# Patient Record
Sex: FEMALE | Race: BLACK OR AFRICAN AMERICAN | NOT HISPANIC OR LATINO | Employment: UNEMPLOYED | URBAN - METROPOLITAN AREA
[De-identification: names, ages, dates, MRNs, and addresses within clinical notes are randomized per-mention and may not be internally consistent; named-entity substitution may affect disease eponyms.]

---

## 2021-04-15 ENCOUNTER — OFFICE VISIT (OUTPATIENT)
Dept: INTERNAL MEDICINE | Facility: CLINIC | Age: 41
End: 2021-04-15
Payer: COMMERCIAL

## 2021-04-15 ENCOUNTER — TELEPHONE (OUTPATIENT)
Dept: ADMINISTRATIVE | Facility: HOSPITAL | Age: 41
End: 2021-04-15

## 2021-04-15 ENCOUNTER — TELEPHONE (OUTPATIENT)
Dept: ORTHOPEDICS | Facility: CLINIC | Age: 41
End: 2021-04-15

## 2021-04-15 VITALS
SYSTOLIC BLOOD PRESSURE: 128 MMHG | HEIGHT: 67 IN | TEMPERATURE: 98 F | DIASTOLIC BLOOD PRESSURE: 82 MMHG | WEIGHT: 289.25 LBS | BODY MASS INDEX: 45.4 KG/M2 | HEART RATE: 64 BPM

## 2021-04-15 DIAGNOSIS — E78.5 HYPERLIPIDEMIA, UNSPECIFIED HYPERLIPIDEMIA TYPE: ICD-10-CM

## 2021-04-15 DIAGNOSIS — M75.102 TEAR OF LEFT ROTATOR CUFF, UNSPECIFIED TEAR EXTENT, UNSPECIFIED WHETHER TRAUMATIC: Primary | ICD-10-CM

## 2021-04-15 DIAGNOSIS — Z85.3 HISTORY OF BREAST CANCER: ICD-10-CM

## 2021-04-15 DIAGNOSIS — T45.1X5A ADVERSE EFFECT OF CHEMOTHERAPY, INITIAL ENCOUNTER: ICD-10-CM

## 2021-04-15 DIAGNOSIS — R32 URINARY INCONTINENCE, UNSPECIFIED TYPE: ICD-10-CM

## 2021-04-15 DIAGNOSIS — F41.1 GAD (GENERALIZED ANXIETY DISORDER): ICD-10-CM

## 2021-04-15 DIAGNOSIS — Z12.31 SCREENING MAMMOGRAM, ENCOUNTER FOR: ICD-10-CM

## 2021-04-15 DIAGNOSIS — Z90.12 STATUS POST LEFT MASTECTOMY: ICD-10-CM

## 2021-04-15 DIAGNOSIS — Z28.9 DELAYED IMMUNIZATIONS: ICD-10-CM

## 2021-04-15 DIAGNOSIS — R73.03 PRE-DIABETES: ICD-10-CM

## 2021-04-15 PROBLEM — Z90.10 S/P MASTECTOMY: Status: ACTIVE | Noted: 2021-04-15

## 2021-04-15 PROCEDURE — 99999 PR PBB SHADOW E&M-NEW PATIENT-LVL V: ICD-10-PCS | Mod: PBBFAC,,, | Performed by: FAMILY MEDICINE

## 2021-04-15 PROCEDURE — 1125F PR PAIN SEVERITY QUANTIFIED, PAIN PRESENT: ICD-10-PCS | Mod: S$GLB,,, | Performed by: FAMILY MEDICINE

## 2021-04-15 PROCEDURE — 90471 TDAP VACCINE GREATER THAN OR EQUAL TO 7YO IM: ICD-10-PCS | Mod: S$GLB,,, | Performed by: FAMILY MEDICINE

## 2021-04-15 PROCEDURE — 3008F BODY MASS INDEX DOCD: CPT | Mod: CPTII,S$GLB,, | Performed by: FAMILY MEDICINE

## 2021-04-15 PROCEDURE — 99204 PR OFFICE/OUTPT VISIT, NEW, LEVL IV, 45-59 MIN: ICD-10-PCS | Mod: 25,S$GLB,, | Performed by: FAMILY MEDICINE

## 2021-04-15 PROCEDURE — 90715 TDAP VACCINE GREATER THAN OR EQUAL TO 7YO IM: ICD-10-PCS | Mod: S$GLB,,, | Performed by: FAMILY MEDICINE

## 2021-04-15 PROCEDURE — 3008F PR BODY MASS INDEX (BMI) DOCUMENTED: ICD-10-PCS | Mod: CPTII,S$GLB,, | Performed by: FAMILY MEDICINE

## 2021-04-15 PROCEDURE — 1125F AMNT PAIN NOTED PAIN PRSNT: CPT | Mod: S$GLB,,, | Performed by: FAMILY MEDICINE

## 2021-04-15 PROCEDURE — 90471 IMMUNIZATION ADMIN: CPT | Mod: S$GLB,,, | Performed by: FAMILY MEDICINE

## 2021-04-15 PROCEDURE — 99204 OFFICE O/P NEW MOD 45 MIN: CPT | Mod: 25,S$GLB,, | Performed by: FAMILY MEDICINE

## 2021-04-15 PROCEDURE — 90715 TDAP VACCINE 7 YRS/> IM: CPT | Mod: S$GLB,,, | Performed by: FAMILY MEDICINE

## 2021-04-15 PROCEDURE — 99999 PR PBB SHADOW E&M-NEW PATIENT-LVL V: CPT | Mod: PBBFAC,,, | Performed by: FAMILY MEDICINE

## 2021-04-15 RX ORDER — METFORMIN HYDROCHLORIDE 500 MG/1
TABLET, EXTENDED RELEASE ORAL
Qty: 30 TABLET | Refills: 0 | Status: SHIPPED | OUTPATIENT
Start: 2021-04-15 | End: 2021-08-03 | Stop reason: SDUPTHER

## 2021-04-15 RX ORDER — ESCITALOPRAM OXALATE 10 MG/1
10 TABLET ORAL DAILY
COMMUNITY
Start: 2021-03-24 | End: 2021-04-15 | Stop reason: SDUPTHER

## 2021-04-15 RX ORDER — PRAVASTATIN SODIUM 40 MG/1
40 TABLET ORAL NIGHTLY
Qty: 30 TABLET | Refills: 0 | Status: SHIPPED | OUTPATIENT
Start: 2021-04-15 | End: 2021-08-03 | Stop reason: SDUPTHER

## 2021-04-15 RX ORDER — LEVOTHYROXINE SODIUM 50 UG/1
50 TABLET ORAL EVERY MORNING
Qty: 30 TABLET | Refills: 0 | Status: SHIPPED | OUTPATIENT
Start: 2021-04-15 | End: 2021-07-29 | Stop reason: SDUPTHER

## 2021-04-15 RX ORDER — LEVOTHYROXINE SODIUM 50 UG/1
50 TABLET ORAL EVERY MORNING
COMMUNITY
Start: 2021-04-04 | End: 2021-04-15 | Stop reason: SDUPTHER

## 2021-04-15 RX ORDER — TRAZODONE HYDROCHLORIDE 50 MG/1
TABLET ORAL
COMMUNITY
Start: 2021-04-09 | End: 2021-04-15 | Stop reason: SDUPTHER

## 2021-04-15 RX ORDER — OXYCODONE AND ACETAMINOPHEN 5; 325 MG/1; MG/1
1 TABLET ORAL EVERY 4 HOURS PRN
COMMUNITY
Start: 2021-03-02 | End: 2021-07-02

## 2021-04-15 RX ORDER — OXYBUTYNIN CHLORIDE 5 MG/1
5 TABLET, EXTENDED RELEASE ORAL DAILY
Qty: 30 TABLET | Refills: 0 | Status: SHIPPED | OUTPATIENT
Start: 2021-04-15 | End: 2021-05-07 | Stop reason: SDUPTHER

## 2021-04-15 RX ORDER — TRAZODONE HYDROCHLORIDE 50 MG/1
TABLET ORAL
Qty: 30 TABLET | Refills: 0 | Status: SHIPPED | OUTPATIENT
Start: 2021-04-15 | End: 2021-04-30

## 2021-04-15 RX ORDER — NAPROXEN 500 MG/1
500 TABLET ORAL 2 TIMES DAILY
COMMUNITY
Start: 2021-03-24 | End: 2021-10-11

## 2021-04-15 RX ORDER — ESCITALOPRAM OXALATE 10 MG/1
10 TABLET ORAL DAILY
Qty: 30 TABLET | Refills: 0 | Status: SHIPPED | OUTPATIENT
Start: 2021-04-15 | End: 2021-04-30

## 2021-04-15 RX ORDER — ASPIRIN 325 MG
TABLET ORAL
COMMUNITY
End: 2023-03-22 | Stop reason: SDUPTHER

## 2021-04-15 RX ORDER — METFORMIN HYDROCHLORIDE 500 MG/1
TABLET, EXTENDED RELEASE ORAL
COMMUNITY
Start: 2021-03-29 | End: 2021-04-15 | Stop reason: SDUPTHER

## 2021-04-15 RX ORDER — PRAVASTATIN SODIUM 40 MG/1
40 TABLET ORAL NIGHTLY
COMMUNITY
Start: 2021-04-04 | End: 2021-04-15 | Stop reason: SDUPTHER

## 2021-04-15 RX ORDER — MAGNESIUM 250 MG
TABLET ORAL ONCE
COMMUNITY
End: 2021-07-02

## 2021-04-16 ENCOUNTER — TELEPHONE (OUTPATIENT)
Dept: ORTHOPEDICS | Facility: CLINIC | Age: 41
End: 2021-04-16

## 2021-04-16 DIAGNOSIS — M25.512 LEFT SHOULDER PAIN, UNSPECIFIED CHRONICITY: Primary | ICD-10-CM

## 2021-04-19 ENCOUNTER — OFFICE VISIT (OUTPATIENT)
Dept: ORTHOPEDICS | Facility: CLINIC | Age: 41
End: 2021-04-19
Payer: COMMERCIAL

## 2021-04-19 ENCOUNTER — HOSPITAL ENCOUNTER (OUTPATIENT)
Dept: RADIOLOGY | Facility: HOSPITAL | Age: 41
Discharge: HOME | End: 2021-04-19
Attending: PHYSICAL MEDICINE & REHABILITATION
Payer: COMMERCIAL

## 2021-04-19 VITALS — WEIGHT: 289 LBS | HEIGHT: 67 IN | BODY MASS INDEX: 45.36 KG/M2

## 2021-04-19 DIAGNOSIS — M75.122 NONTRAUMATIC COMPLETE TEAR OF LEFT ROTATOR CUFF: Primary | ICD-10-CM

## 2021-04-19 DIAGNOSIS — M75.02 ADHESIVE CAPSULITIS OF LEFT SHOULDER: ICD-10-CM

## 2021-04-19 DIAGNOSIS — G89.29 CHRONIC LEFT SHOULDER PAIN: ICD-10-CM

## 2021-04-19 DIAGNOSIS — M25.512 LEFT SHOULDER PAIN, UNSPECIFIED CHRONICITY: ICD-10-CM

## 2021-04-19 DIAGNOSIS — M25.512 CHRONIC LEFT SHOULDER PAIN: ICD-10-CM

## 2021-04-19 PROCEDURE — 99999 PR PBB SHADOW E&M-EST. PATIENT-LVL IV: ICD-10-PCS | Mod: PBBFAC,,, | Performed by: PHYSICAL MEDICINE & REHABILITATION

## 2021-04-19 PROCEDURE — 1125F PR PAIN SEVERITY QUANTIFIED, PAIN PRESENT: ICD-10-PCS | Mod: S$GLB,,, | Performed by: PHYSICAL MEDICINE & REHABILITATION

## 2021-04-19 PROCEDURE — 3008F PR BODY MASS INDEX (BMI) DOCUMENTED: ICD-10-PCS | Mod: CPTII,S$GLB,, | Performed by: PHYSICAL MEDICINE & REHABILITATION

## 2021-04-19 PROCEDURE — 1125F AMNT PAIN NOTED PAIN PRSNT: CPT | Mod: S$GLB,,, | Performed by: PHYSICAL MEDICINE & REHABILITATION

## 2021-04-19 PROCEDURE — 99999 PR PBB SHADOW E&M-EST. PATIENT-LVL IV: CPT | Mod: PBBFAC,,, | Performed by: PHYSICAL MEDICINE & REHABILITATION

## 2021-04-19 PROCEDURE — 73030 X-RAY EXAM OF SHOULDER: CPT | Mod: 26,LT,, | Performed by: RADIOLOGY

## 2021-04-19 PROCEDURE — 73030 XR SHOULDER COMPLETE 2 OR MORE VIEWS LEFT: ICD-10-PCS | Mod: 26,LT,, | Performed by: RADIOLOGY

## 2021-04-19 PROCEDURE — 3008F BODY MASS INDEX DOCD: CPT | Mod: CPTII,S$GLB,, | Performed by: PHYSICAL MEDICINE & REHABILITATION

## 2021-04-19 PROCEDURE — 73030 X-RAY EXAM OF SHOULDER: CPT | Mod: TC,LT

## 2021-04-19 PROCEDURE — 99204 PR OFFICE/OUTPT VISIT, NEW, LEVL IV, 45-59 MIN: ICD-10-PCS | Mod: S$GLB,,, | Performed by: PHYSICAL MEDICINE & REHABILITATION

## 2021-04-19 PROCEDURE — 99204 OFFICE O/P NEW MOD 45 MIN: CPT | Mod: S$GLB,,, | Performed by: PHYSICAL MEDICINE & REHABILITATION

## 2021-04-23 ENCOUNTER — HOSPITAL ENCOUNTER (OUTPATIENT)
Dept: RADIOLOGY | Facility: HOSPITAL | Age: 41
Discharge: HOME | End: 2021-04-23
Attending: PHYSICAL MEDICINE & REHABILITATION
Payer: COMMERCIAL

## 2021-04-23 DIAGNOSIS — G89.29 CHRONIC LEFT SHOULDER PAIN: ICD-10-CM

## 2021-04-23 DIAGNOSIS — M25.512 CHRONIC LEFT SHOULDER PAIN: ICD-10-CM

## 2021-04-23 PROCEDURE — 73221 MRI JOINT UPR EXTREM W/O DYE: CPT | Mod: TC,PO,LT

## 2021-04-23 PROCEDURE — 73221 MRI SHOULDER WITHOUT CONTRAST LEFT: ICD-10-PCS | Mod: 26,LT,, | Performed by: RADIOLOGY

## 2021-04-23 PROCEDURE — 73221 MRI JOINT UPR EXTREM W/O DYE: CPT | Mod: 26,LT,, | Performed by: RADIOLOGY

## 2021-04-26 ENCOUNTER — TELEPHONE (OUTPATIENT)
Dept: ORTHOPEDICS | Facility: CLINIC | Age: 41
End: 2021-04-26

## 2021-04-27 ENCOUNTER — OFFICE VISIT (OUTPATIENT)
Dept: ORTHOPEDICS | Facility: CLINIC | Age: 41
End: 2021-04-27
Payer: COMMERCIAL

## 2021-04-27 VITALS — HEIGHT: 67 IN | WEIGHT: 289 LBS | BODY MASS INDEX: 45.36 KG/M2

## 2021-04-27 DIAGNOSIS — M75.122 NONTRAUMATIC COMPLETE TEAR OF LEFT ROTATOR CUFF: Primary | ICD-10-CM

## 2021-04-27 PROCEDURE — 3008F PR BODY MASS INDEX (BMI) DOCUMENTED: ICD-10-PCS | Mod: CPTII,S$GLB,, | Performed by: STUDENT IN AN ORGANIZED HEALTH CARE EDUCATION/TRAINING PROGRAM

## 2021-04-27 PROCEDURE — 99999 PR PBB SHADOW E&M-EST. PATIENT-LVL III: ICD-10-PCS | Mod: PBBFAC,,, | Performed by: STUDENT IN AN ORGANIZED HEALTH CARE EDUCATION/TRAINING PROGRAM

## 2021-04-27 PROCEDURE — 99204 OFFICE O/P NEW MOD 45 MIN: CPT | Mod: S$GLB,,, | Performed by: STUDENT IN AN ORGANIZED HEALTH CARE EDUCATION/TRAINING PROGRAM

## 2021-04-27 PROCEDURE — 99999 PR PBB SHADOW E&M-EST. PATIENT-LVL III: CPT | Mod: PBBFAC,,, | Performed by: STUDENT IN AN ORGANIZED HEALTH CARE EDUCATION/TRAINING PROGRAM

## 2021-04-27 PROCEDURE — 99204 PR OFFICE/OUTPT VISIT, NEW, LEVL IV, 45-59 MIN: ICD-10-PCS | Mod: S$GLB,,, | Performed by: STUDENT IN AN ORGANIZED HEALTH CARE EDUCATION/TRAINING PROGRAM

## 2021-04-27 PROCEDURE — 3008F BODY MASS INDEX DOCD: CPT | Mod: CPTII,S$GLB,, | Performed by: STUDENT IN AN ORGANIZED HEALTH CARE EDUCATION/TRAINING PROGRAM

## 2021-04-27 PROCEDURE — 1125F PR PAIN SEVERITY QUANTIFIED, PAIN PRESENT: ICD-10-PCS | Mod: S$GLB,,, | Performed by: STUDENT IN AN ORGANIZED HEALTH CARE EDUCATION/TRAINING PROGRAM

## 2021-04-27 PROCEDURE — 1125F AMNT PAIN NOTED PAIN PRSNT: CPT | Mod: S$GLB,,, | Performed by: STUDENT IN AN ORGANIZED HEALTH CARE EDUCATION/TRAINING PROGRAM

## 2021-04-30 ENCOUNTER — LAB VISIT (OUTPATIENT)
Dept: LAB | Facility: HOSPITAL | Age: 41
End: 2021-04-30
Attending: FAMILY MEDICINE
Payer: COMMERCIAL

## 2021-04-30 ENCOUNTER — OFFICE VISIT (OUTPATIENT)
Dept: OPHTHALMOLOGY | Facility: CLINIC | Age: 41
End: 2021-04-30
Payer: COMMERCIAL

## 2021-04-30 ENCOUNTER — OFFICE VISIT (OUTPATIENT)
Dept: INTERNAL MEDICINE | Facility: CLINIC | Age: 41
End: 2021-04-30
Payer: COMMERCIAL

## 2021-04-30 VITALS
SYSTOLIC BLOOD PRESSURE: 138 MMHG | RESPIRATION RATE: 18 BRPM | WEIGHT: 293 LBS | BODY MASS INDEX: 45.99 KG/M2 | HEART RATE: 66 BPM | TEMPERATURE: 98 F | DIASTOLIC BLOOD PRESSURE: 84 MMHG | HEIGHT: 67 IN

## 2021-04-30 DIAGNOSIS — E11.9 DIABETES MELLITUS WITHOUT COMPLICATION: Primary | ICD-10-CM

## 2021-04-30 DIAGNOSIS — F41.1 GAD (GENERALIZED ANXIETY DISORDER): ICD-10-CM

## 2021-04-30 DIAGNOSIS — Z00.00 ROUTINE GENERAL MEDICAL EXAMINATION AT A HEALTH CARE FACILITY: Primary | ICD-10-CM

## 2021-04-30 DIAGNOSIS — H53.9 VISION DISTURBANCE: ICD-10-CM

## 2021-04-30 DIAGNOSIS — Z85.3 HISTORY OF BREAST CANCER: ICD-10-CM

## 2021-04-30 DIAGNOSIS — Z90.710 S/P HYSTERECTOMY: ICD-10-CM

## 2021-04-30 DIAGNOSIS — G47.09 OTHER INSOMNIA: ICD-10-CM

## 2021-04-30 DIAGNOSIS — Z00.00 ROUTINE GENERAL MEDICAL EXAMINATION AT A HEALTH CARE FACILITY: ICD-10-CM

## 2021-04-30 DIAGNOSIS — H04.123 DRY EYE SYNDROME, BILATERAL: ICD-10-CM

## 2021-04-30 DIAGNOSIS — Z78.0 POSTMENOPAUSAL: ICD-10-CM

## 2021-04-30 DIAGNOSIS — H53.2 MONOCULAR DIPLOPIA OF BOTH EYES: ICD-10-CM

## 2021-04-30 DIAGNOSIS — R06.09 DOE (DYSPNEA ON EXERTION): ICD-10-CM

## 2021-04-30 DIAGNOSIS — H52.13 MYOPIA OF BOTH EYES: ICD-10-CM

## 2021-04-30 PROBLEM — Z12.31 SCREENING MAMMOGRAM, ENCOUNTER FOR: Status: RESOLVED | Noted: 2021-04-15 | Resolved: 2021-04-30

## 2021-04-30 LAB
25(OH)D3+25(OH)D2 SERPL-MCNC: 24 NG/ML (ref 30–96)
ALBUMIN SERPL BCP-MCNC: 3.5 G/DL (ref 3.5–5.2)
ALP SERPL-CCNC: 130 U/L (ref 55–135)
ALT SERPL W/O P-5'-P-CCNC: 19 U/L (ref 10–44)
ANION GAP SERPL CALC-SCNC: 7 MMOL/L (ref 8–16)
AST SERPL-CCNC: 20 U/L (ref 10–40)
BASOPHILS # BLD AUTO: 0.03 K/UL (ref 0–0.2)
BASOPHILS NFR BLD: 0.7 % (ref 0–1.9)
BILIRUB SERPL-MCNC: 0.2 MG/DL (ref 0.1–1)
BUN SERPL-MCNC: 9 MG/DL (ref 6–20)
CALCIUM SERPL-MCNC: 9.6 MG/DL (ref 8.7–10.5)
CHLORIDE SERPL-SCNC: 107 MMOL/L (ref 95–110)
CHOLEST SERPL-MCNC: 134 MG/DL (ref 120–199)
CHOLEST/HDLC SERPL: 3.1 {RATIO} (ref 2–5)
CO2 SERPL-SCNC: 26 MMOL/L (ref 23–29)
CREAT SERPL-MCNC: 0.8 MG/DL (ref 0.5–1.4)
DIFFERENTIAL METHOD: ABNORMAL
EOSINOPHIL # BLD AUTO: 0.1 K/UL (ref 0–0.5)
EOSINOPHIL NFR BLD: 2.7 % (ref 0–8)
ERYTHROCYTE [DISTWIDTH] IN BLOOD BY AUTOMATED COUNT: 16.4 % (ref 11.5–14.5)
EST. GFR  (AFRICAN AMERICAN): >60 ML/MIN/1.73 M^2
EST. GFR  (NON AFRICAN AMERICAN): >60 ML/MIN/1.73 M^2
ESTIMATED AVG GLUCOSE: 111 MG/DL (ref 68–131)
FERRITIN SERPL-MCNC: 15 NG/ML (ref 20–300)
GLUCOSE SERPL-MCNC: 79 MG/DL (ref 70–110)
HBA1C MFR BLD: 5.5 % (ref 4–5.6)
HCT VFR BLD AUTO: 38.3 % (ref 37–48.5)
HDLC SERPL-MCNC: 43 MG/DL (ref 40–75)
HDLC SERPL: 32.1 % (ref 20–50)
HGB BLD-MCNC: 11.9 G/DL (ref 12–16)
IMM GRANULOCYTES # BLD AUTO: 0.01 K/UL (ref 0–0.04)
IMM GRANULOCYTES NFR BLD AUTO: 0.2 % (ref 0–0.5)
IRON SERPL-MCNC: 51 UG/DL (ref 30–160)
LDLC SERPL CALC-MCNC: 67.2 MG/DL (ref 63–159)
LEFT EYE DM RETINOPATHY: NEGATIVE
LYMPHOCYTES # BLD AUTO: 2 K/UL (ref 1–4.8)
LYMPHOCYTES NFR BLD: 43.3 % (ref 18–48)
MCH RBC QN AUTO: 24.4 PG (ref 27–31)
MCHC RBC AUTO-ENTMCNC: 31.1 G/DL (ref 32–36)
MCV RBC AUTO: 79 FL (ref 82–98)
MONOCYTES # BLD AUTO: 0.4 K/UL (ref 0.3–1)
MONOCYTES NFR BLD: 9.3 % (ref 4–15)
NEUTROPHILS # BLD AUTO: 2 K/UL (ref 1.8–7.7)
NEUTROPHILS NFR BLD: 43.8 % (ref 38–73)
NONHDLC SERPL-MCNC: 91 MG/DL
NRBC BLD-RTO: 0 /100 WBC
PLATELET # BLD AUTO: 305 K/UL (ref 150–450)
PMV BLD AUTO: 10.1 FL (ref 9.2–12.9)
POTASSIUM SERPL-SCNC: 4.3 MMOL/L (ref 3.5–5.1)
PROT SERPL-MCNC: 7 G/DL (ref 6–8.4)
RBC # BLD AUTO: 4.88 M/UL (ref 4–5.4)
RIGHT EYE DM RETINOPATHY: NEGATIVE
SATURATED IRON: 11 % (ref 20–50)
SODIUM SERPL-SCNC: 140 MMOL/L (ref 136–145)
TOTAL IRON BINDING CAPACITY: 469 UG/DL (ref 250–450)
TRANSFERRIN SERPL-MCNC: 317 MG/DL (ref 200–375)
TRIGL SERPL-MCNC: 119 MG/DL (ref 30–150)
TSH SERPL DL<=0.005 MIU/L-ACNC: 1.73 UIU/ML (ref 0.4–4)
WBC # BLD AUTO: 4.5 K/UL (ref 3.9–12.7)

## 2021-04-30 PROCEDURE — 36415 COLL VENOUS BLD VENIPUNCTURE: CPT | Mod: PO | Performed by: FAMILY MEDICINE

## 2021-04-30 PROCEDURE — 92015 PR REFRACTION: ICD-10-PCS | Mod: S$GLB,,, | Performed by: OPTOMETRIST

## 2021-04-30 PROCEDURE — 99999 PR PBB SHADOW E&M-EST. PATIENT-LVL IV: CPT | Mod: PBBFAC,,, | Performed by: FAMILY MEDICINE

## 2021-04-30 PROCEDURE — 92004 PR EYE EXAM, NEW PATIENT,COMPREHESV: ICD-10-PCS | Mod: S$GLB,,, | Performed by: OPTOMETRIST

## 2021-04-30 PROCEDURE — 85025 COMPLETE CBC W/AUTO DIFF WBC: CPT | Performed by: FAMILY MEDICINE

## 2021-04-30 PROCEDURE — 3008F BODY MASS INDEX DOCD: CPT | Mod: CPTII,S$GLB,, | Performed by: FAMILY MEDICINE

## 2021-04-30 PROCEDURE — 84443 ASSAY THYROID STIM HORMONE: CPT | Performed by: FAMILY MEDICINE

## 2021-04-30 PROCEDURE — 82728 ASSAY OF FERRITIN: CPT | Performed by: FAMILY MEDICINE

## 2021-04-30 PROCEDURE — 99396 PREV VISIT EST AGE 40-64: CPT | Mod: S$GLB,,, | Performed by: FAMILY MEDICINE

## 2021-04-30 PROCEDURE — 83540 ASSAY OF IRON: CPT | Performed by: FAMILY MEDICINE

## 2021-04-30 PROCEDURE — 99999 PR PBB SHADOW E&M-EST. PATIENT-LVL III: ICD-10-PCS | Mod: PBBFAC,,, | Performed by: OPTOMETRIST

## 2021-04-30 PROCEDURE — 99396 PR PREVENTIVE VISIT,EST,40-64: ICD-10-PCS | Mod: S$GLB,,, | Performed by: FAMILY MEDICINE

## 2021-04-30 PROCEDURE — 86803 HEPATITIS C AB TEST: CPT | Performed by: FAMILY MEDICINE

## 2021-04-30 PROCEDURE — 86703 HIV-1/HIV-2 1 RESULT ANTBDY: CPT | Performed by: FAMILY MEDICINE

## 2021-04-30 PROCEDURE — 82306 VITAMIN D 25 HYDROXY: CPT | Performed by: FAMILY MEDICINE

## 2021-04-30 PROCEDURE — 92015 DETERMINE REFRACTIVE STATE: CPT | Mod: S$GLB,,, | Performed by: OPTOMETRIST

## 2021-04-30 PROCEDURE — 3008F PR BODY MASS INDEX (BMI) DOCUMENTED: ICD-10-PCS | Mod: CPTII,S$GLB,, | Performed by: FAMILY MEDICINE

## 2021-04-30 PROCEDURE — 80053 COMPREHEN METABOLIC PANEL: CPT | Performed by: FAMILY MEDICINE

## 2021-04-30 PROCEDURE — 83036 HEMOGLOBIN GLYCOSYLATED A1C: CPT | Performed by: FAMILY MEDICINE

## 2021-04-30 PROCEDURE — 99999 PR PBB SHADOW E&M-EST. PATIENT-LVL III: CPT | Mod: PBBFAC,,, | Performed by: OPTOMETRIST

## 2021-04-30 PROCEDURE — 80061 LIPID PANEL: CPT | Performed by: FAMILY MEDICINE

## 2021-04-30 PROCEDURE — 1125F PR PAIN SEVERITY QUANTIFIED, PAIN PRESENT: ICD-10-PCS | Mod: S$GLB,,, | Performed by: FAMILY MEDICINE

## 2021-04-30 PROCEDURE — 92004 COMPRE OPH EXAM NEW PT 1/>: CPT | Mod: S$GLB,,, | Performed by: OPTOMETRIST

## 2021-04-30 PROCEDURE — 1125F AMNT PAIN NOTED PAIN PRSNT: CPT | Mod: S$GLB,,, | Performed by: FAMILY MEDICINE

## 2021-04-30 PROCEDURE — 99999 PR PBB SHADOW E&M-EST. PATIENT-LVL IV: ICD-10-PCS | Mod: PBBFAC,,, | Performed by: FAMILY MEDICINE

## 2021-04-30 RX ORDER — BUSPIRONE HYDROCHLORIDE 5 MG/1
5 TABLET ORAL 3 TIMES DAILY
Qty: 270 TABLET | Refills: 1 | Status: SHIPPED | OUTPATIENT
Start: 2021-04-30 | End: 2021-05-13

## 2021-04-30 RX ORDER — CALCIUM CARBONATE/VITAMIN D3 600 MG-20
1 TABLET,CHEWABLE ORAL ONCE
Qty: 360 TABLET | Refills: 1 | Status: SHIPPED | OUTPATIENT
Start: 2021-04-30 | End: 2021-04-30

## 2021-04-30 RX ORDER — ESCITALOPRAM OXALATE 20 MG/1
20 TABLET ORAL DAILY
Qty: 90 TABLET | Refills: 0 | Status: SHIPPED | OUTPATIENT
Start: 2021-04-30 | End: 2021-08-03 | Stop reason: SDUPTHER

## 2021-04-30 RX ORDER — TRAZODONE HYDROCHLORIDE 150 MG/1
150 TABLET ORAL NIGHTLY
Qty: 90 TABLET | Refills: 0 | Status: SHIPPED | OUTPATIENT
Start: 2021-04-30 | End: 2021-08-03 | Stop reason: SDUPTHER

## 2021-05-03 DIAGNOSIS — E78.5 HYPERLIPIDEMIA, UNSPECIFIED HYPERLIPIDEMIA TYPE: ICD-10-CM

## 2021-05-03 DIAGNOSIS — R06.09 DOE (DYSPNEA ON EXERTION): Primary | ICD-10-CM

## 2021-05-03 LAB
HCV AB SERPL QL IA: NEGATIVE
HIV 1+2 AB+HIV1 P24 AG SERPL QL IA: NEGATIVE

## 2021-05-04 ENCOUNTER — OFFICE VISIT (OUTPATIENT)
Dept: CARDIOLOGY | Facility: CLINIC | Age: 41
End: 2021-05-04
Payer: COMMERCIAL

## 2021-05-04 ENCOUNTER — HOSPITAL ENCOUNTER (OUTPATIENT)
Dept: CARDIOLOGY | Facility: HOSPITAL | Age: 41
Discharge: HOME | End: 2021-05-04
Attending: INTERNAL MEDICINE
Payer: COMMERCIAL

## 2021-05-04 ENCOUNTER — HOSPITAL ENCOUNTER (OUTPATIENT)
Dept: RADIOLOGY | Facility: HOSPITAL | Age: 41
Discharge: HOME | End: 2021-05-04
Attending: INTERNAL MEDICINE
Payer: COMMERCIAL

## 2021-05-04 VITALS
OXYGEN SATURATION: 98 % | BODY MASS INDEX: 47.09 KG/M2 | HEIGHT: 66 IN | DIASTOLIC BLOOD PRESSURE: 90 MMHG | HEART RATE: 65 BPM | RESPIRATION RATE: 16 BRPM | WEIGHT: 293 LBS | SYSTOLIC BLOOD PRESSURE: 130 MMHG

## 2021-05-04 DIAGNOSIS — F41.1 GAD (GENERALIZED ANXIETY DISORDER): ICD-10-CM

## 2021-05-04 DIAGNOSIS — R00.2 PALPITATIONS: ICD-10-CM

## 2021-05-04 DIAGNOSIS — R06.09 DOE (DYSPNEA ON EXERTION): ICD-10-CM

## 2021-05-04 DIAGNOSIS — E78.5 HYPERLIPIDEMIA, UNSPECIFIED HYPERLIPIDEMIA TYPE: ICD-10-CM

## 2021-05-04 DIAGNOSIS — R07.9 CHEST PAIN, UNSPECIFIED TYPE: ICD-10-CM

## 2021-05-04 DIAGNOSIS — E66.01 OBESITY, MORBID, BMI 40.0-49.9: ICD-10-CM

## 2021-05-04 DIAGNOSIS — R73.03 PRE-DIABETES: ICD-10-CM

## 2021-05-04 DIAGNOSIS — R07.9 CHEST PAIN, UNSPECIFIED TYPE: Primary | ICD-10-CM

## 2021-05-04 DIAGNOSIS — E78.49 OTHER HYPERLIPIDEMIA: ICD-10-CM

## 2021-05-04 PROCEDURE — 71046 X-RAY EXAM CHEST 2 VIEWS: CPT | Mod: 26,,, | Performed by: RADIOLOGY

## 2021-05-04 PROCEDURE — 71046 XR CHEST PA AND LATERAL: ICD-10-PCS | Mod: 26,,, | Performed by: RADIOLOGY

## 2021-05-04 PROCEDURE — 71046 X-RAY EXAM CHEST 2 VIEWS: CPT | Mod: TC,FY,PO

## 2021-05-04 PROCEDURE — 93005 ELECTROCARDIOGRAM TRACING: CPT | Mod: PO

## 2021-05-04 PROCEDURE — 99204 OFFICE O/P NEW MOD 45 MIN: CPT | Mod: S$GLB,,, | Performed by: INTERNAL MEDICINE

## 2021-05-04 PROCEDURE — 99999 PR PBB SHADOW E&M-EST. PATIENT-LVL IV: ICD-10-PCS | Mod: PBBFAC,,, | Performed by: INTERNAL MEDICINE

## 2021-05-04 PROCEDURE — 93010 EKG 12-LEAD: ICD-10-PCS | Mod: ,,, | Performed by: INTERNAL MEDICINE

## 2021-05-04 PROCEDURE — 99204 PR OFFICE/OUTPT VISIT, NEW, LEVL IV, 45-59 MIN: ICD-10-PCS | Mod: S$GLB,,, | Performed by: INTERNAL MEDICINE

## 2021-05-04 PROCEDURE — 99999 PR PBB SHADOW E&M-EST. PATIENT-LVL IV: CPT | Mod: PBBFAC,,, | Performed by: INTERNAL MEDICINE

## 2021-05-04 PROCEDURE — 93010 ELECTROCARDIOGRAM REPORT: CPT | Mod: ,,, | Performed by: INTERNAL MEDICINE

## 2021-05-04 RX ORDER — ASPIRIN 81 MG/1
81 TABLET ORAL DAILY
Qty: 30 TABLET | Refills: 0 | Status: SHIPPED | OUTPATIENT
Start: 2021-05-04 | End: 2022-02-14

## 2021-05-04 RX ORDER — HYDROCHLOROTHIAZIDE 25 MG/1
25 TABLET ORAL DAILY
Qty: 30 TABLET | Refills: 3 | Status: SHIPPED | OUTPATIENT
Start: 2021-05-04 | End: 2021-05-04 | Stop reason: SDUPTHER

## 2021-05-04 RX ORDER — HYDROCHLOROTHIAZIDE 25 MG/1
25 TABLET ORAL DAILY PRN
Qty: 30 TABLET | Refills: 3 | Status: SHIPPED | OUTPATIENT
Start: 2021-05-04 | End: 2021-08-03 | Stop reason: SDUPTHER

## 2021-05-05 ENCOUNTER — TELEPHONE (OUTPATIENT)
Dept: INTERNAL MEDICINE | Facility: CLINIC | Age: 41
End: 2021-05-05

## 2021-05-05 ENCOUNTER — TELEPHONE (OUTPATIENT)
Dept: CARDIOLOGY | Facility: CLINIC | Age: 41
End: 2021-05-05

## 2021-05-07 ENCOUNTER — OFFICE VISIT (OUTPATIENT)
Dept: INTERNAL MEDICINE | Facility: CLINIC | Age: 41
End: 2021-05-07
Payer: COMMERCIAL

## 2021-05-07 VITALS
WEIGHT: 289.69 LBS | RESPIRATION RATE: 17 BRPM | TEMPERATURE: 97 F | DIASTOLIC BLOOD PRESSURE: 72 MMHG | HEIGHT: 66 IN | HEART RATE: 60 BPM | BODY MASS INDEX: 46.56 KG/M2 | SYSTOLIC BLOOD PRESSURE: 118 MMHG

## 2021-05-07 DIAGNOSIS — F41.1 GAD (GENERALIZED ANXIETY DISORDER): ICD-10-CM

## 2021-05-07 DIAGNOSIS — R32 URINARY INCONTINENCE, UNSPECIFIED TYPE: ICD-10-CM

## 2021-05-07 DIAGNOSIS — E78.49 OTHER HYPERLIPIDEMIA: ICD-10-CM

## 2021-05-07 DIAGNOSIS — R73.03 PRE-DIABETES: ICD-10-CM

## 2021-05-07 DIAGNOSIS — M75.122 NONTRAUMATIC COMPLETE TEAR OF LEFT ROTATOR CUFF: ICD-10-CM

## 2021-05-07 DIAGNOSIS — R06.02 SOB (SHORTNESS OF BREATH): Primary | ICD-10-CM

## 2021-05-07 DIAGNOSIS — T45.1X5A ADVERSE EFFECT OF CHEMOTHERAPY, INITIAL ENCOUNTER: ICD-10-CM

## 2021-05-07 DIAGNOSIS — Z85.3 HISTORY OF BREAST CANCER: ICD-10-CM

## 2021-05-07 PROBLEM — H53.9 VISION DISTURBANCE: Status: RESOLVED | Noted: 2021-04-30 | Resolved: 2021-05-07

## 2021-05-07 PROCEDURE — 99214 OFFICE O/P EST MOD 30 MIN: CPT | Mod: S$GLB,,, | Performed by: FAMILY MEDICINE

## 2021-05-07 PROCEDURE — 3008F PR BODY MASS INDEX (BMI) DOCUMENTED: ICD-10-PCS | Mod: CPTII,S$GLB,, | Performed by: FAMILY MEDICINE

## 2021-05-07 PROCEDURE — 1125F AMNT PAIN NOTED PAIN PRSNT: CPT | Mod: S$GLB,,, | Performed by: FAMILY MEDICINE

## 2021-05-07 PROCEDURE — 3008F BODY MASS INDEX DOCD: CPT | Mod: CPTII,S$GLB,, | Performed by: FAMILY MEDICINE

## 2021-05-07 PROCEDURE — 99999 PR PBB SHADOW E&M-EST. PATIENT-LVL IV: ICD-10-PCS | Mod: PBBFAC,,, | Performed by: FAMILY MEDICINE

## 2021-05-07 PROCEDURE — 99214 PR OFFICE/OUTPT VISIT, EST, LEVL IV, 30-39 MIN: ICD-10-PCS | Mod: S$GLB,,, | Performed by: FAMILY MEDICINE

## 2021-05-07 PROCEDURE — 99999 PR PBB SHADOW E&M-EST. PATIENT-LVL IV: CPT | Mod: PBBFAC,,, | Performed by: FAMILY MEDICINE

## 2021-05-07 PROCEDURE — 1125F PR PAIN SEVERITY QUANTIFIED, PAIN PRESENT: ICD-10-PCS | Mod: S$GLB,,, | Performed by: FAMILY MEDICINE

## 2021-05-07 RX ORDER — OXYBUTYNIN CHLORIDE 5 MG/1
5 TABLET, EXTENDED RELEASE ORAL DAILY
Qty: 90 TABLET | Refills: 0 | Status: SHIPPED | OUTPATIENT
Start: 2021-05-07 | End: 2021-08-03 | Stop reason: SDUPTHER

## 2021-05-09 PROBLEM — Z17.1 MALIGNANT NEOPLASM OF LEFT BREAST IN FEMALE, ESTROGEN RECEPTOR NEGATIVE: Status: ACTIVE | Noted: 2021-05-09

## 2021-05-09 PROBLEM — C50.912 MALIGNANT NEOPLASM OF LEFT BREAST IN FEMALE, ESTROGEN RECEPTOR NEGATIVE: Status: ACTIVE | Noted: 2021-05-09

## 2021-05-09 PROBLEM — D32.9 MENINGIOMA: Status: ACTIVE | Noted: 2021-05-09

## 2021-05-10 ENCOUNTER — INFUSION (OUTPATIENT)
Dept: INFUSION THERAPY | Facility: HOSPITAL | Age: 41
End: 2021-05-10
Attending: INTERNAL MEDICINE
Payer: COMMERCIAL

## 2021-05-10 ENCOUNTER — OFFICE VISIT (OUTPATIENT)
Dept: HEMATOLOGY/ONCOLOGY | Facility: CLINIC | Age: 41
End: 2021-05-10
Payer: COMMERCIAL

## 2021-05-10 VITALS
HEART RATE: 70 BPM | RESPIRATION RATE: 16 BRPM | OXYGEN SATURATION: 100 % | TEMPERATURE: 98 F | DIASTOLIC BLOOD PRESSURE: 89 MMHG | SYSTOLIC BLOOD PRESSURE: 129 MMHG

## 2021-05-10 VITALS
SYSTOLIC BLOOD PRESSURE: 131 MMHG | WEIGHT: 287.25 LBS | HEIGHT: 68 IN | TEMPERATURE: 97 F | HEART RATE: 73 BPM | BODY MASS INDEX: 43.53 KG/M2 | DIASTOLIC BLOOD PRESSURE: 83 MMHG

## 2021-05-10 DIAGNOSIS — Z17.1 MALIGNANT NEOPLASM OF LEFT BREAST IN FEMALE, ESTROGEN RECEPTOR NEGATIVE, UNSPECIFIED SITE OF BREAST: Primary | ICD-10-CM

## 2021-05-10 DIAGNOSIS — Z17.1 MALIGNANT NEOPLASM OF LEFT BREAST IN FEMALE, ESTROGEN RECEPTOR NEGATIVE, UNSPECIFIED SITE OF BREAST: ICD-10-CM

## 2021-05-10 DIAGNOSIS — M75.122 NONTRAUMATIC COMPLETE TEAR OF LEFT ROTATOR CUFF: ICD-10-CM

## 2021-05-10 DIAGNOSIS — C50.912 MALIGNANT NEOPLASM OF LEFT BREAST IN FEMALE, ESTROGEN RECEPTOR NEGATIVE, UNSPECIFIED SITE OF BREAST: ICD-10-CM

## 2021-05-10 DIAGNOSIS — Z90.12 STATUS POST LEFT MASTECTOMY: ICD-10-CM

## 2021-05-10 DIAGNOSIS — Z85.3 HISTORY OF BREAST CANCER: ICD-10-CM

## 2021-05-10 DIAGNOSIS — D32.9 MENINGIOMA: ICD-10-CM

## 2021-05-10 DIAGNOSIS — C50.912 MALIGNANT NEOPLASM OF LEFT BREAST IN FEMALE, ESTROGEN RECEPTOR NEGATIVE, UNSPECIFIED SITE OF BREAST: Primary | ICD-10-CM

## 2021-05-10 PROCEDURE — 99205 OFFICE O/P NEW HI 60 MIN: CPT | Mod: S$GLB,,, | Performed by: INTERNAL MEDICINE

## 2021-05-10 PROCEDURE — 63600175 PHARM REV CODE 636 W HCPCS: Performed by: INTERNAL MEDICINE

## 2021-05-10 PROCEDURE — 96523 IRRIG DRUG DELIVERY DEVICE: CPT

## 2021-05-10 PROCEDURE — 99205 PR OFFICE/OUTPT VISIT, NEW, LEVL V, 60-74 MIN: ICD-10-PCS | Mod: S$GLB,,, | Performed by: INTERNAL MEDICINE

## 2021-05-10 PROCEDURE — 99999 PR PBB SHADOW E&M-EST. PATIENT-LVL V: ICD-10-PCS | Mod: PBBFAC,,, | Performed by: INTERNAL MEDICINE

## 2021-05-10 PROCEDURE — A4216 STERILE WATER/SALINE, 10 ML: HCPCS | Performed by: INTERNAL MEDICINE

## 2021-05-10 PROCEDURE — 99999 PR PBB SHADOW E&M-EST. PATIENT-LVL V: CPT | Mod: PBBFAC,,, | Performed by: INTERNAL MEDICINE

## 2021-05-10 PROCEDURE — 25000003 PHARM REV CODE 250: Performed by: INTERNAL MEDICINE

## 2021-05-10 RX ORDER — SODIUM CHLORIDE 0.9 % (FLUSH) 0.9 %
10 SYRINGE (ML) INJECTION
Status: CANCELLED | OUTPATIENT
Start: 2021-05-10

## 2021-05-10 RX ORDER — HEPARIN 100 UNIT/ML
500 SYRINGE INTRAVENOUS
Status: CANCELLED | OUTPATIENT
Start: 2021-05-10

## 2021-05-10 RX ORDER — LIDOCAINE AND PRILOCAINE 25; 25 MG/G; MG/G
CREAM TOPICAL
Qty: 30 G | Refills: 0 | Status: SHIPPED | OUTPATIENT
Start: 2021-05-10 | End: 2021-07-02

## 2021-05-10 RX ORDER — SODIUM CHLORIDE 0.9 % (FLUSH) 0.9 %
10 SYRINGE (ML) INJECTION
Status: DISCONTINUED | OUTPATIENT
Start: 2021-05-10 | End: 2021-05-10 | Stop reason: HOSPADM

## 2021-05-10 RX ORDER — HEPARIN 100 UNIT/ML
500 SYRINGE INTRAVENOUS
Status: DISCONTINUED | OUTPATIENT
Start: 2021-05-10 | End: 2021-05-10 | Stop reason: HOSPADM

## 2021-05-10 RX ADMIN — SODIUM CHLORIDE, PRESERVATIVE FREE 10 ML: 5 INJECTION INTRAVENOUS at 12:05

## 2021-05-10 RX ADMIN — HEPARIN 500 UNITS: 100 SYRINGE at 12:05

## 2021-05-12 ENCOUNTER — TELEPHONE (OUTPATIENT)
Dept: HEMATOLOGY/ONCOLOGY | Facility: CLINIC | Age: 41
End: 2021-05-12

## 2021-05-12 ENCOUNTER — NURSE TRIAGE (OUTPATIENT)
Dept: ADMINISTRATIVE | Facility: CLINIC | Age: 41
End: 2021-05-12

## 2021-05-13 ENCOUNTER — TELEPHONE (OUTPATIENT)
Dept: INTERNAL MEDICINE | Facility: CLINIC | Age: 41
End: 2021-05-13

## 2021-05-13 ENCOUNTER — OFFICE VISIT (OUTPATIENT)
Dept: INTERNAL MEDICINE | Facility: CLINIC | Age: 41
End: 2021-05-13
Payer: COMMERCIAL

## 2021-05-13 VITALS
HEART RATE: 70 BPM | HEIGHT: 68 IN | TEMPERATURE: 98 F | BODY MASS INDEX: 43.68 KG/M2 | SYSTOLIC BLOOD PRESSURE: 110 MMHG | DIASTOLIC BLOOD PRESSURE: 76 MMHG

## 2021-05-13 DIAGNOSIS — Z85.3 HISTORY OF BREAST CANCER: ICD-10-CM

## 2021-05-13 DIAGNOSIS — T45.1X5A ADVERSE EFFECT OF CHEMOTHERAPY, INITIAL ENCOUNTER: ICD-10-CM

## 2021-05-13 DIAGNOSIS — R01.1 MURMUR: ICD-10-CM

## 2021-05-13 DIAGNOSIS — G47.09 OTHER INSOMNIA: ICD-10-CM

## 2021-05-13 DIAGNOSIS — E78.49 OTHER HYPERLIPIDEMIA: ICD-10-CM

## 2021-05-13 DIAGNOSIS — Z28.9 DELAYED IMMUNIZATIONS: ICD-10-CM

## 2021-05-13 DIAGNOSIS — F41.1 GAD (GENERALIZED ANXIETY DISORDER): Primary | ICD-10-CM

## 2021-05-13 DIAGNOSIS — R06.09 DOE (DYSPNEA ON EXERTION): ICD-10-CM

## 2021-05-13 PROCEDURE — 90670 PCV13 VACCINE IM: CPT | Mod: S$GLB,,, | Performed by: FAMILY MEDICINE

## 2021-05-13 PROCEDURE — 3008F PR BODY MASS INDEX (BMI) DOCUMENTED: ICD-10-PCS | Mod: CPTII,S$GLB,, | Performed by: FAMILY MEDICINE

## 2021-05-13 PROCEDURE — 90670 PNEUMOCOCCAL CONJUGATE VACCINE 13-VALENT LESS THAN 5YO & GREATER THAN: ICD-10-PCS | Mod: S$GLB,,, | Performed by: FAMILY MEDICINE

## 2021-05-13 PROCEDURE — 99999 PR PBB SHADOW E&M-EST. PATIENT-LVL V: CPT | Mod: PBBFAC,,, | Performed by: FAMILY MEDICINE

## 2021-05-13 PROCEDURE — 3008F BODY MASS INDEX DOCD: CPT | Mod: CPTII,S$GLB,, | Performed by: FAMILY MEDICINE

## 2021-05-13 PROCEDURE — 90471 IMMUNIZATION ADMIN: CPT | Mod: S$GLB,,, | Performed by: FAMILY MEDICINE

## 2021-05-13 PROCEDURE — 1126F PR PAIN SEVERITY QUANTIFIED, NO PAIN PRESENT: ICD-10-PCS | Mod: S$GLB,,, | Performed by: FAMILY MEDICINE

## 2021-05-13 PROCEDURE — 90471 PNEUMOCOCCAL CONJUGATE VACCINE 13-VALENT LESS THAN 5YO & GREATER THAN: ICD-10-PCS | Mod: S$GLB,,, | Performed by: FAMILY MEDICINE

## 2021-05-13 PROCEDURE — 1126F AMNT PAIN NOTED NONE PRSNT: CPT | Mod: S$GLB,,, | Performed by: FAMILY MEDICINE

## 2021-05-13 PROCEDURE — 99214 PR OFFICE/OUTPT VISIT, EST, LEVL IV, 30-39 MIN: ICD-10-PCS | Mod: 25,S$GLB,, | Performed by: FAMILY MEDICINE

## 2021-05-13 PROCEDURE — 99214 OFFICE O/P EST MOD 30 MIN: CPT | Mod: 25,S$GLB,, | Performed by: FAMILY MEDICINE

## 2021-05-13 PROCEDURE — 99999 PR PBB SHADOW E&M-EST. PATIENT-LVL V: ICD-10-PCS | Mod: PBBFAC,,, | Performed by: FAMILY MEDICINE

## 2021-05-13 RX ORDER — ESCITALOPRAM OXALATE 10 MG/1
10 TABLET ORAL DAILY
Qty: 90 TABLET | Refills: 0 | Status: SHIPPED | OUTPATIENT
Start: 2021-05-13 | End: 2021-07-29 | Stop reason: SDUPTHER

## 2021-05-13 RX ORDER — BUSPIRONE HYDROCHLORIDE 10 MG/1
10 TABLET ORAL 3 TIMES DAILY
Qty: 270 TABLET | Refills: 0 | Status: SHIPPED | OUTPATIENT
Start: 2021-05-13 | End: 2021-07-02

## 2021-05-14 PROCEDURE — G0180 PR HOME HEALTH MD CERTIFICATION: ICD-10-PCS | Mod: ,,, | Performed by: FAMILY MEDICINE

## 2021-05-14 PROCEDURE — G0180 MD CERTIFICATION HHA PATIENT: HCPCS | Mod: ,,, | Performed by: FAMILY MEDICINE

## 2021-05-19 ENCOUNTER — TELEPHONE (OUTPATIENT)
Dept: INTERNAL MEDICINE | Facility: CLINIC | Age: 41
End: 2021-05-19

## 2021-05-20 ENCOUNTER — TELEPHONE (OUTPATIENT)
Dept: RADIOLOGY | Facility: HOSPITAL | Age: 41
End: 2021-05-20

## 2021-05-23 ENCOUNTER — EXTERNAL HOME HEALTH (OUTPATIENT)
Dept: HOME HEALTH SERVICES | Facility: HOSPITAL | Age: 41
End: 2021-05-23
Payer: COMMERCIAL

## 2021-05-24 ENCOUNTER — HOSPITAL ENCOUNTER (OUTPATIENT)
Dept: RADIOLOGY | Facility: HOSPITAL | Age: 41
Discharge: HOME | End: 2021-05-24
Attending: INTERNAL MEDICINE
Payer: COMMERCIAL

## 2021-05-24 DIAGNOSIS — Z17.1 MALIGNANT NEOPLASM OF LEFT BREAST IN FEMALE, ESTROGEN RECEPTOR NEGATIVE, UNSPECIFIED SITE OF BREAST: ICD-10-CM

## 2021-05-24 DIAGNOSIS — C50.912 MALIGNANT NEOPLASM OF LEFT BREAST IN FEMALE, ESTROGEN RECEPTOR NEGATIVE, UNSPECIFIED SITE OF BREAST: ICD-10-CM

## 2021-05-24 DIAGNOSIS — D32.9 MENINGIOMA: ICD-10-CM

## 2021-05-24 PROCEDURE — 70553 MRI BRAIN STEM W/O & W/DYE: CPT | Mod: TC,PO

## 2021-05-24 PROCEDURE — A9585 GADOBUTROL INJECTION: HCPCS | Mod: PO | Performed by: INTERNAL MEDICINE

## 2021-05-24 PROCEDURE — 70553 MRI BRAIN W WO CONTRAST: ICD-10-PCS | Mod: 26,,, | Performed by: RADIOLOGY

## 2021-05-24 PROCEDURE — 70553 MRI BRAIN STEM W/O & W/DYE: CPT | Mod: 26,,, | Performed by: RADIOLOGY

## 2021-05-24 PROCEDURE — 25500020 PHARM REV CODE 255: Mod: PO | Performed by: INTERNAL MEDICINE

## 2021-05-24 RX ORDER — GADOBUTROL 604.72 MG/ML
10 INJECTION INTRAVENOUS
Status: COMPLETED | OUTPATIENT
Start: 2021-05-24 | End: 2021-05-24

## 2021-05-24 RX ADMIN — GADOBUTROL 10 ML: 604.72 INJECTION INTRAVENOUS at 11:05

## 2021-05-26 ENCOUNTER — HOSPITAL ENCOUNTER (OUTPATIENT)
Dept: RADIOLOGY | Facility: HOSPITAL | Age: 41
Discharge: HOME | End: 2021-05-26
Attending: INTERNAL MEDICINE
Payer: COMMERCIAL

## 2021-05-26 DIAGNOSIS — D32.9 MENINGIOMA: ICD-10-CM

## 2021-05-26 DIAGNOSIS — C50.912 MALIGNANT NEOPLASM OF LEFT BREAST IN FEMALE, ESTROGEN RECEPTOR NEGATIVE, UNSPECIFIED SITE OF BREAST: ICD-10-CM

## 2021-05-26 DIAGNOSIS — Z17.1 MALIGNANT NEOPLASM OF LEFT BREAST IN FEMALE, ESTROGEN RECEPTOR NEGATIVE, UNSPECIFIED SITE OF BREAST: ICD-10-CM

## 2021-05-26 PROCEDURE — 78815 NM PET CT ROUTINE: ICD-10-PCS | Mod: 26,PI,, | Performed by: RADIOLOGY

## 2021-05-26 PROCEDURE — 78815 PET IMAGE W/CT SKULL-THIGH: CPT | Mod: 26,PI,, | Performed by: RADIOLOGY

## 2021-05-26 PROCEDURE — 78815 PET IMAGE W/CT SKULL-THIGH: CPT | Mod: TC

## 2021-05-27 ENCOUNTER — DOCUMENT SCAN (OUTPATIENT)
Dept: HOME HEALTH SERVICES | Facility: HOSPITAL | Age: 41
End: 2021-05-27
Payer: COMMERCIAL

## 2021-05-28 ENCOUNTER — OFFICE VISIT (OUTPATIENT)
Dept: HEMATOLOGY/ONCOLOGY | Facility: CLINIC | Age: 41
End: 2021-05-28
Payer: COMMERCIAL

## 2021-05-28 VITALS
SYSTOLIC BLOOD PRESSURE: 130 MMHG | DIASTOLIC BLOOD PRESSURE: 81 MMHG | HEIGHT: 68 IN | OXYGEN SATURATION: 100 % | TEMPERATURE: 97 F | HEART RATE: 63 BPM | WEIGHT: 291.88 LBS | BODY MASS INDEX: 44.23 KG/M2

## 2021-05-28 DIAGNOSIS — D32.9 MENINGIOMA: ICD-10-CM

## 2021-05-28 DIAGNOSIS — Z17.1 MALIGNANT NEOPLASM OF LEFT BREAST IN FEMALE, ESTROGEN RECEPTOR NEGATIVE, UNSPECIFIED SITE OF BREAST: Primary | ICD-10-CM

## 2021-05-28 DIAGNOSIS — C50.912 MALIGNANT NEOPLASM OF LEFT BREAST IN FEMALE, ESTROGEN RECEPTOR NEGATIVE, UNSPECIFIED SITE OF BREAST: Primary | ICD-10-CM

## 2021-05-28 PROCEDURE — 99215 OFFICE O/P EST HI 40 MIN: CPT | Mod: S$GLB,,, | Performed by: INTERNAL MEDICINE

## 2021-05-28 PROCEDURE — 3008F BODY MASS INDEX DOCD: CPT | Mod: CPTII,S$GLB,, | Performed by: INTERNAL MEDICINE

## 2021-05-28 PROCEDURE — 1126F PR PAIN SEVERITY QUANTIFIED, NO PAIN PRESENT: ICD-10-PCS | Mod: S$GLB,,, | Performed by: INTERNAL MEDICINE

## 2021-05-28 PROCEDURE — 99215 PR OFFICE/OUTPT VISIT, EST, LEVL V, 40-54 MIN: ICD-10-PCS | Mod: S$GLB,,, | Performed by: INTERNAL MEDICINE

## 2021-05-28 PROCEDURE — 99999 PR PBB SHADOW E&M-EST. PATIENT-LVL IV: ICD-10-PCS | Mod: PBBFAC,,, | Performed by: INTERNAL MEDICINE

## 2021-05-28 PROCEDURE — 99999 PR PBB SHADOW E&M-EST. PATIENT-LVL IV: CPT | Mod: PBBFAC,,, | Performed by: INTERNAL MEDICINE

## 2021-05-28 PROCEDURE — 1126F AMNT PAIN NOTED NONE PRSNT: CPT | Mod: S$GLB,,, | Performed by: INTERNAL MEDICINE

## 2021-05-28 PROCEDURE — 3008F PR BODY MASS INDEX (BMI) DOCUMENTED: ICD-10-PCS | Mod: CPTII,S$GLB,, | Performed by: INTERNAL MEDICINE

## 2021-05-28 RX ORDER — SODIUM CHLORIDE 0.9 % (FLUSH) 0.9 %
10 SYRINGE (ML) INJECTION
Status: CANCELLED | OUTPATIENT
Start: 2021-05-28

## 2021-05-28 RX ORDER — HEPARIN 100 UNIT/ML
500 SYRINGE INTRAVENOUS
Status: CANCELLED | OUTPATIENT
Start: 2021-05-28

## 2021-06-01 ENCOUNTER — HOSPITAL ENCOUNTER (OUTPATIENT)
Dept: RADIOLOGY | Facility: HOSPITAL | Age: 41
Discharge: HOME | End: 2021-06-01
Attending: FAMILY MEDICINE
Payer: COMMERCIAL

## 2021-06-01 DIAGNOSIS — Z90.12 STATUS POST LEFT MASTECTOMY: ICD-10-CM

## 2021-06-01 DIAGNOSIS — Z12.31 SCREENING MAMMOGRAM, ENCOUNTER FOR: ICD-10-CM

## 2021-06-01 DIAGNOSIS — Z85.3 HISTORY OF BREAST CANCER: ICD-10-CM

## 2021-06-01 PROCEDURE — 77065 DX MAMMO INCL CAD UNI: CPT | Mod: 26,RT,, | Performed by: RADIOLOGY

## 2021-06-01 PROCEDURE — 77061 MAMMO DIGITAL DIAGNOSTIC RIGHT WITH TOMO: ICD-10-PCS | Mod: 26,RT,, | Performed by: RADIOLOGY

## 2021-06-01 PROCEDURE — 77061 BREAST TOMOSYNTHESIS UNI: CPT | Mod: 26,RT,, | Performed by: RADIOLOGY

## 2021-06-01 PROCEDURE — 77061 BREAST TOMOSYNTHESIS UNI: CPT | Mod: TC,RT

## 2021-06-01 PROCEDURE — 77065 MAMMO DIGITAL DIAGNOSTIC RIGHT WITH TOMO: ICD-10-PCS | Mod: 26,RT,, | Performed by: RADIOLOGY

## 2021-06-23 ENCOUNTER — HOSPITAL ENCOUNTER (OUTPATIENT)
Dept: RADIOLOGY | Facility: HOSPITAL | Age: 41
Discharge: HOME | End: 2021-06-23
Attending: INTERNAL MEDICINE
Payer: COMMERCIAL

## 2021-06-23 ENCOUNTER — HOSPITAL ENCOUNTER (OUTPATIENT)
Dept: CARDIOLOGY | Facility: HOSPITAL | Age: 41
Discharge: HOME | End: 2021-06-23
Attending: INTERNAL MEDICINE
Payer: COMMERCIAL

## 2021-06-23 ENCOUNTER — TELEPHONE (OUTPATIENT)
Dept: INFUSION THERAPY | Facility: HOSPITAL | Age: 41
End: 2021-06-23

## 2021-06-23 ENCOUNTER — HOSPITAL ENCOUNTER (OUTPATIENT)
Dept: RADIOLOGY | Facility: HOSPITAL | Age: 41
Discharge: HOME | End: 2021-06-23
Attending: INTERNAL MEDICINE
Payer: MEDICAID

## 2021-06-23 VITALS — HEIGHT: 68 IN | BODY MASS INDEX: 44.1 KG/M2 | WEIGHT: 291 LBS

## 2021-06-23 DIAGNOSIS — R73.03 PRE-DIABETES: ICD-10-CM

## 2021-06-23 DIAGNOSIS — R07.9 CHEST PAIN, UNSPECIFIED TYPE: ICD-10-CM

## 2021-06-23 DIAGNOSIS — R00.2 PALPITATIONS: ICD-10-CM

## 2021-06-23 DIAGNOSIS — F41.1 GAD (GENERALIZED ANXIETY DISORDER): ICD-10-CM

## 2021-06-23 DIAGNOSIS — R06.09 DOE (DYSPNEA ON EXERTION): ICD-10-CM

## 2021-06-23 DIAGNOSIS — E78.49 OTHER HYPERLIPIDEMIA: ICD-10-CM

## 2021-06-23 DIAGNOSIS — E66.01 OBESITY, MORBID, BMI 40.0-49.9: ICD-10-CM

## 2021-06-23 LAB
AORTIC ROOT ANNULUS: 3.24 CM
AV INDEX (PROSTH): 0.89
AV MEAN GRADIENT: 3 MMHG
AV PEAK GRADIENT: 6 MMHG
AV VALVE AREA: 2.89 CM2
AV VELOCITY RATIO: 0.77
BSA FOR ECHO PROCEDURE: 2.52 M2
CV ECHO LV RWT: 0.47 CM
DOP CALC AO PEAK VEL: 1.23 M/S
DOP CALC AO VTI: 26.55 CM
DOP CALC LVOT AREA: 3.2 CM2
DOP CALC LVOT DIAMETER: 2.03 CM
DOP CALC LVOT PEAK VEL: 0.95 M/S
DOP CALC LVOT STROKE VOLUME: 76.6 CM3
DOP CALC RVOT PEAK VEL: 0.64 M/S
DOP CALC RVOT VTI: 16.14 CM
DOP CALCLVOT PEAK VEL VTI: 23.68 CM
E WAVE DECELERATION TIME: 176.99 MSEC
E/A RATIO: 1.04
E/E' RATIO: 5.84 M/S
ECHO LV POSTERIOR WALL: 1.28 CM (ref 0.6–1.1)
EJECTION FRACTION: 55 %
FRACTIONAL SHORTENING: 31 % (ref 28–44)
INTERVENTRICULAR SEPTUM: 1.25 CM (ref 0.6–1.1)
IVRT: 85.63 MSEC
LA MAJOR: 5.13 CM
LA MINOR: 4.87 CM
LA WIDTH: 3.41 CM
LEFT ATRIUM SIZE: 4.11 CM
LEFT ATRIUM VOLUME INDEX: 24.8 ML/M2
LEFT ATRIUM VOLUME: 59.52 CM3
LEFT INTERNAL DIMENSION IN SYSTOLE: 3.76 CM (ref 2.1–4)
LEFT VENTRICLE DIASTOLIC VOLUME INDEX: 60.73 ML/M2
LEFT VENTRICLE DIASTOLIC VOLUME: 145.74 ML
LEFT VENTRICLE MASS INDEX: 121 G/M2
LEFT VENTRICLE SYSTOLIC VOLUME INDEX: 25.1 ML/M2
LEFT VENTRICLE SYSTOLIC VOLUME: 60.21 ML
LEFT VENTRICULAR INTERNAL DIMENSION IN DIASTOLE: 5.47 CM (ref 3.5–6)
LEFT VENTRICULAR MASS: 290.41 G
LV LATERAL E/E' RATIO: 6.08 M/S
LV SEPTAL E/E' RATIO: 5.62 M/S
MV A" WAVE DURATION": 9.99 MSEC
MV PEAK A VEL: 0.7 M/S
MV PEAK E VEL: 0.73 M/S
PISA TR MAX VEL: 2.65 M/S
PULM VEIN S/D RATIO: 1.47
PV MEAN GRADIENT: 1 MMHG
PV PEAK D VEL: 0.49 M/S
PV PEAK S VEL: 0.72 M/S
PV PEAK VELOCITY: 0.79 CM/S
RA MAJOR: 5.19 CM
RA PRESSURE: 3 MMHG
RA WIDTH: 2.52 CM
RIGHT VENTRICULAR END-DIASTOLIC DIMENSION: 2.37 CM
SINUS: 2.7 CM
STJ: 2.87 CM
TDI LATERAL: 0.12 M/S
TDI SEPTAL: 0.13 M/S
TDI: 0.13 M/S
TR MAX PG: 28 MMHG
TRICUSPID ANNULAR PLANE SYSTOLIC EXCURSION: 2.04 CM
TV REST PULMONARY ARTERY PRESSURE: 31 MMHG

## 2021-06-23 PROCEDURE — 78452 STRESS TEST WITH MYOCARDIAL PERFUSION (CUPID ONLY): ICD-10-PCS | Mod: 26,,, | Performed by: INTERNAL MEDICINE

## 2021-06-23 PROCEDURE — 93017 CV STRESS TEST TRACING ONLY: CPT

## 2021-06-23 PROCEDURE — 93018 STRESS TEST WITH MYOCARDIAL PERFUSION (CUPID ONLY): ICD-10-PCS | Mod: ,,, | Performed by: INTERNAL MEDICINE

## 2021-06-23 PROCEDURE — 78452 HT MUSCLE IMAGE SPECT MULT: CPT | Mod: 26,,, | Performed by: INTERNAL MEDICINE

## 2021-06-23 PROCEDURE — A9502 TC99M TETROFOSMIN: HCPCS

## 2021-06-23 PROCEDURE — 93306 ECHO (CUPID ONLY): ICD-10-PCS | Mod: 26,,, | Performed by: INTERNAL MEDICINE

## 2021-06-23 PROCEDURE — 93016 STRESS TEST WITH MYOCARDIAL PERFUSION (CUPID ONLY): ICD-10-PCS | Mod: ,,, | Performed by: INTERNAL MEDICINE

## 2021-06-23 PROCEDURE — 93227 XTRNL ECG REC<48 HR R&I: CPT | Mod: ,,, | Performed by: INTERNAL MEDICINE

## 2021-06-23 PROCEDURE — 93226 XTRNL ECG REC<48 HR SCAN A/R: CPT

## 2021-06-23 PROCEDURE — 93016 CV STRESS TEST SUPVJ ONLY: CPT | Mod: ,,, | Performed by: INTERNAL MEDICINE

## 2021-06-23 PROCEDURE — 93306 TTE W/DOPPLER COMPLETE: CPT

## 2021-06-23 PROCEDURE — 93306 TTE W/DOPPLER COMPLETE: CPT | Mod: 26,,, | Performed by: INTERNAL MEDICINE

## 2021-06-23 PROCEDURE — 93018 CV STRESS TEST I&R ONLY: CPT | Mod: ,,, | Performed by: INTERNAL MEDICINE

## 2021-06-23 PROCEDURE — 63600175 PHARM REV CODE 636 W HCPCS: Performed by: INTERNAL MEDICINE

## 2021-06-23 PROCEDURE — 78452 HT MUSCLE IMAGE SPECT MULT: CPT

## 2021-06-23 PROCEDURE — 93227 HOLTER MONITOR - 48 HOUR (CUPID ONLY): ICD-10-PCS | Mod: ,,, | Performed by: INTERNAL MEDICINE

## 2021-06-23 RX ORDER — REGADENOSON 0.08 MG/ML
0.4 INJECTION, SOLUTION INTRAVENOUS ONCE
Status: COMPLETED | OUTPATIENT
Start: 2021-06-23 | End: 2021-06-23

## 2021-06-23 RX ADMIN — REGADENOSON 0.4 MG: 0.08 INJECTION, SOLUTION INTRAVENOUS at 01:06

## 2021-06-24 ENCOUNTER — PATIENT MESSAGE (OUTPATIENT)
Dept: INTERNAL MEDICINE | Facility: CLINIC | Age: 41
End: 2021-06-24

## 2021-06-24 LAB
CV STRESS BASE HR: 46 BPM
DIASTOLIC BLOOD PRESSURE: 84 MMHG
NUC REST EJECTION FRACTION: 60
NUC STRESS EJECTION FRACTION: 53 %
OHS CV CPX 85 PERCENT MAX PREDICTED HEART RATE MALE: 143
OHS CV CPX ESTIMATED METS: 1
OHS CV CPX MAX PREDICTED HEART RATE: 168
OHS CV CPX PATIENT IS FEMALE: 1
OHS CV CPX PATIENT IS MALE: 0
OHS CV CPX PEAK DIASTOLIC BLOOD PRESSURE: 88 MMHG
OHS CV CPX PEAK HEAR RATE: 85 BPM
OHS CV CPX PEAK RATE PRESSURE PRODUCT: NORMAL
OHS CV CPX PEAK SYSTOLIC BLOOD PRESSURE: 152 MMHG
OHS CV CPX PERCENT MAX PREDICTED HEART RATE ACHIEVED: 51
OHS CV CPX RATE PRESSURE PRODUCT PRESENTING: 5704
STRESS ECHO POST EXERCISE DUR SEC: 58 SECONDS
SYSTOLIC BLOOD PRESSURE: 124 MMHG

## 2021-06-25 ENCOUNTER — INFUSION (OUTPATIENT)
Dept: INFUSION THERAPY | Facility: HOSPITAL | Age: 41
End: 2021-06-25
Attending: FAMILY MEDICINE
Payer: COMMERCIAL

## 2021-06-25 VITALS
DIASTOLIC BLOOD PRESSURE: 78 MMHG | WEIGHT: 288.81 LBS | HEART RATE: 55 BPM | RESPIRATION RATE: 18 BRPM | HEIGHT: 68 IN | SYSTOLIC BLOOD PRESSURE: 120 MMHG | OXYGEN SATURATION: 96 % | BODY MASS INDEX: 43.77 KG/M2 | TEMPERATURE: 98 F

## 2021-06-25 DIAGNOSIS — C50.912 MALIGNANT NEOPLASM OF LEFT BREAST IN FEMALE, ESTROGEN RECEPTOR NEGATIVE, UNSPECIFIED SITE OF BREAST: Primary | ICD-10-CM

## 2021-06-25 DIAGNOSIS — Z17.1 MALIGNANT NEOPLASM OF LEFT BREAST IN FEMALE, ESTROGEN RECEPTOR NEGATIVE, UNSPECIFIED SITE OF BREAST: Primary | ICD-10-CM

## 2021-06-25 LAB
OHS CV EVENT MONITOR DAY: 2
OHS CV HOLTER LENGTH DECIMAL HOURS: 96
OHS CV HOLTER LENGTH HOURS: 48
OHS CV HOLTER LENGTH MINUTES: 0

## 2021-06-25 PROCEDURE — A4216 STERILE WATER/SALINE, 10 ML: HCPCS | Performed by: INTERNAL MEDICINE

## 2021-06-25 PROCEDURE — 25000003 PHARM REV CODE 250: Performed by: INTERNAL MEDICINE

## 2021-06-25 PROCEDURE — 63600175 PHARM REV CODE 636 W HCPCS: Performed by: INTERNAL MEDICINE

## 2021-06-25 PROCEDURE — 96523 IRRIG DRUG DELIVERY DEVICE: CPT

## 2021-06-25 RX ORDER — HEPARIN 100 UNIT/ML
500 SYRINGE INTRAVENOUS
Status: DISCONTINUED | OUTPATIENT
Start: 2021-06-25 | End: 2021-06-25 | Stop reason: HOSPADM

## 2021-06-25 RX ORDER — SODIUM CHLORIDE 0.9 % (FLUSH) 0.9 %
10 SYRINGE (ML) INJECTION
Status: CANCELLED | OUTPATIENT
Start: 2021-06-25

## 2021-06-25 RX ORDER — SODIUM CHLORIDE 0.9 % (FLUSH) 0.9 %
10 SYRINGE (ML) INJECTION
Status: DISCONTINUED | OUTPATIENT
Start: 2021-06-25 | End: 2021-06-25 | Stop reason: HOSPADM

## 2021-06-25 RX ORDER — HEPARIN 100 UNIT/ML
500 SYRINGE INTRAVENOUS
Status: CANCELLED | OUTPATIENT
Start: 2021-06-25

## 2021-06-25 RX ADMIN — HEPARIN 500 UNITS: 100 SYRINGE at 11:06

## 2021-06-25 RX ADMIN — SODIUM CHLORIDE, PRESERVATIVE FREE 10 ML: 5 INJECTION INTRAVENOUS at 11:06

## 2021-07-01 ENCOUNTER — TELEPHONE (OUTPATIENT)
Dept: INTERNAL MEDICINE | Facility: CLINIC | Age: 41
End: 2021-07-01

## 2021-07-02 ENCOUNTER — OFFICE VISIT (OUTPATIENT)
Dept: INTERNAL MEDICINE | Facility: CLINIC | Age: 41
End: 2021-07-02
Payer: COMMERCIAL

## 2021-07-02 ENCOUNTER — OFFICE VISIT (OUTPATIENT)
Dept: CARDIOLOGY | Facility: CLINIC | Age: 41
End: 2021-07-02
Payer: COMMERCIAL

## 2021-07-02 ENCOUNTER — HOSPITAL ENCOUNTER (OUTPATIENT)
Dept: RADIOLOGY | Facility: HOSPITAL | Age: 41
Discharge: HOME | End: 2021-07-02
Attending: FAMILY MEDICINE
Payer: COMMERCIAL

## 2021-07-02 ENCOUNTER — TELEPHONE (OUTPATIENT)
Dept: PAIN MEDICINE | Facility: CLINIC | Age: 41
End: 2021-07-02

## 2021-07-02 VITALS
WEIGHT: 293 LBS | HEART RATE: 66 BPM | OXYGEN SATURATION: 98 % | DIASTOLIC BLOOD PRESSURE: 88 MMHG | SYSTOLIC BLOOD PRESSURE: 130 MMHG | BODY MASS INDEX: 45.22 KG/M2

## 2021-07-02 VITALS
TEMPERATURE: 99 F | DIASTOLIC BLOOD PRESSURE: 74 MMHG | SYSTOLIC BLOOD PRESSURE: 102 MMHG | HEIGHT: 68 IN | WEIGHT: 293 LBS | HEART RATE: 100 BPM | BODY MASS INDEX: 44.41 KG/M2

## 2021-07-02 DIAGNOSIS — Z85.3 HISTORY OF BREAST CANCER: ICD-10-CM

## 2021-07-02 DIAGNOSIS — R07.9 CHEST PAIN, UNSPECIFIED TYPE: Primary | ICD-10-CM

## 2021-07-02 DIAGNOSIS — D32.9 MENINGIOMA: ICD-10-CM

## 2021-07-02 DIAGNOSIS — E66.01 OBESITY, MORBID, BMI 40.0-49.9: ICD-10-CM

## 2021-07-02 DIAGNOSIS — C50.912 MALIGNANT NEOPLASM OF LEFT BREAST IN FEMALE, ESTROGEN RECEPTOR NEGATIVE, UNSPECIFIED SITE OF BREAST: ICD-10-CM

## 2021-07-02 DIAGNOSIS — G47.09 OTHER INSOMNIA: ICD-10-CM

## 2021-07-02 DIAGNOSIS — R06.09 DOE (DYSPNEA ON EXERTION): ICD-10-CM

## 2021-07-02 DIAGNOSIS — R07.9 CHEST PAIN, UNSPECIFIED TYPE: ICD-10-CM

## 2021-07-02 DIAGNOSIS — Z17.1 MALIGNANT NEOPLASM OF LEFT BREAST IN FEMALE, ESTROGEN RECEPTOR NEGATIVE, UNSPECIFIED SITE OF BREAST: ICD-10-CM

## 2021-07-02 DIAGNOSIS — F41.1 GAD (GENERALIZED ANXIETY DISORDER): Primary | ICD-10-CM

## 2021-07-02 DIAGNOSIS — R73.03 PRE-DIABETES: ICD-10-CM

## 2021-07-02 DIAGNOSIS — Z28.9 DELAYED IMMUNIZATIONS: ICD-10-CM

## 2021-07-02 DIAGNOSIS — E78.49 OTHER HYPERLIPIDEMIA: ICD-10-CM

## 2021-07-02 DIAGNOSIS — T45.1X5A ADVERSE EFFECT OF CHEMOTHERAPY, INITIAL ENCOUNTER: ICD-10-CM

## 2021-07-02 DIAGNOSIS — R01.1 MURMUR: ICD-10-CM

## 2021-07-02 DIAGNOSIS — G47.30 SLEEP APNEA, UNSPECIFIED TYPE: ICD-10-CM

## 2021-07-02 PROCEDURE — 3008F BODY MASS INDEX DOCD: CPT | Mod: CPTII,S$GLB,, | Performed by: FAMILY MEDICINE

## 2021-07-02 PROCEDURE — 99999 PR PBB SHADOW E&M-EST. PATIENT-LVL V: ICD-10-PCS | Mod: PBBFAC,,, | Performed by: INTERNAL MEDICINE

## 2021-07-02 PROCEDURE — 99214 OFFICE O/P EST MOD 30 MIN: CPT | Mod: S$GLB,,, | Performed by: INTERNAL MEDICINE

## 2021-07-02 PROCEDURE — 99215 PR OFFICE/OUTPT VISIT, EST, LEVL V, 40-54 MIN: ICD-10-PCS | Mod: 25,S$GLB,, | Performed by: FAMILY MEDICINE

## 2021-07-02 PROCEDURE — 99215 OFFICE O/P EST HI 40 MIN: CPT | Mod: 25,S$GLB,, | Performed by: FAMILY MEDICINE

## 2021-07-02 PROCEDURE — 1126F AMNT PAIN NOTED NONE PRSNT: CPT | Mod: S$GLB,,, | Performed by: FAMILY MEDICINE

## 2021-07-02 PROCEDURE — 3008F PR BODY MASS INDEX (BMI) DOCUMENTED: ICD-10-PCS | Mod: CPTII,S$GLB,, | Performed by: FAMILY MEDICINE

## 2021-07-02 PROCEDURE — 99214 PR OFFICE/OUTPT VISIT, EST, LEVL IV, 30-39 MIN: ICD-10-PCS | Mod: S$GLB,,, | Performed by: INTERNAL MEDICINE

## 2021-07-02 PROCEDURE — 71046 X-RAY EXAM CHEST 2 VIEWS: CPT | Mod: 26,,, | Performed by: RADIOLOGY

## 2021-07-02 PROCEDURE — 90732 PPSV23 VACC 2 YRS+ SUBQ/IM: CPT | Mod: S$GLB,,, | Performed by: FAMILY MEDICINE

## 2021-07-02 PROCEDURE — 1126F PR PAIN SEVERITY QUANTIFIED, NO PAIN PRESENT: ICD-10-PCS | Mod: S$GLB,,, | Performed by: FAMILY MEDICINE

## 2021-07-02 PROCEDURE — 3008F BODY MASS INDEX DOCD: CPT | Mod: CPTII,S$GLB,, | Performed by: INTERNAL MEDICINE

## 2021-07-02 PROCEDURE — 90471 IMMUNIZATION ADMIN: CPT | Mod: S$GLB,,, | Performed by: FAMILY MEDICINE

## 2021-07-02 PROCEDURE — 99999 PR PBB SHADOW E&M-EST. PATIENT-LVL V: CPT | Mod: PBBFAC,,, | Performed by: FAMILY MEDICINE

## 2021-07-02 PROCEDURE — 3008F PR BODY MASS INDEX (BMI) DOCUMENTED: ICD-10-PCS | Mod: CPTII,S$GLB,, | Performed by: INTERNAL MEDICINE

## 2021-07-02 PROCEDURE — 90732 PNEUMOCOCCAL POLYSACCHARIDE VACCINE 23-VALENT =>2YO SQ IM: ICD-10-PCS | Mod: S$GLB,,, | Performed by: FAMILY MEDICINE

## 2021-07-02 PROCEDURE — 99999 PR PBB SHADOW E&M-EST. PATIENT-LVL V: CPT | Mod: PBBFAC,,, | Performed by: INTERNAL MEDICINE

## 2021-07-02 PROCEDURE — 71046 XR CHEST PA AND LATERAL: ICD-10-PCS | Mod: 26,,, | Performed by: RADIOLOGY

## 2021-07-02 PROCEDURE — 90471 PNEUMOCOCCAL POLYSACCHARIDE VACCINE 23-VALENT =>2YO SQ IM: ICD-10-PCS | Mod: S$GLB,,, | Performed by: FAMILY MEDICINE

## 2021-07-02 PROCEDURE — 71046 X-RAY EXAM CHEST 2 VIEWS: CPT | Mod: TC

## 2021-07-02 PROCEDURE — 99999 PR PBB SHADOW E&M-EST. PATIENT-LVL V: ICD-10-PCS | Mod: PBBFAC,,, | Performed by: FAMILY MEDICINE

## 2021-07-02 RX ORDER — BUSPIRONE HYDROCHLORIDE 15 MG/1
15 TABLET ORAL 3 TIMES DAILY
Qty: 270 TABLET | Refills: 3 | Status: SHIPPED | OUTPATIENT
Start: 2021-07-02 | End: 2021-08-03 | Stop reason: SDUPTHER

## 2021-07-02 RX ORDER — LANOLIN ALCOHOL/MO/W.PET/CERES
400 CREAM (GRAM) TOPICAL DAILY
Qty: 90 TABLET | Refills: 1 | Status: SHIPPED | OUTPATIENT
Start: 2021-07-02 | End: 2021-09-23 | Stop reason: SDUPTHER

## 2021-07-06 ENCOUNTER — TELEPHONE (OUTPATIENT)
Dept: CARDIOLOGY | Facility: CLINIC | Age: 41
End: 2021-07-06

## 2021-07-06 ENCOUNTER — TELEPHONE (OUTPATIENT)
Dept: INTERNAL MEDICINE | Facility: CLINIC | Age: 41
End: 2021-07-06

## 2021-07-08 ENCOUNTER — TELEPHONE (OUTPATIENT)
Dept: PAIN MEDICINE | Facility: CLINIC | Age: 41
End: 2021-07-08

## 2021-07-29 ENCOUNTER — TELEPHONE (OUTPATIENT)
Dept: PAIN MEDICINE | Facility: CLINIC | Age: 41
End: 2021-07-29

## 2021-07-29 ENCOUNTER — OFFICE VISIT (OUTPATIENT)
Dept: PAIN MEDICINE | Facility: CLINIC | Age: 41
End: 2021-07-29
Payer: COMMERCIAL

## 2021-07-29 VITALS
SYSTOLIC BLOOD PRESSURE: 113 MMHG | HEIGHT: 68 IN | DIASTOLIC BLOOD PRESSURE: 81 MMHG | RESPIRATION RATE: 18 BRPM | HEART RATE: 55 BPM | BODY MASS INDEX: 44.41 KG/M2 | WEIGHT: 293 LBS

## 2021-07-29 DIAGNOSIS — M75.112 NONTRAUMATIC INCOMPLETE TEAR OF LEFT ROTATOR CUFF: Primary | ICD-10-CM

## 2021-07-29 DIAGNOSIS — R07.9 CHEST PAIN, UNSPECIFIED TYPE: ICD-10-CM

## 2021-07-29 DIAGNOSIS — M79.2 NEUROPATHIC PAIN OF CHEST: ICD-10-CM

## 2021-07-29 PROCEDURE — 1159F MED LIST DOCD IN RCRD: CPT | Mod: CPTII,S$GLB,, | Performed by: ANESTHESIOLOGY

## 2021-07-29 PROCEDURE — 3079F PR MOST RECENT DIASTOLIC BLOOD PRESSURE 80-89 MM HG: ICD-10-PCS | Mod: CPTII,S$GLB,, | Performed by: ANESTHESIOLOGY

## 2021-07-29 PROCEDURE — 3008F BODY MASS INDEX DOCD: CPT | Mod: CPTII,S$GLB,, | Performed by: ANESTHESIOLOGY

## 2021-07-29 PROCEDURE — 3044F PR MOST RECENT HEMOGLOBIN A1C LEVEL <7.0%: ICD-10-PCS | Mod: CPTII,S$GLB,, | Performed by: ANESTHESIOLOGY

## 2021-07-29 PROCEDURE — 99999 PR PBB SHADOW E&M-EST. PATIENT-LVL V: ICD-10-PCS | Mod: PBBFAC,,, | Performed by: ANESTHESIOLOGY

## 2021-07-29 PROCEDURE — 1159F PR MEDICATION LIST DOCUMENTED IN MEDICAL RECORD: ICD-10-PCS | Mod: CPTII,S$GLB,, | Performed by: ANESTHESIOLOGY

## 2021-07-29 PROCEDURE — 3074F SYST BP LT 130 MM HG: CPT | Mod: CPTII,S$GLB,, | Performed by: ANESTHESIOLOGY

## 2021-07-29 PROCEDURE — 99999 PR PBB SHADOW E&M-EST. PATIENT-LVL V: CPT | Mod: PBBFAC,,, | Performed by: ANESTHESIOLOGY

## 2021-07-29 PROCEDURE — 1125F AMNT PAIN NOTED PAIN PRSNT: CPT | Mod: CPTII,S$GLB,, | Performed by: ANESTHESIOLOGY

## 2021-07-29 PROCEDURE — 3074F PR MOST RECENT SYSTOLIC BLOOD PRESSURE < 130 MM HG: ICD-10-PCS | Mod: CPTII,S$GLB,, | Performed by: ANESTHESIOLOGY

## 2021-07-29 PROCEDURE — 99204 PR OFFICE/OUTPT VISIT, NEW, LEVL IV, 45-59 MIN: ICD-10-PCS | Mod: S$GLB,,, | Performed by: ANESTHESIOLOGY

## 2021-07-29 PROCEDURE — 3008F PR BODY MASS INDEX (BMI) DOCUMENTED: ICD-10-PCS | Mod: CPTII,S$GLB,, | Performed by: ANESTHESIOLOGY

## 2021-07-29 PROCEDURE — 3079F DIAST BP 80-89 MM HG: CPT | Mod: CPTII,S$GLB,, | Performed by: ANESTHESIOLOGY

## 2021-07-29 PROCEDURE — 3044F HG A1C LEVEL LT 7.0%: CPT | Mod: CPTII,S$GLB,, | Performed by: ANESTHESIOLOGY

## 2021-07-29 PROCEDURE — 1125F PR PAIN SEVERITY QUANTIFIED, PAIN PRESENT: ICD-10-PCS | Mod: CPTII,S$GLB,, | Performed by: ANESTHESIOLOGY

## 2021-07-29 PROCEDURE — 99204 OFFICE O/P NEW MOD 45 MIN: CPT | Mod: S$GLB,,, | Performed by: ANESTHESIOLOGY

## 2021-07-29 RX ORDER — LIDOCAINE 50 MG/G
PATCH TOPICAL
Qty: 90 PATCH | Refills: 0 | Status: SHIPPED | OUTPATIENT
Start: 2021-07-29 | End: 2021-11-22

## 2021-07-29 RX ORDER — LEVOTHYROXINE SODIUM 50 UG/1
50 TABLET ORAL
COMMUNITY
End: 2021-08-03 | Stop reason: SDUPTHER

## 2021-07-29 RX ORDER — CAPSAICIN 0.75 MG/G
CREAM TOPICAL 3 TIMES DAILY
Qty: 57 G | Refills: 0 | Status: SHIPPED | OUTPATIENT
Start: 2021-07-29 | End: 2021-11-22

## 2021-08-02 ENCOUNTER — NURSE TRIAGE (OUTPATIENT)
Dept: ADMINISTRATIVE | Facility: CLINIC | Age: 41
End: 2021-08-02

## 2021-08-02 PROBLEM — Z00.00 ROUTINE GENERAL MEDICAL EXAMINATION AT A HEALTH CARE FACILITY: Status: RESOLVED | Noted: 2021-04-30 | Resolved: 2021-08-02

## 2021-08-03 ENCOUNTER — OFFICE VISIT (OUTPATIENT)
Dept: INTERNAL MEDICINE | Facility: CLINIC | Age: 41
End: 2021-08-03
Payer: MEDICAID

## 2021-08-03 DIAGNOSIS — R32 URINARY INCONTINENCE, UNSPECIFIED TYPE: ICD-10-CM

## 2021-08-03 DIAGNOSIS — E78.5 HYPERLIPIDEMIA, UNSPECIFIED HYPERLIPIDEMIA TYPE: ICD-10-CM

## 2021-08-03 DIAGNOSIS — G47.09 OTHER INSOMNIA: ICD-10-CM

## 2021-08-03 DIAGNOSIS — I10 HYPERTENSION, UNSPECIFIED TYPE: ICD-10-CM

## 2021-08-03 DIAGNOSIS — F41.1 GAD (GENERALIZED ANXIETY DISORDER): Primary | ICD-10-CM

## 2021-08-03 DIAGNOSIS — E78.49 OTHER HYPERLIPIDEMIA: ICD-10-CM

## 2021-08-03 DIAGNOSIS — R73.03 PRE-DIABETES: ICD-10-CM

## 2021-08-03 DIAGNOSIS — E03.9 HYPOTHYROIDISM, UNSPECIFIED TYPE: ICD-10-CM

## 2021-08-03 PROBLEM — R07.9 CHEST PAIN: Status: RESOLVED | Noted: 2021-07-02 | Resolved: 2021-08-03

## 2021-08-03 PROCEDURE — 99214 PR OFFICE/OUTPT VISIT, EST, LEVL IV, 30-39 MIN: ICD-10-PCS | Mod: 95,,, | Performed by: FAMILY MEDICINE

## 2021-08-03 PROCEDURE — 99214 OFFICE O/P EST MOD 30 MIN: CPT | Mod: 95,,, | Performed by: FAMILY MEDICINE

## 2021-08-03 RX ORDER — HYDROCHLOROTHIAZIDE 25 MG/1
25 TABLET ORAL DAILY PRN
Qty: 90 TABLET | Refills: 3 | Status: SHIPPED | OUTPATIENT
Start: 2021-08-03 | End: 2022-07-19 | Stop reason: SDUPTHER

## 2021-08-03 RX ORDER — LEVOTHYROXINE SODIUM 50 UG/1
50 TABLET ORAL
Qty: 90 TABLET | Refills: 3 | Status: SHIPPED | OUTPATIENT
Start: 2021-08-03 | End: 2021-09-21

## 2021-08-03 RX ORDER — TRAZODONE HYDROCHLORIDE 150 MG/1
150 TABLET ORAL NIGHTLY
Qty: 90 TABLET | Refills: 3 | Status: SHIPPED | OUTPATIENT
Start: 2021-08-03 | End: 2022-07-19 | Stop reason: SDUPTHER

## 2021-08-03 RX ORDER — ESCITALOPRAM OXALATE 20 MG/1
20 TABLET ORAL DAILY
Qty: 90 TABLET | Refills: 3 | Status: SHIPPED | OUTPATIENT
Start: 2021-08-03 | End: 2022-07-19 | Stop reason: SDUPTHER

## 2021-08-03 RX ORDER — OXYBUTYNIN CHLORIDE 5 MG/1
5 TABLET, EXTENDED RELEASE ORAL DAILY
Qty: 90 TABLET | Refills: 3 | Status: SHIPPED | OUTPATIENT
Start: 2021-08-03 | End: 2022-07-19

## 2021-08-03 RX ORDER — PRAVASTATIN SODIUM 40 MG/1
40 TABLET ORAL NIGHTLY
Qty: 90 TABLET | Refills: 3 | Status: SHIPPED | OUTPATIENT
Start: 2021-08-03 | End: 2022-07-19 | Stop reason: SDUPTHER

## 2021-08-03 RX ORDER — BUSPIRONE HYDROCHLORIDE 15 MG/1
15 TABLET ORAL 3 TIMES DAILY
Qty: 270 TABLET | Refills: 3 | Status: SHIPPED | OUTPATIENT
Start: 2021-08-03 | End: 2022-07-19 | Stop reason: SDUPTHER

## 2021-08-03 RX ORDER — METFORMIN HYDROCHLORIDE 500 MG/1
TABLET, EXTENDED RELEASE ORAL
Qty: 90 TABLET | Refills: 3 | Status: SHIPPED | OUTPATIENT
Start: 2021-08-03 | End: 2022-07-19 | Stop reason: SDUPTHER

## 2021-08-06 ENCOUNTER — PATIENT MESSAGE (OUTPATIENT)
Dept: INTERNAL MEDICINE | Facility: CLINIC | Age: 41
End: 2021-08-06

## 2021-08-16 ENCOUNTER — TELEPHONE (OUTPATIENT)
Dept: PULMONOLOGY | Facility: CLINIC | Age: 41
End: 2021-08-16

## 2021-08-18 ENCOUNTER — TELEPHONE (OUTPATIENT)
Dept: PSYCHIATRY | Facility: CLINIC | Age: 41
End: 2021-08-18

## 2021-09-01 ENCOUNTER — TELEPHONE (OUTPATIENT)
Dept: PSYCHIATRY | Facility: CLINIC | Age: 41
End: 2021-09-01

## 2021-09-01 ENCOUNTER — PATIENT MESSAGE (OUTPATIENT)
Dept: PSYCHIATRY | Facility: CLINIC | Age: 41
End: 2021-09-01

## 2021-09-08 ENCOUNTER — INFUSION (OUTPATIENT)
Dept: INFUSION THERAPY | Facility: HOSPITAL | Age: 41
End: 2021-09-08
Attending: INTERNAL MEDICINE
Payer: MEDICAID

## 2021-09-08 ENCOUNTER — OFFICE VISIT (OUTPATIENT)
Dept: HEMATOLOGY/ONCOLOGY | Facility: CLINIC | Age: 41
End: 2021-09-08
Payer: MEDICAID

## 2021-09-08 ENCOUNTER — LAB VISIT (OUTPATIENT)
Dept: LAB | Facility: HOSPITAL | Age: 41
End: 2021-09-08
Attending: INTERNAL MEDICINE
Payer: MEDICAID

## 2021-09-08 ENCOUNTER — PATIENT MESSAGE (OUTPATIENT)
Dept: ORTHOPEDICS | Facility: CLINIC | Age: 41
End: 2021-09-08

## 2021-09-08 VITALS
DIASTOLIC BLOOD PRESSURE: 75 MMHG | BODY MASS INDEX: 44.41 KG/M2 | OXYGEN SATURATION: 99 % | WEIGHT: 293 LBS | HEART RATE: 54 BPM | TEMPERATURE: 97 F | HEIGHT: 68 IN | SYSTOLIC BLOOD PRESSURE: 117 MMHG

## 2021-09-08 VITALS
DIASTOLIC BLOOD PRESSURE: 74 MMHG | OXYGEN SATURATION: 99 % | HEART RATE: 60 BPM | TEMPERATURE: 98 F | RESPIRATION RATE: 18 BRPM | SYSTOLIC BLOOD PRESSURE: 120 MMHG

## 2021-09-08 DIAGNOSIS — D84.821 IMMUNODEFICIENCY DUE TO CHEMOTHERAPY: Primary | ICD-10-CM

## 2021-09-08 DIAGNOSIS — Z17.1 MALIGNANT NEOPLASM OF LEFT BREAST IN FEMALE, ESTROGEN RECEPTOR NEGATIVE, UNSPECIFIED SITE OF BREAST: Primary | ICD-10-CM

## 2021-09-08 DIAGNOSIS — C50.912 MALIGNANT NEOPLASM OF LEFT BREAST IN FEMALE, ESTROGEN RECEPTOR NEGATIVE, UNSPECIFIED SITE OF BREAST: ICD-10-CM

## 2021-09-08 DIAGNOSIS — D64.9 ANEMIA, UNSPECIFIED TYPE: ICD-10-CM

## 2021-09-08 DIAGNOSIS — Z79.899 IMMUNODEFICIENCY DUE TO CHEMOTHERAPY: Primary | ICD-10-CM

## 2021-09-08 DIAGNOSIS — C50.912 MALIGNANT NEOPLASM OF LEFT BREAST IN FEMALE, ESTROGEN RECEPTOR NEGATIVE, UNSPECIFIED SITE OF BREAST: Primary | ICD-10-CM

## 2021-09-08 DIAGNOSIS — D32.9 MENINGIOMA: ICD-10-CM

## 2021-09-08 DIAGNOSIS — Z17.1 MALIGNANT NEOPLASM OF LEFT BREAST IN FEMALE, ESTROGEN RECEPTOR NEGATIVE, UNSPECIFIED SITE OF BREAST: ICD-10-CM

## 2021-09-08 DIAGNOSIS — T45.1X5A IMMUNODEFICIENCY DUE TO CHEMOTHERAPY: Primary | ICD-10-CM

## 2021-09-08 DIAGNOSIS — D50.9 MICROCYTIC ANEMIA: ICD-10-CM

## 2021-09-08 DIAGNOSIS — G93.9 BRAIN LESION: ICD-10-CM

## 2021-09-08 LAB
ALBUMIN SERPL BCP-MCNC: 3.3 G/DL (ref 3.5–5.2)
ALP SERPL-CCNC: 115 U/L (ref 55–135)
ALT SERPL W/O P-5'-P-CCNC: 13 U/L (ref 10–44)
ANION GAP SERPL CALC-SCNC: 9 MMOL/L (ref 8–16)
AST SERPL-CCNC: 17 U/L (ref 10–40)
BASOPHILS # BLD AUTO: 0.02 K/UL (ref 0–0.2)
BASOPHILS NFR BLD: 0.4 % (ref 0–1.9)
BILIRUB SERPL-MCNC: 0.2 MG/DL (ref 0.1–1)
BUN SERPL-MCNC: 8 MG/DL (ref 6–20)
CALCIUM SERPL-MCNC: 9.8 MG/DL (ref 8.7–10.5)
CHLORIDE SERPL-SCNC: 107 MMOL/L (ref 95–110)
CO2 SERPL-SCNC: 25 MMOL/L (ref 23–29)
CREAT SERPL-MCNC: 0.9 MG/DL (ref 0.5–1.4)
DIFFERENTIAL METHOD: ABNORMAL
EOSINOPHIL # BLD AUTO: 0.1 K/UL (ref 0–0.5)
EOSINOPHIL NFR BLD: 1.4 % (ref 0–8)
ERYTHROCYTE [DISTWIDTH] IN BLOOD BY AUTOMATED COUNT: 15.6 % (ref 11.5–14.5)
EST. GFR  (AFRICAN AMERICAN): >60 ML/MIN/1.73 M^2
EST. GFR  (NON AFRICAN AMERICAN): >60 ML/MIN/1.73 M^2
GLUCOSE SERPL-MCNC: 109 MG/DL (ref 70–110)
HCT VFR BLD AUTO: 38.9 % (ref 37–48.5)
HGB BLD-MCNC: 11.9 G/DL (ref 12–16)
IMM GRANULOCYTES # BLD AUTO: 0.01 K/UL (ref 0–0.04)
IMM GRANULOCYTES NFR BLD AUTO: 0.2 % (ref 0–0.5)
LYMPHOCYTES # BLD AUTO: 1.7 K/UL (ref 1–4.8)
LYMPHOCYTES NFR BLD: 34.1 % (ref 18–48)
MCH RBC QN AUTO: 23.9 PG (ref 27–31)
MCHC RBC AUTO-ENTMCNC: 30.6 G/DL (ref 32–36)
MCV RBC AUTO: 78 FL (ref 82–98)
MONOCYTES # BLD AUTO: 0.3 K/UL (ref 0.3–1)
MONOCYTES NFR BLD: 6.8 % (ref 4–15)
NEUTROPHILS # BLD AUTO: 2.9 K/UL (ref 1.8–7.7)
NEUTROPHILS NFR BLD: 57.1 % (ref 38–73)
NRBC BLD-RTO: 0 /100 WBC
PLATELET # BLD AUTO: 276 K/UL (ref 150–450)
PMV BLD AUTO: 9.4 FL (ref 9.2–12.9)
POTASSIUM SERPL-SCNC: 3.8 MMOL/L (ref 3.5–5.1)
PROT SERPL-MCNC: 6.9 G/DL (ref 6–8.4)
RBC # BLD AUTO: 4.98 M/UL (ref 4–5.4)
SODIUM SERPL-SCNC: 141 MMOL/L (ref 136–145)
WBC # BLD AUTO: 5.01 K/UL (ref 3.9–12.7)

## 2021-09-08 PROCEDURE — 99999 PR PBB SHADOW E&M-EST. PATIENT-LVL V: CPT | Mod: PBBFAC,,, | Performed by: INTERNAL MEDICINE

## 2021-09-08 PROCEDURE — 36415 COLL VENOUS BLD VENIPUNCTURE: CPT | Performed by: INTERNAL MEDICINE

## 2021-09-08 PROCEDURE — 99215 OFFICE O/P EST HI 40 MIN: CPT | Mod: PBBFAC | Performed by: INTERNAL MEDICINE

## 2021-09-08 PROCEDURE — A4216 STERILE WATER/SALINE, 10 ML: HCPCS | Performed by: INTERNAL MEDICINE

## 2021-09-08 PROCEDURE — 96523 IRRIG DRUG DELIVERY DEVICE: CPT

## 2021-09-08 PROCEDURE — 99215 OFFICE O/P EST HI 40 MIN: CPT | Mod: S$PBB,,, | Performed by: INTERNAL MEDICINE

## 2021-09-08 PROCEDURE — 25000003 PHARM REV CODE 250: Performed by: INTERNAL MEDICINE

## 2021-09-08 PROCEDURE — 99215 PR OFFICE/OUTPT VISIT, EST, LEVL V, 40-54 MIN: ICD-10-PCS | Mod: S$PBB,,, | Performed by: INTERNAL MEDICINE

## 2021-09-08 PROCEDURE — 99999 PR PBB SHADOW E&M-EST. PATIENT-LVL V: ICD-10-PCS | Mod: PBBFAC,,, | Performed by: INTERNAL MEDICINE

## 2021-09-08 PROCEDURE — 85025 COMPLETE CBC W/AUTO DIFF WBC: CPT | Performed by: INTERNAL MEDICINE

## 2021-09-08 PROCEDURE — 63600175 PHARM REV CODE 636 W HCPCS: Performed by: INTERNAL MEDICINE

## 2021-09-08 PROCEDURE — 80053 COMPREHEN METABOLIC PANEL: CPT | Performed by: INTERNAL MEDICINE

## 2021-09-08 RX ORDER — SODIUM CHLORIDE 0.9 % (FLUSH) 0.9 %
10 SYRINGE (ML) INJECTION
Status: CANCELLED | OUTPATIENT
Start: 2021-09-08

## 2021-09-08 RX ORDER — SODIUM CHLORIDE 0.9 % (FLUSH) 0.9 %
10 SYRINGE (ML) INJECTION
Status: DISCONTINUED | OUTPATIENT
Start: 2021-09-08 | End: 2021-09-08 | Stop reason: HOSPADM

## 2021-09-08 RX ORDER — HEPARIN 100 UNIT/ML
500 SYRINGE INTRAVENOUS
Status: CANCELLED | OUTPATIENT
Start: 2021-09-08

## 2021-09-08 RX ORDER — GABAPENTIN 100 MG/1
100 CAPSULE ORAL
COMMUNITY
Start: 2021-08-02 | End: 2021-09-24 | Stop reason: SDUPTHER

## 2021-09-08 RX ORDER — HEPARIN 100 UNIT/ML
500 SYRINGE INTRAVENOUS
Status: DISCONTINUED | OUTPATIENT
Start: 2021-09-08 | End: 2021-09-08 | Stop reason: HOSPADM

## 2021-09-08 RX ADMIN — SODIUM CHLORIDE, PRESERVATIVE FREE 10 ML: 5 INJECTION INTRAVENOUS at 09:09

## 2021-09-08 RX ADMIN — HEPARIN 500 UNITS: 100 SYRINGE at 09:09

## 2021-09-13 ENCOUNTER — PATIENT MESSAGE (OUTPATIENT)
Dept: PAIN MEDICINE | Facility: CLINIC | Age: 41
End: 2021-09-13

## 2021-09-13 ENCOUNTER — TELEPHONE (OUTPATIENT)
Dept: PAIN MEDICINE | Facility: CLINIC | Age: 41
End: 2021-09-13

## 2021-09-15 ENCOUNTER — PATIENT MESSAGE (OUTPATIENT)
Dept: PAIN MEDICINE | Facility: CLINIC | Age: 41
End: 2021-09-15

## 2021-09-23 ENCOUNTER — PATIENT MESSAGE (OUTPATIENT)
Dept: INTERNAL MEDICINE | Facility: CLINIC | Age: 41
End: 2021-09-23

## 2021-09-23 ENCOUNTER — PATIENT MESSAGE (OUTPATIENT)
Dept: HEMATOLOGY/ONCOLOGY | Facility: CLINIC | Age: 41
End: 2021-09-23

## 2021-09-23 DIAGNOSIS — R07.9 CHEST PAIN, UNSPECIFIED TYPE: Primary | ICD-10-CM

## 2021-09-23 DIAGNOSIS — R00.2 PALPITATIONS: ICD-10-CM

## 2021-09-23 DIAGNOSIS — E78.5 HYPERLIPIDEMIA, UNSPECIFIED HYPERLIPIDEMIA TYPE: ICD-10-CM

## 2021-09-23 RX ORDER — LANOLIN ALCOHOL/MO/W.PET/CERES
400 CREAM (GRAM) TOPICAL DAILY
Qty: 90 TABLET | Refills: 1 | Status: SHIPPED | OUTPATIENT
Start: 2021-09-23 | End: 2021-11-10

## 2021-09-23 RX ORDER — LANOLIN ALCOHOL/MO/W.PET/CERES
400 CREAM (GRAM) TOPICAL DAILY
Qty: 90 TABLET | Refills: 1 | Status: SHIPPED | OUTPATIENT
Start: 2021-09-23 | End: 2021-12-02 | Stop reason: SDUPTHER

## 2021-09-27 ENCOUNTER — TELEPHONE (OUTPATIENT)
Dept: PAIN MEDICINE | Facility: CLINIC | Age: 41
End: 2021-09-27

## 2021-10-02 ENCOUNTER — PATIENT OUTREACH (OUTPATIENT)
Dept: ADMINISTRATIVE | Facility: OTHER | Age: 41
End: 2021-10-02

## 2021-10-04 ENCOUNTER — TELEPHONE (OUTPATIENT)
Dept: CARDIOLOGY | Facility: CLINIC | Age: 41
End: 2021-10-04

## 2021-10-07 ENCOUNTER — TELEPHONE (OUTPATIENT)
Dept: RADIOLOGY | Facility: HOSPITAL | Age: 41
End: 2021-10-07

## 2021-10-08 ENCOUNTER — HOSPITAL ENCOUNTER (OUTPATIENT)
Dept: RADIOLOGY | Facility: HOSPITAL | Age: 41
Discharge: HOME | End: 2021-10-08
Attending: INTERNAL MEDICINE
Payer: MEDICAID

## 2021-10-08 DIAGNOSIS — G93.9 BRAIN LESION: ICD-10-CM

## 2021-10-08 PROCEDURE — 70553 MRI BRAIN W WO CONTRAST: ICD-10-PCS | Mod: 26,,, | Performed by: RADIOLOGY

## 2021-10-08 PROCEDURE — 25500020 PHARM REV CODE 255: Mod: PO | Performed by: INTERNAL MEDICINE

## 2021-10-08 PROCEDURE — A9585 GADOBUTROL INJECTION: HCPCS | Mod: PO | Performed by: INTERNAL MEDICINE

## 2021-10-08 PROCEDURE — 70553 MRI BRAIN STEM W/O & W/DYE: CPT | Mod: 26,,, | Performed by: RADIOLOGY

## 2021-10-08 PROCEDURE — 70553 MRI BRAIN STEM W/O & W/DYE: CPT | Mod: TC,PO

## 2021-10-08 RX ORDER — GADOBUTROL 604.72 MG/ML
10 INJECTION INTRAVENOUS
Status: COMPLETED | OUTPATIENT
Start: 2021-10-08 | End: 2021-10-08

## 2021-10-08 RX ADMIN — GADOBUTROL 10 ML: 604.72 INJECTION INTRAVENOUS at 09:10

## 2021-10-09 DIAGNOSIS — D50.9 MICROCYTIC ANEMIA: Primary | ICD-10-CM

## 2021-10-09 RX ORDER — SODIUM CHLORIDE 0.9 % (FLUSH) 0.9 %
10 SYRINGE (ML) INJECTION
Status: CANCELLED | OUTPATIENT
Start: 2022-02-25

## 2021-10-09 RX ORDER — HEPARIN 100 UNIT/ML
500 SYRINGE INTRAVENOUS
Status: CANCELLED | OUTPATIENT
Start: 2022-02-25

## 2021-10-09 RX ORDER — HEPARIN 100 UNIT/ML
500 SYRINGE INTRAVENOUS
Status: CANCELLED | OUTPATIENT
Start: 2022-03-04

## 2021-10-09 RX ORDER — SODIUM CHLORIDE 0.9 % (FLUSH) 0.9 %
10 SYRINGE (ML) INJECTION
Status: CANCELLED | OUTPATIENT
Start: 2022-03-04

## 2021-10-11 ENCOUNTER — OFFICE VISIT (OUTPATIENT)
Dept: CARDIOLOGY | Facility: CLINIC | Age: 41
End: 2021-10-11
Payer: MEDICAID

## 2021-10-11 ENCOUNTER — TELEPHONE (OUTPATIENT)
Dept: INTERNAL MEDICINE | Facility: CLINIC | Age: 41
End: 2021-10-11

## 2021-10-11 ENCOUNTER — OFFICE VISIT (OUTPATIENT)
Dept: INTERNAL MEDICINE | Facility: CLINIC | Age: 41
End: 2021-10-11
Payer: MEDICAID

## 2021-10-11 ENCOUNTER — HOSPITAL ENCOUNTER (OUTPATIENT)
Dept: RADIOLOGY | Facility: HOSPITAL | Age: 41
Discharge: HOME | End: 2021-10-11
Attending: FAMILY MEDICINE
Payer: MEDICAID

## 2021-10-11 ENCOUNTER — TELEPHONE (OUTPATIENT)
Dept: HEMATOLOGY/ONCOLOGY | Facility: CLINIC | Age: 41
End: 2021-10-11

## 2021-10-11 VITALS
DIASTOLIC BLOOD PRESSURE: 72 MMHG | HEART RATE: 58 BPM | WEIGHT: 291.25 LBS | BODY MASS INDEX: 44.14 KG/M2 | SYSTOLIC BLOOD PRESSURE: 120 MMHG | TEMPERATURE: 98 F | HEIGHT: 68 IN

## 2021-10-11 VITALS
BODY MASS INDEX: 44.14 KG/M2 | RESPIRATION RATE: 16 BRPM | HEART RATE: 58 BPM | WEIGHT: 291.25 LBS | SYSTOLIC BLOOD PRESSURE: 116 MMHG | DIASTOLIC BLOOD PRESSURE: 84 MMHG | HEIGHT: 68 IN | OXYGEN SATURATION: 96 %

## 2021-10-11 DIAGNOSIS — R26.9 GAIT DIFFICULTY: Primary | ICD-10-CM

## 2021-10-11 DIAGNOSIS — E78.5 HYPERLIPIDEMIA, UNSPECIFIED HYPERLIPIDEMIA TYPE: ICD-10-CM

## 2021-10-11 DIAGNOSIS — R06.09 DOE (DYSPNEA ON EXERTION): ICD-10-CM

## 2021-10-11 DIAGNOSIS — S39.012A STRAIN OF LUMBAR REGION, INITIAL ENCOUNTER: Primary | ICD-10-CM

## 2021-10-11 DIAGNOSIS — C50.912 MALIGNANT NEOPLASM OF LEFT BREAST IN FEMALE, ESTROGEN RECEPTOR NEGATIVE, UNSPECIFIED SITE OF BREAST: ICD-10-CM

## 2021-10-11 DIAGNOSIS — R07.9 CHEST PAIN, UNSPECIFIED TYPE: ICD-10-CM

## 2021-10-11 DIAGNOSIS — E78.49 OTHER HYPERLIPIDEMIA: Primary | ICD-10-CM
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
## 2021-10-11 DIAGNOSIS — Z85.3 HISTORY OF BREAST CANCER: ICD-10-CM

## 2021-10-11 DIAGNOSIS — Z17.1 MALIGNANT NEOPLASM OF LEFT BREAST IN FEMALE, ESTROGEN RECEPTOR NEGATIVE, UNSPECIFIED SITE OF BREAST: ICD-10-CM

## 2021-10-11 DIAGNOSIS — T45.1X5A ADVERSE EFFECT OF CHEMOTHERAPY, INITIAL ENCOUNTER: ICD-10-CM

## 2021-10-11 DIAGNOSIS — E78.49 OTHER HYPERLIPIDEMIA: ICD-10-CM

## 2021-10-11 DIAGNOSIS — R01.1 MURMUR: ICD-10-CM

## 2021-10-11 DIAGNOSIS — R26.89 IMBALANCE: ICD-10-CM

## 2021-10-11 DIAGNOSIS — R00.2 PALPITATIONS: ICD-10-CM

## 2021-10-11 DIAGNOSIS — E66.01 OBESITY, MORBID, BMI 40.0-49.9: ICD-10-CM

## 2021-10-11 DIAGNOSIS — I10 PRIMARY HYPERTENSION: ICD-10-CM

## 2021-10-11 DIAGNOSIS — R26.9 GAIT DIFFICULTY: ICD-10-CM

## 2021-10-11 DIAGNOSIS — R73.03 PRE-DIABETES: ICD-10-CM

## 2021-10-11 DIAGNOSIS — F41.1 GAD (GENERALIZED ANXIETY DISORDER): ICD-10-CM

## 2021-10-11 DIAGNOSIS — G47.30 SLEEP APNEA, UNSPECIFIED TYPE: ICD-10-CM

## 2021-10-11 DIAGNOSIS — S39.012A STRAIN OF LUMBAR REGION, INITIAL ENCOUNTER: ICD-10-CM

## 2021-10-11 PROCEDURE — 99999 PR PBB SHADOW E&M-EST. PATIENT-LVL V: CPT | Mod: PBBFAC,,, | Performed by: FAMILY MEDICINE

## 2021-10-11 PROCEDURE — 99214 OFFICE O/P EST MOD 30 MIN: CPT | Mod: S$PBB,,, | Performed by: FAMILY MEDICINE

## 2021-10-11 PROCEDURE — 99214 PR OFFICE/OUTPT VISIT, EST, LEVL IV, 30-39 MIN: ICD-10-PCS | Mod: S$PBB,,, | Performed by: INTERNAL MEDICINE

## 2021-10-11 PROCEDURE — 99215 OFFICE O/P EST HI 40 MIN: CPT | Mod: PBBFAC,27,PO | Performed by: FAMILY MEDICINE

## 2021-10-11 PROCEDURE — 96372 THER/PROPH/DIAG INJ SC/IM: CPT | Mod: PBBFAC,PO

## 2021-10-11 PROCEDURE — 99214 PR OFFICE/OUTPT VISIT, EST, LEVL IV, 30-39 MIN: ICD-10-PCS | Mod: S$PBB,,, | Performed by: FAMILY MEDICINE

## 2021-10-11 PROCEDURE — 99999 PR PBB SHADOW E&M-EST. PATIENT-LVL IV: CPT | Mod: PBBFAC,,, | Performed by: INTERNAL MEDICINE

## 2021-10-11 PROCEDURE — 72100 XR LUMBAR SPINE 2 OR 3 VIEWS: ICD-10-PCS | Mod: 26,,, | Performed by: RADIOLOGY

## 2021-10-11 PROCEDURE — 72100 X-RAY EXAM L-S SPINE 2/3 VWS: CPT | Mod: TC,FY,PO

## 2021-10-11 PROCEDURE — 99999 PR PBB SHADOW E&M-EST. PATIENT-LVL V: ICD-10-PCS | Mod: PBBFAC,,, | Performed by: FAMILY MEDICINE

## 2021-10-11 PROCEDURE — 99999 PR PBB SHADOW E&M-EST. PATIENT-LVL IV: ICD-10-PCS | Mod: PBBFAC,,, | Performed by: INTERNAL MEDICINE

## 2021-10-11 PROCEDURE — 99214 OFFICE O/P EST MOD 30 MIN: CPT | Mod: S$PBB,,, | Performed by: INTERNAL MEDICINE

## 2021-10-11 PROCEDURE — 99214 OFFICE O/P EST MOD 30 MIN: CPT | Mod: PBBFAC,PO | Performed by: INTERNAL MEDICINE

## 2021-10-11 PROCEDURE — 72100 X-RAY EXAM L-S SPINE 2/3 VWS: CPT | Mod: 26,,, | Performed by: RADIOLOGY

## 2021-10-11 PROCEDURE — 90471 IMMUNIZATION ADMIN: CPT | Mod: PBBFAC,PO

## 2021-10-11 RX ORDER — ORPHENADRINE CITRATE 30 MG/ML
60 INJECTION INTRAMUSCULAR; INTRAVENOUS
Status: COMPLETED | OUTPATIENT
Start: 2021-10-11 | End: 2021-10-11

## 2021-10-11 RX ORDER — KETOROLAC TROMETHAMINE 30 MG/ML
60 INJECTION, SOLUTION INTRAMUSCULAR; INTRAVENOUS
Status: COMPLETED | OUTPATIENT
Start: 2021-10-11 | End: 2021-10-11

## 2021-10-11 RX ORDER — MELOXICAM 15 MG/1
15 TABLET ORAL DAILY
Qty: 30 TABLET | Refills: 0 | Status: SHIPPED | OUTPATIENT
Start: 2021-10-11 | End: 2022-07-19

## 2021-10-11 RX ADMIN — KETOROLAC TROMETHAMINE 60 MG: 60 INJECTION, SOLUTION INTRAMUSCULAR at 11:10

## 2021-10-11 RX ADMIN — ORPHENADRINE CITRATE 60 MG: 30 INJECTION INTRAMUSCULAR; INTRAVENOUS at 11:10

## 2021-10-19 ENCOUNTER — CLINICAL SUPPORT (OUTPATIENT)
Dept: REHABILITATION | Facility: HOSPITAL | Age: 41
End: 2021-10-19
Attending: FAMILY MEDICINE
Payer: MEDICAID

## 2021-10-19 DIAGNOSIS — S39.012A STRAIN OF LUMBAR REGION, INITIAL ENCOUNTER: ICD-10-CM
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
## 2021-10-19 PROCEDURE — 97162 PT EVAL MOD COMPLEX 30 MIN: CPT | Mod: PN

## 2021-10-20 ENCOUNTER — PATIENT MESSAGE (OUTPATIENT)
Dept: INTERNAL MEDICINE | Facility: CLINIC | Age: 41
End: 2021-10-20
Payer: MEDICAID

## 2021-10-20 ENCOUNTER — TELEPHONE (OUTPATIENT)
Dept: INTERNAL MEDICINE | Facility: CLINIC | Age: 41
End: 2021-10-20

## 2021-10-22 ENCOUNTER — OFFICE VISIT (OUTPATIENT)
Dept: OTOLARYNGOLOGY | Facility: CLINIC | Age: 41
End: 2021-10-22
Payer: MEDICAID

## 2021-10-22 ENCOUNTER — CLINICAL SUPPORT (OUTPATIENT)
Dept: AUDIOLOGY | Facility: CLINIC | Age: 41
End: 2021-10-22
Payer: MEDICAID

## 2021-10-22 DIAGNOSIS — R26.9 GAIT DIFFICULTY: ICD-10-CM

## 2021-10-22 DIAGNOSIS — R26.89 IMBALANCE: ICD-10-CM

## 2021-10-22 DIAGNOSIS — K21.9 LARYNGOPHARYNGEAL REFLUX (LPR): ICD-10-CM

## 2021-10-22 DIAGNOSIS — G43.909 MIGRAINE WITHOUT STATUS MIGRAINOSUS, NOT INTRACTABLE, UNSPECIFIED MIGRAINE TYPE: Primary | ICD-10-CM

## 2021-10-22 PROCEDURE — 99212 OFFICE O/P EST SF 10 MIN: CPT | Mod: PBBFAC | Performed by: STUDENT IN AN ORGANIZED HEALTH CARE EDUCATION/TRAINING PROGRAM

## 2021-10-22 PROCEDURE — 99999 PR PBB SHADOW E&M-EST. PATIENT-LVL I: CPT | Mod: PBBFAC,,, | Performed by: AUDIOLOGIST-HEARING AID FITTER

## 2021-10-22 PROCEDURE — 92557 COMPREHENSIVE HEARING TEST: CPT | Mod: PBBFAC | Performed by: AUDIOLOGIST-HEARING AID FITTER

## 2021-10-22 PROCEDURE — 99999 PR PBB SHADOW E&M-EST. PATIENT-LVL II: ICD-10-PCS | Mod: PBBFAC,,, | Performed by: STUDENT IN AN ORGANIZED HEALTH CARE EDUCATION/TRAINING PROGRAM

## 2021-10-22 PROCEDURE — 99203 OFFICE O/P NEW LOW 30 MIN: CPT | Mod: S$PBB,,, | Performed by: STUDENT IN AN ORGANIZED HEALTH CARE EDUCATION/TRAINING PROGRAM

## 2021-10-22 PROCEDURE — 92567 TYMPANOMETRY: CPT | Mod: PBBFAC | Performed by: AUDIOLOGIST-HEARING AID FITTER

## 2021-10-22 PROCEDURE — 99999 PR PBB SHADOW E&M-EST. PATIENT-LVL I: ICD-10-PCS | Mod: PBBFAC,,, | Performed by: AUDIOLOGIST-HEARING AID FITTER

## 2021-10-22 PROCEDURE — 99203 PR OFFICE/OUTPT VISIT, NEW, LEVL III, 30-44 MIN: ICD-10-PCS | Mod: S$PBB,,, | Performed by: STUDENT IN AN ORGANIZED HEALTH CARE EDUCATION/TRAINING PROGRAM

## 2021-10-22 PROCEDURE — 99999 PR PBB SHADOW E&M-EST. PATIENT-LVL II: CPT | Mod: PBBFAC,,, | Performed by: STUDENT IN AN ORGANIZED HEALTH CARE EDUCATION/TRAINING PROGRAM

## 2021-10-22 PROCEDURE — 99211 OFF/OP EST MAY X REQ PHY/QHP: CPT | Mod: PBBFAC,27 | Performed by: AUDIOLOGIST-HEARING AID FITTER

## 2021-10-22 RX ORDER — PANTOPRAZOLE SODIUM 40 MG/1
40 TABLET, DELAYED RELEASE ORAL DAILY
Qty: 30 TABLET | Refills: 11 | Status: SHIPPED | OUTPATIENT
Start: 2021-10-22 | End: 2021-10-27 | Stop reason: SDUPTHER

## 2021-10-26 ENCOUNTER — CLINICAL SUPPORT (OUTPATIENT)
Dept: REHABILITATION | Facility: HOSPITAL | Age: 41
End: 2021-10-26
Payer: MEDICAID

## 2021-10-26 DIAGNOSIS — G89.29 CHRONIC BILATERAL LOW BACK PAIN WITHOUT SCIATICA: ICD-10-CM

## 2021-10-26 DIAGNOSIS — M54.50 CHRONIC BILATERAL LOW BACK PAIN WITHOUT SCIATICA: ICD-10-CM

## 2021-10-26 DIAGNOSIS — R26.2 DIFFICULTY WALKING: ICD-10-CM

## 2021-10-26 PROCEDURE — 97110 THERAPEUTIC EXERCISES: CPT | Mod: PN

## 2021-10-28 ENCOUNTER — CLINICAL SUPPORT (OUTPATIENT)
Dept: REHABILITATION | Facility: HOSPITAL | Age: 41
End: 2021-10-28
Payer: MEDICAID

## 2021-10-28 DIAGNOSIS — R26.2 DIFFICULTY WALKING: ICD-10-CM

## 2021-10-28 DIAGNOSIS — G89.29 CHRONIC BILATERAL LOW BACK PAIN WITHOUT SCIATICA: ICD-10-CM

## 2021-10-28 DIAGNOSIS — M54.50 CHRONIC BILATERAL LOW BACK PAIN WITHOUT SCIATICA: ICD-10-CM

## 2021-10-28 PROCEDURE — 97110 THERAPEUTIC EXERCISES: CPT | Mod: PN

## 2021-10-29 ENCOUNTER — OFFICE VISIT (OUTPATIENT)
Dept: INTERNAL MEDICINE | Facility: CLINIC | Age: 41
End: 2021-10-29
Payer: MEDICAID

## 2021-10-29 ENCOUNTER — LAB VISIT (OUTPATIENT)
Dept: LAB | Facility: HOSPITAL | Age: 41
End: 2021-10-29
Attending: FAMILY MEDICINE
Payer: MEDICAID

## 2021-10-29 VITALS
BODY MASS INDEX: 44.41 KG/M2 | TEMPERATURE: 98 F | SYSTOLIC BLOOD PRESSURE: 130 MMHG | DIASTOLIC BLOOD PRESSURE: 84 MMHG | OXYGEN SATURATION: 99 % | HEIGHT: 68 IN | WEIGHT: 293 LBS | HEART RATE: 69 BPM

## 2021-10-29 DIAGNOSIS — M75.102 TEAR OF LEFT ROTATOR CUFF, UNSPECIFIED TEAR EXTENT, UNSPECIFIED WHETHER TRAUMATIC: ICD-10-CM

## 2021-10-29 DIAGNOSIS — E03.9 HYPOTHYROIDISM, UNSPECIFIED TYPE: ICD-10-CM

## 2021-10-29 DIAGNOSIS — I10 PRIMARY HYPERTENSION: ICD-10-CM

## 2021-10-29 DIAGNOSIS — R26.9 GAIT DIFFICULTY: ICD-10-CM

## 2021-10-29 DIAGNOSIS — K21.9 GASTROESOPHAGEAL REFLUX DISEASE, UNSPECIFIED WHETHER ESOPHAGITIS PRESENT: ICD-10-CM

## 2021-10-29 DIAGNOSIS — F41.1 GAD (GENERALIZED ANXIETY DISORDER): ICD-10-CM

## 2021-10-29 DIAGNOSIS — R20.2 TINGLING OF LEFT UPPER EXTREMITY: ICD-10-CM

## 2021-10-29 DIAGNOSIS — R06.09 DOE (DYSPNEA ON EXERTION): ICD-10-CM

## 2021-10-29 DIAGNOSIS — R06.02 SOB (SHORTNESS OF BREATH): Primary | ICD-10-CM

## 2021-10-29 DIAGNOSIS — R26.2 DIFFICULTY WALKING: ICD-10-CM

## 2021-10-29 DIAGNOSIS — R26.89 IMBALANCE: ICD-10-CM

## 2021-10-29 DIAGNOSIS — E78.49 OTHER HYPERLIPIDEMIA: ICD-10-CM

## 2021-10-29 DIAGNOSIS — R06.02 SOB (SHORTNESS OF BREATH): ICD-10-CM

## 2021-10-29 DIAGNOSIS — R10.9 ABDOMINAL PAIN, UNSPECIFIED ABDOMINAL LOCATION: ICD-10-CM

## 2021-10-29 LAB
BNP SERPL-MCNC: 15 PG/ML (ref 0–99)
CK SERPL-CCNC: 211 U/L (ref 20–180)
D DIMER PPP IA.FEU-MCNC: 0.79 MG/L FEU
T4 FREE SERPL-MCNC: 0.96 NG/DL (ref 0.71–1.51)
THYROPEROXIDASE IGG SERPL-ACNC: 308 IU/ML
TSH SERPL DL<=0.005 MIU/L-ACNC: 1.59 UIU/ML (ref 0.4–4)

## 2021-10-29 PROCEDURE — 84436 ASSAY OF TOTAL THYROXINE: CPT | Performed by: FAMILY MEDICINE

## 2021-10-29 PROCEDURE — 85379 FIBRIN DEGRADATION QUANT: CPT | Performed by: FAMILY MEDICINE

## 2021-10-29 PROCEDURE — 99999 PR PBB SHADOW E&M-EST. PATIENT-LVL V: CPT | Mod: PBBFAC,,, | Performed by: FAMILY MEDICINE

## 2021-10-29 PROCEDURE — 84480 ASSAY TRIIODOTHYRONINE (T3): CPT | Performed by: FAMILY MEDICINE

## 2021-10-29 PROCEDURE — 99214 PR OFFICE/OUTPT VISIT, EST, LEVL IV, 30-39 MIN: ICD-10-PCS | Mod: S$PBB,,, | Performed by: FAMILY MEDICINE

## 2021-10-29 PROCEDURE — 99215 OFFICE O/P EST HI 40 MIN: CPT | Mod: PBBFAC,PO | Performed by: FAMILY MEDICINE

## 2021-10-29 PROCEDURE — 82550 ASSAY OF CK (CPK): CPT | Performed by: FAMILY MEDICINE

## 2021-10-29 PROCEDURE — 99214 OFFICE O/P EST MOD 30 MIN: CPT | Mod: S$PBB,,, | Performed by: FAMILY MEDICINE

## 2021-10-29 PROCEDURE — 84439 ASSAY OF FREE THYROXINE: CPT | Performed by: FAMILY MEDICINE

## 2021-10-29 PROCEDURE — 99999 PR PBB SHADOW E&M-EST. PATIENT-LVL V: ICD-10-PCS | Mod: PBBFAC,,, | Performed by: FAMILY MEDICINE

## 2021-10-29 PROCEDURE — 83880 ASSAY OF NATRIURETIC PEPTIDE: CPT | Performed by: FAMILY MEDICINE

## 2021-10-29 PROCEDURE — 85025 COMPLETE CBC W/AUTO DIFF WBC: CPT | Performed by: FAMILY MEDICINE

## 2021-10-29 PROCEDURE — 84481 FREE ASSAY (FT-3): CPT | Performed by: FAMILY MEDICINE

## 2021-10-29 PROCEDURE — 36415 COLL VENOUS BLD VENIPUNCTURE: CPT | Mod: PO | Performed by: FAMILY MEDICINE

## 2021-10-29 PROCEDURE — 84443 ASSAY THYROID STIM HORMONE: CPT | Performed by: FAMILY MEDICINE

## 2021-10-29 PROCEDURE — 86376 MICROSOMAL ANTIBODY EACH: CPT | Performed by: FAMILY MEDICINE

## 2021-10-29 RX ORDER — ALBUTEROL SULFATE 90 UG/1
2 AEROSOL, METERED RESPIRATORY (INHALATION) EVERY 6 HOURS PRN
Qty: 18 G | Refills: 1 | Status: SHIPPED | OUTPATIENT
Start: 2021-10-29 | End: 2022-07-19 | Stop reason: SDUPTHER

## 2021-10-30 ENCOUNTER — TELEPHONE (OUTPATIENT)
Dept: INTERNAL MEDICINE | Facility: CLINIC | Age: 41
End: 2021-10-30
Payer: MEDICAID

## 2021-10-30 LAB
BASOPHILS # BLD AUTO: 0.01 K/UL (ref 0–0.2)
BASOPHILS NFR BLD: 0.2 % (ref 0–1.9)
DIFFERENTIAL METHOD: ABNORMAL
EOSINOPHIL # BLD AUTO: 0.1 K/UL (ref 0–0.5)
EOSINOPHIL NFR BLD: 2.1 % (ref 0–8)
ERYTHROCYTE [DISTWIDTH] IN BLOOD BY AUTOMATED COUNT: 16.1 % (ref 11.5–14.5)
HCT VFR BLD AUTO: 40.8 % (ref 37–48.5)
HGB BLD-MCNC: 12.3 G/DL (ref 12–16)
IMM GRANULOCYTES # BLD AUTO: 0.01 K/UL (ref 0–0.04)
IMM GRANULOCYTES NFR BLD AUTO: 0.2 % (ref 0–0.5)
LYMPHOCYTES # BLD AUTO: 1.9 K/UL (ref 1–4.8)
LYMPHOCYTES NFR BLD: 36.8 % (ref 18–48)
MCH RBC QN AUTO: 24.4 PG (ref 27–31)
MCHC RBC AUTO-ENTMCNC: 30.1 G/DL (ref 32–36)
MCV RBC AUTO: 81 FL (ref 82–98)
MONOCYTES # BLD AUTO: 0.3 K/UL (ref 0.3–1)
MONOCYTES NFR BLD: 5.1 % (ref 4–15)
NEUTROPHILS # BLD AUTO: 2.9 K/UL (ref 1.8–7.7)
NEUTROPHILS NFR BLD: 55.6 % (ref 38–73)
NRBC BLD-RTO: 0 /100 WBC
PLATELET # BLD AUTO: 289 K/UL (ref 150–450)
PMV BLD AUTO: 9.6 FL (ref 9.2–12.9)
RBC # BLD AUTO: 5.05 M/UL (ref 4–5.4)
T3 SERPL-MCNC: 131 NG/DL (ref 60–180)
T3FREE SERPL-MCNC: 3 PG/ML (ref 2.3–4.2)
T4 SERPL-MCNC: 11.1 UG/DL (ref 4.5–11.5)
WBC # BLD AUTO: 5.14 K/UL (ref 3.9–12.7)

## 2021-11-01 ENCOUNTER — TELEPHONE (OUTPATIENT)
Dept: INTERNAL MEDICINE | Facility: CLINIC | Age: 41
End: 2021-11-01
Payer: MEDICAID

## 2021-11-01 ENCOUNTER — CLINICAL SUPPORT (OUTPATIENT)
Dept: REHABILITATION | Facility: HOSPITAL | Age: 41
End: 2021-11-01
Payer: MEDICAID

## 2021-11-01 DIAGNOSIS — M54.50 CHRONIC BILATERAL LOW BACK PAIN WITHOUT SCIATICA: ICD-10-CM

## 2021-11-01 DIAGNOSIS — G89.29 CHRONIC BILATERAL LOW BACK PAIN WITHOUT SCIATICA: ICD-10-CM

## 2021-11-01 DIAGNOSIS — R26.2 DIFFICULTY WALKING: ICD-10-CM

## 2021-11-01 DIAGNOSIS — E06.9 THYROIDITIS: Primary | ICD-10-CM

## 2021-11-01 PROCEDURE — 97110 THERAPEUTIC EXERCISES: CPT | Mod: PN

## 2021-11-02 ENCOUNTER — PATIENT MESSAGE (OUTPATIENT)
Dept: INTERNAL MEDICINE | Facility: CLINIC | Age: 41
End: 2021-11-02
Payer: MEDICAID

## 2021-11-03 ENCOUNTER — PATIENT MESSAGE (OUTPATIENT)
Dept: GASTROENTEROLOGY | Facility: CLINIC | Age: 41
End: 2021-11-03
Payer: MEDICAID

## 2021-11-03 ENCOUNTER — PATIENT MESSAGE (OUTPATIENT)
Dept: INTERNAL MEDICINE | Facility: CLINIC | Age: 41
End: 2021-11-03
Payer: MEDICAID

## 2021-11-04 ENCOUNTER — CLINICAL SUPPORT (OUTPATIENT)
Dept: REHABILITATION | Facility: HOSPITAL | Age: 41
End: 2021-11-04
Payer: MEDICAID

## 2021-11-04 ENCOUNTER — PATIENT MESSAGE (OUTPATIENT)
Dept: PAIN MEDICINE | Facility: CLINIC | Age: 41
End: 2021-11-04
Payer: MEDICAID

## 2021-11-04 ENCOUNTER — PATIENT MESSAGE (OUTPATIENT)
Dept: PAIN MEDICINE | Facility: HOSPITAL | Age: 41
End: 2021-11-04
Payer: MEDICAID

## 2021-11-04 DIAGNOSIS — R26.2 DIFFICULTY WALKING: ICD-10-CM

## 2021-11-04 DIAGNOSIS — M54.50 CHRONIC BILATERAL LOW BACK PAIN WITHOUT SCIATICA: ICD-10-CM

## 2021-11-04 DIAGNOSIS — G89.29 CHRONIC BILATERAL LOW BACK PAIN WITHOUT SCIATICA: ICD-10-CM

## 2021-11-04 PROCEDURE — 97110 THERAPEUTIC EXERCISES: CPT | Mod: PN

## 2021-11-05 DIAGNOSIS — R06.02 SOB (SHORTNESS OF BREATH): ICD-10-CM

## 2021-11-05 DIAGNOSIS — E66.01 OBESITY, MORBID, BMI 40.0-49.9: ICD-10-CM

## 2021-11-05 DIAGNOSIS — R06.09 DOE (DYSPNEA ON EXERTION): ICD-10-CM

## 2021-11-05 DIAGNOSIS — E78.49 OTHER HYPERLIPIDEMIA: Primary | ICD-10-CM

## 2021-11-05 DIAGNOSIS — R01.1 MURMUR: ICD-10-CM

## 2021-11-05 DIAGNOSIS — I10 PRIMARY HYPERTENSION: ICD-10-CM

## 2021-11-08 ENCOUNTER — CLINICAL SUPPORT (OUTPATIENT)
Dept: REHABILITATION | Facility: HOSPITAL | Age: 41
End: 2021-11-08
Payer: MEDICAID

## 2021-11-08 ENCOUNTER — PATIENT MESSAGE (OUTPATIENT)
Dept: PAIN MEDICINE | Facility: CLINIC | Age: 41
End: 2021-11-08
Payer: MEDICAID

## 2021-11-08 ENCOUNTER — HOSPITAL ENCOUNTER (OUTPATIENT)
Dept: CARDIOLOGY | Facility: HOSPITAL | Age: 41
Discharge: HOME | End: 2021-11-08
Attending: INTERNAL MEDICINE
Payer: MEDICAID

## 2021-11-08 ENCOUNTER — OFFICE VISIT (OUTPATIENT)
Dept: CARDIOLOGY | Facility: CLINIC | Age: 41
End: 2021-11-08
Payer: MEDICAID

## 2021-11-08 VITALS
OXYGEN SATURATION: 97 % | WEIGHT: 293 LBS | SYSTOLIC BLOOD PRESSURE: 136 MMHG | BODY MASS INDEX: 44.41 KG/M2 | DIASTOLIC BLOOD PRESSURE: 88 MMHG | HEIGHT: 68 IN | HEART RATE: 71 BPM | RESPIRATION RATE: 16 BRPM

## 2021-11-08 DIAGNOSIS — R06.02 SOB (SHORTNESS OF BREATH): ICD-10-CM

## 2021-11-08 DIAGNOSIS — R26.2 DIFFICULTY WALKING: ICD-10-CM

## 2021-11-08 DIAGNOSIS — G89.29 CHRONIC BILATERAL LOW BACK PAIN WITHOUT SCIATICA: ICD-10-CM

## 2021-11-08 DIAGNOSIS — R00.2 PALPITATIONS: ICD-10-CM

## 2021-11-08 DIAGNOSIS — R01.1 MURMUR: ICD-10-CM

## 2021-11-08 DIAGNOSIS — R07.9 CHEST PAIN, UNSPECIFIED TYPE: ICD-10-CM

## 2021-11-08 DIAGNOSIS — E66.01 OBESITY, MORBID, BMI 40.0-49.9: ICD-10-CM

## 2021-11-08 DIAGNOSIS — G47.30 SLEEP APNEA, UNSPECIFIED TYPE: ICD-10-CM

## 2021-11-08 DIAGNOSIS — E78.49 OTHER HYPERLIPIDEMIA: ICD-10-CM

## 2021-11-08 DIAGNOSIS — I10 PRIMARY HYPERTENSION: ICD-10-CM

## 2021-11-08 DIAGNOSIS — F41.1 GAD (GENERALIZED ANXIETY DISORDER): ICD-10-CM

## 2021-11-08 DIAGNOSIS — R06.09 DOE (DYSPNEA ON EXERTION): Primary | ICD-10-CM

## 2021-11-08 DIAGNOSIS — Z85.3 HISTORY OF BREAST CANCER: ICD-10-CM

## 2021-11-08 DIAGNOSIS — R06.09 DOE (DYSPNEA ON EXERTION): ICD-10-CM

## 2021-11-08 DIAGNOSIS — R60.0 LOCALIZED EDEMA: ICD-10-CM

## 2021-11-08 DIAGNOSIS — M54.50 CHRONIC BILATERAL LOW BACK PAIN WITHOUT SCIATICA: ICD-10-CM

## 2021-11-08 DIAGNOSIS — R73.03 PRE-DIABETES: ICD-10-CM

## 2021-11-08 PROCEDURE — 99215 OFFICE O/P EST HI 40 MIN: CPT | Mod: PBBFAC,PO | Performed by: INTERNAL MEDICINE

## 2021-11-08 PROCEDURE — 99999 PR PBB SHADOW E&M-EST. PATIENT-LVL V: CPT | Mod: PBBFAC,,, | Performed by: INTERNAL MEDICINE

## 2021-11-08 PROCEDURE — 99214 OFFICE O/P EST MOD 30 MIN: CPT | Mod: S$PBB,,, | Performed by: INTERNAL MEDICINE

## 2021-11-08 PROCEDURE — 97110 THERAPEUTIC EXERCISES: CPT | Mod: PN

## 2021-11-08 PROCEDURE — 93005 ELECTROCARDIOGRAM TRACING: CPT | Mod: PO

## 2021-11-08 PROCEDURE — 93010 ELECTROCARDIOGRAM REPORT: CPT | Mod: ,,, | Performed by: INTERNAL MEDICINE

## 2021-11-08 PROCEDURE — 99214 PR OFFICE/OUTPT VISIT, EST, LEVL IV, 30-39 MIN: ICD-10-PCS | Mod: S$PBB,,, | Performed by: INTERNAL MEDICINE

## 2021-11-08 PROCEDURE — 99999 PR PBB SHADOW E&M-EST. PATIENT-LVL V: ICD-10-PCS | Mod: PBBFAC,,, | Performed by: INTERNAL MEDICINE

## 2021-11-08 PROCEDURE — 93010 EKG 12-LEAD: ICD-10-PCS | Mod: ,,, | Performed by: INTERNAL MEDICINE

## 2021-11-09 ENCOUNTER — PATIENT MESSAGE (OUTPATIENT)
Dept: HEMATOLOGY/ONCOLOGY | Facility: CLINIC | Age: 41
End: 2021-11-09
Payer: MEDICAID

## 2021-11-10 ENCOUNTER — OFFICE VISIT (OUTPATIENT)
Dept: ORTHOPEDICS | Facility: CLINIC | Age: 41
End: 2021-11-10
Payer: MEDICAID

## 2021-11-10 VITALS — HEIGHT: 68 IN | BODY MASS INDEX: 44.41 KG/M2 | WEIGHT: 293 LBS

## 2021-11-10 DIAGNOSIS — M75.102 TEAR OF LEFT ROTATOR CUFF, UNSPECIFIED TEAR EXTENT, UNSPECIFIED WHETHER TRAUMATIC: ICD-10-CM

## 2021-11-10 DIAGNOSIS — R20.2 TINGLING OF LEFT UPPER EXTREMITY: ICD-10-CM

## 2021-11-10 PROCEDURE — 99999 PR PBB SHADOW E&M-EST. PATIENT-LVL IV: CPT | Mod: PBBFAC,,, | Performed by: STUDENT IN AN ORGANIZED HEALTH CARE EDUCATION/TRAINING PROGRAM

## 2021-11-10 PROCEDURE — 99213 OFFICE O/P EST LOW 20 MIN: CPT | Mod: S$PBB,,, | Performed by: STUDENT IN AN ORGANIZED HEALTH CARE EDUCATION/TRAINING PROGRAM

## 2021-11-10 PROCEDURE — 99213 PR OFFICE/OUTPT VISIT, EST, LEVL III, 20-29 MIN: ICD-10-PCS | Mod: S$PBB,,, | Performed by: STUDENT IN AN ORGANIZED HEALTH CARE EDUCATION/TRAINING PROGRAM

## 2021-11-10 PROCEDURE — 99999 PR PBB SHADOW E&M-EST. PATIENT-LVL IV: ICD-10-PCS | Mod: PBBFAC,,, | Performed by: STUDENT IN AN ORGANIZED HEALTH CARE EDUCATION/TRAINING PROGRAM

## 2021-11-10 PROCEDURE — 99214 OFFICE O/P EST MOD 30 MIN: CPT | Mod: PBBFAC,PO | Performed by: STUDENT IN AN ORGANIZED HEALTH CARE EDUCATION/TRAINING PROGRAM

## 2021-11-10 RX ORDER — METFORMIN HYDROCHLORIDE 500 MG/1
TABLET, EXTENDED RELEASE ORAL
COMMUNITY
Start: 2021-04-07 | End: 2022-02-08

## 2021-11-11 ENCOUNTER — PATIENT MESSAGE (OUTPATIENT)
Dept: INTERNAL MEDICINE | Facility: CLINIC | Age: 41
End: 2021-11-11
Payer: MEDICAID

## 2021-11-11 ENCOUNTER — CLINICAL SUPPORT (OUTPATIENT)
Dept: REHABILITATION | Facility: HOSPITAL | Age: 41
End: 2021-11-11
Payer: MEDICAID

## 2021-11-11 ENCOUNTER — PATIENT MESSAGE (OUTPATIENT)
Dept: HEMATOLOGY/ONCOLOGY | Facility: CLINIC | Age: 41
End: 2021-11-11
Payer: MEDICAID

## 2021-11-11 DIAGNOSIS — M54.50 CHRONIC BILATERAL LOW BACK PAIN WITHOUT SCIATICA: ICD-10-CM

## 2021-11-11 DIAGNOSIS — R26.2 DIFFICULTY WALKING: ICD-10-CM

## 2021-11-11 DIAGNOSIS — G89.29 CHRONIC BILATERAL LOW BACK PAIN WITHOUT SCIATICA: ICD-10-CM

## 2021-11-11 PROCEDURE — 97110 THERAPEUTIC EXERCISES: CPT | Mod: PN

## 2021-11-12 ENCOUNTER — HOSPITAL ENCOUNTER (OUTPATIENT)
Facility: HOSPITAL | Age: 41
Discharge: HOME | End: 2021-11-12
Attending: ANESTHESIOLOGY | Admitting: ANESTHESIOLOGY
Payer: MEDICAID

## 2021-11-12 VITALS
WEIGHT: 293 LBS | HEART RATE: 70 BPM | DIASTOLIC BLOOD PRESSURE: 82 MMHG | SYSTOLIC BLOOD PRESSURE: 158 MMHG | BODY MASS INDEX: 44.41 KG/M2 | RESPIRATION RATE: 16 BRPM | OXYGEN SATURATION: 99 % | HEIGHT: 68 IN | TEMPERATURE: 98 F

## 2021-11-12 DIAGNOSIS — M12.812 LEFT ROTATOR CUFF TEAR ARTHROPATHY: ICD-10-CM

## 2021-11-12 DIAGNOSIS — M75.102 LEFT ROTATOR CUFF TEAR ARTHROPATHY: ICD-10-CM

## 2021-11-12 PROBLEM — M75.112 NONTRAUMATIC INCOMPLETE TEAR OF LEFT ROTATOR CUFF: Status: ACTIVE | Noted: 2021-11-12

## 2021-11-12 LAB — POCT GLUCOSE: 149 MG/DL (ref 70–110)

## 2021-11-12 PROCEDURE — 64418 PR NERVE BLOCK INJ, ANES/STEROID, SUPRASCAPULAR: ICD-10-PCS | Mod: 59,LT,, | Performed by: ANESTHESIOLOGY

## 2021-11-12 PROCEDURE — 64418 NJX AA&/STRD SPRSCAP NRV: CPT | Mod: 59,LT,, | Performed by: ANESTHESIOLOGY

## 2021-11-12 PROCEDURE — 64417 PR NERVE BLOCK INJ, ANES/STEROID, AXILLARY, INCL IMAG GUIDANCE: ICD-10-PCS | Mod: LT,,, | Performed by: ANESTHESIOLOGY

## 2021-11-12 PROCEDURE — 63600175 PHARM REV CODE 636 W HCPCS: Performed by: ANESTHESIOLOGY

## 2021-11-12 PROCEDURE — 25000003 PHARM REV CODE 250: Performed by: ANESTHESIOLOGY

## 2021-11-12 PROCEDURE — 82962 GLUCOSE BLOOD TEST: CPT | Performed by: ANESTHESIOLOGY

## 2021-11-12 PROCEDURE — 64418 NJX AA&/STRD SPRSCAP NRV: CPT | Performed by: ANESTHESIOLOGY

## 2021-11-12 PROCEDURE — 64417 NJX AA&/STRD AX NERVE IMG: CPT | Performed by: ANESTHESIOLOGY

## 2021-11-12 PROCEDURE — 64417 NJX AA&/STRD AX NERVE IMG: CPT | Mod: LT,,, | Performed by: ANESTHESIOLOGY

## 2021-11-12 RX ORDER — BUPIVACAINE HYDROCHLORIDE 2.5 MG/ML
INJECTION, SOLUTION EPIDURAL; INFILTRATION; INTRACAUDAL
Status: DISCONTINUED | OUTPATIENT
Start: 2021-11-12 | End: 2021-11-12 | Stop reason: HOSPADM

## 2021-11-12 RX ORDER — SODIUM BICARBONATE 1 MEQ/ML
SYRINGE (ML) INTRAVENOUS
Status: DISCONTINUED | OUTPATIENT
Start: 2021-11-12 | End: 2021-11-12 | Stop reason: HOSPADM

## 2021-11-12 RX ORDER — TRIAMCINOLONE ACETONIDE 40 MG/ML
INJECTION, SUSPENSION INTRA-ARTICULAR; INTRAMUSCULAR
Status: DISCONTINUED | OUTPATIENT
Start: 2021-11-12 | End: 2021-11-12 | Stop reason: HOSPADM

## 2021-11-15 ENCOUNTER — HOSPITAL ENCOUNTER (OUTPATIENT)
Dept: CARDIOLOGY | Facility: HOSPITAL | Age: 41
Discharge: HOME | End: 2021-11-15
Attending: INTERNAL MEDICINE
Payer: MEDICAID

## 2021-11-15 VITALS — WEIGHT: 293 LBS | HEIGHT: 68 IN | BODY MASS INDEX: 44.41 KG/M2

## 2021-11-15 DIAGNOSIS — R60.0 LOCALIZED EDEMA: ICD-10-CM

## 2021-11-15 PROCEDURE — 93970 EXTREMITY STUDY: CPT | Mod: 50,PO

## 2021-11-15 PROCEDURE — 93970 CV US DOPPLER VENOUS LEGS BILATERAL (CUPID ONLY): ICD-10-PCS | Mod: 26,,, | Performed by: INTERNAL MEDICINE

## 2021-11-15 PROCEDURE — 93970 EXTREMITY STUDY: CPT | Mod: 26,,, | Performed by: INTERNAL MEDICINE

## 2021-11-16 ENCOUNTER — CLINICAL SUPPORT (OUTPATIENT)
Dept: REHABILITATION | Facility: HOSPITAL | Age: 41
End: 2021-11-16
Payer: MEDICAID

## 2021-11-16 DIAGNOSIS — R26.2 DIFFICULTY WALKING: ICD-10-CM

## 2021-11-16 DIAGNOSIS — M54.50 CHRONIC BILATERAL LOW BACK PAIN WITHOUT SCIATICA: ICD-10-CM

## 2021-11-16 DIAGNOSIS — G89.29 CHRONIC BILATERAL LOW BACK PAIN WITHOUT SCIATICA: ICD-10-CM

## 2021-11-16 PROCEDURE — 97110 THERAPEUTIC EXERCISES: CPT | Mod: PN

## 2021-11-18 ENCOUNTER — CLINICAL SUPPORT (OUTPATIENT)
Dept: REHABILITATION | Facility: HOSPITAL | Age: 41
End: 2021-11-18
Payer: MEDICAID

## 2021-11-18 ENCOUNTER — TELEPHONE (OUTPATIENT)
Dept: RADIOLOGY | Facility: HOSPITAL | Age: 41
End: 2021-11-18
Payer: MEDICAID

## 2021-11-18 DIAGNOSIS — R26.2 DIFFICULTY WALKING: ICD-10-CM

## 2021-11-18 DIAGNOSIS — G89.29 CHRONIC BILATERAL LOW BACK PAIN WITHOUT SCIATICA: ICD-10-CM

## 2021-11-18 DIAGNOSIS — M54.50 CHRONIC BILATERAL LOW BACK PAIN WITHOUT SCIATICA: ICD-10-CM

## 2021-11-18 PROCEDURE — 97110 THERAPEUTIC EXERCISES: CPT | Mod: PN

## 2021-11-19 ENCOUNTER — HOSPITAL ENCOUNTER (OUTPATIENT)
Dept: RADIOLOGY | Facility: HOSPITAL | Age: 41
Discharge: HOME | End: 2021-11-19
Attending: FAMILY MEDICINE
Payer: MEDICAID

## 2021-11-19 DIAGNOSIS — E04.1 THYROID NODULE: Primary | ICD-10-CM

## 2021-11-19 DIAGNOSIS — E03.9 HYPOTHYROIDISM, UNSPECIFIED TYPE: ICD-10-CM

## 2021-11-19 DIAGNOSIS — R06.09 DOE (DYSPNEA ON EXERTION): ICD-10-CM

## 2021-11-19 PROCEDURE — 71250 CT THORAX DX C-: CPT | Mod: TC

## 2021-11-19 PROCEDURE — 71250 CT CHEST WITHOUT CONTRAST: ICD-10-PCS | Mod: 26,,, | Performed by: RADIOLOGY

## 2021-11-19 PROCEDURE — 76536 US EXAM OF HEAD AND NECK: CPT | Mod: 26,,, | Performed by: RADIOLOGY

## 2021-11-19 PROCEDURE — 76536 US THYROID: ICD-10-PCS | Mod: 26,,, | Performed by: RADIOLOGY

## 2021-11-19 PROCEDURE — 76536 US EXAM OF HEAD AND NECK: CPT | Mod: TC

## 2021-11-19 PROCEDURE — 71250 CT THORAX DX C-: CPT | Mod: 26,,, | Performed by: RADIOLOGY

## 2021-11-22 ENCOUNTER — TELEPHONE (OUTPATIENT)
Dept: PEDIATRICS | Facility: CLINIC | Age: 41
End: 2021-11-22
Payer: MEDICAID

## 2021-11-22 ENCOUNTER — OFFICE VISIT (OUTPATIENT)
Dept: GASTROENTEROLOGY | Facility: CLINIC | Age: 41
End: 2021-11-22
Payer: MEDICAID

## 2021-11-22 ENCOUNTER — HOSPITAL ENCOUNTER (OUTPATIENT)
Dept: RADIOLOGY | Facility: HOSPITAL | Age: 41
Discharge: HOME | End: 2021-11-22
Attending: NURSE PRACTITIONER
Payer: MEDICAID

## 2021-11-22 ENCOUNTER — OFFICE VISIT (OUTPATIENT)
Dept: INTERNAL MEDICINE | Facility: CLINIC | Age: 41
End: 2021-11-22
Payer: MEDICAID

## 2021-11-22 VITALS — BODY MASS INDEX: 44.98 KG/M2 | HEIGHT: 68 IN | SYSTOLIC BLOOD PRESSURE: 122 MMHG | DIASTOLIC BLOOD PRESSURE: 70 MMHG

## 2021-11-22 VITALS
SYSTOLIC BLOOD PRESSURE: 110 MMHG | OXYGEN SATURATION: 98 % | WEIGHT: 293 LBS | HEART RATE: 84 BPM | BODY MASS INDEX: 44.98 KG/M2 | TEMPERATURE: 98 F | DIASTOLIC BLOOD PRESSURE: 80 MMHG

## 2021-11-22 DIAGNOSIS — I10 PRIMARY HYPERTENSION: ICD-10-CM

## 2021-11-22 DIAGNOSIS — K59.04 CHRONIC IDIOPATHIC CONSTIPATION: Primary | ICD-10-CM

## 2021-11-22 DIAGNOSIS — E04.1 THYROID NODULE: ICD-10-CM

## 2021-11-22 DIAGNOSIS — K21.9 GASTROESOPHAGEAL REFLUX DISEASE, UNSPECIFIED WHETHER ESOPHAGITIS PRESENT: ICD-10-CM

## 2021-11-22 DIAGNOSIS — K59.04 CHRONIC IDIOPATHIC CONSTIPATION: ICD-10-CM

## 2021-11-22 DIAGNOSIS — R10.11 RIGHT UPPER QUADRANT ABDOMINAL PAIN: Primary | ICD-10-CM

## 2021-11-22 DIAGNOSIS — E78.49 OTHER HYPERLIPIDEMIA: ICD-10-CM

## 2021-11-22 DIAGNOSIS — R07.0 THROAT DISCOMFORT: Primary | ICD-10-CM

## 2021-11-22 DIAGNOSIS — R10.11 RIGHT UPPER QUADRANT ABDOMINAL PAIN: ICD-10-CM

## 2021-11-22 PROCEDURE — 99215 OFFICE O/P EST HI 40 MIN: CPT | Mod: PBBFAC,27 | Performed by: NURSE PRACTITIONER

## 2021-11-22 PROCEDURE — 99214 OFFICE O/P EST MOD 30 MIN: CPT | Mod: S$PBB,,, | Performed by: FAMILY MEDICINE

## 2021-11-22 PROCEDURE — 99214 OFFICE O/P EST MOD 30 MIN: CPT | Mod: 25,27,PBBFAC,PO | Performed by: FAMILY MEDICINE

## 2021-11-22 PROCEDURE — 74019 RADEX ABDOMEN 2 VIEWS: CPT | Mod: TC

## 2021-11-22 PROCEDURE — 99999 PR PBB SHADOW E&M-EST. PATIENT-LVL IV: CPT | Mod: PBBFAC,,, | Performed by: FAMILY MEDICINE

## 2021-11-22 PROCEDURE — 74019 RADEX ABDOMEN 2 VIEWS: CPT | Mod: 26,,, | Performed by: RADIOLOGY

## 2021-11-22 PROCEDURE — 99204 OFFICE O/P NEW MOD 45 MIN: CPT | Mod: S$PBB,,, | Performed by: NURSE PRACTITIONER

## 2021-11-22 PROCEDURE — 74019 XR ABDOMEN FLAT AND ERECT: ICD-10-PCS | Mod: 26,,, | Performed by: RADIOLOGY

## 2021-11-22 PROCEDURE — 99214 PR OFFICE/OUTPT VISIT, EST, LEVL IV, 30-39 MIN: ICD-10-PCS | Mod: S$PBB,,, | Performed by: FAMILY MEDICINE

## 2021-11-22 PROCEDURE — 99204 PR OFFICE/OUTPT VISIT, NEW, LEVL IV, 45-59 MIN: ICD-10-PCS | Mod: S$PBB,,, | Performed by: NURSE PRACTITIONER

## 2021-11-22 PROCEDURE — 99999 PR PBB SHADOW E&M-EST. PATIENT-LVL IV: ICD-10-PCS | Mod: PBBFAC,,, | Performed by: FAMILY MEDICINE

## 2021-11-22 PROCEDURE — 99999 PR PBB SHADOW E&M-EST. PATIENT-LVL V: ICD-10-PCS | Mod: PBBFAC,,, | Performed by: NURSE PRACTITIONER

## 2021-11-22 PROCEDURE — 99999 PR PBB SHADOW E&M-EST. PATIENT-LVL V: CPT | Mod: PBBFAC,,, | Performed by: NURSE PRACTITIONER

## 2021-11-25 ENCOUNTER — PATIENT MESSAGE (OUTPATIENT)
Dept: GASTROENTEROLOGY | Facility: CLINIC | Age: 41
End: 2021-11-25
Payer: MEDICAID

## 2021-11-29 ENCOUNTER — HOSPITAL ENCOUNTER (OUTPATIENT)
Dept: RADIOLOGY | Facility: HOSPITAL | Age: 41
Discharge: HOME | End: 2021-11-29
Attending: NURSE PRACTITIONER
Payer: MEDICAID

## 2021-11-29 ENCOUNTER — PATIENT MESSAGE (OUTPATIENT)
Dept: GASTROENTEROLOGY | Facility: CLINIC | Age: 41
End: 2021-11-29
Payer: MEDICAID

## 2021-11-29 DIAGNOSIS — R10.11 RIGHT UPPER QUADRANT ABDOMINAL PAIN: ICD-10-CM

## 2021-11-29 PROCEDURE — 25000011 NM HEPATOBILIARY(HIDA) WITH PHARM AND EF

## 2021-11-29 PROCEDURE — 78227 NM HEPATOBILIARY(HIDA) WITH PHARM AND EF: ICD-10-PCS | Mod: 26,,, | Performed by: RADIOLOGY

## 2021-11-29 PROCEDURE — 78227 HEPATOBIL SYST IMAGE W/DRUG: CPT | Mod: 26,,, | Performed by: RADIOLOGY

## 2021-11-29 PROCEDURE — 27202308 NM HEPATOBILIARY(HIDA) WITH PHARM AND EF

## 2021-11-30 ENCOUNTER — TELEPHONE (OUTPATIENT)
Dept: SURGERY | Facility: CLINIC | Age: 41
End: 2021-11-30
Payer: MEDICAID

## 2021-11-30 DIAGNOSIS — R10.11 RIGHT UPPER QUADRANT ABDOMINAL PAIN: Primary | ICD-10-CM

## 2021-12-01 ENCOUNTER — TELEPHONE (OUTPATIENT)
Dept: SURGERY | Facility: CLINIC | Age: 41
End: 2021-12-01
Payer: MEDICAID

## 2021-12-02 ENCOUNTER — PATIENT MESSAGE (OUTPATIENT)
Dept: HEMATOLOGY/ONCOLOGY | Facility: CLINIC | Age: 41
End: 2021-12-02
Payer: MEDICAID

## 2021-12-02 ENCOUNTER — OFFICE VISIT (OUTPATIENT)
Dept: OTOLARYNGOLOGY | Facility: CLINIC | Age: 41
End: 2021-12-02
Payer: MEDICAID

## 2021-12-02 VITALS — HEIGHT: 68 IN | BODY MASS INDEX: 44.41 KG/M2 | WEIGHT: 293 LBS

## 2021-12-02 DIAGNOSIS — E04.1 THYROID NODULE: Primary | ICD-10-CM

## 2021-12-02 PROCEDURE — 99213 OFFICE O/P EST LOW 20 MIN: CPT | Mod: S$PBB,,, | Performed by: OTOLARYNGOLOGY

## 2021-12-02 PROCEDURE — 99999 PR PBB SHADOW E&M-EST. PATIENT-LVL IV: CPT | Mod: PBBFAC,,, | Performed by: OTOLARYNGOLOGY

## 2021-12-02 PROCEDURE — 99214 OFFICE O/P EST MOD 30 MIN: CPT | Mod: PBBFAC | Performed by: OTOLARYNGOLOGY

## 2021-12-02 PROCEDURE — 99213 PR OFFICE/OUTPT VISIT, EST, LEVL III, 20-29 MIN: ICD-10-PCS | Mod: S$PBB,,, | Performed by: OTOLARYNGOLOGY

## 2021-12-02 PROCEDURE — 99999 PR PBB SHADOW E&M-EST. PATIENT-LVL IV: ICD-10-PCS | Mod: PBBFAC,,, | Performed by: OTOLARYNGOLOGY

## 2021-12-03 ENCOUNTER — CLINICAL SUPPORT (OUTPATIENT)
Dept: PULMONOLOGY | Facility: CLINIC | Age: 41
End: 2021-12-03
Payer: MEDICAID

## 2021-12-03 ENCOUNTER — PATIENT MESSAGE (OUTPATIENT)
Dept: PSYCHIATRY | Facility: CLINIC | Age: 41
End: 2021-12-03
Payer: MEDICAID

## 2021-12-03 ENCOUNTER — LAB VISIT (OUTPATIENT)
Dept: LAB | Facility: HOSPITAL | Age: 41
End: 2021-12-03
Attending: INTERNAL MEDICINE
Payer: MEDICAID

## 2021-12-03 ENCOUNTER — OFFICE VISIT (OUTPATIENT)
Dept: PULMONOLOGY | Facility: CLINIC | Age: 41
End: 2021-12-03
Payer: MEDICAID

## 2021-12-03 ENCOUNTER — TELEPHONE (OUTPATIENT)
Dept: SLEEP MEDICINE | Facility: CLINIC | Age: 41
End: 2021-12-03
Payer: MEDICAID

## 2021-12-03 VITALS
SYSTOLIC BLOOD PRESSURE: 134 MMHG | RESPIRATION RATE: 18 BRPM | OXYGEN SATURATION: 97 % | HEIGHT: 68 IN | DIASTOLIC BLOOD PRESSURE: 80 MMHG | BODY MASS INDEX: 44.41 KG/M2 | HEART RATE: 51 BPM | WEIGHT: 293 LBS

## 2021-12-03 VITALS — WEIGHT: 293 LBS | BODY MASS INDEX: 44.41 KG/M2 | HEIGHT: 68 IN

## 2021-12-03 DIAGNOSIS — Z82.5 FAMILY HISTORY OF ASTHMA: ICD-10-CM

## 2021-12-03 DIAGNOSIS — E04.1 THYROID NODULE: ICD-10-CM

## 2021-12-03 DIAGNOSIS — R06.09 DOE (DYSPNEA ON EXERTION): ICD-10-CM

## 2021-12-03 DIAGNOSIS — R29.818 SUSPECTED SLEEP APNEA: ICD-10-CM

## 2021-12-03 DIAGNOSIS — R06.81 WITNESSED APNEIC SPELLS: ICD-10-CM

## 2021-12-03 DIAGNOSIS — J45.909 ASTHMA, UNSPECIFIED ASTHMA SEVERITY, UNSPECIFIED WHETHER COMPLICATED, UNSPECIFIED WHETHER PERSISTENT: ICD-10-CM

## 2021-12-03 DIAGNOSIS — R91.8 MULTIPLE LUNG NODULES ON CT: ICD-10-CM

## 2021-12-03 DIAGNOSIS — R06.83 SNORING: ICD-10-CM

## 2021-12-03 DIAGNOSIS — R06.09 DOE (DYSPNEA ON EXERTION): Primary | ICD-10-CM

## 2021-12-03 DIAGNOSIS — G47.19 EXCESSIVE DAYTIME SLEEPINESS: ICD-10-CM

## 2021-12-03 LAB
BRPFT: ABNORMAL
FEF 25 75 LLN: 1.51
FEF 25 75 PRE REF: 44.7 %
FEF 25 75 REF: 2.9
FEV1 FVC LLN: 71
FEV1 FVC PRE REF: 96.1 %
FEV1 FVC REF: 81
FEV1 LLN: 2.21
FEV1 PRE REF: 51.5 %
FEV1 REF: 2.87
FVC LLN: 2.76
FVC PRE REF: 53.3 %
FVC REF: 3.55
PEF LLN: 4.92
PEF PRE REF: 71.4 %
PEF REF: 7.19
PRE FEF 25 75: 1.3 L/S (ref 1.51–4.3)
PRE FET 100: 7.73 SEC
PRE FEV1 FVC: 78.27 % (ref 70.82–92)
PRE FEV1: 1.48 L (ref 2.21–3.54)
PRE FVC: 1.89 L (ref 2.76–4.34)
PRE PEF: 5.14 L/S (ref 4.92–9.46)

## 2021-12-03 PROCEDURE — 82785 ASSAY OF IGE: CPT | Performed by: INTERNAL MEDICINE

## 2021-12-03 PROCEDURE — 99999 PR PBB SHADOW E&M-EST. PATIENT-LVL I: CPT | Mod: PBBFAC,,,

## 2021-12-03 PROCEDURE — 99999 PR PBB SHADOW E&M-EST. PATIENT-LVL I: ICD-10-PCS | Mod: PBBFAC,,,

## 2021-12-03 PROCEDURE — 94618 PULMONARY STRESS TESTING: CPT | Mod: 26,S$PBB,, | Performed by: INTERNAL MEDICINE

## 2021-12-03 PROCEDURE — 99204 PR OFFICE/OUTPT VISIT, NEW, LEVL IV, 45-59 MIN: ICD-10-PCS | Mod: 25,S$PBB,, | Performed by: INTERNAL MEDICINE

## 2021-12-03 PROCEDURE — 36415 COLL VENOUS BLD VENIPUNCTURE: CPT | Performed by: INTERNAL MEDICINE

## 2021-12-03 PROCEDURE — 94618 PULMONARY STRESS TESTING: CPT | Mod: PBBFAC

## 2021-12-03 PROCEDURE — 99211 OFF/OP EST MAY X REQ PHY/QHP: CPT | Mod: PBBFAC,27

## 2021-12-03 PROCEDURE — 99999 PR PBB SHADOW E&M-EST. PATIENT-LVL IV: ICD-10-PCS | Mod: PBBFAC,,, | Performed by: INTERNAL MEDICINE

## 2021-12-03 PROCEDURE — 94010 BREATHING CAPACITY TEST: CPT | Mod: PBBFAC

## 2021-12-03 PROCEDURE — 94010 BREATHING CAPACITY TEST: ICD-10-PCS | Mod: 26,59,S$PBB, | Performed by: INTERNAL MEDICINE

## 2021-12-03 PROCEDURE — 99204 OFFICE O/P NEW MOD 45 MIN: CPT | Mod: 25,S$PBB,, | Performed by: INTERNAL MEDICINE

## 2021-12-03 PROCEDURE — 99214 OFFICE O/P EST MOD 30 MIN: CPT | Mod: PBBFAC | Performed by: INTERNAL MEDICINE

## 2021-12-03 PROCEDURE — 94010 BREATHING CAPACITY TEST: CPT | Mod: 26,59,S$PBB, | Performed by: INTERNAL MEDICINE

## 2021-12-03 PROCEDURE — 94618 PULMONARY STRESS TESTING: ICD-10-PCS | Mod: 26,S$PBB,, | Performed by: INTERNAL MEDICINE

## 2021-12-03 PROCEDURE — 99999 PR PBB SHADOW E&M-EST. PATIENT-LVL IV: CPT | Mod: PBBFAC,,, | Performed by: INTERNAL MEDICINE

## 2021-12-03 RX ORDER — BUDESONIDE AND FORMOTEROL FUMARATE DIHYDRATE 80; 4.5 UG/1; UG/1
2 AEROSOL RESPIRATORY (INHALATION) 2 TIMES DAILY
Qty: 10.2 G | Refills: 3 | Status: SHIPPED | OUTPATIENT
Start: 2021-12-03 | End: 2022-03-16

## 2021-12-03 RX ORDER — LANOLIN ALCOHOL/MO/W.PET/CERES
400 CREAM (GRAM) TOPICAL DAILY
Qty: 90 TABLET | Refills: 1 | Status: SHIPPED | OUTPATIENT
Start: 2021-12-03 | End: 2024-01-16 | Stop reason: SDUPTHER

## 2021-12-04 LAB — IGE SERPL-ACNC: 58 IU/ML (ref 0–100)

## 2021-12-06 ENCOUNTER — OFFICE VISIT (OUTPATIENT)
Dept: SURGERY | Facility: CLINIC | Age: 41
End: 2021-12-06
Payer: MEDICAID

## 2021-12-06 ENCOUNTER — HOSPITAL ENCOUNTER (OUTPATIENT)
Dept: RADIOLOGY | Facility: HOSPITAL | Age: 41
Discharge: HOME | End: 2021-12-06
Attending: OTOLARYNGOLOGY
Payer: MEDICAID

## 2021-12-06 VITALS
DIASTOLIC BLOOD PRESSURE: 78 MMHG | BODY MASS INDEX: 45.89 KG/M2 | TEMPERATURE: 98 F | HEART RATE: 52 BPM | WEIGHT: 293 LBS | SYSTOLIC BLOOD PRESSURE: 129 MMHG

## 2021-12-06 DIAGNOSIS — Z15.09 MUTATION IN TP53 GENE: ICD-10-CM

## 2021-12-06 DIAGNOSIS — Z17.1 MALIGNANT NEOPLASM OF LEFT BREAST IN FEMALE, ESTROGEN RECEPTOR NEGATIVE, UNSPECIFIED SITE OF BREAST: ICD-10-CM

## 2021-12-06 DIAGNOSIS — C50.912 MALIGNANT NEOPLASM OF LEFT BREAST IN FEMALE, ESTROGEN RECEPTOR NEGATIVE, UNSPECIFIED SITE OF BREAST: ICD-10-CM

## 2021-12-06 DIAGNOSIS — Z15.89 MUTATION IN TP53 GENE: ICD-10-CM

## 2021-12-06 DIAGNOSIS — E04.1 THYROID NODULE: ICD-10-CM

## 2021-12-06 DIAGNOSIS — Z15.01 MUTATION IN TP53 GENE: ICD-10-CM

## 2021-12-06 PROBLEM — Z15.02 MUTATION IN TP53 GENE: Status: ACTIVE | Noted: 2021-12-06

## 2021-12-06 PROCEDURE — 88173 CYTOPATH EVAL FNA REPORT: CPT | Performed by: PATHOLOGY

## 2021-12-06 PROCEDURE — 10005 FNA BX W/US GDN 1ST LES: CPT

## 2021-12-06 PROCEDURE — 99204 PR OFFICE/OUTPT VISIT, NEW, LEVL IV, 45-59 MIN: ICD-10-PCS | Mod: S$PBB,,, | Performed by: SURGERY

## 2021-12-06 PROCEDURE — 88173 PR  INTERPRETATION OF FNA SMEAR: ICD-10-PCS | Mod: 26,,, | Performed by: PATHOLOGY

## 2021-12-06 PROCEDURE — 99999 PR PBB SHADOW E&M-EST. PATIENT-LVL V: ICD-10-PCS | Mod: PBBFAC,,, | Performed by: SURGERY

## 2021-12-06 PROCEDURE — 99999 PR PBB SHADOW E&M-EST. PATIENT-LVL V: CPT | Mod: PBBFAC,,, | Performed by: SURGERY

## 2021-12-06 PROCEDURE — 88173 CYTOPATH EVAL FNA REPORT: CPT | Mod: 26,,, | Performed by: PATHOLOGY

## 2021-12-06 PROCEDURE — 99204 OFFICE O/P NEW MOD 45 MIN: CPT | Mod: S$PBB,,, | Performed by: SURGERY

## 2021-12-06 PROCEDURE — 99215 OFFICE O/P EST HI 40 MIN: CPT | Mod: PBBFAC,25 | Performed by: SURGERY

## 2021-12-07 ENCOUNTER — CLINICAL SUPPORT (OUTPATIENT)
Dept: REHABILITATION | Facility: HOSPITAL | Age: 41
End: 2021-12-07
Payer: MEDICAID

## 2021-12-07 DIAGNOSIS — R26.2 DIFFICULTY WALKING: ICD-10-CM

## 2021-12-07 DIAGNOSIS — G89.29 CHRONIC BILATERAL LOW BACK PAIN WITHOUT SCIATICA: ICD-10-CM

## 2021-12-07 DIAGNOSIS — M54.50 CHRONIC BILATERAL LOW BACK PAIN WITHOUT SCIATICA: ICD-10-CM

## 2021-12-07 PROCEDURE — 97110 THERAPEUTIC EXERCISES: CPT | Mod: PN

## 2021-12-08 ENCOUNTER — OFFICE VISIT (OUTPATIENT)
Dept: ORTHOPEDICS | Facility: CLINIC | Age: 41
End: 2021-12-08
Payer: MEDICAID

## 2021-12-08 VITALS — WEIGHT: 293 LBS | BODY MASS INDEX: 44.41 KG/M2 | HEIGHT: 68 IN

## 2021-12-08 DIAGNOSIS — M75.122 NONTRAUMATIC COMPLETE TEAR OF LEFT ROTATOR CUFF: Primary | ICD-10-CM

## 2021-12-08 PROCEDURE — 99999 PR PBB SHADOW E&M-EST. PATIENT-LVL III: ICD-10-PCS | Mod: PBBFAC,,, | Performed by: STUDENT IN AN ORGANIZED HEALTH CARE EDUCATION/TRAINING PROGRAM

## 2021-12-08 PROCEDURE — 99213 PR OFFICE/OUTPT VISIT, EST, LEVL III, 20-29 MIN: ICD-10-PCS | Mod: S$PBB,,, | Performed by: STUDENT IN AN ORGANIZED HEALTH CARE EDUCATION/TRAINING PROGRAM

## 2021-12-08 PROCEDURE — 99213 OFFICE O/P EST LOW 20 MIN: CPT | Mod: PBBFAC,PO | Performed by: STUDENT IN AN ORGANIZED HEALTH CARE EDUCATION/TRAINING PROGRAM

## 2021-12-08 PROCEDURE — 99999 PR PBB SHADOW E&M-EST. PATIENT-LVL III: CPT | Mod: PBBFAC,,, | Performed by: STUDENT IN AN ORGANIZED HEALTH CARE EDUCATION/TRAINING PROGRAM

## 2021-12-08 PROCEDURE — 99213 OFFICE O/P EST LOW 20 MIN: CPT | Mod: S$PBB,,, | Performed by: STUDENT IN AN ORGANIZED HEALTH CARE EDUCATION/TRAINING PROGRAM

## 2021-12-10 ENCOUNTER — PATIENT MESSAGE (OUTPATIENT)
Dept: ORTHOPEDICS | Facility: CLINIC | Age: 41
End: 2021-12-10
Payer: MEDICAID

## 2021-12-10 ENCOUNTER — PATIENT MESSAGE (OUTPATIENT)
Dept: OTOLARYNGOLOGY | Facility: CLINIC | Age: 41
End: 2021-12-10
Payer: MEDICAID

## 2021-12-10 ENCOUNTER — PROCEDURE VISIT (OUTPATIENT)
Dept: SLEEP MEDICINE | Facility: CLINIC | Age: 41
End: 2021-12-10
Payer: MEDICAID

## 2021-12-10 DIAGNOSIS — R06.83 SNORING: ICD-10-CM

## 2021-12-10 DIAGNOSIS — R29.818 SUSPECTED SLEEP APNEA: ICD-10-CM

## 2021-12-10 DIAGNOSIS — G47.19 EXCESSIVE DAYTIME SLEEPINESS: ICD-10-CM

## 2021-12-10 DIAGNOSIS — R06.81 WITNESSED APNEIC SPELLS: ICD-10-CM

## 2021-12-10 LAB
COMMENT: NORMAL
FINAL PATHOLOGIC DIAGNOSIS: NORMAL
Lab: NORMAL

## 2021-12-10 PROCEDURE — 95806 SLEEP STUDY UNATT&RESP EFFT: CPT | Mod: PBBFAC | Performed by: INTERNAL MEDICINE

## 2021-12-13 ENCOUNTER — PATIENT MESSAGE (OUTPATIENT)
Dept: OTOLARYNGOLOGY | Facility: CLINIC | Age: 41
End: 2021-12-13
Payer: MEDICAID

## 2021-12-13 PROCEDURE — 95806 SLEEP STUDY UNATT&RESP EFFT: CPT | Mod: 26,S$PBB,, | Performed by: INTERNAL MEDICINE

## 2021-12-13 PROCEDURE — 95806 PR SLEEP STUDY, UNATTENDED, SIMUL RECORD HR/O2 SAT/RESP FLOW/RESP EFFT: ICD-10-PCS | Mod: 26,S$PBB,, | Performed by: INTERNAL MEDICINE

## 2021-12-15 ENCOUNTER — CLINICAL SUPPORT (OUTPATIENT)
Dept: REHABILITATION | Facility: HOSPITAL | Age: 41
End: 2021-12-15
Payer: MEDICAID

## 2021-12-15 DIAGNOSIS — M54.50 CHRONIC BILATERAL LOW BACK PAIN WITHOUT SCIATICA: ICD-10-CM

## 2021-12-15 DIAGNOSIS — R26.2 DIFFICULTY WALKING: ICD-10-CM

## 2021-12-15 DIAGNOSIS — G89.29 CHRONIC BILATERAL LOW BACK PAIN WITHOUT SCIATICA: ICD-10-CM

## 2021-12-15 PROCEDURE — 97110 THERAPEUTIC EXERCISES: CPT | Mod: PN,CQ

## 2021-12-16 ENCOUNTER — PATIENT MESSAGE (OUTPATIENT)
Dept: PULMONOLOGY | Facility: CLINIC | Age: 41
End: 2021-12-16
Payer: MEDICAID

## 2021-12-16 ENCOUNTER — PATIENT MESSAGE (OUTPATIENT)
Dept: INTERNAL MEDICINE | Facility: CLINIC | Age: 41
End: 2021-12-16
Payer: MEDICAID

## 2021-12-20 ENCOUNTER — CLINICAL SUPPORT (OUTPATIENT)
Dept: REHABILITATION | Facility: HOSPITAL | Age: 41
End: 2021-12-20
Payer: MEDICAID

## 2021-12-20 DIAGNOSIS — R26.2 DIFFICULTY WALKING: ICD-10-CM

## 2021-12-20 DIAGNOSIS — M54.50 CHRONIC BILATERAL LOW BACK PAIN WITHOUT SCIATICA: ICD-10-CM

## 2021-12-20 DIAGNOSIS — G89.29 CHRONIC BILATERAL LOW BACK PAIN WITHOUT SCIATICA: ICD-10-CM

## 2021-12-20 PROCEDURE — 97110 THERAPEUTIC EXERCISES: CPT | Mod: PN

## 2021-12-23 ENCOUNTER — PATIENT MESSAGE (OUTPATIENT)
Dept: OTOLARYNGOLOGY | Facility: CLINIC | Age: 41
End: 2021-12-23
Payer: MEDICAID

## 2021-12-29 ENCOUNTER — PATIENT MESSAGE (OUTPATIENT)
Dept: HEMATOLOGY/ONCOLOGY | Facility: CLINIC | Age: 41
End: 2021-12-29
Payer: MEDICAID

## 2022-01-02 NOTE — PROGRESS NOTES
"Referring Provider:    No referring provider defined for this encounter.  Subjective:   Patient: Sangeetha June [id number], :1980   Visit date:1/3/2022 1:55 PM    Chief Complaint:  Follow-up    HPI:  Sangeetha is a 42 y.o. female who I was asked to see in consultation for evaluation of the following issue(s):    COMPRESSIVE SYMPTOMS OR MINOR RISK FACTORS FOR THYROID CANCER (Negative unless checked):  []  Increasing in size over the past 6 months  []  Dysphagia   [x]  Dyspnea on exertion  []  Orthopnea  []  Hemoptysis  [x]  Voice changes  []  Pain  []  AGE >45    [x]  FEMALE     HIGH RISK HISTORY FOR THYROID CANCER:  []  Thyroid cancer in 1 or more first degree relatives  []  History of radiation exposure to the neck  []  Prior thyroidectomy with dx of thyroid cancer  []  PET positive nodule  []  Multiple Endocrine Neoplasia  []  Familial Medullary Thyroid Cancer    (2009 REVISED [employer]ICAN [employer]OCIATION MANAGEMENT GUIDELINES FOR PATIENTS WITH THYROID NODULES AND DIFFERENTIATED THYROID CANCER)            Objective:   Physical Exam:  Vitals:  Ht 5' 6" (1.676 m)   Wt (!) 138.2 kg (304 lb 10.8 oz)   LMP  (LMP Unknown)   BMI 49.18 kg/m²   General appearance:  Well developed, well nourished        Communication: weak, raspy voice    Ears:  Otoscopy of external auditory canals and tympanic membranes was normal, clinical speech reception thresholds grossly intact, no mass/lesion of auricle.  Nose:  No masses/lesions of external nose, nasal mucosa, septum, and turbinates were within normal limits.  Mouth:  No mass/lesion of lips, teeth, gums, hard/soft palate, tongue, tonsils, or oropharynx.      Neck & Lymphatics:  No cervical lymphadenopathy, no neck mass/crepitus/ asymmetry, trachea is midline.  THYROID: no palpable nodules    DATA REVIEWED    [] On thyroid hormone medications (check if YES)  Lab Results   Component Value Date    TSH 1.586 10/29/2021    TSH 1.731 2021    FREET4 0.96 10/29/2021    " CALCIUM 9.3 01/07/2022    CALCIUM 9.3 10/08/2021    CALCIUM 9.8 09/08/2021    CALCIUM 9.2 07/02/2021    CALCIUM 9.3 05/24/2021       ULTRASOUND:  No results found for this or any previous visit.    Results for orders placed during the hospital encounter of 11/19/21    US Thyroid    Narrative  EXAMINATION:  US THYROID    CLINICAL HISTORY:  Hypothyroidism, unspecified    TECHNIQUE:  Ultrasound of the thyroid and cervical lymph nodes was performed.     COMPARISON:  None.    FINDINGS:  The thyroid gland is normal in size.  The right lobe measures 5.2 x 1.3 x 2.0 cm.  The left lobe measures 5.1 x 2.0 x 2.0 cm. Thyroid isthmus measures 6.4 mm AP.  Total thyroid volume measures approximately 16.0 mL.  There is a densely calcified rounded nodule at the midportion of the left lobe measuring approximately 12 mm which appears to demonstrate some extra glandular extension posteriorly.  Characterization is somewhat limited by artifact from calcification.  Thyroid parenchyma otherwise appears fairly homogeneous with normal vascularity.  No lymphadenopathy is seen.    Impression  Suspicious 12 mm nodule at the midportion of the left thyroid lobe as above.  FNA is recommend.    This report was flagged in Epic as abnormal.      Electronically signed by: Phoenix Mejia MD  Date:    11/19/2021  Time:    13:48        PATHOLOGY:  Final Pathologic Diagnosis Thyroid,  left lobe nodule ( 4 smears and 1 thin prep):   - Features consistent with benign thyroid nodule; [place] Category 2:   Benign, with features suggestive of chronic lymphocytic thyroiditis (see   comments)    Comment: Interp By Griselda Angela M.D., Signed on 12/10/2021 at 08:41     Flexible Laryngoscopy  Procedure: Risks, benefits, and alternatives of the procedure were discussed with the patient, and the patient consented to the fiberoptic examination.  We applied a topical nasal decongestant and analgesic.  After adequate anesthesia was obtained, the flexible  fiberoptic scope was passed into each nostril independently.  Each nasal cavity, the entire pharynx (nasopharynx to hypopharynx) and the larynx were visualized. At the end of the examination, the scope was removed. The patient tolerated the procedure well with no complications.     Findings:  -     Laryngeal mucosa is normal  -     Posterior commissure has mild hypertrophy  -     Lingual tonsils have no hypertrophy  -     Adenoids have no  hypertrophy  -     Right vocal fold: normal mobility     mass/lesion: none  -     Left vocal fold: normal mobility     mass/lesion: none  -     Other findings: none        Assessment & Plan:   Sangeetha was seen today for follow-up.    Diagnoses and all orders for this visit:    Thyroid nodule  Comments:  Benign FNA; repeat US 6/2023  Orders:  -     US Soft Tissue Head Neck Thyroid; Future    Dysphonia  -     Ambulatory referral/consult to Speech Therapy; Future

## 2022-01-03 ENCOUNTER — OFFICE VISIT (OUTPATIENT)
Dept: OTOLARYNGOLOGY | Facility: CLINIC | Age: 42
End: 2022-01-03
Payer: MEDICAID

## 2022-01-03 ENCOUNTER — OFFICE VISIT (OUTPATIENT)
Dept: PSYCHIATRY | Facility: CLINIC | Age: 42
End: 2022-01-03
Payer: MEDICAID

## 2022-01-03 VITALS — BODY MASS INDEX: 47.09 KG/M2 | HEIGHT: 66 IN | WEIGHT: 293 LBS

## 2022-01-03 DIAGNOSIS — F43.23 ADJUSTMENT DISORDER WITH MIXED ANXIETY AND DEPRESSED MOOD: ICD-10-CM

## 2022-01-03 DIAGNOSIS — R49.0 DYSPHONIA: ICD-10-CM

## 2022-01-03 DIAGNOSIS — E04.1 THYROID NODULE: Primary | ICD-10-CM

## 2022-01-03 PROCEDURE — 99213 OFFICE O/P EST LOW 20 MIN: CPT | Mod: 25,S$PBB,, | Performed by: OTOLARYNGOLOGY

## 2022-01-03 PROCEDURE — 99999 PR PBB SHADOW E&M-EST. PATIENT-LVL IV: CPT | Mod: PBBFAC,,, | Performed by: OTOLARYNGOLOGY

## 2022-01-03 PROCEDURE — 3008F BODY MASS INDEX DOCD: CPT | Mod: CPTII,,, | Performed by: OTOLARYNGOLOGY

## 2022-01-03 PROCEDURE — 90791 PR PSYCHIATRIC DIAGNOSTIC EVALUATION: ICD-10-PCS | Mod: AJ,HB,, | Performed by: SOCIAL WORKER

## 2022-01-03 PROCEDURE — 31575 DIAGNOSTIC LARYNGOSCOPY: CPT | Mod: PBBFAC | Performed by: OTOLARYNGOLOGY

## 2022-01-03 PROCEDURE — 99999 PR PBB SHADOW E&M-EST. PATIENT-LVL I: CPT | Mod: PBBFAC,AJ,HB, | Performed by: SOCIAL WORKER

## 2022-01-03 PROCEDURE — 3044F PR MOST RECENT HEMOGLOBIN A1C LEVEL <7.0%: ICD-10-PCS | Mod: AJ,HB,CPTII, | Performed by: SOCIAL WORKER

## 2022-01-03 PROCEDURE — 3044F HG A1C LEVEL LT 7.0%: CPT | Mod: CPTII,,, | Performed by: OTOLARYNGOLOGY

## 2022-01-03 PROCEDURE — 99999 PR PBB SHADOW E&M-EST. PATIENT-LVL IV: ICD-10-PCS | Mod: PBBFAC,,, | Performed by: OTOLARYNGOLOGY

## 2022-01-03 PROCEDURE — 90791 PSYCH DIAGNOSTIC EVALUATION: CPT | Mod: AJ,HB,, | Performed by: SOCIAL WORKER

## 2022-01-03 PROCEDURE — 31575 PR LARYNGOSCOPY, FLEXIBLE; DIAGNOSTIC: ICD-10-PCS | Mod: S$PBB,,, | Performed by: OTOLARYNGOLOGY

## 2022-01-03 PROCEDURE — 31575 DIAGNOSTIC LARYNGOSCOPY: CPT | Mod: S$PBB,,, | Performed by: OTOLARYNGOLOGY

## 2022-01-03 PROCEDURE — 3044F PR MOST RECENT HEMOGLOBIN A1C LEVEL <7.0%: ICD-10-PCS | Mod: CPTII,,, | Performed by: OTOLARYNGOLOGY

## 2022-01-03 PROCEDURE — 99211 OFF/OP EST MAY X REQ PHY/QHP: CPT | Mod: PBBFAC,25 | Performed by: SOCIAL WORKER

## 2022-01-03 PROCEDURE — 3008F PR BODY MASS INDEX (BMI) DOCUMENTED: ICD-10-PCS | Mod: CPTII,,, | Performed by: OTOLARYNGOLOGY

## 2022-01-03 PROCEDURE — 99213 PR OFFICE/OUTPT VISIT, EST, LEVL III, 20-29 MIN: ICD-10-PCS | Mod: 25,S$PBB,, | Performed by: OTOLARYNGOLOGY

## 2022-01-03 PROCEDURE — 99214 OFFICE O/P EST MOD 30 MIN: CPT | Mod: PBBFAC,27,25 | Performed by: OTOLARYNGOLOGY

## 2022-01-03 PROCEDURE — 3044F HG A1C LEVEL LT 7.0%: CPT | Mod: AJ,HB,CPTII, | Performed by: SOCIAL WORKER

## 2022-01-03 PROCEDURE — 99999 PR PBB SHADOW E&M-EST. PATIENT-LVL I: ICD-10-PCS | Mod: PBBFAC,AJ,HB, | Performed by: SOCIAL WORKER

## 2022-01-03 NOTE — PROGRESS NOTES
"Psychiatry Initial Visit (PhD/LCSW)  Diagnostic Interview - CPT 54458    Date: 1/3/2022    Site: [place]     Referral source:  Sam Garcia MD    Clinical status of patient: Outpatient    Sangeetha June, a 41 y.o. female, for initial evaluation visit.  Met with patient.    Chief complaint/reason for encounter: depression and anxiety    History of present illness:  Patient was uncomfortable entering the office due to anxiety.  She did not like the "way" the office looked.  She has multiple medical issues that she is still dealing with.  "I love my sisters and brothers, but they don't give it back to me."  I don't have a life.  "I will think someone is coming to get me."  Her sister recorded the patient previously.  She had barricaded herself in the room.  "I got a lot going on."  I am still dealing with my momma and my  dying.  She got sick with cancer after they .  "I cry all of the time."    "Nobody care about me.  I feel alone all the time.  I get mad because I have a hard time finding my words due to the chemo."  She would hide in the closet due to someone trying to find her.  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  They are supposed to be getting me a CPAP.    Pain: 4    Symptoms:   · Mood: depressed mood, insomnia and poor concentration  · Anxiety: excessive anxiety/worry and restlessness/keyed up  · Substance abuse: denied  · Cognitive functioning: poor focus  · Health behaviors: s/p breast cancer, shoulder problems    Psychiatric history: She has never been to counseling before.    Medical history: She has high blood pressure.  She gets tired easily.      Family history of psychiatric illness: Two of her siblings are "nervous" and one is depressed."    Social history (marriage, employment, etc.):  Her mother, Sara,  about six years ago.  "It feels like it happened last year."  She had a massive stroke which caused dementia.  She was 63.  She was " "a homemaker and "my best friend."  She lived with the patient in [place].  "I tried to kill myself after she ."  "I called my doctor.  She talked to me then I went back home."  She does not know her real father.  He is still alive.  Her step father, Abdon Burrell, lives in [place].  He calls me and checks on me daily.  He was  to her mother for about forty years.  He was a sugar cane farmer.  She grew up in [place].  She moved to [place], [place].  She was there for twenty two years.  Her mother had went to school in [place].  Her brother already lived there.  Her mother had dementia.  The patient graduated from [employer] in .  She was in a "special class" for her behavior.  "All I wanted to do was fight.  I wouldn't listen to the teacher."  She went to college for two years at [employer].  She was pursuing something in the medical field.  She is the fifth of eight children, four boys and four girls.  She has one brother that is  due to gunshot through his window.  He was standing up watching television.  He was living in [place].  "I love all of my sisters and brothers."  She lives with her sister, Ruth Ann, 41, for one year in [place].  She likes living with her.  She acts like she doesn't like me living there.  Her 6yo nephew, Hector, lives with them.  She thinks her sister is depressed.  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  She worked in a nursing home, sitting, and living with a client till she .  The patient came back to [place] Almost a year ago due to illness.  She had left breast cancer in 2019.  She had a left breast mastectomy.  She plans to have a right breast mastectomy due to the chance of cancer.  She has a left arm rotator cuff tear from when she had the left mastectomy.  She has applied for disability.  She has a tumor on her brain, but it is not malignant.  She was " " to [place] for five years.  He  five years ago.  He was 38.  He had a massive heart attack in his sleep.  Her son, Maximiliano, Abdi, who lives in [place].  His father, Jeff, was with her for three years.  He didn't want to stop doing drugs.  Her son works in a plant.  He is single with no children.  She reports a close relationship to her son.  She is a Anabaptist.  "I believe in God."  She listens to her Jainism on the phone.  She likes to watch Anabaptist movies about healing.        Substance use:   Alcohol: none   Drugs: none   Tobacco: none   Caffeine:  She will drink about three cokes a day.  She is trying to increase her water intake.      Current medications and drug reactions (include OTC, herbal): see medication list  She has been taking the Lexapro and the Buspar for about a year.      Strengths and liabilities: Strength: Patient accepts guidance/feedback, Strength: Patient is motivated for change., Strength: Patient has positive support network., Strength: Patient has reasonable judgment., Strength: Patient is stable., Liability: Patient has poor health., Liability: Patient has possible cognitive impairment., Liability: Patient lacks coping skills.    Current Evaluation:     Mental Status Exam:  General Appearance:  age appropriate, casually dressed, overweight   Speech: normal tone, normal rate, normal pitch, normal volume      Level of Cooperation: cooperative      Thought Processes: normal and logical   Mood: anxious, depressed      Thought Content: normal, no suicidality, no homicidality, delusions, or paranoia   Affect: anxious   Orientation: Oriented x3   Memory: recent >  intact, remote >  intact   Attention Span & Concentration: intact   Fund of General Knowledge: intact and appropriate to age and level of education   Abstract Reasoning:    Judgment & Insight: poor     Language  intact     Diagnostic Impression - Plan:       ICD-10-CM ICD-9-CM   1. ANGIE (generalized anxiety disorder)  F41.1 " 300.02       Plan:individual psychotherapy   May refer patient for psychological testing in the future to assess cognition.  Will continue to assess in ongoing sessions.    Return to Clinic: as scheduled    Length of Service (minutes): 45

## 2022-01-03 NOTE — LETTER
January 8, 2022        Sam Garcia MD  [id number] [place] [id number]             [facility]  [address] [place] [address]  Phone: [phone]  Fax: [phone]   Patient: Sangeetha June   MR Number: [id number]   YOB: 1980   Date of Visit: 1/3/2022       Dear Dr. Garcia:    Thank you for referring Sangeetha June to me for evaluation. Below are the relevant portions of my assessment and plan of care.    If you have questions, please do not hesitate to call me. I look forward to following Sangeetha along with you.    Sincerely,      Kaleb Rader, MAURY           CC  No Recipients

## 2022-01-05 ENCOUNTER — PATIENT MESSAGE (OUTPATIENT)
Dept: PULMONOLOGY | Facility: CLINIC | Age: 42
End: 2022-01-05
Payer: MEDICAID

## 2022-01-05 ENCOUNTER — PATIENT MESSAGE (OUTPATIENT)
Dept: ORTHOPEDICS | Facility: CLINIC | Age: 42
End: 2022-01-05

## 2022-01-05 ENCOUNTER — OFFICE VISIT (OUTPATIENT)
Dept: ORTHOPEDICS | Facility: CLINIC | Age: 42
End: 2022-01-05
Payer: MEDICAID

## 2022-01-05 VITALS — WEIGHT: 293 LBS | BODY MASS INDEX: 47.09 KG/M2 | HEIGHT: 66 IN

## 2022-01-05 DIAGNOSIS — M67.814 BICEPS TENDINOSIS OF LEFT SHOULDER: ICD-10-CM

## 2022-01-05 DIAGNOSIS — M75.42 SUBACROMIAL IMPINGEMENT OF LEFT SHOULDER: ICD-10-CM

## 2022-01-05 DIAGNOSIS — M75.122 NONTRAUMATIC COMPLETE TEAR OF LEFT ROTATOR CUFF: Primary | ICD-10-CM

## 2022-01-05 DIAGNOSIS — Z01.818 PREOP TESTING: Primary | ICD-10-CM

## 2022-01-05 PROCEDURE — 1159F MED LIST DOCD IN RCRD: CPT | Mod: CPTII,,, | Performed by: STUDENT IN AN ORGANIZED HEALTH CARE EDUCATION/TRAINING PROGRAM

## 2022-01-05 PROCEDURE — 1160F PR REVIEW ALL MEDS BY PRESCRIBER/CLIN PHARMACIST DOCUMENTED: ICD-10-PCS | Mod: CPTII,,, | Performed by: STUDENT IN AN ORGANIZED HEALTH CARE EDUCATION/TRAINING PROGRAM

## 2022-01-05 PROCEDURE — 1159F PR MEDICATION LIST DOCUMENTED IN MEDICAL RECORD: ICD-10-PCS | Mod: CPTII,,, | Performed by: STUDENT IN AN ORGANIZED HEALTH CARE EDUCATION/TRAINING PROGRAM

## 2022-01-05 PROCEDURE — 99214 PR OFFICE/OUTPT VISIT, EST, LEVL IV, 30-39 MIN: ICD-10-PCS | Mod: S$PBB,,, | Performed by: STUDENT IN AN ORGANIZED HEALTH CARE EDUCATION/TRAINING PROGRAM

## 2022-01-05 PROCEDURE — 3008F BODY MASS INDEX DOCD: CPT | Mod: CPTII,,, | Performed by: STUDENT IN AN ORGANIZED HEALTH CARE EDUCATION/TRAINING PROGRAM

## 2022-01-05 PROCEDURE — 99214 OFFICE O/P EST MOD 30 MIN: CPT | Mod: S$PBB,,, | Performed by: STUDENT IN AN ORGANIZED HEALTH CARE EDUCATION/TRAINING PROGRAM

## 2022-01-05 PROCEDURE — 1160F RVW MEDS BY RX/DR IN RCRD: CPT | Mod: CPTII,,, | Performed by: STUDENT IN AN ORGANIZED HEALTH CARE EDUCATION/TRAINING PROGRAM

## 2022-01-05 PROCEDURE — 99999 PR PBB SHADOW E&M-EST. PATIENT-LVL V: ICD-10-PCS | Mod: PBBFAC,,, | Performed by: STUDENT IN AN ORGANIZED HEALTH CARE EDUCATION/TRAINING PROGRAM

## 2022-01-05 PROCEDURE — 3008F PR BODY MASS INDEX (BMI) DOCUMENTED: ICD-10-PCS | Mod: CPTII,,, | Performed by: STUDENT IN AN ORGANIZED HEALTH CARE EDUCATION/TRAINING PROGRAM

## 2022-01-05 PROCEDURE — 99215 OFFICE O/P EST HI 40 MIN: CPT | Mod: PBBFAC,PO | Performed by: STUDENT IN AN ORGANIZED HEALTH CARE EDUCATION/TRAINING PROGRAM

## 2022-01-05 PROCEDURE — 99999 PR PBB SHADOW E&M-EST. PATIENT-LVL V: CPT | Mod: PBBFAC,,, | Performed by: STUDENT IN AN ORGANIZED HEALTH CARE EDUCATION/TRAINING PROGRAM

## 2022-01-05 RX ORDER — DIAPER,BRIEF,INFANT-TODD,DISP
EACH MISCELLANEOUS 2 TIMES DAILY
COMMUNITY
Start: 2021-12-22 | End: 2024-02-27

## 2022-01-05 RX ORDER — OLOPATADINE HYDROCHLORIDE 1 MG/ML
SOLUTION/ DROPS OPHTHALMIC
COMMUNITY
Start: 2021-12-18 | End: 2024-02-27

## 2022-01-05 NOTE — H&P (VIEW-ONLY)
Orthopaedic Follow-Up Visit    Last Appointment:  12/08/2021  Diagnosis:  Left shoulder rotator cuff tear, subacromial impingement, biceps tendinosis  Prior Procedure: none    Sangeetha June is a 41 y.o. female who is here for f/u evaluation of left shoulder rotator cuff tear. The patient was last seen here by me on 12/08/2021 at which point we decided to await results of her thyroid nodule biopsy prior to considering further treatment options. The patient returns today reporting that the symptoms have persisted and is interested in proceeding with further treatment options.  She had a axillary and suprascapular nerve block with interventional pain in November.  She reports this relieved significant amount of her symptoms, but she still endorses pain in the shoulder with activity.    To review her history, antonia June is a 41 y.o. right-hand dominant female who presents with left shoulder pain and dysfunction. Onset of the symptoms was over1 year ago. Inciting event:Patient states she got a mastectomy on 06/24/2020 and has been having residual pain in her left shoulder since. Current symptoms include pain and limited range of motion in the shoulder, pain is poorly localized, global shoulder pain, quality is constant aching and sharp pains. Pain is aggravated by range of motion, especially trying to lift or reach, endorses night pain when sleeping.      Patient's medications, allergies, past medical, surgical, social and family histories were reviewed and updated as appropriate.    Review of Systems   All systems reviewed were negative.  Specifically, the patient denies fever, chills, weight loss, chest pain, shortness of breath, or dyspnea on exertion.      Past Medical History:   Diagnosis Date    Anxiety     Breast cancer     Chest pain     Chest pain 7/2/2021    Cholecystitis without calculus     Dizziness     Elevated C-reactive protein (CRP)     Essential hypertension     Memory change      Osteoarthritis     Other specified disorders of thyroid     Screening mammogram, encounter for 4/15/2021    Shoulder injury     SOB (shortness of breath)     Thyroiditis     Vision disturbance 4/30/2021       Past Surgical History:   Procedure Laterality Date    BREAST BIOPSY      HYSTERECTOMY      INJECTION OF JOINT Left 11/12/2021    Procedure: Left suprascapular and axillary injection with local;  Surgeon: Bisi Cochran MD;  [facility];  Service: Pain Management;  Laterality: Left;    MASTECTOMY         Patient's Medications   New Prescriptions    No medications on file   Previous Medications    ALBUTEROL (PROVENTIL/VENTOLIN HFA) 90 MCG/ACTUATION INHALER    Inhale 2 puffs into the lungs every 6 (six) hours as needed for Wheezing. Rescue    ASCORBIC ACID, VITAMIN C, (VITAMIN C) 100 MG TABLET    Take 100 mg by mouth once daily.    ASPIRIN (ECOTRIN) 81 MG EC TABLET    Take 1 tablet (81 mg total) by mouth once daily.    BUDESONIDE-FORMOTEROL 80-4.5 MCG (SYMBICORT) 80-4.5 MCG/ACTUATION HFAA    Inhale 2 puffs into the lungs 2 (two) times a day. Controller    BUSPIRONE (BUSPAR) 15 MG TABLET    Take 1 tablet (15 mg total) by mouth 3 (three) times daily.    ESCITALOPRAM OXALATE (LEXAPRO) 20 MG TABLET    Take 1 tablet (20 mg total) by mouth once daily.    EUTHYROX 50 MCG TABLET    Take 1 tablet (50 mcg total) by mouth every morning.    GABAPENTIN (NEURONTIN) 100 MG CAPSULE    Take 1 capsule (100 mg total) by mouth 2 (two) times daily.    HYDROCHLOROTHIAZIDE (HYDRODIURIL) 25 MG TABLET    Take 1 tablet (25 mg total) by mouth daily as needed (edema, swelling).    HYDROCORTISONE 0.5 % CREAM    Apply topically 2 (two) times daily.    LINACLOTIDE (LINZESS) 72 MCG CAP CAPSULE    Take 1 capsule (72 mcg total) by mouth once daily.    MAGNESIUM OXIDE (MAG-OX) 400 MG (241.3 MG MAGNESIUM) TABLET    Take 1 tablet (400 mg total) by mouth once daily.    MELOXICAM (MOBIC) 15 MG TABLET    Take 1 tablet (15 mg total)  by mouth once daily.    METFORMIN (GLUCOPHAGE-XR) 500 MG ER 24HR TABLET    SMARTSI Tablet(s) By Mouth Every Evening    METFORMIN (GLUCOPHAGE-XR) 500 MG ER 24HR TABLET    1 tablet EVERY PM (route: oral)    MULTIVIT WITH MIN-FOLIC ACID (ADULT MULTIVITAMIN GUMMIES) 200 MCG CHEW    Take by mouth.    OLOPATADINE (PATANOL) 0.1 % OPHTHALMIC SOLUTION        OXYBUTYNIN (DITROPAN-XL) 5 MG TR24    Take 1 tablet (5 mg total) by mouth once daily.    PANTOPRAZOLE (PROTONIX) 40 MG TABLET    Take 1 tablet (40 mg total) by mouth once daily.    PRAVASTATIN (PRAVACHOL) 40 MG TABLET    Take 1 tablet (40 mg total) by mouth every evening.    RANITIDINE (ZANTAC) 150 MG TABLET    Take 1 tablet (150 mg total) by mouth 2 (two) times daily.    TRAZODONE (DESYREL) 150 MG TABLET    Take 1 tablet (150 mg total) by mouth every evening.    UNABLE TO FIND    1 tablet 2 TIMES DAILY (route: oral)   Modified Medications    No medications on file   Discontinued Medications    No medications on file       Family History   Problem Relation Age of Onset    Cancer Mother     Stroke Mother     Heart disease Mother     Stroke Sister     Diabetes Sister     Diabetes Brother     No Known Problems Maternal Grandmother     Cancer Maternal Grandfather     Alcohol abuse Maternal Grandfather     No Known Problems Paternal Grandmother     No Known Problems Paternal Grandfather        Review of patient's allergies indicates:   Allergen Reactions    Lisinopril          Objective:      Physical Exam  Patient is alert and oriented, no distress. Skin is intact. Neuro is normal with no focal motor or sensory findings.    Cervical exam is unremarkable. Intact cervical ROM. Negative Spurling's test    Physical Exam:  RIGHT    LEFT    Scap Dyskinesis/Winging (-)    (-)    Tenderness:          Greater Tuberosity  (-)    +  Bicipital Groove  (-)    +  AC joint   (-)    (-)  Other:     ROM:  Forward Elevation 180    100  Abduction  120    80  ER (at  side)  80    40  IR   T8    T12    Strength:   Supraspinatus  5/5    4/5  Infraspinatus  5/5    4/5  Subscap / IR  5/5    5/5     Special Tests:   Neer:    (-)    +   Salvador:   (-)    +   SS Stress:   (-)    +   Bear Hug:   (-)    (-)   [place]'s:   (-)    +   Resisted Thrower's:   (-)    +   Cross Arm Abduction:  (-)    (-)    Imaging:    XR SHOULDER COMPLETE 2 OR MORE VIEWS LEFT     CLINICAL HISTORY:  Pain in left shoulder     TECHNIQUE:  Two or three views of the left shoulder were performed.     COMPARISON:  None     FINDINGS:  Left axillary region clips are present.  Glenohumeral joint is intact.  No fracture or dislocation.  Cortical irregularity with mild erosive changes of the distal left clavicle suggested.     Impression:     As above        Electronically signed by: Gorge Griffin MD  Date:                                            04/19/2021      MRI Shoulder Without Contrast Left  Narrative: EXAMINATION:  MRI SHOULDER WITHOUT CONTRAST LEFT    CLINICAL HISTORY:  Shoulder pain, rotator cuff disorder suspected, nondiagnostic xray;  Pain in left shoulder    TECHNIQUE:  Multiplanar/multisequence MRI of the left shoulder was performed without the use of intravenous or intra-articular gadolinium.    COMPARISON:  Radiographs dated 04/19/2021    FINDINGS:  Rotator cuff: There is a full-thickness tear at the anterior leading edge of the supraspinatus tendon at the insertion approximately 7.5 mm of retraction.  There is contiguous partial-thickness tearing seen extending posteriorly to the infraspinatus spanning approximately 2.2 cm AP in total.  A small amount of intrasubstance fluid signal noted tracking proximally within the infraspinatus.  Subscapularis and teres minor tendons appear intact.  Rotator cuff muscle bulk is preserved.    Labrum: No large labral defect demonstrated on this non arthrographic study.    Long head of the biceps: Intact    Bones: Marrow signal is within normal limits with the exception  of mild cystic change underlying the rotator cuff footprint.  No evidence of fracture or marrow replacement process.    AC joint: Mild arthrosis.  There is a small joint effusion present.  Acromioclavicular and coracoclavicular ligaments appear intact.  Appears intact.    Cartilage: No large glenohumeral articular cartilage defect demonstrated on this non arthrographic study.    Miscellaneous: No joint effusion.  Impression: 1. There is of the supraspinatus and infraspinatus tendons as above.  2. Mild AC joint arthrosis with small associated effusion.    Electronically signed by: Phoenix Mejia MD  Date:    04/23/2021  Time:    11:15       Physician read: I agree with the above impression.    Assessment/Plan:   Sangeetha June is a 41 y.o. female with left shoulder rotator cuff tear, subacromial impingement, biceps tendinosis    Plan:    1. Discussed diagnosis and treatment options with her again today.  She has a known left shoulder rotator cuff tear and associated subacromial impingement and biceps tendinosis.  She has exhausted nonoperative treatments and despite these interventions continue have symptoms on a daily basis that limits her activities and diminishes her quality of life.  She is ready to proceed with operative treatment.  2. We have been awaiting results of her thyroid nodule biopsy which came back benign, so we will proceed.  3. Recommend left shoulder arthroscopy with rotator cuff repair, subacromial impingement, and possible arthroscopic biceps tenodesis.  4. We discussed the risks, benefits, limitations, and alternatives of surgery today.  We discussed the postoperative rehab and recovery protocol in detail.  Despite the risks, she elected to proceed forward with surgery and the consent was freely signed.  5. We will plan for surgery on January 27, 2022  6. Follow up with me 10-14 days after surgery          Francisco Mathews MD

## 2022-01-05 NOTE — PROGRESS NOTES
Orthopaedic Follow-Up Visit    Last Appointment:  12/08/2021  Diagnosis:  Left shoulder rotator cuff tear, subacromial impingement, biceps tendinosis  Prior Procedure: none    Sangeetha June is a 41 y.o. female who is here for f/u evaluation of left shoulder rotator cuff tear. The patient was last seen here by me on 12/08/2021 at which point we decided to await results of her thyroid nodule biopsy prior to considering further treatment options. The patient returns today reporting that the symptoms have persisted and is interested in proceeding with further treatment options.  She had a axillary and suprascapular nerve block with interventional pain in November.  She reports this relieved significant amount of her symptoms, but she still endorses pain in the shoulder with activity.    To review her history, antonia June is a 41 y.o. right-hand dominant female who presents with left shoulder pain and dysfunction. Onset of the symptoms was over1 year ago. Inciting event:Patient states she got a mastectomy on 06/24/2020 and has been having residual pain in her left shoulder since. Current symptoms include pain and limited range of motion in the shoulder, pain is poorly localized, global shoulder pain, quality is constant aching and sharp pains. Pain is aggravated by range of motion, especially trying to lift or reach, endorses night pain when sleeping.      Patient's medications, allergies, past medical, surgical, social and family histories were reviewed and updated as appropriate.    Review of Systems   All systems reviewed were negative.  Specifically, the patient denies fever, chills, weight loss, chest pain, shortness of breath, or dyspnea on exertion.      Past Medical History:   Diagnosis Date    Anxiety     Breast cancer     Chest pain     Chest pain 7/2/2021    Cholecystitis without calculus     Dizziness     Elevated C-reactive protein (CRP)     Essential hypertension     Memory change      Osteoarthritis     Other specified disorders of thyroid     Screening mammogram, encounter for 4/15/2021    Shoulder injury     SOB (shortness of breath)     Thyroiditis     Vision disturbance 4/30/2021       Past Surgical History:   Procedure Laterality Date    BREAST BIOPSY      HYSTERECTOMY      INJECTION OF JOINT Left 11/12/2021    Procedure: Left suprascapular and axillary injection with local;  Surgeon: Bisi Cochran MD;  Location: [facility];  Service: Pain Management;  Laterality: Left;    MASTECTOMY         Patient's Medications   New Prescriptions    No medications on file   Previous Medications    ALBUTEROL (PROVENTIL/VENTOLIN HFA) 90 MCG/ACTUATION INHALER    Inhale 2 puffs into the lungs every 6 (six) hours as needed for Wheezing. Rescue    ASCORBIC ACID, VITAMIN C, (VITAMIN C) 100 MG TABLET    Take 100 mg by mouth once daily.    ASPIRIN (ECOTRIN) 81 MG EC TABLET    Take 1 tablet (81 mg total) by mouth once daily.    BUDESONIDE-FORMOTEROL 80-4.5 MCG (SYMBICORT) 80-4.5 MCG/ACTUATION HFAA    Inhale 2 puffs into the lungs 2 (two) times a day. Controller    BUSPIRONE (BUSPAR) 15 MG TABLET    Take 1 tablet (15 mg total) by mouth 3 (three) times daily.    ESCITALOPRAM OXALATE (LEXAPRO) 20 MG TABLET    Take 1 tablet (20 mg total) by mouth once daily.    EUTHYROX 50 MCG TABLET    Take 1 tablet (50 mcg total) by mouth every morning.    GABAPENTIN (NEURONTIN) 100 MG CAPSULE    Take 1 capsule (100 mg total) by mouth 2 (two) times daily.    HYDROCHLOROTHIAZIDE (HYDRODIURIL) 25 MG TABLET    Take 1 tablet (25 mg total) by mouth daily as needed (edema, swelling).    HYDROCORTISONE 0.5 % CREAM    Apply topically 2 (two) times daily.    LINACLOTIDE (LINZESS) 72 MCG CAP CAPSULE    Take 1 capsule (72 mcg total) by mouth once daily.    MAGNESIUM OXIDE (MAG-OX) 400 MG (241.3 MG MAGNESIUM) TABLET    Take 1 tablet (400 mg total) by mouth once daily.    MELOXICAM (MOBIC) 15 MG TABLET    Take 1 tablet (15 mg total)  by mouth once daily.    METFORMIN (GLUCOPHAGE-XR) 500 MG ER 24HR TABLET    SMARTSI Tablet(s) By Mouth Every Evening    METFORMIN (GLUCOPHAGE-XR) 500 MG ER 24HR TABLET    1 tablet EVERY PM (route: oral)    MULTIVIT WITH MIN-FOLIC ACID (ADULT MULTIVITAMIN GUMMIES) 200 MCG CHEW    Take by mouth.    OLOPATADINE (PATANOL) 0.1 % OPHTHALMIC SOLUTION        OXYBUTYNIN (DITROPAN-XL) 5 MG TR24    Take 1 tablet (5 mg total) by mouth once daily.    PANTOPRAZOLE (PROTONIX) 40 MG TABLET    Take 1 tablet (40 mg total) by mouth once daily.    PRAVASTATIN (PRAVACHOL) 40 MG TABLET    Take 1 tablet (40 mg total) by mouth every evening.    RANITIDINE (ZANTAC) 150 MG TABLET    Take 1 tablet (150 mg total) by mouth 2 (two) times daily.    TRAZODONE (DESYREL) 150 MG TABLET    Take 1 tablet (150 mg total) by mouth every evening.    UNABLE TO FIND    1 tablet 2 TIMES DAILY (route: oral)   Modified Medications    No medications on file   Discontinued Medications    No medications on file       Family History   Problem Relation Age of Onset    Cancer Mother     Stroke Mother     Heart disease Mother     Stroke Sister     Diabetes Sister     Diabetes Brother     No Known Problems Maternal Grandmother     Cancer Maternal Grandfather     Alcohol abuse Maternal Grandfather     No Known Problems Paternal Grandmother     No Known Problems Paternal Grandfather        Review of patient's allergies indicates:   Allergen Reactions    Lisinopril          Objective:      Physical Exam  Patient is alert and oriented, no distress. Skin is intact. Neuro is normal with no focal motor or sensory findings.    Cervical exam is unremarkable. Intact cervical ROM. Negative Spurling's test    Physical Exam:  RIGHT    LEFT    Scap Dyskinesis/Winging (-)    (-)    Tenderness:          Greater Tuberosity  (-)    +  Bicipital Groove  (-)    +  AC joint   (-)    (-)  Other:     ROM:  Forward Elevation 180    100  Abduction  120    80  ER (at  side)  80    40  IR   T8    T12    Strength:   Supraspinatus  5/5    4/5  Infraspinatus  5/5    4/5  Subscap / IR  5/5    5/5     Special Tests:   Neer:    (-)    +   Salvador:   (-)    +   SS Stress:   (-)    +   Bear Hug:   (-)    (-)   Parthenon's:   (-)    +   Resisted Thrower's:   (-)    +   Cross Arm Abduction:  (-)    (-)    Imaging:    XR SHOULDER COMPLETE 2 OR MORE VIEWS LEFT     CLINICAL HISTORY:  Pain in left shoulder     TECHNIQUE:  Two or three views of the left shoulder were performed.     COMPARISON:  None     FINDINGS:  Left axillary region clips are present.  Glenohumeral joint is intact.  No fracture or dislocation.  Cortical irregularity with mild erosive changes of the distal left clavicle suggested.     Impression:     As above        Electronically signed by: Gorge Griffin MD  Date:                                            04/19/2021      MRI Shoulder Without Contrast Left  Narrative: EXAMINATION:  MRI SHOULDER WITHOUT CONTRAST LEFT    CLINICAL HISTORY:  Shoulder pain, rotator cuff disorder suspected, nondiagnostic xray;  Pain in left shoulder    TECHNIQUE:  Multiplanar/multisequence MRI of the left shoulder was performed without the use of intravenous or intra-articular gadolinium.    COMPARISON:  Radiographs dated 04/19/2021    FINDINGS:  Rotator cuff: There is a full-thickness tear at the anterior leading edge of the supraspinatus tendon at the insertion approximately 7.5 mm of retraction.  There is contiguous partial-thickness tearing seen extending posteriorly to the infraspinatus spanning approximately 2.2 cm AP in total.  A small amount of intrasubstance fluid signal noted tracking proximally within the infraspinatus.  Subscapularis and teres minor tendons appear intact.  Rotator cuff muscle bulk is preserved.    Labrum: No large labral defect demonstrated on this non arthrographic study.    Long head of the biceps: Intact    Bones: Marrow signal is within normal limits with the exception  of mild cystic change underlying the rotator cuff footprint.  No evidence of fracture or marrow replacement process.    AC joint: Mild arthrosis.  There is a small joint effusion present.  Acromioclavicular and coracoclavicular ligaments appear intact.  Appears intact.    Cartilage: No large glenohumeral articular cartilage defect demonstrated on this non arthrographic study.    Miscellaneous: No joint effusion.  Impression: 1. There is of the supraspinatus and infraspinatus tendons as above.  2. Mild AC joint arthrosis with small associated effusion.    Electronically signed by: Phoenix Mejia MD  Date:    04/23/2021  Time:    11:15       Physician read: I agree with the above impression.    Assessment/Plan:   Sangeetha June is a 41 y.o. female with left shoulder rotator cuff tear, subacromial impingement, biceps tendinosis    Plan:    1. Discussed diagnosis and treatment options with her again today.  She has a known left shoulder rotator cuff tear and associated subacromial impingement and biceps tendinosis.  She has exhausted nonoperative treatments and despite these interventions continue have symptoms on a daily basis that limits her activities and diminishes her quality of life.  She is ready to proceed with operative treatment.  2. We have been awaiting results of her thyroid nodule biopsy which came back benign, so we will proceed.  3. Recommend left shoulder arthroscopy with rotator cuff repair, subacromial impingement, and possible arthroscopic biceps tenodesis.  4. We discussed the risks, benefits, limitations, and alternatives of surgery today.  We discussed the postoperative rehab and recovery protocol in detail.  Despite the risks, she elected to proceed forward with surgery and the consent was freely signed.  5. We will plan for surgery on January 27, 2022  6. Follow up with me 10-14 days after surgery          Francisco Mathews MD

## 2022-01-06 DIAGNOSIS — G47.33 MILD OBSTRUCTIVE SLEEP APNEA: Primary | ICD-10-CM

## 2022-01-07 ENCOUNTER — LAB VISIT (OUTPATIENT)
Dept: LAB | Facility: HOSPITAL | Age: 42
End: 2022-01-07
Attending: STUDENT IN AN ORGANIZED HEALTH CARE EDUCATION/TRAINING PROGRAM
Payer: MEDICAID

## 2022-01-07 ENCOUNTER — CLINICAL SUPPORT (OUTPATIENT)
Dept: SPEECH THERAPY | Facility: HOSPITAL | Age: 42
End: 2022-01-07
Attending: OTOLARYNGOLOGY
Payer: MEDICAID

## 2022-01-07 DIAGNOSIS — Z01.818 PREOP TESTING: ICD-10-CM

## 2022-01-07 DIAGNOSIS — R49.0 DYSPHONIA: ICD-10-CM

## 2022-01-07 LAB
ALBUMIN SERPL BCP-MCNC: 3.2 G/DL (ref 3.5–5.2)
ALP SERPL-CCNC: 122 U/L (ref 55–135)
ALT SERPL W/O P-5'-P-CCNC: 11 U/L (ref 10–44)
ANION GAP SERPL CALC-SCNC: 10 MMOL/L (ref 8–16)
AST SERPL-CCNC: 11 U/L (ref 10–40)
BASOPHILS # BLD AUTO: 0.02 K/UL (ref 0–0.2)
BASOPHILS NFR BLD: 0.5 % (ref 0–1.9)
BILIRUB SERPL-MCNC: 0.3 MG/DL (ref 0.1–1)
BUN SERPL-MCNC: 6 MG/DL (ref 6–20)
CALCIUM SERPL-MCNC: 9.3 MG/DL (ref 8.7–10.5)
CHLORIDE SERPL-SCNC: 100 MMOL/L (ref 95–110)
CO2 SERPL-SCNC: 28 MMOL/L (ref 23–29)
CREAT SERPL-MCNC: 0.8 MG/DL (ref 0.5–1.4)
DIFFERENTIAL METHOD: ABNORMAL
EOSINOPHIL # BLD AUTO: 0.1 K/UL (ref 0–0.5)
EOSINOPHIL NFR BLD: 1.4 % (ref 0–8)
ERYTHROCYTE [DISTWIDTH] IN BLOOD BY AUTOMATED COUNT: 16.3 % (ref 11.5–14.5)
EST. GFR  (AFRICAN AMERICAN): >60 ML/MIN/1.73 M^2
EST. GFR  (NON AFRICAN AMERICAN): >60 ML/MIN/1.73 M^2
ESTIMATED AVG GLUCOSE: 140 MG/DL (ref 68–131)
GLUCOSE SERPL-MCNC: 133 MG/DL (ref 70–110)
HBA1C MFR BLD: 6.5 % (ref 4–5.6)
HCT VFR BLD AUTO: 39.7 % (ref 37–48.5)
HGB BLD-MCNC: 11.9 G/DL (ref 12–16)
IMM GRANULOCYTES # BLD AUTO: 0.01 K/UL (ref 0–0.04)
IMM GRANULOCYTES NFR BLD AUTO: 0.2 % (ref 0–0.5)
LYMPHOCYTES # BLD AUTO: 0.9 K/UL (ref 1–4.8)
LYMPHOCYTES NFR BLD: 21.8 % (ref 18–48)
MCH RBC QN AUTO: 24.3 PG (ref 27–31)
MCHC RBC AUTO-ENTMCNC: 30 G/DL (ref 32–36)
MCV RBC AUTO: 81 FL (ref 82–98)
MONOCYTES # BLD AUTO: 0.4 K/UL (ref 0.3–1)
MONOCYTES NFR BLD: 8.2 % (ref 4–15)
NEUTROPHILS # BLD AUTO: 2.9 K/UL (ref 1.8–7.7)
NEUTROPHILS NFR BLD: 67.9 % (ref 38–73)
NRBC BLD-RTO: 0 /100 WBC
PLATELET # BLD AUTO: 299 K/UL (ref 150–450)
PMV BLD AUTO: 10.1 FL (ref 9.2–12.9)
POTASSIUM SERPL-SCNC: 4.2 MMOL/L (ref 3.5–5.1)
PROT SERPL-MCNC: 6.6 G/DL (ref 6–8.4)
RBC # BLD AUTO: 4.9 M/UL (ref 4–5.4)
SODIUM SERPL-SCNC: 138 MMOL/L (ref 136–145)
WBC # BLD AUTO: 4.27 K/UL (ref 3.9–12.7)

## 2022-01-07 PROCEDURE — 92524 BEHAVRAL QUALIT ANALYS VOICE: CPT | Performed by: SPEECH-LANGUAGE PATHOLOGIST

## 2022-01-07 PROCEDURE — 80053 COMPREHEN METABOLIC PANEL: CPT | Performed by: STUDENT IN AN ORGANIZED HEALTH CARE EDUCATION/TRAINING PROGRAM

## 2022-01-07 PROCEDURE — 85025 COMPLETE CBC W/AUTO DIFF WBC: CPT | Performed by: STUDENT IN AN ORGANIZED HEALTH CARE EDUCATION/TRAINING PROGRAM

## 2022-01-07 PROCEDURE — 83036 HEMOGLOBIN GLYCOSYLATED A1C: CPT | Performed by: STUDENT IN AN ORGANIZED HEALTH CARE EDUCATION/TRAINING PROGRAM

## 2022-01-07 PROCEDURE — 92507 TX SP LANG VOICE COMM INDIV: CPT | Performed by: SPEECH-LANGUAGE PATHOLOGIST

## 2022-01-07 PROCEDURE — 36415 COLL VENOUS BLD VENIPUNCTURE: CPT | Performed by: STUDENT IN AN ORGANIZED HEALTH CARE EDUCATION/TRAINING PROGRAM

## 2022-01-07 NOTE — PROGRESS NOTES
See evaluation in POC.    Angelika Gunter MA, CCC-SLP  Speech Language Pathologist  1/10/2022

## 2022-01-10 ENCOUNTER — OFFICE VISIT (OUTPATIENT)
Dept: CARDIOLOGY | Facility: CLINIC | Age: 42
End: 2022-01-10
Payer: MEDICAID

## 2022-01-10 VITALS
SYSTOLIC BLOOD PRESSURE: 126 MMHG | DIASTOLIC BLOOD PRESSURE: 84 MMHG | BODY MASS INDEX: 44.41 KG/M2 | WEIGHT: 293 LBS | HEART RATE: 68 BPM | HEIGHT: 68 IN

## 2022-01-10 DIAGNOSIS — E78.49 OTHER HYPERLIPIDEMIA: ICD-10-CM

## 2022-01-10 DIAGNOSIS — G47.30 SLEEP APNEA, UNSPECIFIED TYPE: ICD-10-CM

## 2022-01-10 DIAGNOSIS — R07.9 CHEST PAIN, UNSPECIFIED TYPE: ICD-10-CM

## 2022-01-10 DIAGNOSIS — I10 PRIMARY HYPERTENSION: ICD-10-CM

## 2022-01-10 DIAGNOSIS — R06.02 SOB (SHORTNESS OF BREATH): ICD-10-CM

## 2022-01-10 DIAGNOSIS — R06.09 DOE (DYSPNEA ON EXERTION): ICD-10-CM

## 2022-01-10 DIAGNOSIS — Z01.810 PREOP CARDIOVASCULAR EXAM: Primary | ICD-10-CM

## 2022-01-10 DIAGNOSIS — E66.01 OBESITY, MORBID, BMI 40.0-49.9: ICD-10-CM

## 2022-01-10 DIAGNOSIS — F41.1 GAD (GENERALIZED ANXIETY DISORDER): ICD-10-CM

## 2022-01-10 DIAGNOSIS — Z85.3 HISTORY OF BREAST CANCER: ICD-10-CM

## 2022-01-10 DIAGNOSIS — R01.1 MURMUR: ICD-10-CM

## 2022-01-10 DIAGNOSIS — R00.2 PALPITATIONS: ICD-10-CM

## 2022-01-10 DIAGNOSIS — R60.0 LOCALIZED EDEMA: ICD-10-CM

## 2022-01-10 PROBLEM — R49.0 DYSPHONIA: Status: ACTIVE | Noted: 2022-01-10

## 2022-01-10 PROCEDURE — 3008F BODY MASS INDEX DOCD: CPT | Mod: CPTII,,, | Performed by: INTERNAL MEDICINE

## 2022-01-10 PROCEDURE — 99214 OFFICE O/P EST MOD 30 MIN: CPT | Mod: PBBFAC,PO | Performed by: INTERNAL MEDICINE

## 2022-01-10 PROCEDURE — 99214 PR OFFICE/OUTPT VISIT, EST, LEVL IV, 30-39 MIN: ICD-10-PCS | Mod: S$PBB,,, | Performed by: INTERNAL MEDICINE

## 2022-01-10 PROCEDURE — 1159F PR MEDICATION LIST DOCUMENTED IN MEDICAL RECORD: ICD-10-PCS | Mod: CPTII,,, | Performed by: INTERNAL MEDICINE

## 2022-01-10 PROCEDURE — 3079F PR MOST RECENT DIASTOLIC BLOOD PRESSURE 80-89 MM HG: ICD-10-PCS | Mod: CPTII,,, | Performed by: INTERNAL MEDICINE

## 2022-01-10 PROCEDURE — 3008F PR BODY MASS INDEX (BMI) DOCUMENTED: ICD-10-PCS | Mod: CPTII,,, | Performed by: INTERNAL MEDICINE

## 2022-01-10 PROCEDURE — 99214 OFFICE O/P EST MOD 30 MIN: CPT | Mod: S$PBB,,, | Performed by: INTERNAL MEDICINE

## 2022-01-10 PROCEDURE — 3074F PR MOST RECENT SYSTOLIC BLOOD PRESSURE < 130 MM HG: ICD-10-PCS | Mod: CPTII,,, | Performed by: INTERNAL MEDICINE

## 2022-01-10 PROCEDURE — 1159F MED LIST DOCD IN RCRD: CPT | Mod: CPTII,,, | Performed by: INTERNAL MEDICINE

## 2022-01-10 PROCEDURE — 3079F DIAST BP 80-89 MM HG: CPT | Mod: CPTII,,, | Performed by: INTERNAL MEDICINE

## 2022-01-10 PROCEDURE — 1160F RVW MEDS BY RX/DR IN RCRD: CPT | Mod: CPTII,,, | Performed by: INTERNAL MEDICINE

## 2022-01-10 PROCEDURE — 99999 PR PBB SHADOW E&M-EST. PATIENT-LVL IV: CPT | Mod: PBBFAC,,, | Performed by: INTERNAL MEDICINE

## 2022-01-10 PROCEDURE — 1160F PR REVIEW ALL MEDS BY PRESCRIBER/CLIN PHARMACIST DOCUMENTED: ICD-10-PCS | Mod: CPTII,,, | Performed by: INTERNAL MEDICINE

## 2022-01-10 PROCEDURE — 99999 PR PBB SHADOW E&M-EST. PATIENT-LVL IV: ICD-10-PCS | Mod: PBBFAC,,, | Performed by: INTERNAL MEDICINE

## 2022-01-10 PROCEDURE — 3044F PR MOST RECENT HEMOGLOBIN A1C LEVEL <7.0%: ICD-10-PCS | Mod: CPTII,,, | Performed by: INTERNAL MEDICINE

## 2022-01-10 PROCEDURE — 3044F HG A1C LEVEL LT 7.0%: CPT | Mod: CPTII,,, | Performed by: INTERNAL MEDICINE

## 2022-01-10 PROCEDURE — 3074F SYST BP LT 130 MM HG: CPT | Mod: CPTII,,, | Performed by: INTERNAL MEDICINE

## 2022-01-10 NOTE — PLAN OF CARE
"[facility]  Speech Therapy Evaluation -Voice    Date: 1/7/2022     Name: Sangeetha June   MRN: [id number]    Therapy Diagnosis:   Encounter Diagnosis   Name Primary?    Dysphonia     Physician: Issac Erickson MD  Physician Orders: Ambulatory referral/consult to speech therapy   Medical Diagnosis: Dysphonia [R49.0]    Visit # / Visits Authorized:  1 / 1   Date of Evaluation:  1/7/2022   Insurance Authorization Period: 1/3/22-12/31/22  Plan of Care Certification:   1/7/2022 to 3/18/22      Time In: 10:34 AM   Time Out: 11:11 AM  Procedure Min.   Qualitative Analysis of Voice and Resonance  22   Speech-Language-Voice Therapy 15     Precautions: Standard  Subjective     History of Current Condition:   Patient complains of changes to vocal function since she completed chemo for breast cancer about a year ago. She reports her vocal quality is worse when she is tired. She was seen by Dr. Erickson, ENT, on 1/3/22 with normal scope exam findings. She reports her voice "changes" over the course of the day. She reports she feels like she is "trying her hardest to push the words out" and reports she "misses a breath sometimes". Her medical history is significant for history of cancer and COPD.      Past Medical History: Sangeetha June  has a past medical history of Anxiety, Breast cancer, Chest pain, Chest pain (7/2/2021), Cholecystitis without calculus, Dizziness, Elevated C-reactive protein (CRP), Essential hypertension, Memory change, Osteoarthritis, Other specified disorders of thyroid, Screening mammogram, encounter for (4/15/2021), Shoulder injury, SOB (shortness of breath), Thyroiditis, and Vision disturbance (4/30/2021).  Sangeetha June  has a past surgical history that includes Hysterectomy; Mastectomy; Breast biopsy; and Injection of joint (Left, 11/12/2021).  Medical Hx and Allergies: Sangeetha has a current medication list which includes the following prescription(s): albuterol, " "ascorbic acid (vitamin c), aspirin, budesonide-formoterol 80-4.5 mcg, buspirone, escitalopram oxalate, euthyrox, gabapentin, hydrochlorothiazide, hydrocortisone, linaclotide, magnesium oxide, meloxicam, metformin, metformin, adult multivitamin gummies, olopatadine, oxybutynin, pantoprazole, pravastatin, ranitidine, trazodone, and UNABLE TO FIND.   Review of patient's allergies indicates:   Allergen Reactions    Lisinopril      Prior Therapy:  Has done PT in the past   Social History:  Not currently working   Prior Level of Function: WFL   Current Level of Function: voice impacting ability to communicate efficiently and effectively in all communication situations   Pain: 0/10  Pain Location / Description: N/A  Nutrition:  Tolerating full diet, no swallowing concerns     Objective   Formal Assessment:    VOICE EVALUATION    Vocal Complaints: fatigue with use, changes in modal pitch, difficulty with singing, tightness and reduced volume    Swallowing: N/A   Breathing: shallow and labored breathing   Smokin packs/day   EtOH: 0 drinks/week; "maybe once a month"   Caffeine: 3-5 cups/day   Reflux: yes and medication management  Water: "sip here and there"     Vocal activities: talking to family, talk on the phone     Environment: N/A    Laryngeal endoscopy findings per Dr. Erickson on 1/3/22:  Findings:  -     Laryngeal mucosa is normal  -     Posterior commissure has mild hypertrophy  -     Lingual tonsils have no hypertrophy  -     Adenoids have no  hypertrophy  -     Right vocal fold:    normal mobility                                      mass/lesion: none  -     Left vocal fold:      normal mobility                                      mass/lesion: none  -     Other findings: none       Perceptual assessment:    -Quality: strained and breathy  -Volume: decreased projection and variable loudness   -Pitch: low F0  -Flexibility: instability    Consensus Auditory-Percentual Evaluation of Voice (CAPE-V) Overall Score: " "30/100    Maximum Phonation Time (MPT) (ah > 18s WFL): 5 seconds    S/Z ratio (ratio of 1.4+ may indicate a degree of vocal fold dysfunction): S: 6.5s / Z: 5.5s = 1.2     Voice Handicap Index (VHI-10) (completed to assess self-perceived handicap associated with dysphonia; >11 considered abnormal):     1. My voice makes it difficult for people to hear me 4   2.   I run out of air when I talk 4   3.   People have difficulty understanding me in a noisy room  4   4.   The sound of my voice varies throughout the day  4   5.   My family has difficulty understanding me when I call throughout the house 4   6.   I use the phone less often than I would like to 4   7.   I'm tense when talking to others because of my voice  4   8.   I tend to avoid groups of people because of my voice  0   9.   People seem irritated with my voice 1   10. People ask "What's wrong with your voice?" 3   TOTAL 32/40       Reflux Symptom Index (RSI) Score:   Reflux Symptom Index (RSI) is a questionnaire given to the patient to  the possible presence and/or severity of LPR symptoms within the last month and any relationship between this condition and the patient's dysphagia. A score that is greater than 15 may be indicative of LPR.    2. Hoarseness or problem with your voice  5   2.   Clearing your throat  3   3.   Excess throat mucus or post-nasal drip 1   4.   Difficulty swallowing food, liquid, or pills 2   5.   Coughing after you ate or after lying down  5   6.   Breathing difficulties or choking episodes 5   7.   Troublesome or annoying cough 5   8.   Sensations of something sticking in your throat or a lump in your throat 3   9.   Heartburn, chest pain, indigestion, or stomach acid coming up 5   TOTAL 34/40       Oral-Motor Assessment  WNL    Muscle Tension Assessment  Tension Observed: neck  Tenderness with palpation/massage: yes  Reduced thyrohyoid space at rest: yes    Breath Support  At rest: Mixed thoracic/clavicular  Sustained " Phonation: Mixed thoracic/clavicular  Conversation: Mixed thoracic/clavicular  Speaks on residual air: yes    Impact of Voice Impairment on Functioning: (0=no impact; 10=substantial impact)  Vocal Effort: 5  Vocal Fatigue: 6  Vocal Impact: 5    Education / Stimulability Trials  Discussed importance of vocal hygiene including: hydration and reducing caffeine / drying agents.  Patient was stimulable for improved voice using VFE exercises.        Treatment   Total Treatment Time Separate from Evaluation: 15 minutes     Education: Plan of Care, role of SLP in care, vocal hygeine and rationale and demonstration of vocal function exercises were discussed with pt. Patient expressed understanding.     Home Program: Patient educated and encouraged to implement vocal hygiene program, reflux management strategies, and vocal function exercises x2 each, twice daily.   Assessment     Sangeetha presents to [facility] status post medical diagnosis of dysphonia. She presents with mild-moderate dysphonia characterized by strained vocal quality, reduced breath/speech coordination resulting in poor breath support for phonation, vocal fatigue, reduced projection and loudness, and inconsistent/instable vocal quality that is impacting patient's quality of life and ability to communicate efficiently and effectively in all communication situations.       Positive prognostic factors include willingness to participate in therapy, motivation. Negative prognostic factors include transportation. Patient will benefit from skilled, outpatient rehabilitation speech therapy.    Rehab Potential: good  Pt's spiritual, cultural, and educational needs considered and patient agreeable to plan of care and goals.    Short Term Goals (10 weeks):   Patient will complete SOVT exercises and/or resonant-focused exercises 3-5x daily to strengthen and balance the intrinsic laryngeal musculature and maximize glottic closure without medial  hyperfunction.  Patient will discriminate between easy and strained phonation with 80% accuracy.   Patient will recall and utilize vocal hygiene strategies with minimal verbal cueing.    Patient will participate in vocal function exercises x 10 each with minimal verbal cueing to improve vocal quality.        Long Term Goals (10 weeks):   Patient will implement and adhere to vocal hygiene protocols on a daily basis, including the elimination of phonotraumatic behaviors.  Patient and clinician will facilitate changes in vocal function in order to restore functional use of voice for daily occupational, social, and emotional demands.  Patient will improve coordination of respiration and phonation for efficient vocal production at a conversational level.    Plan     Plan of Care Certification Period: 1/7/2022 to 3/18/22    Recommended Treatment Plan:  Patient will participate in the [facility] rehabilitation program for speech therapy 1 times per week to address her voice deficits, to educate patient and their family, and to participate in a home exercise program.    Other Recommendations: N/A    Therapist's Name:   Angelika Gunter MA, CCC-SLP  Speech Language Pathologist  1/10/2022      I CERTIFY THE NEED FOR THESE SERVICES FURNISHED UNDER THIS PLAN OF TREATMENT AND WHILE UNDER MY CARE    Physician Name: _______________________________    Physician Signature: ____________________________
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
## 2022-01-18 ENCOUNTER — HOSPITAL ENCOUNTER (OUTPATIENT)
Dept: PREADMISSION TESTING | Facility: HOSPITAL | Age: 42
Discharge: HOME | End: 2022-01-18
Attending: STUDENT IN AN ORGANIZED HEALTH CARE EDUCATION/TRAINING PROGRAM
Payer: MEDICAID

## 2022-01-18 VITALS
TEMPERATURE: 98 F | DIASTOLIC BLOOD PRESSURE: 60 MMHG | OXYGEN SATURATION: 99 % | HEART RATE: 75 BPM | SYSTOLIC BLOOD PRESSURE: 108 MMHG | RESPIRATION RATE: 16 BRPM

## 2022-01-18 DIAGNOSIS — Z17.1 MALIGNANT NEOPLASM OF LEFT BREAST IN FEMALE, ESTROGEN RECEPTOR NEGATIVE, UNSPECIFIED SITE OF BREAST: ICD-10-CM

## 2022-01-18 DIAGNOSIS — E78.49 OTHER HYPERLIPIDEMIA: ICD-10-CM

## 2022-01-18 DIAGNOSIS — R73.03 PRE-DIABETES: ICD-10-CM

## 2022-01-18 DIAGNOSIS — I10 PRIMARY HYPERTENSION: ICD-10-CM

## 2022-01-18 DIAGNOSIS — R06.09 DOE (DYSPNEA ON EXERTION): ICD-10-CM

## 2022-01-18 DIAGNOSIS — M75.112 NONTRAUMATIC INCOMPLETE TEAR OF LEFT ROTATOR CUFF: ICD-10-CM

## 2022-01-18 DIAGNOSIS — C50.912 MALIGNANT NEOPLASM OF LEFT BREAST IN FEMALE, ESTROGEN RECEPTOR NEGATIVE, UNSPECIFIED SITE OF BREAST: ICD-10-CM

## 2022-01-18 DIAGNOSIS — I63.9 CEREBROVASCULAR ACCIDENT (CVA), UNSPECIFIED MECHANISM: ICD-10-CM

## 2022-01-18 DIAGNOSIS — F41.1 GAD (GENERALIZED ANXIETY DISORDER): ICD-10-CM

## 2022-01-18 DIAGNOSIS — G47.09 OTHER INSOMNIA: ICD-10-CM

## 2022-01-18 NOTE — H&P
Preoperative History and Physical                                                             Hospital Medicine      Chief Complaint: Preoperative evaluation     History of Present Illness:      Sangeetha June is a 42 y.o. female who presents to the office today for a preoperative consultation at the request of Dr. Mathews who plans on performing left shoulder arthroscopy with rotator cuff repair on 1/27/2022.    Functional Status:      The patient is not able to climb a flight of stairs. The patient is able to ambulate  without difficulty. The patient's functional status is affected by the surgical problem. The patient's functional status is not affected by shortness of breath, chest pain, dyspnea on exertion and fatigue.    MET score greater than 4    Past Medical History:      Past Medical History:   Diagnosis Date    Anxiety     Breast cancer     Chest pain     Chest pain 7/2/2021    Cholecystitis without calculus     Dizziness     Elevated C-reactive protein (CRP)     Essential hypertension     Memory change     Osteoarthritis     Other specified disorders of thyroid     Screening mammogram, encounter for 4/15/2021    Shoulder injury     SOB (shortness of breath)     Thyroiditis     Vision disturbance 4/30/2021        Past Surgical History:      Past Surgical History:   Procedure Laterality Date    BREAST BIOPSY      HYSTERECTOMY      INJECTION OF JOINT Left 11/12/2021    Procedure: Left suprascapular and axillary injection with local;  Surgeon: Bisi Cochran MD;  Location: [facility];  Service: Pain Management;  Laterality: Left;    MASTECTOMY          Social History:      Social History     Socioeconomic History    Marital status:    Tobacco Use    Smoking status: Never Smoker    Smokeless tobacco: Never Used   Substance and Sexual Activity    Alcohol use: Not Currently    Drug use: Yes     Types: Marijuana         Family History:      Family History   Problem Relation Age of Onset    Cancer Mother     Stroke Mother     Heart disease Mother     Stroke Sister     Diabetes Sister     Diabetes Brother     No Known Problems Maternal Grandmother     Cancer Maternal Grandfather     Alcohol abuse Maternal Grandfather     No Known Problems Paternal Grandmother     No Known Problems Paternal Grandfather        Allergies:      Review of patient's allergies indicates:   Allergen Reactions    Lisinopril        Medications:      Current Outpatient Medications   Medication Sig    albuterol (PROVENTIL/VENTOLIN HFA) 90 mcg/actuation inhaler Inhale 2 puffs into the lungs every 6 (six) hours as needed for Wheezing. Rescue    ascorbic acid, vitamin C, (VITAMIN C) 100 MG tablet Take 100 mg by mouth once daily.    aspirin (ECOTRIN) 81 MG EC tablet Take 1 tablet (81 mg total) by mouth once daily.    budesonide-formoterol 80-4.5 mcg (SYMBICORT) 80-4.5 mcg/actuation HFAA Inhale 2 puffs into the lungs 2 (two) times a day. Controller    busPIRone (BUSPAR) 15 MG tablet Take 1 tablet (15 mg total) by mouth 3 (three) times daily.    EScitalopram oxalate (LEXAPRO) 20 MG tablet Take 1 tablet (20 mg total) by mouth once daily.    EUTHYROX 50 mcg tablet Take 1 tablet (50 mcg total) by mouth every morning.    gabapentin (NEURONTIN) 100 MG capsule Take 1 capsule (100 mg total) by mouth 2 (two) times daily.    hydroCHLOROthiazide (HYDRODIURIL) 25 MG tablet Take 1 tablet (25 mg total) by mouth daily as needed (edema, swelling).    magnesium oxide (MAG-OX) 400 mg (241.3 mg magnesium) tablet Take 1 tablet (400 mg total) by mouth once daily.    meloxicam (MOBIC) 15 MG tablet Take 1 tablet (15 mg total) by mouth once daily.    metFORMIN (GLUCOPHAGE-XR) 500 MG ER 24hr tablet SMARTSI Tablet(s) By Mouth Every Evening    metFORMIN (GLUCOPHAGE-XR) 500 MG ER 24hr tablet 1 tablet EVERY PM (route: oral)    oxybutynin (DITROPAN-XL) 5 MG TR24  Take 1 tablet (5 mg total) by mouth once daily.    pantoprazole (PROTONIX) 40 MG tablet Take 1 tablet (40 mg total) by mouth once daily.    pravastatin (PRAVACHOL) 40 MG tablet Take 1 tablet (40 mg total) by mouth every evening.    ranitidine (ZANTAC) 150 MG tablet Take 1 tablet (150 mg total) by mouth 2 (two) times daily.    traZODone (DESYREL) 150 MG tablet Take 1 tablet (150 mg total) by mouth every evening.    hydrocortisone 0.5 % cream Apply topically 2 (two) times daily.    linaCLOtide (LINZESS) 72 mcg Cap capsule Take 1 capsule (72 mcg total) by mouth once daily. (Patient not taking: Reported on 1/18/2022)    multivit with min-folic acid (ADULT MULTIVITAMIN GUMMIES) 200 mcg Chew Take by mouth.    olopatadine (PATANOL) 0.1 % ophthalmic solution     UNABLE TO FIND 1 tablet 2 TIMES DAILY (route: oral)     No current facility-administered medications for this encounter.       Vitals:      Vitals:    01/18/22 1133   BP: 108/60   Pulse: 75   Resp: 16   Temp: 98 °F (36.7 °C)       Review of Systems:        Constitutional: Negative for fever, chills, weight loss, malaise/fatigue and diaphoresis.   HENT: Negative for hearing loss, ear pain, nosebleeds, congestion, sore throat, neck pain, tinnitus and ear discharge.    Eyes: Negative for blurred vision, double vision, photophobia, pain, discharge and redness.   Respiratory: Negative for cough, hemoptysis, sputum production, shortness of breath, wheezing and stridor.    Cardiovascular: Negative for chest pain, palpitations, orthopnea, claudication, leg swelling and PND. + SOB (chronic)  Gastrointestinal: Negative for heartburn, nausea, vomiting, abdominal pain, diarrhea, constipation, blood in stool and melena.   Genitourinary: Negative for dysuria, urgency, frequency, hematuria and flank pain.   Musculoskeletal: Negative for myalgias, back pain, joint pain and falls.   Skin: Negative for itching and rash.   Neurological: Negative for dizziness, tingling,  tremors, sensory change, speech change, focal weakness, seizures, loss of consciousness, weakness and headaches.   Endo/Heme/Allergies: Negative for environmental allergies and polydipsia. Does not bruise/bleed easily.   Psychiatric/Behavioral: Negative for depression, suicidal ideas, hallucinations, memory loss and substance abuse. The patient is not nervous/anxious and does not have insomnia.    All 14 systems reviewed and negative except as noted above.    Physical Exam:      Constitutional: Appears well-developed, well-nourished and in no acute distress.  Patient is oriented to person, place, and time. Obese   Head: Normocephalic and atraumatic. Mucous membranes moist.  Neck: Neck supple no mass.   Cardiovascular: Normal rate and regular rhythm.  S1 S2 appreciated by ascultation.  Pulmonary/Chest: Effort normal and clear to auscultation bilaterally. No respiratory distress.   Abdomen: Soft. Non-tender and non-distended. Bowel sounds are normal.   Neurological: Patient is alert and oriented to person, place and time. Moves all extremities.  Skin: Warm and dry. No lesions.  Extremities: No clubbing, cyanosis or edema.    Laboratory data:      Reviewed and noted in plan where applicable. Please see chart for full laboratory data.    No results for input(s): CPK, CPKMB, TROPONINI, MB in the last 24 hours. No results for input(s): POCTGLUCOSE in the last 24 hours.     No results found for: INR, PROTIME    Lab Results   Component Value Date    WBC 4.27 01/07/2022    HGB 11.9 (L) 01/07/2022    HCT 39.7 01/07/2022    MCV 81 (L) 01/07/2022     01/07/2022       No results for input(s): GLU, NA, K, CL, CO2, BUN, CREATININE, CALCIUM, MG in the last 24 hours.    Predictors of intubation difficulty:       Morbid obesity? yes    Anatomically abnormal facies? no   Prominent incisors? no   Receding mandible? no   Short, thick neck? no   Neck range of motion: normal   Dentition: no teeth  Modified Mallampati score Class II      Cardiographics:      ECG: normal sinus rhythm, no blocks or conduction defects, no ischemic changes  Echocardiogram: normal reviewed by Dr. Johnston     Imaging:      Chest x-ray: not done      Assessment and Plan:      Nontraumatic incomplete tear of left rotator cuff  Patient presents today at the request of Dr. Mathews who plans on performing a left shoulder arthroscopy with rotator cuff repair on 1/27/2022     Known risk factors for perioperative complications: Hx of CVA     Difficulty with intubation is not anticipated.    Cardiac Risk Estimation: Based on the Revised Cardiac Risk Index, patient is a Class 2 risk with a 6.0% risk of a major cardiac event in a low risk procedure.     1.) Preoperative workup as follows: ECG, hemoglobin, hematocrit, electrolytes, creatinine, glucose.  2.) Change in medication regimen before surgery: discontinue ASA 6 days before surgery, discontinue NSAIDs (mobic) 5 days before surgery.  3.) Prophylaxis for cardiac events with perioperative beta-blockers: not indicated.  4.) Invasive hemodynamic monitoring perioperatively: should be considered.  5.) Deep vein thrombosis prophylaxis postoperatively: regimen to be chosen by surgical team.  6.) Surveillance for postoperative MI with ECG immediately postoperatively and on postoperati ve days 1 and 2 AND troponin levels 24 hours postoperatively and on day 4 or hospital discharge (whichever comes first): should be considered.  7.) Current medications which may produce withdrawal symptoms if withheld perioperatively: None   8.) Other measures: Postoperative hypertension management with IV hydralazine until able to take oral medications.  Postoperative incentive spirometry to prevent pneumonia.    Hypertension  Bp stable   Continue HCTZ   IV hydralazine prn   Monitor closely     Hyperlipidemia  Continue statin     Pre-diabetes  Accuchecks   Hemoglobin A1c 6.5  Hold metformin   Hold metformin X 24 hours prior to sugery    ANGIE  (generalized anxiety disorder)  Stable   Continue lexapro and buspar     Other insomnia  Continue trazodone     AGUILAR (dyspnea on exertion)  Patient cleared per cardiology   - pharm nuclear stress test - neg 6/21  - 2D ECHO - neg  - f/u pulm has EVERARDO  - Holter - neg  - recent D dimer elevated, neg CTA for PE        Malignant neoplasm of left breast in female, estrogen receptor negative  F/u with oncology outpatient     Hx of CVA (cerebral vascular accident)  Continue statin   Resume ASA when appropriate

## 2022-01-18 NOTE — ASSESSMENT & PLAN NOTE
Accuchecks   Hemoglobin A1c 6.5  Hold metformin   Hold metformin X 24 hours prior to sugery  
Bp stable   Continue HCTZ   IV hydralazine prn   Monitor closely   
Continue statin   
Continue statin   Resume ASA when appropriate   
Continue trazodone   
F/u with oncology outpatient   
Patient cleared per cardiology   - pharm nuclear stress test - neg 6/21  - 2D ECHO - neg  - f/u pulm has EVERARDO  - Holter - neg  - recent D dimer elevated, neg CTA for PE      
Patient presents today at the request of Dr. Mathews who plans on performing a left shoulder arthroscopy with rotator cuff repair on 1/27/2022     Known risk factors for perioperative complications: Hx of CVA     Difficulty with intubation is not anticipated.    Cardiac Risk Estimation: Based on the Revised Cardiac Risk Index, patient is a Class 2 risk with a 6.0% risk of a major cardiac event in a low risk procedure.     1.) Preoperative workup as follows: ECG, hemoglobin, hematocrit, electrolytes, creatinine, glucose.  2.) Change in medication regimen before surgery: discontinue ASA 6 days before surgery, discontinue NSAIDs (mobic) 5 days before surgery.  3.) Prophylaxis for cardiac events with perioperative beta-blockers: not indicated.  4.) Invasive hemodynamic monitoring perioperatively: should be considered.  5.) Deep vein thrombosis prophylaxis postoperatively: regimen to be chosen by surgical team.  6.) Surveillance for postoperative MI with ECG immediately postoperatively and on postoperati ve days 1 and 2 AND troponin levels 24 hours postoperatively and on day 4 or hospital discharge (whichever comes first): should be considered.  7.) Current medications which may produce withdrawal symptoms if withheld perioperatively: None   8.) Other measures: Postoperative hypertension management with IV hydralazine until able to take oral medications.  Postoperative incentive spirometry to prevent pneumonia.  
Stable   Continue lexapro and buspar   
large/painful/red

## 2022-01-18 NOTE — DISCHARGE INSTRUCTIONS
To confirm, Your doctor has instructed you that surgery is scheduled for 1/27/2022      Please report to [facility] .  Pre admit office will call afternoon prior to surgery with final arrival time    INSTRUCTIONS IMPORTANT!!!     Do not eat, drink, or smoke after 12 midnight-including water. OK to brush teeth, no gum, candy or mints!    ¨ Take only these medicines with a small swallow of water-morning of surgery.    Buspar, Lexapro, Hydrochlorothiazide, Protonix    Pre operative instructions:  Please review the Pre-Operative Instruction booklet that you were given.        Bathing Instructions--See page 6 in the Pre-operative booklet.      Prevention of surgical site infections:     -Keep incisions clean and dry.   -Do not soak/submerge incisions in water until completely healed.   -Do not apply lotions, powders, creams, or deodorants to site.   -Always make sure hands are cleaned with antibacterial soap/ alcohol-based  prior to touching the surgical site.  (This includes doctors, nurses, staff, and yourself.)    Signs and symptoms:   -Redness and pain around the area where you had surgery   -Drainage of cloudy fluid from your surgical wound   -Fever over 100.4       I have read or had read and explained to me, and understand the above information.  Additional comments or instructions:  Received a copy of Pre-operative instructions booklet, FAQ surgical site infection sheet, and packets of hibiclens (if indicated).

## 2022-01-20 ENCOUNTER — ANESTHESIA EVENT (OUTPATIENT)
Dept: SURGERY | Facility: HOSPITAL | Age: 42
End: 2022-01-20
Payer: MEDICAID

## 2022-01-21 ENCOUNTER — PATIENT MESSAGE (OUTPATIENT)
Dept: ADMINISTRATIVE | Facility: OTHER | Age: 42
End: 2022-01-21
Payer: MEDICAID

## 2022-01-24 ENCOUNTER — TELEPHONE (OUTPATIENT)
Dept: SPORTS MEDICINE | Facility: CLINIC | Age: 42
End: 2022-01-24
Payer: MEDICAID

## 2022-01-24 ENCOUNTER — LAB VISIT (OUTPATIENT)
Dept: PRIMARY CARE CLINIC | Facility: CLINIC | Age: 42
End: 2022-01-24
Payer: MEDICAID

## 2022-01-24 DIAGNOSIS — Z01.818 PREOP TESTING: ICD-10-CM

## 2022-01-24 PROCEDURE — U0003 INFECTIOUS AGENT DETECTION BY NUCLEIC ACID (DNA OR RNA); SEVERE ACUTE RESPIRATORY SYNDROME CORONAVIRUS 2 (SARS-COV-2) (CORONAVIRUS DISEASE [COVID-19]), AMPLIFIED PROBE TECHNIQUE, MAKING USE OF HIGH THROUGHPUT TECHNOLOGIES AS DESCRIBED BY CMS-2020-01-R: HCPCS | Performed by: STUDENT IN AN ORGANIZED HEALTH CARE EDUCATION/TRAINING PROGRAM

## 2022-01-24 PROCEDURE — U0005 INFEC AGEN DETEC AMPLI PROBE: HCPCS | Performed by: STUDENT IN AN ORGANIZED HEALTH CARE EDUCATION/TRAINING PROGRAM

## 2022-01-25 LAB
SARS-COV-2 RNA RESP QL NAA+PROBE: NOT DETECTED
SARS-COV-2- CYCLE NUMBER: NORMAL

## 2022-01-26 ENCOUNTER — TELEPHONE (OUTPATIENT)
Dept: PREADMISSION TESTING | Facility: HOSPITAL | Age: 42
End: 2022-01-26
Payer: MEDICAID

## 2022-01-26 NOTE — ANESTHESIA PREPROCEDURE EVALUATION
01/26/2022   Past Medical History:   Diagnosis Date    Anxiety     Breast cancer     Chest pain     Chest pain 7/2/2021    Cholecystitis without calculus     Dizziness     Elevated C-reactive protein (CRP)     Essential hypertension     Memory change     Osteoarthritis     Other specified disorders of thyroid     Screening mammogram, encounter for 4/15/2021    Shoulder injury     SOB (shortness of breath)     Thyroiditis     Vision disturbance 4/30/2021       Sangeetha June is a 42 y.o., female.  Past Surgical History:   Procedure Laterality Date    BREAST BIOPSY      HYSTERECTOMY      INJECTION OF JOINT Left 11/12/2021    Procedure: Left suprascapular and axillary injection with local;  Surgeon: Bisi Cochran MD;  [facility]: [facility];  Service: Pain Management;  Laterality: Left;    MASTECTOMY           Anesthesia Evaluation    I have reviewed the Patient Summary Reports.    I have reviewed the Nursing Notes. I have reviewed the NPO Status.   I have reviewed the Medications.     Review of Systems  Anesthesia Hx:  No problems with previous Anesthesia  Denies Family Hx of Anesthesia complications.   Denies Personal Hx of Anesthesia complications.   Social:  No Alcohol Use, Non-Smoker    Hematology/Oncology:  Hematology Normal        Cardiovascular:   Hypertension AGUILAR ECG has been reviewed. Extensive cards w/u for AGUILAR, NEG.  EF 55%.   Pulmonary:   Sleep Apnea    Education provided regarding risk of obstructive sleep apnea     Renal/:  Renal/ Normal     Hepatic/GI:   GERD    Musculoskeletal:   Arthritis   Denies Spine Disorders    Neurological:   CVA    Endocrine:   Hypothyroidism    Psych:   anxiety          Physical Exam  General:  Morbid Obesity    Airway/Jaw/Neck:  Airway Findings: Mouth Opening: Normal Tongue: Normal  General Airway Assessment: Adult  Mallampati: II  TM  Distance: Normal, at least 6 cm  Jaw/Neck Findings:  Neck ROM: Normal ROM  Neck Findings:     Eyes/Ears/Nose:  Eyes/Ears/Nose Findings:    Dental:  Dental Findings: Edentulous   Chest/Lungs:  Chest/Lungs Findings: Clear to auscultation, Normal Respiratory Rate     Heart/Vascular:  Heart Findings: Rate: Normal  Rhythm: Regular Rhythm  Sounds: Normal  Heart murmur: negative Vascular Findings: Normal    Abdomen:  Abdomen Findings: Normal    Musculoskeletal:  Musculoskeletal Findings: Normal   Skin:  Skin Findings: Normal    Mental Status:  Mental Status Findings:  Alert and Oriented         Anesthesia Plan  Type of Anesthesia, risks & benefits discussed:  Anesthesia Type:  general    Patient's Preference:   Plan Factors:          Intra-op Monitoring Plan: standard ASA monitors  Intra-op Monitoring Plan Comments:   Post Op Pain Control Plan: per primary service following discharge from PACU, multimodal analgesia and peripheral nerve block  Post Op Pain Control Plan Comments:     Induction:   IV  Beta Blocker:  Patient is not currently on a Beta-Blocker (No further documentation required).       Informed Consent: Patient understands risks and agrees with Anesthesia plan.  Questions answered. Anesthesia consent signed with patient.  ASA Score: 3     Day of Surgery Review of History & Physical:    H&P update referred to the surgeon.         Ready For Surgery From Anesthesia Perspective.

## 2022-01-26 NOTE — TELEPHONE ENCOUNTER
Called and spoke with the patient about the following:    Your Surgery arrival time is at 5:30 am on 1/27/22 at [facility] location.    The address is [address] [place]  [address].     Only one adult (over 18) is to accompany you to surgery, unless it is a Pediatric patient, then 2 adults are encouraged to accompany them to the surgery center.    Your ride MUST STAY the entire time until you are discharged.      Please come in the main lobby (located on the 1st floor).     Be prepared to show your photo ID and insurance card.     Masks should be worn by ALL persons entering the building.     Nothing to eat or drink after after midnight, unless you were instructed to take specific medications discussed with the Pre-admit Nurse.     Please call [phone] with any questions or concerns.     Thanks.

## 2022-01-27 ENCOUNTER — HOSPITAL ENCOUNTER (OUTPATIENT)
Facility: HOSPITAL | Age: 42
Discharge: HOME | End: 2022-01-27
Attending: STUDENT IN AN ORGANIZED HEALTH CARE EDUCATION/TRAINING PROGRAM | Admitting: STUDENT IN AN ORGANIZED HEALTH CARE EDUCATION/TRAINING PROGRAM
Payer: MEDICAID

## 2022-01-27 ENCOUNTER — ANESTHESIA (OUTPATIENT)
Dept: SURGERY | Facility: HOSPITAL | Age: 42
End: 2022-01-27
Payer: MEDICAID

## 2022-01-27 VITALS
HEIGHT: 68 IN | DIASTOLIC BLOOD PRESSURE: 66 MMHG | OXYGEN SATURATION: 95 % | RESPIRATION RATE: 21 BRPM | WEIGHT: 293 LBS | SYSTOLIC BLOOD PRESSURE: 136 MMHG | BODY MASS INDEX: 44.41 KG/M2 | TEMPERATURE: 98 F | HEART RATE: 71 BPM

## 2022-01-27 DIAGNOSIS — M75.102 ROTATOR CUFF TEAR, LEFT: ICD-10-CM

## 2022-01-27 DIAGNOSIS — M75.122 NONTRAUMATIC COMPLETE TEAR OF LEFT ROTATOR CUFF: Primary | ICD-10-CM

## 2022-01-27 LAB — POCT GLUCOSE: 126 MG/DL (ref 70–110)

## 2022-01-27 PROCEDURE — 29828 SHO ARTHRS SRG BICP TENODSIS: CPT | Mod: 51,LT,, | Performed by: STUDENT IN AN ORGANIZED HEALTH CARE EDUCATION/TRAINING PROGRAM

## 2022-01-27 PROCEDURE — 25000003 PHARM REV CODE 250: Performed by: NURSE ANESTHETIST, CERTIFIED REGISTERED

## 2022-01-27 PROCEDURE — 25000003 PHARM REV CODE 250: Performed by: ANESTHESIOLOGY

## 2022-01-27 PROCEDURE — 25000003 PHARM REV CODE 250: Performed by: STUDENT IN AN ORGANIZED HEALTH CARE EDUCATION/TRAINING PROGRAM

## 2022-01-27 PROCEDURE — 01630 ANES OPN/ARTHR PX SHO JT NOS: CPT | Performed by: STUDENT IN AN ORGANIZED HEALTH CARE EDUCATION/TRAINING PROGRAM

## 2022-01-27 PROCEDURE — 63600175 PHARM REV CODE 636 W HCPCS: Performed by: STUDENT IN AN ORGANIZED HEALTH CARE EDUCATION/TRAINING PROGRAM

## 2022-01-27 PROCEDURE — D9220A PRA ANESTHESIA: Mod: ANES,,, | Performed by: ANESTHESIOLOGY

## 2022-01-27 PROCEDURE — 29826 PR SHLDR ARTHROSCOP,PART ACROMIOPLAS: ICD-10-PCS | Mod: LT,,, | Performed by: STUDENT IN AN ORGANIZED HEALTH CARE EDUCATION/TRAINING PROGRAM

## 2022-01-27 PROCEDURE — 76942 ECHO GUIDE FOR BIOPSY: CPT | Mod: 26,,, | Performed by: ANESTHESIOLOGY

## 2022-01-27 PROCEDURE — D9220A PRA ANESTHESIA: ICD-10-PCS | Mod: ANES,,, | Performed by: ANESTHESIOLOGY

## 2022-01-27 PROCEDURE — D9220A PRA ANESTHESIA: Mod: CRNA,,, | Performed by: NURSE ANESTHETIST, CERTIFIED REGISTERED

## 2022-01-27 PROCEDURE — 25000242 PHARM REV CODE 250 ALT 637 W/ HCPCS: Performed by: NURSE ANESTHETIST, CERTIFIED REGISTERED

## 2022-01-27 PROCEDURE — 71000033 HC RECOVERY, INTIAL HOUR: Performed by: STUDENT IN AN ORGANIZED HEALTH CARE EDUCATION/TRAINING PROGRAM

## 2022-01-27 PROCEDURE — 36000711: Performed by: STUDENT IN AN ORGANIZED HEALTH CARE EDUCATION/TRAINING PROGRAM

## 2022-01-27 PROCEDURE — 64450 NJX AA&/STRD OTHER PN/BRANCH: CPT | Performed by: STUDENT IN AN ORGANIZED HEALTH CARE EDUCATION/TRAINING PROGRAM

## 2022-01-27 PROCEDURE — 82962 GLUCOSE BLOOD TEST: CPT | Performed by: STUDENT IN AN ORGANIZED HEALTH CARE EDUCATION/TRAINING PROGRAM

## 2022-01-27 PROCEDURE — 64415 NJX AA&/STRD BRCH PLXS IMG: CPT | Mod: 59,LT | Performed by: ANESTHESIOLOGY

## 2022-01-27 PROCEDURE — 29826 SHO ARTHRS SRG DECOMPRESSION: CPT | Mod: LT,,, | Performed by: STUDENT IN AN ORGANIZED HEALTH CARE EDUCATION/TRAINING PROGRAM

## 2022-01-27 PROCEDURE — 63600175 PHARM REV CODE 636 W HCPCS: Performed by: ANESTHESIOLOGY

## 2022-01-27 PROCEDURE — C9290 INJ, BUPIVACAINE LIPOSOME: HCPCS | Performed by: ANESTHESIOLOGY

## 2022-01-27 PROCEDURE — 29827 SHO ARTHRS SRG RT8TR CUF RPR: CPT | Mod: LT,,, | Performed by: STUDENT IN AN ORGANIZED HEALTH CARE EDUCATION/TRAINING PROGRAM

## 2022-01-27 PROCEDURE — C1713 ANCHOR/SCREW BN/BN,TIS/BN: HCPCS | Performed by: STUDENT IN AN ORGANIZED HEALTH CARE EDUCATION/TRAINING PROGRAM

## 2022-01-27 PROCEDURE — 29827 PR SHLDR ARTHROSCOP,SURG,W/ROTAT CUFF REPR: ICD-10-PCS | Mod: LT,,, | Performed by: STUDENT IN AN ORGANIZED HEALTH CARE EDUCATION/TRAINING PROGRAM

## 2022-01-27 PROCEDURE — 37000009 HC ANESTHESIA EA ADD 15 MINS: Performed by: STUDENT IN AN ORGANIZED HEALTH CARE EDUCATION/TRAINING PROGRAM

## 2022-01-27 PROCEDURE — 27201423 OPTIME MED/SURG SUP & DEVICES STERILE SUPPLY: Performed by: STUDENT IN AN ORGANIZED HEALTH CARE EDUCATION/TRAINING PROGRAM

## 2022-01-27 PROCEDURE — 36000710: Performed by: STUDENT IN AN ORGANIZED HEALTH CARE EDUCATION/TRAINING PROGRAM

## 2022-01-27 PROCEDURE — 63600175 PHARM REV CODE 636 W HCPCS: Performed by: NURSE ANESTHETIST, CERTIFIED REGISTERED

## 2022-01-27 PROCEDURE — 64415 NJX AA&/STRD BRCH PLXS IMG: CPT | Mod: 59,LT,, | Performed by: ANESTHESIOLOGY

## 2022-01-27 PROCEDURE — 29828 PR ARTHROSCOPY SHOULDER SURGICAL BICEPS TENODESIS: ICD-10-PCS | Mod: 51,LT,, | Performed by: STUDENT IN AN ORGANIZED HEALTH CARE EDUCATION/TRAINING PROGRAM

## 2022-01-27 PROCEDURE — 37000008 HC ANESTHESIA 1ST 15 MINUTES: Performed by: STUDENT IN AN ORGANIZED HEALTH CARE EDUCATION/TRAINING PROGRAM

## 2022-01-27 PROCEDURE — 64415 PR NERVE BLOCK INJ, ANES/STEROID, BRACHIAL PLEXUS, INCL IMAG GUIDANCE: ICD-10-PCS | Mod: 59,LT,, | Performed by: ANESTHESIOLOGY

## 2022-01-27 PROCEDURE — 76942 PR U/S GUIDANCE FOR NEEDLE GUIDANCE: ICD-10-PCS | Mod: 26,,, | Performed by: ANESTHESIOLOGY

## 2022-01-27 PROCEDURE — D9220A PRA ANESTHESIA: ICD-10-PCS | Mod: CRNA,,, | Performed by: NURSE ANESTHETIST, CERTIFIED REGISTERED

## 2022-01-27 PROCEDURE — 71000015 HC POSTOP RECOV 1ST HR: Performed by: STUDENT IN AN ORGANIZED HEALTH CARE EDUCATION/TRAINING PROGRAM

## 2022-01-27 DEVICE — ANCHOR FIBERTAK 2.6 SOFT DL: Type: IMPLANTABLE DEVICE | Site: SHOULDER | Status: FUNCTIONAL

## 2022-01-27 DEVICE — IMPLANTABLE DEVICE: Type: IMPLANTABLE DEVICE | Site: SHOULDER | Status: FUNCTIONAL

## 2022-01-27 DEVICE — ANCHOR SWIVELOCK KNTLS BLUE #2: Type: IMPLANTABLE DEVICE | Site: SHOULDER | Status: FUNCTIONAL

## 2022-01-27 RX ORDER — DIPHENHYDRAMINE HYDROCHLORIDE 50 MG/ML
25 INJECTION INTRAMUSCULAR; INTRAVENOUS EVERY 6 HOURS PRN
Status: DISCONTINUED | OUTPATIENT
Start: 2022-01-27 | End: 2022-01-27 | Stop reason: HOSPADM

## 2022-01-27 RX ORDER — MIDAZOLAM HYDROCHLORIDE 1 MG/ML
INJECTION INTRAMUSCULAR; INTRAVENOUS
Status: DISCONTINUED | OUTPATIENT
Start: 2022-01-27 | End: 2022-01-27

## 2022-01-27 RX ORDER — ONDANSETRON HYDROCHLORIDE 2 MG/ML
INJECTION, SOLUTION INTRAMUSCULAR; INTRAVENOUS
Status: DISCONTINUED | OUTPATIENT
Start: 2022-01-27 | End: 2022-01-27

## 2022-01-27 RX ORDER — EPINEPHRINE 1 MG/ML
INJECTION, SOLUTION INTRACARDIAC; INTRAMUSCULAR; INTRAVENOUS; SUBCUTANEOUS
Status: DISCONTINUED
Start: 2022-01-27 | End: 2022-01-27 | Stop reason: HOSPADM

## 2022-01-27 RX ORDER — MEPERIDINE HYDROCHLORIDE 25 MG/ML
12.5 INJECTION INTRAMUSCULAR; INTRAVENOUS; SUBCUTANEOUS ONCE
Status: DISCONTINUED | OUTPATIENT
Start: 2022-01-27 | End: 2022-01-27 | Stop reason: HOSPADM

## 2022-01-27 RX ORDER — ALBUTEROL SULFATE 90 UG/1
AEROSOL, METERED RESPIRATORY (INHALATION)
Status: DISCONTINUED | OUTPATIENT
Start: 2022-01-27 | End: 2022-01-27

## 2022-01-27 RX ORDER — DEXAMETHASONE SODIUM PHOSPHATE 4 MG/ML
INJECTION, SOLUTION INTRA-ARTICULAR; INTRALESIONAL; INTRAMUSCULAR; INTRAVENOUS; SOFT TISSUE
Status: DISCONTINUED | OUTPATIENT
Start: 2022-01-27 | End: 2022-01-27

## 2022-01-27 RX ORDER — SUCCINYLCHOLINE CHLORIDE 20 MG/ML
INJECTION INTRAMUSCULAR; INTRAVENOUS
Status: DISCONTINUED | OUTPATIENT
Start: 2022-01-27 | End: 2022-01-27

## 2022-01-27 RX ORDER — TRAMADOL HYDROCHLORIDE 50 MG/1
50 TABLET ORAL
Qty: 36 TABLET | Refills: 0 | Status: SHIPPED | OUTPATIENT
Start: 2022-01-27 | End: 2022-02-14 | Stop reason: SDUPTHER

## 2022-01-27 RX ORDER — ASPIRIN 81 MG/1
81 TABLET ORAL 2 TIMES DAILY
Qty: 28 TABLET | Refills: 0 | Status: SHIPPED | OUTPATIENT
Start: 2022-01-27 | End: 2022-02-10

## 2022-01-27 RX ORDER — FENTANYL CITRATE 50 UG/ML
25 INJECTION, SOLUTION INTRAMUSCULAR; INTRAVENOUS EVERY 5 MIN PRN
Status: DISCONTINUED | OUTPATIENT
Start: 2022-01-27 | End: 2022-01-27 | Stop reason: HOSPADM

## 2022-01-27 RX ORDER — PHENYLEPHRINE HYDROCHLORIDE 10 MG/ML
INJECTION INTRAVENOUS
Status: DISCONTINUED | OUTPATIENT
Start: 2022-01-27 | End: 2022-01-27

## 2022-01-27 RX ORDER — BUPIVACAINE HYDROCHLORIDE 5 MG/ML
INJECTION, SOLUTION EPIDURAL; INTRACAUDAL
Status: COMPLETED | OUTPATIENT
Start: 2022-01-27 | End: 2022-01-27

## 2022-01-27 RX ORDER — SODIUM CHLORIDE, SODIUM LACTATE, POTASSIUM CHLORIDE, CALCIUM CHLORIDE 600; 310; 30; 20 MG/100ML; MG/100ML; MG/100ML; MG/100ML
INJECTION, SOLUTION INTRAVENOUS CONTINUOUS
Status: ACTIVE | OUTPATIENT
Start: 2022-01-27

## 2022-01-27 RX ORDER — CHLORHEXIDINE GLUCONATE ORAL RINSE 1.2 MG/ML
10 SOLUTION DENTAL
Status: DISCONTINUED | OUTPATIENT
Start: 2022-01-27 | End: 2022-01-27 | Stop reason: HOSPADM

## 2022-01-27 RX ORDER — SODIUM CHLORIDE 9 MG/ML
INJECTION, SOLUTION INTRAVENOUS CONTINUOUS
Status: DISCONTINUED | OUTPATIENT
Start: 2022-01-27 | End: 2022-01-27 | Stop reason: HOSPADM

## 2022-01-27 RX ORDER — LIDOCAINE HYDROCHLORIDE 20 MG/ML
INJECTION, SOLUTION EPIDURAL; INFILTRATION; INTRACAUDAL; PERINEURAL
Status: DISCONTINUED | OUTPATIENT
Start: 2022-01-27 | End: 2022-01-27

## 2022-01-27 RX ORDER — FENTANYL CITRATE 50 UG/ML
INJECTION, SOLUTION INTRAMUSCULAR; INTRAVENOUS
Status: DISCONTINUED | OUTPATIENT
Start: 2022-01-27 | End: 2022-01-27

## 2022-01-27 RX ORDER — EPINEPHRINE 1 MG/ML
INJECTION, SOLUTION INTRACARDIAC; INTRAMUSCULAR; INTRAVENOUS; SUBCUTANEOUS
Status: DISCONTINUED | OUTPATIENT
Start: 2022-01-27 | End: 2022-01-27 | Stop reason: HOSPADM

## 2022-01-27 RX ORDER — PROPOFOL 10 MG/ML
VIAL (ML) INTRAVENOUS
Status: DISCONTINUED | OUTPATIENT
Start: 2022-01-27 | End: 2022-01-27

## 2022-01-27 RX ORDER — ROCURONIUM BROMIDE 10 MG/ML
INJECTION, SOLUTION INTRAVENOUS
Status: DISCONTINUED | OUTPATIENT
Start: 2022-01-27 | End: 2022-01-27

## 2022-01-27 RX ORDER — CELECOXIB 200 MG/1
200 CAPSULE ORAL 2 TIMES DAILY
Qty: 28 CAPSULE | Refills: 0 | Status: SHIPPED | OUTPATIENT
Start: 2022-01-27

## 2022-01-27 RX ORDER — HYDROCODONE BITARTRATE AND ACETAMINOPHEN 5; 325 MG/1; MG/1
1 TABLET ORAL
Status: DISCONTINUED | OUTPATIENT
Start: 2022-01-27 | End: 2022-01-27 | Stop reason: HOSPADM

## 2022-01-27 RX ORDER — ONDANSETRON 2 MG/ML
4 INJECTION INTRAMUSCULAR; INTRAVENOUS ONCE AS NEEDED
Status: DISCONTINUED | OUTPATIENT
Start: 2022-01-27 | End: 2022-01-27 | Stop reason: HOSPADM

## 2022-01-27 RX ORDER — OXYCODONE HYDROCHLORIDE 5 MG/1
5 TABLET ORAL EVERY 4 HOURS PRN
Qty: 24 TABLET | Refills: 0 | Status: SHIPPED | OUTPATIENT
Start: 2022-01-27 | End: 2022-02-08

## 2022-01-27 RX ORDER — ACETAMINOPHEN 500 MG
1000 TABLET ORAL EVERY 8 HOURS PRN
Qty: 60 TABLET | Refills: 0 | Status: SHIPPED | OUTPATIENT
Start: 2022-01-27 | End: 2022-02-08

## 2022-01-27 RX ORDER — ALBUTEROL SULFATE 0.83 MG/ML
2.5 SOLUTION RESPIRATORY (INHALATION) EVERY 4 HOURS PRN
Status: DISCONTINUED | OUTPATIENT
Start: 2022-01-27 | End: 2022-01-27 | Stop reason: HOSPADM

## 2022-01-27 RX ADMIN — SODIUM CHLORIDE, SODIUM LACTATE, POTASSIUM CHLORIDE, AND CALCIUM CHLORIDE: 600; 310; 30; 20 INJECTION, SOLUTION INTRAVENOUS at 06:01

## 2022-01-27 RX ADMIN — ROCURONIUM BROMIDE 30 MG: 10 INJECTION, SOLUTION INTRAVENOUS at 07:01

## 2022-01-27 RX ADMIN — PHENYLEPHRINE HYDROCHLORIDE 50 MCG: 10 INJECTION INTRAVENOUS at 08:01

## 2022-01-27 RX ADMIN — PROPOFOL 50 MG: 10 INJECTION, EMULSION INTRAVENOUS at 07:01

## 2022-01-27 RX ADMIN — SUCCINYLCHOLINE CHLORIDE 200 MG: 20 INJECTION, SOLUTION INTRAMUSCULAR; INTRAVENOUS at 07:01

## 2022-01-27 RX ADMIN — DEXAMETHASONE SODIUM PHOSPHATE 4 MG: 4 INJECTION, SOLUTION INTRA-ARTICULAR; INTRALESIONAL; INTRAMUSCULAR; INTRAVENOUS; SOFT TISSUE at 08:01

## 2022-01-27 RX ADMIN — ALBUTEROL SULFATE 10 PUFF: 90 AEROSOL, METERED RESPIRATORY (INHALATION) at 09:01

## 2022-01-27 RX ADMIN — MIDAZOLAM HYDROCHLORIDE 1 MG: 1 INJECTION INTRAMUSCULAR; INTRAVENOUS at 07:01

## 2022-01-27 RX ADMIN — HYDROCODONE BITARTRATE AND ACETAMINOPHEN 1 TABLET: 5; 325 TABLET ORAL at 10:01

## 2022-01-27 RX ADMIN — CEFAZOLIN SODIUM 3 ML: 2 SOLUTION INTRAVENOUS at 07:01

## 2022-01-27 RX ADMIN — SUGAMMADEX 200 MG: 100 INJECTION, SOLUTION INTRAVENOUS at 09:01

## 2022-01-27 RX ADMIN — PROPOFOL 30 MG: 10 INJECTION, EMULSION INTRAVENOUS at 07:01

## 2022-01-27 RX ADMIN — FENTANYL CITRATE 50 MCG: 50 INJECTION, SOLUTION INTRAMUSCULAR; INTRAVENOUS at 07:01

## 2022-01-27 RX ADMIN — LIDOCAINE HYDROCHLORIDE 80 MG: 20 INJECTION, SOLUTION EPIDURAL; INFILTRATION; INTRACAUDAL; PERINEURAL at 07:01

## 2022-01-27 RX ADMIN — ONDANSETRON HYDROCHLORIDE 4 MG: 2 INJECTION, SOLUTION INTRAMUSCULAR; INTRAVENOUS at 08:01

## 2022-01-27 RX ADMIN — ROCURONIUM BROMIDE 10 MG: 10 INJECTION, SOLUTION INTRAVENOUS at 07:01

## 2022-01-27 RX ADMIN — ROCURONIUM BROMIDE 10 MG: 10 INJECTION, SOLUTION INTRAVENOUS at 08:01

## 2022-01-27 RX ADMIN — BUPIVACAINE 133 MG: 13.3 INJECTION, SUSPENSION, LIPOSOMAL INFILTRATION at 07:01

## 2022-01-27 RX ADMIN — CHLORHEXIDINE GLUCONATE 0.12% ORAL RINSE 10 ML: 1.2 LIQUID ORAL at 06:01

## 2022-01-27 RX ADMIN — BUPIVACAINE HYDROCHLORIDE 10 ML: 5 INJECTION, SOLUTION EPIDURAL; INTRACAUDAL; PERINEURAL at 07:01

## 2022-01-27 NOTE — PATIENT INSTRUCTIONS
DISCHARGE INSTRUCTIONS FOR ROTATOR CUFF REPAIR     Contact the [facility] Medicine Clinic at [phone] if you have questions about your instructions or follow-up appointment.     DIET:   Start with clear liquids and light foods to minimize nausea. Once these are tolerated, advance to a regular diet.     DRESSING AND WOUND CARE:   Keep the dressing clean and dry. It is normal for there to be some drainage after surgery since the shoulder was irrigated with large amounts of fluid. Reinforce with additional gauze as necessary.   Remove the dressing the 2nd day after surgery and begin changing daily with clean gauze or Band-Aids®. Keep your incisions covered until you follow up in clinic.   If you have Steri-Strips in place of stitches, allow them to stay in place as long as possible. Steri-Strips are made of a fabric material that can get wet in the shower and pat dry with a towel. They usually fall off on their own within 7 to 10 days. You may trim the edges as they begin to curl.     BATHING:   You may bathe or shower on the 2nd day after surgery, but do not scrub or soak the incisions. Dry the area by gently blotting it with a gauze or towel. After it is completely dry, cover the wound with clean gauze or Band-Aids®. Do NOT submerge the incisions (bath/swim) until after the sutures are removed and the wound has completely healed.     ACTIVITY:    Ice should be applied to the shoulder for 20-30 minutes, 5-6 times a day, to help control pain and swelling. Apply additional times as needed, especially after exercise, for the first 3-4 weeks. Do not apply ice directly to the skin; use a thin barrier in between. Also, do not use heat.    Elevate the shoulder by sleeping as upright as possible using extra pillows or a recliner. Do this for the first few days to help decrease pain and swelling.    Wear the sling at all times for 6 weeks including while sleeping. The only time you may remove the sling is for bathing  and exercises. Do not lean or put your body weight on your arm.    When your block wears off, start the following exercises:  Remove the sling for 5-10 minutes, 3 times a day, to do the following exercises:   1. Fully bend & straighten your fingers, your wrist, and your elbow several times.   2. Lean forward, bracing yourself on a table/counter with your normal arm. Let your surgical arm relax and hang straight down. Shift your weight so that your arm moves side to side, front to back, and in gentle circles like a pendulum or elephant's trunk. Use your body to generate the movement for this, NOT your surgical shoulder's muscles. (see drawing below)      Physical therapy will be started after your 1st follow-up visit. At that time, you will be given a prescription & rehabilitation protocol to take to the PT clinic of your choice. Plan to visit with a therapist within 3 days after your follow-up visit.     PAIN CONTROL:   It is important to stay ahead of pain as it becomes challenging to get under control if you fall behind. Ice and elevation can help and should be used as much as possible in the first few days.    Narcotic pain medications, such as tramadol and oxycodone, should be taken as prescribed. The Tramadol is intended to be taken first as the primary medicine and then oxycodone taken for breakthrough pain. Wean off as soon as possible. Take these with food to decrease the chances of nausea and vomiting. Do not drink alcohol, drive a vehicle, or use heavy machinery while taking narcotic pain medications.    NSAID medications are used for pain control and to decrease inflammation. You may be prescribed an NSAID such as celebrex. Take as instructed. Other NSAID medications such as ibuprofen, Motrin, Advil, naproxen, or Aleve can be used once you have finished the celebrex, or if a prescription for celebrex was not provided.    Acetaminophen (Tylenol) is an effective over-the-counter pain medication that  can be used with NSAID medications and non-acetaminophen containing narcotics such as plain oxycodone.     ASPIRIN FOR PREVENTION OF BLOOD CLOTS:   You should take one 81 mg baby aspirin twice daily for two weeks starting the evening of the day you have surgery unless instructed otherwise or taking a different blood thinner such as enoxaparin or warfarin. If you are aware that you are at high risk for a blood clot, notify your physician as soon as possible.   Take aspirin at least 30 minutes before taking ibuprofen or Toradol.    CONSTIPATION PREVENTION:   Anesthesia and pain medications, changes in eating and drinking, and less activity can all lead to constipation after surgery. To prevent or reduce constipation, take an over-the-counter stool softener (brands include Colace and Miralax). Follow the directions on the bottle. Drink plenty of water and eat high fiber foods including whole grains, fresh fruits, vegetables, beans, prunes or prune juice.     PROBLEMS TO REPORT:   1. Persistent bloody drainage that soaks through reinforced dressings.   2. Fever greater than 101F or 38C.   3. Incision that is very red, swollen, draining pus, shows red streaks, or feels hot.   4. Inability to urinate within 8 hours of surgery (a rare effect of the anesthesia).   5. If you develop a rash, generalized itching or swelling from the medications, STOP the medication and call the clinic or the orthopedic surgery resident on call.   Daytime calls should be directed to the Sports Medicine Clinic at [phone].   Night-time and weekend calls should be directed to the after hours nurse line at [phone]    FREQUENTLY ASKED QUESTIONS     WHAT DAILY ACTIVITIES CAN I DO?   After shoulder surgery, you may do what you feel comfortable doing in the sling. Do not lift anything with your operative arm or put yourself at risk of falling.     CAN I DRIVE OR RIDE BY CAR/ TRAIN/ PLANE?   You should not drive while using a sling. There  are no forced restrictions regarding operating a motor vehicle, however you must always be the  of whether you are able to operate it safely. You should not drive while taking narcotic pain medications. You may ride in a car after surgery as needed. You may take a train or even fly the day after your surgery as long as you feel secure and comfortable.     WHAT ABOUT WORK?   You may return to an office-type job or to school whenever comfortable. For most patients this occurs 1-2 weeks after surgery. For more active jobs that require some lifting, you can wait until after your follow-up appointment. Any other unusual types of jobs should be discussed to determine a date for return to work.     WHAT ABOUT SWELLING?   Expect swelling as a normal process after surgery. Ice, elevation, and other treatments provided at physical therapy will allow this to improve in time. Some swelling may remain for up to 8 weeks, and this is normal.     WHAT IF IT REALLY HURTS TOO MUCH?   Surgery hurts and you cannot expect to be pain free, but our goal is for it to be tolerable. Try to use all available pain therapies such as narcotics, NSAIDS, and acetaminophen. Always try more ice and elevation. If the pain is not tolerable, call the clinic or the after hours nurse line.

## 2022-01-27 NOTE — ANESTHESIA PROCEDURE NOTES
Peripheral Block    Patient location during procedure: pre-op   Block not for primary anesthetic.  Reason for block: at surgeon's request and post-op pain management   Post-op Pain Location: Left shoulder  Start time: 1/27/2022 7:03 AM  Timeout: 1/27/2022 6:53 AM   End time: 1/27/2022 7:06 AM    Staffing  Authorizing Provider: Maty Thompson MD  Performing Provider: Maty Thompson MD    Staffing  Other anesthesia staff: Alexander David CRNA  Preanesthetic Checklist  Completed: patient identified, IV checked, site marked, risks and benefits discussed, surgical consent, monitors and equipment checked, pre-op evaluation and timeout performed  Peripheral Block  Patient position: supine  Prep: ChloraPrep  Patient monitoring: heart rate, cardiac monitor, continuous pulse ox, continuous capnometry and frequent blood pressure checks  Block type: supraclavicular  Laterality: left  Injection technique: single shot  Needle  Needle type: Stimuplex   Needle gauge: 22 G  Needle length: 2 in  Needle localization: anatomical landmarks, ultrasound guidance and nerve stimulator   -ultrasound image captured on disc.  Assessment  Injection assessment: negative aspiration, negative parasthesia and local visualized surrounding nerve  Paresthesia pain: none  Heart rate change: no  Slow fractionated injection: yes  Pain Tolerance: comfortable throughout block and no complaints  Medications:  Medication Administration Time: 1/27/2022 7:06 AM  Medications: bupivacaine (pf) (MARCAINE) injection 0.5%, 10 mL    Medications:  Bolus administered: 30 mL of 0.5 ropivacaine  Epinephrine added: 3.75 mcg/mL (1/300,000)  Additional Notes  VSS.  [facility] RN monitoring vitals throughout procedure.  Patient tolerated procedure well.

## 2022-01-27 NOTE — PLAN OF CARE
Left sided supraclavicular peripheral block completed per Dr. Thompson at bedside. Patient tolerated well. VSS. Continuous cardiac monitor and SPO2 in place.

## 2022-01-27 NOTE — OP NOTE
DATE OF SERVICE: 1/27/2022    ATTENDING: Francisco Mathews MD    DATE OF SURGERY: 1/27/2022    PATIENT'S NAME: Sangeetha June    MEDICAL RECORD NUMBER: [id number]     PREOPERATIVE DIAGNOSES:   1. Left shoulder rotator cuff tear  2. Left shoulder biceps tendinitis, SLAP tear  3. Left shoulder impingement    POSTOPERATIVE DIAGNOSES:   1. Left shoulder rotator cuff tear  2. Left shoulder biceps tendinitis, SLAP tear  3. Left shoulder impingement    PROCEDURE PERFORMED:   1. Left shoulder arthroscopic rotator cuff repair  2. Left shoulder arthroscopic biceps tenodesis  3. Left shoulder subacromial decompression    ANESTHESIA: General plus regional.     IMPLANTS USED:   Implant Name Type Inv. Item Serial No.  Lot No. LRB No. Used Action   4.75 BC Loop N Tack Tenodesis Implant System    [employer] [id number] Left 1 Implanted   ANCHOR FIBERTAK 2.6 SOFT DL - [id number]  ANCHOR FIBERTAK 2.6 SOFT DL  [employer] [id number] Left 2 Implanted   ANCHOR SWIVELOCK KNTLS BLUE #2 - [id number]  ANCHOR SWIVELOCK KNTLS BLUE #2  [employer] [id number] Left 2 Implanted         COMPLICATIONS: None.     POSITION: Beachchair.     BRIEF INDICATIONS: This is a 42 y.o. female who presents with a symptomatic LEFT rotator cuff tear and associated biceps tendonopathy. she has failed conservative treatment measures. We discussed surgical treatment options including risks and benefits. After a detailed explanation of the technical aspects of the procedure, the patient elected to proceed and signed consent.    OPERATIVE FINDINGS:   Biceps/Labrum: Inflamed tendon with evidence of longitudinal tearing, Type 2 SLAP tear  Glenohumeral joint: Glenoid and humeral head with grade 2 changes, no focal lesions  Rotator Cuff: Full thickness tear of the supraspinatus  Subacromial space: inflamed bursal tissue with anterolateral spur    DESCRIPTION OF PROCEDURE:   The patient was identified in the preoperative holding area. The operative upper extremity was  marked.  Consent was verified. The patient was then taken for preoperative block. Following this, the patient was taken to the main operating room where she was laid supine on the operative table. General anesthetic was induced. Preoperative time-out was performed verifying the patient, procedure, and preoperative antibiotics. The patient was then positioned in the beachchair position with all bony prominences well padded. The operative upper extremity was then prepped and draped in the usual sterile fashion.     A posterior portal was established with an #11-blade. The arthroscope was inserted into the glenohumeral joint atraumatically. We then made an anterior portal using an outside-in technique with an #18-gauge spinal needle into the rotator interval. We then used an #11-blade for stab incision and then followed this with a straight hemostat to open our anterior portal. We then inserted our arthroscopic probe to probe the joint. We were able to visualize the biceps tendon which was inflamed and deformed with evidence longitudinal tearing. The labrum was probed and demonstrated frayed edges and a type 2 SLAP tear. The shaver was introduced to debride the frayed edges of labrum.    The subscapularis was viewed and probed and found to be intact.     We then directed our attention to the biceps tenodesis.  A suture tape was placed around the biceps tendon in a luggage tag configuration.  A suture Passer was then used to sanders the biceps tendon distal to the luggage tag and grabbed the tail of the suture, pulling it back through, and creating a secure locking type stitch.  This was then loaded into a SwiveLock.  The biceps tendon was then detached from its anchor on the superior labrum.  An awl was used to create a  hole for the anchor and the anchor was advanced into the  hole, thus anchoring the biceps tendon into the most superior part of the bicipital groove at the upper border of the  subscapularis.    From the glenohumeral space we were also able to visualize a rotator cuff tear.  We then localized a lateral portal under direct visualization with an #18-gauge spinal needle into the cuff tear. We then made a #11-blade stab incision in the lateral shoulder and then through this brought our arthroscopic shaver. We debrided the torn cuff tissue back to a stable rim and also began preparing the tuberosity for anchor placement. We then removed our instruments from the glenohumeral joint and placed the arthroscope from the posterior portal into the subacromial space. We debrided a significant amount of bursal tissue in the subacromial space. We identified the undersurface of the acromion. We used a VAPR to debride the undersurface of the acromion up to the anterior and lateral margins. We identified the CA ligament and we were careful not to remove this. Next, we did our acromioplasty by burring the anterolateral margin of the acromion then working carefully medially. The portals were switched and the acromioplasty was completed through the posterior portal in the cutting block fashion. We continued debridement of the bursal tissue, identified the rotator cuff tear, and worked to define the edges more clearly. The remnants of rotator cuff tissue at the greater tuberosity was debrided and the greater tuberosity preparation was completed by debriding down to bleeding bone.     Once the rotator cuff was prepared, we then placed our medial row anchors.  We used Arthrex 2.6 mm FiberTak anchors preloaded with SutureTape for the medial row.  We then passed the sutures using the scorpion device from anterior to posterior.  We tied these in a mattress fashion using alternating half-hitches and tails were left long for incorporation into a lateral row.    Next, we began debridement of the lateral humerus with a VAPR probe. We debrided down to bony base. We then placed our lateral row anchors, which consisted of  Arthrex 4.75mm SwiveLock anchors.  We brought sutures from each of the medial row anchors and tensioned them into the anterior lateral row anchor and then advanced the anchor.  This was repeated for the posterior lateral row anchor.    Once we completed this, we took the remaining final arthroscopic pictures.     The incisions were closed with 3-0 nylon suture.  A light sterile dressing and sling with shoulder immobilizer was applied.  The patient was then awoken from anesthesia and brought to PACU in stable condition.    Plan:  Rotator Cuff Protocol  NWB LUE in sling  Ok to be out of sling for pendulums, elbow, wrist ROM  ASA 81mg BID x 2wks for DVT ppx  f/u 10-14 days for suture removal      Francisco Mathews MD

## 2022-01-27 NOTE — TRANSFER OF CARE
"Anesthesia Transfer of Care Note    Patient: Sangeetha June    Procedure(s) Performed: Procedure(s) (LRB):  REPAIR, ROTATOR CUFF, ARTHROSCOPIC (Left)  ARTHROSCOPY, SHOULDER, WITH SUBACROMIAL SPACE DECOMPRESSION (Left)  FIXATION, TENDON (Left)    Patient location: PACU    Anesthesia Type: general    Transport from OR: Transported from OR on room air with adequate spontaneous ventilation    Post pain: adequate analgesia    Post assessment: no apparent anesthetic complications and tolerated procedure well    Post vital signs: stable    Level of consciousness: awake and alert    Nausea/Vomiting: no nausea/vomiting    Complications: none    Transfer of care protocol was followed      Last vitals:   Visit Vitals  BP (!) 144/68 (BP Location: Right arm, Patient Position: Lying)   Pulse 79   Temp 36.6 °C (97.9 °F) (Temporal)   Resp 18   Ht 5' 8" (1.727 m)   Wt 133.4 kg (294 lb 3.3 oz)   LMP  (LMP Unknown)   SpO2 100%   Breastfeeding No   BMI 44.73 kg/m²     "

## 2022-01-27 NOTE — ANESTHESIA POSTPROCEDURE EVALUATION
Anesthesia Post Evaluation    Patient: Sangeetha June    Procedure(s) Performed: Procedure(s) (LRB):  REPAIR, ROTATOR CUFF, ARTHROSCOPIC (Left)  ARTHROSCOPY, SHOULDER, WITH SUBACROMIAL SPACE DECOMPRESSION (Left)  FIXATION, TENDON (Left)    Final Anesthesia Type: general      Patient location during evaluation: PACU  Patient participation: Yes- Able to Participate  Level of consciousness: awake and alert and oriented  Post-procedure vital signs: reviewed and stable  Pain management: adequate  Airway patency: patent    PONV status at discharge: No PONV  Anesthetic complications: no      Cardiovascular status: blood pressure returned to baseline, stable and hemodynamically stable  Respiratory status: unassisted  Hydration status: euvolemic  Follow-up not needed.          Vitals Value Taken Time   /66 01/27/22 1048   Temp 36.6 °C (97.9 °F) 01/27/22 0948   Pulse 67 01/27/22 1058   Resp 38 01/27/22 1058   SpO2 95 % 01/27/22 1057   Vitals shown include unvalidated device data.      Event Time   Out of Recovery 10:30:00         Pain/Kaylie Score: Pain Rating Prior to Med Admin: 5 (1/27/2022 10:31 AM)  Kaylie Score: 10 (1/27/2022 10:45 AM)

## 2022-01-27 NOTE — DISCHARGE SUMMARY
[facility]  Discharge Note  Short Stay    Procedure(s) (LRB):  REPAIR, ROTATOR CUFF, ARTHROSCOPIC (Left)  ARTHROSCOPY, SHOULDER, WITH SUBACROMIAL SPACE DECOMPRESSION (Left)  FIXATION, TENDON (Left)    OUTCOME: Patient tolerated treatment/procedure well without complication and is now ready for discharge.    DISPOSITION: Home or Self Care    FINAL DIAGNOSIS:  Left shoulder rotator cuff tear    FOLLOWUP: In clinic    DISCHARGE INSTRUCTIONS:  No discharge procedures on file.      Clinical Reference Documents Added to Patient Instructions       Document    ACETAMINOPHEN, ADULT (ENGLISH)    ASPIRIN, ADULT (ENGLISH)    CELECOXIB, ADULT (ENGLISH)    OXYCODONE, ADULT (ENGLISH)    TRAMADOL, ADULT (ENGLISH)          TIME SPENT ON DISCHARGE: 10 minutes

## 2022-01-27 NOTE — ANESTHESIA PROCEDURE NOTES
Intubation    Date/Time: 1/27/2022 7:24 AM  Performed by: Alexander David CRNA  Authorized by: Maty Thompson MD     Intubation:     Induction:  Intravenous    Intubated:  Postinduction    Mask Ventilation:  Easy mask    Attempts:  1    Attempted By:  CRNA    Method of Intubation:  Direct    Blade:  Garcia 2    Laryngeal View Grade: Grade I - full view of cords      Difficult Airway Encountered?: No      Complications:  None    Airway Device:  Oral endotracheal tube    Airway Device Size:  7.0    Style/Cuff Inflation:  Cuffed (inflated to minimal occlusive pressure)    Inflation Amount (mL):  6    Tube secured:  22    Secured at:  The lips    Placement Verified By:  Capnometry    Complicating Factors:  None    Findings Post-Intubation:  BS equal bilateral  Notes:      Dr Thompson at bedside for induction/intubation

## 2022-01-28 ENCOUNTER — NURSE TRIAGE (OUTPATIENT)
Dept: ADMINISTRATIVE | Facility: CLINIC | Age: 42
End: 2022-01-28
Payer: MEDICAID

## 2022-01-28 ENCOUNTER — TELEPHONE (OUTPATIENT)
Dept: ORTHOPEDICS | Facility: CLINIC | Age: 42
End: 2022-01-28
Payer: MEDICAID

## 2022-01-28 NOTE — TELEPHONE ENCOUNTER
Dr. Luna is called and she is telling patient it is normal to have the effects she feels it is a sign the block is wearing off. She is informing patient she is having the symptoms of the nerve block wearing off. Also c/o sore throat and Dr Luna advised chloraseptic to help.

## 2022-01-28 NOTE — TELEPHONE ENCOUNTER
Reason for Disposition   [1] SEVERE post-op pain (e.g., excruciating, pain scale 8-10) AND [2] not controlled with pain medications    Additional Information   Negative: [1] Widespread rash AND [2] bright red, sunburn-like   Negative: [1] SEVERE headache AND [2] after spinal (epidural) anesthesia   Negative: [1] Vomiting AND [2] persists > 4 hours   Negative: [1] Vomiting AND [2] abdomen looks much more swollen than usual   Negative: [1] Drinking very little AND [2] dehydration suspected (e.g., no urine > 12 hours, very dry mouth, very lightheaded)   Negative: Patient sounds very sick or weak to the triager   Negative: Sounds like a serious complication to the triager   Negative: Fever > 100.4 F (38.0 C)    Protocols used: POST-OP SYMPTOMS AND [id number]

## 2022-01-28 NOTE — TELEPHONE ENCOUNTER
Swelling and hotness to touch to left hand and numbness to to fingers.  Reason for Disposition   [1] Caller has URGENT question AND [2] triager unable to answer question     Swelling hotness to touch to left hand and numbness to to fingers.    Additional Information   Negative: Sounds like a life-threatening emergency to the triager   Negative: Chest pain   Negative: Difficulty breathing   Negative: Acting confused (e.g., disoriented, slurred speech) or excessively sleepy   Negative: [1] Discomfort (pain, burning or stinging) when passing urine AND [2] male   Negative: [1] Discomfort (pain, burning or stinging) when passing urine AND [2] female   Negative: Constipation   Negative: New or worsening leg (calf, thigh) pain   Negative: New or worsening leg swelling   Negative: Dizziness is severe, or persists > 24 hours after surgery   Negative: Pain, redness, swelling, or pus at IV Site   Negative: Symptoms arising from use of a urinary catheter ([place] or [place])   Negative: Cast problems or questions   Negative: Medication question   Negative: Surgical incision symptoms and questions   Negative: [1] Widespread rash AND [2] bright red, sunburn-like   Negative: [1] SEVERE headache AND [2] after spinal (epidural) anesthesia   Negative: [1] Vomiting AND [2] persists > 4 hours   Negative: [1] Vomiting AND [2] abdomen looks much more swollen than usual   Negative: [1] Drinking very little AND [2] dehydration suspected (e.g., no urine > 12 hours, very dry mouth, very lightheaded)   Negative: Patient sounds very sick or weak to the triager   Negative: Sounds like a serious complication to the triager   Negative: Fever > 100.4 F (38.0 C)   Negative: [1] SEVERE post-op pain (e.g., excruciating, pain scale 8-10) AND [2] not controlled with pain medications    Protocols used: POST-OP SYMPTOMS AND [id number]

## 2022-01-31 ENCOUNTER — NURSE TRIAGE (OUTPATIENT)
Dept: ADMINISTRATIVE | Facility: CLINIC | Age: 42
End: 2022-01-31
Payer: MEDICAID

## 2022-02-01 ENCOUNTER — TELEPHONE (OUTPATIENT)
Dept: INTERNAL MEDICINE | Facility: CLINIC | Age: 42
End: 2022-02-01
Payer: MEDICAID

## 2022-02-01 ENCOUNTER — OFFICE VISIT (OUTPATIENT)
Dept: PSYCHIATRY | Facility: CLINIC | Age: 42
End: 2022-02-01
Payer: MEDICAID

## 2022-02-01 DIAGNOSIS — F41.1 GENERALIZED ANXIETY DISORDER: Primary | ICD-10-CM

## 2022-02-01 PROCEDURE — 3044F PR MOST RECENT HEMOGLOBIN A1C LEVEL <7.0%: ICD-10-PCS | Mod: AJ,HB,CPTII, | Performed by: SOCIAL WORKER

## 2022-02-01 PROCEDURE — 90832 PR PSYCHOTHERAPY W/PATIENT, 30 MIN: ICD-10-PCS | Mod: AJ,HB,, | Performed by: SOCIAL WORKER

## 2022-02-01 PROCEDURE — 90832 PSYTX W PT 30 MINUTES: CPT | Mod: AJ,HB,, | Performed by: SOCIAL WORKER

## 2022-02-01 PROCEDURE — 3044F HG A1C LEVEL LT 7.0%: CPT | Mod: AJ,HB,CPTII, | Performed by: SOCIAL WORKER

## 2022-02-01 NOTE — TELEPHONE ENCOUNTER
"  Reason for Disposition   [1] SEVERE pain AND [2] present < 1 hour    Additional Information   Negative: Shock suspected (e.g., cold/pale/clammy skin, too weak to stand, low BP, rapid pulse)   Negative: Difficult to awaken or acting confused (e.g., disoriented, slurred speech)   Negative: Passed out (i.e., lost consciousness, collapsed and was not responding)   Negative: Sounds like a life-threatening emergency to the triager   Negative: Chest pain   Negative: Pain is mainly in upper abdomen  (if needed ask: "is it mainly above the belly button?")   Negative: Followed an abdomen (stomach) injury   Negative: [1] Abdominal pain AND [2] pregnant < 20 weeks   Negative: [1] Abdominal pain AND [2] pregnant 20 or more weeks   Negative: [1] Abdominal pain AND [2] postpartum (from 0 to 6 weeks after delivery)   Negative: [1] SEVERE pain (e.g., excruciating) AND [2] present > 1 hour   Negative: [1] SEVERE pain AND [2] age > 60 years   Negative: [1] Vomiting AND [2] contains red blood or black ("coffee ground") material  (Exception: few red streaks in vomit that only happened once)   Negative: Blood in bowel movements (Exception: blood on surface of BM with constipation)   Negative: Black or tarry bowel movements (Exception: chronic-unchanged black-grey bowel movements AND is taking iron pills or Pepto-bismol)   Negative: Patient sounds very sick or weak to the triager   Negative: [1] MILD-MODERATE pain AND [2] constant AND [3] present > 2 hours   Negative: [1] Vomiting AND [2] abdomen looks much more swollen than usual   Negative: [1] Vomiting AND [2] contains bile (green color)   Negative: White of the eyes have turned yellow (i.e., jaundice)   Negative: Fever > 103 F (39.4 C)   Negative: [1] Fever > 101 F (38.3 C) AND [2] age > 60 years   Negative: [1] Fever > 100.0 F (37.8 C) AND [2] bedridden (e.g., nursing home patient, CVA, chronic illness, recovering from surgery)   Negative: [1] Fever > 100.0 " F (37.8 C) AND [2] diabetes mellitus or weak immune system (e.g., HIV positive, cancer chemo, splenectomy, organ transplant, chronic steroids)    Protocols used: ABDOMINAL PAIN - FEMALE-A-AH  pt called re had 'humongous' pain in stomach. started taking meds for gasrelief but pain was getting worse. eased up some.  passed huge formed BM and pain started after. Pain severe for almost an hour. abd . rates pain 5. last BM prior to today was wed. pt had surg thurs. diarrhea passed at end of formed stool. no blood. pain above belly button. Pain now at level of belly button. no CP, no SOB. felt a little lightheaded. had some nausea.no vomiting . burping. flatus earlier. denies preg. rec follow up call in next 1-2 hours.protocol advice offered. pt agrees.  ED warnings given. Call back with questions

## 2022-02-01 NOTE — TELEPHONE ENCOUNTER
Pt c/o abd pain that stated after having a BM. Advised pt to go to ER is symptoms worsen pt states she is feeling much better. Scheduled appt with Dr. Garcia on 02/08 @ 120.

## 2022-02-01 NOTE — PROGRESS NOTES
Individual Psychotherapy (PhD/LCSW)    2/1/2022    Site:  [facility]         Therapeutic Intervention: Met with patient.  Outpatient - Insight oriented psychotherapy 30 min - CPT code 43301    Chief complaint/reason for encounter: depression and anxiety     Interval history and content of current session:  Patient presents to ongoing individual therapy due to depression and anxiety.  She apologizes for being late for the appointment.  She notes that it is difficult getting ready with one arm.  She had rotator cuff repair last week.  She will have to wear an arm brace for six weeks.  She lives in [place] with her sister.  Her doctor has told her that she can't live alone due to the problems with anxiety.  She plans to move in with her boyfriend.  They knew each other from school years ago.  They reconnected in [place].  She lived in [place] for over twenty years.  She misses her life there because she was independent. Her son still lives there.  Her boyfriend sanchez not want to move back because there is not as much opportunity for work.  She struggles with ongoing pain from previous surgeries.  Encourage the patient to continue to comply with medical treatment.  Explore her goals for the future.  Reflect that she has a good support system.  Educate the patient that video visits are available if needed due to distance.  She prefers to meet with a provider in person.  She has a follow up appointment with myself in two weeks.  She will be seeing Dr. Jaquez for medication management.  She admits that talking about problems helps her mood.    Treatment plan:  · Target symptoms: depression, anxiety   · Why chosen therapy is appropriate versus another modality: relevant to diagnosis  · Outcome monitoring methods: self-report, observation  · Therapeutic intervention type: insight oriented psychotherapy, supportive psychotherapy, interactive psychotherapy    Risk parameters:  Patient reports no suicidal ideation  Patient  reports no homicidal ideation  Patient reports no self-injurious behavior  Patient reports no violent behavior    Verbal deficits: None    Patient's response to intervention:  The patient's response to intervention is accepting, motivated.    Progress toward goals and other mental status changes:  The patient's progress toward goals is limited.    Diagnosis:   Generalized Anxiety Disorder    Plan:  individual psychotherapy and medication management by physician    Return to clinic: as scheduled    Length of Service (minutes): 30

## 2022-02-08 ENCOUNTER — OFFICE VISIT (OUTPATIENT)
Dept: INTERNAL MEDICINE | Facility: CLINIC | Age: 42
End: 2022-02-08
Payer: MEDICAID

## 2022-02-08 ENCOUNTER — HOSPITAL ENCOUNTER (OUTPATIENT)
Dept: RADIOLOGY | Facility: HOSPITAL | Age: 42
Discharge: HOME | End: 2022-02-08
Attending: FAMILY MEDICINE
Payer: MEDICAID

## 2022-02-08 VITALS
BODY MASS INDEX: 46.09 KG/M2 | HEART RATE: 66 BPM | DIASTOLIC BLOOD PRESSURE: 72 MMHG | SYSTOLIC BLOOD PRESSURE: 128 MMHG | OXYGEN SATURATION: 99 % | TEMPERATURE: 98 F | WEIGHT: 293 LBS

## 2022-02-08 DIAGNOSIS — E03.9 HYPOTHYROIDISM, UNSPECIFIED TYPE: ICD-10-CM

## 2022-02-08 DIAGNOSIS — R49.0 DYSPHONIA: ICD-10-CM

## 2022-02-08 DIAGNOSIS — K21.9 GASTROESOPHAGEAL REFLUX DISEASE, UNSPECIFIED WHETHER ESOPHAGITIS PRESENT: ICD-10-CM

## 2022-02-08 DIAGNOSIS — R10.9 ABDOMINAL PAIN, UNSPECIFIED ABDOMINAL LOCATION: ICD-10-CM

## 2022-02-08 DIAGNOSIS — E78.49 OTHER HYPERLIPIDEMIA: ICD-10-CM

## 2022-02-08 DIAGNOSIS — E11.69 TYPE 2 DIABETES MELLITUS WITH OTHER SPECIFIED COMPLICATION, WITH LONG-TERM CURRENT USE OF INSULIN: Primary | ICD-10-CM

## 2022-02-08 DIAGNOSIS — R26.9 GAIT DIFFICULTY: ICD-10-CM

## 2022-02-08 DIAGNOSIS — Z79.4 TYPE 2 DIABETES MELLITUS WITH OTHER SPECIFIED COMPLICATION, WITH LONG-TERM CURRENT USE OF INSULIN: Primary | ICD-10-CM

## 2022-02-08 DIAGNOSIS — R32 URINARY INCONTINENCE, UNSPECIFIED TYPE: ICD-10-CM

## 2022-02-08 DIAGNOSIS — I10 PRIMARY HYPERTENSION: ICD-10-CM

## 2022-02-08 PROBLEM — R73.03 PRE-DIABETES: Status: RESOLVED | Noted: 2021-04-15 | Resolved: 2022-02-08

## 2022-02-08 PROBLEM — E11.9 TYPE 2 DIABETES MELLITUS, WITH LONG-TERM CURRENT USE OF INSULIN: Status: ACTIVE | Noted: 2022-02-08

## 2022-02-08 PROCEDURE — 3074F SYST BP LT 130 MM HG: CPT | Mod: CPTII,,, | Performed by: FAMILY MEDICINE

## 2022-02-08 PROCEDURE — 1160F PR REVIEW ALL MEDS BY PRESCRIBER/CLIN PHARMACIST DOCUMENTED: ICD-10-PCS | Mod: CPTII,,, | Performed by: FAMILY MEDICINE

## 2022-02-08 PROCEDURE — 1159F MED LIST DOCD IN RCRD: CPT | Mod: CPTII,,, | Performed by: FAMILY MEDICINE

## 2022-02-08 PROCEDURE — 74019 XR ABDOMEN FLAT AND ERECT: ICD-10-PCS | Mod: 26,,, | Performed by: RADIOLOGY

## 2022-02-08 PROCEDURE — 3044F HG A1C LEVEL LT 7.0%: CPT | Mod: CPTII,,, | Performed by: FAMILY MEDICINE

## 2022-02-08 PROCEDURE — 99999 PR PBB SHADOW E&M-EST. PATIENT-LVL IV: ICD-10-PCS | Mod: PBBFAC,,, | Performed by: FAMILY MEDICINE

## 2022-02-08 PROCEDURE — 3078F DIAST BP <80 MM HG: CPT | Mod: CPTII,,, | Performed by: FAMILY MEDICINE

## 2022-02-08 PROCEDURE — 1160F RVW MEDS BY RX/DR IN RCRD: CPT | Mod: CPTII,,, | Performed by: FAMILY MEDICINE

## 2022-02-08 PROCEDURE — 3044F PR MOST RECENT HEMOGLOBIN A1C LEVEL <7.0%: ICD-10-PCS | Mod: CPTII,,, | Performed by: FAMILY MEDICINE

## 2022-02-08 PROCEDURE — 99214 OFFICE O/P EST MOD 30 MIN: CPT | Mod: S$PBB,,, | Performed by: FAMILY MEDICINE

## 2022-02-08 PROCEDURE — 74019 RADEX ABDOMEN 2 VIEWS: CPT | Mod: 26,,, | Performed by: RADIOLOGY

## 2022-02-08 PROCEDURE — 3008F PR BODY MASS INDEX (BMI) DOCUMENTED: ICD-10-PCS | Mod: CPTII,,, | Performed by: FAMILY MEDICINE

## 2022-02-08 PROCEDURE — 3074F PR MOST RECENT SYSTOLIC BLOOD PRESSURE < 130 MM HG: ICD-10-PCS | Mod: CPTII,,, | Performed by: FAMILY MEDICINE

## 2022-02-08 PROCEDURE — 3078F PR MOST RECENT DIASTOLIC BLOOD PRESSURE < 80 MM HG: ICD-10-PCS | Mod: CPTII,,, | Performed by: FAMILY MEDICINE

## 2022-02-08 PROCEDURE — 1159F PR MEDICATION LIST DOCUMENTED IN MEDICAL RECORD: ICD-10-PCS | Mod: CPTII,,, | Performed by: FAMILY MEDICINE

## 2022-02-08 PROCEDURE — 74019 RADEX ABDOMEN 2 VIEWS: CPT | Mod: TC,FY,PO

## 2022-02-08 PROCEDURE — 99214 PR OFFICE/OUTPT VISIT, EST, LEVL IV, 30-39 MIN: ICD-10-PCS | Mod: S$PBB,,, | Performed by: FAMILY MEDICINE

## 2022-02-08 PROCEDURE — 3008F BODY MASS INDEX DOCD: CPT | Mod: CPTII,,, | Performed by: FAMILY MEDICINE

## 2022-02-08 PROCEDURE — 99214 OFFICE O/P EST MOD 30 MIN: CPT | Mod: PBBFAC,PO | Performed by: FAMILY MEDICINE

## 2022-02-08 PROCEDURE — 99999 PR PBB SHADOW E&M-EST. PATIENT-LVL IV: CPT | Mod: PBBFAC,,, | Performed by: FAMILY MEDICINE

## 2022-02-08 RX ORDER — GABAPENTIN 100 MG/1
100 CAPSULE ORAL 2 TIMES DAILY
Qty: 180 CAPSULE | Refills: 1 | Status: SHIPPED | OUTPATIENT
Start: 2022-02-08 | End: 2022-07-19

## 2022-02-08 NOTE — PROGRESS NOTES
Subjective:       Patient ID: Sangeetha June is a 42 y.o. female.    Chief Complaint: Hypertension        Diabetes  She presents for her initial diabetic visit. She has type 2 diabetes mellitus. Her disease course has been stable. Associated symptoms include blurred vision. Pertinent negatives for diabetes include no chest pain, no fatigue and no foot paresthesias. Pertinent negatives for hypoglycemia complications include no blackouts and no hospitalization.     Review of Systems   Constitutional: Negative for fatigue.   Eyes: Positive for blurred vision.   Respiratory: Negative for shortness of breath.    Cardiovascular: Negative for chest pain.   Gastrointestinal: Negative for abdominal pain.       Objective:      Physical Exam  Vitals and nursing note reviewed.   Constitutional:       General: She is not in acute distress.     Appearance: Normal appearance. She is well-developed. She is not diaphoretic.      Comments: In wheelchair   HENT:      Head: Normocephalic and atraumatic.      Mouth/Throat:      Mouth: Mucous membranes are moist.      Pharynx: No oropharyngeal exudate or posterior oropharyngeal erythema.   Pulmonary:      Effort: Pulmonary effort is normal. No respiratory distress.      Breath sounds: Normal breath sounds. No wheezing.   Abdominal:      General: There is no distension.      Palpations: Abdomen is soft.      Tenderness: There is abdominal tenderness in the epigastric area.   Skin:     General: Skin is warm and dry.      Findings: No erythema or rash.   Neurological:      Mental Status: She is alert.         Assessment:       1. Type 2 diabetes mellitus with other specified complication, with long-term current use of insulin    2. Primary hypertension    3. Other hyperlipidemia    4. Abdominal pain, unspecified abdominal location    5. Hypothyroidism, unspecified type    6. Urinary incontinence, unspecified type    7. Dysphonia    8. Gastroesophageal reflux disease, unspecified  whether esophagitis present    9. Gait difficulty        Plan:     Problem List Items Addressed This Visit        ENT    Dysphonia    Relevant Orders    Ambulatory referral/consult to Speech Therapy       Cardiac/Vascular    Hyperlipidemia    Overview     Chronic, Stable, cont a statin         Hypertension    Overview     Chronic, Stable, cont hctz            Renal/    Urinary incontinence    Relevant Orders    Ambulatory referral/consult to Urology       Endocrine    Hypothyroidism    Overview     Chronic, Stable, cont synthroid         Type 2 diabetes mellitus, with long-term current use of insulin - Primary    Relevant Medications    dulaglutide (TRULICITY) 0.75 mg/0.5 mL pen injector    Other Relevant Orders    Ambulatory referral/consult to Podiatry    Microalbumin/Creatinine Ratio, Urine       GI    Abdominal pain    Relevant Orders    Ambulatory referral/consult to Gastroenterology    X-Ray Abdomen Flat And Erect    Gastroesophageal reflux disease    Relevant Orders    Ambulatory referral/consult to Gastroenterology       Other    Gait difficulty    Relevant Orders    CANE FOR HOME USE

## 2022-02-09 ENCOUNTER — OFFICE VISIT (OUTPATIENT)
Dept: ORTHOPEDICS | Facility: CLINIC | Age: 42
End: 2022-02-09
Payer: MEDICAID

## 2022-02-09 ENCOUNTER — TELEPHONE (OUTPATIENT)
Dept: DIABETES | Facility: CLINIC | Age: 42
End: 2022-02-09
Payer: MEDICAID

## 2022-02-09 ENCOUNTER — OFFICE VISIT (OUTPATIENT)
Dept: PODIATRY | Facility: CLINIC | Age: 42
End: 2022-02-09
Payer: MEDICAID

## 2022-02-09 VITALS — BODY MASS INDEX: 44.41 KG/M2 | WEIGHT: 293 LBS | HEIGHT: 68 IN

## 2022-02-09 DIAGNOSIS — E11.69 TYPE 2 DIABETES MELLITUS WITH OTHER SPECIFIED COMPLICATION, WITH LONG-TERM CURRENT USE OF INSULIN: ICD-10-CM

## 2022-02-09 DIAGNOSIS — L84 CORN OR CALLUS: ICD-10-CM

## 2022-02-09 DIAGNOSIS — B35.1 DERMATOPHYTOSIS OF NAIL: Primary | ICD-10-CM

## 2022-02-09 DIAGNOSIS — Z79.4 TYPE 2 DIABETES MELLITUS WITH OTHER SPECIFIED COMPLICATION, WITH LONG-TERM CURRENT USE OF INSULIN: ICD-10-CM

## 2022-02-09 DIAGNOSIS — M75.122 NONTRAUMATIC COMPLETE TEAR OF LEFT ROTATOR CUFF: Primary | ICD-10-CM

## 2022-02-09 PROCEDURE — 99024 POSTOP FOLLOW-UP VISIT: CPT | Mod: ,,, | Performed by: STUDENT IN AN ORGANIZED HEALTH CARE EDUCATION/TRAINING PROGRAM

## 2022-02-09 PROCEDURE — 1159F MED LIST DOCD IN RCRD: CPT | Mod: CPTII,,, | Performed by: PODIATRIST

## 2022-02-09 PROCEDURE — 1160F PR REVIEW ALL MEDS BY PRESCRIBER/CLIN PHARMACIST DOCUMENTED: ICD-10-PCS | Mod: CPTII,,, | Performed by: STUDENT IN AN ORGANIZED HEALTH CARE EDUCATION/TRAINING PROGRAM

## 2022-02-09 PROCEDURE — 1159F MED LIST DOCD IN RCRD: CPT | Mod: CPTII,,, | Performed by: STUDENT IN AN ORGANIZED HEALTH CARE EDUCATION/TRAINING PROGRAM

## 2022-02-09 PROCEDURE — 3066F PR DOCUMENTATION OF TREATMENT FOR NEPHROPATHY: ICD-10-PCS | Mod: CPTII,,, | Performed by: PODIATRIST

## 2022-02-09 PROCEDURE — 3066F NEPHROPATHY DOC TX: CPT | Mod: CPTII,,, | Performed by: PODIATRIST

## 2022-02-09 PROCEDURE — 3061F PR NEG MICROALBUMINURIA RESULT DOCUMENTED/REVIEW: ICD-10-PCS | Mod: CPTII,,, | Performed by: STUDENT IN AN ORGANIZED HEALTH CARE EDUCATION/TRAINING PROGRAM

## 2022-02-09 PROCEDURE — 3008F PR BODY MASS INDEX (BMI) DOCUMENTED: ICD-10-PCS | Mod: CPTII,,, | Performed by: PODIATRIST

## 2022-02-09 PROCEDURE — 99999 PR PBB SHADOW E&M-EST. PATIENT-LVL IV: CPT | Mod: PBBFAC,,, | Performed by: STUDENT IN AN ORGANIZED HEALTH CARE EDUCATION/TRAINING PROGRAM

## 2022-02-09 PROCEDURE — 11721 DEBRIDE NAIL 6 OR MORE: CPT | Mod: PBBFAC,PO | Performed by: PODIATRIST

## 2022-02-09 PROCEDURE — 99204 PR OFFICE/OUTPT VISIT, NEW, LEVL IV, 45-59 MIN: ICD-10-PCS | Mod: 25,S$PBB,, | Performed by: PODIATRIST

## 2022-02-09 PROCEDURE — 1159F PR MEDICATION LIST DOCUMENTED IN MEDICAL RECORD: ICD-10-PCS | Mod: CPTII,,, | Performed by: PODIATRIST

## 2022-02-09 PROCEDURE — 3061F NEG MICROALBUMINURIA REV: CPT | Mod: CPTII,,, | Performed by: STUDENT IN AN ORGANIZED HEALTH CARE EDUCATION/TRAINING PROGRAM

## 2022-02-09 PROCEDURE — 11721 DEBRIDE NAIL 6 OR MORE: CPT | Mod: S$PBB,,, | Performed by: PODIATRIST

## 2022-02-09 PROCEDURE — 3008F BODY MASS INDEX DOCD: CPT | Mod: CPTII,,, | Performed by: STUDENT IN AN ORGANIZED HEALTH CARE EDUCATION/TRAINING PROGRAM

## 2022-02-09 PROCEDURE — 3044F PR MOST RECENT HEMOGLOBIN A1C LEVEL <7.0%: ICD-10-PCS | Mod: CPTII,,, | Performed by: PODIATRIST

## 2022-02-09 PROCEDURE — 99214 OFFICE O/P EST MOD 30 MIN: CPT | Mod: PBBFAC,PO | Performed by: PODIATRIST

## 2022-02-09 PROCEDURE — 1160F RVW MEDS BY RX/DR IN RCRD: CPT | Mod: CPTII,,, | Performed by: STUDENT IN AN ORGANIZED HEALTH CARE EDUCATION/TRAINING PROGRAM

## 2022-02-09 PROCEDURE — 3061F PR NEG MICROALBUMINURIA RESULT DOCUMENTED/REVIEW: ICD-10-PCS | Mod: CPTII,,, | Performed by: PODIATRIST

## 2022-02-09 PROCEDURE — 11721 PR DEBRIDEMENT OF NAILS, 6 OR MORE: ICD-10-PCS | Mod: S$PBB,,, | Performed by: PODIATRIST

## 2022-02-09 PROCEDURE — 3072F PR LOW RISK FOR RETINOPATHY: ICD-10-PCS | Mod: CPTII,,, | Performed by: STUDENT IN AN ORGANIZED HEALTH CARE EDUCATION/TRAINING PROGRAM

## 2022-02-09 PROCEDURE — 99999 PR PBB SHADOW E&M-EST. PATIENT-LVL IV: ICD-10-PCS | Mod: PBBFAC,,, | Performed by: PODIATRIST

## 2022-02-09 PROCEDURE — 99999 PR PBB SHADOW E&M-EST. PATIENT-LVL IV: ICD-10-PCS | Mod: PBBFAC,,, | Performed by: STUDENT IN AN ORGANIZED HEALTH CARE EDUCATION/TRAINING PROGRAM

## 2022-02-09 PROCEDURE — 3072F PR LOW RISK FOR RETINOPATHY: ICD-10-PCS | Mod: CPTII,,, | Performed by: PODIATRIST

## 2022-02-09 PROCEDURE — 3072F LOW RISK FOR RETINOPATHY: CPT | Mod: CPTII,,, | Performed by: PODIATRIST

## 2022-02-09 PROCEDURE — 1159F PR MEDICATION LIST DOCUMENTED IN MEDICAL RECORD: ICD-10-PCS | Mod: CPTII,,, | Performed by: STUDENT IN AN ORGANIZED HEALTH CARE EDUCATION/TRAINING PROGRAM

## 2022-02-09 PROCEDURE — 3008F PR BODY MASS INDEX (BMI) DOCUMENTED: ICD-10-PCS | Mod: CPTII,,, | Performed by: STUDENT IN AN ORGANIZED HEALTH CARE EDUCATION/TRAINING PROGRAM

## 2022-02-09 PROCEDURE — 3044F PR MOST RECENT HEMOGLOBIN A1C LEVEL <7.0%: ICD-10-PCS | Mod: CPTII,,, | Performed by: STUDENT IN AN ORGANIZED HEALTH CARE EDUCATION/TRAINING PROGRAM

## 2022-02-09 PROCEDURE — 99999 PR PBB SHADOW E&M-EST. PATIENT-LVL IV: CPT | Mod: PBBFAC,,, | Performed by: PODIATRIST

## 2022-02-09 PROCEDURE — 3072F LOW RISK FOR RETINOPATHY: CPT | Mod: CPTII,,, | Performed by: STUDENT IN AN ORGANIZED HEALTH CARE EDUCATION/TRAINING PROGRAM

## 2022-02-09 PROCEDURE — 3066F NEPHROPATHY DOC TX: CPT | Mod: CPTII,,, | Performed by: STUDENT IN AN ORGANIZED HEALTH CARE EDUCATION/TRAINING PROGRAM

## 2022-02-09 PROCEDURE — 99204 OFFICE O/P NEW MOD 45 MIN: CPT | Mod: 25,S$PBB,, | Performed by: PODIATRIST

## 2022-02-09 PROCEDURE — 3066F PR DOCUMENTATION OF TREATMENT FOR NEPHROPATHY: ICD-10-PCS | Mod: CPTII,,, | Performed by: STUDENT IN AN ORGANIZED HEALTH CARE EDUCATION/TRAINING PROGRAM

## 2022-02-09 PROCEDURE — 3008F BODY MASS INDEX DOCD: CPT | Mod: CPTII,,, | Performed by: PODIATRIST

## 2022-02-09 PROCEDURE — 99214 OFFICE O/P EST MOD 30 MIN: CPT | Mod: PBBFAC,25,PO | Performed by: STUDENT IN AN ORGANIZED HEALTH CARE EDUCATION/TRAINING PROGRAM

## 2022-02-09 PROCEDURE — 3044F HG A1C LEVEL LT 7.0%: CPT | Mod: CPTII,,, | Performed by: PODIATRIST

## 2022-02-09 PROCEDURE — 3061F NEG MICROALBUMINURIA REV: CPT | Mod: CPTII,,, | Performed by: PODIATRIST

## 2022-02-09 PROCEDURE — 99024 PR POST-OP FOLLOW-UP VISIT: ICD-10-PCS | Mod: ,,, | Performed by: STUDENT IN AN ORGANIZED HEALTH CARE EDUCATION/TRAINING PROGRAM

## 2022-02-09 PROCEDURE — 3044F HG A1C LEVEL LT 7.0%: CPT | Mod: CPTII,,, | Performed by: STUDENT IN AN ORGANIZED HEALTH CARE EDUCATION/TRAINING PROGRAM

## 2022-02-09 RX ORDER — AMMONIUM LACTATE 12 G/100G
1 CREAM TOPICAL 2 TIMES DAILY
Qty: 140 G | Refills: 2 | Status: SHIPPED | OUTPATIENT
Start: 2022-02-09 | End: 2022-07-25 | Stop reason: SDUPTHER

## 2022-02-09 NOTE — PROGRESS NOTES
Orthopaedics  Post-operative follow-up    Procedure Performed:   1. Left shoulder arthroscopic rotator cuff repair  2. Left shoulder arthroscopic biceps tenodesis  3. Left shoulder subacromial decompression    Date of Surgery: 01/27/2022    Subjective: Sangeetha June is now almost 2 weeks out from her shoulder surgery.  She is doing well with no specific complaints other than the expected post-operative pain and stiffness.  She has been compliant with post-operative restrictions and is ready to start PT.  she reports that she is already feeling better than she did preop.      Exam:  Sutures removed, incision sites benign with no drainage or redness  Mild bruising as anticipated  ROM fluid, will be formally assessed at next visit  Axillary nerve sensation and motor intact  Motor and sensory intact distally  Strong radial pulse, fingers warm and well perfused    Imaging:  No new imaging studies    Impression:  Left shoulder RCR, SAD, biceps tenodesis , initial post-operative visit - doing well    Plan:  Discussed surgical findings, operative procedure  Reviewed post-operative instructions, restrictions, and rehabilitation  Provided PT script and protocol - referral placed for [facility]  Symptomatic treatment for pain / swelling  Instructed patient to call clinic if questions or concerns      Follow-up in 1 month at 6 weeks post-op          Francisco Mathews MD

## 2022-02-09 NOTE — PROGRESS NOTES
PODIATRIC MEDICINE AND SURGERY     CHIEF COMPLAINT  Chief Complaint   Patient presents with    Diabetic Foot Exam     Diabetic routine foot care, 0 pain in feet, diabetic, wears casual shoes, last seen Dr. Garcia on 02/08/22         HPI    SUBJECTIVE: Sangeetha June is a 42 y.o. female who  has a past medical history of Anxiety, Breast cancer, Chest pain, Chest pain (7/2/2021), Cholecystitis without calculus, Dizziness, Elevated C-reactive protein (CRP), Essential hypertension, Memory change, EVERARDO (obstructive sleep apnea), Osteoarthritis, Other specified disorders of thyroid, Pre-diabetes (4/15/2021), Screening mammogram, encounter for (4/15/2021), Shoulder injury, SOB (shortness of breath), Thyroiditis, and Vision disturbance (4/30/2021). Sangeethapresents to clinic for high risk diabetic foot exam and care.  Sangeetha admits numbness, burning, and/or tingling sensations in their feet. Patient admits to painful toenails aggravated by increased weight bearing, shoe gear, and pressure. States pain is relieved with routine debridements. Patient has no other pedal complaints at this time.    This patient has documented high risk feet requiring routine maintenance secondary to diabetes mellitis and those secondary complications of diabetes, as mentioned.      HgA1c:   Hemoglobin A1C   Date Value Ref Range Status   01/07/2022 6.5 (H) 4.0 - 5.6 % Final     Comment:     ADA Screening Guidelines:  5.7-6.4%  Consistent with prediabetes  >or=6.5%  Consistent with diabetes    High levels of fetal hemoglobin interfere with the HbA1C  assay. Heterozygous hemoglobin variants (HbS, HgC, etc)do  not significantly interfere with this assay.   However, presence of multiple variants may affect accuracy.     07/02/2021 6.3 (H) 4.0 - 5.6 % Final     Comment:     ADA Screening Guidelines:  5.7-6.4%  Consistent with prediabetes  >or=6.5%  Consistent with diabetes    High levels of fetal hemoglobin interfere with the HbA1C  assay.  Heterozygous hemoglobin variants (HbS, HgC, etc)do  not significantly interfere with this assay.   However, presence of multiple variants may affect accuracy.     04/30/2021 5.5 4.0 - 5.6 % Final     Comment:     ADA Screening Guidelines:  5.7-6.4%  Consistent with prediabetes  >or=6.5%  Consistent with diabetes    High levels of fetal hemoglobin interfere with the HbA1C  assay. Heterozygous hemoglobin variants (HbS, HgC, etc)do  not significantly interfere with this assay.   However, presence of multiple variants may affect accuracy.           PMH  Past Medical History:   Diagnosis Date    Anxiety     Breast cancer     Chest pain     Chest pain 7/2/2021    Cholecystitis without calculus     Dizziness     Elevated C-reactive protein (CRP)     Essential hypertension     Memory change     EVERARDO (obstructive sleep apnea)     Osteoarthritis     Other specified disorders of thyroid     Pre-diabetes 4/15/2021    Chronic, Stable, cont metformin    Screening mammogram, encounter for 4/15/2021    Shoulder injury     SOB (shortness of breath)     Thyroiditis     Vision disturbance 4/30/2021     Patient Active Problem List   Diagnosis    Delayed immunizations    S/P mastectomy    Chemotherapy adverse reaction    History of breast cancer    Left rotator cuff tear arthropathy    ANGIE (generalized anxiety disorder)    Urinary incontinence    Hyperlipidemia    Other insomnia    AGUILAR (dyspnea on exertion)    S/P hysterectomy    Postmenopausal    Obesity, morbid, BMI 40.0-49.9    Malignant neoplasm of left breast in female, estrogen receptor negative    Meningioma    Murmur    Hypertension    Hypothyroidism    Microcytic anemia    Fall    Strain of lumbar region    Gait difficulty    Imbalance    Difficulty walking    Lumbago    Abdominal pain    Tingling of left upper extremity    Gastroesophageal reflux disease    SOB (shortness of breath)    Nontraumatic incomplete tear of left rotator  cuff    Thyroid nodule    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 Rescue 18 g 1    ascorbic acid, vitamin C, (VITAMIN C) 100 MG tablet Take 100 mg by mouth once daily.      aspirin (ECOTRIN) 81 MG EC tablet Take 1 tablet (81 mg total) by mouth once daily. 30 tablet 0    aspirin (ECOTRIN) 81 MG EC tablet Take 1 tablet (81 mg total) by mouth 2 (two) times a day. for 14 days 28 tablet 0    budesonide-formoterol 80-4.5 mcg (SYMBICORT) 80-4.5 mcg/actuation HFAA Inhale 2 puffs into the lungs 2 (two) times a day. Controller 10.2 g 3    busPIRone (BUSPAR) 15 MG tablet Take 1 tablet (15 mg total) by mouth 3 (three) times daily. 270 tablet 3    celecoxib (CELEBREX) 200 MG capsule Take 1 capsule (200 mg total) by mouth 2 (two) times daily. 28 capsule 0    dulaglutide (TRULICITY) 0.75 mg/0.5 mL pen injector Inject 0.75 mg into the skin every 7 days for 30 days, THEN 1.5 mg every 7 days. 20 pen 0    EScitalopram oxalate (LEXAPRO) 20 MG tablet Take 1 tablet (20 mg total) by mouth once daily. 90 tablet 3    EUTHYROX 50 mcg tablet Take 1 tablet (50 mcg total) by mouth every morning. 90 tablet 1    gabapentin (NEURONTIN) 100 MG capsule Take 1 capsule (100 mg total) by mouth 2 (two) times daily. 180 capsule 1    hydroCHLOROthiazide (HYDRODIURIL) 25 MG tablet Take 1 tablet (25 mg total) by mouth daily as needed (edema, swelling). 90 tablet 3    hydrocortisone 0.5 % cream Apply topically 2 (two) times daily.      linaCLOtide (LINZESS) 72 mcg Cap capsule Take 1 capsule (72 mcg total) by mouth once daily. 30 capsule 5    magnesium oxide (MAG-OX) 400 mg (241.3 mg magnesium) tablet Take  1 tablet (400 mg total) by mouth once daily. 90 tablet 1    meloxicam (MOBIC) 15 MG tablet Take 1 tablet (15 mg total) by mouth once daily. 30 tablet 0    metFORMIN (GLUCOPHAGE-XR) 500 MG ER 24hr tablet SMARTSI Tablet(s) By Mouth Every Evening 90 tablet 3    multivit with min-folic acid (ADULT MULTIVITAMIN GUMMIES) 200 mcg Chew Take by mouth.      olopatadine (PATANOL) 0.1 % ophthalmic solution       pantoprazole (PROTONIX) 40 MG tablet Take 1 tablet (40 mg total) by mouth once daily. 30 tablet 11    pravastatin (PRAVACHOL) 40 MG tablet Take 1 tablet (40 mg total) by mouth every evening. 90 tablet 3    traMADoL (ULTRAM) 50 mg tablet Take 1 tablet (50 mg total) by mouth every 4 to 6 hours as needed for Pain. 36 tablet 0    traZODone (DESYREL) 150 MG tablet Take 1 tablet (150 mg total) by mouth every evening. 90 tablet 3    oxybutynin (DITROPAN-XL) 5 MG TR24 Take 1 tablet (5 mg total) by mouth once daily. 90 tablet 3     Current Facility-Administered Medications on File Prior to Visit   Medication Dose Route Frequency Provider Last Rate Last Admin    lactated ringers infusion   Intravenous Continuous Maty Thompson MD 10 mL/hr at 22 0636 Restarted at 22 [phone]       [facility]     Past Surgical History:   Procedure Laterality Date    ARTHROSCOPIC REPAIR OF ROTATOR CUFF OF SHOULDER Left 2022    Procedure: REPAIR, ROTATOR CUFF, ARTHROSCOPIC;  Surgeon: Francisco Mathews MD;  Location: [facility] OR;  Service: Orthopedics;  Laterality: Left;    ARTHROSCOPY OF SHOULDER WITH DECOMPRESSION OF SUBACROMIAL SPACE Left 2022    Procedure: ARTHROSCOPY, SHOULDER, WITH SUBACROMIAL SPACE DECOMPRESSION;  Surgeon: Francisco Mathews MD;  Location: [facility] OR;  Service: Orthopedics;  Laterality: Left;    BREAST BIOPSY      FIXATION OF TENDON Left 2022    Procedure: FIXATION, TENDON;  Surgeon: Francisco Mathews MD;  Location: [facility] OR;  Service: Orthopedics;  Laterality: Left;     "HYSTERECTOMY      INJECTION OF JOINT Left 11/12/2021    Procedure: Left suprascapular and axillary injection with local;  Surgeon: Bisi Cochran MD;  Location: [facility];  Service: Pain Management;  Laterality: Left;    MASTECTOMY          ALL  Review of patient's allergies indicates:   Allergen Reactions    Lisinopril        SOC     Social History     Tobacco Use    Smoking status: Never Smoker    Smokeless tobacco: Never Used   Substance Use Topics    Alcohol use: Not Currently    Drug use: Yes     Types: Marijuana         Family HX    Family History   Problem Relation Age of Onset    Cancer Mother     Stroke Mother     Heart disease Mother     Stroke Sister     Diabetes Sister     Diabetes Brother     No Known Problems Maternal Grandmother     Cancer Maternal Grandfather     Alcohol abuse Maternal Grandfather     No Known Problems Paternal Grandmother     No Known Problems Paternal Grandfather             REVIEW OF SYSTEMS  General: Denies any fever or chills  Chest: Denies shortness of breath, wheezing, coughing, or sputum production  Heart: Denies chest pain.  As noted above and per history of current illness above, otherwise negative in the remainder of the 14 systems.     PHYSICAL EXAM  Vitals:    02/09/22 1012   Weight: (!) 137.4 kg (303 lb)   Height: 5' 8" (1.727 m)   PainSc: 0-No pain       GEN:  This patient is well-developed, well-nourished and appears stated age, well-oriented to person, place and time, and cooperative and pleasant on today's visit.      LOWER EXTREMITY    VASCULAR  DP pedal pulse 2/4 RIGHT, LEFT2/4   PT pedal pulse 2/4 RIGHT, LEFT2/4  Capillary refill time immediate to the toes.   Feet are warm to the touch. Skin temperature warm to warm from proximally to distally   There are no varicosities, telangiectasias noted to bilateral foot and ankle regions.   There are no ecchymoses noted to bilateral foot and ankle regions.   There is mild left gross lower extremity " edema.    DERMATOLOGIC  Skin moist with healthy texture and turgor.  Thickened, dystrophic, elongated, discolored toenails with subungal debris 1-5 b/l   There are no open ulcerations, lacerations, or fissures to bilateral foot and ankle regions. There are no signs of infection as there is no erythema, no proximal-extending lymphangiitis, no fluctuance, or crepitus noted on palpation to bilateral foot and ankle regions.   There is no interdigital maceration.   There are diffuse plantar hyperkeratotic lesions noted to feet.    NEUROLOGIC  Protective sensation intact at 10/10 sites upon examination with [place] Weinsten 5.07 g monofilament.  Propioception intact at 1st MTPJ b/l.  Achilles and patellar deep tendon reflexes intact  Babinski reflex absent    ORTHOPEDIC/BIOMECHANICAL  No symptomatic structural abnormalities noted. Muscle strength is 5/5 for foot inverters, everters, plantarflexors, and dorsiflexors. Muscle tone is normal.  I  nspection/palpation of bone, joints and muscles unremarkable.    ASSESSMENT  Dermatophytosis of nail    Type 2 diabetes mellitus with other specified complication, with long-term current use of insulin  -     Ambulatory referral/consult to Podiatry  -     Ambulatory referral/consult to Diabetes Education; Future; Expected date: 02/16/2022    Corn or callus    Other orders  -     ammonium lactate 12 % Crea; Apply 1 Act topically 2 (two) times daily. To feet.  Dispense: 140 g; Refill: 2        PLAN  -The patient was examined and evaulated  -Discuss presenting problems, etiology, pathologic processes and management options with patient today.   -I counseled the patient on their conditions, their implications and medical management. An in depth discussion on diabetic management, risk prevention, amputation prevention verbally and provided educational literature in written format.  -With patient's permission, the elongated onychomycotic toenails, as outlined in the physical examination,  were sharply debrided/trimmed with a double action nail nipper to their soft tissue attachment. If indicated, the nails were then smoothed down in thickness with an beatriz board to facilitate in further debridement removing all offending nail and subungual debris. Patient relates relief following the procedure.   -Rx amlactin   - Shoe inspection. Diabetic Foot Education. Patient reminded of the importance of good nutrition and blood sugar control to help prevent podiatric complications of diabetes. Patient instructed on proper foot hygeine. We discussed wearing proper shoe gear, daily foot inspections, never walking without protective shoe gear, never putting sharp instruments to feet, routine podiatric nail visits every 3-4 months.        Disclaimer: This note was partially prepared using a voice recognition system and is likely to have sound alike errors within the text.        Future Appointments   Date Time Provider Department Center   2/9/2022 11:20 AM Francisco Mathews MD [facility] ORTHO [place]   2/14/2022 10:20 AM Asim Johnston MD [facility] CARDIO [place]   2/15/2022  9:00 AM [facility] [place]   2/15/2022 10:00 AM Kaleb Rader LCSW [facility] [place]   2/15/2022 11:00 AM LABORATORY, [facility] [place]   2/18/2022  9:00 AM Jamar Martin MD [facility] HEMONC [place]   3/1/2022  1:00 PM Kaleb Rader LCSW [facility] [place]   3/3/2022 11:00 AM Kody Andujar MD [facility] ENDODI[facility]EL[facility]   3/16/2022 10:00 AM Evy Cid MD [facility] PULMSVC [place]   4/8/2022 10:00 AM Sam Garcia MD [facility] IM [place]   4/11/2022  1:20 PM Asim Johnston MD [facility] CARDIO [place]   4/26/2022  1:40 PM Katey Portillo PA-C [facility] GASTRO [place]       Report Electronically Signed By:     Tiny Lyn DPM   Podiatry  [facility]  2/9/2022

## 2022-02-12 ENCOUNTER — PATIENT MESSAGE (OUTPATIENT)
Dept: ORTHOPEDICS | Facility: CLINIC | Age: 42
End: 2022-02-12
Payer: MEDICAID

## 2022-02-14 ENCOUNTER — OFFICE VISIT (OUTPATIENT)
Dept: CARDIOLOGY | Facility: CLINIC | Age: 42
End: 2022-02-14
Payer: MEDICAID

## 2022-02-14 VITALS
OXYGEN SATURATION: 99 % | SYSTOLIC BLOOD PRESSURE: 128 MMHG | BODY MASS INDEX: 44.41 KG/M2 | DIASTOLIC BLOOD PRESSURE: 88 MMHG | RESPIRATION RATE: 16 BRPM | HEART RATE: 61 BPM | HEIGHT: 68 IN | WEIGHT: 293 LBS

## 2022-02-14 DIAGNOSIS — R07.9 CHEST PAIN, UNSPECIFIED TYPE: ICD-10-CM

## 2022-02-14 DIAGNOSIS — E78.49 OTHER HYPERLIPIDEMIA: ICD-10-CM

## 2022-02-14 DIAGNOSIS — R01.1 MURMUR: ICD-10-CM

## 2022-02-14 DIAGNOSIS — Z85.3 HISTORY OF BREAST CANCER: ICD-10-CM

## 2022-02-14 DIAGNOSIS — R06.09 DOE (DYSPNEA ON EXERTION): Primary | ICD-10-CM

## 2022-02-14 DIAGNOSIS — F41.1 GAD (GENERALIZED ANXIETY DISORDER): ICD-10-CM

## 2022-02-14 DIAGNOSIS — I10 PRIMARY HYPERTENSION: ICD-10-CM

## 2022-02-14 DIAGNOSIS — G47.30 SLEEP APNEA, UNSPECIFIED TYPE: ICD-10-CM

## 2022-02-14 DIAGNOSIS — R06.02 SOB (SHORTNESS OF BREATH): ICD-10-CM

## 2022-02-14 DIAGNOSIS — R60.0 LOCALIZED EDEMA: ICD-10-CM

## 2022-02-14 DIAGNOSIS — R00.2 PALPITATIONS: ICD-10-CM

## 2022-02-14 DIAGNOSIS — E66.01 OBESITY, MORBID, BMI 40.0-49.9: ICD-10-CM

## 2022-02-14 PROCEDURE — 3044F PR MOST RECENT HEMOGLOBIN A1C LEVEL <7.0%: ICD-10-PCS | Mod: CPTII,,, | Performed by: INTERNAL MEDICINE

## 2022-02-14 PROCEDURE — 3074F SYST BP LT 130 MM HG: CPT | Mod: CPTII,,, | Performed by: INTERNAL MEDICINE

## 2022-02-14 PROCEDURE — 3044F HG A1C LEVEL LT 7.0%: CPT | Mod: CPTII,,, | Performed by: INTERNAL MEDICINE

## 2022-02-14 PROCEDURE — 3079F DIAST BP 80-89 MM HG: CPT | Mod: CPTII,,, | Performed by: INTERNAL MEDICINE

## 2022-02-14 PROCEDURE — 1159F MED LIST DOCD IN RCRD: CPT | Mod: CPTII,,, | Performed by: INTERNAL MEDICINE

## 2022-02-14 PROCEDURE — 99999 PR PBB SHADOW E&M-EST. PATIENT-LVL IV: ICD-10-PCS | Mod: PBBFAC,,, | Performed by: INTERNAL MEDICINE

## 2022-02-14 PROCEDURE — 99214 PR OFFICE/OUTPT VISIT, EST, LEVL IV, 30-39 MIN: ICD-10-PCS | Mod: S$PBB,,, | Performed by: INTERNAL MEDICINE

## 2022-02-14 PROCEDURE — 3008F BODY MASS INDEX DOCD: CPT | Mod: CPTII,,, | Performed by: INTERNAL MEDICINE

## 2022-02-14 PROCEDURE — 3074F PR MOST RECENT SYSTOLIC BLOOD PRESSURE < 130 MM HG: ICD-10-PCS | Mod: CPTII,,, | Performed by: INTERNAL MEDICINE

## 2022-02-14 PROCEDURE — 1160F RVW MEDS BY RX/DR IN RCRD: CPT | Mod: CPTII,,, | Performed by: INTERNAL MEDICINE

## 2022-02-14 PROCEDURE — 3061F NEG MICROALBUMINURIA REV: CPT | Mod: CPTII,,, | Performed by: INTERNAL MEDICINE

## 2022-02-14 PROCEDURE — 3066F NEPHROPATHY DOC TX: CPT | Mod: CPTII,,, | Performed by: INTERNAL MEDICINE

## 2022-02-14 PROCEDURE — 3072F LOW RISK FOR RETINOPATHY: CPT | Mod: CPTII,,, | Performed by: INTERNAL MEDICINE

## 2022-02-14 PROCEDURE — 3008F PR BODY MASS INDEX (BMI) DOCUMENTED: ICD-10-PCS | Mod: CPTII,,, | Performed by: INTERNAL MEDICINE

## 2022-02-14 PROCEDURE — 99214 OFFICE O/P EST MOD 30 MIN: CPT | Mod: S$PBB,,, | Performed by: INTERNAL MEDICINE

## 2022-02-14 PROCEDURE — 3072F PR LOW RISK FOR RETINOPATHY: ICD-10-PCS | Mod: CPTII,,, | Performed by: INTERNAL MEDICINE

## 2022-02-14 PROCEDURE — 3066F PR DOCUMENTATION OF TREATMENT FOR NEPHROPATHY: ICD-10-PCS | Mod: CPTII,,, | Performed by: INTERNAL MEDICINE

## 2022-02-14 PROCEDURE — 3079F PR MOST RECENT DIASTOLIC BLOOD PRESSURE 80-89 MM HG: ICD-10-PCS | Mod: CPTII,,, | Performed by: INTERNAL MEDICINE

## 2022-02-14 PROCEDURE — 1159F PR MEDICATION LIST DOCUMENTED IN MEDICAL RECORD: ICD-10-PCS | Mod: CPTII,,, | Performed by: INTERNAL MEDICINE

## 2022-02-14 PROCEDURE — 99999 PR PBB SHADOW E&M-EST. PATIENT-LVL IV: CPT | Mod: PBBFAC,,, | Performed by: INTERNAL MEDICINE

## 2022-02-14 PROCEDURE — 1160F PR REVIEW ALL MEDS BY PRESCRIBER/CLIN PHARMACIST DOCUMENTED: ICD-10-PCS | Mod: CPTII,,, | Performed by: INTERNAL MEDICINE

## 2022-02-14 PROCEDURE — 99214 OFFICE O/P EST MOD 30 MIN: CPT | Mod: PBBFAC,PO | Performed by: INTERNAL MEDICINE

## 2022-02-14 PROCEDURE — 3061F PR NEG MICROALBUMINURIA RESULT DOCUMENTED/REVIEW: ICD-10-PCS | Mod: CPTII,,, | Performed by: INTERNAL MEDICINE

## 2022-02-14 RX ORDER — TRAMADOL HYDROCHLORIDE 50 MG/1
50 TABLET ORAL EVERY 6 HOURS PRN
Qty: 30 TABLET | Refills: 0 | Status: ON HOLD | OUTPATIENT
Start: 2022-02-14 | End: 2022-05-31 | Stop reason: CLARIF
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
## 2022-02-15 ENCOUNTER — OFFICE VISIT (OUTPATIENT)
Dept: PSYCHIATRY | Facility: CLINIC | Age: 42
End: 2022-02-15
Payer: MEDICAID

## 2022-02-15 ENCOUNTER — LAB VISIT (OUTPATIENT)
Dept: LAB | Facility: HOSPITAL | Age: 42
End: 2022-02-15
Attending: INTERNAL MEDICINE
Payer: MEDICAID

## 2022-02-15 ENCOUNTER — CLINICAL SUPPORT (OUTPATIENT)
Dept: REHABILITATION | Facility: HOSPITAL | Age: 42
End: 2022-02-15
Attending: STUDENT IN AN ORGANIZED HEALTH CARE EDUCATION/TRAINING PROGRAM
Payer: MEDICAID

## 2022-02-15 ENCOUNTER — CLINICAL SUPPORT (OUTPATIENT)
Dept: SPEECH THERAPY | Facility: HOSPITAL | Age: 42
End: 2022-02-15
Attending: FAMILY MEDICINE
Payer: MEDICAID

## 2022-02-15 DIAGNOSIS — F33.3 SEVERE EPISODE OF RECURRENT MAJOR DEPRESSIVE DISORDER, WITH PSYCHOTIC FEATURES: Primary | ICD-10-CM

## 2022-02-15 DIAGNOSIS — R41.3 MEMORY CHANGE: ICD-10-CM

## 2022-02-15 DIAGNOSIS — D50.9 MICROCYTIC ANEMIA: ICD-10-CM

## 2022-02-15 DIAGNOSIS — R29.898 WEAKNESS OF SHOULDER: ICD-10-CM

## 2022-02-15 DIAGNOSIS — R49.0 DYSPHONIA: Primary | ICD-10-CM

## 2022-02-15 DIAGNOSIS — F41.1 GAD (GENERALIZED ANXIETY DISORDER): ICD-10-CM

## 2022-02-15 DIAGNOSIS — F41.1 GENERALIZED ANXIETY DISORDER: Primary | ICD-10-CM

## 2022-02-15 DIAGNOSIS — M25.612 DECREASED ROM OF LEFT SHOULDER: ICD-10-CM

## 2022-02-15 DIAGNOSIS — M75.122 NONTRAUMATIC COMPLETE TEAR OF LEFT ROTATOR CUFF: ICD-10-CM

## 2022-02-15 LAB
ALBUMIN SERPL BCP-MCNC: 3.3 G/DL (ref 3.5–5.2)
ALP SERPL-CCNC: 137 U/L (ref 55–135)
ALT SERPL W/O P-5'-P-CCNC: 15 U/L (ref 10–44)
ANION GAP SERPL CALC-SCNC: 7 MMOL/L (ref 8–16)
AST SERPL-CCNC: 14 U/L (ref 10–40)
BASOPHILS # BLD AUTO: 0.03 K/UL (ref 0–0.2)
BASOPHILS NFR BLD: 0.7 % (ref 0–1.9)
BILIRUB SERPL-MCNC: 0.2 MG/DL (ref 0.1–1)
BUN SERPL-MCNC: 6 MG/DL (ref 6–20)
CALCIUM SERPL-MCNC: 9.1 MG/DL (ref 8.7–10.5)
CHLORIDE SERPL-SCNC: 106 MMOL/L (ref 95–110)
CO2 SERPL-SCNC: 27 MMOL/L (ref 23–29)
CREAT SERPL-MCNC: 0.8 MG/DL (ref 0.5–1.4)
DIFFERENTIAL METHOD: ABNORMAL
EOSINOPHIL # BLD AUTO: 0.1 K/UL (ref 0–0.5)
EOSINOPHIL NFR BLD: 3.1 % (ref 0–8)
ERYTHROCYTE [DISTWIDTH] IN BLOOD BY AUTOMATED COUNT: 16.9 % (ref 11.5–14.5)
EST. GFR  (AFRICAN AMERICAN): >60 ML/MIN/1.73 M^2
EST. GFR  (NON AFRICAN AMERICAN): >60 ML/MIN/1.73 M^2
FERRITIN SERPL-MCNC: 18 NG/ML (ref 20–300)
GLUCOSE SERPL-MCNC: 85 MG/DL (ref 70–110)
HCT VFR BLD AUTO: 38 % (ref 37–48.5)
HGB BLD-MCNC: 11.7 G/DL (ref 12–16)
IMM GRANULOCYTES # BLD AUTO: 0.01 K/UL (ref 0–0.04)
IMM GRANULOCYTES NFR BLD AUTO: 0.2 % (ref 0–0.5)
IRON SERPL-MCNC: 75 UG/DL (ref 30–160)
LYMPHOCYTES # BLD AUTO: 1.7 K/UL (ref 1–4.8)
LYMPHOCYTES NFR BLD: 40.2 % (ref 18–48)
MCH RBC QN AUTO: 24.3 PG (ref 27–31)
MCHC RBC AUTO-ENTMCNC: 30.8 G/DL (ref 32–36)
MCV RBC AUTO: 79 FL (ref 82–98)
MONOCYTES # BLD AUTO: 0.3 K/UL (ref 0.3–1)
MONOCYTES NFR BLD: 7.8 % (ref 4–15)
NEUTROPHILS # BLD AUTO: 2 K/UL (ref 1.8–7.7)
NEUTROPHILS NFR BLD: 48 % (ref 38–73)
NRBC BLD-RTO: 0 /100 WBC
PLATELET # BLD AUTO: 309 K/UL (ref 150–450)
PMV BLD AUTO: 9 FL (ref 9.2–12.9)
POTASSIUM SERPL-SCNC: 4.5 MMOL/L (ref 3.5–5.1)
PROT SERPL-MCNC: 6.9 G/DL (ref 6–8.4)
RBC # BLD AUTO: 4.81 M/UL (ref 4–5.4)
SATURATED IRON: 15 % (ref 20–50)
SODIUM SERPL-SCNC: 140 MMOL/L (ref 136–145)
TOTAL IRON BINDING CAPACITY: 506 UG/DL (ref 250–450)
TRANSFERRIN SERPL-MCNC: 342 MG/DL (ref 200–375)
WBC # BLD AUTO: 4.25 K/UL (ref 3.9–12.7)

## 2022-02-15 PROCEDURE — 99999 PR PBB SHADOW E&M-EST. PATIENT-LVL I: CPT | Mod: PBBFAC,HB,, | Performed by: PSYCHOLOGIST

## 2022-02-15 PROCEDURE — 92507 TX SP LANG VOICE COMM INDIV: CPT | Performed by: SPEECH-LANGUAGE PATHOLOGIST

## 2022-02-15 PROCEDURE — 3061F NEG MICROALBUMINURIA REV: CPT | Mod: HP,HB,CPTII, | Performed by: PSYCHOLOGIST

## 2022-02-15 PROCEDURE — 97161 PT EVAL LOW COMPLEX 20 MIN: CPT

## 2022-02-15 PROCEDURE — 3066F NEPHROPATHY DOC TX: CPT | Mod: HP,HB,CPTII, | Performed by: PSYCHOLOGIST

## 2022-02-15 PROCEDURE — 99999 PR PBB SHADOW E&M-EST. PATIENT-LVL I: ICD-10-PCS | Mod: PBBFAC,HB,, | Performed by: PSYCHOLOGIST

## 2022-02-15 PROCEDURE — 3044F HG A1C LEVEL LT 7.0%: CPT | Mod: AJ,HB,CPTII,S$PBB | Performed by: SOCIAL WORKER

## 2022-02-15 PROCEDURE — 82728 ASSAY OF FERRITIN: CPT | Performed by: INTERNAL MEDICINE

## 2022-02-15 PROCEDURE — 97110 THERAPEUTIC EXERCISES: CPT

## 2022-02-15 PROCEDURE — 99417 PR PROLONGED SVC, OUTPT, W/WO DIRECT PT CONTACT,  EA ADDTL 15 MIN: ICD-10-PCS | Mod: HP,HB,S$PBB, | Performed by: PSYCHOLOGIST

## 2022-02-15 PROCEDURE — 99215 PR OFFICE/OUTPT VISIT, EST, LEVL V, 40-54 MIN: ICD-10-PCS | Mod: HP,HB,S$PBB, | Performed by: PSYCHOLOGIST

## 2022-02-15 PROCEDURE — 3061F PR NEG MICROALBUMINURIA RESULT DOCUMENTED/REVIEW: ICD-10-PCS | Mod: AJ,HB,CPTII,S$PBB | Performed by: SOCIAL WORKER

## 2022-02-15 PROCEDURE — 3044F PR MOST RECENT HEMOGLOBIN A1C LEVEL <7.0%: ICD-10-PCS | Mod: AJ,HB,CPTII,S$PBB | Performed by: SOCIAL WORKER

## 2022-02-15 PROCEDURE — 3072F LOW RISK FOR RETINOPATHY: CPT | Mod: HP,HB,CPTII, | Performed by: PSYCHOLOGIST

## 2022-02-15 PROCEDURE — 99211 OFF/OP EST MAY X REQ PHY/QHP: CPT | Mod: PBBFAC | Performed by: PSYCHOLOGIST

## 2022-02-15 PROCEDURE — 3061F PR NEG MICROALBUMINURIA RESULT DOCUMENTED/REVIEW: ICD-10-PCS | Mod: HP,HB,CPTII, | Performed by: PSYCHOLOGIST

## 2022-02-15 PROCEDURE — 3044F HG A1C LEVEL LT 7.0%: CPT | Mod: HP,HB,CPTII, | Performed by: PSYCHOLOGIST

## 2022-02-15 PROCEDURE — 3072F PR LOW RISK FOR RETINOPATHY: ICD-10-PCS | Mod: HP,HB,CPTII, | Performed by: PSYCHOLOGIST

## 2022-02-15 PROCEDURE — 80053 COMPREHEN METABOLIC PANEL: CPT | Performed by: INTERNAL MEDICINE

## 2022-02-15 PROCEDURE — 3072F PR LOW RISK FOR RETINOPATHY: ICD-10-PCS | Mod: AJ,HB,CPTII,S$PBB | Performed by: SOCIAL WORKER

## 2022-02-15 PROCEDURE — 85025 COMPLETE CBC W/AUTO DIFF WBC: CPT | Performed by: INTERNAL MEDICINE

## 2022-02-15 PROCEDURE — 90834 PR PSYCHOTHERAPY W/PATIENT, 45 MIN: ICD-10-PCS | Mod: AJ,HB,S$PBB, | Performed by: SOCIAL WORKER

## 2022-02-15 PROCEDURE — 97140 MANUAL THERAPY 1/> REGIONS: CPT

## 2022-02-15 PROCEDURE — 84466 ASSAY OF TRANSFERRIN: CPT | Performed by: INTERNAL MEDICINE

## 2022-02-15 PROCEDURE — 99417 PROLNG OP E/M EACH 15 MIN: CPT | Mod: HP,HB,S$PBB, | Performed by: PSYCHOLOGIST

## 2022-02-15 PROCEDURE — 1159F MED LIST DOCD IN RCRD: CPT | Mod: HP,HB,CPTII, | Performed by: PSYCHOLOGIST

## 2022-02-15 PROCEDURE — 3066F PR DOCUMENTATION OF TREATMENT FOR NEPHROPATHY: ICD-10-PCS | Mod: HP,HB,CPTII, | Performed by: PSYCHOLOGIST

## 2022-02-15 PROCEDURE — 99215 OFFICE O/P EST HI 40 MIN: CPT | Mod: HP,HB,S$PBB, | Performed by: PSYCHOLOGIST

## 2022-02-15 PROCEDURE — 3072F LOW RISK FOR RETINOPATHY: CPT | Mod: AJ,HB,CPTII,S$PBB | Performed by: SOCIAL WORKER

## 2022-02-15 PROCEDURE — 1159F PR MEDICATION LIST DOCUMENTED IN MEDICAL RECORD: ICD-10-PCS | Mod: HP,HB,CPTII, | Performed by: PSYCHOLOGIST

## 2022-02-15 PROCEDURE — 3061F NEG MICROALBUMINURIA REV: CPT | Mod: AJ,HB,CPTII,S$PBB | Performed by: SOCIAL WORKER

## 2022-02-15 PROCEDURE — 3044F PR MOST RECENT HEMOGLOBIN A1C LEVEL <7.0%: ICD-10-PCS | Mod: HP,HB,CPTII, | Performed by: PSYCHOLOGIST

## 2022-02-15 PROCEDURE — 36415 COLL VENOUS BLD VENIPUNCTURE: CPT | Performed by: INTERNAL MEDICINE

## 2022-02-15 PROCEDURE — 3066F PR DOCUMENTATION OF TREATMENT FOR NEPHROPATHY: ICD-10-PCS | Mod: AJ,HB,CPTII,S$PBB | Performed by: SOCIAL WORKER

## 2022-02-15 PROCEDURE — 90834 PSYTX W PT 45 MINUTES: CPT | Mod: AJ,HB,S$PBB, | Performed by: SOCIAL WORKER

## 2022-02-15 PROCEDURE — 3066F NEPHROPATHY DOC TX: CPT | Mod: AJ,HB,CPTII,S$PBB | Performed by: SOCIAL WORKER

## 2022-02-15 NOTE — PROGRESS NOTES
"[facility]  Speech Therapy Treatment Note- Voice  Date: 2/15/2022     Name: Sangeetha June   MRN: [id number]   Therapy Diagnosis:   Encounter Diagnosis   Name Primary?    Dysphonia Yes   Physician: Issac Erickson MD  Physician Orders: Ambulatory referral/consult to speech therapy   Medical Diagnosis: Dysphonia [R49.0]    Visit #/ Visits Authorized: 1/ 8 (+eval)  Date of Evaluation:  1/7/22  Insurance Authorization Period: 1/14/22-2/28/22  Plan of Care Expiration Date:    3/18/22  Extended Plan of Care:  -   Progress Note: 3/15/22   Visits Cancelled: 1  Visits No Show: 0    Time In:  11:31 AM  Time Out:  12:00 PM  Total Billable Time: 29 mins      Precautions: Standard and cancer  Subjective:   Patient reports: her voice is "doing okay". She reports she worked on voice exercises "a couple of times" but she often forgets to complete exercises. She reports she had surgery on her L shoulder about 2 weeks ago and she will initiate PT today.   She was compliant to home exercise program.     Response to previous treatment: -     Pain Scale:  6/10 on a Visual Analog Scale currently.   Pain Location: L shoulder     Objective:   TIMED  Procedure Min.   Cognitive Therapeutic Interventions, first 15 minutes CPT 88004  0   Cognitive Therapeutic Interventions, each additional 15 minutes CPT 90111  0         UNTIMED  Procedure Min.   Speech- Language- Voice Therapy  29   Total Timed Units: 0  Total Untimed Units: 1  Charges Billed/Number of units: 46205/1    Short Term Goals: (10 weeks) Current Progress:   Patient will complete SOVT exercises and/or resonant-focused exercises 3-5x daily to strengthen and balance the intrinsic laryngeal musculature and maximize glottic closure without medial hyperfunction.    Progressing/ Not Met 2/15/2022   Not formally addressed      Patient will discriminate between easy and strained phonation with 80% accuracy.     Progressing/ Not Met 2/15/2022   Patient discriminated " between easy and strained phonation while completing VFE as well as in conversation with about 70% accuracy and moderate verbal and visual cueing.      Patient will recall and utilize vocal hygiene strategies with minimal verbal cueing.       Goal Met 2/15/2022   Patient recalled and is implementing vocal hygiene strategies independently, most notably, significantly increasing water intake since evaluation. Vocal hygiene strategies reviewed in today's session. Patient encouraged to continue to increase water intake as able.    Patient will participate in vocal function exercises x 10 each with minimal verbal cueing to improve vocal quality.     Progressing/ Not Met 2/15/2022   Vocal function exercises were reviewed and patient executed with 75% accuracy and moderate verbal and visual cueing for inhalation through nose prior to execution of exercises, reducing volume and laryngeal strain during exercise completion, focus on using breath and avoiding holding breath. Exercises completed x2 in today's session. Will continue to address.        Patient Education/Response:   Patient was educated regarding respiration/phonation and coordination of systems for efficient voicing as well as rationale for completion of exercises. Patient expressed understanding of information provided.     Home program established: yes-vocal hygiene, vocal funciton exercises x2 each, x2 daily  Exercises were reviewed and Sangeetha was able to demonstrate them prior to the end of the session.  Sangeetha demonstrated good  understanding of the education provided.     See Electronic Medical Record under Patient Instructions for exercises provided throughout therapy.  Assessment:   Sangeetha is progressing well towards her goals. Progression toward execution of VFE in today's session. Current goals remain appropriate. Goals to be updated as necessary.     Patient prognosis is Good. Patient will continue to benefit from skilled outpatient speech and language  therapy to address the deficits listed in the problem list on initial evaluation, provide patient/family education and to maximize patient's level of independence in the home and community environment.     Medical necessity is demonstrated by the following IMPAIRMENTS:  Poor vocal quality prevents accurate/efficient communication in communication situations (medical, social, vocational, home).    Barriers to Therapy: comorbidities, history of cancer, transportation  Patient's spiritual, cultural and educational needs considered and patient agreeable to plan of care and goals.  Plan:   Continue Plan of Care with focus on vocal function exercises, SOVT exercises, and reducing laryngeal strain through awareness of phonation.     Angelika Gunter MA, CCC-SLP  Speech Language Pathologist  2/15/2022

## 2022-02-15 NOTE — TELEPHONE ENCOUNTER
I would say try and call her to see what she needs us to do. I'm not sure why the medication wasn't approved by insurance or how expensive it is.

## 2022-02-15 NOTE — PROGRESS NOTES
"PSYCHIATRIC EVALUATION     Disclaimer: Evaluation and treatment is based on information presented to date. Any new information may affect assessment and findings.     Name: Sangeetha June  Age: 42 y.o.  : 1980    Preferred Name: Sangeetha  Gender Identity: cis female  Preferred Pronouns: she/her    Referring provider: Kaleb Rader LCSW    Reason for Encounter:  Post-chemo memory problems    History of Present Illness: Sangeetha attended her visit. Sangeetha reported having anxiety with chemo. She said that she gets nervous when she does "not know what's going on." She said that her symptoms start in her chest and then goes to her head and gives her a worried feeling. She said that she had her chemo 2 years ago. She said that looking down helps her concentrate better (rather than looking at another person). It helps if "I get in my own space."     She is taking Lexapro, buspirone, and trazodone. She reported that she started these when she started treatment at [facility].    She said that when she is at home and "it happens," "I am a mess." She reported that she starts to cry and "children" tell her that someone is going to take her somewhere." When asked about the children, she said that there aren't any children. She said that she hears a voice telling her that. She reported that she still hears the children, and they work her nerves.    She reported that she does not like being by herself and feels like she is a burden on her family. She said that she is always doing something wrong. Her family reportedly fusses at her for interfering with his work; her sister fusses for her forgetting things. She said that she is sometimes "so tired that I sit around." She is not keeping the house, and her sister gets upset with her for that. Her sister reportedly told her that she has until  to move in with her boyfriend.     Sangeetha has been with her boyfriend for 6 years. She said that she was in [place] and got cancer--she was on " "chemo, and her levels were dropping. She was getting weak and had poor speech. Her sister had visited her in [place] and brought her back here because she was not doing well. Her sister has been taking care of her; now that she is "doing good," she wants her to go back. Sangeetha reported that she is supposed to have a cane for ambulation. Her sister wants her to "grow my independence." Her boyfriend moved here with her and lives in .     She said that she does not drive because her anxiety interferes. Transportation brought her for her appointments today. She has her 's license and a vehicle, but her boyfriend has her car until she gets better. She reported that she last drove 3 years ago. She reported that she has trouble with word-finding and memory. She gets short of breath sometimes, even when talking. She has trouble standing for long periods; her back starts hurting. She reported problems sleeping--after she takes her medicine, it takes her about an hour to fall asleep. She reported that she gets up all during the night--waiting on a CPAP machine. She said that she currently coughs a lot in her sleep--the CPAP is reportedly going to help with that. She reported feeling irritable sometimes; cries a couple of times a week. She feels lonely and thinks that no one loves her or wants her. She feels hopeless/worthless. She said that she knows that she is not "worthless--God got me. Everybody going on with their life, and not me." She reported that she has had suicidal thoughts but "would not do it because God said no." She said that she last had those thoughts a couple of months ago because she wanted to go see her mom and . She denied having a plan in the past and denied current thoughts.    When she was a teenager, she cut her left wrist when she wanted to die. She said that she was at school--she reported feeling depressed but denied taking any medicine or getting treatment back then.    She said that she " "misses her " and my mama." She said that her   almost 7 years ago. She said that after her mom , she was suicidal and started medicine but did not recall what medicine. It was her PCP in [place].    She wanted me to know her sibling's name--she lives with Ruth Ann. Initially, said I needed to know so I could call them--has 7 of them. Then, she started naming them.    [Prasanth note of 1/3/22: Patient was uncomfortable entering the office due to anxiety.  She did not like the "way" the office looked.  She has multiple medical issues that she is still dealing with.  "I love my sisters and brothers, but they don't give it back to me."  I don't have a life.  "I will think someone is coming to get me."  Her sister recorded the patient previously.  She had barricaded herself in the room.  "I got a lot going on."  I am still dealing with my momma and my  dying.  She got sick with cancer after they .  "I cry all of the time."     "Nobody care about me.  I feel alone all the time.  I get mad because I have a hard time finding my words due to the chemo."  She would hide in the closet due to someone trying to find her.  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  They are supposed to be getting me a CPAP.    She has been taking the Lexapro and the Buspar for about a year.]     is below (last two years).          Review Of Systems: Review of Systems   Constitutional: Negative for activity change, appetite change and fatigue.   HENT: Negative for nasal congestion, hearing loss, mouth sores, sneezing, sore throat, tinnitus and trouble swallowing.    Eyes: Negative for redness and visual disturbance.   Respiratory: Negative for cough, choking, chest tightness and shortness of breath.    Cardiovascular: Negative for chest pain, palpitations and leg swelling.   Gastrointestinal: Negative for abdominal pain, constipation, diarrhea, nausea and vomiting.   Endocrine: Negative for cold " "intolerance, heat intolerance, polydipsia and polyphagia.   Genitourinary: Negative for decreased urine volume, difficulty urinating and hematuria.   Musculoskeletal: Positive for back pain. Negative for gait problem, joint swelling and myalgias.        Knees hurt; shoulder hurts   Integumentary:  Negative for color change and rash.   Allergic/Immunologic: Negative for food allergies.   Neurological: Positive for light-headedness (sometimes feels like she will pass out; "I quickly sit down"), headaches and memory loss (word-finding problems). Negative for dizziness, seizures and speech difficulty.   Hematological: Negative for adenopathy.   Psychiatric/Behavioral: Positive for agitation (irritable at times), confusion, decreased concentration, dysphoric mood, hallucinations (hearing a voice (?)), sleep disturbance (waiting on a CPAP) and suicidal ideas (none currently--last 2 months ago). Negative for self-injury. The patient is nervous/anxious.         Nutritional Screening: Considering the patient's height and weight, medications, medical history and preferences, should a referral be made to the dietitian? no    Constitutional    Vitals:  Most recent vital signs were reviewed.   Last 3 sets of Vitals    Vitals - 1 value per visit 2/9/2022 2/14/2022 2/14/2022   SYSTOLIC - - 128   DIASTOLIC - - 88   Pulse - - 61   Temp - - -   Resp - - 16   SPO2 - - 99   Weight (lb) 303 - 308.86   Weight (kg) 137.44 - 140.1   Height 68 - 68   BMI (Calculated) 46.1 - 47   VISIT REPORT - - -   Pain Score  - 0 -   Some recent data might be hidden            General: age appropriate, casually dressed, wearing mask, left arm in a sling; hair ml, pulled back  Musculoskeletal  Muscle Strength/Tone: no tremor, no tic  Gait & Station: unstead and slower gait  Psychiatric:  Oriented: x 3 / including: Date: February 14th (off a day)--self-corrected to 15th--[asked if she was right and when indicated that she was "doing fine," then she " "corrected to 15th because she had a doctor's appt yesterday]; and aware meeting with [facility] [place],   Attitude: cooperative   Eye Contact: good   Behavior: she was on her phone when I went to get her from the waiting room; her phone rang at least twice during the visit  Mood: "right now, I'm doing--I've done calmed down just a little bit" "I feel a little heaviness in my chest but I'm not nervous no more"  Affect: appropriate range   Attention: impaired and long time to complete serial 3s and counted first number when subtracting so actually subtracted 2s instead of 3s; trouble spelling world backward 20-3=Q--------------------------18---------------------------------16---------------------------14-----------------12-------------------------10; [used fingers and repeated directions often]; world; ------------d-l----r-o-l  Concentration: grossly intact   Thought Process: goal directed   Speech: intelligible  Volume: WNL   Quantity: WNL   Rhythm: WNL  Insight: fair to good   Threats: no SI / HI   Memory: Impaired to some degree and Registers and recalls 3/3 objects immediately and 2/3 at 6 minutes (apple, desk, --boat) (missing martha)  Psychosis: denies all   Estimate of Intellectual Function: average or below  Judgment (to simple situation): envelope="it got my name on it? Q--give it to the mailman" "or put it in my mailbox and raise the flag on it"  Relevant Elements of Neurological Exam: slow ambulation; slightly unsteady     Medical history:   Past Medical History:   Diagnosis Date    Anxiety     Breast cancer     Chest pain     Chest pain 7/2/2021    Cholecystitis without calculus     Dizziness     Elevated C-reactive protein (CRP)     Essential hypertension     Memory change     EVERARDO (obstructive sleep apnea)     Osteoarthritis     Other specified disorders of thyroid     Pre-diabetes 4/15/2021    Chronic, Stable, cont metformin    Screening mammogram, encounter for 4/15/2021    Shoulder " "injury     SOB (shortness of breath)     Thyroiditis     Vision disturbance 2021        Family History:  Family History   Problem Relation Age of Onset    Cancer Mother     Stroke Mother     Heart disease Mother     Stroke Sister     Diabetes Sister     Diabetes Brother     No Known Problems Maternal Grandmother     Cancer Maternal Grandfather     Alcohol abuse Maternal Grandfather     No Known Problems Paternal Grandmother     No Known Problems Paternal Grandfather         Family history of psychiatric illness: brother has depression; sister depressed sometimes; not aware of any psychosis or bipolar    PSYCHO-SOCIAL DEVELOPMENT HISTORY:   Social History     Socioeconomic History    Marital status:    Tobacco Use    Smoking status: Never Smoker    Smokeless tobacco: Never Used   Substance and Sexual Activity    Alcohol use: Not Currently    Drug use: Yes     Types: Marijuana        Social history/Education/Episcopal-Spiritual: see below     [Prasanth note of 1/3/22: Her mother, Sara,  about six years ago.  "It feels like it happened last year."  She had a massive stroke which caused dementia.  She was 63.  She was a homemaker and "my best friend."  She lived with the patient in [place].  "I tried to kill myself after she ."  "I called my doctor.  She talked to me then I went back home."  She does not know her real father.  He is still alive.  Her step father, Indra, 68, lives in [place].  He calls me and checks on me daily.  He was  to her mother for about forty years.  He was a sugar cane farmer.  She grew up in [place].  She moved to [place], [place].  She was there for twenty two years.  Her mother had went to school in [place].  Her brother already lived there.  Her mother had dementia.  The patient graduated from [employer] in .  She was in a "special class" for her behavior.  "All I wanted to do was fight.  I wouldn't listen to the " "teacher."  She went to college for two years at [employer].  She was pursuing something in the medical field.  She is the fifth of eight children, four boys and four girls.  She has one brother that is  due to gunshot through his window.  He was standing up watching television.  He was living in [place].  "I love all of my sisters and brothers."  She lives with her sister, Ruth Ann 41, for one year in [place].  She likes living with her.  She acts like she doesn't like me living there.  Her 6yo nephew, Hector, lives with them.  She thinks her sister is depressed.  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  She worked in a nursing home, sitting, and living with a client till she .  The patient came back to [place] Almost a year ago due to illness.  She had left breast cancer in 2019.  She had a left breast mastectomy.  She plans to have a right breast mastectomy due to the chance of cancer.  She has a left arm rotator cuff tear from when she had the left mastectomy.  She has applied for disability.  She has a tumor on her brain, but it is not malignant.  She was  to [place] for five years.  He  five years ago.  He was 38.  He had a massive heart attack in his sleep.  Her son, Abdi Viera, who lives in [place].  His father, Jeff, was with her for three years.  He didn't want to stop doing drugs.  Her son works in a plant.  He is single with no children.  She reports a close relationship to her son.  She is a Latter day.  "I believe in God."  She listens to her Religious on the phone.  She likes to watch Latter day movies about healing.]        Legal: Denied    FPC time: Denied    Disability:  [Noted above: She has applied for disability.] She reported that she was awarded disability--her boyfriend is her payee.    Allergy Review:   Review of patient's allergies indicates:   Allergen Reactions    Lisinopril         Medical " Problem List:   Patient Active Problem List   Diagnosis    Delayed immunizations    S/P mastectomy    Chemotherapy adverse reaction    History of breast cancer    Left rotator cuff tear arthropathy    ANGIE (generalized anxiety disorder)    Urinary incontinence    Hyperlipidemia    Other insomnia    AGUILAR (dyspnea on exertion)    S/P hysterectomy    Postmenopausal    Obesity, morbid, BMI 40.0-49.9    Malignant neoplasm of left breast in female, estrogen receptor negative    Meningioma    Murmur    Hypertension    Hypothyroidism    Microcytic anemia    Fall    Strain of lumbar region    Gait difficulty    Imbalance    Difficulty walking    Lumbago    Abdominal pain    Tingling of left upper extremity    Gastroesophageal reflux disease    SOB (shortness of breath)    Nontraumatic incomplete tear of left rotator cuff    Thyroid nodule    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    Severe episode of recurrent major depressive disorder, with psychotic features Yes    ANGIE (generalized anxiety disorder)     Memory change         IMPRESSIONS/PLAN    Sangeetha had had cancer treatment, resulting in decompensation mentally. She is treated for depression and anxiety. She reported that she has had continued memory problems and showed gross impairment on mental status testing today. There are some oddities, however, and additional testing will be helpful in diagnostic clarity and treatment recommendations/prognosis.     · Medication Management: Discussed risks, benefits, and alternatives to treatment plan documented above with patient. I answered all patient questions related to this plan, and patient expressed understanding and agreement.   Continue Lexapro 20 mg, buspirone 15 mg  tid, trazodone 150 mg hs (no refills needed)  · continue supportive counseling   · Will be getting CPAP--sleep hygiene  · Referral for neuropsych  · Get disability report--reportedly met with psychologist for CE in application  · Consulted with therapist    Follow up in about 3 months (around 5/15/2022) for medication management.     Medication List with Changes/Refills   Current Medications    ALBUTEROL (PROVENTIL/VENTOLIN HFA) 90 MCG/ACTUATION INHALER    Inhale 2 puffs into the lungs every 6 (six) hours as needed for Wheezing. Rescue    AMMONIUM LACTATE 12 % CREA    Apply 1 Act topically 2 (two) times daily. To feet.    ASCORBIC ACID, VITAMIN C, (VITAMIN C) 100 MG TABLET    Take 100 mg by mouth once daily.    BUDESONIDE-FORMOTEROL 80-4.5 MCG (SYMBICORT) 80-4.5 MCG/ACTUATION HFAA    Inhale 2 puffs into the lungs 2 (two) times a day. Controller    BUSPIRONE (BUSPAR) 15 MG TABLET    Take 1 tablet (15 mg total) by mouth 3 (three) times daily.    CELECOXIB (CELEBREX) 200 MG CAPSULE    Take 1 capsule (200 mg total) by mouth 2 (two) times daily.    DULAGLUTIDE (TRULICITY) 0.75 MG/0.5 ML PEN INJECTOR    Inject 0.75 mg into the skin every 7 days for 30 days, THEN 1.5 mg every 7 days.    ESCITALOPRAM OXALATE (LEXAPRO) 20 MG TABLET    Take 1 tablet (20 mg total) by mouth once daily.    EUTHYROX 50 MCG TABLET    Take 1 tablet (50 mcg total) by mouth every morning.    GABAPENTIN (NEURONTIN) 100 MG CAPSULE    Take 1 capsule (100 mg total) by mouth 2 (two) times daily.    HYDROCHLOROTHIAZIDE (HYDRODIURIL) 25 MG TABLET    Take 1 tablet (25 mg total) by mouth daily as needed (edema, swelling).    HYDROCORTISONE 0.5 % CREAM    Apply topically 2 (two) times daily.    LINACLOTIDE (LINZESS) 72 MCG CAP CAPSULE    Take 1 capsule (72 mcg total) by mouth once daily.    MAGNESIUM OXIDE (MAG-OX) 400 MG (241.3 MG MAGNESIUM) TABLET    Take 1 tablet (400 mg total) by mouth once daily.    MELOXICAM (MOBIC) 15 MG TABLET    Take 1 tablet (15 mg  total) by mouth once daily.    METFORMIN (GLUCOPHAGE-XR) 500 MG ER 24HR TABLET    SMARTSI Tablet(s) By Mouth Every Evening    MULTIVIT WITH MIN-FOLIC ACID (ADULT MULTIVITAMIN GUMMIES) 200 MCG CHEW    Take by mouth.    OLOPATADINE (PATANOL) 0.1 % OPHTHALMIC SOLUTION        OXYBUTYNIN (DITROPAN-XL) 5 MG TR24    Take 1 tablet (5 mg total) by mouth once daily.    PANTOPRAZOLE (PROTONIX) 40 MG TABLET    Take 1 tablet (40 mg total) by mouth once daily.    PRAVASTATIN (PRAVACHOL) 40 MG TABLET    Take 1 tablet (40 mg total) by mouth every evening.    TRAMADOL (ULTRAM) 50 MG TABLET    Take 1 tablet (50 mg total) by mouth every 6 (six) hours as needed for Pain.    TRAZODONE (DESYREL) 150 MG TABLET    Take 1 tablet (150 mg total) by mouth every evening.        Time spent with pt including note preparation: 103 minutes     Patito Jaquez, PhD, MP  Advanced Practice Medical Psychologist  [facility]  [address] [facility].  [place] [id number]  [phone]   [phone] fax

## 2022-02-15 NOTE — LETTER
February 15, 2022    Kaleb Rader, [facility]  [address] [place] [address]       [facility]  [address] [facility]  [place] [address]  Phone: [phone]  Fax: [phone]   Patient: Sangeetha June   MR Number: [id number]   YOB: 1980   Date of Visit: 2/15/2022     Dear Dr. Rader:    Thank you for referring Sangeetha June to me for evaluation. Below are the relevant portions of my assessment and plan of care.    Sangeetha had had breast cancer that returned a couple of years ago, resulting in decompensation mentally. She is treated for depression and anxiety. She reported that she has had continued memory problems and showed gross impairment on mental status testing today. There are some oddities, however, and additional testing will be helpful in diagnostic clarity and treatment recommendations/prognosis.   · Medication Management: Discussed risks, benefits, and alternatives to treatment plan documented above with patient. I answered all patient questions related to this plan, and patient expressed understanding and agreement.   Continue Lexapro 20 mg, buspirone 15 mg tid, trazodone 150 mg hs (no refills needed)  · continue supportive counseling   · Will be getting CPAP--sleep hygiene  · Referral for neuropsych  · Get disability report--reportedly met with psychologist for CE in application  · Consulted with therapist  Follow up in about 3 months (around 5/15/2022) for medication management.     If you have questions, please do not hesitate to call me. I look forward to following Sangeetha along with you.    Sincerely,    Patito Jaquez, PhD, MPAP   Advanced Practice Medical Psychologist      CC  Sam Garcia MD

## 2022-02-15 NOTE — PLAN OF CARE
"[facility]  Physical Therapy Initial Evaluation      Name: Sangeetha Ramoshill  Clinic Number: [id number]    Therapy Diagnosis:    Encounter Diagnoses   Name Primary?    Nontraumatic complete tear of left rotator cuff     Decreased ROM of left shoulder     Weakness of shoulder       Physician: Francisco Mathews*    Physician Orders: PT Eval and Treat  Medical Diagnosis from Referral: M75.122 (ICD-10-CM) - Nontraumatic complete tear of left rotator cuff  Evaluation Date: 2/15/2022  Authorization Period Expiration: 02/09/2023  Plan of Care Expiration: 05/18/2022    Progress Update: 03/15/2022    Visit # / Visits authorized: 1 / 1   FOTO: 1 / 3       PRECAUTIONS: Standard Precautions, Diabetes: type 2 and Weight-bearing status: Non-weight-bearing: left shoulder     SURGERY:   1. Left shoulder arthroscopic rotator cuff repair  2. Left shoulder arthroscopic biceps tenodesis  3. Left shoulder subacromial decompression  DATE OF SURGERY: 1/27/2022  MD Follow-up: 3/9/2021    Time In: 12:25  Time Out: 1:15  Total Appointment Time (timed & untimed codes): 50 minutes    SUBJECTIVE   Date of onset: surgery on 1/27    History of current condition - Sangeetha is a 42 y.o. female whom reports a history of mammary surgery due to cancer.  Sangeetha's current exercise regiment includes: "waiting until she came to PT".  Seeking Physical Therapy for post-op Rotator cuff repair. She reports pain is mild but has become stiff not moving a lot especially in her left upper trap.    JOVANNY: non traumatic  Falls: none  Physician Instructions (per patient): she reports she has to keep arm by her side. She reports she was told she can move her elbow actively. She also reports doctor stated to hold off on exercise until therapy.  Post-op instructions: follow post RCR protocol      Imaging: No updated imaging for this episode of care     Prior Therapy: Yes  Social History: Pt lives with their family  Occupation: Pt is " not working  Prior Level of Function: Independent with all ADLs  Current Level of Function: 20% of PLOF    Pain:  Current 0 /10, worst 0 /10, best 0 /10   Location:     Description: Aching  Aggravating Factors: sleep and being in her sling  Easing Factors: medication, rest    Pts goals: Pt reported goals are to improve range of motion and strength    _______________________________________________________  Medical History:   Past Medical History:   Diagnosis Date    Anxiety     Breast cancer     Chest pain     Chest pain 7/2/2021    Cholecystitis without calculus     Dizziness     Elevated C-reactive protein (CRP)     Essential hypertension     Memory change     EVERARDO (obstructive sleep apnea)     Osteoarthritis     Other specified disorders of thyroid     Pre-diabetes 4/15/2021    Chronic, Stable, cont metformin    Screening mammogram, encounter for 4/15/2021    Shoulder injury     SOB (shortness of breath)     Thyroiditis     Vision disturbance 4/30/2021       Surgical History:   Sangeetha June  has a past surgical history that includes Hysterectomy; Mastectomy; Breast biopsy; Injection of joint (Left, 11/12/2021); Arthroscopic repair of rotator cuff of shoulder (Left, 1/27/2022); Arthroscopy of shoulder with decompression of subacromial space (Left, 1/27/2022); and Fixation of tendon (Left, 1/27/2022).    Medications:   Sangeetha has a current medication list which includes the following prescription(s): albuterol, ammonium lactate, ascorbic acid (vitamin c), budesonide-formoterol 80-4.5 mcg, buspirone, celecoxib, dulaglutide, escitalopram oxalate, euthyrox, gabapentin, hydrochlorothiazide, hydrocortisone, linaclotide, magnesium oxide, meloxicam, metformin, adult multivitamin gummies, olopatadine, oxybutynin, pantoprazole, pravastatin, tramadol, and trazodone, and the following Facility-Administered Medications: lactated ringers.    Allergies:   Review of patient's allergies indicates:    Allergen Reactions    Lisinopril           OBJECTIVE   (x = not tested due to pain and/or inability to obtain test position)    RANGE OF MOTION:    Cervical Right   (spine)  2/15/2022 Left     2/15/2022 Pain/Dysfunction with Movement Goal   Cervical Flexion (60) 35 --- stiff 45  Initial:    Cervical Extension (90) 60 --- stiff 45  Initial:      Shoulder PROM Right  2/15/2022 Left  2/15/2022 Pain/Dysfunction with Movement Goal   Forward Flexion in scapular plane (180) 90 90 discomfort 180  Initial:    External Rotation at scaption (90) x 30  90  Initial:    Internal rotation  at 0 degrees (70) x 30  45  Initial:    Functional ER (C7) x x  C7  Initial:   Functional IR (T10) x x  T10  Initial:      STRENGTH:    U/E MMT Right  2/15/2022 Left  2/15/2022 Pain/Dysfunction with Movement Goal   Shoulder Flexion x trace  5/5 B   Shoulder Extension x trace  5/5 B   Shoulder Abduction x trace  5/5 B   Shoulder IR x trace  5/5 B   Shoulder ER   x trace  5/5 B   Elbow Flexion  5/5 3-/5 No pain or discomfort 5/5 B   Elbow Extension 5/5 3-/5 No pain or discomfort 5/5 B     MUSCLE LENGTH:     Muscle Tested  Right  2/15/2022 Left   2/15/2022 Goal   Upper Trapezius  decreased decreased Normal B    Levator Scapulae  decreased decreased Normal B   Sternocleidomastoid x x Normal B   Scalenes  x x Normal B    Pectoralis Minor  x x Normal B   Pectoralis Major x x Normal B     JOINT MOBILITY:     Joint Motion Tested Right  (spine)  2/15/2022 Left   2/15/2022 Goal   GHJ Posterior Glide x Hypomobile Normal B   GHJ Inferior Glide x Hypomobile Normal B   GHJ Lateral Glide x Hypomobile Normal B   GHJ Anterior Glide x Hypomobile Normal B   Scapular Thoracic: grade 2 glide x Hypomobile Normal B       Sensation:  Sensation is intact to light touch    Palpation: Increased tone and tenderness noted with palpation to: upper traps and rhomboids    Posture:  Pt presents with postural abnormalities which include: forward head and rounded  shoulders    Gait: N/A    Movement Analysis: N/A    Balance: N/A      FUNCTION:     CMS Impairment/Limitation/Restriction for FOTO shoulder Survey    Therapist reviewed FOTO scores for Sangeetha June on 2/15/2022.   FOTO documents entered into [employer] - see Media section.    Limitation Score: 96%         TREATMENT     Total Treatment time separate from Evaluation: (20) minutes    Rehabilitation program:   Phase 1: (0-6 weeks) - 1/27/2022-3/10/2022  Phase 2: (6-12 weeks)- 3/10/2022-4/7/2022  Phase 3: (12+ weeks)- 4/7/2022+    Sangeetha received the following manual therapy techniques: Joint mobilizations, Myofacial release, Soft tissue Mobilization, and PROM were applied to the: left for (10) minutes, including: x = exercises performed today    Manual Intervention 2/15/2022    Soft Tissue Massage  upper trapezius, levator scapulae  and rhomboids    Joint Mobility  Grade (I-III); GHJ oscillations/AP/Lat/SupInf    x Grade (I-III); Scapular Thoracic Med/Lat/Sup/Inf/UR/DR   PROM x Supine: Internal rotation to 30/External Rotation to 30    x Side-lying: Scapular ADD, Shoulder Flexion    x Elbow Flexion/extension     Plan for Next Visit: GHJ oscillations     Sangeetha received therapeutic exercises to develop strength, endurance, ROM, flexibility, and posture for (10) minutes including: x = exercises performed     Ther Ex 2/15/2022    Scapular Retractions x 30x   Pendulum Hangs x 20x CW/CCW   Wrist flexion/extension x 30x   Ball squeezes x 30x               Plan for Next Visit: isometrics, rhythmic stabilization,        Home Exercises and Patient Education Provided    Education/Self-Care provided:    Patient educated on the impairments noted above and the effects of physical therapy intervention to improve overall condition and QOL.    Patient was educated on all the above exercise prior/during/after for proper posture, positioning, and execution for safe performance with home exercise program.     Written Home Exercises  Provided: yes. Prefers: Printed Copies  Exercises were reviewed and Sangeetha was able to demonstrate them prior to the end of the session.  Sangeetha demonstrated good understanding of the education provided.     See EMR under Patient Instructions for exercises provided during therapy sessions.    ASSESSMENT     Sangeetha is a 42 y.o. female referred to outpatient physical therapy with a medical diagnosis of Nontraumatic complete tear of left rotator cuff s/p rotator cuff repair by Dr. Mathews on 1/27/2021.  Sangeetha is post-op day 20, and presents in shoulder abductor sling for support while healing.     Signs and symptoms are consistent with this stage of recovery, with physical exam findings that support decreased shoulder girdle ROM, decreased scapular and shoulder strength, Glenohumeral joint hypomobility, increased joint and soft tissue edema, and impaired functional mobility. The above impairments will be addressed through manual therapy techniques, therapeutic exercises, functional training, and modalities as necessary. Patient was treated and educated on exercises for home program, progression of therapy, and benefits of therapy to achieve full functional mobility.     Pt prognosis is Good.   Pt will benefit from skilled outpatient Physical Therapy to address the deficits stated above and in the chart below, provide pt/family education, and to maximize pt's level of independence.     Plan of care discussed with patient: Yes  Pt's spiritual, cultural and educational needs considered and patient is agreeable to the plan of care and goals as stated below:     Anticipated Barriers for therapy: co-morbidities and sedentary lifestyle    Medical Necessity is demonstrated by the following  History  Co-morbidities and personal factors that may impact the plan of care Co-morbidities:   diabetes, high BMI, history of cancer, HTN and shoulder surgery    Personal Factors:   no deficits     high   Examination  Body Structures and  "Functions, activity limitations and participation restrictions that may impact the plan of care Body Regions:   neck  upper extremities    Body Systems:    gross symmetry  ROM  strength  gross coordinated movement    Participation Restrictions:   See above in "Current Level of Function"     Activity limitations:   Learning and applying knowledge  no deficits    General Tasks and Commands  no deficits    Communication  no deficits    Mobility  lifting and carrying objects  fine hand use (grasping/picking up)    Self care  no deficits    Domestic Life  shopping  cooking  doing house work (cleaning house, washing dishes, laundry)  assisting others    Interactions/Relationships  no deficits    Life Areas  no deficits    Community and Social Life  community life  recreation and leisure         high   Clinical Presentation stable and uncomplicated low   Decision Making/ Complexity Score: low       GOALS:     SHORT TERM GOALS: 8 weeks 7/15/2021   1. Recent signs and systems trend is improving in order to progress towards LTG's.     1. Patient will improve AROM to below measures to progress towards long term goals.  a. Shoulder Flexion: pain free AROM >130*  b. Shoulder ER @ 90*: pain free AROM > 60*  c. Shoulder functional IR: pain free AROM > L5     3. Patient will be independent with HEP in order to further progress and return to maximal function.     4. Pain rating at Worst: 5/10 in order to progress towards increased independence with activity.     5. Patient will be able to correct postural deviations in sitting and standing, to decrease pain and promote long term stability.        LONG TERM GOALS: 16 weeks 7/15/2021   1. Patient will return to normal ADL, recreational, and work related activities with less pain and limitation.      2. Patient will improve AROM to stated goals in order to return to maximal functional potential.      3. Patient will improve strength to stated goals of appropriate musculature in order " to improve functional independence.      4. Pain Rating at Best: 1/10 to improve Quality of Life and return to prior level activities.      5. Pain will Improve FOTO Score to the predicted outcome score: 52%     6. Patient will have met/partially met personal goal of: being able to reach arm above shoulder height to complete daily activities            PLAN   Plan of care Certification: 2/15/2022 to 5/15/2022.    Outpatient Physical Therapy 2 times weekly for 12 weeks to include any combination of the following interventions: virtual visits, dry needling, modalities, electrical stimulation (IFC, Pre-Mod, Attended with Functional Dry Needling), Manual Therapy, Moist Heat/ Ice, Neuromuscular Re-ed, Patient Education, Self Care, Therapeutic Exercise, Functional Training and Therapeutic Activites     Thank you for this referral.    These services are reasonable and necessary for the conditions set forth above while under my care.    Ryne Arrington, PT, DPT

## 2022-02-15 NOTE — PATIENT INSTRUCTIONS
"[facility]PARTMENT OF PSYCHIATRY   PATIENT INFORMATION    We appreciate the opportunity to participate in your medical care and hope the following protocols will make it easier for you to receive quality treatment in our department.    PUNCTUALITY: Your appointment is scheduled for a fixed amount of time, reserved especially for you.  To get the benefit of your appointment, please arrive at least 15 minutes early to allow time for traffic, parking and registration.  Should you arrive more than 15 minutes late to your appointment, you will be rescheduled in order to assure your clinician has adequate time to assess you and provide helpful care.      APPOINTMENTS: Appointments are made by the nursing/front office staff or through the patient portal. Providers do not have access  to schedule appointments. Walk in appointments are not available. FOR EMERGENCIES, PLEASE GO THE CLOSEST EMERGENCY ROOM.    CANCELLATION/MISSED APPOINTMENTS:   In order to receive quality care, all appointments must be kept.  If you are unable to keep an appointment, please reschedule at least 3 days prior if possible. Late cancellations (within 24 hours of the appointment) and repeated no-show appointments may result in dismissal from the clinic. After two no show/late cancellation visits, you will receive a notice letter, alerting you to keep visits to prevent department dismissal. If another visit is missed after receipt of the notice, you will be discharged from the clinic. This policy is in effect to allow for other individuals on a long waiting list to be seen as soon as possible. Unlike other branches of medicine where several individuals can be scheduled in a 30 minute time slot, only one individual can be scheduled in any time slot in Psychiatry.     MESSAGES: For simple questions/concerns, you may contact your individual providers electronically through the "[facility]" portal or by calling [phone] " with messages relayed via office staff. If relevant, include pharmacy name and phone number, date of last visit and next scheduled visit, phone number where you can be reached throughout the day, and whether leaving a voicemail or message on an answering machine is acceptable. Messages will be returned by the Medical Assistant or Office Staff after your provider has reviewed the message.  Please allow 24 hours for a returned message before leaving another message. Messages will be checked each workday (Monday through Friday) during office hours (8:00 a.m. and 5:00 p.m.) and returned at most within one business day.  You may leave a non-urgent message after hours. Note that psychotherapy and medication management are not appropriate by telephone or the patient portal.    PRESCRIPTION REFILLS:  Please communicate with your prescriber about any refills you need during your appointment. You may also request refills through the [facility] portal (preferred) or by calling the clinic. Prescriptions will be filled during office hours.      Please do not wait until you are completely out of medication to request refills. Same day refills are not always possible. Patients may experience symptoms of withdrawal if they run out of medications. The patient assumes all responsibility when there is an issue with non-compliance with follow-up appointments and medications.   Some medications are controlled and regulated by the FDA and [place]. Some of these medications can not be refilled before 30 days and require a face to face appointment.     PAPERWORK REQUESTS: If you have any forms or letters that need to be completed by your doctor, please present these at the beginning of the appointment to ensure that information needed to complete them is obtained during the office visit. Paperwork will be returned within 7-10 business days. Staff will call you to  the paperwork when completed.    SPECIAL EVALUATIONS: Please note that  "our department is treatment-focused. As such, we focus on treatment-oriented evaluations and do not perform specialty or "forensic" evaluations. Examples are listed below.     Disability: We do not do disability evaluations.  Please contact Social Security Administration for evaluations and determinations. You will then sign releases allowing for records from your treatment providers to be forwarded to Social Security Administration to use in their evaluation.   Gun Permit: We do not offer Sound Judgment Evaluations or assessments leading to gun ownership, nor do we fill out or file paperwork relevant to owning, concealing or purchasing a firearm.   Emotional Support      Animals (KAYY): We do not provide documentation, including letters, to aid in the acclamation that an Emotional Support Animal is required. Note that ESAs are not trained to perform tasks or recognize particular signs or symptoms. Rather, they are distinguished by the close, emotional, and supportive bond between the animal and the owner.       SAMPLES: We do not provide samples of any medications. If you have financial difficulties and are on a limited income, you may qualify for Patient Assistance Programs from various pharmaceutical companies. This will require that you complete paperwork with your financial information, but this does not guarantee that the company will approve the application. Alternative medication options can be discussed.    REFERRALS/COORDINATION: You will be referred to other providers if we feel unable to adequately diagnose or treat your particular condition, or if collaboration with another provider would allow for better management of your condition.    This document is for information purposes only. Please refer to the full disclaimer and copyright statement available at http://[email or web address].health.[place].gov.au regarding the information from this website before making use of such information.  See website " www.cci.health.wa.gov.au for more handouts and resources.    What is Sleep Hygiene?  Sleep hygiene is the term used to describe good sleep habits. Considerable research has gone into developing a set of guidelines and tips which are designed to enhance good sleeping, and there is much evidence to suggest that these strategies can provide long-term solutions to sleep difficulties. There are many medications which are used to treat insomnia,  but these tend to be only effective in the short-term. Ongoing use of sleeping pills may lead to dependence and interfere with developing good sleep habits independent of medication,  thereby prolonging sleep difficulties. Talk to your health professional about what is right for you, but we recommend good sleep hygiene as an important part of treating insomnia,  either with other strategies such as medication or cognitive therapy or alone.    Sleep Hygiene Tips  1) Get regular. One of the best ways to train your body to sleep well is to go to bed and get up at more or less the same time every day, even on weekends and days off! This regular rhythm will make you feel better and will give your body something to work from.  2) Sleep when sleepy. Only try to sleep when you actually feel tired or sleepy, rather than spending too much time awake in bed.  3) Get up & try again. If you havent been able to get to sleep after about 20 minutes or more, get up and do something calming or boring until you feel sleepy, then return to bed and try again. Sit quietly on the couch with the lights off (bright light will tell your brain that it is time to wake up), or read something boring like the phone book. Avoid doing anything that is too stimulating or interesting, as this will wake you up even more.  4) Avoid caffeine & nicotine. It is best to avoid consuming any caffeine (in coffee, tea, cola drinks, chocolate, and some medications) or nicotine (cigarettes) for at least 4-6 hours before  going to bed. These substances act as stimulants and interfere with the ability to fall asleep   5) Avoid alcohol. It is also best to avoid alcohol for at least 4-6 hours before going to bed. Many people believe that alcohol is relaxing and helps them to get to sleep at first, but it actually interrupts the quality of sleep.  6) Bed is for sleeping. Try not to use your bed for anything other than sleeping and sex, so that your body comes to associate bed with sleep. If you use bed as a place to watch TV, eat, read, work on your laptop, pay bills, and other things, your body will not learn this connection.  7) No naps. It is best to avoid taking naps during the day, to make sure that you are tired at bedtime. If you cant make it through the day without a nap, make sure it is for less than an hour and before 3pm.  8) Sleep rituals. You can develop your own rituals of things to remind your body that it is time to sleep - some people find it useful to do relaxing stretches or breathing exercises for 15 minutes before bed each night, or sit calmly with a cup of caffeine-free tea.  9) Bathtime. Having a hot bath 1-2 hours before bedtime can be useful, as it will raise your body temperature, causing you to feel sleepy as your body temperature drops again. Research shows that sleepiness is associated with a drop in body temperature.  10) No clock-watching. Many people who struggle with sleep tend to watch the clock too much. Frequently checking the clock during the night can wake you up (especially if you turn  on the light to read the time) and reinforces negative thoughts such as Oh no, look how late it is, Ill never get to sleep or its so early, I have only slept for 5 hours, this is  terrible.  11) Use a sleep diary. This worksheet can be a useful way of making sure you have the right facts about your sleep, rather than making assumptions. Because a diary involves watching  the clock (see point 10) it is a good  idea to only use it for two weeks to get an idea of what is going and then perhaps two months down the track to see how you are progressing.  12) Exercise. Regular exercise is a good idea to help with good sleep, but try not to do strenuous exercise in the 4 hours before bedtime. Morning walks are a great way to start the day feeling refreshed!  13) Eat right. A healthy, balanced diet will help you to sleep well, but timing is important. Some people find that a very empty stomach at bedtime is distracting, so it can be useful  to have a light snack, but a heavy meal soon before bed can also interrupt sleep. Some people recommend a warm glass of milk, which contains tryptophan, which acts as a natural  sleep inducer.  14) The right space. It is very important that your bed and bedroom are quiet and comfortable for sleeping. A cooler room with enough blankets to stay warm is best, and make sure you have curtains or an eyemask to block out early morning light and earplugs if there is noise outside your room.  15) Keep daytime routine the same. Even if you have a bad night sleep and are tired it is important that you try to keep your daytime activities the same as you had planned. That is,  dont avoid activities because you feel tired. This can reinforce the insomnia.    Call In if problems  Call Report Side Effects   Encouraged to follow up with primary care / Gen Med MD for continued monitoring of general health and wellness  Call [phone] Or go to ER if Acute Concerns (especially if any thoughts of harm to self or other)       Patito Jaquez, PhD, MP  Advanced Practice Medical Psychologist  [facility]  [place] [address]  [phone]   [phone] fax

## 2022-02-16 ENCOUNTER — TELEPHONE (OUTPATIENT)
Dept: NEUROLOGY | Facility: CLINIC | Age: 42
End: 2022-02-16
Payer: MEDICAID

## 2022-02-16 ENCOUNTER — PATIENT MESSAGE (OUTPATIENT)
Dept: NEUROLOGY | Facility: CLINIC | Age: 42
End: 2022-02-16
Payer: MEDICAID

## 2022-02-16 NOTE — TELEPHONE ENCOUNTER
Spoke to pt to schedule intake appt with Dr Garay, and testing a few weeks later. Sent new patient documents via [employer].

## 2022-02-17 NOTE — PROGRESS NOTES
[facility]   Physical Therapy Treatment Note     Name: Sangeetha Hannon [place]  Clinic Number: [id number]    Therapy Diagnosis:   Encounter Diagnoses   Name Primary?    Decreased ROM of left shoulder     Weakness of shoulder      Physician: Francisco Mathews    Visit Date: 2/18/2022    Physician: Francisco Mathews    Physician Orders: PT Eval and Treat  Medical Diagnosis from Referral: M75.122 (ICD-10-CM) - Nontraumatic complete tear of left rotator cuff  Evaluation Date: 2/15/2022  Authorization Period Expiration: 02/09/2023  Plan of Care Expiration: 05/18/2022                          Progress Update: 03/15/2022                        Visit # / Visits authorized: 1/20 (1 / 1 - eval)          FOTO: 1 / 3       PTA Visit #: 1/5     PRECAUTIONS: Standard Precautions, Diabetes: type 2 and Weight-bearing status: Non-weight-bearing: left shoulder      SURGERY:   1. Left shoulder arthroscopic rotator cuff repair  2. Left shoulder arthroscopic biceps tenodesis  3. Left shoulder subacromial decompression  DATE OF SURGERY: 1/27/2022  MD Follow-up: 3/9/2021    Time In: 1250 pm  Time Out: 125 pm  Total Appointment Time: 35 minutes    SUBJECTIVE     Pt reports: her shoulder has been hurting and she's out of pain medicine so she has just been tolerating it.    She was compliant with home exercise program.  Response to previous treatment: initial eval  Functional change: too soon to tell    Pain: 7/10  Location: left shoulder      OBJECTIVE     Objective Measures updated at progress report unless specified.     Treatment     Rehabilitation program:   Phase 1: (0-6 weeks) - 1/27/2022-3/10/2022  Phase 2: (6-12 weeks)- 3/10/2022-4/7/2022  Phase 3: (12+ weeks)- 4/7/2022+    Sangeetha received the treatments listed below:      Sangeetha received the following manual therapy techniques: Joint mobilizations, Myofacial release, Soft tissue Mobilization, and PROM were applied to the: left for (20)  minutes, including:   x = exercises performed today     Manual Intervention Performed today:     Soft Tissue Massage  x upper trapezius, levator scapulae  and rhomboids    Joint Mobility   Grade (I-III); GHJ oscillations/AP/Lat/SupInf     x Grade (I-III); Scapular Thoracic Med/Lat/Sup/Inf/UR/DR   PROM x Supine: Internal rotation to 30/External Rotation to 30     x Side-lying: Scapular ADD, Shoulder Flexion     x Elbow Flexion/extension      Plan for Next Visit: GHJ oscillations      Sangeetha received therapeutic exercises to develop strength, endurance, ROM, flexibility, and posture for (15) minutes including:   x = exercises performed      Ther Ex Performed today:     Scapular Retractions x 30x   Pendulum Hangs x 30x CW/CCW   Wrist flexion/extension x 30x   Bicep curls x 30x   Ball squeezes  30x   Shoulder Isometrics: flex, ext, abd, IR, ER   x 3s x10               Plan for Next Visit: rhythmic stabilization        Patient Education and Home Exercises     Home Exercises Provided and Patient Education Provided     Education provided:   · Patient educated on the impairments noted above and the effects of physical therapy intervention to improve overall condition and QOL.   · Patient was educated on all the above exercise prior/during/after for proper posture, positioning, and execution for safe performance with home exercise program.     Written Home Exercises Provided: Patient instructed to cont prior HEP. Exercises were reviewed and Sangeetha was able to demonstrate them prior to the end of the session.  Sangeetha demonstrated good  understanding of the education provided. See EMR under Patient Instructions for exercises provided during therapy sessions    ASSESSMENT     Patient did well with addition of isometric exercises, she was encouraged to remain as pain free as possible. She had a few moments that caused minimal pain. Upper trap STM and stretching were done this session to address tension she presented to therapy with,  which seemed to help relieve this.     Sangeetha Is progressing well towards her goals.   Pt prognosis is Good.     Pt will continue to benefit from skilled outpatient physical therapy to address the deficits listed in the problem list box on initial evaluation, provide pt/family education and to maximize pt's level of independence in the home and community environment.     Pt's spiritual, cultural and educational needs considered and pt agreeable to plan of care and goals.     Anticipated barriers to physical therapy:  co-morbidities and sedentary lifestyle, transportation and other doctors appointments    Goals:     SHORT TERM GOALS: 8 weeks 7/15/2021   1. Recent signs and systems trend is improving in order to progress towards LTG's.     1. Patient will improve AROM to below measures to progress towards long term goals.  a. Shoulder Flexion: pain free AROM >130*  b. Shoulder ER @ 90*: pain free AROM > 60*  c. Shoulder functional IR: pain free AROM > L5     3. Patient will be independent with HEP in order to further progress and return to maximal function.     4. Pain rating at Worst: 5/10 in order to progress towards increased independence with activity.     5. Patient will be able to correct postural deviations in sitting and standing, to decrease pain and promote long term stability.        LONG TERM GOALS: 16 weeks 7/15/2021   1. Patient will return to normal ADL, recreational, and work related activities with less pain and limitation.      2. Patient will improve AROM to stated goals in order to return to maximal functional potential.      3. Patient will improve strength to stated goals of appropriate musculature in order to improve functional independence.      4. Pain Rating at Best: 1/10 to improve Quality of Life and return to prior level activities.      5. Pain will Improve FOTO Score to the predicted outcome score: 52%     6. Patient will have met/partially met personal goal of: being able to reach arm  above shoulder height to complete daily activities              PLAN     Continue PT current POC to improve left upper extremity range of motion and strength.    Tylor Lamb, PTA

## 2022-02-17 NOTE — PROGRESS NOTES
"Individual Psychotherapy (PhD/LCSW)    2/15/2022    Site:  [facility]         Therapeutic Intervention: Met with patient.  Outpatient - Insight oriented psychotherapy 45 min - CPT code 94874    Chief complaint/reason for encounter: depression and anxiety     Interval history and content of current session:  Patient presents to ongoing individual therapy due to depression and anxiety.  She was seen by Dr. Jaquez earlier today.  She will be referred for neuropsych testing.  She was able to receive disability due to her condition.  She would like to stay living with her sister, but her sister would like her to become independent again.  The patient recalls visiting family in [place] when she came south from [place].  She would not stay in one place and she was constantly visiting others.  Her boyfriend wants her to move in with him.  Her sister has given her a deadline.  The patient took medical transportation to get to this appointment.  She recalls hearing children's voices telling her that they were going to take her to the basement and do her harm in [place].  She noted that the children were "real" in [place].  She does hear the children's voices now.  It causes her anxiety.  Encourage the patient to focus on the present.  Note that she is grieving the life that she used to live.  Reflect that if she moves in with her boyfriend she will not be very independent.  She deals with pain due to previous surgeries.  She continues to wear an arm brace from a recent surgery.  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  She does not want to be told when to go to bed or when to eat.  She admits that she was uncomfortable meeting Dr. Jaquez because she is a new person.  She does feel it helps her to talk about her problems.    Treatment plan:  ? Target symptoms: depression, anxiety   ? Why chosen therapy is appropriate versus another modality: relevant to " diagnosis  ? Outcome monitoring methods: self-report, observation  ? Therapeutic intervention type: insight oriented psychotherapy, supportive psychotherapy, interactive psychotherapy     Risk parameters:  Patient reports no suicidal ideation  Patient reports no homicidal ideation  Patient reports no self-injurious behavior  Patient reports no violent behavior     Verbal deficits: None     Patient's response to intervention:  The patient's response to intervention is accepting, motivated.     Progress toward goals and other mental status changes:  The patient's progress toward goals is limited.     Diagnosis:   Generalized Anxiety Disorder     Plan:  individual psychotherapy and medication management by physician     Return to clinic: as scheduled     Length of Service (minutes): 45

## 2022-02-18 ENCOUNTER — OFFICE VISIT (OUTPATIENT)
Dept: HEMATOLOGY/ONCOLOGY | Facility: CLINIC | Age: 42
End: 2022-02-18
Payer: MEDICAID

## 2022-02-18 ENCOUNTER — CLINICAL SUPPORT (OUTPATIENT)
Dept: REHABILITATION | Facility: HOSPITAL | Age: 42
End: 2022-02-18
Attending: STUDENT IN AN ORGANIZED HEALTH CARE EDUCATION/TRAINING PROGRAM
Payer: MEDICAID

## 2022-02-18 ENCOUNTER — TELEPHONE (OUTPATIENT)
Dept: ENDOSCOPY | Facility: HOSPITAL | Age: 42
End: 2022-02-18
Payer: MEDICAID

## 2022-02-18 VITALS
HEIGHT: 68 IN | BODY MASS INDEX: 44.41 KG/M2 | DIASTOLIC BLOOD PRESSURE: 86 MMHG | WEIGHT: 293 LBS | HEART RATE: 58 BPM | TEMPERATURE: 97 F | SYSTOLIC BLOOD PRESSURE: 141 MMHG | OXYGEN SATURATION: 98 %

## 2022-02-18 DIAGNOSIS — F41.1 GAD (GENERALIZED ANXIETY DISORDER): ICD-10-CM

## 2022-02-18 DIAGNOSIS — E66.01 OBESITY, MORBID, BMI 40.0-49.9: ICD-10-CM

## 2022-02-18 DIAGNOSIS — M25.612 DECREASED ROM OF LEFT SHOULDER: ICD-10-CM

## 2022-02-18 DIAGNOSIS — E04.1 THYROID NODULE: ICD-10-CM

## 2022-02-18 DIAGNOSIS — D50.9 MICROCYTIC ANEMIA: ICD-10-CM

## 2022-02-18 DIAGNOSIS — D32.9 MENINGIOMA: ICD-10-CM

## 2022-02-18 DIAGNOSIS — C50.912 MALIGNANT NEOPLASM OF LEFT BREAST IN FEMALE, ESTROGEN RECEPTOR NEGATIVE, UNSPECIFIED SITE OF BREAST: ICD-10-CM

## 2022-02-18 DIAGNOSIS — R29.898 WEAKNESS OF SHOULDER: ICD-10-CM

## 2022-02-18 DIAGNOSIS — Z17.1 MALIGNANT NEOPLASM OF LEFT BREAST IN FEMALE, ESTROGEN RECEPTOR NEGATIVE, UNSPECIFIED SITE OF BREAST: ICD-10-CM

## 2022-02-18 PROCEDURE — 3079F PR MOST RECENT DIASTOLIC BLOOD PRESSURE 80-89 MM HG: ICD-10-PCS | Mod: CPTII,,, | Performed by: INTERNAL MEDICINE

## 2022-02-18 PROCEDURE — 3044F PR MOST RECENT HEMOGLOBIN A1C LEVEL <7.0%: ICD-10-PCS | Mod: CPTII,,, | Performed by: INTERNAL MEDICINE

## 2022-02-18 PROCEDURE — 3061F PR NEG MICROALBUMINURIA RESULT DOCUMENTED/REVIEW: ICD-10-PCS | Mod: CPTII,,, | Performed by: INTERNAL MEDICINE

## 2022-02-18 PROCEDURE — 3072F PR LOW RISK FOR RETINOPATHY: ICD-10-PCS | Mod: CPTII,,, | Performed by: INTERNAL MEDICINE

## 2022-02-18 PROCEDURE — 3061F NEG MICROALBUMINURIA REV: CPT | Mod: CPTII,,, | Performed by: INTERNAL MEDICINE

## 2022-02-18 PROCEDURE — 97140 MANUAL THERAPY 1/> REGIONS: CPT | Mod: CQ

## 2022-02-18 PROCEDURE — 1159F MED LIST DOCD IN RCRD: CPT | Mod: CPTII,,, | Performed by: INTERNAL MEDICINE

## 2022-02-18 PROCEDURE — 3008F BODY MASS INDEX DOCD: CPT | Mod: CPTII,,, | Performed by: INTERNAL MEDICINE

## 2022-02-18 PROCEDURE — 1159F PR MEDICATION LIST DOCUMENTED IN MEDICAL RECORD: ICD-10-PCS | Mod: CPTII,,, | Performed by: INTERNAL MEDICINE

## 2022-02-18 PROCEDURE — 3077F SYST BP >= 140 MM HG: CPT | Mod: CPTII,,, | Performed by: INTERNAL MEDICINE

## 2022-02-18 PROCEDURE — 99215 PR OFFICE/OUTPT VISIT, EST, LEVL V, 40-54 MIN: ICD-10-PCS | Mod: 25,S$PBB,, | Performed by: INTERNAL MEDICINE

## 2022-02-18 PROCEDURE — 3079F DIAST BP 80-89 MM HG: CPT | Mod: CPTII,,, | Performed by: INTERNAL MEDICINE

## 2022-02-18 PROCEDURE — 3077F PR MOST RECENT SYSTOLIC BLOOD PRESSURE >= 140 MM HG: ICD-10-PCS | Mod: CPTII,,, | Performed by: INTERNAL MEDICINE

## 2022-02-18 PROCEDURE — 99215 OFFICE O/P EST HI 40 MIN: CPT | Mod: PBBFAC,PO | Performed by: INTERNAL MEDICINE

## 2022-02-18 PROCEDURE — 3066F PR DOCUMENTATION OF TREATMENT FOR NEPHROPATHY: ICD-10-PCS | Mod: CPTII,,, | Performed by: INTERNAL MEDICINE

## 2022-02-18 PROCEDURE — 3066F NEPHROPATHY DOC TX: CPT | Mod: CPTII,,, | Performed by: INTERNAL MEDICINE

## 2022-02-18 PROCEDURE — 3008F PR BODY MASS INDEX (BMI) DOCUMENTED: ICD-10-PCS | Mod: CPTII,,, | Performed by: INTERNAL MEDICINE

## 2022-02-18 PROCEDURE — 99999 PR PBB SHADOW E&M-EST. PATIENT-LVL V: CPT | Mod: PBBFAC,,, | Performed by: INTERNAL MEDICINE

## 2022-02-18 PROCEDURE — 3044F HG A1C LEVEL LT 7.0%: CPT | Mod: CPTII,,, | Performed by: INTERNAL MEDICINE

## 2022-02-18 PROCEDURE — 99215 OFFICE O/P EST HI 40 MIN: CPT | Mod: 25,S$PBB,, | Performed by: INTERNAL MEDICINE

## 2022-02-18 PROCEDURE — 97110 THERAPEUTIC EXERCISES: CPT | Mod: CQ

## 2022-02-18 PROCEDURE — 3072F LOW RISK FOR RETINOPATHY: CPT | Mod: CPTII,,, | Performed by: INTERNAL MEDICINE

## 2022-02-18 PROCEDURE — 99999 PR PBB SHADOW E&M-EST. PATIENT-LVL V: ICD-10-PCS | Mod: PBBFAC,,, | Performed by: INTERNAL MEDICINE

## 2022-02-18 NOTE — ASSESSMENT & PLAN NOTE
Obtained MRI of brain that revealed a 9 mm round extra-axial enhancing mass overlying the left frontal lobe consistent with a small meningioma.  No other enhancing intracranial lesion identified.

## 2022-02-18 NOTE — ASSESSMENT & PLAN NOTE
Initiated workup for microcytic anemia, most recent iron studies from 02/15/2022 revealed low iron saturation at 15% and ferritin level at 18 nanograms/cc.  Noted hemoglobin at 11.7 grams/deciliter with microcytosis.  Given these findings, will recommend IV iron therapy.  Will recheck iron and hemoglobin status at the completion of treatment.  Will also recommend upper and lower endoscopies to rule out GI involvement.  Return in 6 weeks with repeat labs or sooner if needed.

## 2022-02-18 NOTE — ASSESSMENT & PLAN NOTE
Stage IIA (pT2 pN0 cM0) invasive ductal carcinoma, ER 1%, NE negative HER2 Elieser negative status post left breast mastectomy and adjuvant chemotherapy. Completed dose dense AC plus 3/12 weekly Taxol.     Recently established with our clinic. Restaging PET-CT scan from 05/26/2021 showed no FDG PET/CT findings to suggest recurrent or metastatic disease.  Ordered mammography, yet to be obtained by patient.    Although patient did not complete her weekly Taxol, it was decided that resuming adjuvant chemotherapy after 1 year may not be beneficial based on evidence.  Decision was made to continue with surveillance imaging as clinically necessary and physical assessment.       Reviewed CBC and CMP today, no concerning cytopenia or evidence of end-organ damage.  Return back in 4 weeks with repeat labs or sooner if needed.

## 2022-02-18 NOTE — PROGRESS NOTES
Subjective:   Date of Visit: 2/18/22   ?   ?    REFERRING PROVIDER: No referring provider defined for this encounter.   ?   CHIEF COMPLAINT:  History of breast cancer???????   ?   ONCOLOGIC DIAGNOSIS:  Invasive ductal carcinoma, pT2 N0 M0  ?   CURRENT TREATMENT:  None    PAST TREATMENT:   -left breast mastectomy  -adjuvant chemotherapy with dose dense AC followed by 3 cycles of weekly Taxol  ?   ONCOLOGIC HISTORY:   -invasive ductal carcinoma-April 2020     HPI:  42-year-old female with past medical history that is significant for hypertension osteoarthritis thyroiditis, invasive ductal carcinoma status post left breast mastectomy and adjuvant chemotherapy presents today to establish with our clinic having relocated to [place].    Apparently in April of 2020, she presented to PCP with complaints of breast lump near sternum, painful and swollen.  Mammography/ultrasound breast showed a new 2.5 cm mass left breast at 9 Oclock middle depth.  Biopsy revealed invasive ductal carcinoma, ER positive by 1%, WI negative and HER2 Elieser negative, stage at pT2 N0 M0.  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    She underwent mastectomy on 06/24/2020 and the tumor was noted to be 3.1 cm in size.  0/16 lymph nodes were positive.  Tumor was essentially treated at triple negative and chemotherapy was recommended.  Echocardiogram performed May of 2020 showed ejection fraction of 55%.  Patient was treated with dose dense AC followed by weekly Taxol.  She received 3 cycles out of 12 of single agent Taxol with the last one being in October of 2020 before she started complaining of worsening cognitive abilities and speech problems.  Chemotherapy was discontinued.  Adjuvant radiation was not recommended as patient's tumor margins were noted to be about 1.3 cm, she had negative lymph nodes and tumor was T2.  Endocrine therapy was not recommended.    She has since been observed.  Recently relocated to  [place].  She presents today to establish with our clinic.    She complains of pain around [employer] which was last flushed in of 2020 per patient.  Also complains of left shoulder pain which she attributed to rotator cuff injury.  Denies fever, chills, nausea chest pain shortness of breath, abdominal pain, or dysuria.  Denies nipple inversion, breast pain palpable mass.    With regards to speech difficulty and cognitive impairment, MRI of the brain was obtained on 10/13/2020 and showed a left frontal meningioma with no acute intracranial pathology, repeat MRI was recommended in 6 months.  She complains of intermittent headache mostly frontal.    Family history is pertinent colon cancer, prostate cancer esophageal cancer.  No family history breast or ovarian cancer.    Review of Systems   Constitutional: Positive for fatigue. Negative for activity change, appetite change, chills, fever and unexpected weight change.   HENT: Negative for hearing loss, mouth sores, nosebleeds, sore throat, tinnitus, trouble swallowing and voice change.         Headache   Eyes: Negative for visual disturbance.   Respiratory: Negative for cough, chest tightness and shortness of breath.    Cardiovascular: Negative for chest pain, palpitations and leg swelling.   Gastrointestinal: Negative for abdominal pain, anal bleeding, blood in stool, constipation, diarrhea, nausea and vomiting.   Genitourinary: Negative for dysuria, frequency, hematuria, pelvic pain, vaginal bleeding and vaginal pain.   Musculoskeletal: Positive for gait problem. Negative for arthralgias, back pain, joint swelling and neck pain.        Left shoulder pain   Skin: Negative for color change, pallor, rash and wound.   Allergic/Immunologic: Negative for immunocompromised state.   Neurological: Positive for weakness. Negative for dizziness, tremors, syncope, speech difficulty, light-headedness and headaches.   Hematological: Negative for adenopathy. Does not bruise/bleed  easily.   Psychiatric/Behavioral: Negative for agitation, confusion, decreased concentration, hallucinations and sleep disturbance. The patient is not nervous/anxious.        ?   PAST MEDICAL HISTORY:   Past Medical History:   Diagnosis Date    Anxiety     Breast cancer     Chest pain     Chest pain 7/2/2021    Cholecystitis without calculus     Dizziness     Elevated C-reactive protein (CRP)     Essential hypertension     Memory change     EVERARDO (obstructive sleep apnea)     Osteoarthritis     Other specified disorders of thyroid     Pre-diabetes 4/15/2021    Chronic, Stable, cont metformin    Screening mammogram, encounter for 4/15/2021    Shoulder injury     SOB (shortness of breath)     Thyroiditis     Vision disturbance 4/30/2021    ?     PAST SURGICAL HISTORY:   Past Surgical History:   Procedure Laterality Date    ARTHROSCOPIC REPAIR OF ROTATOR CUFF OF SHOULDER Left 1/27/2022    Procedure: REPAIR, ROTATOR CUFF, ARTHROSCOPIC;  Surgeon: Francisco Mathews MD;  Location: [facility] OR;  Service: Orthopedics;  Laterality: Left;    ARTHROSCOPY OF SHOULDER WITH DECOMPRESSION OF SUBACROMIAL SPACE Left 1/27/2022    Procedure: ARTHROSCOPY, SHOULDER, WITH SUBACROMIAL SPACE DECOMPRESSION;  Surgeon: Francisco Mathews MD;  Location: [facility] OR;  Service: Orthopedics;  Laterality: Left;    BREAST BIOPSY      FIXATION OF TENDON Left 1/27/2022    Procedure: FIXATION, TENDON;  Surgeon: Francisco Mathews MD;  Location: [facility] OR;  Service: Orthopedics;  Laterality: Left;    HYSTERECTOMY      INJECTION OF JOINT Left 11/12/2021    Procedure: Left suprascapular and axillary injection with local;  Surgeon: Bisi Cochran MD;  Location: [facility] PAIN [facility]GT;  Service: Pain Management;  Laterality: Left;    MASTECTOMY        ?   ALLERGIES:   Allergies as of 02/18/2022 - Reviewed 02/18/2022   Allergen Reaction Noted    Lisinopril  03/01/2021      ?   MEDICATIONS:?   Outpatient Medications Marked as Taking  for the 22 encounter (Office Visit) with Jamar Martin MD   Medication Sig Dispense Refill    albuterol (PROVENTIL/VENTOLIN HFA) 90 mcg/actuation inhaler Inhale 2 puffs into the lungs every 6 (six) hours as needed for Wheezing. Rescue 18 g 1    ammonium lactate 12 % Crea Apply 1 Act topically 2 (two) times daily. To feet. 140 g 2    ascorbic acid, vitamin C, (VITAMIN C) 100 MG tablet Take 100 mg by mouth once daily.      budesonide-formoterol 80-4.5 mcg (SYMBICORT) 80-4.5 mcg/actuation HFAA Inhale 2 puffs into the lungs 2 (two) times a day. Controller 10.2 g 3    busPIRone (BUSPAR) 15 MG tablet Take 1 tablet (15 mg total) by mouth 3 (three) times daily. 270 tablet 3    celecoxib (CELEBREX) 200 MG capsule Take 1 capsule (200 mg total) by mouth 2 (two) times daily. 28 capsule 0    dulaglutide (TRULICITY) 0.75 mg/0.5 mL pen injector Inject 0.75 mg into the skin every 7 days for 30 days, THEN 1.5 mg every 7 days. 20 pen 0    EScitalopram oxalate (LEXAPRO) 20 MG tablet Take 1 tablet (20 mg total) by mouth once daily. 90 tablet 3    EUTHYROX 50 mcg tablet Take 1 tablet (50 mcg total) by mouth every morning. 90 tablet 1    gabapentin (NEURONTIN) 100 MG capsule Take 1 capsule (100 mg total) by mouth 2 (two) times daily. 180 capsule 1    hydroCHLOROthiazide (HYDRODIURIL) 25 MG tablet Take 1 tablet (25 mg total) by mouth daily as needed (edema, swelling). 90 tablet 3    hydrocortisone 0.5 % cream Apply topically 2 (two) times daily.      linaCLOtide (LINZESS) 72 mcg Cap capsule Take 1 capsule (72 mcg total) by mouth once daily. 30 capsule 5    magnesium oxide (MAG-OX) 400 mg (241.3 mg magnesium) tablet Take 1 tablet (400 mg total) by mouth once daily. 90 tablet 1    meloxicam (MOBIC) 15 MG tablet Take 1 tablet (15 mg total) by mouth once daily. 30 tablet 0    metFORMIN (GLUCOPHAGE-XR) 500 MG ER 24hr tablet SMARTSI Tablet(s) By Mouth Every Evening 90 tablet 3    multivit with min-folic acid  (ADULT MULTIVITAMIN GUMMIES) 200 mcg Chew Take by mouth.      olopatadine (PATANOL) 0.1 % ophthalmic solution       pantoprazole (PROTONIX) 40 MG tablet Take 1 tablet (40 mg total) by mouth once daily. 30 tablet 11    pravastatin (PRAVACHOL) 40 MG tablet Take 1 tablet (40 mg total) by mouth every evening. 90 tablet 3    traMADoL (ULTRAM) 50 mg tablet Take 1 tablet (50 mg total) by mouth every 6 (six) hours as needed for Pain. 30 tablet 0    traZODone (DESYREL) 150 MG tablet Take 1 tablet (150 mg total) by mouth every evening. 90 tablet 3      ?   SOCIAL HISTORY:?   Social History     Tobacco Use    Smoking status: Never Smoker    Smokeless tobacco: Never Used   Substance Use Topics    Alcohol use: Not Currently        ?   FAMILY HISTORY:   family history includes Alcohol abuse in her maternal grandfather; Cancer in her maternal grandfather and mother; Diabetes in her brother and sister; Heart disease in her mother; No Known Problems in her maternal grandmother, paternal grandfather, and paternal grandmother; Stroke in her mother and sister.   ?     Objective:      Physical Exam  Constitutional:       General: She is not in acute distress.     Appearance: She is well-developed. She is obese. She is not ill-appearing or toxic-appearing.   HENT:      Head: Normocephalic and atraumatic.      Mouth/Throat:      Pharynx: No oropharyngeal exudate.   Eyes:      General: No scleral icterus.        Right eye: No discharge.         Left eye: No discharge.      Conjunctiva/sclera: Conjunctivae normal.      Pupils: Pupils are equal, round, and reactive to light.   Neck:      Thyroid: No thyromegaly.   Cardiovascular:      Rate and Rhythm: Normal rate and regular rhythm.      Heart sounds: No murmur heard.      Pulmonary:      Effort: Pulmonary effort is normal. No respiratory distress.      Breath sounds: Normal breath sounds.   Chest:      Chest wall: Deformity (From mastectomy) present. No tenderness.   Breasts:       Right: No supraclavicular adenopathy.      Left: Skin change present. No supraclavicular adenopathy.       Abdominal:      General: Bowel sounds are normal. There is no distension.      Palpations: Abdomen is soft. There is no mass.      Tenderness: There is no abdominal tenderness. There is no guarding or rebound.   Musculoskeletal:         General: No tenderness. Normal range of motion.      Cervical back: Normal range of motion and neck supple.   Lymphadenopathy:      Cervical: No cervical adenopathy.      Right cervical: No superficial cervical adenopathy.     Left cervical: No superficial cervical adenopathy.      Upper Body:      Right upper body: No supraclavicular or pectoral adenopathy.      Left upper body: No supraclavicular or pectoral adenopathy.   Skin:     General: Skin is warm and dry.      Capillary Refill: Capillary refill takes 2 to 3 seconds.      Coloration: Skin is not pale.      Findings: No erythema or rash.   Neurological:      Mental Status: She is alert and oriented to person, place, and time.      Cranial Nerves: No cranial nerve deficit.      Sensory: No sensory deficit.   Psychiatric:         Behavior: Behavior normal. Behavior is cooperative.         Judgment: Judgment normal.         ?   Vitals:    02/18/22 0848   BP: (!) 141/86   Pulse: (!) 58   Temp: 97.3 °F (36.3 °C)      ?     ECOG SCORE    2 - Capable of all selfcare but unable to carry out any work activities, active > 50% of hours             ?   Laboratory:  ?   No visits with results within 1 Day(s) from this visit.   Latest known visit with results is:   Lab Visit on 02/15/2022   Component Date Value Ref Range Status    Iron 02/15/2022 75  30 - 160 ug/dL Final    Transferrin 02/15/2022 342  200 - 375 mg/dL Final    TIBC 02/15/2022 506* 250 - 450 ug/dL Final    Saturated Iron 02/15/2022 15* 20 - 50 % Final    Ferritin 02/15/2022 18* 20.0 - 300.0 ng/mL Final    WBC 02/15/2022 4.25  3.90 - 12.70 K/uL Final    RBC  02/15/2022 4.81  4.00 - 5.40 M/uL Final    Hemoglobin 02/15/2022 11.7* 12.0 - 16.0 g/dL Final    Hematocrit 02/15/2022 38.0  37.0 - 48.5 % Final    MCV 02/15/2022 79* 82 - 98 fL Final    MCH 02/15/2022 24.3* 27.0 - 31.0 pg Final    MCHC 02/15/2022 30.8* 32.0 - 36.0 g/dL Final    RDW 02/15/2022 16.9* 11.5 - 14.5 % Final    Platelets 02/15/2022 309  150 - 450 K/uL Final    MPV 02/15/2022 9.0* 9.2 - 12.9 fL Final    Immature Granulocytes 02/15/2022 0.2  0.0 - 0.5 % Final    Gran # (ANC) 02/15/2022 2.0  1.8 - 7.7 K/uL Final    Immature Grans (Abs) 02/15/2022 0.01  0.00 - 0.04 K/uL Final    Lymph # 02/15/2022 1.7  1.0 - 4.8 K/uL Final    Mono # 02/15/2022 0.3  0.3 - 1.0 K/uL Final    Eos # 02/15/2022 0.1  0.0 - 0.5 K/uL Final    Baso # 02/15/2022 0.03  0.00 - 0.20 K/uL Final    nRBC 02/15/2022 0  0 /100 WBC Final    Gran % 02/15/2022 48.0  38.0 - 73.0 % Final    Lymph % 02/15/2022 40.2  18.0 - 48.0 % Final    Mono % 02/15/2022 7.8  4.0 - 15.0 % Final    Eosinophil % 02/15/2022 3.1  0.0 - 8.0 % Final    Basophil % 02/15/2022 0.7  0.0 - 1.9 % Final    Differential Method 02/15/2022 Automated   Final    Sodium 02/15/2022 140  136 - 145 mmol/L Final    Potassium 02/15/2022 4.5  3.5 - 5.1 mmol/L Final    Chloride 02/15/2022 106  95 - 110 mmol/L Final    CO2 02/15/2022 27  23 - 29 mmol/L Final    Glucose 02/15/2022 85  70 - 110 mg/dL Final    BUN 02/15/2022 6  6 - 20 mg/dL Final    Creatinine 02/15/2022 0.8  0.5 - 1.4 mg/dL Final    Calcium 02/15/2022 9.1  8.7 - 10.5 mg/dL Final    Total Protein 02/15/2022 6.9  6.0 - 8.4 g/dL Final    Albumin 02/15/2022 3.3* 3.5 - 5.2 g/dL Final    Total Bilirubin 02/15/2022 0.2  0.1 - 1.0 mg/dL Final    Alkaline Phosphatase 02/15/2022 137* 55 - 135 U/L Final    AST 02/15/2022 14  10 - 40 U/L Final    ALT 02/15/2022 15  10 - 44 U/L Final    Anion Gap 02/15/2022 7* 8 - 16 mmol/L Final    eGFR if African American 02/15/2022 >60  >60 mL/min/1.73 m^2 Final     eGFR if non African American 02/15/2022 >60  >60 mL/min/1.73 m^2 Final      ?   Tumor markers   ?   ?   Imaging: X-Ray Abdomen Flat And Erect  Narrative: EXAMINATION:  XR ABDOMEN FLAT AND ERECT    CLINICAL HISTORY:  Unspecified abdominal pain    TECHNIQUE:  Flat and erect AP views of the abdomen were performed.    COMPARISON:  11/22/2021    FINDINGS:  Bowel gas pattern is nonobstructive.  Moderate colonic fecal material present.  No suspicious mass-effect or calcifications present.  Osseous structures intact.  Lung bases clear.  Impression: Constipation    Electronically signed by: Stephen Gonzalez MD  Date:    02/08/2022  Time:    14:17     ?      Pathology:  Pathology Results  (Last 10 years)    None           ?   Assessment/Plan:       1. ANGIE (generalized anxiety disorder)    2. Malignant neoplasm of left breast in female, estrogen receptor negative, unspecified site of breast    3. Meningioma    4. Microcytic anemia    5. Obesity, morbid, BMI 40.0-49.9    6. Thyroid nodule          ANGIE (generalized anxiety disorder)  Followed by psychiatrist.    Malignant neoplasm of left breast in female, estrogen receptor negative  Stage IIA (pT2 pN0 cM0) invasive ductal carcinoma, ER 1%, CA negative HER2 Elieser negative status post left breast mastectomy and adjuvant chemotherapy. Completed dose dense AC plus 3/12 weekly Taxol.     Recently established with our clinic. Restaging PET-CT scan from 05/26/2021 showed no FDG PET/CT findings to suggest recurrent or metastatic disease.  Ordered mammography, yet to be obtained by patient.    Although patient did not complete her weekly Taxol, it was decided that resuming adjuvant chemotherapy after 1 year may not be beneficial based on evidence.  Decision was made to continue with surveillance imaging as clinically necessary and physical assessment.       Reviewed CBC and CMP today, no concerning cytopenia or evidence of end-organ damage.  Return back in 4 weeks with repeat labs or  sooner if needed.    Meningioma  Obtained MRI of brain that revealed a 9 mm round extra-axial enhancing mass overlying the left frontal lobe consistent with a small meningioma.  No other enhancing intracranial lesion identified.    Microcytic anemia  Initiated workup for microcytic anemia, most recent iron studies from 02/15/2022 revealed low iron saturation at 15% and ferritin level at 18 nanograms/cc.  Noted hemoglobin at 11.7 grams/deciliter with microcytosis.  Given these findings, will recommend IV iron therapy.  Will recheck iron and hemoglobin status at the completion of treatment.  Will also recommend upper and lower endoscopies to rule out GI involvement.  Return in 6 weeks with repeat labs or sooner if needed.    Obesity, morbid, BMI 40.0-49.9  Counseled on lifestyle modification and exercise.    Thyroid nodule  Was seen by Dr. Erickson, biopsy in December of 2021 revealed benign tissue.      ?ANGIE (generalized anxiety disorder)  -     CBC Auto Differential; Future; Expected date: 02/18/2022  -     Comprehensive Metabolic Panel; Future; Expected date: 02/18/2022  -     Iron and TIBC; Future; Expected date: 02/18/2022  -     Ferritin; Future; Expected date: 02/18/2022  -     Case Request Endoscopy: EGD (ESOPHAGOGASTRODUODENOSCOPY)  COLONOSCOPY  -     Mammo Digital Diagnostic Bilat with Darrion; Future; Expected date: 02/18/2022    Malignant neoplasm of left breast in female, estrogen receptor negative, unspecified site of breast  -     CBC Auto Differential; Future; Expected date: 02/18/2022  -     Comprehensive Metabolic Panel; Future; Expected date: 02/18/2022  -     Iron and TIBC; Future; Expected date: 02/18/2022  -     Ferritin; Future; Expected date: 02/18/2022  -     Case Request Endoscopy: EGD (ESOPHAGOGASTRODUODENOSCOPY)  COLONOSCOPY  -     Mammo Digital Diagnostic Bilat with Darrion; Future; Expected date: 02/18/2022  -     Mammo Digital Diagnostic Right with Darrion; Future; Expected date:  02/18/2022    Meningioma    Microcytic anemia  -     CBC Auto Differential; Future; Expected date: 02/18/2022  -     Comprehensive Metabolic Panel; Future; Expected date: 02/18/2022  -     Iron and TIBC; Future; Expected date: 02/18/2022  -     Ferritin; Future; Expected date: 02/18/2022  -     Case Request Endoscopy: EGD (ESOPHAGOGASTRODUODENOSCOPY)  COLONOSCOPY  -     Mammo Digital Diagnostic Bilat with Darrion; Future; Expected date: 02/18/2022    Obesity, morbid, BMI 40.0-49.9    Thyroid nodule       ?   Follow-Up: Follow up in about 2 months (around 4/18/2022).    BREANNE CROCKETT Md., Ph.D  Hematology & Oncology Department  Phone #: [phone]

## 2022-02-18 NOTE — TELEPHONE ENCOUNTER
----- Message from Sangeetha Werner LPN sent at 2/18/2022 10:51 AM CST -----    ----- Message -----  From: Valerie Poon LPN  Sent: 2/18/2022  10:34 AM CST  To: [facility] Staff    Good morning, Dr. Martin would like patient to be seen for a EGD and colonoscopy.  Thank you!

## 2022-02-22 ENCOUNTER — CLINICAL SUPPORT (OUTPATIENT)
Dept: REHABILITATION | Facility: HOSPITAL | Age: 42
End: 2022-02-22
Attending: STUDENT IN AN ORGANIZED HEALTH CARE EDUCATION/TRAINING PROGRAM
Payer: MEDICAID

## 2022-02-22 ENCOUNTER — CLINICAL SUPPORT (OUTPATIENT)
Dept: SPEECH THERAPY | Facility: HOSPITAL | Age: 42
End: 2022-02-22
Payer: MEDICAID

## 2022-02-22 DIAGNOSIS — R29.898 WEAKNESS OF SHOULDER: ICD-10-CM

## 2022-02-22 DIAGNOSIS — M25.612 DECREASED ROM OF LEFT SHOULDER: Primary | ICD-10-CM

## 2022-02-22 DIAGNOSIS — R49.0 DYSPHONIA: Primary | ICD-10-CM

## 2022-02-22 PROCEDURE — 97110 THERAPEUTIC EXERCISES: CPT

## 2022-02-22 PROCEDURE — 92507 TX SP LANG VOICE COMM INDIV: CPT | Performed by: SPEECH-LANGUAGE PATHOLOGIST

## 2022-02-22 NOTE — PROGRESS NOTES
"[facility]  Speech Therapy Treatment Note- Voice  Date: 2/22/2022     Name: Sangeetha June   MRN: [id number]   Therapy Diagnosis:   Encounter Diagnosis   Name Primary?    Dysphonia Yes   Physician: Issac Erickson MD  Physician Orders: Ambulatory referral/consult to speech therapy   Medical Diagnosis: Dysphonia [R49.0]    Visit #/ Visits Authorized: 3/ 8 (+eval)  Date of Evaluation:  1/7/22  Insurance Authorization Period: 1/14/22-2/28/22  Plan of Care Expiration Date:    3/18/22  Extended Plan of Care:  -   Progress Note: 3/15/22   Visits Cancelled: 2  Visits No Show: 0    Time In:  9:44 AM  Time Out: 10:30 AM  Total Billable Time: 46 mins      Precautions: Standard and cancer  Subjective:   Patient reports: she feels like "her brain is messing up" and "telling her she needs to get out of here". She reports she is feeling anxious and would like to see her psychiatrist soon. She reports after discussion, that she forgot to take all of her medication this morning which is likely related to her anxious feelings. She reports she has been having memory concerns since chemotherapy and she will follow with a neuropsychologist for testing in early May 2022.     She reports her voice is "alright". She reports some days her voice is clear and some days she feels like "she has to push her words out", particularly when she is fatigued. She reports she has been completing her exercises about 1 time per day. She reports she has eliminated soda from her diet and has overall increased her water intake.   She was compliant to home exercise program.     Response to previous treatment: -     Pain Scale:  6/10 on a Visual Analog Scale currently.   Pain Location: L shoulder     Objective:   TIMED  Procedure Min.   Cognitive Therapeutic Interventions, first 15 minutes CPT 43109  0   Cognitive Therapeutic Interventions, each additional 15 minutes CPT 58078  0         UNTIMED  Procedure Min.   Speech- Language- Voice " Therapy  46   Total Timed Units: 0  Total Untimed Units: 1  Charges Billed/Number of units: 92218/1    Short Term Goals: (10 weeks) Current Progress:   Patient will complete SOVT exercises and/or resonant-focused exercises 3-5x daily to strengthen and balance the intrinsic laryngeal musculature and maximize glottic closure without medial hyperfunction.    Progressing/ Not Met 2/22/2022   Not formally addressed      Patient will discriminate between easy and strained phonation with 80% accuracy.     Goal Met 2/22/2022    Met x1 Patient discriminated between easy and strained phonation while completing VFE as well as in conversation with about 85% accuracy and minimal verbal and visual cueing.      Patient will recall and utilize vocal hygiene strategies with minimal verbal cueing.       Goal Met 2/15/2022   Patient recalled and is implementing vocal hygiene strategies independently, most notably, significantly increasing water intake since evaluation. Vocal hygiene strategies reviewed in today's session. Patient encouraged to continue to increase water intake as able.    Patient will participate in vocal function exercises with 80% accuracy with minimal verbal cueing to improve vocal quality.     Progressing/ Not Met 2/22/2022    Met x1 Vocal function exercises were reviewed and patient executed with 85% accuracy and minimal-moderate verbal and visual cueing for inhalation through nose prior to execution of exercises, reducing volume and laryngeal strain during exercise completion, focus on using breath and avoiding holding breath. Exercises completed x4 in today's session. Will continue to address. Of note, significantly improved vocal quality with overall subjective reduction in laryngeal strain after completion of exercises in today's session.         Patient Education/Response:   Patient was educated regarding respiration/phonation and coordination of systems for efficient voicing as well as rationale for  completion of exercises. Patient expressed understanding of information provided.     Home program established: yes-vocal hygiene, vocal funciton exercises x2 each, x2 daily  Exercises were reviewed and Sangeetha was able to demonstrate them prior to the end of the session.  Sangeetha demonstrated good  understanding of the education provided.     See Electronic Medical Record under Patient Instructions for exercises provided throughout therapy.  Assessment:   Sangeetha is progressing well towards her goals. Progression toward independent execution of VFE in today's session with overall improved vocal quality following execution. Current goals remain appropriate. Goals to be updated as necessary.     Patient prognosis is Good. Patient will continue to benefit from skilled outpatient speech and language therapy to address the deficits listed in the problem list on initial evaluation, provide patient/family education and to maximize patient's level of independence in the home and community environment.     Medical necessity is demonstrated by the following IMPAIRMENTS:  Poor vocal quality prevents accurate/efficient communication in communication situations (medical, social, vocational, home).    Barriers to Therapy: comorbidities, history of cancer, transportation  Patient's spiritual, cultural and educational needs considered and patient agreeable to plan of care and goals.  Plan:   Continue Plan of Care with focus on vocal function exercises, SOVT exercises, and reducing laryngeal strain through awareness of phonation.     Angelika Gunter MA, CCC-SLP  Speech Language Pathologist  2/22/2022

## 2022-02-22 NOTE — PROGRESS NOTES
[facility]   Physical Therapy Treatment Note     Name: Sangeetha Hannon [place]  Clinic Number: [id number]    Therapy Diagnosis:   Encounter Diagnoses   Name Primary?    Decreased ROM of left shoulder Yes    Weakness of shoulder      Physician: Francisco Mathews*    Visit Date: 2/22/2022    Physician: Francisco Mathews    Physician Orders: PT Eval and Treat  Medical Diagnosis from Referral: M75.122 (ICD-10-CM) - Nontraumatic complete tear of left rotator cuff  Evaluation Date: 2/15/2022  Authorization Period Expiration: 02/09/2023  Plan of Care Expiration: 05/18/2022                          Progress Update: 03/15/2022                        Visit # / Visits authorized: 2/20 (1 - eval)          FOTO: 1 / 3       PTA Visit #: 1/5     PRECAUTIONS: Standard Precautions, Diabetes: type 2 and Weight-bearing status: Non-weight-bearing: left shoulder      SURGERY:   1. Left shoulder arthroscopic rotator cuff repair  2. Left shoulder arthroscopic biceps tenodesis  3. Left shoulder subacromial decompression  DATE OF SURGERY: 1/27/2022  MD Follow-up: 3/9/2021    Time In: 1250 pm  Time Out: 125 pm  Total Appointment Time: 35 minutes    SUBJECTIVE     Pt reports: her shoulder has been hurting at deltoid tuberosity. She reports doing laundry with her non-affected arm and    She was compliant with home exercise program.  Response to previous treatment: initial eval  Functional change: too soon to tell    Pain: 7/10  Location: left shoulder      OBJECTIVE     Objective Measures updated at progress report unless specified.     Treatment     Rehabilitation program:   Phase 1: (0-6 weeks) - 1/27/2022-3/10/2022  Phase 2: (6-12 weeks)- 3/10/2022-4/7/2022  Phase 3: (12+ weeks)- 4/7/2022+    Sangeetha received the treatments listed below:      Sangeetha received the following manual therapy techniques: Joint mobilizations, Myofacial release, Soft tissue Mobilization, and PROM were applied to the: left for  (20) minutes, including:   x = exercises performed today     Manual Intervention Performed today:     Soft Tissue Massage  x upper trapezius, levator scapulae  and rhomboids    Joint Mobility  x Grade (I-III); GHJ oscillations/AP/Lat/SupInf     x Grade (I-III); Scapular Thoracic Med/Lat/Sup/Inf/UR/DR   PROM x Supine: Internal rotation to 30/External Rotation to 30     x Side-lying: Scapular ADD, Shoulder Flexion     x Elbow Flexion/extension      Plan for Next Visit: GHJ oscillations      Sangeetha received therapeutic exercises to develop strength, endurance, ROM, flexibility, and posture for (25) minutes including:   x = exercises performed      Ther Ex Performed today:     Scapular Retractions x 30x   Pendulum Hangs  30x CW/CCW   Wrist flexion/extension x 30x each 3#   Bicep curls x 30x   Ball squeezes  30x   Shoulder Isometrics: flex, ext, abd, Internal rotation, External Rotation    x 3 x10               Plan for Next Visit: rhythmic stabilization        Patient Education and Home Exercises     Home Exercises Provided and Patient Education Provided     Education provided:   · Patient educated on the impairments noted above and the effects of physical therapy intervention to improve overall condition and QOL.   · Patient was educated on all the above exercise prior/during/after for proper posture, positioning, and execution for safe performance with home exercise program.     Written Home Exercises Provided: Patient instructed to cont prior HEP. Exercises were reviewed and Sangeetha was able to demonstrate them prior to the end of the session.  Sangeetha demonstrated good  understanding of the education provided. See EMR under Patient Instructions for exercises provided during therapy sessions    ASSESSMENT     Patient did well with addition of isometric exercises, she was encouraged to remain as pain free as possible. She had a few moments that caused minimal pain. Upper trap STM and stretching were done this session to  address tension she presented to therapy with, which seemed to help relieve this.     Sangeetha Is progressing well towards her goals.   Pt prognosis is Good.     Pt will continue to benefit from skilled outpatient physical therapy to address the deficits listed in the problem list box on initial evaluation, provide pt/family education and to maximize pt's level of independence in the home and community environment.     Pt's spiritual, cultural and educational needs considered and pt agreeable to plan of care and goals.     Anticipated barriers to physical therapy:  co-morbidities and sedentary lifestyle, transportation and other doctors appointments    Goals:     SHORT TERM GOALS: 8 weeks 7/15/2021   1. Recent signs and systems trend is improving in order to progress towards LTG's.     1. Patient will improve AROM to below measures to progress towards long term goals.  a. Shoulder Flexion: pain free AROM >130*  b. Shoulder ER @ 90*: pain free AROM > 60*  c. Shoulder functional IR: pain free AROM > L5     3. Patient will be independent with HEP in order to further progress and return to maximal function.     4. Pain rating at Worst: 5/10 in order to progress towards increased independence with activity.     5. Patient will be able to correct postural deviations in sitting and standing, to decrease pain and promote long term stability.        LONG TERM GOALS: 16 weeks 7/15/2021   1. Patient will return to normal ADL, recreational, and work related activities with less pain and limitation.      2. Patient will improve AROM to stated goals in order to return to maximal functional potential.      3. Patient will improve strength to stated goals of appropriate musculature in order to improve functional independence.      4. Pain Rating at Best: 1/10 to improve Quality of Life and return to prior level activities.      5. Pain will Improve FOTO Score to the predicted outcome score: 52%     6. Patient will have met/partially  met personal goal of: being able to reach arm above shoulder height to complete daily activities              PLAN     Continue PT current POC to improve left upper extremity range of motion and strength.    Ryne Arrington, PT

## 2022-02-23 ENCOUNTER — PATIENT MESSAGE (OUTPATIENT)
Dept: INTERNAL MEDICINE | Facility: CLINIC | Age: 42
End: 2022-02-23
Payer: MEDICAID

## 2022-02-23 ENCOUNTER — PATIENT MESSAGE (OUTPATIENT)
Dept: CARDIOLOGY | Facility: CLINIC | Age: 42
End: 2022-02-23
Payer: MEDICAID

## 2022-02-23 ENCOUNTER — TELEPHONE (OUTPATIENT)
Dept: HEMATOLOGY/ONCOLOGY | Facility: CLINIC | Age: 42
End: 2022-02-23
Payer: MEDICAID

## 2022-02-23 ENCOUNTER — PATIENT MESSAGE (OUTPATIENT)
Dept: PSYCHIATRY | Facility: CLINIC | Age: 42
End: 2022-02-23
Payer: MEDICAID

## 2022-02-23 ENCOUNTER — HOSPITAL ENCOUNTER (OUTPATIENT)
Dept: RADIOLOGY | Facility: HOSPITAL | Age: 42
Discharge: HOME | End: 2022-02-23
Attending: INTERNAL MEDICINE
Payer: MEDICAID

## 2022-02-23 ENCOUNTER — PATIENT MESSAGE (OUTPATIENT)
Dept: HEMATOLOGY/ONCOLOGY | Facility: CLINIC | Age: 42
End: 2022-02-23
Payer: MEDICAID

## 2022-02-23 DIAGNOSIS — C50.912 MALIGNANT NEOPLASM OF LEFT BREAST IN FEMALE, ESTROGEN RECEPTOR NEGATIVE, UNSPECIFIED SITE OF BREAST: ICD-10-CM

## 2022-02-23 DIAGNOSIS — Z17.1 MALIGNANT NEOPLASM OF LEFT BREAST IN FEMALE, ESTROGEN RECEPTOR NEGATIVE, UNSPECIFIED SITE OF BREAST: ICD-10-CM

## 2022-02-23 PROCEDURE — 77065 MAMMO DIGITAL DIAGNOSTIC RIGHT WITH TOMO: ICD-10-PCS | Mod: 26,RT,, | Performed by: RADIOLOGY

## 2022-02-23 PROCEDURE — 77065 DX MAMMO INCL CAD UNI: CPT | Mod: 26,RT,, | Performed by: RADIOLOGY

## 2022-02-23 PROCEDURE — 77061 BREAST TOMOSYNTHESIS UNI: CPT | Mod: 26,RT,, | Performed by: RADIOLOGY

## 2022-02-23 PROCEDURE — 77061 MAMMO DIGITAL DIAGNOSTIC RIGHT WITH TOMO: ICD-10-PCS | Mod: 26,RT,, | Performed by: RADIOLOGY

## 2022-02-23 PROCEDURE — 77065 DX MAMMO INCL CAD UNI: CPT | Mod: TC,RT

## 2022-02-23 NOTE — TELEPHONE ENCOUNTER
"Spoke to patient per jose d message states "I will be moving to [place] or [place] and want to know of any services available for a sitter  Because of my anxiety and not wanting to be alone."  Informed will send message to RAUL Saavedra'er to see if there are any services available, verbalized understanding.   "

## 2022-02-25 ENCOUNTER — CLINICAL SUPPORT (OUTPATIENT)
Dept: REHABILITATION | Facility: HOSPITAL | Age: 42
End: 2022-02-25
Attending: STUDENT IN AN ORGANIZED HEALTH CARE EDUCATION/TRAINING PROGRAM
Payer: MEDICAID

## 2022-02-25 ENCOUNTER — INFUSION (OUTPATIENT)
Dept: INFUSION THERAPY | Facility: HOSPITAL | Age: 42
End: 2022-02-25
Attending: INTERNAL MEDICINE
Payer: MEDICAID

## 2022-02-25 VITALS
SYSTOLIC BLOOD PRESSURE: 124 MMHG | HEART RATE: 60 BPM | RESPIRATION RATE: 16 BRPM | DIASTOLIC BLOOD PRESSURE: 87 MMHG | TEMPERATURE: 97 F | OXYGEN SATURATION: 98 %

## 2022-02-25 DIAGNOSIS — R29.898 WEAKNESS OF SHOULDER: ICD-10-CM

## 2022-02-25 DIAGNOSIS — D50.9 MICROCYTIC ANEMIA: Primary | ICD-10-CM

## 2022-02-25 DIAGNOSIS — M25.612 DECREASED ROM OF LEFT SHOULDER: Primary | ICD-10-CM

## 2022-02-25 PROCEDURE — 63600175 PHARM REV CODE 636 W HCPCS: Mod: JG | Performed by: INTERNAL MEDICINE

## 2022-02-25 PROCEDURE — 96365 THER/PROPH/DIAG IV INF INIT: CPT

## 2022-02-25 PROCEDURE — 97140 MANUAL THERAPY 1/> REGIONS: CPT

## 2022-02-25 PROCEDURE — 97110 THERAPEUTIC EXERCISES: CPT

## 2022-02-25 PROCEDURE — 25000003 PHARM REV CODE 250: Performed by: INTERNAL MEDICINE

## 2022-02-25 RX ORDER — HEPARIN 100 UNIT/ML
500 SYRINGE INTRAVENOUS
Status: DISCONTINUED | OUTPATIENT
Start: 2022-02-25 | End: 2022-02-25 | Stop reason: HOSPADM

## 2022-02-25 RX ADMIN — SODIUM CHLORIDE: 9 INJECTION, SOLUTION INTRAVENOUS at 11:02

## 2022-02-25 RX ADMIN — FERRIC CARBOXYMALTOSE INJECTION 750 MG: 50 INJECTION, SOLUTION INTRAVENOUS at 11:02

## 2022-02-25 RX ADMIN — Medication 500 UNITS: at 12:02

## 2022-02-25 NOTE — PLAN OF CARE
Discussed plan of care with pt. Addressed any and ongoing concerns. Pt denies    Problem: Adult Inpatient Plan of Care  Goal: Plan of Care Review  Outcome: Ongoing, Progressing  Flowsheets (Taken 2/25/2022 1304)  Plan of Care Reviewed With: patient  Goal: Patient-Specific Goal (Individualized)  Outcome: Ongoing, Progressing  Goal: Absence of Hospital-Acquired Illness or Injury  Outcome: Ongoing, Progressing  Intervention: Identify and Manage Fall Risk  Flowsheets (Taken 2/25/2022 1304)  Safety Promotion/Fall Prevention: in recliner, wheels locked  Intervention: Prevent Infection  Flowsheets (Taken 2/25/2022 1304)  Infection Prevention:   equipment surfaces disinfected   hand hygiene promoted  Goal: Optimal Comfort and Wellbeing  Outcome: Ongoing, Progressing  Intervention: Provide Person-Centered Care  Flowsheets (Taken 2/25/2022 1304)  Trust Relationship/Rapport:   care explained   questions encouraged   choices provided   reassurance provided   emotional support provided   thoughts/feelings acknowledged   empathic listening provided   questions answered     Problem: Anemia  Goal: Anemia Symptom Improvement  Outcome: Ongoing, Progressing  Intervention: Monitor and Manage Anemia  Flowsheets (Taken 2/25/2022 1304)  Safety Promotion/Fall Prevention: in recliner, wheels locked  Fatigue Management: fatigue-related activity identified

## 2022-02-25 NOTE — PROGRESS NOTES
[facility]   Physical Therapy Treatment Note     Name: Sangeetha Hannon [place]  Clinic Number: [id number]    Therapy Diagnosis:   Encounter Diagnoses   Name Primary?    Decreased ROM of left shoulder Yes    Weakness of shoulder      Physician: Francisco Mathews*    Visit Date: 2/25/2022    Physician: Francisco Mathews    Physician Orders: PT Eval and Treat  Medical Diagnosis from Referral: M75.122 (ICD-10-CM) - Nontraumatic complete tear of left rotator cuff  Evaluation Date: 2/15/2022  Authorization Period Expiration: 02/09/2023  Plan of Care Expiration: 05/18/2022                          Progress Update: 03/15/2022                        Visit # / Visits authorized: 3/20 (1 - eval)          FOTO: 1 / 3       PTA Visit #: 1/5     PRECAUTIONS: Standard Precautions, Diabetes: type 2 and Weight-bearing status: Non-weight-bearing: left shoulder      SURGERY:   1. Left shoulder arthroscopic rotator cuff repair  2. Left shoulder arthroscopic biceps tenodesis  3. Left shoulder subacromial decompression  DATE OF SURGERY: 1/27/2022  MD Follow-up: 3/9/2021    Time In: 0925 pm  Time Out: 1015 pm  Total Appointment Time: 50 minutes    SUBJECTIVE     Pt reports: pain has not been a problem for her lately but hasn't been able to get her prescription filled. She denies pain during range of motion today.    She was compliant with home exercise program.  Response to previous treatment: initial eval  Functional change: too soon to tell    Pain: 7/10  Location: left shoulder      OBJECTIVE     Objective Measures updated at progress report unless specified.     Treatment     Rehabilitation program:   Phase 1: (0-6 weeks) - 1/27/2022-3/10/2022  Phase 2: (6-12 weeks)- 3/10/2022-4/7/2022  Phase 3: (12+ weeks)- 4/7/2022+    Sangeetha received the treatments listed below:      Sangeetha received the following manual therapy techniques: Joint mobilizations, Myofacial release, Soft tissue Mobilization, and  PROM were applied to the: left for (20) minutes, including:   x = exercises performed today     Manual Intervention Performed today:     Soft Tissue Massage  x upper trapezius, levator scapulae  and rhomboids    Joint Mobility x Grade (I-III); GHJ oscillations/AP/Lat/SupInf     x Grade (I-III); Scapular Thoracic Med/Lat/Sup/Inf/UR/DR   PROM x Supine: Internal rotation to 45/External Rotation to 30     x Side-lying: Scapular ADD, Shoulder Flexion/scaption to 120, shoulder extension to 30      Elbow Flexion/extension      Plan for Next Visit: GHJ oscillations      Sangeetha received therapeutic exercises to develop strength, endurance, ROM, flexibility, and posture for (25) minutes including:   x = exercises performed      Ther Ex Performed today:     Scapular Retractions Yellow Theraband  x 30x   Pendulum Hangs  30x CW/CCW   Wrist flexion/extension  30x each 3#   Bicep curls  30x   Ball squeezes  30x   Shoulder Isometrics: flex, ext, abd, Internal rotation, External Rotation, extension   3 x10    Prone rows leaning over mat x 3x10        Shoulder External Rotation  x Yellow Theraband    shoulder Internal rotation x Yellow Theraband    Rhythmic stabilization  x  elbow at 90 2x1 Minute      Plan for Next Visit: rhythmic stabilization        Patient Education and Home Exercises     Home Exercises Provided and Patient Education Provided     Education provided:   · Patient educated on the impairments noted above and the effects of physical therapy intervention to improve overall condition and QOL.   · Patient was educated on all the above exercise prior/during/after for proper posture, positioning, and execution for safe performance with home exercise program.     Written Home Exercises Provided: Patient instructed to cont prior HEP. Exercises were reviewed and Sangeetha was able to demonstrate them prior to the end of the session.  Sangeetha demonstrated good  understanding of the education provided. See EMR under Patient Instructions  for exercises provided during therapy sessions    ASSESSMENT     Patient did well with addition of prone rows, shoulder Internal rotation, and External Rotation exercises. She had a few moments that caused minimal pain. Rhthymic stabilization also added to today's visit.    Sangeetha Is progressing well towards her goals.   Pt prognosis is Good.     Pt will continue to benefit from skilled outpatient physical therapy to address the deficits listed in the problem list box on initial evaluation, provide pt/family education and to maximize pt's level of independence in the home and community environment.     Pt's spiritual, cultural and educational needs considered and pt agreeable to plan of care and goals.     Anticipated barriers to physical therapy:  co-morbidities and sedentary lifestyle, transportation and other doctors appointments    Goals:     SHORT TERM GOALS: 8 weeks 7/15/2021   1. Recent signs and systems trend is improving in order to progress towards LTG's.     1. Patient will improve AROM to below measures to progress towards long term goals.  a. Shoulder Flexion: pain free AROM >130*  b. Shoulder ER @ 90*: pain free AROM > 60*  c. Shoulder functional IR: pain free AROM > L5     3. Patient will be independent with HEP in order to further progress and return to maximal function.     4. Pain rating at Worst: 5/10 in order to progress towards increased independence with activity.     5. Patient will be able to correct postural deviations in sitting and standing, to decrease pain and promote long term stability.        LONG TERM GOALS: 16 weeks 7/15/2021   1. Patient will return to normal ADL, recreational, and work related activities with less pain and limitation.      2. Patient will improve AROM to stated goals in order to return to maximal functional potential.      3. Patient will improve strength to stated goals of appropriate musculature in order to improve functional independence.      4. Pain Rating at  Best: 1/10 to improve Quality of Life and return to prior level activities.      5. Pain will Improve FOTO Score to the predicted outcome score: 52%     6. Patient will have met/partially met personal goal of: being able to reach arm above shoulder height to complete daily activities              PLAN     Continue PT current POC to improve left upper extremity range of motion and strength.    Ryne Arrington, PT

## 2022-02-28 ENCOUNTER — OFFICE VISIT (OUTPATIENT)
Dept: INTERNAL MEDICINE | Facility: CLINIC | Age: 42
End: 2022-02-28
Payer: MEDICAID

## 2022-02-28 VITALS
HEART RATE: 69 BPM | WEIGHT: 293 LBS | BODY MASS INDEX: 45.32 KG/M2 | TEMPERATURE: 98 F | SYSTOLIC BLOOD PRESSURE: 110 MMHG | DIASTOLIC BLOOD PRESSURE: 71 MMHG

## 2022-02-28 DIAGNOSIS — T45.1X5A ADVERSE EFFECT OF CHEMOTHERAPY, INITIAL ENCOUNTER: ICD-10-CM

## 2022-02-28 DIAGNOSIS — R13.10 DYSPHAGIA, UNSPECIFIED TYPE: ICD-10-CM

## 2022-02-28 DIAGNOSIS — E78.49 OTHER HYPERLIPIDEMIA: ICD-10-CM

## 2022-02-28 DIAGNOSIS — I10 PRIMARY HYPERTENSION: Primary | ICD-10-CM

## 2022-02-28 DIAGNOSIS — M75.102 TEAR OF LEFT ROTATOR CUFF, UNSPECIFIED TEAR EXTENT, UNSPECIFIED WHETHER TRAUMATIC: ICD-10-CM

## 2022-02-28 DIAGNOSIS — R26.9 GAIT DIFFICULTY: ICD-10-CM
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
## 2022-02-28 PROBLEM — M25.612 DECREASED ROM OF LEFT SHOULDER: Status: RESOLVED | Noted: 2022-02-15 | Resolved: 2022-02-28

## 2022-02-28 PROCEDURE — 3008F BODY MASS INDEX DOCD: CPT | Mod: CPTII,,, | Performed by: FAMILY MEDICINE

## 2022-02-28 PROCEDURE — 3074F SYST BP LT 130 MM HG: CPT | Mod: CPTII,,, | Performed by: FAMILY MEDICINE

## 2022-02-28 PROCEDURE — 1159F PR MEDICATION LIST DOCUMENTED IN MEDICAL RECORD: ICD-10-PCS | Mod: CPTII,,, | Performed by: FAMILY MEDICINE

## 2022-02-28 PROCEDURE — 3072F PR LOW RISK FOR RETINOPATHY: ICD-10-PCS | Mod: CPTII,,, | Performed by: FAMILY MEDICINE

## 2022-02-28 PROCEDURE — 3044F PR MOST RECENT HEMOGLOBIN A1C LEVEL <7.0%: ICD-10-PCS | Mod: CPTII,,, | Performed by: FAMILY MEDICINE

## 2022-02-28 PROCEDURE — 1160F RVW MEDS BY RX/DR IN RCRD: CPT | Mod: CPTII,,, | Performed by: FAMILY MEDICINE

## 2022-02-28 PROCEDURE — 99999 PR PBB SHADOW E&M-EST. PATIENT-LVL IV: ICD-10-PCS | Mod: PBBFAC,,, | Performed by: FAMILY MEDICINE

## 2022-02-28 PROCEDURE — 99214 OFFICE O/P EST MOD 30 MIN: CPT | Mod: PBBFAC,PO | Performed by: FAMILY MEDICINE

## 2022-02-28 PROCEDURE — 3061F PR NEG MICROALBUMINURIA RESULT DOCUMENTED/REVIEW: ICD-10-PCS | Mod: CPTII,,, | Performed by: FAMILY MEDICINE

## 2022-02-28 PROCEDURE — 3074F PR MOST RECENT SYSTOLIC BLOOD PRESSURE < 130 MM HG: ICD-10-PCS | Mod: CPTII,,, | Performed by: FAMILY MEDICINE

## 2022-02-28 PROCEDURE — 99999 PR PBB SHADOW E&M-EST. PATIENT-LVL IV: CPT | Mod: PBBFAC,,, | Performed by: FAMILY MEDICINE

## 2022-02-28 PROCEDURE — 3044F HG A1C LEVEL LT 7.0%: CPT | Mod: CPTII,,, | Performed by: FAMILY MEDICINE

## 2022-02-28 PROCEDURE — 3008F PR BODY MASS INDEX (BMI) DOCUMENTED: ICD-10-PCS | Mod: CPTII,,, | Performed by: FAMILY MEDICINE

## 2022-02-28 PROCEDURE — 1160F PR REVIEW ALL MEDS BY PRESCRIBER/CLIN PHARMACIST DOCUMENTED: ICD-10-PCS | Mod: CPTII,,, | Performed by: FAMILY MEDICINE

## 2022-02-28 PROCEDURE — 99214 OFFICE O/P EST MOD 30 MIN: CPT | Mod: S$PBB,,, | Performed by: FAMILY MEDICINE

## 2022-02-28 PROCEDURE — 3078F DIAST BP <80 MM HG: CPT | Mod: CPTII,,, | Performed by: FAMILY MEDICINE

## 2022-02-28 PROCEDURE — 3061F NEG MICROALBUMINURIA REV: CPT | Mod: CPTII,,, | Performed by: FAMILY MEDICINE

## 2022-02-28 PROCEDURE — 3072F LOW RISK FOR RETINOPATHY: CPT | Mod: CPTII,,, | Performed by: FAMILY MEDICINE

## 2022-02-28 PROCEDURE — 3078F PR MOST RECENT DIASTOLIC BLOOD PRESSURE < 80 MM HG: ICD-10-PCS | Mod: CPTII,,, | Performed by: FAMILY MEDICINE

## 2022-02-28 PROCEDURE — 1159F MED LIST DOCD IN RCRD: CPT | Mod: CPTII,,, | Performed by: FAMILY MEDICINE

## 2022-02-28 PROCEDURE — 99214 PR OFFICE/OUTPT VISIT, EST, LEVL IV, 30-39 MIN: ICD-10-PCS | Mod: S$PBB,,, | Performed by: FAMILY MEDICINE

## 2022-02-28 PROCEDURE — 3066F PR DOCUMENTATION OF TREATMENT FOR NEPHROPATHY: ICD-10-PCS | Mod: CPTII,,, | Performed by: FAMILY MEDICINE

## 2022-02-28 PROCEDURE — 3066F NEPHROPATHY DOC TX: CPT | Mod: CPTII,,, | Performed by: FAMILY MEDICINE

## 2022-02-28 NOTE — PROGRESS NOTES
Subjective:       Patient ID: Sangeetha June is a 42 y.o. female.    Chief Complaint: Follow-up (Pt has questions about health care )    Here for gait difficulty and need for HH.    Review of Systems   Respiratory: Negative for shortness of breath.    Cardiovascular: Negative for chest pain.   Gastrointestinal: Negative for abdominal pain.       Objective:      Physical Exam  Vitals and nursing note reviewed.   Constitutional:       General: She is not in acute distress.     Appearance: Normal appearance. She is well-developed. She is not diaphoretic.      Comments: In wheelchair   HENT:      Head: Normocephalic and atraumatic.      Mouth/Throat:      Mouth: Mucous membranes are moist.      Pharynx: No oropharyngeal exudate or posterior oropharyngeal erythema.   Pulmonary:      Effort: Pulmonary effort is normal. No respiratory distress.      Breath sounds: Normal breath sounds. No wheezing.   Abdominal:      General: There is no distension.      Palpations: Abdomen is soft.      Tenderness: There is abdominal tenderness.   Skin:     General: Skin is warm and dry.      Findings: No erythema or rash.   Neurological:      Mental Status: She is alert.         Assessment:       1. Primary hypertension    2. Other hyperlipidemia    3. Gait difficulty    4. Dysphagia, unspecified type    5. Adverse effect of chemotherapy, initial encounter    6. Tear of left rotator cuff, unspecified tear extent, unspecified whether traumatic        Plan:     Problem List Items Addressed This Visit        Cardiac/Vascular    Hyperlipidemia    Overview     Chronic, Stable, cont a statin           Hypertension - Primary    Overview     Chronic, Stable, cont hctz              GI    Dysphagia       Other    Chemotherapy adverse reaction    Gait difficulty    Current Assessment & Plan     Pt has gait difficulty and imbalance.  Pt ADLs - able to bathe herself, feed herself, put clothes on  Unable to cook for herself, unable to drive,  unable to manage finances.  Unable to manage medications on her own.             Other Visit Diagnoses     Tear of left rotator cuff, unspecified tear extent, unspecified whether traumatic

## 2022-02-28 NOTE — ASSESSMENT & PLAN NOTE
Pt has gait difficulty and imbalance.  Pt ADLs - able to bathe herself, feed herself, put clothes on  Unable to cook for herself, unable to drive, unable to manage finances.  Unable to manage medications on her own.

## 2022-03-01 ENCOUNTER — OFFICE VISIT (OUTPATIENT)
Dept: PSYCHIATRY | Facility: CLINIC | Age: 42
End: 2022-03-01
Payer: MEDICAID

## 2022-03-01 ENCOUNTER — CLINICAL SUPPORT (OUTPATIENT)
Dept: SPEECH THERAPY | Facility: HOSPITAL | Age: 42
End: 2022-03-01
Payer: MEDICAID

## 2022-03-01 ENCOUNTER — CLINICAL SUPPORT (OUTPATIENT)
Dept: REHABILITATION | Facility: HOSPITAL | Age: 42
End: 2022-03-01
Payer: MEDICAID

## 2022-03-01 DIAGNOSIS — R49.0 DYSPHONIA: Primary | ICD-10-CM

## 2022-03-01 DIAGNOSIS — R29.898 WEAKNESS OF SHOULDER: Primary | ICD-10-CM

## 2022-03-01 DIAGNOSIS — F41.1 GENERALIZED ANXIETY DISORDER: Primary | ICD-10-CM

## 2022-03-01 PROCEDURE — 3044F PR MOST RECENT HEMOGLOBIN A1C LEVEL <7.0%: ICD-10-PCS | Mod: AJ,HB,CPTII,S$PBB | Performed by: SOCIAL WORKER

## 2022-03-01 PROCEDURE — 3066F NEPHROPATHY DOC TX: CPT | Mod: AJ,HB,CPTII,S$PBB | Performed by: SOCIAL WORKER

## 2022-03-01 PROCEDURE — 3061F NEG MICROALBUMINURIA REV: CPT | Mod: AJ,HB,CPTII,S$PBB | Performed by: SOCIAL WORKER

## 2022-03-01 PROCEDURE — 3072F PR LOW RISK FOR RETINOPATHY: ICD-10-PCS | Mod: AJ,HB,CPTII,S$PBB | Performed by: SOCIAL WORKER

## 2022-03-01 PROCEDURE — 3072F LOW RISK FOR RETINOPATHY: CPT | Mod: AJ,HB,CPTII,S$PBB | Performed by: SOCIAL WORKER

## 2022-03-01 PROCEDURE — 97110 THERAPEUTIC EXERCISES: CPT | Mod: 59 | Performed by: PHYSICAL THERAPIST

## 2022-03-01 PROCEDURE — 92507 TX SP LANG VOICE COMM INDIV: CPT | Performed by: SPEECH-LANGUAGE PATHOLOGIST

## 2022-03-01 PROCEDURE — 97140 MANUAL THERAPY 1/> REGIONS: CPT | Mod: 59

## 2022-03-01 PROCEDURE — 90832 PSYTX W PT 30 MINUTES: CPT | Mod: AJ,HB,S$PBB, | Performed by: SOCIAL WORKER

## 2022-03-01 PROCEDURE — 90832 PR PSYCHOTHERAPY W/PATIENT, 30 MIN: ICD-10-PCS | Mod: AJ,HB,S$PBB, | Performed by: SOCIAL WORKER

## 2022-03-01 PROCEDURE — 3061F PR NEG MICROALBUMINURIA RESULT DOCUMENTED/REVIEW: ICD-10-PCS | Mod: AJ,HB,CPTII,S$PBB | Performed by: SOCIAL WORKER

## 2022-03-01 PROCEDURE — 3066F PR DOCUMENTATION OF TREATMENT FOR NEPHROPATHY: ICD-10-PCS | Mod: AJ,HB,CPTII,S$PBB | Performed by: SOCIAL WORKER

## 2022-03-01 PROCEDURE — 3044F HG A1C LEVEL LT 7.0%: CPT | Mod: AJ,HB,CPTII,S$PBB | Performed by: SOCIAL WORKER
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
## 2022-03-01 NOTE — PROGRESS NOTES
[facility]   Physical Therapy Treatment Note     Name: Sangeetha Hannon [place]  Clinic Number: [id number]    Therapy Diagnosis:   Encounter Diagnosis   Name Primary?    Weakness of shoulder Yes     Physician: Francisco Mathews*    Visit Date: 3/1/2022    Physician: Francisco Mathews*    Physician Orders: PT Eval and Treat  Medical Diagnosis from Referral: M75.122 (ICD-10-CM) - Nontraumatic complete tear of left rotator cuff  Evaluation Date: 2/15/2022  Authorization Period Expiration: 02/09/2023  Plan of Care Expiration: 05/18/2022                          Progress Update: 03/15/2022                        Visit # / Visits authorized: 4/20 (1 - eval)          FOTO: 1 / 3       PTA Visit #: 0/5     PRECAUTIONS: Standard Precautions, Diabetes: type 2 and Weight-bearing status: Non-weight-bearing: left shoulder      SURGERY:   1. Left shoulder arthroscopic rotator cuff repair  2. Left shoulder arthroscopic biceps tenodesis  3. Left shoulder subacromial decompression  DATE OF SURGERY: 1/27/2022  MD Follow-up: 3/9/2021    Time In: 1130 am  Time Out: 1210 pm  Total Appointment Time: 40 minutes    SUBJECTIVE     Pt reports: left shoulder feels good, does get out of sling at home a lot. No increase in acute pains.     She was compliant with home exercise program.  Response to previous treatment: initial eval  Functional change: too soon to tell    Pain: 7/10  Location: left shoulder      OBJECTIVE     Objective Measures updated at progress report unless specified.     Treatment     Rehabilitation program:   Phase 1: (0-6 weeks) - 1/27/2022-3/10/2022  Phase 2: (6-12 weeks)- 3/10/2022-4/7/2022  Phase 3: (12+ weeks)- 4/7/2022+    Sangeetha received the treatments listed below:      Sangeetha received the following manual therapy techniques: Joint mobilizations, Myofacial release, Soft tissue Mobilization, and PROM were applied to the: left for (20) minutes, including:   x = exercises performed  today     Manual Intervention Performed today:     Soft Tissue Massage   upper trapezius, levator scapulae  and rhomboids    Joint Mobility x Grade (I-III); GHJ oscillations/AP/Lat/SupInf     x Grade (I-III); Scapular Thoracic Med/Lat/Sup/Inf/UR/DR   PROM x Supine: Internal rotation to 45/External Rotation to 30     x Side-lying: Scapular ADD, Shoulder Flexion/scaption to 120, shoulder extension to 30      Elbow Flexion/extension      Plan for Next Visit: GHJ oscillations      Sangeetha received therapeutic exercises to develop strength, endurance, ROM, flexibility, and posture for (25) minutes including:   x = exercises performed      Ther Ex Performed today:     Scapular Retractions Yellow Theraband  x 30x   Pendulum Hangs  30x CW/CCW   Wrist flexion/extension  30x each 3#   Bicep curls x 30x YTB   Ball squeezes  30x   Shoulder Isometrics: flex, ext, abd, Internal rotation, External Rotation, extension   3 x10    Prone rows leaning over mat x 3x10, 2#        Shoulder External Rotation  x Yellow Theraband    shoulder Internal rotation x Yellow Theraband    Rhythmic stabilization    elbow at 90 2x1 Minute      Plan for Next Visit: rhythmic stabilization        Patient Education and Home Exercises     Home Exercises Provided and Patient Education Provided     Education provided:   · Patient educated on the impairments noted above and the effects of physical therapy intervention to improve overall condition and QOL.   · Patient was educated on all the above exercise prior/during/after for proper posture, positioning, and execution for safe performance with home exercise program.     Written Home Exercises Provided: Patient instructed to cont prior HEP. Exercises were reviewed and Sangeetha was able to demonstrate them prior to the end of the session.  Sangeetha demonstrated good  understanding of the education provided. See EMR under Patient Instructions for exercises provided during therapy sessions    ASSESSMENT     Patient  presents with improving motion overall. No increase in acute pains. Pt educated to be very careful at home if taking sling off at home. Pt educated to continue HEP to further sustain gains made through therapy.    Sangeetha Is progressing well towards her goals.   Pt prognosis is Good.     Pt will continue to benefit from skilled outpatient physical therapy to address the deficits listed in the problem list box on initial evaluation, provide pt/family education and to maximize pt's level of independence in the home and community environment.     Pt's spiritual, cultural and educational needs considered and pt agreeable to plan of care and goals.     Anticipated barriers to physical therapy:  co-morbidities and sedentary lifestyle, transportation and other doctors appointments    Goals:     SHORT TERM GOALS: 8 weeks 7/15/2021   1. Recent signs and systems trend is improving in order to progress towards LTG's.     1. Patient will improve AROM to below measures to progress towards long term goals.  a. Shoulder Flexion: pain free AROM >130*  b. Shoulder ER @ 90*: pain free AROM > 60*  c. Shoulder functional IR: pain free AROM > L5     3. Patient will be independent with HEP in order to further progress and return to maximal function.     4. Pain rating at Worst: 5/10 in order to progress towards increased independence with activity.     5. Patient will be able to correct postural deviations in sitting and standing, to decrease pain and promote long term stability.        LONG TERM GOALS: 16 weeks 7/15/2021   1. Patient will return to normal ADL, recreational, and work related activities with less pain and limitation.      2. Patient will improve AROM to stated goals in order to return to maximal functional potential.      3. Patient will improve strength to stated goals of appropriate musculature in order to improve functional independence.      4. Pain Rating at Best: 1/10 to improve Quality of Life and return to prior  level activities.      5. Pain will Improve FOTO Score to the predicted outcome score: 52%     6. Patient will have met/partially met personal goal of: being able to reach arm above shoulder height to complete daily activities              PLAN     Continue PT current POC to improve left upper extremity range of motion and strength.    Mohit Cornejo, PT

## 2022-03-01 NOTE — PROGRESS NOTES
"[facility]  Speech Therapy Treatment Note- Voice  Date: 3/1/2022     Name: Sangeetha June   MRN: [id number]   Therapy Diagnosis:   Encounter Diagnosis   Name Primary?    Dysphonia Yes   Physician: Issac Erickson MD  Physician Orders: Ambulatory referral/consult to speech therapy   Medical Diagnosis: Dysphonia [R49.0]    Visit #/ Visits Authorized: 3/ 8 (+eval)  Date of Evaluation:  1/7/22  Insurance Authorization Period: 1/14/22-2/28/22  Plan of Care Expiration Date:    3/18/22  Extended Plan of Care:  -   Progress Note: 3/15/22   Visits Cancelled: 3  Visits No Show: 1    Time In:  10:48 AM  Time Out: 11:20 AM  Total Billable Time: 32 mins      Precautions: Standard and cancer  Subjective:   Patient reports: she was about 15 minutes late secondary to transportation issues. She continues to report difficulty breathing which results in fatigue and increased difficulty with voicing. She reports her voice has been "alright" and depends largely on breath support.   She was compliant to home exercise program.     Response to previous treatment: -     Pain Scale:  6/10 on a Visual Analog Scale currently.   Pain Location: L shoulder     Objective:   TIMED  Procedure Min.   Cognitive Therapeutic Interventions, first 15 minutes CPT 31417  0   Cognitive Therapeutic Interventions, each additional 15 minutes CPT 25875  0         UNTIMED  Procedure Min.   Speech- Language- Voice Therapy  32   Total Timed Units: 0  Total Untimed Units: 1  Charges Billed/Number of units: 62986/1    Short Term Goals: (10 weeks) Current Progress:   Patient will complete SOVT exercises and/or resonant-focused exercises 3-5x daily to strengthen and balance the intrinsic laryngeal musculature and maximize glottic closure without medial hyperfunction.    Progressing/ Not Met 3/1/2022   Not formally addressed      Patient will discriminate between easy and strained phonation with 80% accuracy.     Goal Met 2/22/2022    Met x1 Patient " discriminated between easy and strained phonation while completing VFE as well as in conversation with about 85% accuracy and minimal verbal and visual cueing.      Patient will recall and utilize vocal hygiene strategies with minimal verbal cueing.       Goal Met 2/15/2022   Patient recalled and is implementing vocal hygiene strategies independently, most notably, significantly increasing water intake since evaluation. Vocal hygiene strategies reviewed in today's session. Patient encouraged to continue to increase water intake as able.    Patient will participate in vocal function exercises with 80% accuracy with minimal verbal cueing to improve vocal quality.     Progressing/ Not Met 3/1/2022    Met x1 Vocal function exercises were reviewed and patient executed with 90% accuracy and minimal-moderate verbal and visual cueing for diaphragmatic breathing and inhalation through nose prior to execution of exercises with overall intent of reducing laryngeal strain. Of note, in today's session, patient for the first time felt she was able to manage breathing for execution of exercises with increase in clarity of vocal quality. Exercises completed x2 in today's session. Will continue to address. Patient encouraged to implement appropriate breathing pattern/diaphragmatic breathing for all speech/voicing.      Patient will complete diaphragmatic breathing exercises by producing maximal exhalation via diaphragmic breathing with a duration of 5 seconds in 10 trials, consistently with minimal tactile and verbal clinician cues.    Progressing/ Not Met 3/1/2022    Patient was introduced to diaphragmatic breathing in today's session and implemented in supine position as well as sitting up. While supine, patient implemented with 80% accuracy and moderate verbal cueing for reduced thoracic movement and while sitting up patient completed with 50% accuracy and moderate verbal cueing for reduced thoracic and clavicular movement and  increased diaphragmatic movement. Patient encouraged to complete 10-15 daily at home.         Patient Education/Response:   Patient was educated regarding respiration/phonation and coordination of systems for efficient voicing as well as rationale for completion of exercises. Patient expressed understanding of information provided.     Home program established: yes-vocal hygiene, vocal funciton exercises x2 each, x2 daily  Exercises were reviewed and Sangeetha was able to demonstrate them prior to the end of the session.  Sangeetha demonstrated good  understanding of the education provided.     See Electronic Medical Record under Patient Instructions for exercises provided throughout therapy.  Assessment:   Sangeetha is progressing well towards her goals. Progression toward independent execution of VFE in today's session with overall improved vocal quality following execution related to improved diaphragmatic breathing. Current goals remain appropriate. Goals to be updated as necessary.     Patient prognosis is Good. Patient will continue to benefit from skilled outpatient speech and language therapy to address the deficits listed in the problem list on initial evaluation, provide patient/family education and to maximize patient's level of independence in the home and community environment.     Medical necessity is demonstrated by the following IMPAIRMENTS:  Poor vocal quality prevents accurate/efficient communication in communication situations (medical, social, vocational, home).    Barriers to Therapy: comorbidities, history of cancer, transportation  Patient's spiritual, cultural and educational needs considered and patient agreeable to plan of care and goals.    Plan:   Continue Plan of Care with focus on vocal function exercises, SOVT exercises, and reducing laryngeal strain through awareness of phonation.     Angelika Gunter MA, CCC-SLP  Speech Language Pathologist  3/1/2022

## 2022-03-03 ENCOUNTER — OFFICE VISIT (OUTPATIENT)
Dept: ENDOCRINOLOGY | Facility: CLINIC | Age: 42
End: 2022-03-03
Payer: MEDICAID

## 2022-03-03 VITALS
BODY MASS INDEX: 44.41 KG/M2 | DIASTOLIC BLOOD PRESSURE: 78 MMHG | OXYGEN SATURATION: 98 % | HEIGHT: 68 IN | WEIGHT: 293 LBS | SYSTOLIC BLOOD PRESSURE: 116 MMHG | HEART RATE: 78 BPM | RESPIRATION RATE: 20 BRPM

## 2022-03-03 DIAGNOSIS — E06.9 THYROIDITIS: ICD-10-CM

## 2022-03-03 DIAGNOSIS — Z79.4 TYPE 2 DIABETES MELLITUS WITHOUT COMPLICATION, WITH LONG-TERM CURRENT USE OF INSULIN: ICD-10-CM

## 2022-03-03 DIAGNOSIS — E03.8 HYPOTHYROIDISM DUE TO HASHIMOTO'S THYROIDITIS: Primary | ICD-10-CM

## 2022-03-03 DIAGNOSIS — E04.1 THYROID NODULE: ICD-10-CM

## 2022-03-03 DIAGNOSIS — E11.9 TYPE 2 DIABETES MELLITUS WITHOUT COMPLICATION, WITH LONG-TERM CURRENT USE OF INSULIN: ICD-10-CM

## 2022-03-03 DIAGNOSIS — E06.3 HYPOTHYROIDISM DUE TO HASHIMOTO'S THYROIDITIS: Primary | ICD-10-CM

## 2022-03-03 PROCEDURE — 1159F PR MEDICATION LIST DOCUMENTED IN MEDICAL RECORD: ICD-10-PCS | Mod: CPTII,,, | Performed by: INTERNAL MEDICINE

## 2022-03-03 PROCEDURE — 3072F LOW RISK FOR RETINOPATHY: CPT | Mod: CPTII,,, | Performed by: INTERNAL MEDICINE

## 2022-03-03 PROCEDURE — 3072F PR LOW RISK FOR RETINOPATHY: ICD-10-PCS | Mod: CPTII,,, | Performed by: INTERNAL MEDICINE

## 2022-03-03 PROCEDURE — 3066F NEPHROPATHY DOC TX: CPT | Mod: CPTII,,, | Performed by: INTERNAL MEDICINE

## 2022-03-03 PROCEDURE — 3061F NEG MICROALBUMINURIA REV: CPT | Mod: CPTII,,, | Performed by: INTERNAL MEDICINE

## 2022-03-03 PROCEDURE — 99204 OFFICE O/P NEW MOD 45 MIN: CPT | Mod: S$PBB,,, | Performed by: INTERNAL MEDICINE

## 2022-03-03 PROCEDURE — 1160F PR REVIEW ALL MEDS BY PRESCRIBER/CLIN PHARMACIST DOCUMENTED: ICD-10-PCS | Mod: CPTII,,, | Performed by: INTERNAL MEDICINE

## 2022-03-03 PROCEDURE — 1159F MED LIST DOCD IN RCRD: CPT | Mod: CPTII,,, | Performed by: INTERNAL MEDICINE

## 2022-03-03 PROCEDURE — 99999 PR PBB SHADOW E&M-EST. PATIENT-LVL V: CPT | Mod: PBBFAC,,, | Performed by: INTERNAL MEDICINE

## 2022-03-03 PROCEDURE — 99999 PR PBB SHADOW E&M-EST. PATIENT-LVL V: ICD-10-PCS | Mod: PBBFAC,,, | Performed by: INTERNAL MEDICINE

## 2022-03-03 PROCEDURE — 3044F HG A1C LEVEL LT 7.0%: CPT | Mod: CPTII,,, | Performed by: INTERNAL MEDICINE

## 2022-03-03 PROCEDURE — 3061F PR NEG MICROALBUMINURIA RESULT DOCUMENTED/REVIEW: ICD-10-PCS | Mod: CPTII,,, | Performed by: INTERNAL MEDICINE

## 2022-03-03 PROCEDURE — 3066F PR DOCUMENTATION OF TREATMENT FOR NEPHROPATHY: ICD-10-PCS | Mod: CPTII,,, | Performed by: INTERNAL MEDICINE

## 2022-03-03 PROCEDURE — 99204 PR OFFICE/OUTPT VISIT, NEW, LEVL IV, 45-59 MIN: ICD-10-PCS | Mod: S$PBB,,, | Performed by: INTERNAL MEDICINE

## 2022-03-03 PROCEDURE — 3008F BODY MASS INDEX DOCD: CPT | Mod: CPTII,,, | Performed by: INTERNAL MEDICINE

## 2022-03-03 PROCEDURE — 1160F RVW MEDS BY RX/DR IN RCRD: CPT | Mod: CPTII,,, | Performed by: INTERNAL MEDICINE

## 2022-03-03 PROCEDURE — 3078F DIAST BP <80 MM HG: CPT | Mod: CPTII,,, | Performed by: INTERNAL MEDICINE

## 2022-03-03 PROCEDURE — 3044F PR MOST RECENT HEMOGLOBIN A1C LEVEL <7.0%: ICD-10-PCS | Mod: CPTII,,, | Performed by: INTERNAL MEDICINE

## 2022-03-03 PROCEDURE — 3074F PR MOST RECENT SYSTOLIC BLOOD PRESSURE < 130 MM HG: ICD-10-PCS | Mod: CPTII,,, | Performed by: INTERNAL MEDICINE

## 2022-03-03 PROCEDURE — 3078F PR MOST RECENT DIASTOLIC BLOOD PRESSURE < 80 MM HG: ICD-10-PCS | Mod: CPTII,,, | Performed by: INTERNAL MEDICINE

## 2022-03-03 PROCEDURE — 99215 OFFICE O/P EST HI 40 MIN: CPT | Mod: PBBFAC | Performed by: INTERNAL MEDICINE

## 2022-03-03 PROCEDURE — 3008F PR BODY MASS INDEX (BMI) DOCUMENTED: ICD-10-PCS | Mod: CPTII,,, | Performed by: INTERNAL MEDICINE

## 2022-03-03 PROCEDURE — 3074F SYST BP LT 130 MM HG: CPT | Mod: CPTII,,, | Performed by: INTERNAL MEDICINE

## 2022-03-03 NOTE — PROGRESS NOTES
ENDOCRINOLOGY INITIAL VISIT  03/03/2022    Reason for Visit:  Sangeetha June is a 42 y.o. female referred by Sam Garcia MD for evaluation of thyroid nodule and hypothyroidism    HPI:  Sangeetha June is a 42 y.o. female  with history breast cancer treated with mastectomy and chemotherapy , meningioma, hyperlipidemia, hypertension, hypothyroidism, 2 diabetes who presents for evaluation of thyroid nodule and hypothyroidism     With regards to hypothyroidism:    Patient was found to have hypothyroidism around 2014 in [place] with labs, started on thyroid hormone at that time   Aunt with thyroid issues    Etiology determine to be: Hashimoto's    Thyroid Antibodies:  No results found for: TPOAB, TPOS      Current medication:  Euthyrox 50 mcg daily    Takes thyroid medication properly without food first thing in the morning and is waiting at least 30 minutes to eat.     Not on any Ca, po iron, multivitamins, acid reflux medications, or supplements, discussed need to separate these medications from thyroid med by 4 hours.      Thyroid Symptoms  + fatigue    Weight change:  []  Gain [x]  Loss  []  Denies   Some wt loss with GLP-1     Temperature intolerance:  []  Cold []  Hot   [x]  Denies     GI:  []  Hyperdefication [x]  Constipation []  Denies  Taking miralax     Integument:  []  Hair loss [x]  Dry skin  []  Denies    Other:  [x]  Palpitation []  tremor     []  Increased anxiety    []  Denies      Has seen cardiology in past for palipations     Latest Reference Range & Units 10/29/21 11:30   TSH 0.400 - 4.000 uIU/mL 1.586   T3, Total 60 - 180 ng/dL 131   T3, Free 2.3 - 4.2 pg/mL 3.0   T4 Total 4.5 - 11.5 ug/dL 11.1   Free T4 0.71 - 1.51 ng/dL 0.96   Thyroperoxidase Antibodies <6.0 IU/mL 308.0 (H)       Regarding thyroid nodule:  Incidentally found to have nodule on imaging     Neck ULTRASOUND 12/6/21  The thyroid gland is normal in size.  The right lobe measures 5.2 x 1.3 x 2.0 cm.  The left  lobe measures 5.1 x 2.0 x 2.0 cm. Thyroid isthmus measures 6.4 mm AP.  Total thyroid volume measures approximately 16.0 mL.  There is a densely calcified rounded nodule at the midportion of the left lobe measuring approximately 12 mm which appears to demonstrate some extra glandular extension posteriorly.  Characterization is somewhat limited by artifact from calcification.  Thyroid parenchyma otherwise appears fairly homogeneous with normal vascularity.  No lymphadenopathy is seen.    Left 12 mm nodule FNA:  12/6/21 - benign, consistent with hashimotos    Regarding type 2 diabetes:  This is being managed by primary care physician.  Patient is compliant with metformin and weekly Trulicity, hemoglobin A1c at goal.    Lab Results   Component Value Date    HGBA1C 6.5 (H) 01/07/2022        Past Medical History:   Diagnosis Date    Anxiety     Breast cancer     Chest pain     Chest pain 7/2/2021    Cholecystitis without calculus     Decreased ROM of left shoulder 2/15/2022    Dizziness     Elevated C-reactive protein (CRP)     Essential hypertension     Fall 10/11/2021    Memory change     EVERARDO (obstructive sleep apnea)     Osteoarthritis     Other specified disorders of thyroid     Pre-diabetes 4/15/2021    Chronic, Stable, cont metformin    Screening mammogram, encounter for 4/15/2021    Shoulder injury     SOB (shortness of breath)     Thyroiditis     Vision disturbance 4/30/2021       Past Surgical History:   Procedure Laterality Date    ARTHROSCOPIC REPAIR OF ROTATOR CUFF OF SHOULDER Left 1/27/2022    Procedure: REPAIR, ROTATOR CUFF, ARTHROSCOPIC;  Surgeon: Francisco Mathews MD;  Location: [facility] OR;  Service: Orthopedics;  Laterality: Left;    ARTHROSCOPY OF SHOULDER WITH DECOMPRESSION OF SUBACROMIAL SPACE Left 1/27/2022    Procedure: ARTHROSCOPY, SHOULDER, WITH SUBACROMIAL SPACE DECOMPRESSION;  Surgeon: Francisco Mathews MD;  Location: [facility] OR;  Service: Orthopedics;  Laterality:  Left;    BREAST BIOPSY      FIXATION OF TENDON Left 1/27/2022    Procedure: FIXATION, TENDON;  Surgeon: Francisco Mathews MD;  Location: [facility] OR;  Service: Orthopedics;  Laterality: Left;    HYSTERECTOMY      INJECTION OF JOINT Left 11/12/2021    Procedure: Left suprascapular and axillary injection with local;  Surgeon: Bisi Cochran MD;  Location: [facility] PAIN MGT;  Service: Pain Management;  Laterality: Left;    MASTECTOMY         Review of patient's allergies indicates:   Allergen Reactions    Lisinopril          Current Outpatient Medications:     albuterol (PROVENTIL/VENTOLIN HFA) 90 mcg/actuation inhaler, Inhale 2 puffs into the lungs every 6 (six) hours as needed for Wheezing. Rescue, Disp: 18 g, Rfl: 1    ammonium lactate 12 % Crea, Apply 1 Act topically 2 (two) times daily. To feet., Disp: 140 g, Rfl: 2    ascorbic acid, vitamin C, (VITAMIN C) 100 MG tablet, Take 100 mg by mouth once daily., Disp: , Rfl:     budesonide-formoterol 80-4.5 mcg (SYMBICORT) 80-4.5 mcg/actuation HFAA, Inhale 2 puffs into the lungs 2 (two) times a day. Controller, Disp: 10.2 g, Rfl: 3    busPIRone (BUSPAR) 15 MG tablet, Take 1 tablet (15 mg total) by mouth 3 (three) times daily., Disp: 270 tablet, Rfl: 3    celecoxib (CELEBREX) 200 MG capsule, Take 1 capsule (200 mg total) by mouth 2 (two) times daily., Disp: 28 capsule, Rfl: 0    dulaglutide (TRULICITY) 0.75 mg/0.5 mL pen injector, Inject 0.75 mg into the skin every 7 days for 30 days, THEN 1.5 mg every 7 days., Disp: 20 pen, Rfl: 0    EScitalopram oxalate (LEXAPRO) 20 MG tablet, Take 1 tablet (20 mg total) by mouth once daily., Disp: 90 tablet, Rfl: 3    EUTHYROX 50 mcg tablet, Take 1 tablet (50 mcg total) by mouth every morning., Disp: 90 tablet, Rfl: 1    gabapentin (NEURONTIN) 100 MG capsule, Take 1 capsule (100 mg total) by mouth 2 (two) times daily., Disp: 180 capsule, Rfl: 1    hydroCHLOROthiazide (HYDRODIURIL) 25 MG tablet, Take 1 tablet (25 mg  total) by mouth daily as needed (edema, swelling)., Disp: 90 tablet, Rfl: 3    hydrocortisone 0.5 % cream, Apply topically 2 (two) times daily., Disp: , Rfl:     linaCLOtide (LINZESS) 72 mcg Cap capsule, Take 1 capsule (72 mcg total) by mouth once daily., Disp: 30 capsule, Rfl: 5    magnesium oxide (MAG-OX) 400 mg (241.3 mg magnesium) tablet, Take 1 tablet (400 mg total) by mouth once daily., Disp: 90 tablet, Rfl: 1    meloxicam (MOBIC) 15 MG tablet, Take 1 tablet (15 mg total) by mouth once daily., Disp: 30 tablet, Rfl: 0    metFORMIN (GLUCOPHAGE-XR) 500 MG ER 24hr tablet, SMARTSI Tablet(s) By Mouth Every Evening, Disp: 90 tablet, Rfl: 3    multivit with min-folic acid (ADULT MULTIVITAMIN GUMMIES) 200 mcg Chew, Take by mouth., Disp: , Rfl:     olopatadine (PATANOL) 0.1 % ophthalmic solution, , Disp: , Rfl:     oxybutynin (DITROPAN-XL) 5 MG TR24, Take 1 tablet (5 mg total) by mouth once daily., Disp: 90 tablet, Rfl: 3    pantoprazole (PROTONIX) 40 MG tablet, Take 1 tablet (40 mg total) by mouth once daily., Disp: 30 tablet, Rfl: 11    pravastatin (PRAVACHOL) 40 MG tablet, Take 1 tablet (40 mg total) by mouth every evening., Disp: 90 tablet, Rfl: 3    traMADoL (ULTRAM) 50 mg tablet, Take 1 tablet (50 mg total) by mouth every 6 (six) hours as needed for Pain., Disp: 30 tablet, Rfl: 0    traZODone (DESYREL) 150 MG tablet, Take 1 tablet (150 mg total) by mouth every evening., Disp: 90 tablet, Rfl: 3  No current facility-administered medications for this visit.    Facility-Administered Medications Ordered in Other Visits:     lactated ringers infusion, , Intravenous, Continuous, Maty Thompson MD, Last Rate: 10 mL/hr at 22 0636, Restarted at 22 0709    Social History     Socioeconomic History    Marital status:    Tobacco Use    Smoking status: Never Smoker    Smokeless tobacco: Never Used   Substance and Sexual Activity    Alcohol use: Not Currently    Drug use: Yes     Types:  Marijuana       Family History   Problem Relation Age of Onset    Cancer Mother     Stroke Mother     Heart disease Mother     Stroke Sister     Diabetes Sister     Diabetes Brother     No Known Problems Maternal Grandmother     Cancer Maternal Grandfather     Alcohol abuse Maternal Grandfather     No Known Problems Paternal Grandmother     No Known Problems Paternal Grandfather        ROS: see HPI       PHYSICAL EXAM  LMP  (LMP Unknown)   Wt Readings from Last 3 Encounters:   02/28/22 135.2 kg (298 lb 1 oz)   02/18/22 (!) 138.8 kg (306 lb)   02/14/22 (!) 140.1 kg (308 lb 13.8 oz)   ]    Constitutional:  Pleasant,  in no acute distress.   HENT:   Head:    Normocephalic and atraumatic.   Eyes:    EOMI. No scleral icterus.   Neck:    No thyromegaly or palpable thyroid nodules, no cervical LAD  Cardiovascular:  Normal rate, regular rhythm, 2+ radial pulses blx   Respiratory:   Effort normal   Gastrointestinal: Soft, nontender  Neurological:  No tremor, normal speech  Skin:    Skin is warm, dry  Psych:  Normal mood and affect.   Extremity:  No edema or wounds, no deformity     LABORATORY REVIEW:  Thyroid Labs Latest Ref Rng & Units 10/29/2021 1/7/2022 2/15/2022   TSH 0.400 - 4.000 uIU/mL 1.586 - -   Free T4 0.71 - 1.51 ng/dL 0.96 - -   Sodium 136 - 145 mmol/L - 138 140   Potassium 3.5 - 5.1 mmol/L - 4.2 4.5   Chloride 95 - 110 mmol/L - 100 106   Carbon Dioxide 23 - 29 mmol/L - 28 27   Glucose 70 - 110 mg/dL - 133(H) 85   Blood Urea Nitrogen 6 - 20 mg/dL - 6 6   Creatinine 0.5 - 1.4 mg/dL - 0.8 0.8   Calcium 8.7 - 10.5 mg/dL - 9.3 9.1   Total Protein 6.0 - 8.4 g/dL - 6.6 6.9   Albumin 3.5 - 5.2 g/dL - 3.2(L) 3.3(L)   Total Bilirubin 0.1 - 1.0 mg/dL - 0.3 0.2   AST 10 - 40 U/L - 11 14   ALT 10 - 44 U/L - 11 15   Anion Gap 8 - 16 mmol/L - 10 7(L)   eGFR (African American) >60 mL/min/1.73 m:2 - >60.0 >60   eGFR (Non-African American) >60 mL/min/1.73 m:2 - >60.0 >60   WBC 3.90 - 12.70 K/uL 5.14 4.27 4.25   RBC  4.00 - 5.40 M/uL 5.05 4.90 4.81   Hemoglobin 12.0 - 16.0 g/dL 12.3 11.9(L) 11.7(L)   Hematocrit 37.0 - 48.5 % 40.8 39.7 38.0   MCV 82 - 98 fL 81(L) 81(L) 79(L)   MCH 27.0 - 31.0 pg 24.4(L) 24.3(L) 24.3(L)   MCHC 32.0 - 36.0 g/dL 30.1(L) 30.0(L) 30.8(L)   RDW 11.5 - 14.5 % 16.1(H) 16.3(H) 16.9(H)   Platelets 150 - 450 K/uL 289 299 309   MPV 9.2 - 12.9 fL 9.6 10.1 9.0(L)   Gran # 1.8 - 7.7 K/uL 2.9 2.9 2.0   Lymph # 1.0 - 4.8 K/uL 1.9 0.9(L) 1.7   Mono # 0.3 - 1.0 K/uL 0.3 0.4 0.3   Eos # 0.0 - 0.5 K/uL 0.1 0.1 0.1   Baso # 0.00 - 0.20 K/uL 0.01 0.02 0.03   Gran % 38.0 - 73.0 % 55.6 67.9 48.0   Lymph % 18.0 - 48.0 % 36.8 21.8 40.2   Mono% 4.0 - 15.0 % 5.1 8.2 7.8   Eos % 0.0 - 8.0 % 2.1 1.4 3.1   Baso % 0.0 - 1.9 % 0.2 0.5 0.7   Thyroperoxidase Antibodies <6.0 IU/mL 308.0(H) - -        IMAGING STUDIES    ASSESSMENT/PLAN    Problem List Items Addressed This Visit        1 - High    Hypothyroidism - Primary     Patient with history of Hashimoto's disease on stable dose of new thyroxine 50 mcg daily, taking appropriately.  Given that she has recently lost weight since being on Trulicity will check TSH now to ensure that she does not need dose change.  Is difficult to determine what symptoms are related to thyroid as she has several comorbidities.  Aim to keep TSH within the normal range.           Relevant Orders    TSH       2     Thyroid nodule     History of 12 mm left-sided thyroid nodule with benign FNA in December 2021. Will repeat neck ultrasound in December 2022 with visit.  Patient is asymptomatic without any compressive symptoms will let me know if she notices any changes in neck.           Relevant Orders    US Soft Tissue Head Neck Thyroid       3     Type 2 diabetes mellitus, with long-term current use of insulin     Well controlled with hemoglobin A1c at goal, continue management per primary care physician             Other Visit Diagnoses     Thyroiditis            RTC in 12/2022 with neck ULTRASOUND prior    Add on TSH to next blood draw    Kody Andujar MD

## 2022-03-03 NOTE — ASSESSMENT & PLAN NOTE
Patient with history of Hashimoto's disease on stable dose of new thyroxine 50 mcg daily, taking appropriately.  Given that she has recently lost weight since being on Trulicity will check TSH now to ensure that she does not need dose change.  Is difficult to determine what symptoms are related to thyroid as she has several comorbidities.  Aim to keep TSH within the normal range.

## 2022-03-03 NOTE — ASSESSMENT & PLAN NOTE
History of 12 mm left-sided thyroid nodule with benign FNA in December 2021. Will repeat neck ultrasound in December 2022 with visit.  Patient is asymptomatic without any compressive symptoms will let me know if she notices any changes in neck.

## 2022-03-04 ENCOUNTER — INFUSION (OUTPATIENT)
Dept: INFUSION THERAPY | Facility: HOSPITAL | Age: 42
End: 2022-03-04
Attending: INTERNAL MEDICINE
Payer: MEDICAID

## 2022-03-04 VITALS
SYSTOLIC BLOOD PRESSURE: 111 MMHG | RESPIRATION RATE: 18 BRPM | HEART RATE: 52 BPM | OXYGEN SATURATION: 100 % | TEMPERATURE: 98 F | DIASTOLIC BLOOD PRESSURE: 75 MMHG

## 2022-03-04 DIAGNOSIS — D50.9 MICROCYTIC ANEMIA: Primary | ICD-10-CM

## 2022-03-04 PROCEDURE — 96365 THER/PROPH/DIAG IV INF INIT: CPT

## 2022-03-04 PROCEDURE — 25000003 PHARM REV CODE 250: Performed by: INTERNAL MEDICINE

## 2022-03-04 PROCEDURE — 63600175 PHARM REV CODE 636 W HCPCS: Performed by: INTERNAL MEDICINE

## 2022-03-04 RX ORDER — HEPARIN 100 UNIT/ML
500 SYRINGE INTRAVENOUS
Status: DISCONTINUED | OUTPATIENT
Start: 2022-03-04 | End: 2022-03-04 | Stop reason: HOSPADM

## 2022-03-04 RX ADMIN — FERRIC CARBOXYMALTOSE INJECTION 750 MG: 50 INJECTION, SOLUTION INTRAVENOUS at 11:03

## 2022-03-04 RX ADMIN — Medication 500 UNITS: at 12:03

## 2022-03-07 ENCOUNTER — CLINICAL SUPPORT (OUTPATIENT)
Dept: REHABILITATION | Facility: HOSPITAL | Age: 42
End: 2022-03-07
Payer: MEDICAID

## 2022-03-07 DIAGNOSIS — R29.898 WEAKNESS OF SHOULDER: Primary | ICD-10-CM

## 2022-03-07 DIAGNOSIS — R49.0 DYSPHONIA: Primary | ICD-10-CM

## 2022-03-07 PROCEDURE — 92507 TX SP LANG VOICE COMM INDIV: CPT | Performed by: SPEECH-LANGUAGE PATHOLOGIST

## 2022-03-07 PROCEDURE — 97140 MANUAL THERAPY 1/> REGIONS: CPT

## 2022-03-07 PROCEDURE — 97110 THERAPEUTIC EXERCISES: CPT

## 2022-03-07 NOTE — PROGRESS NOTES
[facility]   Physical Therapy Treatment Note     Name: Sangeetha Hannon [place]  Clinic Number: [id number]    Therapy Diagnosis:   Encounter Diagnosis   Name Primary?    Weakness of shoulder Yes     Physician: Francisco Mathews*    Visit Date: 3/7/2022    Physician: Francisco Mathews*    Physician Orders: PT Eval and Treat  Medical Diagnosis from Referral: M75.122 (ICD-10-CM) - Nontraumatic complete tear of left rotator cuff  Evaluation Date: 2/15/2022  Authorization Period Expiration: 02/09/2023  Plan of Care Expiration: 05/18/2022                          Progress Update: 03/15/2022                        Visit # / Visits authorized: 5/20 (1 - eval)          FOTO: 1 / 3       PTA Visit #: 0/5     PRECAUTIONS: Standard Precautions, Diabetes: type 2 and Weight-bearing status: Non-weight-bearing: left shoulder      SURGERY:   1. Left shoulder arthroscopic rotator cuff repair  2. Left shoulder arthroscopic biceps tenodesis  3. Left shoulder subacromial decompression  DATE OF SURGERY: 1/27/2022  MD Follow-up: 3/9/2021    Time In: 1015 am  Time Out: 1100 pm  Total Appointment Time: 45 minutes    SUBJECTIVE     Pt reports: left shoulder feels good, does get out of sling at home a lot. Patient reports mild soreness after therapy but denies pain.    She was compliant with home exercise program.  Response to previous treatment: no soreness or pain  Functional change: too soon to tell    Pain: 0/10  Location: left shoulder      OBJECTIVE     Objective Measures updated at progress report unless specified.     Treatment     Rehabilitation program:   Phase 1: (0-6 weeks) - 1/27/2022-3/10/2022  Phase 2: (6-12 weeks)- 3/10/2022-4/7/2022  Phase 3: (12+ weeks)- 4/7/2022+    Sangeetha received the treatments listed below:      Sangeetha received the following manual therapy techniques: Joint mobilizations, Myofacial release, Soft tissue Mobilization, and PROM were applied to the: left for (20) minutes,  including:   x = exercises performed today     Manual Intervention Performed today:     Soft Tissue Massage   upper trapezius, levator scapulae  and rhomboids    Joint Mobility x Grade (I-III); GHJ oscillations/AP/Lat/SupInf     x Grade (I-III); Scapular Thoracic Med/Lat/Sup/Inf/UR/DR   PROM x Supine: Internal rotation to 45/External Rotation to 30     x Side-lying: Scapular ADD, Shoulder Flexion/scaption to 120, shoulder extension to 30      Elbow Flexion/extension      Plan for Next Visit: GHJ oscillations      Sangeetha received therapeutic exercises to develop strength, endurance, ROM, flexibility, and posture for (25) minutes including:   x = exercises performed      Ther Ex Performed today:     Scapular Retractions Yellow Theraband  x 30x   Pendulum Hangs  30x CW/CCW   Wrist flexion/extension  30x each 3#   Bicep curls x 30x YTB   Ball squeezes  30x   Shoulder Isometrics: flex, ext, abd, Internal rotation, External Rotation, extension   3 x10    Prone rows leaning over mat x 3x10, 2#   pulleys x 3 Minutes scaption   External Rotation stretch with dowel x 15x 5 second holds   Bilateral AAROM with opposite hand x 15x 5 seconds toward belly buttom   HEP given x    Scapular retractions x Yellow Theraband    Shoulder External Rotation towel under arm x Yellow Theraband    shoulder Internal rotation towel under arm x Yellow Theraband    Rhythmic stabilization    elbow at 90 2x1 Minute      Plan for Next Visit: rhythmic stabilization        Patient Education and Home Exercises     Home Exercises Provided and Patient Education Provided     Education provided:   · Patient educated on the impairments noted above and the effects of physical therapy intervention to improve overall condition and QOL.   · Patient was educated on all the above exercise prior/during/after for proper posture, positioning, and execution for safe performance with home exercise program.     Written Home Exercises Provided: Patient instructed to cont  prior HEP. Exercises were reviewed and Sangeetha was able to demonstrate them prior to the end of the session.  Sangeetha demonstrated good  understanding of the education provided. See EMR under Patient Instructions for exercises provided during therapy sessions    ASSESSMENT     Patient presents with improving motion overall. No increase in acute pains. Increased PROM, and isotonics during therapy. Pt educated to be very careful at home if taking sling off at home. Pt educated to continue HEP to further sustain gains made through therapy.    Sangeetha Is progressing well towards her goals.   Pt prognosis is Good.     Pt will continue to benefit from skilled outpatient physical therapy to address the deficits listed in the problem list box on initial evaluation, provide pt/family education and to maximize pt's level of independence in the home and community environment.     Pt's spiritual, cultural and educational needs considered and pt agreeable to plan of care and goals.     Anticipated barriers to physical therapy:  co-morbidities and sedentary lifestyle, transportation and other doctors appointments    Goals:     SHORT TERM GOALS: 8 weeks 7/15/2021   1. Recent signs and systems trend is improving in order to progress towards LTG's.     1. Patient will improve AROM to below measures to progress towards long term goals.  a. Shoulder Flexion: pain free AROM >130*  b. Shoulder ER @ 90*: pain free AROM > 60*  c. Shoulder functional IR: pain free AROM > L5     3. Patient will be independent with HEP in order to further progress and return to maximal function.     4. Pain rating at Worst: 5/10 in order to progress towards increased independence with activity.     5. Patient will be able to correct postural deviations in sitting and standing, to decrease pain and promote long term stability.        LONG TERM GOALS: 16 weeks 7/15/2021   1. Patient will return to normal ADL, recreational, and work related activities with less pain and  limitation.      2. Patient will improve AROM to stated goals in order to return to maximal functional potential.      3. Patient will improve strength to stated goals of appropriate musculature in order to improve functional independence.      4. Pain Rating at Best: 1/10 to improve Quality of Life and return to prior level activities.      5. Pain will Improve FOTO Score to the predicted outcome score: 52%     6. Patient will have met/partially met personal goal of: being able to reach arm above shoulder height to complete daily activities              PLAN     Continue PT current POC to improve left upper extremity range of motion and strength.    Ryne Arrington, PT

## 2022-03-07 NOTE — PROGRESS NOTES
"[facility]  Speech Therapy Treatment Note- Voice  Date: 3/7/2022     Name: Sangeetha June   MRN: [id number]   Therapy Diagnosis:   Encounter Diagnosis   Name Primary?    Dysphonia Yes   Physician: Issac Erickson MD  Physician Orders: Ambulatory referral/consult to speech therapy   Medical Diagnosis: Dysphonia [R49.0]    Visit #/ Visits Authorized: 4/ 24 (+eval)  Date of Evaluation:  1/7/22  Insurance Authorization Period: 1/14/22-5/20/22  Plan of Care Expiration Date:    3/18/22  Extended Plan of Care:  -   Progress Note: 3/15/22   Visits Cancelled: 3  Visits No Show: 1    Time In:  8:54 AM  Time Out: 9:32 AM  Total Billable Time: 38 mins      Precautions: Standard and cancer  Subjective:   Patient reports: she arrived on time. She reports her voice has been "excellent". She reports sometimes when she talks for a long time, she has some shakiness in her voice, but overall her voice has been much improved. She reports she hasn't been "running out of air" and feels less strain. She reports she has wanted to record her voice because the quality has been significantly more clear. She reports she has been aware of straining and has "stopped herself" when she speaks with laryngeal tension/strain.   She was compliant to home exercise program.     Response to previous treatment: improved vocal quality/breathing    Pain Scale:  3/10 on a Visual Analog Scale currently.   Pain Location: L shoulder     Objective:   TIMED  Procedure Min.   Cognitive Therapeutic Interventions, first 15 minutes CPT 63050  0   Cognitive Therapeutic Interventions, each additional 15 minutes CPT 35816  0         UNTIMED  Procedure Min.   Speech- Language- Voice Therapy  38   Total Timed Units: 0  Total Untimed Units: 1  Charges Billed/Number of units: 99767/1    Short Term Goals: (10 weeks) Current Progress:   Patient will complete SOVT exercises and/or resonant-focused exercises 3-5x daily to strengthen and balance the " intrinsic laryngeal musculature and maximize glottic closure without medial hyperfunction.    Progressing/ Not Met 3/7/2022   Patient was introduced to SOVT exercises and executed with 80% accuracy and minimal verbal cueing for inhalation through nose, prolonging exercises, and reducing volume.    Patient will discriminate between easy and strained phonation with 80% accuracy.     Goal Met 2/22/2022    Met x1 Patient discriminated between easy and strained phonation while completing VFE as well as in conversation with about 85% accuracy and minimal verbal and visual cueing.      Patient will recall and utilize vocal hygiene strategies with minimal verbal cueing.       Goal Met 2/15/2022   Patient recalled and is implementing vocal hygiene strategies independently, most notably, significantly increasing water intake since evaluation. Vocal hygiene strategies reviewed in today's session. Patient encouraged to continue to increase water intake as able.    Patient will participate in vocal function exercises with 80% accuracy with minimal verbal cueing to improve vocal quality.     Goal Met 3/7/2022    Met x2 Vocal function exercises were reviewed and patient executed with 90% accuracy and minimal verbal cueing for diaphragmatic breathing and inhalation through nose prior to execution of exercises with overall intent of reducing laryngeal strain. Of note, in today's session, patient for the first time felt she was able to manage breathing for execution of exercises with increase in clarity of vocal quality. Exercises completed x2 in today's session.      Patient will complete diaphragmatic breathing exercises by producing maximal exhalation via diaphragmic breathing with a duration of 5 seconds in 10 trials, consistently with minimal tactile and verbal clinician cues.    Progressing/ Not Met 3/7/2022     Met x1  Patient implemented in supine position as well as sitting up. While supine, patient implemented with 100%  accuracy and minimal verbal cueing for reduced thoracic movement and while sitting up patient completed with 80% accuracy and moderate verbal cueing for reduced thoracic and clavicular movement and increased diaphragmatic movement. Patient encouraged to complete 10-15 minutes daily at home.         Patient Education/Response:   Patient was educated regarding respiration/phonation and coordination of systems for efficient voicing as well as rationale for completion of exercises. Patient expressed understanding of information provided.     Home program established: yes-vocal hygiene, vocal funciton exercises x2 each, x2 daily  Exercises were reviewed and Sangeetha was able to demonstrate them prior to the end of the session.  Sangeetha demonstrated good  understanding of the education provided.     See Electronic Medical Record under Patient Instructions for exercises provided throughout therapy.  Assessment:   Sangeetha is progressing well towards her goals. Progression toward independent execution of VFE and diaphragmatic breathing in today's session with overall improved vocal quality following execution related to improved diaphragmatic breathing. Current goals remain appropriate. Goals to be updated as necessary.     Patient prognosis is Good. Patient will continue to benefit from skilled outpatient speech and language therapy to address the deficits listed in the problem list on initial evaluation, provide patient/family education and to maximize patient's level of independence in the home and community environment.     Medical necessity is demonstrated by the following IMPAIRMENTS:  Poor vocal quality prevents accurate/efficient communication in communication situations (medical, social, vocational, home).    Barriers to Therapy: comorbidities, history of cancer, transportation  Patient's spiritual, cultural and educational needs considered and patient agreeable to plan of care and goals.    Plan:   Continue Plan of Care with  focus on vocal function exercises, SOVT exercises, and reducing laryngeal strain through awareness of phonation.     Angelika Gunter MA, CCC-SLP  Speech Language Pathologist  3/7/2022

## 2022-03-09 ENCOUNTER — OFFICE VISIT (OUTPATIENT)
Dept: ORTHOPEDICS | Facility: CLINIC | Age: 42
End: 2022-03-09
Payer: MEDICAID

## 2022-03-09 VITALS — WEIGHT: 293 LBS | BODY MASS INDEX: 44.41 KG/M2 | HEIGHT: 68 IN

## 2022-03-09 DIAGNOSIS — M75.122 NONTRAUMATIC COMPLETE TEAR OF LEFT ROTATOR CUFF: Primary | ICD-10-CM

## 2022-03-09 PROCEDURE — 3066F NEPHROPATHY DOC TX: CPT | Mod: CPTII,,, | Performed by: STUDENT IN AN ORGANIZED HEALTH CARE EDUCATION/TRAINING PROGRAM

## 2022-03-09 PROCEDURE — 1159F MED LIST DOCD IN RCRD: CPT | Mod: CPTII,,, | Performed by: STUDENT IN AN ORGANIZED HEALTH CARE EDUCATION/TRAINING PROGRAM

## 2022-03-09 PROCEDURE — 3008F PR BODY MASS INDEX (BMI) DOCUMENTED: ICD-10-PCS | Mod: CPTII,,, | Performed by: STUDENT IN AN ORGANIZED HEALTH CARE EDUCATION/TRAINING PROGRAM

## 2022-03-09 PROCEDURE — 99024 POSTOP FOLLOW-UP VISIT: CPT | Mod: ,,, | Performed by: STUDENT IN AN ORGANIZED HEALTH CARE EDUCATION/TRAINING PROGRAM

## 2022-03-09 PROCEDURE — 1159F PR MEDICATION LIST DOCUMENTED IN MEDICAL RECORD: ICD-10-PCS | Mod: CPTII,,, | Performed by: STUDENT IN AN ORGANIZED HEALTH CARE EDUCATION/TRAINING PROGRAM

## 2022-03-09 PROCEDURE — 3061F PR NEG MICROALBUMINURIA RESULT DOCUMENTED/REVIEW: ICD-10-PCS | Mod: CPTII,,, | Performed by: STUDENT IN AN ORGANIZED HEALTH CARE EDUCATION/TRAINING PROGRAM

## 2022-03-09 PROCEDURE — 3044F PR MOST RECENT HEMOGLOBIN A1C LEVEL <7.0%: ICD-10-PCS | Mod: CPTII,,, | Performed by: STUDENT IN AN ORGANIZED HEALTH CARE EDUCATION/TRAINING PROGRAM

## 2022-03-09 PROCEDURE — 3072F LOW RISK FOR RETINOPATHY: CPT | Mod: CPTII,,, | Performed by: STUDENT IN AN ORGANIZED HEALTH CARE EDUCATION/TRAINING PROGRAM

## 2022-03-09 PROCEDURE — 99999 PR PBB SHADOW E&M-EST. PATIENT-LVL III: CPT | Mod: PBBFAC,,, | Performed by: STUDENT IN AN ORGANIZED HEALTH CARE EDUCATION/TRAINING PROGRAM

## 2022-03-09 PROCEDURE — 3072F PR LOW RISK FOR RETINOPATHY: ICD-10-PCS | Mod: CPTII,,, | Performed by: STUDENT IN AN ORGANIZED HEALTH CARE EDUCATION/TRAINING PROGRAM

## 2022-03-09 PROCEDURE — 99024 PR POST-OP FOLLOW-UP VISIT: ICD-10-PCS | Mod: ,,, | Performed by: STUDENT IN AN ORGANIZED HEALTH CARE EDUCATION/TRAINING PROGRAM

## 2022-03-09 PROCEDURE — 1160F PR REVIEW ALL MEDS BY PRESCRIBER/CLIN PHARMACIST DOCUMENTED: ICD-10-PCS | Mod: CPTII,,, | Performed by: STUDENT IN AN ORGANIZED HEALTH CARE EDUCATION/TRAINING PROGRAM

## 2022-03-09 PROCEDURE — 1160F RVW MEDS BY RX/DR IN RCRD: CPT | Mod: CPTII,,, | Performed by: STUDENT IN AN ORGANIZED HEALTH CARE EDUCATION/TRAINING PROGRAM

## 2022-03-09 PROCEDURE — 3061F NEG MICROALBUMINURIA REV: CPT | Mod: CPTII,,, | Performed by: STUDENT IN AN ORGANIZED HEALTH CARE EDUCATION/TRAINING PROGRAM

## 2022-03-09 PROCEDURE — 99213 OFFICE O/P EST LOW 20 MIN: CPT | Mod: PBBFAC,PO | Performed by: STUDENT IN AN ORGANIZED HEALTH CARE EDUCATION/TRAINING PROGRAM

## 2022-03-09 PROCEDURE — 3008F BODY MASS INDEX DOCD: CPT | Mod: CPTII,,, | Performed by: STUDENT IN AN ORGANIZED HEALTH CARE EDUCATION/TRAINING PROGRAM

## 2022-03-09 PROCEDURE — 3044F HG A1C LEVEL LT 7.0%: CPT | Mod: CPTII,,, | Performed by: STUDENT IN AN ORGANIZED HEALTH CARE EDUCATION/TRAINING PROGRAM

## 2022-03-09 PROCEDURE — 3066F PR DOCUMENTATION OF TREATMENT FOR NEPHROPATHY: ICD-10-PCS | Mod: CPTII,,, | Performed by: STUDENT IN AN ORGANIZED HEALTH CARE EDUCATION/TRAINING PROGRAM

## 2022-03-09 PROCEDURE — 99999 PR PBB SHADOW E&M-EST. PATIENT-LVL III: ICD-10-PCS | Mod: PBBFAC,,, | Performed by: STUDENT IN AN ORGANIZED HEALTH CARE EDUCATION/TRAINING PROGRAM

## 2022-03-09 NOTE — PROGRESS NOTES
Orthopaedics  Post-operative follow-up    Procedure Performed:  1. Left shoulder arthroscopic rotator cuff repair  2. Left shoulder arthroscopic biceps tenodesis  3. Left shoulder subacromial decompression     Date of Surgery: 01/27/2022    Subjective: Sangeetha June is now approximately 6 weeks out from her shoulder surgery.  She has been compliant with post-operative restrictions and has been progressing with PT.  Her pain and function continue to improve. She is ready to have her sling discontinued.    Exam:  Incision sites healed, C/D/I  No swelling or bruising noted  Fluid ROM with no crepitus  Passive ROM: , , ER 40  Axillary nerve sensation and motor intact  Motor and sensory intact distally  Strong radial pulse, fingers warm and well perfused    Imaging:  No new imaging studies    Impression:  Left shoulder RCR, SAD, biceps tenodesis, second post-operative visit - doing well    Plan:  Overall she is doing well and making progress. She is going to PT and is starting AAROM and AROM now  Advance PT per protocol  Symptomatic treatment for pain / swelling  May advance activities as reviewed today: ok to discontinue sling  Instructed patient to call clinic if questions or concerns    Follow-up with me in 6 weeks          Francisco Mathews MD

## 2022-03-14 ENCOUNTER — PATIENT MESSAGE (OUTPATIENT)
Dept: SURGERY | Facility: CLINIC | Age: 42
End: 2022-03-14
Payer: MEDICAID

## 2022-03-16 ENCOUNTER — PATIENT MESSAGE (OUTPATIENT)
Dept: ADMINISTRATIVE | Facility: OTHER | Age: 42
End: 2022-03-16
Payer: MEDICAID

## 2022-03-16 ENCOUNTER — OFFICE VISIT (OUTPATIENT)
Dept: PULMONOLOGY | Facility: CLINIC | Age: 42
End: 2022-03-16
Payer: MEDICAID

## 2022-03-16 VITALS
HEIGHT: 68 IN | BODY MASS INDEX: 44.41 KG/M2 | DIASTOLIC BLOOD PRESSURE: 82 MMHG | OXYGEN SATURATION: 97 % | HEART RATE: 68 BPM | RESPIRATION RATE: 19 BRPM | SYSTOLIC BLOOD PRESSURE: 120 MMHG | WEIGHT: 293 LBS

## 2022-03-16 DIAGNOSIS — G47.33 MILD OBSTRUCTIVE SLEEP APNEA: ICD-10-CM

## 2022-03-16 DIAGNOSIS — J45.909 NOT WELL CONTROLLED ASTHMA WITHOUT COMPLICATION: Primary | ICD-10-CM

## 2022-03-16 DIAGNOSIS — R94.2 MIXED OBSTRUCTIVE AND RESTRICTIVE VENTILATORY DEFECT: ICD-10-CM

## 2022-03-16 DIAGNOSIS — Z71.89 CPAP USE COUNSELING: ICD-10-CM

## 2022-03-16 PROCEDURE — 3061F NEG MICROALBUMINURIA REV: CPT | Mod: CPTII,,, | Performed by: INTERNAL MEDICINE

## 2022-03-16 PROCEDURE — 3061F PR NEG MICROALBUMINURIA RESULT DOCUMENTED/REVIEW: ICD-10-PCS | Mod: CPTII,,, | Performed by: INTERNAL MEDICINE

## 2022-03-16 PROCEDURE — 99214 PR OFFICE/OUTPT VISIT, EST, LEVL IV, 30-39 MIN: ICD-10-PCS | Mod: S$PBB,,, | Performed by: INTERNAL MEDICINE

## 2022-03-16 PROCEDURE — 1160F PR REVIEW ALL MEDS BY PRESCRIBER/CLIN PHARMACIST DOCUMENTED: ICD-10-PCS | Mod: CPTII,,, | Performed by: INTERNAL MEDICINE

## 2022-03-16 PROCEDURE — 1160F RVW MEDS BY RX/DR IN RCRD: CPT | Mod: CPTII,,, | Performed by: INTERNAL MEDICINE

## 2022-03-16 PROCEDURE — 99214 OFFICE O/P EST MOD 30 MIN: CPT | Mod: PBBFAC | Performed by: INTERNAL MEDICINE

## 2022-03-16 PROCEDURE — 99999 PR PBB SHADOW E&M-EST. PATIENT-LVL IV: CPT | Mod: PBBFAC,,, | Performed by: INTERNAL MEDICINE

## 2022-03-16 PROCEDURE — 3079F PR MOST RECENT DIASTOLIC BLOOD PRESSURE 80-89 MM HG: ICD-10-PCS | Mod: CPTII,,, | Performed by: INTERNAL MEDICINE

## 2022-03-16 PROCEDURE — 99999 PR PBB SHADOW E&M-EST. PATIENT-LVL IV: ICD-10-PCS | Mod: PBBFAC,,, | Performed by: INTERNAL MEDICINE

## 2022-03-16 PROCEDURE — 3008F BODY MASS INDEX DOCD: CPT | Mod: CPTII,,, | Performed by: INTERNAL MEDICINE

## 2022-03-16 PROCEDURE — 3074F PR MOST RECENT SYSTOLIC BLOOD PRESSURE < 130 MM HG: ICD-10-PCS | Mod: CPTII,,, | Performed by: INTERNAL MEDICINE

## 2022-03-16 PROCEDURE — 3074F SYST BP LT 130 MM HG: CPT | Mod: CPTII,,, | Performed by: INTERNAL MEDICINE

## 2022-03-16 PROCEDURE — 1159F PR MEDICATION LIST DOCUMENTED IN MEDICAL RECORD: ICD-10-PCS | Mod: CPTII,,, | Performed by: INTERNAL MEDICINE

## 2022-03-16 PROCEDURE — 3066F NEPHROPATHY DOC TX: CPT | Mod: CPTII,,, | Performed by: INTERNAL MEDICINE

## 2022-03-16 PROCEDURE — 3044F PR MOST RECENT HEMOGLOBIN A1C LEVEL <7.0%: ICD-10-PCS | Mod: CPTII,,, | Performed by: INTERNAL MEDICINE

## 2022-03-16 PROCEDURE — 3066F PR DOCUMENTATION OF TREATMENT FOR NEPHROPATHY: ICD-10-PCS | Mod: CPTII,,, | Performed by: INTERNAL MEDICINE

## 2022-03-16 PROCEDURE — 99214 OFFICE O/P EST MOD 30 MIN: CPT | Mod: S$PBB,,, | Performed by: INTERNAL MEDICINE

## 2022-03-16 PROCEDURE — 3079F DIAST BP 80-89 MM HG: CPT | Mod: CPTII,,, | Performed by: INTERNAL MEDICINE

## 2022-03-16 PROCEDURE — 3072F PR LOW RISK FOR RETINOPATHY: ICD-10-PCS | Mod: CPTII,,, | Performed by: INTERNAL MEDICINE

## 2022-03-16 PROCEDURE — 3044F HG A1C LEVEL LT 7.0%: CPT | Mod: CPTII,,, | Performed by: INTERNAL MEDICINE

## 2022-03-16 PROCEDURE — 3072F LOW RISK FOR RETINOPATHY: CPT | Mod: CPTII,,, | Performed by: INTERNAL MEDICINE

## 2022-03-16 PROCEDURE — 1159F MED LIST DOCD IN RCRD: CPT | Mod: CPTII,,, | Performed by: INTERNAL MEDICINE

## 2022-03-16 PROCEDURE — 3008F PR BODY MASS INDEX (BMI) DOCUMENTED: ICD-10-PCS | Mod: CPTII,,, | Performed by: INTERNAL MEDICINE

## 2022-03-16 RX ORDER — BUDESONIDE AND FORMOTEROL FUMARATE DIHYDRATE 160; 4.5 UG/1; UG/1
2 AEROSOL RESPIRATORY (INHALATION) EVERY 12 HOURS
Qty: 10.2 G | Refills: 3 | Status: SHIPPED | OUTPATIENT
Start: 2022-03-16 | End: 2023-01-11 | Stop reason: SDUPTHER

## 2022-03-16 NOTE — PROGRESS NOTES
Pulmonary Outpatient Follow Up Visit     Subjective:       Patient ID: Sangeetha June is a 42 y.o. female.    Chief Complaint: Asthma      HPI          42-year-old female patient presenting for 3 months follow-up.      Initially evaluated December 2021 for shortness of breath on exertion referred by Cardiology.         Patient complained of shortness of breath on exertion, dry coughing intermittent wheezing for months.  Wakes up nightly to use albuterol now prescribed by PMD Dr. monteiro due to nightly coughing and wheezing.  Not a smoker.     Does have a brother and sister with asthma nieces and nephews with asthma.  Not a smoker.I suspected asthma and started on Symbicort 80 mcg 2 puffs twice a day, feels better however partially controlled asthma control test questionnaire today 11.     History of stage II left breast CA status post resection and adjuvant chemotherapy.     Complains of snoring, excessive daytime sleepiness, witnessed apneic spells. Mild obstructive sleep apnea noted on home sleep study AHI 9 events per hour.  Using CPAP machine, feeling better.       Review of Systems   Constitutional: Positive for weight gain, fatigue and weakness. Negative for fever and chills.   HENT: Negative for nosebleeds.    Eyes: Negative for redness.   Respiratory: Positive for cough, shortness of breath, wheezing, dyspnea on extertion, use of rescue inhaler and somnolence. Negative for choking.    Cardiovascular: Positive for chest pain.   Genitourinary: Negative for hematuria.   Endocrine: Negative for cold intolerance.    Musculoskeletal: Positive for arthralgias, back pain and gait problem.   Skin: Negative for rash.   Gastrointestinal: Negative for vomiting.   Neurological: Negative for syncope.   Hematological: Negative for adenopathy.        History of left breast CA status post resection and adjuvant chemotherapy   Psychiatric/Behavioral: Positive for sleep  disturbance. Negative for confusion.       Outpatient Encounter Medications as of 3/16/2022   Medication Sig Dispense Refill    albuterol (PROVENTIL/VENTOLIN HFA) 90 mcg/actuation inhaler Inhale 2 puffs into the lungs every 6 (six) hours as needed for Wheezing. Rescue 18 g 1    ammonium lactate 12 % Crea Apply 1 Act topically 2 (two) times daily. To feet. 140 g 2    ascorbic acid, vitamin C, (VITAMIN C) 100 MG tablet Take 100 mg by mouth once daily.      busPIRone (BUSPAR) 15 MG tablet Take 1 tablet (15 mg total) by mouth 3 (three) times daily. 270 tablet 3    celecoxib (CELEBREX) 200 MG capsule Take 1 capsule (200 mg total) by mouth 2 (two) times daily. 28 capsule 0    dulaglutide (TRULICITY) 0.75 mg/0.5 mL pen injector Inject 0.75 mg into the skin every 7 days for 30 days, THEN 1.5 mg every 7 days. 20 pen 0    EScitalopram oxalate (LEXAPRO) 20 MG tablet Take 1 tablet (20 mg total) by mouth once daily. 90 tablet 3    EUTHYROX 50 mcg tablet Take 1 tablet (50 mcg total) by mouth every morning. 90 tablet 1    gabapentin (NEURONTIN) 100 MG capsule Take 1 capsule (100 mg total) by mouth 2 (two) times daily. 180 capsule 1    hydroCHLOROthiazide (HYDRODIURIL) 25 MG tablet Take 1 tablet (25 mg total) by mouth daily as needed (edema, swelling). 90 tablet 3    hydrocortisone 0.5 % cream Apply topically 2 (two) times daily.      linaCLOtide (LINZESS) 72 mcg Cap capsule Take 1 capsule (72 mcg total) by mouth once daily. 30 capsule 5    magnesium oxide (MAG-OX) 400 mg (241.3 mg magnesium) tablet Take 1 tablet (400 mg total) by mouth once daily. 90 tablet 1    meloxicam (MOBIC) 15 MG tablet Take 1 tablet (15 mg total) by mouth once daily. 30 tablet 0    metFORMIN (GLUCOPHAGE-XR) 500 MG ER 24hr tablet SMARTSI Tablet(s) By Mouth Every Evening 90 tablet 3    multivit with min-folic acid (ADULT MULTIVITAMIN GUMMIES) 200 mcg Chew Take by mouth.      olopatadine (PATANOL) 0.1 % ophthalmic solution        "pantoprazole (PROTONIX) 40 MG tablet Take 1 tablet (40 mg total) by mouth once daily. 30 tablet 11    pravastatin (PRAVACHOL) 40 MG tablet Take 1 tablet (40 mg total) by mouth every evening. 90 tablet 3    traMADoL (ULTRAM) 50 mg tablet Take 1 tablet (50 mg total) by mouth every 6 (six) hours as needed for Pain. 30 tablet 0    traZODone (DESYREL) 150 MG tablet Take 1 tablet (150 mg total) by mouth every evening. 90 tablet 3    [DISCONTINUED] budesonide-formoterol 80-4.5 mcg (SYMBICORT) 80-4.5 mcg/actuation HFAA Inhale 2 puffs into the lungs 2 (two) times a day. Controller 10.2 g 3    budesonide-formoterol 160-4.5 mcg (SYMBICORT) 160-4.5 mcg/actuation HFAA Inhale 2 puffs into the lungs every 12 (twelve) hours. Controller 10.2 g 3    oxybutynin (DITROPAN-XL) 5 MG TR24 Take 1 tablet (5 mg total) by mouth once daily. 90 tablet 3     Facility-Administered Encounter Medications as of 3/16/2022   Medication Dose Route Frequency Provider Last Rate Last Admin    lactated ringers infusion   Intravenous Continuous Maty Thompson MD 10 mL/hr at 01/27/22 0636 Restarted at 01/27/22 0709       Objective:     Vital Signs (Most Recent)  Vital Signs  Pulse: 68  Resp: 19  SpO2: 97 %  BP: 120/82  Height and Weight  Height: 5' 8" (172.7 cm)  Weight: 132.9 kg (292 lb 15.9 oz)  BSA (Calculated - sq m): 2.52 sq meters  BMI (Calculated): 44.6  Weight in (lb) to have BMI = 25: 164.1]  Wt Readings from Last 2 Encounters:   03/16/22 132.9 kg (292 lb 15.9 oz)   03/09/22 134.4 kg (296 lb 4.8 oz)       Physical Exam   Constitutional: She is oriented to person, place, and time. She appears well-developed and well-nourished. No distress. She is obese.   HENT:   Head: Normocephalic.   Cardiovascular: Normal rate.   Pulmonary/Chest: Normal expansion and effort normal. No stridor. No respiratory distress. She exhibits no tenderness.   Abdominal: She exhibits no distension.   Musculoskeletal:         General: No tenderness.      Cervical back: " Neck supple.      Comments: Left breast CA sp mastectomy    Lymphadenopathy: No supraclavicular adenopathy is present.     She has no cervical adenopathy.   Neurological: She is alert and oriented to person, place, and time. Gait normal.   Skin: Skin is warm. No cyanosis. Nails show no clubbing.   Psychiatric: She has a normal mood and affect. Her behavior is normal. Judgment and thought content normal.   Nursing note and vitals reviewed.      Laboratory  Lab Results   Component Value Date    WBC 4.25 02/15/2022    RBC 4.81 02/15/2022    HGB 11.7 (L) 02/15/2022    HCT 38.0 02/15/2022    MCV 79 (L) 02/15/2022    MCH 24.3 (L) 02/15/2022    MCHC 30.8 (L) 02/15/2022    RDW 16.9 (H) 02/15/2022     02/15/2022    MPV 9.0 (L) 02/15/2022    GRAN 2.0 02/15/2022    GRAN 48.0 02/15/2022    LYMPH 1.7 02/15/2022    LYMPH 40.2 02/15/2022    MONO 0.3 02/15/2022    MONO 7.8 02/15/2022    EOS 0.1 02/15/2022    BASO 0.03 02/15/2022    EOSINOPHIL 3.1 02/15/2022    BASOPHIL 0.7 02/15/2022       BMP  Lab Results   Component Value Date     02/15/2022    K 4.5 02/15/2022     02/15/2022    CO2 27 02/15/2022    BUN 6 02/15/2022    CREATININE 0.8 02/15/2022    CALCIUM 9.1 02/15/2022    ANIONGAP 7 (L) 02/15/2022    ESTGFRAFRICA >60 02/15/2022    EGFRNONAA >60 02/15/2022    AST 14 02/15/2022    ALT 15 02/15/2022    PROT 6.9 02/15/2022       Lab Results   Component Value Date    BNP 15 10/29/2021    BNP <10 07/02/2021    BNP <10 05/04/2021       Lab Results   Component Value Date    TSH 1.586 10/29/2021       No results found for: SEDRATE    No results found for: CRP  Lab Results   Component Value Date    IGE 58 12/03/2021        No results found for: ASPERGILLUS  No results found for: AFUMIGATUSCL     No results found for: ACE     Diagnostic Results:  I have personally reviewed today the following studies:      Home sleep study mild obstructive sleep apnea AHI 9.6 events per hour.      Compliance report reviewed.  63% usage  more than 4 hours.  ResidualAHI 3 events per hour      CT chest November 2021     COMPARISON:  Prior chest radiographs; PET-CT 05/26/2021     FINDINGS:  Thoracic aorta and great vessels are unremarkable.  Heart demonstrates mild enlargement.  No pericardial effusion.  No pleural effusion.  No pathologically enlarged hilar, mediastinal or axillary lymph nodes.  Left axillary node dissection postsurgical findings present.  Left mastectomy changes noted.     Lungs demonstrate no focal opacity with diffuse hypoaeration changes.  Stable 4 mm right upper lobe nodule present adjacent to the minor fissure.  Nearby 3 mm calcified nodule present.  4 mm anterior right middle lobe nodule noted, better visualized currently but not felt to be changed from prior PET-CT.     Hepatic steatosis noted.  Left renal cyst.  No acute abnormality within the visualized upper abdomen.  No acute osseous abnormality.     Impression:     No acute abnormality      Spirometry December 2021     Grade A-D -acceptable quality of tracingsModerately severe restriction (FVC 50-59% of predicted). Consider lung volumes if clinically indicatedFlow volume loop demonstrate an obstructive defect.Flow volume loops demonstrate a restrictive defect.Overall   there is moderately severe ventilatory impairment. (FEV1 > or equal to 50% of predicted and < or equal to 59% of predicted.)   Â Pattern of mixed obstruction and restriction    6 minute walking test December 2021 no need for O2 on exertion.        Assessment/Plan:   Not well controlled asthma without complication  -     budesonide-formoterol 160-4.5 mcg (SYMBICORT) 160-4.5 mcg/actuation HFAA; Inhale 2 puffs into the lungs every 12 (twelve) hours. Controller  Dispense: 10.2 g; Refill: 3  -     Complete PFT with bronchodilator; Future; Expected date: 06/16/2022    Mixed obstructive and restrictive ventilatory defect  -     budesonide-formoterol 160-4.5 mcg (SYMBICORT) 160-4.5 mcg/actuation HFAA; Inhale 2  puffs into the lungs every 12 (twelve) hours. Controller  Dispense: 10.2 g; Refill: 3  -     Complete PFT with bronchodilator; Future; Expected date: 06/16/2022    Mild obstructive sleep apnea    CPAP use counseling     continue albuterol p.r.n.      uncontrolled asthma, increase Symbicort to 80 mcg 2 puffs twice daily.      Encouraged patient to improve CPAP compliance.  Does have a nasal cushion.      Repeat PFT next visit.            Follow up in about 3 months (around 6/16/2022).    This note was prepared using voice recognition system and is likely to have sound alike errors that may have been overlooked even after proof reading.  Please call me with any questions    Discussed diagnosis, its evaluation, treatment and usual course. All questions answered.      Evy Cid MD

## 2022-03-18 ENCOUNTER — CLINICAL SUPPORT (OUTPATIENT)
Dept: REHABILITATION | Facility: HOSPITAL | Age: 42
End: 2022-03-18
Payer: MEDICAID

## 2022-03-18 DIAGNOSIS — R29.898 WEAKNESS OF SHOULDER: Primary | ICD-10-CM

## 2022-03-18 PROCEDURE — 97110 THERAPEUTIC EXERCISES: CPT

## 2022-03-18 PROCEDURE — 97140 MANUAL THERAPY 1/> REGIONS: CPT

## 2022-03-18 NOTE — PROGRESS NOTES
[facility]   Physical Therapy Treatment Note     Name: Sangeetha Hannon Austin  Clinic Number: [id number]    Therapy Diagnosis:   Encounter Diagnosis   Name Primary?    Weakness of shoulder Yes     Physician: Francisco Mathews*    Visit Date: 3/18/2022    Physician: Francisco Mathews*    Physician Orders: PT Eval and Treat  Medical Diagnosis from Referral: M75.122 (ICD-10-CM) - Nontraumatic complete tear of left rotator cuff  Evaluation Date: 2/15/2022  Authorization Period Expiration: 02/09/2023  Plan of Care Expiration: 05/18/2022                          Progress Update: 03/15/2022                        Visit # / Visits authorized: 6/20 (1 - eval)          FOTO: 1 / 3       PTA Visit #: 0/5     PRECAUTIONS: Standard Precautions, Diabetes: type 2 and Weight-bearing status: Non-weight-bearing: left shoulder      SURGERY:   1. Left shoulder arthroscopic rotator cuff repair  2. Left shoulder arthroscopic biceps tenodesis  3. Left shoulder subacromial decompression  DATE OF SURGERY: 1/27/2022  MD Follow-up: 4/20/2021    Time In: 1055 am  Time Out: 1135 pm  Total Appointment Time: 40 minutes    SUBJECTIVE     Pt reports:Patient reports mild soreness after therapy but denies pain. She reports being unable to make it the past 2 weeks due to being sick and transportation issues. She is now moving closer to [place] so this should help.    She was compliant with home exercise program.  Response to previous treatment: no soreness or pain  Functional change: too soon to tell    Pain: 0/10  Location: left shoulder      OBJECTIVE     Objective Measures updated at progress report unless specified.     Treatment     Rehabilitation program:   Phase 1: (0-6 weeks) - 1/27/2022-3/10/2022  Phase 2: (6-12 weeks)- 3/10/2022-4/7/2022  Phase 3: (12+ weeks)- 4/7/2022+    Sangeetha received the treatments listed below:   Billing reflects [place] medicaid guidelines, billing all therapy as  therapeutic-exercise    Sangeetha received the following manual therapy techniques: Joint mobilizations, Myofacial release, Soft tissue Mobilization, and PROM were applied to the: left for (15) minutes, including:   x = exercises performed today     Manual Intervention Performed today:     Soft Tissue Massage   upper trapezius, levator scapulae  and rhomboids    Joint Mobility x Grade (I-III); GHJ oscillations/PA/Lat/Inf      Grade (I-III); Scapular Thoracic Med/Lat/Sup/Inf/UR/DR   PROM x Supine: Internal rotation/External Rotation as tolerated     x Side-lying: Scapular ADD, Shoulder Flexion/scaption to 120, shoulder extension to 30      Elbow Flexion/extension      Plan for Next Visit: GHJ oscillations      Sangeetha received therapeutic exercises to develop strength, endurance, ROM, flexibility, and posture for (25) minutes including:   x = exercises performed      Ther Ex Performed today:     Scapular Retractions Yellow Theraband  x 30x   Pendulum Hangs  30x CW/CCW   Wrist flexion/extension  30x each 3#   Bicep curls - 30x YTB   Ball squeezes  30x   Shoulder Isometrics: flex, ext, abd, Internal rotation, External Rotation, extension   3 x10    Prone rows leaning over mat - 3x10, 2#   pulleys x 3 Minutes flexion, abduction   External Rotation stretch with dowel  15x 5 second holds   Standing Bilateral AAROM flexion with opposite hand x 15x 5 seconds    HEP given     Scapular retractions - Yellow Theraband    Shoulder External Rotation towel under arm - Yellow Theraband    Wall walks x 3 Minutes    shoulder Internal rotation towel under arm - Yellow Theraband    Rhythmic stabilization    elbow at 90 2x1 Minute      Plan for Next Visit: rhythmic stabilization        Patient Education and Home Exercises     Home Exercises Provided and Patient Education Provided     Education provided:   · Patient educated on the impairments noted above and the effects of physical therapy intervention to improve overall condition and QOL.    · Patient was educated on all the above exercise prior/during/after for proper posture, positioning, and execution for safe performance with home exercise program.     Written Home Exercises Provided: Patient instructed to cont prior HEP. Exercises were reviewed and Sangeetha was able to demonstrate them prior to the end of the session.  Sangeetha demonstrated good  understanding of the education provided. See EMR under Patient Instructions for exercises provided during therapy sessions    ASSESSMENT     Patient presents with improving motion overall. No increase in acute pains. Increased PROM, and wall walks during therapy today. Pt educated to be very careful at home if taking sling off at home. Pt educated to continue HEP to further sustain gains made through therapy.    Sangeetha Is progressing well towards her goals.   Pt prognosis is Good.     Pt will continue to benefit from skilled outpatient physical therapy to address the deficits listed in the problem list box on initial evaluation, provide pt/family education and to maximize pt's level of independence in the home and community environment.     Pt's spiritual, cultural and educational needs considered and pt agreeable to plan of care and goals.     Anticipated barriers to physical therapy:  co-morbidities and sedentary lifestyle, transportation and other doctors appointments    Goals:     SHORT TERM GOALS: 8 weeks 3/18/2021   1. Recent signs and systems trend is improving in order to progress towards LTG's.  Met   1. Patient will improve AROM to below measures to progress towards long term goals.  a. Shoulder Flexion: pain free AROM >130*  b. Shoulder ER @ 90*: pain free AROM > 60*  c. Shoulder functional IR: pain free AROM > L5  PC   3. Patient will be independent with HEP in order to further progress and return to maximal function.  Met   4. Pain rating at Worst: 5/10 in order to progress towards increased independence with activity.  Met   5. Patient will be able  to correct postural deviations in sitting and standing, to decrease pain and promote long term stability.  PC      LONG TERM GOALS: 16 weeks 3/18/2021   1. Patient will return to normal ADL, recreational, and work related activities with less pain and limitation.   PC   2. Patient will improve AROM to stated goals in order to return to maximal functional potential.   PC   3. Patient will improve strength to stated goals of appropriate musculature in order to improve functional independence.  PC   4. Pain Rating at Best: 1/10 to improve Quality of Life and return to prior level activities.  PC   5. Pain will Improve FOTO Score to the predicted outcome score: 52%  PC   6. Patient will have met/partially met personal goal of: being able to reach arm above shoulder height to complete daily activities  PC            PLAN     Continue PT current POC to improve left upper extremity range of motion and strength.    Ryne Arrington, PT

## 2022-03-23 ENCOUNTER — CLINICAL SUPPORT (OUTPATIENT)
Dept: REHABILITATION | Facility: HOSPITAL | Age: 42
End: 2022-03-23
Payer: MEDICAID

## 2022-03-23 DIAGNOSIS — R29.898 WEAKNESS OF SHOULDER: Primary | ICD-10-CM

## 2022-03-23 DIAGNOSIS — R49.0 DYSPHONIA: Primary | ICD-10-CM

## 2022-03-23 PROCEDURE — 97110 THERAPEUTIC EXERCISES: CPT

## 2022-03-23 PROCEDURE — 92507 TX SP LANG VOICE COMM INDIV: CPT | Performed by: SPEECH-LANGUAGE PATHOLOGIST

## 2022-03-23 PROCEDURE — 97140 MANUAL THERAPY 1/> REGIONS: CPT | Mod: 59

## 2022-03-23 NOTE — PROGRESS NOTES
[facility]   Physical Therapy Progress Note     Name: Sangeetha Ramoshill  Clinic Number: [id number]    Therapy Diagnosis:   Encounter Diagnosis   Name Primary?    Weakness of shoulder Yes     Physician: Francisco Mathews*    Visit Date: 3/23/2022    Physician: Francisco Mathews*    Physician Orders: PT Eval and Treat  Medical Diagnosis from Referral: M75.122 (ICD-10-CM) - Nontraumatic complete tear of left rotator cuff  Evaluation Date: 2/15/2022  Authorization Period Expiration: 02/09/2023  Plan of Care Expiration: 05/18/2022                          Progress Update: 04/23/2022                        Visit # / Visits authorized: 7/20 (1 - eval)          FOTO: 1 / 3       PTA Visit #: 0/5     PRECAUTIONS: Standard Precautions, Diabetes: type 2 and Weight-bearing status: Non-weight-bearing: left shoulder      SURGERY:   1. Left shoulder arthroscopic rotator cuff repair  2. Left shoulder arthroscopic biceps tenodesis  3. Left shoulder subacromial decompression  DATE OF SURGERY: 1/27/2022  MD Follow-up: 4/20/2021    Time In: 0915 am  Time Out: 1000 pm  Total Appointment Time: 45 minutes    SUBJECTIVE     Pt reports: Patient reports mild soreness after therapy but denies pain. She reports better tolerance to therapy after today's session.    She was compliant with home exercise program.  Response to previous treatment: no soreness or pain  Functional change: too soon to tell    Pain: 0/10  Location: left shoulder      OBJECTIVE     Objective Measures updated at progress report unless specified.   RANGE OF MOTION:     Cervical Right   (spine)  2/15/2022 Left      2/15/2022 Left  2/23/2022 Pain/Dysfunction with Movement Goal   Cervical Flexion (60) 35 --- 45 stiff 45  Initial:    Cervical Extension (90) 60 --- 65 stiff 45  Initial:       Shoulder PROM Right  2/15/2022 Left  2/15/2022 Left  2/23/2022 Goal   Forward Flexion in scapular plane (180) 180 90 170 180  Initial:     External Rotation at scaption (90) x 30 60 90  Initial:    Internal rotation at 0 degrees (70) x 30 70  Scapular plane 45  Initial:    Functional ER (C7) x x x C7  Initial:   Functional IR (T10) x x x T10  Initial:       STRENGTH:     U/E MMT Right  2/15/2022 Left  2/15/2022 Pain/Dysfunction with Movement Goal   Shoulder Flexion x 3/5 discomfort  5/5 B   Shoulder Extension x 3/5 discomfort   5/5 B   Shoulder Abduction x 2+/5   5/5 B   Shoulder IR x 3/5   5/5 B   Shoulder ER    x 3/5  5/5 B   Elbow Flexion  5/5 3+/5  5/5 B   Elbow Extension 5/5 3+/5  5/5 B      MUSCLE LENGTH:      Muscle Tested  Right  2/15/2022 Left   2/15/2022 Goal   Upper Trapezius  decreased decreased Normal B    Levator Scapulae  decreased decreased Normal B   Sternocleidomastoid x x Normal B   Scalenes  x x Normal B    Pectoralis Minor  x x Normal B   Pectoralis Major x x Normal B      JOINT MOBILITY:      Joint Motion Tested Right  (spine)  2/15/2022 Left   2/15/2022 Goal   GHJ Posterior Glide x Normal Normal B   GHJ Inferior Glide x Hypomobile Normal B   GHJ Lateral Glide x Hypomobile Normal B   GHJ Anterior Glide x Normal Normal B   Scapular Thoracic: grade 2 glide x Normal Normal B           Treatment     Rehabilitation program:   Phase 1: (0-6 weeks) - 1/27/2022-3/10/2022  Phase 2: (6-12 weeks)- 3/10/2022-4/7/2022  Phase 3: (12+ weeks)- 4/7/2022+    Sangeetha received the treatments listed below:   Billing reflects [place] medicaid guidelines, billing all therapy as therapeutic-exercise    Sangeetha received the following manual therapy techniques: Joint mobilizations, Myofacial release, Soft tissue Mobilization, and PROM were applied to the: left for (15) minutes, including:   x = exercises performed today     Manual Intervention Performed today:     Soft Tissue Massage   upper trapezius, levator scapulae  and rhomboids    Joint Mobility x Grade (I-III); GHJ oscillations/PA/Lat/Inf     x Grade (I-III); Scapular Thoracic Lat/Inf   PROM x Supine:  Internal rotation/External Rotation as tolerated     x Side-lying: Scapular ADD, Shoulder Flexion/scaption      Elbow Flexion/extension      Plan for Next Visit: ALEIDA oscillations      Snageetha received therapeutic exercises to develop strength, endurance, ROM, flexibility, and posture for (30) minutes including:   x = exercises performed      Ther Ex Performed today:     Pendulum Hangs  30x CW/CCW   Wrist flexion/extension  30x each 3#   Bicep curls - 30x YTB   Ball squeezes  30x   Shoulder Isometrics: flex, ext, abd, Internal rotation, External Rotation, extension   3 x10    Prone rows leaning over mat - 3x10, 2#   pulleys x 3 Minutes flexion, abduction   External Rotation stretch with dowel  15x 5 second holds   Supine Bilateral AAROM flexion with opposite hand x 15x 5 seconds    HEP given     Scapular retractions x red Theraband    Shoulder External Rotation towel under arm x red Theraband    Wall walks x 3 Minutes    shoulder Internal rotation towel under arm x red Theraband    Rhythmic stabilization  x  shoulder at 90 3x1 Minute      Plan for Next Visit: rhythmic stabilization        Patient Education and Home Exercises     Home Exercises Provided and Patient Education Provided     Education provided:   · Patient educated on the impairments noted above and the effects of physical therapy intervention to improve overall condition and QOL.   · Patient was educated on all the above exercise prior/during/after for proper posture, positioning, and execution for safe performance with home exercise program.     Written Home Exercises Provided: Patient instructed to cont prior HEP. Exercises were reviewed and Sangeetha was able to demonstrate them prior to the end of the session.  Sangeetha demonstrated good  understanding of the education provided. See EMR under Patient Instructions for exercises provided during therapy sessions    ASSESSMENT     Patient presents with improving motion overall. No increase in acute pains. Increased  PROM and strength noted with objective measurements above. Pt no longer requires to be in a ling and is progressing well with her current treatment plan. Pt educated to continue HEP to further sustain gains made through therapy.    Sangeetha Is progressing well towards her goals.   Pt prognosis is Good.     Pt will continue to benefit from skilled outpatient physical therapy to address the deficits listed in the problem list box on initial evaluation, provide pt/family education and to maximize pt's level of independence in the home and community environment.     Pt's spiritual, cultural and educational needs considered and pt agreeable to plan of care and goals.     Anticipated barriers to physical therapy:  co-morbidities and sedentary lifestyle, transportation and other doctors appointments    Goals:     SHORT TERM GOALS: 8 weeks 3/18/2021   1. Recent signs and systems trend is improving in order to progress towards LTG's.  Met   1. Patient will improve AROM to below measures to progress towards long term goals.  a. Shoulder Flexion: pain free AROM >130*  b. Shoulder ER @ 90*: pain free AROM > 60*  c. Shoulder functional IR: pain free AROM > L5  PC   3. Patient will be independent with HEP in order to further progress and return to maximal function.  Met   4. Pain rating at Worst: 5/10 in order to progress towards increased independence with activity.  Met   5. Patient will be able to correct postural deviations in sitting and standing, to decrease pain and promote long term stability.  PC      LONG TERM GOALS: 16 weeks 3/18/2021   1. Patient will return to normal ADL, recreational, and work related activities with less pain and limitation.   PC   2. Patient will improve AROM to stated goals in order to return to maximal functional potential.   PC   3. Patient will improve strength to stated goals of appropriate musculature in order to improve functional independence.  PC   4. Pain Rating at Best: 1/10 to improve  Quality of Life and return to prior level activities.  PC   5. Pain will Improve FOTO Score to the predicted outcome score: 52%  PC   6. Patient will have met/partially met personal goal of: being able to reach arm above shoulder height to complete daily activities  PC            PLAN     Continue PT current POC to improve left upper extremity range of motion and strength.    Ryne Arrington, PT

## 2022-03-23 NOTE — PROGRESS NOTES
[facility]  Speech Therapy Treatment Note/Discharge Summary- Voice  Date: 3/23/2022     Name: Sangeetha June   MRN: [id number]   Therapy Diagnosis:   Encounter Diagnosis   Name Primary?    Dysphonia Yes   Physician: Issac Erickson MD  Physician Orders: Ambulatory referral/consult to speech therapy   Medical Diagnosis: Dysphonia [R49.0]    Visit #/ Visits Authorized: 5/ 24 (+eval)  Date of Evaluation:  1/7/22  Insurance Authorization Period: 1/14/22-5/20/22  Plan of Care Expiration Date:    3/18/22  Extended Plan of Care:  -   Progress Note: 3/15/22   Visits Cancelled: 5  Visits No Show: 1    Date of Last visit: 3/23/2022   Total Visits Received: 6  Cancelled Visits: 5  No Show Visits: 1    Time In:  10:00 AM  Time Out: 10:40 AM  Total Billable Time: 40 mins      Precautions: Standard and cancer  Subjective:   Patient reports: she arrived on time. She reports her voice has been "great". She reports her voice has been really clear for the past few days/week. She also reports her head has been very "clear" too and she has been feeling very "normal" at home. She reports she has been working on her breathing at home as well as during physical therapy.   She was compliant to home exercise program.     Response to previous treatment: improved vocal quality/breathing    Pain Scale:  3/10 on a Visual Analog Scale currently.   Pain Location: L shoulder     Objective:   TIMED  Procedure Min.   Cognitive Therapeutic Interventions, first 15 minutes CPT 41143  0   Cognitive Therapeutic Interventions, each additional 15 minutes CPT 33743  0         UNTIMED  Procedure Min.   Speech- Language- Voice Therapy  40   Total Timed Units: 0  Total Untimed Units: 1  Charges Billed/Number of units: 04884/1    Short Term Goals: (10 weeks) Current Progress:   Patient will complete SOVT exercises and/or resonant-focused exercises 3-5x daily to strengthen and balance the intrinsic laryngeal musculature and maximize glottic " closure without medial hyperfunction.    Goal Met 3/23/2022   Patient completed SOVT exercises and executed with 90% accuracy and minimal verbal cueing for inhalation through nose, prolonging exercises, and reducing volume.     Patient then read Kersey passage with minimal verbal cueing for diaphragmatic breath and overall clear vocal quality.     Patient will discriminate between easy and strained phonation with 80% accuracy.     Goal Met 2/22/2022    Met x1 Patient discriminated between easy and strained phonation while completing VFE as well as in conversation with about 85% accuracy and minimal verbal and visual cueing.      Patient will recall and utilize vocal hygiene strategies with minimal verbal cueing.       Goal Met 2/15/2022   Patient recalled and is implementing vocal hygiene strategies independently, most notably, significantly increasing water intake since evaluation. Vocal hygiene strategies reviewed in today's session. Patient encouraged to continue to increase water intake as able.    Patient will participate in vocal function exercises with 80% accuracy with minimal verbal cueing to improve vocal quality.     Goal Met 3/7/2022    Met x2 Vocal function exercises were reviewed and patient executed with 90% accuracy and minimal verbal cueing for diaphragmatic breathing and inhalation through nose prior to execution of exercises with overall intent of reducing laryngeal strain. Of note, in today's session, patient for the first time felt she was able to manage breathing for execution of exercises with increase in clarity of vocal quality. Exercises completed x2 in today's session.      Patient will complete diaphragmatic breathing exercises by producing maximal exhalation via diaphragmic breathing with a duration of 5 seconds in 10 trials, consistently with minimal tactile and verbal clinician cues.    Goal Met 3/23/2022     Met x2 Patient implemented in supine position as well as sitting up. While  supine, patient implemented with 100% accuracy and minimal verbal cueing for reduced thoracic movement and while sitting up patient completed with 80% accuracy and minimal verbal cueing for reduced thoracic and clavicular movement and increased diaphragmatic movement. Patient encouraged to complete 10-15 minutes daily at home.         Patient Education/Response:   Patient was educated regarding respiration/phonation and coordination of systems for efficient voicing as well as rationale for completion of exercises. Patient expressed understanding of information provided.     Home program established: yes-vocal hygiene, vocal funciton exercises x2 each, x2 daily  Exercises were reviewed and Sangeetha was able to demonstrate them prior to the end of the session. Sangeetha demonstrated good  understanding of the education provided.     See Electronic Medical Record under Patient Instructions for exercises provided throughout therapy.  Assessment:   Assessment of Current Status: Patient is now completing all exercises accurately and independently with carryover of improved vocal quality for all reading and speech tasks. Patient was encouraged to follow up as needed given any change to status and expressed agreement with plan of care.     Goals: See progress toward goals in objective section above.     Discharge reason: Patient is now asymptomatic and Patient has met all of her goals    Plan:   The patient is discharged from speech therapy.     Angelika Gunter MA, CCC-SLP  Speech Language Pathologist  3/23/2022

## 2022-03-28 ENCOUNTER — CLINICAL SUPPORT (OUTPATIENT)
Dept: REHABILITATION | Facility: HOSPITAL | Age: 42
End: 2022-03-28
Payer: MEDICAID

## 2022-03-28 DIAGNOSIS — R29.898 WEAKNESS OF SHOULDER: Primary | ICD-10-CM

## 2022-03-28 PROCEDURE — 97110 THERAPEUTIC EXERCISES: CPT | Performed by: PHYSICAL THERAPIST

## 2022-03-28 NOTE — PROGRESS NOTES
[facility]   Physical Therapy Progress Note     Name: Sangeetha Ramoshill  Clinic Number: [id number]    Therapy Diagnosis:   Encounter Diagnosis   Name Primary?    Weakness of shoulder Yes     Physician: Francisco Mathews*    Visit Date: 3/28/2022    Physician: Francisco Mathews*    Physician Orders: PT Eval and Treat  Medical Diagnosis from Referral: M75.122 (ICD-10-CM) - Nontraumatic complete tear of left rotator cuff  Evaluation Date: 2/15/2022  Authorization Period Expiration: 02/09/2023  Plan of Care Expiration: 05/18/2022                          Progress Update: 04/23/2022                        Visit # / Visits authorized: 8/20 (1 - eval)          FOTO: 1 / 3       PTA Visit #: 0/5     PRECAUTIONS: Standard Precautions, Diabetes: type 2 and Weight-bearing status: Non-weight-bearing: left shoulder      SURGERY:   1. Left shoulder arthroscopic rotator cuff repair  2. Left shoulder arthroscopic biceps tenodesis  3. Left shoulder subacromial decompression  DATE OF SURGERY: 1/27/2022  MD Follow-up: 4/20/2021    Time In: 1030 am  Time Out: 1115 pm  Total Appointment Time: 45 minutes    SUBJECTIVE     Pt reports: left shoulder been more painful lately as well as left sided neck pain    She was compliant with home exercise program.  Response to previous treatment: no soreness or pain  Functional change: too soon to tell    Pain: 0/10  Location: left shoulder      OBJECTIVE     Objective Measures updated at progress report unless specified.     Treatment     Rehabilitation program:   Phase 1: (0-6 weeks) - 1/27/2022-3/10/2022  Phase 2: (6-12 weeks)- 3/10/2022-4/7/2022  Phase 3: (12+ weeks)- 4/7/2022+    Sangeetha received the treatments listed below:   Billing reflects [place] medicaid guidelines, billing all therapy as therapeutic-exercise    Sangeetha received the following manual therapy techniques: Joint mobilizations, Myofacial release, Soft tissue Mobilization, and PROM were  "applied to the: left for (15) minutes, including:   x = exercises performed today     Manual Intervention Performed today:     Soft Tissue Massage   upper trapezius, levator scapulae  and rhomboids    Joint Mobility x Grade (I-III); GHJ oscillations/PA/Lat/Inf     x Grade (I-III); Scapular Thoracic Lat/Inf   PROM x Supine: Internal rotation/External Rotation as tolerated     x Side-lying: Scapular ADD, Shoulder Flexion/scaption      Elbow Flexion/extension      Plan for Next Visit: GHJ oscillations      Sangeetha received therapeutic exercises to develop strength, endurance, ROM, flexibility, and posture for (30) minutes including:   x = exercises performed      Ther Ex Performed today:               Bicep curls  30x YTB        Shoulder Isometrics: flex, ext, abd, Internal rotation, External Rotation, extension   3 x10    Prone rows leaning over mat  3x10, 2#   pulleys x 3 Minutes flexion, abduction   External Rotation stretch with dowel x 15x 5 second holds   Supine Bilateral AAROM flexion with opposite hand x 15x 5 seconds    Upper trap stretch x 30" hold, 3x   Scapular retractions x green Theraband 3x10   Shoulder External Rotation towel under arm x red Theraband 2x12   Wall walks x 3 Minutes    shoulder Internal rotation towel under arm  red Theraband    Rhythmic stabilization    shoulder at 90 3x1 Minute      Plan for Next Visit: rhythmic stabilization        Patient Education and Home Exercises     Home Exercises Provided and Patient Education Provided     Education provided:   · Patient educated on the impairments noted above and the effects of physical therapy intervention to improve overall condition and QOL.   · Patient was educated on all the above exercise prior/during/after for proper posture, positioning, and execution for safe performance with home exercise program.     Written Home Exercises Provided: Patient instructed to cont prior HEP. Exercises were reviewed and Sangeetha was able to demonstrate them prior " to the end of the session.  Sangeetha demonstrated good  understanding of the education provided. See EMR under Patient Instructions for exercises provided during therapy sessions    ASSESSMENT     Pt progressing well with RTC protocol. Pt does have increased pain but educated that is common with beginning of strengthening progressions. Pt demonstrated upper trapezius stretch to improve neck pain and stiffness. Pt will continue to benefit from further strengthening progressions to improve functional ability.     Sangeetha Is progressing well towards her goals.   Pt prognosis is Good.     Pt will continue to benefit from skilled outpatient physical therapy to address the deficits listed in the problem list box on initial evaluation, provide pt/family education and to maximize pt's level of independence in the home and community environment.     Pt's spiritual, cultural and educational needs considered and pt agreeable to plan of care and goals.     Anticipated barriers to physical therapy:  co-morbidities and sedentary lifestyle, transportation and other doctors appointments    Goals:     SHORT TERM GOALS: 8 weeks 3/18/2021   1. Recent signs and systems trend is improving in order to progress towards LTG's.  Met   1. Patient will improve AROM to below measures to progress towards long term goals.  a. Shoulder Flexion: pain free AROM >130*  b. Shoulder ER @ 90*: pain free AROM > 60*  c. Shoulder functional IR: pain free AROM > L5  PC   3. Patient will be independent with HEP in order to further progress and return to maximal function.  Met   4. Pain rating at Worst: 5/10 in order to progress towards increased independence with activity.  Met   5. Patient will be able to correct postural deviations in sitting and standing, to decrease pain and promote long term stability.  PC      LONG TERM GOALS: 16 weeks 3/18/2021   1. Patient will return to normal ADL, recreational, and work related activities with less pain and limitation.    PC   2. Patient will improve AROM to stated goals in order to return to maximal functional potential.   PC   3. Patient will improve strength to stated goals of appropriate musculature in order to improve functional independence.  PC   4. Pain Rating at Best: 1/10 to improve Quality of Life and return to prior level activities.  PC   5. Pain will Improve FOTO Score to the predicted outcome score: 52%  PC   6. Patient will have met/partially met personal goal of: being able to reach arm above shoulder height to complete daily activities  PC            PLAN     Continue PT current POC to improve left upper extremity range of motion and strength.    Mohit Cornejo, PT

## 2022-03-29 ENCOUNTER — PATIENT OUTREACH (OUTPATIENT)
Dept: ADMINISTRATIVE | Facility: HOSPITAL | Age: 42
End: 2022-03-29
Payer: MEDICAID

## 2022-04-02 ENCOUNTER — PATIENT MESSAGE (OUTPATIENT)
Dept: ORTHOPEDICS | Facility: CLINIC | Age: 42
End: 2022-04-02
Payer: MEDICAID

## 2022-04-05 ENCOUNTER — TELEPHONE (OUTPATIENT)
Dept: DIABETES | Facility: CLINIC | Age: 42
End: 2022-04-05
Payer: MEDICAID

## 2022-04-11 ENCOUNTER — OFFICE VISIT (OUTPATIENT)
Dept: CARDIOLOGY | Facility: CLINIC | Age: 42
End: 2022-04-11
Payer: MEDICAID

## 2022-04-11 ENCOUNTER — OFFICE VISIT (OUTPATIENT)
Dept: SURGERY | Facility: CLINIC | Age: 42
End: 2022-04-11
Payer: MEDICAID

## 2022-04-11 VITALS
OXYGEN SATURATION: 100 % | SYSTOLIC BLOOD PRESSURE: 120 MMHG | BODY MASS INDEX: 44.41 KG/M2 | HEIGHT: 68 IN | DIASTOLIC BLOOD PRESSURE: 60 MMHG | HEART RATE: 63 BPM | WEIGHT: 293 LBS

## 2022-04-11 VITALS
DIASTOLIC BLOOD PRESSURE: 60 MMHG | WEIGHT: 293 LBS | SYSTOLIC BLOOD PRESSURE: 120 MMHG | HEART RATE: 63 BPM | BODY MASS INDEX: 44.41 KG/M2 | HEIGHT: 68 IN

## 2022-04-11 DIAGNOSIS — I63.9 CEREBROVASCULAR ACCIDENT (CVA), UNSPECIFIED MECHANISM: ICD-10-CM

## 2022-04-11 DIAGNOSIS — E66.01 OBESITY, MORBID, BMI 40.0-49.9: ICD-10-CM

## 2022-04-11 DIAGNOSIS — R06.09 DOE (DYSPNEA ON EXERTION): Primary | ICD-10-CM

## 2022-04-11 DIAGNOSIS — R06.02 SOB (SHORTNESS OF BREATH): ICD-10-CM

## 2022-04-11 DIAGNOSIS — E78.49 OTHER HYPERLIPIDEMIA: ICD-10-CM

## 2022-04-11 DIAGNOSIS — Z17.1 MALIGNANT NEOPLASM OF LEFT BREAST IN FEMALE, ESTROGEN RECEPTOR NEGATIVE, UNSPECIFIED SITE OF BREAST: Primary | ICD-10-CM

## 2022-04-11 DIAGNOSIS — R00.2 PALPITATIONS: ICD-10-CM

## 2022-04-11 DIAGNOSIS — Z85.3 HISTORY OF BREAST CANCER: ICD-10-CM

## 2022-04-11 DIAGNOSIS — I10 PRIMARY HYPERTENSION: ICD-10-CM

## 2022-04-11 DIAGNOSIS — R07.9 CHEST PAIN, UNSPECIFIED TYPE: ICD-10-CM

## 2022-04-11 DIAGNOSIS — C50.912 MALIGNANT NEOPLASM OF LEFT BREAST IN FEMALE, ESTROGEN RECEPTOR NEGATIVE, UNSPECIFIED SITE OF BREAST: Primary | ICD-10-CM

## 2022-04-11 DIAGNOSIS — G47.30 SLEEP APNEA, UNSPECIFIED TYPE: ICD-10-CM

## 2022-04-11 DIAGNOSIS — R60.0 LOCALIZED EDEMA: ICD-10-CM

## 2022-04-11 PROCEDURE — 99999 PR PBB SHADOW E&M-EST. PATIENT-LVL V: ICD-10-PCS | Mod: PBBFAC,,, | Performed by: SURGERY

## 2022-04-11 PROCEDURE — 3061F PR NEG MICROALBUMINURIA RESULT DOCUMENTED/REVIEW: ICD-10-PCS | Mod: CPTII,,, | Performed by: INTERNAL MEDICINE

## 2022-04-11 PROCEDURE — 1160F PR REVIEW ALL MEDS BY PRESCRIBER/CLIN PHARMACIST DOCUMENTED: ICD-10-PCS | Mod: CPTII,,, | Performed by: INTERNAL MEDICINE

## 2022-04-11 PROCEDURE — 3008F PR BODY MASS INDEX (BMI) DOCUMENTED: ICD-10-PCS | Mod: CPTII,,, | Performed by: INTERNAL MEDICINE

## 2022-04-11 PROCEDURE — 99999 PR PBB SHADOW E&M-EST. PATIENT-LVL IV: ICD-10-PCS | Mod: PBBFAC,,, | Performed by: INTERNAL MEDICINE

## 2022-04-11 PROCEDURE — 99214 PR OFFICE/OUTPT VISIT, EST, LEVL IV, 30-39 MIN: ICD-10-PCS | Mod: S$PBB,,, | Performed by: INTERNAL MEDICINE

## 2022-04-11 PROCEDURE — 1159F PR MEDICATION LIST DOCUMENTED IN MEDICAL RECORD: ICD-10-PCS | Mod: CPTII,,, | Performed by: SURGERY

## 2022-04-11 PROCEDURE — 3044F PR MOST RECENT HEMOGLOBIN A1C LEVEL <7.0%: ICD-10-PCS | Mod: CPTII,,, | Performed by: INTERNAL MEDICINE

## 2022-04-11 PROCEDURE — 3072F LOW RISK FOR RETINOPATHY: CPT | Mod: CPTII,,, | Performed by: INTERNAL MEDICINE

## 2022-04-11 PROCEDURE — 1160F RVW MEDS BY RX/DR IN RCRD: CPT | Mod: CPTII,,, | Performed by: INTERNAL MEDICINE

## 2022-04-11 PROCEDURE — 3074F SYST BP LT 130 MM HG: CPT | Mod: CPTII,,, | Performed by: SURGERY

## 2022-04-11 PROCEDURE — 3044F PR MOST RECENT HEMOGLOBIN A1C LEVEL <7.0%: ICD-10-PCS | Mod: CPTII,,, | Performed by: SURGERY

## 2022-04-11 PROCEDURE — 3074F PR MOST RECENT SYSTOLIC BLOOD PRESSURE < 130 MM HG: ICD-10-PCS | Mod: CPTII,,, | Performed by: INTERNAL MEDICINE

## 2022-04-11 PROCEDURE — 3066F NEPHROPATHY DOC TX: CPT | Mod: CPTII,,, | Performed by: SURGERY

## 2022-04-11 PROCEDURE — 3008F BODY MASS INDEX DOCD: CPT | Mod: CPTII,,, | Performed by: INTERNAL MEDICINE

## 2022-04-11 PROCEDURE — 3008F PR BODY MASS INDEX (BMI) DOCUMENTED: ICD-10-PCS | Mod: CPTII,,, | Performed by: SURGERY

## 2022-04-11 PROCEDURE — 99213 PR OFFICE/OUTPT VISIT, EST, LEVL III, 20-29 MIN: ICD-10-PCS | Mod: S$PBB,,, | Performed by: SURGERY

## 2022-04-11 PROCEDURE — 1159F MED LIST DOCD IN RCRD: CPT | Mod: CPTII,,, | Performed by: SURGERY

## 2022-04-11 PROCEDURE — 3066F PR DOCUMENTATION OF TREATMENT FOR NEPHROPATHY: ICD-10-PCS | Mod: CPTII,,, | Performed by: INTERNAL MEDICINE

## 2022-04-11 PROCEDURE — 99213 OFFICE O/P EST LOW 20 MIN: CPT | Mod: S$PBB,,, | Performed by: SURGERY

## 2022-04-11 PROCEDURE — 3061F NEG MICROALBUMINURIA REV: CPT | Mod: CPTII,,, | Performed by: INTERNAL MEDICINE

## 2022-04-11 PROCEDURE — 99215 OFFICE O/P EST HI 40 MIN: CPT | Mod: PBBFAC,27 | Performed by: SURGERY

## 2022-04-11 PROCEDURE — 3078F DIAST BP <80 MM HG: CPT | Mod: CPTII,,, | Performed by: SURGERY

## 2022-04-11 PROCEDURE — 99999 PR PBB SHADOW E&M-EST. PATIENT-LVL IV: CPT | Mod: PBBFAC,,, | Performed by: INTERNAL MEDICINE

## 2022-04-11 PROCEDURE — 3074F PR MOST RECENT SYSTOLIC BLOOD PRESSURE < 130 MM HG: ICD-10-PCS | Mod: CPTII,,, | Performed by: SURGERY

## 2022-04-11 PROCEDURE — 3061F PR NEG MICROALBUMINURIA RESULT DOCUMENTED/REVIEW: ICD-10-PCS | Mod: CPTII,,, | Performed by: SURGERY

## 2022-04-11 PROCEDURE — 3061F NEG MICROALBUMINURIA REV: CPT | Mod: CPTII,,, | Performed by: SURGERY

## 2022-04-11 PROCEDURE — 1159F MED LIST DOCD IN RCRD: CPT | Mod: CPTII,,, | Performed by: INTERNAL MEDICINE

## 2022-04-11 PROCEDURE — 3078F DIAST BP <80 MM HG: CPT | Mod: CPTII,,, | Performed by: INTERNAL MEDICINE

## 2022-04-11 PROCEDURE — 3074F SYST BP LT 130 MM HG: CPT | Mod: CPTII,,, | Performed by: INTERNAL MEDICINE

## 2022-04-11 PROCEDURE — 99214 OFFICE O/P EST MOD 30 MIN: CPT | Mod: PBBFAC,PO | Performed by: INTERNAL MEDICINE

## 2022-04-11 PROCEDURE — 3078F PR MOST RECENT DIASTOLIC BLOOD PRESSURE < 80 MM HG: ICD-10-PCS | Mod: CPTII,,, | Performed by: SURGERY

## 2022-04-11 PROCEDURE — 3066F NEPHROPATHY DOC TX: CPT | Mod: CPTII,,, | Performed by: INTERNAL MEDICINE

## 2022-04-11 PROCEDURE — 99214 OFFICE O/P EST MOD 30 MIN: CPT | Mod: S$PBB,,, | Performed by: INTERNAL MEDICINE

## 2022-04-11 PROCEDURE — 1159F PR MEDICATION LIST DOCUMENTED IN MEDICAL RECORD: ICD-10-PCS | Mod: CPTII,,, | Performed by: INTERNAL MEDICINE

## 2022-04-11 PROCEDURE — 3072F LOW RISK FOR RETINOPATHY: CPT | Mod: CPTII,,, | Performed by: SURGERY

## 2022-04-11 PROCEDURE — 3072F PR LOW RISK FOR RETINOPATHY: ICD-10-PCS | Mod: CPTII,,, | Performed by: SURGERY

## 2022-04-11 PROCEDURE — 3044F HG A1C LEVEL LT 7.0%: CPT | Mod: CPTII,,, | Performed by: INTERNAL MEDICINE

## 2022-04-11 PROCEDURE — 3078F PR MOST RECENT DIASTOLIC BLOOD PRESSURE < 80 MM HG: ICD-10-PCS | Mod: CPTII,,, | Performed by: INTERNAL MEDICINE

## 2022-04-11 PROCEDURE — 3044F HG A1C LEVEL LT 7.0%: CPT | Mod: CPTII,,, | Performed by: SURGERY

## 2022-04-11 PROCEDURE — 99999 PR PBB SHADOW E&M-EST. PATIENT-LVL V: CPT | Mod: PBBFAC,,, | Performed by: SURGERY

## 2022-04-11 PROCEDURE — 3066F PR DOCUMENTATION OF TREATMENT FOR NEPHROPATHY: ICD-10-PCS | Mod: CPTII,,, | Performed by: SURGERY

## 2022-04-11 PROCEDURE — 3008F BODY MASS INDEX DOCD: CPT | Mod: CPTII,,, | Performed by: SURGERY

## 2022-04-11 PROCEDURE — 3072F PR LOW RISK FOR RETINOPATHY: ICD-10-PCS | Mod: CPTII,,, | Performed by: INTERNAL MEDICINE

## 2022-04-11 NOTE — PROGRESS NOTES
Patient ID: Sangeehta June is a 42 y.o. female.    Follow-up    Chief Complaint: Follow-up      HPI:  The patient underwent a left mastectomy and subsequent noon chemotherapy [place] facility.  She has seen by Oncology here and sent for surgical evaluation the past.  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    According to the notes from the [place] cancer service he was seen by a  and genetic testing was done but does nodes and test results I have not been able to locate in the patient's chart.    She is being followed by Oncology.  She has seen Plastic surgery to discuss reconstruction options.    The patient states that she has these medical records at home.          Review of Systems   Constitutional: Negative for appetite change, chills, fatigue, fever and unexpected weight change.   HENT: Negative for hearing loss and rhinorrhea.    Eyes: Negative for visual disturbance.   Respiratory: Negative for apnea, cough, shortness of breath and wheezing.    Cardiovascular: Negative for chest pain and palpitations.   Gastrointestinal: Negative for abdominal distention, abdominal pain, blood in stool, constipation, diarrhea, nausea and vomiting.   Genitourinary: Negative for dysuria, frequency and urgency.   Musculoskeletal: Negative for arthralgias and neck pain.   Skin: Negative for rash.   Neurological: Negative for seizures, weakness, numbness and headaches.        Mild memory a   Hematological: Negative for adenopathy. Does not bruise/bleed easily.   Psychiatric/Behavioral: Negative for hallucinations. The patient is not nervous/anxious.        Current Outpatient Medications   Medication Sig Dispense Refill    albuterol (PROVENTIL/VENTOLIN HFA) 90 mcg/actuation inhaler Inhale 2 puffs into the lungs every 6 (six) hours as needed for Wheezing. Rescue 18 g 1    ammonium lactate 12 % Crea Apply 1 Act topically 2 (two) times daily. To  feet. 140 g 2    ascorbic acid, vitamin C, (VITAMIN C) 100 MG tablet Take 100 mg by mouth once daily.      budesonide-formoterol 160-4.5 mcg (SYMBICORT) 160-4.5 mcg/actuation HFAA Inhale 2 puffs into the lungs every 12 (twelve) hours. Controller 10.2 g 3    busPIRone (BUSPAR) 15 MG tablet Take 1 tablet (15 mg total) by mouth 3 (three) times daily. 270 tablet 3    celecoxib (CELEBREX) 200 MG capsule Take 1 capsule (200 mg total) by mouth 2 (two) times daily. 28 capsule 0    dulaglutide (TRULICITY) 0.75 mg/0.5 mL pen injector Inject 0.75 mg into the skin every 7 days for 30 days, THEN 1.5 mg every 7 days. 20 pen 0    EScitalopram oxalate (LEXAPRO) 20 MG tablet Take 1 tablet (20 mg total) by mouth once daily. 90 tablet 3    EUTHYROX 50 mcg tablet Take 1 tablet (50 mcg total) by mouth every morning. 90 tablet 1    gabapentin (NEURONTIN) 100 MG capsule Take 1 capsule (100 mg total) by mouth 2 (two) times daily. 180 capsule 1    hydroCHLOROthiazide (HYDRODIURIL) 25 MG tablet Take 1 tablet (25 mg total) by mouth daily as needed (edema, swelling). 90 tablet 3    hydrocortisone 0.5 % cream Apply topically 2 (two) times daily.      linaCLOtide (LINZESS) 72 mcg Cap capsule Take 1 capsule (72 mcg total) by mouth once daily. 30 capsule 5    magnesium oxide (MAG-OX) 400 mg (241.3 mg magnesium) tablet Take 1 tablet (400 mg total) by mouth once daily. 90 tablet 1    meloxicam (MOBIC) 15 MG tablet Take 1 tablet (15 mg total) by mouth once daily. 30 tablet 0    metFORMIN (GLUCOPHAGE-XR) 500 MG ER 24hr tablet SMARTSI Tablet(s) By Mouth Every Evening 90 tablet 3    multivit with min-folic acid (ADULT MULTIVITAMIN GUMMIES) 200 mcg Chew Take by mouth.      olopatadine (PATANOL) 0.1 % ophthalmic solution       pantoprazole (PROTONIX) 40 MG tablet Take 1 tablet (40 mg total) by mouth once daily. 30 tablet 11    pravastatin (PRAVACHOL) 40 MG tablet Take 1 tablet (40 mg total) by mouth every evening. 90 tablet 3     traMADoL (ULTRAM) 50 mg tablet Take 1 tablet (50 mg total) by mouth every 6 (six) hours as needed for Pain. 30 tablet 0    traZODone (DESYREL) 150 MG tablet Take 1 tablet (150 mg total) by mouth every evening. 90 tablet 3    oxybutynin (DITROPAN-XL) 5 MG TR24 Take 1 tablet (5 mg total) by mouth once daily. 90 tablet 3     No current facility-administered medications for this visit.     Facility-Administered Medications Ordered in Other Visits   Medication Dose Route Frequency Provider Last Rate Last Admin    lactated ringers infusion   Intravenous Continuous Maty Thompson MD 10 mL/hr at 01/27/22 0636 Restarted at 01/27/22 0709       Review of patient's allergies indicates:   Allergen Reactions    Lisinopril        Past Medical History:   Diagnosis Date    Anxiety     Breast cancer     Chest pain     Chest pain 7/2/2021    Cholecystitis without calculus     Decreased ROM of left shoulder 2/15/2022    Dizziness     Elevated C-reactive protein (CRP)     Essential hypertension     Fall 10/11/2021    Memory change     EVERARDO (obstructive sleep apnea)     Osteoarthritis     Other specified disorders of thyroid     Pre-diabetes 4/15/2021    Chronic, Stable, cont metformin    Screening mammogram, encounter for 4/15/2021    Shoulder injury     SOB (shortness of breath)     Thyroiditis     Vision disturbance 4/30/2021       Past Surgical History:   Procedure Laterality Date    ARTHROSCOPIC REPAIR OF ROTATOR CUFF OF SHOULDER Left 1/27/2022    Procedure: REPAIR, ROTATOR CUFF, ARTHROSCOPIC;  Surgeon: Francisco Mathews MD;  Location: [facility] OR;  Service: Orthopedics;  Laterality: Left;    ARTHROSCOPY OF SHOULDER WITH DECOMPRESSION OF SUBACROMIAL SPACE Left 1/27/2022    Procedure: ARTHROSCOPY, SHOULDER, WITH SUBACROMIAL SPACE DECOMPRESSION;  Surgeon: Francisco Mathews MD;  Location: [facility] OR;  Service: Orthopedics;  Laterality: Left;    BREAST BIOPSY      FIXATION OF TENDON Left 1/27/2022     Procedure: FIXATION, TENDON;  Surgeon: Francisco Mathews MD;  Location: [facility] OR;  Service: Orthopedics;  Laterality: Left;    HYSTERECTOMY      INJECTION OF JOINT Left 11/12/2021    Procedure: Left suprascapular and axillary injection with local;  Surgeon: Bisi Cochran MD;  Location: [facility] PAIN MGT;  Service: Pain Management;  Laterality: Left;    MASTECTOMY         Family History   Problem Relation Age of Onset    Cancer Mother     Stroke Mother     Heart disease Mother     Stroke Sister     Diabetes Sister     Diabetes Brother     No Known Problems Maternal Grandmother     Cancer Maternal Grandfather     Alcohol abuse Maternal Grandfather     No Known Problems Paternal Grandmother     No Known Problems Paternal Grandfather        Social History     Socioeconomic History    Marital status:    Tobacco Use    Smoking status: Never Smoker    Smokeless tobacco: Never Used   Substance and Sexual Activity    Alcohol use: Not Currently    Drug use: Yes     Types: Marijuana       Vitals:    04/11/22 1531   BP: 120/60   Pulse: 63       Physical Exam  Constitutional:       Appearance: She is well-developed. She is obese.   HENT:      Head: Normocephalic.   Eyes:      Pupils: Pupils are equal, round, and reactive to light.   Neck:      Thyroid: No thyromegaly.      Vascular: No JVD.      Trachea: No tracheal deviation.   Cardiovascular:      Rate and Rhythm: Normal rate and regular rhythm.      Heart sounds: Normal heart sounds.   Pulmonary:      Breath sounds: Normal breath sounds. No wheezing.   Abdominal:      General: Bowel sounds are normal. There is no distension.      Palpations: Abdomen is soft. Abdomen is not rigid. There is no mass.      Tenderness: There is no abdominal tenderness. There is no guarding or rebound.   Musculoskeletal:         General: Normal range of motion.   Lymphadenopathy:      Cervical: No cervical adenopathy.   Skin:     General: Skin is warm and dry.       Findings: No erythema or rash.      Comments: There is a healed left-sided mastectomy incision with some redundant skin.    The right breast shows no abnormalities, skin changes, masses, nipple discharge nor axillary adenopathy   Neurological:      Mental Status: She is alert and oriented to person, place, and time.     She does not have any recent imaging studies    Assessment & Plan:      Patient is status post left modified rest double mastectomy.    She carries a TP53 mutation with does increase her risk of breast cancer however we do not have recommendations from a , or genetic counselor, that she reportedly saw in [place] with regards to their recommendation for treatment of the right breast.  The patient is interested in pursuing some type of reconstruction on the left side    I would recommend that we obtain the records from the [place] cancer services.  She should see our genetic counselor here is those appointments become available.  A decision will need to be made with regard to the right breast with regards to the TP 53 minutes occasion and the need for prophylactic mastectomy with or without reconstruction.    Before any breast surgery recent mammograms would need to be obtained.      We would like to see her back after she has seen [facility] is a genetic counselor in [place].  We have requested the results from the genetic counselor and genetic testing from [place]
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
## 2022-04-12 ENCOUNTER — CLINICAL SUPPORT (OUTPATIENT)
Dept: REHABILITATION | Facility: HOSPITAL | Age: 42
End: 2022-04-12
Attending: STUDENT IN AN ORGANIZED HEALTH CARE EDUCATION/TRAINING PROGRAM
Payer: MEDICAID

## 2022-04-12 DIAGNOSIS — R29.898 WEAKNESS OF SHOULDER: Primary | ICD-10-CM

## 2022-04-12 PROCEDURE — 97110 THERAPEUTIC EXERCISES: CPT

## 2022-04-12 PROCEDURE — 97140 MANUAL THERAPY 1/> REGIONS: CPT

## 2022-04-12 NOTE — PROGRESS NOTES
[facility]   Physical Therapy Treatment Note     Name: Sangeetha Ramoshill  Clinic Number: [id number]    Therapy Diagnosis:   Encounter Diagnosis   Name Primary?    Weakness of shoulder Yes     Physician: Francisco Mathesw*    Visit Date: 4/12/2022    Physician: Francisco Mathews*    Physician Orders: PT Eval and Treat  Medical Diagnosis from Referral: M75.122 (ICD-10-CM) - Nontraumatic complete tear of left rotator cuff  Evaluation Date: 2/15/2022  Authorization Period Expiration: 02/09/2023  Plan of Care Expiration: 05/18/2022                          Progress Update: 04/23/2022                        Visit # / Visits authorized: 9 /20 (1 - eval)          FOTO: 1 / 3       PTA Visit #: 0/5     PRECAUTIONS: Standard Precautions, Diabetes: type 2 and Weight-bearing status: Non-weight-bearing: left shoulder      SURGERY:   1. Left shoulder arthroscopic rotator cuff repair  2. Left shoulder arthroscopic biceps tenodesis  3. Left shoulder subacromial decompression  DATE OF SURGERY: 1/27/2022  MD Follow-up: 4/20/2021    Time In: 0907  Time Out: 0950  Total Billable Time: 38 minutes    SUBJECTIVE     Pt reports: She feels pretty good but is having a lot of soreness in the arm.     She was compliant with home exercise program.  Response to previous treatment: no soreness or pain  Functional change: too soon to tell    Pain: 0/10  Location: left shoulder      OBJECTIVE     Objective Measures updated at progress report unless specified.     Treatment     Rehabilitation program:   Phase 1: (0-6 weeks) - 1/27/2022-3/10/2022  Phase 2: (6-12 weeks)- 3/10/2022-4/7/2022  Phase 3: (12+ weeks)- 4/7/2022+    Sangeetha received the treatments listed below:   Billing reflects [place] medicaid guidelines, billing all therapy as therapeutic-exercise    Sangeetha received the following manual therapy techniques: Joint mobilizations, Myofacial release, Soft tissue Mobilization, and PROM were applied to  "the: left for (15) minutes, including:   x = exercises performed today     Manual Intervention Performed today:     Soft Tissue Massage   upper trapezius, levator scapulae  and rhomboids    Joint Mobility x Grade (I-III); GHJ oscillations/PA/Lat/Inf     x Grade (I-III); Scapular Thoracic Lat/Inf   PROM x Supine: Internal rotation/External Rotation as tolerated      Side-lying: Scapular ADD, Shoulder Flexion/scaption      Elbow Flexion/extension      Plan for Next Visit: GHJ oscillations      Sangeetha received therapeutic exercises to develop strength, endurance, ROM, flexibility, and posture for (30) minutes including:   x = exercises performed      Ther Ex Performed      Range of motion: Pulleys X 3 minutes, flexion hold 5s on Left   Assisted Flexion- hands clasped X 3 minutes   Prone rows leaning over mat  3x10, 2#   External Rotation stretch with dowel- 90* Abduction X 15x 5 second holds   Supine Bilateral AAROM flexion with wand- seated X 2x 10 controlled - very challenging   Upper trap stretch  30" hold, 3x   Scapular retractions  green Theraband 3x10   Shoulder External Rotation towel under arm X red Theraband 2x12   Wall walks X 3 Minutes         shoulder Internal rotation towel under arm  red Theraband    Rhythmic stabilization    shoulder at 90 3x1 Minute   Bicep curls  30x YTB   Shoulder Isometrics: flex, ext, abd, Internal rotation, External Rotation, extension  3 x10           BOLD = new this session  Plan for Next Visit: rhythmic stabilization        Patient Education and Home Exercises     Home Exercises Provided and Patient Education Provided     Education provided:   · Patient educated on the impairments noted above and the effects of physical therapy intervention to improve overall condition and QOL.   · Patient was educated on all the above exercise prior/during/after for proper posture, positioning, and execution for safe performance with home exercise program.     Written Home Exercises Provided: " Patient instructed to cont prior HEP. Exercises were reviewed and Sanegetha was able to demonstrate them prior to the end of the session.  Sangeetha demonstrated good  understanding of the education provided. See EMR under Patient Instructions for exercises provided during therapy sessions    ASSESSMENT     Sangeetha June tolerated PT session well with minimal  complaints of pain or discomfort, secondary to improvements in baseline pain. Objective findings are improving with of range of motion  and functional mobility.  Therapy exercises were reviewed by revisiting exercises given from previous home exercise program while adding seated want range of motion against gravity.  Handouts were not issued during today's visit. Sangeetha demonstrated good understanding of new exercises and will continue to progress at home until next follow-up.      Sangeetha Is progressing well towards her goals.   Pt prognosis is Good.     Pt will continue to benefit from skilled outpatient physical therapy to address the deficits listed in the problem list box on initial evaluation, provide pt/family education and to maximize pt's level of independence in the home and community environment.     Pt's spiritual, cultural and educational needs considered and pt agreeable to plan of care and goals.     Anticipated barriers to physical therapy:  co-morbidities and sedentary lifestyle, transportation and other doctors appointments    Goals:     SHORT TERM GOALS: 8 weeks 3/18/2021   1. Recent signs and systems trend is improving in order to progress towards LTG's.  Met   1. Patient will improve AROM to below measures to progress towards long term goals.  a. Shoulder Flexion: pain free AROM >130*  b. Shoulder ER @ 90*: pain free AROM > 60*  c. Shoulder functional IR: pain free AROM > L5  PC   3. Patient will be independent with HEP in order to further progress and return to maximal function.  Met   4. Pain rating at Worst: 5/10 in order to progress towards  increased independence with activity.  Met   5. Patient will be able to correct postural deviations in sitting and standing, to decrease pain and promote long term stability.  PC      LONG TERM GOALS: 16 weeks 3/18/2021   1. Patient will return to normal ADL, recreational, and work related activities with less pain and limitation.   PC   2. Patient will improve AROM to stated goals in order to return to maximal functional potential.   PC   3. Patient will improve strength to stated goals of appropriate musculature in order to improve functional independence.  PC   4. Pain Rating at Best: 1/10 to improve Quality of Life and return to prior level activities.  PC   5. Pain will Improve FOTO Score to the predicted outcome score: 52%  PC   6. Patient will have met/partially met personal goal of: being able to reach arm above shoulder height to complete daily activities  PC            PLAN     Continue PT current POC to improve left upper extremity range of motion and strength.    Delores Esparza, PT

## 2022-04-14 ENCOUNTER — CLINICAL SUPPORT (OUTPATIENT)
Dept: DIABETES | Facility: CLINIC | Age: 42
End: 2022-04-14
Payer: MEDICAID

## 2022-04-14 ENCOUNTER — CLINICAL SUPPORT (OUTPATIENT)
Dept: REHABILITATION | Facility: HOSPITAL | Age: 42
End: 2022-04-14
Attending: STUDENT IN AN ORGANIZED HEALTH CARE EDUCATION/TRAINING PROGRAM
Payer: MEDICAID

## 2022-04-14 VITALS — BODY MASS INDEX: 44.41 KG/M2 | WEIGHT: 293 LBS | HEIGHT: 68 IN

## 2022-04-14 DIAGNOSIS — Z79.4 TYPE 2 DIABETES MELLITUS WITH OTHER SPECIFIED COMPLICATION, WITH LONG-TERM CURRENT USE OF INSULIN: ICD-10-CM

## 2022-04-14 DIAGNOSIS — E11.69 TYPE 2 DIABETES MELLITUS WITH OTHER SPECIFIED COMPLICATION, WITH LONG-TERM CURRENT USE OF INSULIN: ICD-10-CM

## 2022-04-14 DIAGNOSIS — R29.898 WEAKNESS OF SHOULDER: Primary | ICD-10-CM

## 2022-04-14 PROCEDURE — 97110 THERAPEUTIC EXERCISES: CPT | Performed by: PHYSICAL THERAPIST

## 2022-04-14 PROCEDURE — 99999 PR PBB SHADOW E&M-EST. PATIENT-LVL IV: CPT | Mod: PBBFAC,,, | Performed by: DIETITIAN, REGISTERED

## 2022-04-14 PROCEDURE — 99214 OFFICE O/P EST MOD 30 MIN: CPT | Mod: PBBFAC | Performed by: DIETITIAN, REGISTERED

## 2022-04-14 PROCEDURE — 99999 PR PBB SHADOW E&M-EST. PATIENT-LVL IV: ICD-10-PCS | Mod: PBBFAC,,, | Performed by: DIETITIAN, REGISTERED

## 2022-04-14 PROCEDURE — G0108 DIAB MANAGE TRN  PER INDIV: HCPCS | Mod: PBBFAC | Performed by: DIETITIAN, REGISTERED

## 2022-04-14 NOTE — PROGRESS NOTES
[facility]   Physical Therapy Treatment Note     Name: Sanegetha Hannon [place]  Clinic Number: [id number]    Therapy Diagnosis:   Encounter Diagnosis   Name Primary?    Weakness of shoulder Yes     Physician: Francisco Mathews*    Visit Date: 4/14/2022    Physician: Francisco Mathews*    Physician Orders: PT Eval and Treat  Medical Diagnosis from Referral: M75.122 (ICD-10-CM) - Nontraumatic complete tear of left rotator cuff  Evaluation Date: 2/15/2022  Authorization Period Expiration: 02/09/2023  Plan of Care Expiration: 05/18/2022                          Progress Update: 04/23/2022                        Visit # / Visits authorized: 10/20 (1 - eval)          FOTO: 1 / 3       PTA Visit #: 0/5     PRECAUTIONS: Standard Precautions, Diabetes: type 2 and Weight-bearing status: Non-weight-bearing: left shoulder      SURGERY:   1. Left shoulder arthroscopic rotator cuff repair  2. Left shoulder arthroscopic biceps tenodesis  3. Left shoulder subacromial decompression  DATE OF SURGERY: 1/27/2022  MD Follow-up: 4/20/2021    Time In: 230  Time Out: 315  Total Billable Time: 45 minutes    SUBJECTIVE     Pt reports: getting better each day, no significant increase in pain.     She was compliant with home exercise program.  Response to previous treatment: no soreness or pain  Functional change: too soon to tell    Pain: 0/10  Location: left shoulder      OBJECTIVE     Objective Measures updated at progress report unless specified.     Treatment     Rehabilitation program:   Phase 1: (0-6 weeks) - 1/27/2022-3/10/2022  Phase 2: (6-12 weeks)- 3/10/2022-4/7/2022  Phase 3: (12+ weeks)- 4/7/2022+    Sangeetha received the treatments listed below:   Billing reflects [place] medicaid guidelines, billing all therapy as therapeutic-exercise    Sangeetha received the following manual therapy techniques: Joint mobilizations, Myofacial release, Soft tissue Mobilization, and PROM were applied to the: left  "for (15) minutes, including:   x = exercises performed today     Manual Intervention Performed today:     Soft Tissue Massage   upper trapezius, levator scapulae  and rhomboids    Joint Mobility x Grade (I-III); GHJ oscillations/PA/Lat/Inf     x Grade (I-III); Scapular Thoracic Lat/Inf   PROM x Supine: Internal rotation/External Rotation as tolerated      Side-lying: Scapular ADD, Shoulder Flexion/scaption      Elbow Flexion/extension      Plan for Next Visit: GHJ oscillations      Sangeetha received therapeutic exercises to develop strength, endurance, ROM, flexibility, and posture for (30) minutes including:   x = exercises performed      Ther Ex Performed      Range of motion: Pulleys X 3 minutes, flexion hold 5s on Left   Assisted Flexion- hands clasped X 3 minutes   Prone rows leaning over mat  3x10, 2#   External Rotation stretch with dowel- 90* Abduction X 15x 5 second holds   Supine Bilateral AAROM flexion with wand- seated X 2x 10 controlled - very challenging   Upper trap stretch x 30" hold, 3x   Scapular retractions  green Theraband 3x10   Shoulder External Rotation towel under arm X red Theraband 2x12   Wall walks X 3 Minutes    sidelying ER x 3x8 to tolerance.   shoulder Internal rotation towel under arm  red Theraband    Rhythmic stabilization    shoulder at 90 3x1 Minute   Bicep curls  30x YTB   Shoulder Isometrics: flex, ext, abd, Internal rotation, External Rotation, extension  3 x10           BOLD = new this session  Plan for Next Visit: rhythmic stabilization        Patient Education and Home Exercises     Home Exercises Provided and Patient Education Provided     Education provided:   · Patient educated on the impairments noted above and the effects of physical therapy intervention to improve overall condition and QOL.   · Patient was educated on all the above exercise prior/during/after for proper posture, positioning, and execution for safe performance with home exercise program.     Written Home " Exercises Provided: Patient instructed to cont prior HEP. Exercises were reviewed and Sangeetha was able to demonstrate them prior to the end of the session.  Sangeetha demonstrated good  understanding of the education provided. See EMR under Patient Instructions for exercises provided during therapy sessions    ASSESSMENT     Sangeetha June tolerated PT session well with no increase in pain. Pt is apprehensive with end ranges during flexion and abduction. Pt will continue to benefit from further strengthening of the RTC to improve functional movements. .      Sangeetha Is progressing well towards her goals.   Pt prognosis is Good.     Pt will continue to benefit from skilled outpatient physical therapy to address the deficits listed in the problem list box on initial evaluation, provide pt/family education and to maximize pt's level of independence in the home and community environment.     Pt's spiritual, cultural and educational needs considered and pt agreeable to plan of care and goals.     Anticipated barriers to physical therapy:  co-morbidities and sedentary lifestyle, transportation and other doctors appointments    Goals:     SHORT TERM GOALS: 8 weeks 3/18/2021   1. Recent signs and systems trend is improving in order to progress towards LTG's.  Met   1. Patient will improve AROM to below measures to progress towards long term goals.  a. Shoulder Flexion: pain free AROM >130*  b. Shoulder ER @ 90*: pain free AROM > 60*  c. Shoulder functional IR: pain free AROM > L5  PC   3. Patient will be independent with HEP in order to further progress and return to maximal function.  Met   4. Pain rating at Worst: 5/10 in order to progress towards increased independence with activity.  Met   5. Patient will be able to correct postural deviations in sitting and standing, to decrease pain and promote long term stability.  PC      LONG TERM GOALS: 16 weeks 3/18/2021   1. Patient will return to normal ADL, recreational, and  work related activities with less pain and limitation.   PC   2. Patient will improve AROM to stated goals in order to return to maximal functional potential.   PC   3. Patient will improve strength to stated goals of appropriate musculature in order to improve functional independence.  PC   4. Pain Rating at Best: 1/10 to improve Quality of Life and return to prior level activities.  PC   5. Pain will Improve FOTO Score to the predicted outcome score: 52%  PC   6. Patient will have met/partially met personal goal of: being able to reach arm above shoulder height to complete daily activities  PC            PLAN     Continue PT current POC to improve left upper extremity range of motion and strength.    Mohit Cornejo, PT

## 2022-04-14 NOTE — PROGRESS NOTES
Diabetes Care Specialist Progress Note  Author: Alexandra Trent RD, CDE  Date: 4/14/2022    Program Intake  Reason for Diabetes Program Visit:: Initial Diabetes Assessment  Type of Intervention:: Individual  Education: Self-Management Skill Review  Current diabetes risk level:: low  In the last 12 months, have you:: been admitted to a hospital (shoulder surgery)  Was the ER or hospital admission related to diabetes?: No    Lab Results   Component Value Date    HGBA1C 6.5 (H) 01/07/2022       Clinical    Patient Health Rating  Compared to other people your age, how would you rate your health?: Poor    Problem Review  Reviewed Problem List with Patient: yes  Active comorbidities affecting diabetes self-care.: yes (HTN, HLD, CVA, s/p breast cancer, Hypothyroidism, Obesity by BMI, GERD)  Reviewed health maintenance: yes    Clinical Assessment  Current Diabetes Treatment: Diet, Exercise, Oral Medication (Trulicity 0.75mg wkly, Metformin XR 500mg 1tab daily (pt reports not taking because she thought Dr. Garcia discontinued).)  Have you ever experienced hypoglycemia (low blood sugar)?: no  Have you ever experienced hyperglycemia (high blood sugar)?: yes  In the last month, how often have you experienced high blood sugar?: more than once a week  Are you able to tell when your blood sugar is high?: Yes  What are your symptoms?: dizziness, blurry vision, thirst  Have you ever been hospitalized because your blood sugar was high?: no    Medication Information  How many days a week do you miss your medications?: 3 or more (recently stopped metformin)  Do you sometimes have difficulty refilling your medications?: No  Medication adherence impacting ability to self-manage diabetes?: No    Labs  Do you have regular lab work to monitor your medications?: Yes  Type of Regular Lab Work: A1c, Cholesterol, Microalbumin, CBC, BMP  Where do you get your labs drawn?: [facility]  Lab Compliance Barriers: No    Nutritional Status  Diet:   (Pt reports irregular meal patterns. Excess carb from portions, snacks, 50% sugar tea. Inadequate non-starchy vegetables.)  Meal Plan 24 Hour Recall - Breakfast: none OR cereal (honey smacks)  Meal Plan 24 Hour Recall - Lunch: none  Meal Plan 24 Hour Recall - Dinner: 1 taco (grd beef), corn 1/2c  Meal Plan 24 Hour Recall - Snack: cucumbers OR bologne OR [place] candy OR cheese puffs OR cranberries; pat: diet cranberry juice or 1/2 sugar tea or water or sprite  Change in appetite?: No  Recent Changes in Weight: Weight Loss  Was weight loss intentional or unintentional?: Intentional (~10 lbs since 2/22)  Current nutritional status an area of need that is impacting patient's ability to self-manage diabetes?: Yes    Additional Social History    Support  Does anyone support you with your diabetes care?: yes (boyfriend cooks)  Who supports you?: self  Who takes you to your medical appointments?:  (medical transportation)  Is Support an area impacting ability to self-manage diabetes?: No    Access to Mass Media & Technology  Does the patient have access to any of the following devices or technologies?: Smart phone, Internet Access  Media or technology needs impacting ability to self-manage diabetes?: No    Cognitive/Behavioral Health  Alert and Oriented: Yes  Difficulty Thinking: No  Requires Prompting: No  Requires assistance for routine expression?: No  Cognitive or behavioral barriers impacting ability to self-manage diabetes?: No    Culture/Yarsani  Culture or Confucianist beliefs that may impact ability to access healthcare: No    Communication  Language preference: English  Hearing Problems: Yes (decreased left side)  Vision Problems: Yes  Vision problem type:: Decreased Vision  Vision Assistance: Glasses  Communication needs impacting ability to self-manage diabetes?: No    Health Literacy  Preferred Learning Method: Face to Face, Hands On  How often do you need to have someone help you read instructions, pamphlets,  or written material from your doctor or pharmacy?: Always (boyfriend and niece)  Health literacy needs impacting ability to self-manage diabetes?: No      Diabetes Self-Management Skills Assessment    Diabetes Disease Process/Treatment Options  Patient/caregiver able to state what happens when someone has diabetes.: no  Patient/caregiver knows what type of diabetes they have.: no  Diabetes Disease Process/Treatment Options: Skills Assessment Completed: Yes  Assessment indicates:: Knowledge deficit, Instruction Needed  Area of need?: Yes    Nutrition/Healthy Eating  Challenges to healthy eating:: portion control, snacking between meals and at night  Method of carbohydrate measurement:: no method  Patient can identify foods that impact blood sugar.: yes  Patient-identified foods:: starches (bread, pasta, rice, cereal), starchy vegetables (corn, peas, beans), sweets, soda  Nutrition/Healthy Eating Skills Assessment Completed:: Yes  Assessment indicates:: Knowledge deficit, Instruction Needed  Area of need?: Yes    Physical Activity/Exercise  Patient's daily activity level:: lightly active (pt reports irregular walking program)  Patient can identify forms of physical activity.: yes  Stated forms of physical activity:: any movement performed by muscles that uses energy  Patient can identify reasons why exercise/physical activity is important in diabetes management.: no  Physical Activity/Exercise Skills Assessment Completed: : Yes  Assessment indicates:: Knowledge deficit, Instruction Needed  Area of need?: Yes    Medications  Patient is able to describe current diabetes management routine.: yes  Patient is able to identify current diabetes medications, dosages, and appropriate timing of medications.: no  Patient understands the purpose of the medications taken for diabetes.: no  Patient reports problems or concerns with current medication regimen.: yes  Medication regimen problems/concerns:: unsure of doses  Medication  Skills Assessment Completed:: Yes  Assessment indicates:: Instruction Needed, Knowledge deficit  Area of need?: Yes    Home Blood Glucose Monitoring  Patient states that blood sugar is checked at home daily.: no  Reasons for not monitoring:: new diabetes diagnosis  Blood glucose logs:: no, encouraged to bring logs to provider visits  Home Blood Glucose Monitoring Skills Assessment Completed: : Yes  Assessment indicates:: Knowledge deficit, Instruction Needed  Area of need?: Yes    Acute Complications  Patient is able to identify types of acute complications: No  Acute Complications Skills Assessment Completed: : Yes  Assessment indicates:: Knowledge deficit, Instruction Needed  Area of need?: Yes    Chronic Complications  Patient can identify major chronic complications of diabetes.: no  Patient can identify ways to prevent or delay diabetes complications.: no  Patient is aware that having diabetes increases risk of heart disease?: No  Patient is aware that heart disease is the leading cause of death and disability in people with diabetes?: No  Patient is taking statin?: Yes  Chronic Complications Skills Assessment Completed: : Yes  Assessment indicates:: Knowledge deficit, Instruction Needed  Area of need?: Yes    Psychosocial/Coping  Patient can identify ways of coping with chronic disease.: yes  Patient-stated ways of coping with chronic disease:: support from loved ones  Psychosocial/Coping Skills Assessment Completed: : Yes  Assessment indicates:: Adequate understanding  Area of need?: No    Assessment Summary and Plan  Based on today's diabetes care assessment, the following areas of need were identified:      Social 4/14/2022   Support No   Access to Mass Media/Tech No   Cognitive/Behavioral Health No   Culture/Shinto No   Communication No   Health Literacy No        Clinical 4/14/2022   Medication Adherence No   Lab Compliance No   Nutritional Status Yes - see care plan        Diabetes Self-Management  Skills 4/14/2022   Diabetes Disease Process/Treatment Options Yes  Reviewed diabetes pathophysiology, different types of diabetes, signs and symptoms, risk factors and treatment guidelines.    Nutrition/Healthy Eating Yes - see care plan   Physical Activity/Exercise Yes  Discussed importance of daily physical activity with review of benefits, methods, guidelines and precautions.   Medication Yes  Provided review of current diabetes medication action, dosage, frequency, side effects, and storage guidelines. Discussed guidelines for preventing, detecting and treating hypoglycemia and hyperglycemia. Reviewed the importance of meal and medication timing with diabetes mediations for prevention of acute complications and maximum drug benefit.   Home Blood Glucose Monitoring Yes - see care plan   Acute Complications Yes  Discussed prevention, identification signs/symptoms and treatment of hyperglycemia and hypoglycemia and when to contact clinic.   Chronic Complications Yes  Reviewed diabetes complications and preventative screenings including annual dilated eye exams, regular dental exams, and daily foot self-exams. Reviewed foot care guidelines.   Discussed current A1c, Blood Pressure, and Cholesterol results (ABC's of Diabetes) and ADA target guidelines.   Psychosocial/Coping No        Today's interventions were provided through individual discussion, instruction, and written materials were provided.    Patient verbalized understanding of instruction and written materials.  Pt was able to return back demonstration of instructions today. Patient understood key points, needs reinforcement and further instruction.     Diabetes Self-Management Care Plan:  Today's Diabetes Self-Management Care Plan was developed with Sangeetha's input. Sangeetha has agreed to work toward the following goal(s) to improve his/her overall diabetes control.      Care Plan: Diabetes Management   Updates made since 3/15/2022 12:00 AM      Problem: Healthy  Eating       Goal: Eat 4 meals daily - manage carb 30-45grams/meal, 5-15grams/snack.    Start Date: 4/14/2022   Expected End Date: 10/17/2022   Priority: High   Barriers: No Barriers Identified      Task: Reviewed the sources and role of Carbohydrate, Protein, and Fat and how each nutrient impacts blood sugar. Completed 4/14/2022      Task: Provided visual examples using dry measuring cups, food models, and other familiar objects such as computer mouse, deck or cards, tennis ball etc. to help with visualization of portions. Completed 4/14/2022      Task: Recommended replacing beverages containing high sugar content with noncaloric/sugar free options and/or water. Completed 4/14/2022      Task: Review the importance of balancing carbohydrates with each meal using portion control techniques to count servings of carbohydrate and label reading to identify serving size and amount of total carbs per serving. Completed 4/14/2022      Task: Provided Sample plate method and reviewed the use of the plate to estimate amounts of carbohydrate per meal. Completed 4/14/2022      Problem: Blood Glucose Self-Monitoring       Goal: Patient agrees to check and record blood sugars 2 times per day (fst, 2hr ppd); bring meter/records to clinic.    Start Date: 4/14/2022   Expected End Date: 10/17/2022   Priority: Medium   Barriers: No Barriers Identified      Task: Reviewed the importance of self-monitoring blood glucose and keeping logs. Completed 4/14/2022      Task: Provided patient with blood glucose logs, reviewed appropriate timing and frequency to SMBG, education on parameters on when to notify provider and advised patient to bring logs to all appts with PCP/Endocrinologist/Diabetes Care Specialist. Completed 4/14/2022          Follow Up Plan   Follow up in about 4 weeks (around 5/12/2022).   -Review BG logs  -Eval goal progress  -Admin diabetes distress scale    Today's care plan and follow up schedule was discussed with patient.   Sangeetha verbalized understanding of the care plan, goals, and agrees to follow up plan.        The patient was encouraged to communicate with his/her health care provider/physician and care team regarding his/her condition(s) and treatment.  I provided the patient with my contact information today and encouraged to contact me via phone or [facility] Patient Portal as needed.     Length of Visit   Total Time: 60 Minutes

## 2022-04-20 ENCOUNTER — OFFICE VISIT (OUTPATIENT)
Dept: ORTHOPEDICS | Facility: CLINIC | Age: 42
End: 2022-04-20
Payer: MEDICAID

## 2022-04-20 VITALS — BODY MASS INDEX: 44.41 KG/M2 | HEIGHT: 68 IN | WEIGHT: 293 LBS

## 2022-04-20 DIAGNOSIS — M54.9 BACK PAIN, UNSPECIFIED BACK LOCATION, UNSPECIFIED BACK PAIN LATERALITY, UNSPECIFIED CHRONICITY: Primary | ICD-10-CM

## 2022-04-20 DIAGNOSIS — M75.122 NONTRAUMATIC COMPLETE TEAR OF LEFT ROTATOR CUFF: Primary | ICD-10-CM

## 2022-04-20 PROCEDURE — 1159F PR MEDICATION LIST DOCUMENTED IN MEDICAL RECORD: ICD-10-PCS | Mod: CPTII,,, | Performed by: STUDENT IN AN ORGANIZED HEALTH CARE EDUCATION/TRAINING PROGRAM

## 2022-04-20 PROCEDURE — 3072F LOW RISK FOR RETINOPATHY: CPT | Mod: CPTII,,, | Performed by: STUDENT IN AN ORGANIZED HEALTH CARE EDUCATION/TRAINING PROGRAM

## 2022-04-20 PROCEDURE — 99999 PR PBB SHADOW E&M-EST. PATIENT-LVL IV: ICD-10-PCS | Mod: PBBFAC,,, | Performed by: STUDENT IN AN ORGANIZED HEALTH CARE EDUCATION/TRAINING PROGRAM

## 2022-04-20 PROCEDURE — 3008F PR BODY MASS INDEX (BMI) DOCUMENTED: ICD-10-PCS | Mod: CPTII,,, | Performed by: STUDENT IN AN ORGANIZED HEALTH CARE EDUCATION/TRAINING PROGRAM

## 2022-04-20 PROCEDURE — 3008F BODY MASS INDEX DOCD: CPT | Mod: CPTII,,, | Performed by: STUDENT IN AN ORGANIZED HEALTH CARE EDUCATION/TRAINING PROGRAM

## 2022-04-20 PROCEDURE — 3061F NEG MICROALBUMINURIA REV: CPT | Mod: CPTII,,, | Performed by: STUDENT IN AN ORGANIZED HEALTH CARE EDUCATION/TRAINING PROGRAM

## 2022-04-20 PROCEDURE — 99214 OFFICE O/P EST MOD 30 MIN: CPT | Mod: PBBFAC,PO | Performed by: STUDENT IN AN ORGANIZED HEALTH CARE EDUCATION/TRAINING PROGRAM

## 2022-04-20 PROCEDURE — 99024 PR POST-OP FOLLOW-UP VISIT: ICD-10-PCS | Mod: ,,, | Performed by: STUDENT IN AN ORGANIZED HEALTH CARE EDUCATION/TRAINING PROGRAM

## 2022-04-20 PROCEDURE — 1159F MED LIST DOCD IN RCRD: CPT | Mod: CPTII,,, | Performed by: STUDENT IN AN ORGANIZED HEALTH CARE EDUCATION/TRAINING PROGRAM

## 2022-04-20 PROCEDURE — 99024 POSTOP FOLLOW-UP VISIT: CPT | Mod: ,,, | Performed by: STUDENT IN AN ORGANIZED HEALTH CARE EDUCATION/TRAINING PROGRAM

## 2022-04-20 PROCEDURE — 3044F PR MOST RECENT HEMOGLOBIN A1C LEVEL <7.0%: ICD-10-PCS | Mod: CPTII,,, | Performed by: STUDENT IN AN ORGANIZED HEALTH CARE EDUCATION/TRAINING PROGRAM

## 2022-04-20 PROCEDURE — 3066F NEPHROPATHY DOC TX: CPT | Mod: CPTII,,, | Performed by: STUDENT IN AN ORGANIZED HEALTH CARE EDUCATION/TRAINING PROGRAM

## 2022-04-20 PROCEDURE — 3061F PR NEG MICROALBUMINURIA RESULT DOCUMENTED/REVIEW: ICD-10-PCS | Mod: CPTII,,, | Performed by: STUDENT IN AN ORGANIZED HEALTH CARE EDUCATION/TRAINING PROGRAM

## 2022-04-20 PROCEDURE — 3044F HG A1C LEVEL LT 7.0%: CPT | Mod: CPTII,,, | Performed by: STUDENT IN AN ORGANIZED HEALTH CARE EDUCATION/TRAINING PROGRAM

## 2022-04-20 PROCEDURE — 99999 PR PBB SHADOW E&M-EST. PATIENT-LVL IV: CPT | Mod: PBBFAC,,, | Performed by: STUDENT IN AN ORGANIZED HEALTH CARE EDUCATION/TRAINING PROGRAM

## 2022-04-20 PROCEDURE — 1160F RVW MEDS BY RX/DR IN RCRD: CPT | Mod: CPTII,,, | Performed by: STUDENT IN AN ORGANIZED HEALTH CARE EDUCATION/TRAINING PROGRAM

## 2022-04-20 PROCEDURE — 3066F PR DOCUMENTATION OF TREATMENT FOR NEPHROPATHY: ICD-10-PCS | Mod: CPTII,,, | Performed by: STUDENT IN AN ORGANIZED HEALTH CARE EDUCATION/TRAINING PROGRAM

## 2022-04-20 PROCEDURE — 1160F PR REVIEW ALL MEDS BY PRESCRIBER/CLIN PHARMACIST DOCUMENTED: ICD-10-PCS | Mod: CPTII,,, | Performed by: STUDENT IN AN ORGANIZED HEALTH CARE EDUCATION/TRAINING PROGRAM

## 2022-04-20 PROCEDURE — 3072F PR LOW RISK FOR RETINOPATHY: ICD-10-PCS | Mod: CPTII,,, | Performed by: STUDENT IN AN ORGANIZED HEALTH CARE EDUCATION/TRAINING PROGRAM

## 2022-04-21 NOTE — PROGRESS NOTES
Orthopaedics  Post-operative follow-up    Procedure Performed:  1. Left shoulder arthroscopic rotator cuff repair  2. Left shoulder arthroscopic biceps tenodesis  3. Left shoulder subacromial decompression     Date of Surgery: 01/27/2022    Subjective: Sangeetha June is now approximately 3 months out from her shoulder surgery.  She is making progress in PT. Her pain is significantly improved and her function is steadily improving.    Exam:  incision sites healed, C/D/I  Fluid ROM with no crepitus  Active supine ROM:  with minimal assist, , ER 60  Active upright ROM: FF 90, ABD 60, ER 40  Axillary nerve sensation and motor intact  Motor and sensory intact distally  Strong radial pulse, fingers warm and well perfused    Imaging:  No new imaging studies    Impression:  S/p left shoulder RCR, LHBT, SAD, third post-operative visit - doing well    Plan:  Her pain is significantly improved but she continues to have some weakness in the shoulder  Would like her to continue to emphasize active ROM and strengthening  Advance PT per protocol  Symptomatic treatment for pain / swelling  Instructed patient to call clinic if questions or concerns    Follow-up with me in 2 months to check progress          Francisco Mathews MD

## 2022-04-22 ENCOUNTER — TELEPHONE (OUTPATIENT)
Dept: GENETICS | Facility: CLINIC | Age: 42
End: 2022-04-22
Payer: MEDICAID

## 2022-04-22 ENCOUNTER — TELEPHONE (OUTPATIENT)
Dept: PAIN MEDICINE | Facility: CLINIC | Age: 42
End: 2022-04-22
Payer: MEDICAID

## 2022-04-22 ENCOUNTER — LAB VISIT (OUTPATIENT)
Dept: LAB | Facility: HOSPITAL | Age: 42
End: 2022-04-22
Attending: INTERNAL MEDICINE
Payer: MEDICAID

## 2022-04-22 ENCOUNTER — PATIENT MESSAGE (OUTPATIENT)
Dept: PSYCHIATRY | Facility: CLINIC | Age: 42
End: 2022-04-22
Payer: MEDICAID

## 2022-04-22 ENCOUNTER — PATIENT MESSAGE (OUTPATIENT)
Dept: INTERNAL MEDICINE | Facility: CLINIC | Age: 42
End: 2022-04-22
Payer: MEDICAID

## 2022-04-22 ENCOUNTER — OFFICE VISIT (OUTPATIENT)
Dept: HEMATOLOGY/ONCOLOGY | Facility: CLINIC | Age: 42
End: 2022-04-22
Payer: MEDICAID

## 2022-04-22 VITALS
DIASTOLIC BLOOD PRESSURE: 85 MMHG | WEIGHT: 293 LBS | HEIGHT: 68 IN | SYSTOLIC BLOOD PRESSURE: 143 MMHG | TEMPERATURE: 98 F | HEART RATE: 56 BPM | OXYGEN SATURATION: 97 % | BODY MASS INDEX: 44.41 KG/M2

## 2022-04-22 DIAGNOSIS — E66.01 OBESITY, MORBID, BMI 40.0-49.9: ICD-10-CM

## 2022-04-22 DIAGNOSIS — M75.112 NONTRAUMATIC INCOMPLETE TEAR OF LEFT ROTATOR CUFF: ICD-10-CM

## 2022-04-22 DIAGNOSIS — Z17.1 MALIGNANT NEOPLASM OF LEFT BREAST IN FEMALE, ESTROGEN RECEPTOR NEGATIVE, UNSPECIFIED SITE OF BREAST: ICD-10-CM

## 2022-04-22 DIAGNOSIS — Z15.09 MUTATION IN TP53 GENE: ICD-10-CM

## 2022-04-22 DIAGNOSIS — C50.912 MALIGNANT NEOPLASM OF LEFT BREAST IN FEMALE, ESTROGEN RECEPTOR NEGATIVE, UNSPECIFIED SITE OF BREAST: ICD-10-CM

## 2022-04-22 DIAGNOSIS — D32.9 MENINGIOMA: ICD-10-CM

## 2022-04-22 DIAGNOSIS — E06.3 HYPOTHYROIDISM DUE TO HASHIMOTO'S THYROIDITIS: ICD-10-CM

## 2022-04-22 DIAGNOSIS — F41.1 GAD (GENERALIZED ANXIETY DISORDER): ICD-10-CM

## 2022-04-22 DIAGNOSIS — E03.8 HYPOTHYROIDISM DUE TO HASHIMOTO'S THYROIDITIS: ICD-10-CM

## 2022-04-22 DIAGNOSIS — Z15.01 MUTATION IN TP53 GENE: ICD-10-CM

## 2022-04-22 DIAGNOSIS — D50.9 MICROCYTIC ANEMIA: ICD-10-CM

## 2022-04-22 DIAGNOSIS — Z15.89 MUTATION IN TP53 GENE: ICD-10-CM

## 2022-04-22 DIAGNOSIS — E04.1 THYROID NODULE: ICD-10-CM

## 2022-04-22 LAB
ALBUMIN SERPL BCP-MCNC: 3.6 G/DL (ref 3.5–5.2)
ALP SERPL-CCNC: 105 U/L (ref 55–135)
ALT SERPL W/O P-5'-P-CCNC: 16 U/L (ref 10–44)
ANION GAP SERPL CALC-SCNC: 14 MMOL/L (ref 8–16)
AST SERPL-CCNC: 19 U/L (ref 10–40)
BASOPHILS # BLD AUTO: 0.03 K/UL (ref 0–0.2)
BASOPHILS NFR BLD: 0.5 % (ref 0–1.9)
BILIRUB SERPL-MCNC: 0.2 MG/DL (ref 0.1–1)
BUN SERPL-MCNC: 6 MG/DL (ref 6–20)
CALCIUM SERPL-MCNC: 9.8 MG/DL (ref 8.7–10.5)
CHLORIDE SERPL-SCNC: 98 MMOL/L (ref 95–110)
CO2 SERPL-SCNC: 28 MMOL/L (ref 23–29)
CREAT SERPL-MCNC: 0.8 MG/DL (ref 0.5–1.4)
DIFFERENTIAL METHOD: ABNORMAL
EOSINOPHIL # BLD AUTO: 0.1 K/UL (ref 0–0.5)
EOSINOPHIL NFR BLD: 1 % (ref 0–8)
ERYTHROCYTE [DISTWIDTH] IN BLOOD BY AUTOMATED COUNT: 18.8 % (ref 11.5–14.5)
EST. GFR  (AFRICAN AMERICAN): >60 ML/MIN/1.73 M^2
EST. GFR  (NON AFRICAN AMERICAN): >60 ML/MIN/1.73 M^2
FERRITIN SERPL-MCNC: 302 NG/ML (ref 20–300)
GLUCOSE SERPL-MCNC: 94 MG/DL (ref 70–110)
HCT VFR BLD AUTO: 42.4 % (ref 37–48.5)
HGB BLD-MCNC: 13.3 G/DL (ref 12–16)
IMM GRANULOCYTES # BLD AUTO: 0.02 K/UL (ref 0–0.04)
IMM GRANULOCYTES NFR BLD AUTO: 0.3 % (ref 0–0.5)
LYMPHOCYTES # BLD AUTO: 2.2 K/UL (ref 1–4.8)
LYMPHOCYTES NFR BLD: 37.1 % (ref 18–48)
MCH RBC QN AUTO: 26 PG (ref 27–31)
MCHC RBC AUTO-ENTMCNC: 31.4 G/DL (ref 32–36)
MCV RBC AUTO: 83 FL (ref 82–98)
MONOCYTES # BLD AUTO: 0.4 K/UL (ref 0.3–1)
MONOCYTES NFR BLD: 7.2 % (ref 4–15)
NEUTROPHILS # BLD AUTO: 3.2 K/UL (ref 1.8–7.7)
NEUTROPHILS NFR BLD: 53.9 % (ref 38–73)
NRBC BLD-RTO: 0 /100 WBC
PLATELET # BLD AUTO: 325 K/UL (ref 150–450)
PMV BLD AUTO: 9.4 FL (ref 9.2–12.9)
POTASSIUM SERPL-SCNC: 3.3 MMOL/L (ref 3.5–5.1)
PROT SERPL-MCNC: 7.6 G/DL (ref 6–8.4)
RBC # BLD AUTO: 5.12 M/UL (ref 4–5.4)
SODIUM SERPL-SCNC: 140 MMOL/L (ref 136–145)
TSH SERPL DL<=0.005 MIU/L-ACNC: 1.41 UIU/ML (ref 0.4–4)
WBC # BLD AUTO: 5.87 K/UL (ref 3.9–12.7)

## 2022-04-22 PROCEDURE — 3008F PR BODY MASS INDEX (BMI) DOCUMENTED: ICD-10-PCS | Mod: CPTII,,, | Performed by: INTERNAL MEDICINE

## 2022-04-22 PROCEDURE — 3077F PR MOST RECENT SYSTOLIC BLOOD PRESSURE >= 140 MM HG: ICD-10-PCS | Mod: CPTII,,, | Performed by: INTERNAL MEDICINE

## 2022-04-22 PROCEDURE — 3066F NEPHROPATHY DOC TX: CPT | Mod: CPTII,,, | Performed by: INTERNAL MEDICINE

## 2022-04-22 PROCEDURE — 99999 PR PBB SHADOW E&M-EST. PATIENT-LVL IV: ICD-10-PCS | Mod: PBBFAC,,, | Performed by: INTERNAL MEDICINE

## 2022-04-22 PROCEDURE — 80053 COMPREHEN METABOLIC PANEL: CPT | Performed by: INTERNAL MEDICINE

## 2022-04-22 PROCEDURE — 82728 ASSAY OF FERRITIN: CPT | Performed by: INTERNAL MEDICINE

## 2022-04-22 PROCEDURE — 99215 OFFICE O/P EST HI 40 MIN: CPT | Mod: S$PBB,,, | Performed by: INTERNAL MEDICINE

## 2022-04-22 PROCEDURE — 3079F DIAST BP 80-89 MM HG: CPT | Mod: CPTII,,, | Performed by: INTERNAL MEDICINE

## 2022-04-22 PROCEDURE — 99999 PR PBB SHADOW E&M-EST. PATIENT-LVL IV: CPT | Mod: PBBFAC,,, | Performed by: INTERNAL MEDICINE

## 2022-04-22 PROCEDURE — 3044F PR MOST RECENT HEMOGLOBIN A1C LEVEL <7.0%: ICD-10-PCS | Mod: CPTII,,, | Performed by: INTERNAL MEDICINE

## 2022-04-22 PROCEDURE — 3061F PR NEG MICROALBUMINURIA RESULT DOCUMENTED/REVIEW: ICD-10-PCS | Mod: CPTII,,, | Performed by: INTERNAL MEDICINE

## 2022-04-22 PROCEDURE — 3072F LOW RISK FOR RETINOPATHY: CPT | Mod: CPTII,,, | Performed by: INTERNAL MEDICINE

## 2022-04-22 PROCEDURE — 1159F MED LIST DOCD IN RCRD: CPT | Mod: CPTII,,, | Performed by: INTERNAL MEDICINE

## 2022-04-22 PROCEDURE — 3072F PR LOW RISK FOR RETINOPATHY: ICD-10-PCS | Mod: CPTII,,, | Performed by: INTERNAL MEDICINE

## 2022-04-22 PROCEDURE — 99215 PR OFFICE/OUTPT VISIT, EST, LEVL V, 40-54 MIN: ICD-10-PCS | Mod: S$PBB,,, | Performed by: INTERNAL MEDICINE

## 2022-04-22 PROCEDURE — 36415 COLL VENOUS BLD VENIPUNCTURE: CPT | Mod: PO | Performed by: INTERNAL MEDICINE

## 2022-04-22 PROCEDURE — 1159F PR MEDICATION LIST DOCUMENTED IN MEDICAL RECORD: ICD-10-PCS | Mod: CPTII,,, | Performed by: INTERNAL MEDICINE

## 2022-04-22 PROCEDURE — 3079F PR MOST RECENT DIASTOLIC BLOOD PRESSURE 80-89 MM HG: ICD-10-PCS | Mod: CPTII,,, | Performed by: INTERNAL MEDICINE

## 2022-04-22 PROCEDURE — 85025 COMPLETE CBC W/AUTO DIFF WBC: CPT | Performed by: INTERNAL MEDICINE

## 2022-04-22 PROCEDURE — 84466 ASSAY OF TRANSFERRIN: CPT | Performed by: INTERNAL MEDICINE

## 2022-04-22 PROCEDURE — 84443 ASSAY THYROID STIM HORMONE: CPT | Performed by: INTERNAL MEDICINE

## 2022-04-22 PROCEDURE — 3008F BODY MASS INDEX DOCD: CPT | Mod: CPTII,,, | Performed by: INTERNAL MEDICINE

## 2022-04-22 PROCEDURE — 3044F HG A1C LEVEL LT 7.0%: CPT | Mod: CPTII,,, | Performed by: INTERNAL MEDICINE

## 2022-04-22 PROCEDURE — 3066F PR DOCUMENTATION OF TREATMENT FOR NEPHROPATHY: ICD-10-PCS | Mod: CPTII,,, | Performed by: INTERNAL MEDICINE

## 2022-04-22 PROCEDURE — 99214 OFFICE O/P EST MOD 30 MIN: CPT | Mod: PBBFAC,PO | Performed by: INTERNAL MEDICINE

## 2022-04-22 PROCEDURE — 3061F NEG MICROALBUMINURIA REV: CPT | Mod: CPTII,,, | Performed by: INTERNAL MEDICINE

## 2022-04-22 PROCEDURE — 3077F SYST BP >= 140 MM HG: CPT | Mod: CPTII,,, | Performed by: INTERNAL MEDICINE

## 2022-04-22 NOTE — PROGRESS NOTES
Subjective:   Date of Visit: 4/22/22   ?   ?    REFERRING PROVIDER: No referring provider defined for this encounter.   ?   CHIEF COMPLAINT:  History of breast cancer???????   ?   ONCOLOGIC DIAGNOSIS:  Invasive ductal carcinoma, pT2 N0 M0  ?   CURRENT TREATMENT:  None    PAST TREATMENT:   -left breast mastectomy  -adjuvant chemotherapy with dose dense AC followed by 3 cycles of weekly Taxol  ?   ONCOLOGIC HISTORY:   -invasive ductal carcinoma-April 2020     HPI:  42-year-old female with past medical history that is significant for hypertension osteoarthritis thyroiditis, invasive ductal carcinoma status post left breast mastectomy and adjuvant chemotherapy presents today to establish with our clinic having relocated to [place].    Apparently in April of 2020, she presented to PCP with complaints of breast lump near sternum, painful and swollen.  Mammography/ultrasound breast showed a new 2.5 cm mass left breast at 9 Oclock middle depth.  Biopsy revealed invasive ductal carcinoma, ER positive by 1%, IL negative and HER2 Elieser negative, stage at pT2 N0 M0.  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    She underwent mastectomy on 06/24/2020 and the tumor was noted to be 3.1 cm in size.  0/16 lymph nodes were positive.  Tumor was essentially treated at triple negative and chemotherapy was recommended.  Echocardiogram performed May of 2020 showed ejection fraction of 55%.  Patient was treated with dose dense AC followed by weekly Taxol.  She received 3 cycles out of 12 of single agent Taxol with the last one being in October of 2020 before she started complaining of worsening cognitive abilities and speech problems.  Chemotherapy was discontinued.  Adjuvant radiation was not recommended as patient's tumor margins were noted to be about 1.3 cm, she had negative lymph nodes and tumor was T2.  Endocrine therapy was not recommended.    She has since been observed.  Recently relocated to  [place].  She presents today to establish with our clinic.    She complains of pain around MediPort which was last flushed in of 2020 per patient.  Also complains of left shoulder pain which she attributed to rotator cuff injury.  Denies fever, chills, nausea chest pain shortness of breath, abdominal pain, or dysuria.  Denies nipple inversion, breast pain palpable mass.    With regards to speech difficulty and cognitive impairment, MRI of the brain was obtained on 10/13/2020 and showed a left frontal meningioma with no acute intracranial pathology, repeat MRI was recommended in 6 months.  She complains of intermittent headache mostly frontal.    Family history is pertinent colon cancer, prostate cancer esophageal cancer.  No family history breast or ovarian cancer.    Review of Systems   Constitutional: Positive for fatigue. Negative for activity change, appetite change, chills, fever and unexpected weight change.   HENT: Negative for hearing loss, mouth sores, nosebleeds, sore throat, tinnitus, trouble swallowing and voice change.         Headache   Eyes: Negative for visual disturbance.   Respiratory: Negative for cough, chest tightness and shortness of breath.    Cardiovascular: Negative for chest pain, palpitations and leg swelling.   Gastrointestinal: Negative for abdominal pain, anal bleeding, blood in stool, constipation, diarrhea, nausea and vomiting.   Genitourinary: Negative for dysuria, frequency, hematuria, pelvic pain, vaginal bleeding and vaginal pain.   Musculoskeletal: Positive for gait problem. Negative for arthralgias, back pain, joint swelling and neck pain.        Left shoulder pain   Skin: Negative for color change, pallor, rash and wound.   Allergic/Immunologic: Negative for immunocompromised state.   Neurological: Positive for weakness. Negative for dizziness, tremors, syncope, speech difficulty, light-headedness and headaches.   Hematological: Negative for adenopathy. Does not bruise/bleed  easily.   Psychiatric/Behavioral: Negative for agitation, confusion, decreased concentration, hallucinations and sleep disturbance. The patient is not nervous/anxious.        ?   PAST MEDICAL HISTORY:   Past Medical History:   Diagnosis Date    Anxiety     Breast cancer     Chest pain     Chest pain 7/2/2021    Cholecystitis without calculus     Decreased ROM of left shoulder 2/15/2022    Dizziness     Elevated C-reactive protein (CRP)     Essential hypertension     Fall 10/11/2021    Memory change     EVERARDO (obstructive sleep apnea)     Osteoarthritis     Other specified disorders of thyroid     Pre-diabetes 4/15/2021    Chronic, Stable, cont metformin    Screening mammogram, encounter for 4/15/2021    Shoulder injury     SOB (shortness of breath)     Thyroiditis     Vision disturbance 4/30/2021    ?     PAST SURGICAL HISTORY:   Past Surgical History:   Procedure Laterality Date    ARTHROSCOPIC REPAIR OF ROTATOR CUFF OF SHOULDER Left 1/27/2022    Procedure: REPAIR, ROTATOR CUFF, ARTHROSCOPIC;  Surgeon: Francisco Mathews MD;  Location: [facility] OR;  Service: Orthopedics;  Laterality: Left;    ARTHROSCOPY OF SHOULDER WITH DECOMPRESSION OF SUBACROMIAL SPACE Left 1/27/2022    Procedure: ARTHROSCOPY, SHOULDER, WITH SUBACROMIAL SPACE DECOMPRESSION;  Surgeon: Francisco Mathews MD;  Location: [facility] OR;  Service: Orthopedics;  Laterality: Left;    BREAST BIOPSY      FIXATION OF TENDON Left 1/27/2022    Procedure: FIXATION, TENDON;  Surgeon: Francisco Mathews MD;  Location: [facility] OR;  Service: Orthopedics;  Laterality: Left;    HYSTERECTOMY      INJECTION OF JOINT Left 11/12/2021    Procedure: Left suprascapular and axillary injection with local;  Surgeon: Bisi Cochran MD;  Location: [facility]IN [facility]GT;  Service: Pain Management;  Laterality: Left;    MASTECTOMY        ?   ALLERGIES:   Allergies as of 04/22/2022 - Reviewed 04/22/2022   Allergen Reaction Noted    Lisinopril  03/01/2021       ?   MEDICATIONS:?   Outpatient Medications Marked as Taking for the 22 encounter (Office Visit) with Jamar Martin MD   Medication Sig Dispense Refill    albuterol (PROVENTIL/VENTOLIN HFA) 90 mcg/actuation inhaler Inhale 2 puffs into the lungs every 6 (six) hours as needed for Wheezing. Rescue 18 g 1    ammonium lactate 12 % Crea Apply 1 Act topically 2 (two) times daily. To feet. 140 g 2    ascorbic acid, vitamin C, (VITAMIN C) 100 MG tablet Take 100 mg by mouth once daily.      budesonide-formoterol 160-4.5 mcg (SYMBICORT) 160-4.5 mcg/actuation HFAA Inhale 2 puffs into the lungs every 12 (twelve) hours. Controller 10.2 g 3    busPIRone (BUSPAR) 15 MG tablet Take 1 tablet (15 mg total) by mouth 3 (three) times daily. 270 tablet 3    celecoxib (CELEBREX) 200 MG capsule Take 1 capsule (200 mg total) by mouth 2 (two) times daily. 28 capsule 0    dulaglutide (TRULICITY) 0.75 mg/0.5 mL pen injector Inject 0.75 mg into the skin every 7 days for 30 days, THEN 1.5 mg every 7 days. 20 pen 0    EScitalopram oxalate (LEXAPRO) 20 MG tablet Take 1 tablet (20 mg total) by mouth once daily. 90 tablet 3    EUTHYROX 50 mcg tablet Take 1 tablet (50 mcg total) by mouth every morning. 90 tablet 1    gabapentin (NEURONTIN) 100 MG capsule Take 1 capsule (100 mg total) by mouth 2 (two) times daily. 180 capsule 1    hydroCHLOROthiazide (HYDRODIURIL) 25 MG tablet Take 1 tablet (25 mg total) by mouth daily as needed (edema, swelling). 90 tablet 3    hydrocortisone 0.5 % cream Apply topically 2 (two) times daily.      linaCLOtide (LINZESS) 72 mcg Cap capsule Take 1 capsule (72 mcg total) by mouth once daily. 30 capsule 5    magnesium oxide (MAG-OX) 400 mg (241.3 mg magnesium) tablet Take 1 tablet (400 mg total) by mouth once daily. 90 tablet 1    meloxicam (MOBIC) 15 MG tablet Take 1 tablet (15 mg total) by mouth once daily. 30 tablet 0    metFORMIN (GLUCOPHAGE-XR) 500 MG ER 24hr tablet SMARTSI Tablet(s) By  Mouth Every Evening 90 tablet 3    multivit with min-folic acid (ADULT MULTIVITAMIN GUMMIES) 200 mcg Chew Take by mouth.      olopatadine (PATANOL) 0.1 % ophthalmic solution       pantoprazole (PROTONIX) 40 MG tablet Take 1 tablet (40 mg total) by mouth once daily. 30 tablet 11    pravastatin (PRAVACHOL) 40 MG tablet Take 1 tablet (40 mg total) by mouth every evening. 90 tablet 3    traMADoL (ULTRAM) 50 mg tablet Take 1 tablet (50 mg total) by mouth every 6 (six) hours as needed for Pain. 30 tablet 0    traZODone (DESYREL) 150 MG tablet Take 1 tablet (150 mg total) by mouth every evening. 90 tablet 3      ?   SOCIAL HISTORY:?   Social History     Tobacco Use    Smoking status: Never Smoker    Smokeless tobacco: Never Used   Substance Use Topics    Alcohol use: Not Currently        ?   FAMILY HISTORY:   family history includes Alcohol abuse in her maternal grandfather; Cancer in her maternal grandfather and mother; Diabetes in her brother and sister; Heart disease in her mother; No Known Problems in her maternal grandmother, paternal grandfather, and paternal grandmother; Stroke in her mother and sister.   ?     Objective:      Physical Exam  Constitutional:       General: She is not in acute distress.     Appearance: She is well-developed. She is obese. She is not ill-appearing or toxic-appearing.   HENT:      Head: Normocephalic and atraumatic.      Mouth/Throat:      Pharynx: No oropharyngeal exudate.   Eyes:      General: No scleral icterus.        Right eye: No discharge.         Left eye: No discharge.      Conjunctiva/sclera: Conjunctivae normal.      Pupils: Pupils are equal, round, and reactive to light.   Neck:      Thyroid: No thyromegaly.   Cardiovascular:      Rate and Rhythm: Normal rate and regular rhythm.      Heart sounds: No murmur heard.  Pulmonary:      Effort: Pulmonary effort is normal. No respiratory distress.      Breath sounds: Normal breath sounds.   Chest:      Chest wall:  Deformity (From mastectomy) present. No tenderness.   Breasts:      Right: No supraclavicular adenopathy.      Left: Skin change present. No supraclavicular adenopathy.       Abdominal:      General: Bowel sounds are normal. There is no distension.      Palpations: Abdomen is soft. There is no mass.      Tenderness: There is no abdominal tenderness. There is no guarding or rebound.   Musculoskeletal:         General: No tenderness. Normal range of motion.      Cervical back: Normal range of motion and neck supple.   Lymphadenopathy:      Cervical: No cervical adenopathy.      Right cervical: No superficial cervical adenopathy.     Left cervical: No superficial cervical adenopathy.      Upper Body:      Right upper body: No supraclavicular or pectoral adenopathy.      Left upper body: No supraclavicular or pectoral adenopathy.   Skin:     General: Skin is warm and dry.      Capillary Refill: Capillary refill takes 2 to 3 seconds.      Coloration: Skin is not pale.      Findings: No erythema or rash.   Neurological:      Mental Status: She is alert and oriented to person, place, and time.      Cranial Nerves: No cranial nerve deficit.      Sensory: No sensory deficit.   Psychiatric:         Behavior: Behavior normal. Behavior is cooperative.         Judgment: Judgment normal.         ?   Vitals:    04/22/22 1005   BP: (!) 143/85   Pulse: (!) 56   Temp: 97.5 °F (36.4 °C)      ?     ECOG SCORE    2 - Capable of all selfcare but unable to carry out any work activities, active > 50% of hours             ?   Laboratory:  ?   No visits with results within 1 Day(s) from this visit.   Latest known visit with results is:   Patient Outreach on 03/29/2022   Component Date Value Ref Range Status    Left Eye DM Retinopathy 04/30/2021 Negative   Final    Right Eye DM Retinopathy 04/30/2021 Negative   Final      ?   Tumor markers   ?   ?   Imaging: Mammo Digital Diagnostic Right with Darrion  Narrative: Result:   Mammo Digital  Diagnostic Right with Darrion     History:  Patient is 42 y.o. and is seen for diagnostic imaging.    Films Compared:  Compared to: 06/01/2021 Mammo Digital Diagnostic Right with Darrion     Findings:  This procedure was performed using tomosynthesis. Computer-aided detection   was utilized in the interpretation of this examination.  Right  The right breast has scattered areas of fibroglandular density. There is   no evidence of suspicious masses, calcifications, or other abnormal   findings in the right breast.  Impression: There is no mammographic evidence of malignancy in the right breast.    BI-RADS Category:   Overall: 1 - Negative     Recommendation:  Routine screening mammogram in 1 year is recommended.     ?      Pathology:  Pathology Results  (Last 10 years)    None           ?   Assessment/Plan:       1. ANGIE (generalized anxiety disorder)    2. Thyroid nodule    3. Malignant neoplasm of left breast in female, estrogen receptor negative, unspecified site of breast    4. Meningioma    5. Microcytic anemia    6. 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 Obesity, morbid, BMI 40.0-49.9    8. Nontraumatic incomplete tear of left rotator cuff          ANGIE (generalized anxiety disorder)  Followed by psychiatrist.    Thyroid nodule  Was seen by Dr. Erickson, biopsy in December of 2021 revealed benign tissue.    Malignant neoplasm of left breast in female, estrogen receptor negative  Stage IIA (pT2 pN0 cM0) invasive ductal carcinoma, ER 1%, NH negative HER2 Elieser negative status post left breast mastectomy and adjuvant chemotherapy. Completed dose dense AC plus 3/12 weekly Taxol.      Recently established with our clinic. Restaging PET-CT scan from 05/26/2021 showed no FDG PET/CT findings to suggest recurrent or metastatic disease.  Diagnostic mammogram from 02/23/2022 showed no evidence of malignancy in the right breast.  Patient is interested in reconstruction and has been referred to plastic and general surgery services for further  discussions.    Meningioma  Obtained MRI of brain that revealed a 9 mm round extra-axial enhancing mass overlying the left frontal lobe consistent with a small meningioma.  No other enhancing intracranial lesion identified.    Microcytic anemia  Microcytic anemia of unknown etiology.  Iron studies from February of 2022 showed iron deficiency anemia.  Status post IV iron therapy.  Will recheck hemoglobin and iron status today.  Also recommended upper and lower endoscopies to rule out GI bleed, order will place but yet to be obtained by patient.  Reiterated importance of endoscopic evaluation.  Patient verbalized understanding.    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  Will place referral to genetic counselor for further evaluation and discussions regarding possible screening tests.    Obesity, morbid, BMI 40.0-49.9  Counseled on lifestyle modification and exercise.    Nontraumatic incomplete tear of left rotator cuff  Followed by orthopedic surgery.  His      ?ANGIE (generalized anxiety disorder)    Thyroid nodule    Malignant neoplasm of left breast in female, estrogen receptor negative, unspecified site of breast    Meningioma    Microcytic anemia  -     CBC Auto Differential; Future; Expected date: 04/22/2022  -     Comprehensive Metabolic Panel; Future; Expected date: 04/22/2022  -     IRON AND TIBC; Future; Expected date: 04/22/2022  -     FERRITIN; Future; Expected date: 04/22/2022  -     Case Request Endoscopy: EGD (ESOPHAGOGASTRODUODENOSCOPY)  COLONOSCOPY  -     Ambulatory referral/consult to Genetics; Future; Expected date: 04/29/2022    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   Follow-Up: Follow up in about 3 months (around 7/22/2022).    BREANNE CROCKETT Md., Ph.D  Hematology & Oncology Department  Phone #: [phone]

## 2022-04-22 NOTE — ASSESSMENT & PLAN NOTE
Stage IIA (pT2 pN0 cM0) invasive ductal carcinoma, ER 1%, DC negative HER2 Elieser negative status post left breast mastectomy and adjuvant chemotherapy. Completed dose dense AC plus 3/12 weekly Taxol.      Recently established with our clinic. Restaging PET-CT scan from 05/26/2021 showed no FDG PET/CT findings to suggest recurrent or metastatic disease.  Diagnostic mammogram from 02/23/2022 showed no evidence of malignancy in the right breast.  Patient is interested in reconstruction and has been referred to plastic and general surgery services for further discussions.

## 2022-04-22 NOTE — ASSESSMENT & PLAN NOTE
Microcytic anemia of unknown etiology.  Iron studies from February of 2022 showed iron deficiency anemia.  Status post IV iron therapy.  Will recheck hemoglobin and iron status today.  Also recommended upper and lower endoscopies to rule out GI bleed, order will place but yet to be obtained by patient.  Reiterated importance of endoscopic evaluation.  Patient verbalized understanding.

## 2022-04-22 NOTE — TELEPHONE ENCOUNTER
----- Message from Christina Garcia sent at 4/22/2022  2:17 PM CDT -----  Pt would like a call back in regards to rescheduling her appointment for Monday, she stated the appointment is too close for her to schedule her transportation. Please call her at [phone]

## 2022-04-22 NOTE — ASSESSMENT & PLAN NOTE
High risk for multiple cancers.  Will place referral to genetic counselor for further evaluation and discussions regarding possible screening tests.

## 2022-04-22 NOTE — TELEPHONE ENCOUNTER
Genetic Referral placed, nurse attempt to contact pt, no answer, unable to leave voice message, mail box not set up yet was massage received.

## 2022-04-23 LAB
IRON SERPL-MCNC: 83 UG/DL (ref 30–160)
SATURATED IRON: 23 % (ref 20–50)
TOTAL IRON BINDING CAPACITY: 363 UG/DL (ref 250–450)
TRANSFERRIN SERPL-MCNC: 245 MG/DL (ref 200–375)

## 2022-04-25 ENCOUNTER — PATIENT MESSAGE (OUTPATIENT)
Dept: PODIATRY | Facility: CLINIC | Age: 42
End: 2022-04-25
Payer: MEDICAID

## 2022-04-26 ENCOUNTER — TELEPHONE (OUTPATIENT)
Dept: GASTROENTEROLOGY | Facility: CLINIC | Age: 42
End: 2022-04-26
Payer: MEDICAID

## 2022-04-26 ENCOUNTER — OFFICE VISIT (OUTPATIENT)
Dept: GASTROENTEROLOGY | Facility: CLINIC | Age: 42
End: 2022-04-26
Payer: MEDICAID

## 2022-04-26 ENCOUNTER — DOCUMENTATION ONLY (OUTPATIENT)
Dept: GASTROENTEROLOGY | Facility: CLINIC | Age: 42
End: 2022-04-26
Payer: MEDICAID

## 2022-04-26 VITALS
HEART RATE: 62 BPM | SYSTOLIC BLOOD PRESSURE: 124 MMHG | BODY MASS INDEX: 44.41 KG/M2 | OXYGEN SATURATION: 99 % | HEIGHT: 68 IN | WEIGHT: 293 LBS | DIASTOLIC BLOOD PRESSURE: 86 MMHG

## 2022-04-26 DIAGNOSIS — K21.9 GASTROESOPHAGEAL REFLUX DISEASE, UNSPECIFIED WHETHER ESOPHAGITIS PRESENT: ICD-10-CM

## 2022-04-26 DIAGNOSIS — K59.04 CHRONIC IDIOPATHIC CONSTIPATION: ICD-10-CM

## 2022-04-26 DIAGNOSIS — R10.9 ABDOMINAL PAIN, UNSPECIFIED ABDOMINAL LOCATION: ICD-10-CM

## 2022-04-26 DIAGNOSIS — D50.9 IRON DEFICIENCY ANEMIA, UNSPECIFIED IRON DEFICIENCY ANEMIA TYPE: Primary | ICD-10-CM

## 2022-04-26 PROCEDURE — 99215 OFFICE O/P EST HI 40 MIN: CPT | Mod: PBBFAC | Performed by: PHYSICIAN ASSISTANT

## 2022-04-26 PROCEDURE — 3079F DIAST BP 80-89 MM HG: CPT | Mod: CPTII,,, | Performed by: PHYSICIAN ASSISTANT

## 2022-04-26 PROCEDURE — 3044F HG A1C LEVEL LT 7.0%: CPT | Mod: CPTII,,, | Performed by: PHYSICIAN ASSISTANT

## 2022-04-26 PROCEDURE — 3079F PR MOST RECENT DIASTOLIC BLOOD PRESSURE 80-89 MM HG: ICD-10-PCS | Mod: CPTII,,, | Performed by: PHYSICIAN ASSISTANT

## 2022-04-26 PROCEDURE — 3008F BODY MASS INDEX DOCD: CPT | Mod: CPTII,,, | Performed by: PHYSICIAN ASSISTANT

## 2022-04-26 PROCEDURE — 3074F PR MOST RECENT SYSTOLIC BLOOD PRESSURE < 130 MM HG: ICD-10-PCS | Mod: CPTII,,, | Performed by: PHYSICIAN ASSISTANT

## 2022-04-26 PROCEDURE — 3066F PR DOCUMENTATION OF TREATMENT FOR NEPHROPATHY: ICD-10-PCS | Mod: CPTII,,, | Performed by: PHYSICIAN ASSISTANT

## 2022-04-26 PROCEDURE — 99999 PR PBB SHADOW E&M-EST. PATIENT-LVL V: ICD-10-PCS | Mod: PBBFAC,,, | Performed by: PHYSICIAN ASSISTANT

## 2022-04-26 PROCEDURE — 99214 PR OFFICE/OUTPT VISIT, EST, LEVL IV, 30-39 MIN: ICD-10-PCS | Mod: S$PBB,,, | Performed by: PHYSICIAN ASSISTANT

## 2022-04-26 PROCEDURE — 3074F SYST BP LT 130 MM HG: CPT | Mod: CPTII,,, | Performed by: PHYSICIAN ASSISTANT

## 2022-04-26 PROCEDURE — 3072F PR LOW RISK FOR RETINOPATHY: ICD-10-PCS | Mod: CPTII,,, | Performed by: PHYSICIAN ASSISTANT

## 2022-04-26 PROCEDURE — 3066F NEPHROPATHY DOC TX: CPT | Mod: CPTII,,, | Performed by: PHYSICIAN ASSISTANT

## 2022-04-26 PROCEDURE — 99999 PR PBB SHADOW E&M-EST. PATIENT-LVL V: CPT | Mod: PBBFAC,,, | Performed by: PHYSICIAN ASSISTANT

## 2022-04-26 PROCEDURE — 3044F PR MOST RECENT HEMOGLOBIN A1C LEVEL <7.0%: ICD-10-PCS | Mod: CPTII,,, | Performed by: PHYSICIAN ASSISTANT

## 2022-04-26 PROCEDURE — 3061F NEG MICROALBUMINURIA REV: CPT | Mod: CPTII,,, | Performed by: PHYSICIAN ASSISTANT

## 2022-04-26 PROCEDURE — 99214 OFFICE O/P EST MOD 30 MIN: CPT | Mod: S$PBB,,, | Performed by: PHYSICIAN ASSISTANT

## 2022-04-26 PROCEDURE — 3061F PR NEG MICROALBUMINURIA RESULT DOCUMENTED/REVIEW: ICD-10-PCS | Mod: CPTII,,, | Performed by: PHYSICIAN ASSISTANT

## 2022-04-26 PROCEDURE — 3072F LOW RISK FOR RETINOPATHY: CPT | Mod: CPTII,,, | Performed by: PHYSICIAN ASSISTANT

## 2022-04-26 PROCEDURE — 3008F PR BODY MASS INDEX (BMI) DOCUMENTED: ICD-10-PCS | Mod: CPTII,,, | Performed by: PHYSICIAN ASSISTANT

## 2022-04-26 RX ORDER — SODIUM, POTASSIUM,MAG SULFATES 17.5-3.13G
1 SOLUTION, RECONSTITUTED, ORAL ORAL DAILY
Qty: 1 KIT | Refills: 0 | Status: SHIPPED | OUTPATIENT
Start: 2022-04-26 | End: 2022-04-29

## 2022-04-26 NOTE — PATIENT INSTRUCTIONS
-Will schedule for EGD and colonoscopy procedures per hematology/oncology for her anemia. Will need to drink prep prior to colonoscopy.  2 days prior to colonoscopy, drink one bottle of magnesium citrate which is over the counter.   1 day prior to colonoscopy, begin prep as normal.    -Begin Linzess medication daily for constipation. Can also take Miralax.    -Follow up with Dr. Sofia for right upper abdominal pain. Please discuss gallbladder with him.

## 2022-04-26 NOTE — TELEPHONE ENCOUNTER
Location Screening:    If answers yes to either of the following, schedule at [facility] ONLY. If No, OK for either location.    1. Is procedure for esophageal banding ([facility])? no Yes, select [facility]  2. Is this an Advanced Endoscopic procedure (Dr Mckinney)?  no Yes, select [facility] (except for bariatric procedures - schedule those at the [facility])  3. Is the BMI > 50? no Yes, select [facility]  4. Does the patient have chronic hypoxic respiratory failure, as evidenced by symptoms below? no Yes, select [facility]  a. O2 Saturation <92%  b. Is the patient on supplemental O2  c. History of moderate to severe PFT's  d. Unable to complete a 6 min walk test    COVID Screening    1. Are you COVID vaccinated? yes    2. Are you experiencing shortness of breath, cough, muscle aches, loss of taste or loss of smell?  no    If answered yes to #2 then the patient must seek medical attention with their PCP, urgent care or ED.  Do not schedule the procedure.       ENDO screening    1. Have you been admitted for cardiac, kidney or pulmonary causes to the hospital in the past 3 months? no   If yes, schedule an appointment with PCP before scheduling endoscopic procedure  Unless patient has been seen by Nephrology, Cardiologist or in the GI department within 3 months.      2. Have you had a stent placed in the last 12 months? no: If yes, have one of our providers review the chart and determine if they need to be seen in clinic.      3. Have you had a stroke or heart attack in the past 6 months? no If patient has not been seen by PCP, Neurology or Cardiology within 3 months, have one of our providers review the chart and determine if they need to be seen in clinic.        4. Have you had any chest pain in the past 3 months? no   If yes, have you been evaluated by your PCP and/or cardiologist and was it determined to not be heart related? not applicable   If No, Pt needs to be seen by PCP or Cardiologist.  Pt can be scheduled once cardiac clearance  "obtained by either of those providers.     5. Do you take prescription weight loss medications?  no   If yes, must stop weight loss medication for 2 weeks prior to procedure.     6. Have you been diagnosed with diverticulitis within the past 3 months? no   If yes, must have been seen by GI within the last 3 months, if not schedule with GI [facility].    If Pt has been seen by GI, schedule procedure 8-12 weeks post antibiotic treatment.     7. Are you on Dialysis? no  If yes, schedule procedure for the day AFTER dialysis.  Appt time should be 9am or later, patient arrival time is 2 hours prior to procedure, for labs.  Nulytely or miralax prep must be used for all patients with kidney disease.     8. Are you diabetic?  yes   If yes, schedule morning appt. Advise Pt to hold all diabetic meds day of procedure.     9. All patients 80 years of age and older must be seen in the GI clinic - please schedule an [facility] appointment - Do not schedule a scope procedure appointment.     10. Is patient on a "high risk" medication (blood thinner/antiplatelet agent)?  no   If yes, has cardiac clearance been obtained within the last 60 days? N/A   If no, a new cardiac clearance must be obtained.     Final Questions:    1.I have reviewed the last colonoscopy for recommendations regarding next procedure bowel prep.  yes  2. I have reviewed medications and allergies.  yes  3. I have verified the pharmacy information and appropriate prep sent if needed. yes  4. Prep instructions have been mailed or sent to portal per patient request. yes        All schedulers will have ability to reach out to the ordering GI provider to clarify any issues.       "

## 2022-04-26 NOTE — PROGRESS NOTES
Subjective:      Patient ID: Sangeetha June is a 42 y.o. female.    Chief Complaint: Abdominal Pain and Constipation    HPI:  Patient reports to clinic today for follow up of the above. Last seen by our service in November 2022 for RUQ pain. HIDA performed and abnormal - patient was referred to general surgery. She has not discussed gallbladder with general surgery as of yet. She continues with abdominal pain beginning at the RUQ and radiation toward the epigastrum. Worse with eating. Also suffers from constipation. Having pellet stools. Has a bowel movement every few days. Also suffers from GERD - takes Protonix daily.     Review of Systems   Constitutional: Negative for activity change, appetite change, chills, diaphoresis, fatigue, fever and unexpected weight change.   HENT: Negative for trouble swallowing.    Respiratory: Negative for shortness of breath.    Cardiovascular: Negative for chest pain.   Gastrointestinal: Positive for abdominal pain and constipation. Negative for abdominal distention, blood in stool, nausea and vomiting.   Neurological: Negative for dizziness and weakness.   Psychiatric/Behavioral: Negative for dysphoric mood. The patient is not nervous/anxious.        Medical History: Reviewed    Social History: Reviewed    Allergies: Reviewed    Objective:     Physical Exam  Constitutional:       General: She is not in acute distress.     Appearance: Normal appearance. She is not ill-appearing, toxic-appearing or diaphoretic.   HENT:      Head: Normocephalic and atraumatic.   Eyes:      General: No scleral icterus.     Extraocular Movements: Extraocular movements intact.   Cardiovascular:      Rate and Rhythm: Normal rate and regular rhythm.   Pulmonary:      Effort: Pulmonary effort is normal. No respiratory distress.   Abdominal:      General: Bowel sounds are normal. There is no distension.      Palpations: Abdomen is soft.      Tenderness: There is no abdominal tenderness. There is  no guarding.   Musculoskeletal:         General: Normal range of motion.      Cervical back: Normal range of motion.   Skin:     General: Skin is warm and dry.      Coloration: Skin is not jaundiced.   Neurological:      Mental Status: She is alert and oriented to person, place, and time.         Assessment:     1. Iron deficiency anemia, unspecified iron deficiency anemia type    2. Abdominal pain, unspecified abdominal location    3. Gastroesophageal reflux disease, unspecified whether esophagitis present    4. Chronic idiopathic constipation        Plan:     -Clear bowels with suprep then follow with daily Linzess. Constipation could be contributing to her discomfort.   -Recommend general surgery follow up to discuss abnormal HIDA coupled with RUQ and epigastric pain.  -continue with Protonix.  -Most recent iron studies normal with normal hemoglobin.    Sangeetha was seen today for abdominal pain and constipation.    Diagnoses and all orders for this visit:    Iron deficiency anemia, unspecified iron deficiency anemia type  -     sodium,potassium,mag sulfates (SUPREP BOWEL PREP KIT) 17.5-3.13-1.6 gram SolR; Take 177 mLs by mouth once daily. for 2 days    Abdominal pain, unspecified abdominal location  -     Ambulatory referral/consult to Gastroenterology  -     sodium,potassium,mag sulfates (SUPREP BOWEL PREP KIT) 17.5-3.13-1.6 gram SolR; Take 177 mLs by mouth once daily. for 2 days    Gastroesophageal reflux disease, unspecified whether esophagitis present  -     Ambulatory referral/consult to Gastroenterology  -     sodium,potassium,mag sulfates (SUPREP BOWEL PREP KIT) 17.5-3.13-1.6 gram SolR; Take 177 mLs by mouth once daily. for 2 days    Chronic idiopathic constipation  -     sodium,potassium,mag sulfates (SUPREP BOWEL PREP KIT) 17.5-3.13-1.6 gram SolR; Take 177 mLs by mouth once daily. for 2 days  -     linaCLOtide (LINZESS) 145 mcg Cap capsule; Take 1 capsule (145 mcg total) by mouth once daily.        No  follow-ups on file.    Thank you for the opportunity to participate in the care of this patient.   Katey Portillo PA-C.

## 2022-04-27 ENCOUNTER — TELEPHONE (OUTPATIENT)
Dept: PAIN MEDICINE | Facility: CLINIC | Age: 42
End: 2022-04-27
Payer: MEDICAID

## 2022-04-28 ENCOUNTER — OFFICE VISIT (OUTPATIENT)
Dept: PAIN MEDICINE | Facility: CLINIC | Age: 42
End: 2022-04-28
Payer: MEDICAID

## 2022-04-28 VITALS
HEIGHT: 68 IN | BODY MASS INDEX: 44.41 KG/M2 | SYSTOLIC BLOOD PRESSURE: 116 MMHG | HEART RATE: 65 BPM | WEIGHT: 293 LBS | RESPIRATION RATE: 17 BRPM | DIASTOLIC BLOOD PRESSURE: 80 MMHG

## 2022-04-28 DIAGNOSIS — M70.62 GREATER TROCHANTERIC BURSITIS OF BOTH HIPS: ICD-10-CM

## 2022-04-28 DIAGNOSIS — M54.9 BACK PAIN, UNSPECIFIED BACK LOCATION, UNSPECIFIED BACK PAIN LATERALITY, UNSPECIFIED CHRONICITY: ICD-10-CM

## 2022-04-28 DIAGNOSIS — M46.1 SACROILIITIS: Primary | ICD-10-CM

## 2022-04-28 DIAGNOSIS — M70.61 GREATER TROCHANTERIC BURSITIS OF BOTH HIPS: ICD-10-CM

## 2022-04-28 PROCEDURE — 1160F RVW MEDS BY RX/DR IN RCRD: CPT | Mod: CPTII,,, | Performed by: PHYSICIAN ASSISTANT

## 2022-04-28 PROCEDURE — 3072F PR LOW RISK FOR RETINOPATHY: ICD-10-PCS | Mod: CPTII,,, | Performed by: PHYSICIAN ASSISTANT

## 2022-04-28 PROCEDURE — 3074F PR MOST RECENT SYSTOLIC BLOOD PRESSURE < 130 MM HG: ICD-10-PCS | Mod: CPTII,,, | Performed by: PHYSICIAN ASSISTANT

## 2022-04-28 PROCEDURE — 99999 PR PBB SHADOW E&M-EST. PATIENT-LVL V: ICD-10-PCS | Mod: PBBFAC,,, | Performed by: PHYSICIAN ASSISTANT

## 2022-04-28 PROCEDURE — 1159F PR MEDICATION LIST DOCUMENTED IN MEDICAL RECORD: ICD-10-PCS | Mod: CPTII,,, | Performed by: PHYSICIAN ASSISTANT

## 2022-04-28 PROCEDURE — 99999 PR PBB SHADOW E&M-EST. PATIENT-LVL V: CPT | Mod: PBBFAC,,, | Performed by: PHYSICIAN ASSISTANT

## 2022-04-28 PROCEDURE — 3074F SYST BP LT 130 MM HG: CPT | Mod: CPTII,,, | Performed by: PHYSICIAN ASSISTANT

## 2022-04-28 PROCEDURE — 3079F DIAST BP 80-89 MM HG: CPT | Mod: CPTII,,, | Performed by: PHYSICIAN ASSISTANT

## 2022-04-28 PROCEDURE — 3066F PR DOCUMENTATION OF TREATMENT FOR NEPHROPATHY: ICD-10-PCS | Mod: CPTII,,, | Performed by: PHYSICIAN ASSISTANT

## 2022-04-28 PROCEDURE — 3008F BODY MASS INDEX DOCD: CPT | Mod: CPTII,,, | Performed by: PHYSICIAN ASSISTANT

## 2022-04-28 PROCEDURE — 3008F PR BODY MASS INDEX (BMI) DOCUMENTED: ICD-10-PCS | Mod: CPTII,,, | Performed by: PHYSICIAN ASSISTANT

## 2022-04-28 PROCEDURE — 3061F PR NEG MICROALBUMINURIA RESULT DOCUMENTED/REVIEW: ICD-10-PCS | Mod: CPTII,,, | Performed by: PHYSICIAN ASSISTANT

## 2022-04-28 PROCEDURE — 3061F NEG MICROALBUMINURIA REV: CPT | Mod: CPTII,,, | Performed by: PHYSICIAN ASSISTANT

## 2022-04-28 PROCEDURE — 3044F PR MOST RECENT HEMOGLOBIN A1C LEVEL <7.0%: ICD-10-PCS | Mod: CPTII,,, | Performed by: PHYSICIAN ASSISTANT

## 2022-04-28 PROCEDURE — 1159F MED LIST DOCD IN RCRD: CPT | Mod: CPTII,,, | Performed by: PHYSICIAN ASSISTANT

## 2022-04-28 PROCEDURE — 99214 OFFICE O/P EST MOD 30 MIN: CPT | Mod: S$PBB,,, | Performed by: PHYSICIAN ASSISTANT

## 2022-04-28 PROCEDURE — 3066F NEPHROPATHY DOC TX: CPT | Mod: CPTII,,, | Performed by: PHYSICIAN ASSISTANT

## 2022-04-28 PROCEDURE — 99215 OFFICE O/P EST HI 40 MIN: CPT | Mod: PBBFAC | Performed by: PHYSICIAN ASSISTANT

## 2022-04-28 PROCEDURE — 99214 PR OFFICE/OUTPT VISIT, EST, LEVL IV, 30-39 MIN: ICD-10-PCS | Mod: S$PBB,,, | Performed by: PHYSICIAN ASSISTANT

## 2022-04-28 PROCEDURE — 3072F LOW RISK FOR RETINOPATHY: CPT | Mod: CPTII,,, | Performed by: PHYSICIAN ASSISTANT

## 2022-04-28 PROCEDURE — 3044F HG A1C LEVEL LT 7.0%: CPT | Mod: CPTII,,, | Performed by: PHYSICIAN ASSISTANT

## 2022-04-28 PROCEDURE — 3079F PR MOST RECENT DIASTOLIC BLOOD PRESSURE 80-89 MM HG: ICD-10-PCS | Mod: CPTII,,, | Performed by: PHYSICIAN ASSISTANT

## 2022-04-28 PROCEDURE — 1160F PR REVIEW ALL MEDS BY PRESCRIBER/CLIN PHARMACIST DOCUMENTED: ICD-10-PCS | Mod: CPTII,,, | Performed by: PHYSICIAN ASSISTANT

## 2022-04-28 NOTE — PROGRESS NOTES
Subjective:       Patient ID: Sangeetha June is a 42 y.o. female.    Chief Complaint: No chief complaint on file.    Cyst to face    Review of Systems   Cardiovascular: Negative for chest pain.   Gastrointestinal: Negative for abdominal pain.       Objective:      Physical Exam  Vitals and nursing note reviewed.   Constitutional:       General: She is not in acute distress.     Appearance: Normal appearance. She is well-developed. She is not diaphoretic.      Comments: In wheelchair   HENT:      Head: Normocephalic and atraumatic.      Mouth/Throat:      Mouth: Mucous membranes are moist.      Pharynx: No oropharyngeal exudate or posterior oropharyngeal erythema.   Pulmonary:      Effort: Pulmonary effort is normal. No respiratory distress.      Breath sounds: Normal breath sounds. No wheezing.   Abdominal:      General: There is no distension.      Palpations: Abdomen is soft.   Skin:     General: Skin is warm and dry.      Findings: No erythema or rash.      Comments: Cyst to glabella   Neurological:      Mental Status: She is alert.         Assessment:       1. Skin lesion    2. Other hyperlipidemia    3. Primary hypertension    4. Type 2 diabetes mellitus without complication, with long-term current use of insulin    5. Urinary incontinence, unspecified type        Plan:     Problem List Items Addressed This Visit        Cardiac/Vascular    Hyperlipidemia    Overview     Chronic, Stable, cont a statin           Hypertension    Overview     Chronic, Stable, cont hctz              Renal/    Urinary incontinence    Relevant Orders    Ambulatory referral/consult to Urology       Endocrine    Type 2 diabetes mellitus, with long-term current use of insulin    Relevant Medications    dulaglutide (TRULICITY) 3 mg/0.5 mL pen injector    Other Relevant Orders    Ambulatory referral/consult to Optometry    Hemoglobin A1C      Other Visit Diagnoses     Skin lesion    -  Primary    Relevant Orders    Ambulatory  referral/consult to Dermatology

## 2022-04-28 NOTE — PROGRESS NOTES
Est Patient Chronic Pain Note (Follow up Visit)    Referring Physician: Francisco Mathews*    PCP: Sam Garcia MD    Chief Complaint:   Chief Complaint   Patient presents with    Back Pain     Location unspecified, knee, shoulder pain         SUBJECTIVE:  Interval History (4/28/2022):  Sangeetha June presents today for low back pain and bilateral hip pain. Patient reports pain as 8/10 today. Reports pain as aching and band-like across lower back. Pain is exacerbated by prolonged walking and standing and improved with rest.     She has previously undergone left Posterior Suprascapular and Axillary  Diagnostic Injection on 11/12/2021 and rotator cuff repair on 1/27/2022 with Dr. Mathews, currently in physical therapy for shoulder and has follow up with Dr. Mathews.      Interval HPI 07/29/2021  Sangeetha June is a 42 y.o. female with past medical history significant for breast cancer status post mastectomy (6/2020; [place]) and currently undergoing chemotherapy, prediabetes, hyperlipidemia, hypertension morbid obesity who presents to the clinic for the evaluation of left-sided chest and left shoulder pain.  Patient reports that her pain started following her left-sided mastectomy.  Pain is constant and described as aching and pins and needles in sensation. Pain is exacerbated by left upper extremity abduction and lateral rotation of her torso.  Pain is not elicited with tactile stimulation.  Pain is improved by placing a pillow behind her thoracic spine.      Patient also reports left-sided shoulder pain which is constant.  Patient reports that she has had prior shoulder intra-articular injections in [place] without significant relief.  Patient reports that she has been told that she has rotator cuff pathology.  Patient is unable to extend or abduct her arms greater than 30°.  Patient reports pain is exacerbated with slight internal or external rotation of the left shoulder, with  abduction and with holding items in her left hand.  Patient is unable to perform overhead activities with this extremity.  Patient does report associated weakness in the left upper extremity but denies paresthesias tor weakness in the hands. The pain is rated with a score of  8/10 on the BEST day and a score of 10/10 on the WORST day.  Symptoms interfere with daily activity and sleeping.     Patient reports  loss of sensations.  Patient denies night fever/night sweats, urinary incontinence, bowel incontinence and significant weight loss.    Of note patient saw Dr. Johnston, July 2, 2021 with echo demonstrating left ventricular hypertrophy with normal systolic function, negative nuclear stress test and referral to Pain Management for potential musculoskeletal source.    Of note patient saw Dr. Mathews April 2021 with discussion of consideration for shoulder rotator cuff repair versus corticosteroid injection but with concern regarding recent left-sided mastectomy.    Non-Pharmacologic Treatments:  Physical Therapy/Home Exercise: yes  Ice/Heat:yes  TENS: no  Acupuncture: no  Massage: no  Chiropractic: no    Other: no      Pain Medications:  - Adjuvant Medications: Alleve (Naproxen), Lexapro (Escitalopram) and Trazodone (Desyrel)  - Anti-Coagulants: Aspirin    Pain Procedures:   Left-sided shoulder intra-articular injection    Past Medical History:   Diagnosis Date    Anxiety     Breast cancer     Chest pain     Chest pain 7/2/2021    Cholecystitis without calculus     Decreased ROM of left shoulder 2/15/2022    Dizziness     Elevated C-reactive protein (CRP)     Essential hypertension     Fall 10/11/2021    Memory change     EVERARDO (obstructive sleep apnea)     Osteoarthritis     Other specified disorders of thyroid     Pre-diabetes 4/15/2021    Chronic, Stable, cont metformin    Screening mammogram, encounter for 4/15/2021    Shoulder injury     SOB (shortness of breath)     Thyroiditis     Vision  disturbance 4/30/2021     Past Surgical History:   Procedure Laterality Date    ARTHROSCOPIC REPAIR OF ROTATOR CUFF OF SHOULDER Left 1/27/2022    Procedure: REPAIR, ROTATOR CUFF, ARTHROSCOPIC;  Surgeon: Francisco Mathews MD;  Location: [facility] OR;  Service: Orthopedics;  Laterality: Left;    ARTHROSCOPY OF SHOULDER WITH DECOMPRESSION OF SUBACROMIAL SPACE Left 1/27/2022    Procedure: ARTHROSCOPY, SHOULDER, WITH SUBACROMIAL SPACE DECOMPRESSION;  Surgeon: Francisco Mathews MD;  Location: [facility] OR;  Service: Orthopedics;  Laterality: Left;    BREAST BIOPSY      FIXATION OF TENDON Left 1/27/2022    Procedure: FIXATION, TENDON;  Surgeon: Francisco Mathews MD;  Location: [facility] OR;  Service: Orthopedics;  Laterality: Left;    HYSTERECTOMY      INJECTION OF JOINT Left 11/12/2021    Procedure: Left suprascapular and axillary injection with local;  Surgeon: Bisi Cochran MD;  Location: [facility] PAIN MGT;  Service: Pain Management;  Laterality: Left;    MASTECTOMY       Review of patient's allergies indicates:   Allergen Reactions    Lisinopril        Current Outpatient Medications   Medication Sig    albuterol (PROVENTIL/VENTOLIN HFA) 90 mcg/actuation inhaler Inhale 2 puffs into the lungs every 6 (six) hours as needed for Wheezing. Rescue    ammonium lactate 12 % Crea Apply 1 Act topically 2 (two) times daily. To feet.    ascorbic acid, vitamin C, (VITAMIN C) 100 MG tablet Take 100 mg by mouth once daily.    budesonide-formoterol 160-4.5 mcg (SYMBICORT) 160-4.5 mcg/actuation HFAA Inhale 2 puffs into the lungs every 12 (twelve) hours. Controller    busPIRone (BUSPAR) 15 MG tablet Take 1 tablet (15 mg total) by mouth 3 (three) times daily.    celecoxib (CELEBREX) 200 MG capsule Take 1 capsule (200 mg total) by mouth 2 (two) times daily.    dulaglutide (TRULICITY) 0.75 mg/0.5 mL pen injector Inject 0.75 mg into the skin every 7 days for 30 days, THEN 1.5 mg every 7 days.    EScitalopram oxalate  (LEXAPRO) 20 MG tablet Take 1 tablet (20 mg total) by mouth once daily.    EUTHYROX 50 mcg tablet Take 1 tablet (50 mcg total) by mouth every morning.    gabapentin (NEURONTIN) 100 MG capsule Take 1 capsule (100 mg total) by mouth 2 (two) times daily.    hydroCHLOROthiazide (HYDRODIURIL) 25 MG tablet Take 1 tablet (25 mg total) by mouth daily as needed (edema, swelling).    hydrocortisone 0.5 % cream Apply topically 2 (two) times daily.    linaCLOtide (LINZESS) 145 mcg Cap capsule Take 1 capsule (145 mcg total) by mouth once daily.    magnesium oxide (MAG-OX) 400 mg (241.3 mg magnesium) tablet Take 1 tablet (400 mg total) by mouth once daily.    meloxicam (MOBIC) 15 MG tablet Take 1 tablet (15 mg total) by mouth once daily.    metFORMIN (GLUCOPHAGE-XR) 500 MG ER 24hr tablet SMARTSI Tablet(s) By Mouth Every Evening    multivit with min-folic acid (ADULT MULTIVITAMIN GUMMIES) 200 mcg Chew Take by mouth.    olopatadine (PATANOL) 0.1 % ophthalmic solution     pantoprazole (PROTONIX) 40 MG tablet Take 1 tablet (40 mg total) by mouth once daily.    pravastatin (PRAVACHOL) 40 MG tablet Take 1 tablet (40 mg total) by mouth every evening.    sodium,potassium,mag sulfates (SUPREP BOWEL PREP KIT) 17.5-3.13-1.6 gram SolR Take 177 mLs by mouth once daily. for 2 days    traMADoL (ULTRAM) 50 mg tablet Take 1 tablet (50 mg total) by mouth every 6 (six) hours as needed for Pain.    traZODone (DESYREL) 150 MG tablet Take 1 tablet (150 mg total) by mouth every evening.    oxybutynin (DITROPAN-XL) 5 MG TR24 Take 1 tablet (5 mg total) by mouth once daily.     No current facility-administered medications for this visit.     Facility-Administered Medications Ordered in Other Visits   Medication    lactated ringers infusion       Review of Systems     GENERAL:  No weight loss, malaise or fevers.  HEENT:   No recent changes in vision or hearing  NECK:  Negative for lumps, no difficulty with swallowing.  RESPIRATORY:   "Negative for cough, wheezing or shortness of breath, patient denies any recent URI.  CARDIOVASCULAR:  Negative for chest pain,palpitations.  GI:  Negative for abdominal discomfort, blood in stools or black stools or change in bowel habits.  MUSCULOSKELETAL:  See HPI.  SKIN:  Negative for lesions, rash, and itching.  PSYCH:  No mood disorder or recent psychosocial stressors.  Patients sleep is not disturbed secondary to pain.  HEMATOLOGY/LYMPHOLOGY:  Negative for prolonged bleeding, bruising easily or swollen nodes.  Patient is not currently taking any anti-coagulants  NEURO:   No history of headaches, syncope, paralysis, seizures or tremors.  All other reviewed and negative other than HPI.    OBJECTIVE:    /80   Pulse 65   Resp 17   Ht 5' 8" (1.727 m)   Wt 133.9 kg (295 lb 3.1 oz)   LMP  (LMP Unknown)   BMI 44.88 kg/m²       GENERAL: Well appearing, in no acute distress, alert and oriented x3. Slight mental delay  PSYCH:  Mood and affect appropriate.  MUSCULOSKELETAL:  TTP to bilateral GTB. TTP along lumbar facet joints, mild pain with extension, no pain with flexion  SIJ testing:  - TTP over SI joint: Present  - Yenny's/ Josh's: Positive    - Sacroiliac Distraction Test (anterior pressure): Positive  - Sacroiliac Compression Test (lateral pressure): Positive   HEAD/FACE:  Normocephalic, atraumatic. Cranial nerves grossly intact.  NECK:  Full range of cervical flexion, extension lateral rotation   Normal testing biceps, triceps and brachioradialis bilaterally.    Negative Roxanne's bilaterally.    5/5 strength testing deltoid, biceps, triceps, wrist extensor, wrist flexor and ulnar intrinsics bilaterally.    Normal  strength bilaterally    Shoulder Exam    Inspection/Observation   Swelling: absent  Bruising: absent  Scars: well-healed surgical scar  Deformity: absent  Scapular Dyskinesia: negative     Range of Motion - limited secondary to pain and stiffness  Active abduction: reduced  Extension: " reduced  Forward Elevation: reduced  Adduction: normal  External Rotation reduced  Internal rotation reduced     Other   Sensation:  tenderness to palpation at left greater tuberosity, bicipital groove and AC joint on L       CV: RRR with palpation of the radial artery.  PULM: No evidence of respiratory difficulty, symmetric chest rise.  GI:  Soft and non-tender.  EXTREMITIES:Good capillary refill.   GAIT:  Not assessed today.  Patient in wheelchair.    Imaging:     10/11/2021 X-ray lumbar spine   FINDINGS:  Bone density is normal.  The lumbar vertebral bodies maintain normal height and alignment.  There is moderate disc space narrowing at the L5-S1 level with mild disc space narrowing at the L4-5 level.  There is minimal endplate osteophyte formation at the lower 3 disc levels.  There is mild facet joint arthropathy at the lower 2 lumbar levels.  The paraspinous soft tissues are normal.     07/02/2021 x-ray chest  Right anterior chest wall Port-A-Cath with tip projected over the right atrium.   Cardiomediastinal silhouette is within normal limits. Surgical clips in the left axillary region.  Lungs are well expanded without evidence of focal consolidation, pleural effusion, or pneumothorax.  Pulmonary vasculature and hilar regions are within normal limits.  No acute osseous abnormality.    MRI left shoulder April 2021  Rotator cuff: There is a full-thickness tear at the anterior leading edge of the supraspinatus tendon at the insertion approximately 7.5 mm of retraction.  There is contiguous partial-thickness tearing seen extending posteriorly to the infraspinatus spanning approximately 2.2 cm AP in total.  A small amount of intrasubstance fluid signal noted tracking proximally within the infraspinatus.  Subscapularis and teres minor tendons appear intact.  Rotator cuff muscle bulk is preserved.     Labrum: No large labral defect demonstrated on this non arthrographic study.     Long head of the biceps:  Intact     Bones: Marrow signal is within normal limits with the exception of mild cystic change underlying the rotator cuff footprint.  No evidence of fracture or marrow replacement process.     AC joint: Mild arthrosis.  There is a small joint effusion present.  Acromioclavicular and coracoclavicular ligaments appear intact.  Appears intact.     Cartilage: No large glenohumeral articular cartilage defect demonstrated on this non arthrographic study.     Miscellaneous: No joint effusion.      ASSESSMENT: 42 y.o. year old female with left-sided chest and shoulder pain, consistent with     1. Sacroiliitis  IR SI Joint Injection w/Imaging    IR SI Joint Injection w/Imaging    IR Aspiration Injection Large Joint W FL    IR Aspiration Injection Large Joint W FL    IR Aspiration Injection Large Joint W FL    IR Aspiration Injection Large Joint W FL    Case Request-RAD/Other Procedure Area: Bilateral SIJ +  Bilateral GT Bursa Injection RN IV Sedation, Bilateral SIJ + Bilateral GT Bursa Injection RN IV Sedation   2. Back pain, unspecified back location, unspecified back pain laterality, unspecified chronicity  Ambulatory referral/consult to Pain Clinic   3. Greater trochanteric bursitis of both hips  IR SI Joint Injection w/Imaging    IR SI Joint Injection w/Imaging    IR Aspiration Injection Large Joint W FL    IR Aspiration Injection Large Joint W FL    IR Aspiration Injection Large Joint W FL    IR Aspiration Injection Large Joint W FL    Case Request-RAD/Other Procedure Area: Bilateral SIJ +  Bilateral GT Bursa Injection RN IV Sedation, Bilateral SIJ + Bilateral GT Bursa Injection RN IV Sedation         PLAN:   -we have discussed scheduling the patient for a therapeutic posterior block of the Suprascapular and Axillary nerve branches on the left side with corticosteroid and local anesthetic to see if this will confer some pain relief associated with her rotator cuff pathology.  We discussed the procedure, benefits and  potential risks in detail. Procedure on hold as patient is undergoing treatment from dermatology for open lesions of the skin.    - Anticoagulation use: yes aspirin ,   -pending the patient's response to the procedure and her personal  wishes, I will consider referring her back to Dr. Mathews for surgical consideration.     report:  Reviewed and consistent with medication use as prescribed.    - Medications:  -I will prescribe a topical capsaicin cream which the patient can apply to the painful area up to 4 times daily to see if this helps with her neuropathic pain.  We also discussed applying topical 5% lidocaine patches at nighttime.  I have discussed with the patient that she can wear up to 3 patches at once for a period of 12 hours.  We discussed a low systemic absorption associated with this medication.  We have discussed considering an oral pharmacologic agent in the future pending her response to the topical administration.    - Imaging: Reviewed available imaging with patient and answered any questions they had regarding study.    - Follow up visit: return to clinic in 4 weeks    The above plan and management options were discussed at length with patient. Patient is in agreement with the above and verbalized understanding.    - I discussed the goals of interventional chronic pain management with the patient on today's visit. We discussed a multimodal and systematic approach to pain.  This includes diagnostic and therapeutic injections, adjuvant pharmacologic treatment, physical therapy, and at times psychiatry.  I emphasized the importance of regular exercise, core strengthening and stretching, diet and weight loss as a cornerstone of long-term pain management.    - This condition does not require this patient to take time off of work, and the primary goal of our Pain Management services is to improve the patient's functional capacity.  - Patient Questions: Answered all of the patient's questions  regarding diagnoses, therapy, treatment and next steps        Toshia Jorge PA-C  Interventional Pain Management  [facility]    Disclaimer:  This note was prepared using voice recognition system and is likely to have sound alike errors that may have been overlooked even after proof reading.  Please call me with any questions

## 2022-04-29 ENCOUNTER — PATIENT MESSAGE (OUTPATIENT)
Dept: PAIN MEDICINE | Facility: CLINIC | Age: 42
End: 2022-04-29
Payer: MEDICAID

## 2022-04-29 ENCOUNTER — TELEPHONE (OUTPATIENT)
Dept: PAIN MEDICINE | Facility: CLINIC | Age: 42
End: 2022-04-29
Payer: MEDICAID

## 2022-04-29 ENCOUNTER — OFFICE VISIT (OUTPATIENT)
Dept: INTERNAL MEDICINE | Facility: CLINIC | Age: 42
End: 2022-04-29
Payer: MEDICAID

## 2022-04-29 VITALS
HEIGHT: 68 IN | DIASTOLIC BLOOD PRESSURE: 80 MMHG | HEART RATE: 68 BPM | SYSTOLIC BLOOD PRESSURE: 118 MMHG | TEMPERATURE: 97 F | BODY MASS INDEX: 44.41 KG/M2 | WEIGHT: 293 LBS

## 2022-04-29 DIAGNOSIS — E78.49 OTHER HYPERLIPIDEMIA: ICD-10-CM

## 2022-04-29 DIAGNOSIS — R32 URINARY INCONTINENCE, UNSPECIFIED TYPE: ICD-10-CM

## 2022-04-29 DIAGNOSIS — Z79.4 TYPE 2 DIABETES MELLITUS WITHOUT COMPLICATION, WITH LONG-TERM CURRENT USE OF INSULIN: ICD-10-CM

## 2022-04-29 DIAGNOSIS — E11.9 TYPE 2 DIABETES MELLITUS WITHOUT COMPLICATION, WITH LONG-TERM CURRENT USE OF INSULIN: ICD-10-CM

## 2022-04-29 DIAGNOSIS — L98.9 SKIN LESION: Primary | ICD-10-CM

## 2022-04-29 DIAGNOSIS — I10 PRIMARY HYPERTENSION: ICD-10-CM

## 2022-04-29 PROCEDURE — 3074F SYST BP LT 130 MM HG: CPT | Mod: CPTII,,, | Performed by: FAMILY MEDICINE

## 2022-04-29 PROCEDURE — 99999 PR PBB SHADOW E&M-EST. PATIENT-LVL V: ICD-10-PCS | Mod: PBBFAC,,, | Performed by: FAMILY MEDICINE

## 2022-04-29 PROCEDURE — 3008F BODY MASS INDEX DOCD: CPT | Mod: CPTII,,, | Performed by: FAMILY MEDICINE

## 2022-04-29 PROCEDURE — 3079F PR MOST RECENT DIASTOLIC BLOOD PRESSURE 80-89 MM HG: ICD-10-PCS | Mod: CPTII,,, | Performed by: FAMILY MEDICINE

## 2022-04-29 PROCEDURE — 1159F MED LIST DOCD IN RCRD: CPT | Mod: CPTII,,, | Performed by: FAMILY MEDICINE

## 2022-04-29 PROCEDURE — 99215 OFFICE O/P EST HI 40 MIN: CPT | Mod: PBBFAC,PO | Performed by: FAMILY MEDICINE

## 2022-04-29 PROCEDURE — 3061F PR NEG MICROALBUMINURIA RESULT DOCUMENTED/REVIEW: ICD-10-PCS | Mod: CPTII,,, | Performed by: FAMILY MEDICINE

## 2022-04-29 PROCEDURE — 3072F LOW RISK FOR RETINOPATHY: CPT | Mod: CPTII,,, | Performed by: FAMILY MEDICINE

## 2022-04-29 PROCEDURE — 3079F DIAST BP 80-89 MM HG: CPT | Mod: CPTII,,, | Performed by: FAMILY MEDICINE

## 2022-04-29 PROCEDURE — 3074F PR MOST RECENT SYSTOLIC BLOOD PRESSURE < 130 MM HG: ICD-10-PCS | Mod: CPTII,,, | Performed by: FAMILY MEDICINE

## 2022-04-29 PROCEDURE — 3061F NEG MICROALBUMINURIA REV: CPT | Mod: CPTII,,, | Performed by: FAMILY MEDICINE

## 2022-04-29 PROCEDURE — 3066F NEPHROPATHY DOC TX: CPT | Mod: CPTII,,, | Performed by: FAMILY MEDICINE

## 2022-04-29 PROCEDURE — 3008F PR BODY MASS INDEX (BMI) DOCUMENTED: ICD-10-PCS | Mod: CPTII,,, | Performed by: FAMILY MEDICINE

## 2022-04-29 PROCEDURE — 1159F PR MEDICATION LIST DOCUMENTED IN MEDICAL RECORD: ICD-10-PCS | Mod: CPTII,,, | Performed by: FAMILY MEDICINE

## 2022-04-29 PROCEDURE — 3072F PR LOW RISK FOR RETINOPATHY: ICD-10-PCS | Mod: CPTII,,, | Performed by: FAMILY MEDICINE

## 2022-04-29 PROCEDURE — 1160F RVW MEDS BY RX/DR IN RCRD: CPT | Mod: CPTII,,, | Performed by: FAMILY MEDICINE

## 2022-04-29 PROCEDURE — 99214 PR OFFICE/OUTPT VISIT, EST, LEVL IV, 30-39 MIN: ICD-10-PCS | Mod: S$PBB,,, | Performed by: FAMILY MEDICINE

## 2022-04-29 PROCEDURE — 3044F HG A1C LEVEL LT 7.0%: CPT | Mod: CPTII,,, | Performed by: FAMILY MEDICINE

## 2022-04-29 PROCEDURE — 99214 OFFICE O/P EST MOD 30 MIN: CPT | Mod: S$PBB,,, | Performed by: FAMILY MEDICINE

## 2022-04-29 PROCEDURE — 99999 PR PBB SHADOW E&M-EST. PATIENT-LVL V: CPT | Mod: PBBFAC,,, | Performed by: FAMILY MEDICINE

## 2022-04-29 PROCEDURE — 3044F PR MOST RECENT HEMOGLOBIN A1C LEVEL <7.0%: ICD-10-PCS | Mod: CPTII,,, | Performed by: FAMILY MEDICINE

## 2022-04-29 PROCEDURE — 1160F PR REVIEW ALL MEDS BY PRESCRIBER/CLIN PHARMACIST DOCUMENTED: ICD-10-PCS | Mod: CPTII,,, | Performed by: FAMILY MEDICINE

## 2022-04-29 PROCEDURE — 3066F PR DOCUMENTATION OF TREATMENT FOR NEPHROPATHY: ICD-10-PCS | Mod: CPTII,,, | Performed by: FAMILY MEDICINE

## 2022-04-29 RX ORDER — DULAGLUTIDE 3 MG/.5ML
3 INJECTION, SOLUTION SUBCUTANEOUS
Qty: 12 PEN | Refills: 0 | Status: SHIPPED | OUTPATIENT
Start: 2022-04-29 | End: 2022-05-17 | Stop reason: SDUPTHER

## 2022-04-29 NOTE — TELEPHONE ENCOUNTER
Returned patient call and advised her that since she saw her primary care today and he is sending her to Dermatology to have the skin lesion looked at to make sure that is not infected that we will have to hold off on scheduling her procedure.  I asked her to call us back after she sees Dermatology and we will re-asses at that time and see if she is ready to be scheduled.

## 2022-05-03 ENCOUNTER — TELEPHONE (OUTPATIENT)
Dept: HEMATOLOGY/ONCOLOGY | Facility: CLINIC | Age: 42
End: 2022-05-03
Payer: MEDICAID

## 2022-05-03 NOTE — TELEPHONE ENCOUNTER
Called [facility] @ [phone] and spoke with Gilles to request genetic test results. Gilles states she has 2 results from August 2020 and will fax to [phone] now. Thanked her for her help.

## 2022-05-04 ENCOUNTER — OFFICE VISIT (OUTPATIENT)
Dept: GENETICS | Facility: CLINIC | Age: 42
End: 2022-05-04
Payer: MEDICAID

## 2022-05-04 DIAGNOSIS — Z15.89 MUTATION IN TP53 GENE: ICD-10-CM

## 2022-05-04 DIAGNOSIS — E66.01 OBESITY, MORBID, BMI 40.0-49.9: ICD-10-CM

## 2022-05-04 DIAGNOSIS — D50.9 MICROCYTIC ANEMIA: ICD-10-CM

## 2022-05-04 DIAGNOSIS — C50.912 MALIGNANT NEOPLASM OF LEFT BREAST IN FEMALE, ESTROGEN RECEPTOR NEGATIVE, UNSPECIFIED SITE OF BREAST: ICD-10-CM

## 2022-05-04 DIAGNOSIS — Z15.01 MUTATION IN TP53 GENE: ICD-10-CM

## 2022-05-04 DIAGNOSIS — Z17.1 MALIGNANT NEOPLASM OF LEFT BREAST IN FEMALE, ESTROGEN RECEPTOR NEGATIVE, UNSPECIFIED SITE OF BREAST: ICD-10-CM

## 2022-05-04 DIAGNOSIS — Z15.09 MUTATION IN TP53 GENE: ICD-10-CM

## 2022-05-04 PROCEDURE — 99499 NO LOS: ICD-10-PCS | Mod: 95,,,

## 2022-05-04 PROCEDURE — 99499 UNLISTED E&M SERVICE: CPT | Mod: 95,,,

## 2022-05-04 PROCEDURE — 96040 PR GENETIC COUNSELING, EACH 30 MIN: ICD-10-PCS | Mod: 95,,,

## 2022-05-04 PROCEDURE — 96040 PR GENETIC COUNSELING, EACH 30 MIN: CPT | Mod: 95,,,

## 2022-05-04 NOTE — PROGRESS NOTES
Cancer Genetics  Hereditary/High Risk Clinic  Department of Hematology and Oncology  [facility]         Date of Service: 2022   Provider:  Sarah Cowart MS, JOSE    Patient Information  Name:  Sangeetha June  :  1980  MRN:  [id number]        Referring Provider: Dick Sofia MD      Televisit Information  The patient location is: [place], [place].  The chief complaint leading to consultation is: as below.  Visit type: audio only due to technical difficulties. Approximately 70 minutes in total were spent on this encounter, which includes audio time with patient Additional non-face-to-face time was spent preparing to see the patient (e.g., review of tests), obtaining and/or reviewing separately obtained history, documenting clinical information in the electronic or other health record, independently interpreting results (not separately reported) and communicating results to the patient/family/caregiver, or care coordination (not separately reported).  Each patient to whom he or she provides medical services by telemedicine is: (1) informed of the relationship between the physician and patient and the respective role of any other health care provider with respect to management of the patient; and (2) notified that he or she may decline to receive medical services by telemedicine and may withdraw from such care at any time.      SUBJECTIVE:      Chief Complaint: Post-test genetic counseling and discussion regarding additional testing    History of Present Illness (HPI):  Sangeetha June, 42 y.o., assigned female sex at birth, is established to the [facility] but is new to me.  Sangeetha was referred by General Surgery for genetic cancer risk assessment given her personal history of breast cancer and a TP53 inconclusive variant. At age 40, Sangeetha was diagnosed with invasive ductal carcinoma of the right breast. She was was initially treated  "neoadjuvant chemotherapy - Adriamycin and Ctyoxan (4 cycles only). She transitioned to Taxol and completed 3 cycles but the treatment was discontinued due to worsening cognitive abilities and speech diffculties.    Focused Medical History:    Germline cancer-genetic testing:  Yes  o [employer] Panel ():   - TP53 del of exons 9-10 (INCONCLUSIVE) - this is NOT a pathogenic germline pathogenic result  - VUS:    GALNT12 c.1301C>T (p. [id number])   RNF43 c.380G>A (p.[id number])   Cancer: Left Breast cancer   o Currently experiencing breast pain in right breast, uses tape for nipple because of the intensity of pain   Colon polyp:   o Colonoscopy w/ EGD scheduled for 2022   Other benign tumor:   o Benign thyroid nodule (2021)  o Meningioma (2021)   Pancreatitis:  No   Pancreatic cyst:  No     Surgical History:  · Hysterectomy only (ovaries intact) -   · Left mastectomy -     Ancestry:   Ashkenazi [place] ancestry: No   Paternal: "Creole"   Maternal: Stateless    Focused Family History:   Consanguinity in ancestors:  No   Germline cancer-genetic testing in blood relatives: sister - recently had GT, results are pending    Cancer in family:  Yes; there are no known blood relatives affected with cancer other than those mentioned in the pedigree below  o Maternal 2nd cousin: colon cancer -   o Maternal great-aunt: colon cancer -      Family Cancer Pedigree:             Review of Systems:    See HPI   Sangeetha reported feeling confused, depressed, and angry every day. She mentioned she occasionally has "thoughts". However, she refused to express those specific thoughts because she mentioned "you are going to tell". Offered to discuss those feelings and to talk with someone else. Patient declined and reported that she has been seeing a counselor. Recommended to contact her counselor to discuss her recent feelings.      SUBJECTIVE:     Records Reviewed  · Medical History  · Problem " List  · Any pertinent, available Procedures and Pathology reports in both [facility] and [facility]    COUNSELING   Sangeetha was seen for post-test genetic counseling on 5/4/2022. Sangeetha was diagnosed with breast cancer of the left breast (ER-, NE-, HER2-) in July 2020. She underwent genetic testing, which revealed that Sangeetha carries a TP53 variant (see explanation below). We met today to discuss these test results.    Genetic Testing Results  Due to her personal history of cancer, Sangeetha underwent genetic testing in August 2020. The genetic testing was ordered by Omaira Benoit MD ([facility] [place], [place]) The test ordered was the [employer] Panel, which investigated the following 35 genes: APC, STVEE, AXIN2, BAP1, BARD1, BMPR1A, BRCA1, BRCA2, BRIP1, CDH1, CDK4, CDKN2A, CHEK2, EPCAM, GREM1, HOXB13, MLH1, MSH2, MSH3, MSH6, MUTYH, NBN, NTHL1, PALB2, PMS2, POLD1, POLE, PTEN, RAD51C, RAD51D, RNP43, RPS20, SMAD4, STK11, TP53.     Results revealed a TP53 variant (deletion exon 9-10) - INCONCLUSIVE. This result means that there was a decrease of dosage of exons in the TP53 gene. However, there was not a ~50% decrease in dosage that is expected for a heterozygous germline deletion. [facility] reported that this may be due to genetic or technical reasons. Per [facility] genetics, the inconclusive result can possibly be due to somatic mosaicism or clonal hematopoiesis of indeterminate potential (CHIP). No pathogenic mutations were identified in any of the other 34 genes investigated.      VUS  Of note, two genetic changes called variants of uncertain significance (VUS) were identified in the following genes:   GALTN c.1301C>T (p. [id number])   RNF43 c.380G>A (p.[id number])    Genetic changes, or variants, are termed VUS when there is insufficient information to know whether or not the change increases the risk of cancer. While pathogenic mutations in this gene may be associated with  an increased risk of cancer, a VUS is not currently known to increase the risk of cancer. We do not recommend changing medical management or cancer screening because of a VUS. The goal of genetic testing labs is to reclassify all VUS results as either a benign normal variant or a pathogenic mutation. Should the classification of this variant change, an updated report would be issued from the genetic testing lab ([facility]). Over time, the majority of VUS results are downgraded to benign, normal genetic variants.     Causes of cancer   Germline cancer genetic testing is the testing of genes associated with cancer, known as cancer susceptibility genes.  Just as these genes are inherited from parents, mutations in these genes can be inherited, as well.  A mutation in a cancer susceptibility gene adversely affects the gene's ability to prevent cancer; therefore, carriers of cancer susceptibility gene mutations may be at increased risk for certain cancers.     Only 5-10% cancers are caused by a cancer susceptibility gene mutation, meaning the cancer is genetic/hereditary; rather, most cancers are sporadic. Causes of sporadic cancers may include environmental risk factors, lifestyle risk factors, and non-modifiable risk factors.  It is important to note that members of a family often share not only their genetics but also risk factors including environmental and lifestyle risk factors, so cancers can be familial.     Potential results of genetic testing, and their implications  Discussed the type of genetic testing results to provide context for Sangeetha's result.    Types of genetic testing include positive, negative, and variant of unknown significance (VUS).    · A positive result indicates the presence of at least one clinically significant mutation, and the patient's associated cancer risks vary depending upon the cancer susceptibility gene(s) in which there is/are a mutation(s).  With a positive result, in some cases,  depending upon the specific result and the patient's clinical history, modified risk management may be recommended, including measures for risk reduction and/or surveillance; however, modified management is not always an option.    · A negative result indicates that no clinically significant mutations were identified in the gene(s) tested.    · A VUS indicates that there is not presently enough data for the laboratory to make a determination as to whether the variant is clinically significant.  VUSs are not typically acted upon clinically.      Genetic Testing  Contacted [employer] regarding Sangeetha's genetic testing results. The representative at [employer] recommended 2 options:    1) Repeat targeted genetic testing on different types of tissue to confirm somatic mosaicism    2) Perform single-site testing on TP53 using fibroblast sample AND targeted testing for 1st-degree relatives simultaneously at no additional cost. This would not be 100% informative but will help distinguish between somatic mosaicism and CHIP.   3) Not proceed with additional testing due to the allelic frequency below germline level.    Sangeetha is interested in proceeding with confirmatory testing, specifically option #2.     Modified management may also be recommended, even with a result of no or unknown significance, based upon risk assessment that incorporates the family history. Sometimes, depending upon the genetic testing result and the cancer diagnosis, additional/modified treatments may be an option, though this is not guaranteed.     Genetic mutation inheritance  If the result was a germline mutation:  If Sangeetha tests positive for a mutation, her first-degree relatives would each have a 50% chance of having the same mutation, and other, more distantly related blood-relatives would also be at risk of having the same mutation.       Genetic testing logistics  An outside laboratory would perform the testing after a fibroblast sample is  collected here at the [facility] (will discuss with Heme/Onc team about how this can be done). With genetic testing, there is a potential for the patient to incur out-of-pocket costs since genetic testing was previously performed. However, the lab may offer this confirmatory testing at no-additional cost. Results can take several weeks. Additional post-test genetic counseling can be conducted once the genetic testing results are available.     Genetic discrimination  The Genetic Information Nondiscrimination Act (JOHN) is [place] federal legislation that provides some protections against use of an individual's genetic information by their health insurer and by their employer.  Title I of JOHN prohibits most health insurers from utilizing an individual's genetic information to make decisions regarding insurance eligibility or premium charges.  Title II of JOHN prohibits covered entities, including employers, from requesting the genetic information of employees and applicants.  JOHN does not protect individuals from genetic discrimination toward health insurance obtained through a job with the  or through the Federal Employees Health Benefits Plan; from genetic discrimination by employers with fewer than 15 employees or if employed by the ; or from genetic discrimination by any other type of policies/entities, including but not limited to life insurance, disability insurance, long-term care insurance,  benefits, and  Health Services benefits.    Assessment/Plan  · Check-in with counselor to discuss recent feelings.  · Proceed with confirmatory genetic testing with fibroblast sample.  · Share this information with her niece (working to become Sangeetha's POA).    Questions were encouraged and answered to the patient's satisfaction, and she verbalized understanding of information and agreement with the plan.         Signed,    Sarah Cowart MS, GC  Genetic Counselor,  Hereditary and High-Risk Clinic  Hematology/Oncology, [facility]    05/04/2022

## 2022-05-06 ENCOUNTER — HOSPITAL ENCOUNTER (OUTPATIENT)
Dept: ENDOCRINOLOGY | Facility: CLINIC | Age: 42
Discharge: HOME | End: 2022-05-06
Attending: INTERNAL MEDICINE
Payer: MEDICAID

## 2022-05-06 DIAGNOSIS — E04.1 THYROID NODULE: ICD-10-CM

## 2022-05-06 PROCEDURE — 76536 US SOFT TISSUE HEAD NECK THYROID: ICD-10-PCS | Mod: 26,,, | Performed by: INTERNAL MEDICINE

## 2022-05-06 PROCEDURE — 76536 US EXAM OF HEAD AND NECK: CPT | Mod: 26,,, | Performed by: INTERNAL MEDICINE

## 2022-05-07 ENCOUNTER — PATIENT OUTREACH (OUTPATIENT)
Dept: ADMINISTRATIVE | Facility: OTHER | Age: 42
End: 2022-05-07
Payer: MEDICAID

## 2022-05-09 ENCOUNTER — OFFICE VISIT (OUTPATIENT)
Dept: NEUROLOGY | Facility: CLINIC | Age: 42
End: 2022-05-09
Payer: MEDICAID

## 2022-05-09 DIAGNOSIS — Z92.21 HISTORY OF CHEMOTHERAPY: ICD-10-CM

## 2022-05-09 DIAGNOSIS — F33.3 SEVERE EPISODE OF RECURRENT MAJOR DEPRESSIVE DISORDER, WITH PSYCHOTIC FEATURES: Primary | ICD-10-CM

## 2022-05-09 DIAGNOSIS — D32.9 MENINGIOMA: ICD-10-CM

## 2022-05-09 DIAGNOSIS — F41.1 GAD (GENERALIZED ANXIETY DISORDER): ICD-10-CM

## 2022-05-09 DIAGNOSIS — Z17.1 MALIGNANT NEOPLASM OF LEFT BREAST IN FEMALE, ESTROGEN RECEPTOR NEGATIVE, UNSPECIFIED SITE OF BREAST: ICD-10-CM

## 2022-05-09 DIAGNOSIS — C50.912 MALIGNANT NEOPLASM OF LEFT BREAST IN FEMALE, ESTROGEN RECEPTOR NEGATIVE, UNSPECIFIED SITE OF BREAST: ICD-10-CM

## 2022-05-09 PROCEDURE — 99499 UNLISTED E&M SERVICE: CPT | Mod: S$PBB,AH,HB, | Performed by: STUDENT IN AN ORGANIZED HEALTH CARE EDUCATION/TRAINING PROGRAM

## 2022-05-09 PROCEDURE — 96116 PR NEUROBEHAVIORAL STATUS EXAM BY PSYCH/PHYS: ICD-10-PCS | Mod: S$PBB,AH,HB, | Performed by: STUDENT IN AN ORGANIZED HEALTH CARE EDUCATION/TRAINING PROGRAM

## 2022-05-09 PROCEDURE — 96121 NUBHVL XM PHY/QHP EA ADDL HR: CPT | Mod: S$PBB,AH,HB, | Performed by: STUDENT IN AN ORGANIZED HEALTH CARE EDUCATION/TRAINING PROGRAM

## 2022-05-09 PROCEDURE — 99999 PR PBB SHADOW E&M-EST. PATIENT-LVL II: CPT | Mod: PBBFAC,,, | Performed by: STUDENT IN AN ORGANIZED HEALTH CARE EDUCATION/TRAINING PROGRAM

## 2022-05-09 PROCEDURE — 1159F MED LIST DOCD IN RCRD: CPT | Mod: CPTII,AH,HB, | Performed by: STUDENT IN AN ORGANIZED HEALTH CARE EDUCATION/TRAINING PROGRAM

## 2022-05-09 PROCEDURE — 3061F NEG MICROALBUMINURIA REV: CPT | Mod: CPTII,AH,HB, | Performed by: STUDENT IN AN ORGANIZED HEALTH CARE EDUCATION/TRAINING PROGRAM

## 2022-05-09 PROCEDURE — 3072F LOW RISK FOR RETINOPATHY: CPT | Mod: CPTII,AH,HB, | Performed by: STUDENT IN AN ORGANIZED HEALTH CARE EDUCATION/TRAINING PROGRAM

## 2022-05-09 PROCEDURE — 3044F HG A1C LEVEL LT 7.0%: CPT | Mod: CPTII,AH,HB, | Performed by: STUDENT IN AN ORGANIZED HEALTH CARE EDUCATION/TRAINING PROGRAM

## 2022-05-09 PROCEDURE — 96121 NUBHVL XM PHY/QHP EA ADDL HR: CPT | Mod: PBBFAC | Performed by: STUDENT IN AN ORGANIZED HEALTH CARE EDUCATION/TRAINING PROGRAM

## 2022-05-09 PROCEDURE — 3072F PR LOW RISK FOR RETINOPATHY: ICD-10-PCS | Mod: CPTII,AH,HB, | Performed by: STUDENT IN AN ORGANIZED HEALTH CARE EDUCATION/TRAINING PROGRAM

## 2022-05-09 PROCEDURE — 1159F PR MEDICATION LIST DOCUMENTED IN MEDICAL RECORD: ICD-10-PCS | Mod: CPTII,AH,HB, | Performed by: STUDENT IN AN ORGANIZED HEALTH CARE EDUCATION/TRAINING PROGRAM

## 2022-05-09 PROCEDURE — 96116 NUBHVL XM PHYS/QHP 1ST HR: CPT | Mod: S$PBB,AH,HB, | Performed by: STUDENT IN AN ORGANIZED HEALTH CARE EDUCATION/TRAINING PROGRAM

## 2022-05-09 PROCEDURE — 3061F PR NEG MICROALBUMINURIA RESULT DOCUMENTED/REVIEW: ICD-10-PCS | Mod: CPTII,AH,HB, | Performed by: STUDENT IN AN ORGANIZED HEALTH CARE EDUCATION/TRAINING PROGRAM

## 2022-05-09 PROCEDURE — 99499 NO LOS: ICD-10-PCS | Mod: S$PBB,AH,HB, | Performed by: STUDENT IN AN ORGANIZED HEALTH CARE EDUCATION/TRAINING PROGRAM

## 2022-05-09 PROCEDURE — 99999 PR PBB SHADOW E&M-EST. PATIENT-LVL II: ICD-10-PCS | Mod: PBBFAC,,, | Performed by: STUDENT IN AN ORGANIZED HEALTH CARE EDUCATION/TRAINING PROGRAM

## 2022-05-09 PROCEDURE — 3044F PR MOST RECENT HEMOGLOBIN A1C LEVEL <7.0%: ICD-10-PCS | Mod: CPTII,AH,HB, | Performed by: STUDENT IN AN ORGANIZED HEALTH CARE EDUCATION/TRAINING PROGRAM

## 2022-05-09 PROCEDURE — 3066F NEPHROPATHY DOC TX: CPT | Mod: CPTII,AH,HB, | Performed by: STUDENT IN AN ORGANIZED HEALTH CARE EDUCATION/TRAINING PROGRAM

## 2022-05-09 PROCEDURE — 96116 NUBHVL XM PHYS/QHP 1ST HR: CPT | Mod: PBBFAC | Performed by: STUDENT IN AN ORGANIZED HEALTH CARE EDUCATION/TRAINING PROGRAM

## 2022-05-09 PROCEDURE — 96121 PR NEUROBEHAVIORAL STAT EXAM, EA ADDTL HR: ICD-10-PCS | Mod: S$PBB,AH,HB, | Performed by: STUDENT IN AN ORGANIZED HEALTH CARE EDUCATION/TRAINING PROGRAM

## 2022-05-09 PROCEDURE — 3066F PR DOCUMENTATION OF TREATMENT FOR NEPHROPATHY: ICD-10-PCS | Mod: CPTII,AH,HB, | Performed by: STUDENT IN AN ORGANIZED HEALTH CARE EDUCATION/TRAINING PROGRAM

## 2022-05-09 PROCEDURE — 99212 OFFICE O/P EST SF 10 MIN: CPT | Mod: PBBFAC | Performed by: STUDENT IN AN ORGANIZED HEALTH CARE EDUCATION/TRAINING PROGRAM
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
## 2022-05-09 NOTE — PATIENT INSTRUCTIONS
Today you completed the interview portion of your neuropsychological evaluation. The next step will be to come in for a testing appointment. Our office will reach out to you to schedule this. Some helpful information is included below to help you be ready for your testing appointment.    Please call Dr. Garay at [phone] or send a [employer] message with any questions.     _____________________  Americo Garay, Ph.D.  Neuropsychologist  [facility]  Department of Neurology      Instructions for your upcoming neuropsychological assessment    If you must cancel your appointment please do so 48 hours in advance of your scheduled appointment.   Please take all of your medications as prescribed on the date of testing, unless specifically asked not to do so.   Be sure to eat a full breakfast the morning before testing if you typically eat in the morning.   Bring any water, snacks, or other food supplies you may need for a 2-4 hour appointment.  Do your best to get a good night of sleep before testing.  There is nothing you should do or need to do to study for the tests. Just come ready to do your best.

## 2022-05-11 DIAGNOSIS — E11.9 TYPE 2 DIABETES MELLITUS WITHOUT COMPLICATION: ICD-10-CM

## 2022-05-13 ENCOUNTER — PATIENT MESSAGE (OUTPATIENT)
Dept: INTERNAL MEDICINE | Facility: CLINIC | Age: 42
End: 2022-05-13
Payer: MEDICAID

## 2022-05-13 ENCOUNTER — PATIENT MESSAGE (OUTPATIENT)
Dept: ENDOSCOPY | Facility: HOSPITAL | Age: 42
End: 2022-05-13
Payer: MEDICAID

## 2022-05-17 RX ORDER — DULAGLUTIDE 3 MG/.5ML
3 INJECTION, SOLUTION SUBCUTANEOUS
Qty: 12 PEN | Refills: 0 | Status: SHIPPED | OUTPATIENT
Start: 2022-05-17 | End: 2022-07-19

## 2022-05-18 ENCOUNTER — CLINICAL SUPPORT (OUTPATIENT)
Dept: DIABETES | Facility: CLINIC | Age: 42
End: 2022-05-18
Payer: MEDICAID

## 2022-05-18 VITALS — WEIGHT: 289.25 LBS | BODY MASS INDEX: 43.98 KG/M2

## 2022-05-18 DIAGNOSIS — Z79.4 TYPE 2 DIABETES MELLITUS WITH OTHER SPECIFIED COMPLICATION, WITH LONG-TERM CURRENT USE OF INSULIN: ICD-10-CM

## 2022-05-18 DIAGNOSIS — E11.69 TYPE 2 DIABETES MELLITUS WITH OTHER SPECIFIED COMPLICATION, WITH LONG-TERM CURRENT USE OF INSULIN: ICD-10-CM

## 2022-05-18 PROCEDURE — 99999 PR PBB SHADOW E&M-EST. PATIENT-LVL III: CPT | Mod: PBBFAC,,, | Performed by: DIETITIAN, REGISTERED

## 2022-05-18 PROCEDURE — 99213 OFFICE O/P EST LOW 20 MIN: CPT | Mod: PBBFAC | Performed by: DIETITIAN, REGISTERED

## 2022-05-18 PROCEDURE — 99999 PR PBB SHADOW E&M-EST. PATIENT-LVL III: ICD-10-PCS | Mod: PBBFAC,,, | Performed by: DIETITIAN, REGISTERED

## 2022-05-18 PROCEDURE — G0108 DIAB MANAGE TRN  PER INDIV: HCPCS | Mod: PBBFAC | Performed by: DIETITIAN, REGISTERED

## 2022-05-18 NOTE — PROGRESS NOTES
Diabetes Care Specialist Progress Note  Author: Alexandra Trent RD, CDE  Date: 5/18/2022    Program Intake  Reason for Diabetes Program Visit:: Intervention  Type of Intervention:: Individual  Education: Self-Management Skill Review  Current diabetes risk level:: low    Lab Results   Component Value Date    HGBA1C 6.5 (H) 01/07/2022       Clinical    Problem Review  Active comorbidities affecting diabetes self-care.: yes (HTN, HLD, CVA, s/p breast cancer, Hypothyroidism, Obesity by BMI, GERD)    Clinical Assessment  Current Diabetes Treatment:  Trulicity 3mg wkly (pt planning increase strength to 3mg). Metformin XR 500mg 1tab daily.  Have you ever experienced hypoglycemia (low blood sugar)?: no  Have you ever experienced hyperglycemia (high blood sugar)?: no    Medication Information  How many days a week do you miss your medications?: Never  Do you sometimes have difficulty refilling your medications?: No  Medication adherence impacting ability to self-manage diabetes?: No    Nutritional Status  Diet:  (Pt reports irregular meal patterns 1-2 meals, 2-3snacks daily. Excess carb from orange juice, snacks. Excess sat fat and sodium from recent hog head cheese, fried foods. Inadequate non-starchy vegetables. No use MR.)  Meal Plan 24 Hour Recall - Breakfast: none  Meal Plan 24 Hour Recall - Lunch: hog head cheese sandwich on wheat bread, plum  Meal Plan 24 Hour Recall - Dinner: same OR 1c white beans, rice 1c OR fried fish/shrimp w/ fries  Meal Plan 24 Hour Recall - Snack: watermelon or twizlers; pat: water, diet green tea or diet cranberry, reg.orange kourtney  Change in appetite?: Yes (decreased)  Recent Changes in Weight: Weight Loss  Was weight loss intentional or unintentional?: Intentional (~19lbs since 2/14/22)  Current nutritional status an area of need that is impacting patient's ability to self-manage diabetes?: Yes    Diabetes Self-Management Skills Assessment    Nutrition/Healthy Eating  Challenges to healthy  eating::  (juice, irregular meals, processed meats)  Method of carbohydrate measurement:: measuring cups/spoons (pt reports measuring starch portions since our last visit & switched to diet tea)  Patient can identify foods that impact blood sugar.: yes  Nutrition/Healthy Eating Skills Assessment Completed:: Yes  Assessment indicates:: Instruction Needed, Knowledge deficit  Area of need?: Yes    Physical Activity/Exercise  Patient's daily activity level::  (walking neighborhood 15min 3-6d/wk)  Stated forms of physical activity:: any movement performed by muscles that uses energy  Patient can identify reasons why exercise/physical activity is important in diabetes management.: no  Physical Activity/Exercise Skills Assessment Completed: : Yes  Assessment indicates:: Knowledge deficit, Instruction Needed  Area of need?: Yes    Medications  Patient is able to describe current diabetes management routine.: yes  Patient is able to identify current diabetes medications, dosages, and appropriate timing of medications.: yes  Patient understands the purpose of the medications taken for diabetes.: no  Patient reports problems or concerns with current medication regimen.: yes  Medication regimen problems/concerns:: concerned about side effects  Medication Skills Assessment Completed:: Yes  Assessment indicates:: Instruction Needed, Knowledge deficit  Area of need?: Yes    Home Blood Glucose Monitoring  Patient states that blood sugar is checked at home daily.: yes  Monitoring Method:: home glucometer  How often do you check your blood sugar?: Occasionally  When you check what is your typical blood sugar range? : Per glucometer review, few random BG readings 103-172  Blood glucose logs:: encouraged to bring logs to provider visits  Home Blood Glucose Monitoring Skills Assessment Completed: : Yes  Assessment indicates:: Instruction Needed  Area of need?: Yes    Psychosocial/Coping  Patient can identify ways of coping with chronic  disease.: yes  Patient-stated ways of coping with chronic disease:: support from loved ones, counseling/actively seeing behavioral professional  Psychosocial/Coping Skills Assessment Completed: : Yes  Assessment indicates:: Adequate understanding  Area of need?: No    Assessment Summary and Plan  Based on today's diabetes care assessment, the following areas of need were identified:      Social 4/14/2022   Support No   Access to Mass Media/Tech No   Cognitive/Behavioral Health No   Culture/Evangelical No   Communication No   Health Literacy No        Clinical 5/18/2022   Medication Adherence No   Nutritional Status Yes - see care plan        Diabetes Self-Management Skills 5/18/2022   Nutrition/Healthy Eating Yes - see care plan   Physical Activity/Exercise Yes  Discussed importance of daily physical activity with review of benefits, methods, guidelines and precautions.   Medication Yes  Provided review of current diabetes medication action, dosage, frequency, side effects, and storage guidelines. Discussed guidelines for preventing, detecting and treating hypoglycemia and hyperglycemia. Reviewed the importance of meal and medication timing with diabetes mediations for prevention of acute complications and maximum drug benefit.    Emphasis on need to improve meal spacing, lower fat foods to prevent GI upset.    Home Blood Glucose Monitoring Yes - see care plan   Psychosocial/Coping No         Today's interventions were provided through individual discussion, instruction, and written materials were provided.    Patient verbalized understanding of instruction and written materials.  Pt was able to return back demonstration of instructions today. Patient understood key points, needs reinforcement and further instruction.     Diabetes Self-Management Care Plan:  Today's Diabetes Self-Management Care Plan was developed with Sangeetha's input. Sangeetha has agreed to work toward the following goal(s) to improve his/her overall  diabetes control.      Care Plan: Diabetes Management   Updates made since 4/18/2022 12:00 AM      Problem: Healthy Eating       Goal: Eat 3 meals daily - manage carb 30-45grams/meal, 5-15grams/snack.    Start Date: 4/14/2022   Expected End Date: 10/17/2022   This Visit's Progress: On track   Priority: High   Barriers: Cognitive Deficits   Note:    Reviewed strategies to improve meal spacing, use of diabetes version MR shake, healthier snacks and lower fat protein sources. Detailed food lists provided.     Task: Reviewed the sources and role of Carbohydrate, Protein, and Fat and how each nutrient impacts blood sugar. Completed 5/18/2022      Task: Recommended replacing beverages containing high sugar content with noncaloric/sugar free options and/or water. Completed 5/18/2022      Task: Review the importance of balancing carbohydrates with each meal using portion control techniques to count servings of carbohydrate and label reading to identify serving size and amount of total carbs per serving. Completed 5/18/2022      Task: Provided Sample plate method and reviewed the use of the plate to estimate amounts of carbohydrate per meal. Completed 5/18/2022      Problem: Blood Glucose Self-Monitoring       Goal: Patient agrees to check and record blood sugars 2 times per day (fst, 2hr ppd); bring meter/records to clinic.    Start Date: 4/14/2022   Expected End Date: 10/17/2022   This Visit's Progress: On track   Priority: Medium   Barriers: No Barriers Identified   Note:    Encouraged consistency of BG testing; provided additional logs and requested she return at follow-up visit.      Task: Reviewed the importance of self-monitoring blood glucose and keeping logs. Completed 5/18/2022      Task: Provided patient with blood glucose logs, reviewed appropriate timing and frequency to SMBG, education on parameters on when to notify provider and advised patient to bring logs to all appts with PCP/Endocrinologist/Diabetes Care  Specialist. Completed 5/18/2022          Follow Up Plan   Follow up in about 6 weeks (around 6/29/2022).   -Review BG logs  -Eval goal progress  -Admin diabetes distress scale    Today's care plan and follow up schedule was discussed with patient.  Sangeetha verbalized understanding of the care plan, goals, and agrees to follow up plan.        The patient was encouraged to communicate with his/her health care provider/physician and care team regarding his/her condition(s) and treatment.  I provided the patient with my contact information today and encouraged to contact me via phone or [facility] Patient Portal as needed.     Length of Visit   Total Time: 60 Minutes

## 2022-05-19 ENCOUNTER — OFFICE VISIT (OUTPATIENT)
Dept: PSYCHIATRY | Facility: CLINIC | Age: 42
End: 2022-05-19
Payer: MEDICAID

## 2022-05-19 DIAGNOSIS — F41.1 GENERALIZED ANXIETY DISORDER: Primary | ICD-10-CM

## 2022-05-19 PROCEDURE — 3061F NEG MICROALBUMINURIA REV: CPT | Mod: AJ,HB,CPTII, | Performed by: SOCIAL WORKER

## 2022-05-19 PROCEDURE — 3072F PR LOW RISK FOR RETINOPATHY: ICD-10-PCS | Mod: AJ,HB,CPTII, | Performed by: SOCIAL WORKER

## 2022-05-19 PROCEDURE — 3044F PR MOST RECENT HEMOGLOBIN A1C LEVEL <7.0%: ICD-10-PCS | Mod: AJ,HB,CPTII, | Performed by: SOCIAL WORKER

## 2022-05-19 PROCEDURE — 90834 PR PSYCHOTHERAPY W/PATIENT, 45 MIN: ICD-10-PCS | Mod: AJ,HB,, | Performed by: SOCIAL WORKER

## 2022-05-19 PROCEDURE — 3066F PR DOCUMENTATION OF TREATMENT FOR NEPHROPATHY: ICD-10-PCS | Mod: AJ,HB,CPTII, | Performed by: SOCIAL WORKER

## 2022-05-19 PROCEDURE — 3061F PR NEG MICROALBUMINURIA RESULT DOCUMENTED/REVIEW: ICD-10-PCS | Mod: AJ,HB,CPTII, | Performed by: SOCIAL WORKER

## 2022-05-19 PROCEDURE — 90834 PSYTX W PT 45 MINUTES: CPT | Mod: AJ,HB,, | Performed by: SOCIAL WORKER

## 2022-05-19 PROCEDURE — 3072F LOW RISK FOR RETINOPATHY: CPT | Mod: AJ,HB,CPTII, | Performed by: SOCIAL WORKER

## 2022-05-19 PROCEDURE — 3044F HG A1C LEVEL LT 7.0%: CPT | Mod: AJ,HB,CPTII, | Performed by: SOCIAL WORKER

## 2022-05-19 PROCEDURE — 3066F NEPHROPATHY DOC TX: CPT | Mod: AJ,HB,CPTII, | Performed by: SOCIAL WORKER

## 2022-05-24 ENCOUNTER — TELEPHONE (OUTPATIENT)
Dept: PAIN MEDICINE | Facility: CLINIC | Age: 42
End: 2022-05-24
Payer: MEDICAID

## 2022-05-24 NOTE — TELEPHONE ENCOUNTER
Procedure scheduled with Dr. Cochran for 6/17/22 and injection follow up with JOHN Pope on 7/18 @ 11:00.

## 2022-05-25 NOTE — PROGRESS NOTES
Individual Psychotherapy (PhD/LCSW)    5/19/2022    Site:  [facility]         Therapeutic Intervention: Met with patient.  Outpatient - Insight oriented psychotherapy 45 min - CPT code 18244    Chief complaint/reason for encounter: depression and anxiety     Interval history and content of current session:  Patient presents to ongoing individual therapy due to depression and anxiety.  She is now living in a rental property in [place] with her boyfriend.  She likes the area.  She enjoys talking to the neighbors.  [place]  She continues to struggle with anxiety.  She is not driving.  She recalls living in [place].  She worked multiple jobs and raised her son as a single mother.  She met with Dr. Garay for psychological testing for the initial appointment.  She will start testing at the end of this month.  She admits that she spends the majority of time going to doctor's appointments.  She continues to deal with pain in her back and hips.  She has been working with pain management to deal with those symptoms.  She has not been talking much with her sister in [place].  She does not like the way her sister treated her prior to moving out.  Encourage the patient to continue to care for herself medically.  Educate the patient about the purpose and function of psychological testing.  Validate emotions related to loss due to life change from medical problems.  She does talk to her son on a regular basis.  He is still living in [place].  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  She and her boyfriend enjoy taking a ride on the weekends.  She does feel it relieves some stress.  She continues to have surgeries planned for the future to recover from the left radical mastectomy.     Treatment plan:  ? Target symptoms: depression, anxiety   ? Why chosen therapy is appropriate versus another modality: relevant to diagnosis  ? Outcome monitoring methods: self-report,  observation  ? Therapeutic intervention type: insight oriented psychotherapy, supportive psychotherapy, interactive psychotherapy     Risk parameters:  Patient reports no suicidal ideation  Patient reports no homicidal ideation  Patient reports no self-injurious behavior  Patient reports no violent behavior     Verbal deficits: None     Patient's response to intervention:  The patient's response to intervention is accepting, motivated.     Progress toward goals and other mental status changes:  The patient's progress toward goals is limited.     Diagnosis:   Generalized Anxiety Disorder     Plan:  individual psychotherapy and medication management by physician  Psychological testing with Americo Garay P.h.D. scheduled in May.     Return to clinic: as scheduled     Length of Service (minutes):   45

## 2022-05-26 ENCOUNTER — PATIENT MESSAGE (OUTPATIENT)
Dept: PAIN MEDICINE | Facility: CLINIC | Age: 42
End: 2022-05-26
Payer: MEDICAID

## 2022-05-26 ENCOUNTER — PATIENT MESSAGE (OUTPATIENT)
Dept: INTERNAL MEDICINE | Facility: CLINIC | Age: 42
End: 2022-05-26
Payer: MEDICAID

## 2022-05-30 ENCOUNTER — TELEPHONE (OUTPATIENT)
Dept: DIABETES | Facility: CLINIC | Age: 42
End: 2022-05-30
Payer: MEDICAID

## 2022-05-30 ENCOUNTER — OFFICE VISIT (OUTPATIENT)
Dept: NEUROLOGY | Facility: CLINIC | Age: 42
End: 2022-05-30
Payer: MEDICAID

## 2022-05-30 DIAGNOSIS — F33.3 SEVERE EPISODE OF RECURRENT MAJOR DEPRESSIVE DISORDER, WITH PSYCHOTIC FEATURES: ICD-10-CM

## 2022-05-30 DIAGNOSIS — C50.912 MALIGNANT NEOPLASM OF LEFT BREAST IN FEMALE, ESTROGEN RECEPTOR NEGATIVE, UNSPECIFIED SITE OF BREAST: ICD-10-CM

## 2022-05-30 DIAGNOSIS — D32.9 MENINGIOMA: ICD-10-CM

## 2022-05-30 DIAGNOSIS — Z92.21 HISTORY OF CHEMOTHERAPY: ICD-10-CM

## 2022-05-30 DIAGNOSIS — F41.1 GAD (GENERALIZED ANXIETY DISORDER): ICD-10-CM

## 2022-05-30 DIAGNOSIS — Z17.1 MALIGNANT NEOPLASM OF LEFT BREAST IN FEMALE, ESTROGEN RECEPTOR NEGATIVE, UNSPECIFIED SITE OF BREAST: ICD-10-CM

## 2022-05-30 DIAGNOSIS — G31.84 MINOR NEUROCOGNITIVE DISORDER: Primary | ICD-10-CM

## 2022-05-30 PROCEDURE — 96139 PSYCL/NRPSYC TST TECH EA: CPT | Mod: ,,, | Performed by: STUDENT IN AN ORGANIZED HEALTH CARE EDUCATION/TRAINING PROGRAM

## 2022-05-30 PROCEDURE — 99212 OFFICE O/P EST SF 10 MIN: CPT | Mod: PBBFAC | Performed by: STUDENT IN AN ORGANIZED HEALTH CARE EDUCATION/TRAINING PROGRAM

## 2022-05-30 PROCEDURE — 3061F NEG MICROALBUMINURIA REV: CPT | Mod: CPTII,,, | Performed by: STUDENT IN AN ORGANIZED HEALTH CARE EDUCATION/TRAINING PROGRAM

## 2022-05-30 PROCEDURE — 3061F PR NEG MICROALBUMINURIA RESULT DOCUMENTED/REVIEW: ICD-10-PCS | Mod: CPTII,,, | Performed by: STUDENT IN AN ORGANIZED HEALTH CARE EDUCATION/TRAINING PROGRAM

## 2022-05-30 PROCEDURE — 99499 NO LOS: ICD-10-PCS | Mod: S$PBB,,, | Performed by: STUDENT IN AN ORGANIZED HEALTH CARE EDUCATION/TRAINING PROGRAM

## 2022-05-30 PROCEDURE — 99999 PR PBB SHADOW E&M-EST. PATIENT-LVL II: CPT | Mod: PBBFAC,,, | Performed by: STUDENT IN AN ORGANIZED HEALTH CARE EDUCATION/TRAINING PROGRAM

## 2022-05-30 PROCEDURE — 3066F NEPHROPATHY DOC TX: CPT | Mod: CPTII,,, | Performed by: STUDENT IN AN ORGANIZED HEALTH CARE EDUCATION/TRAINING PROGRAM

## 2022-05-30 PROCEDURE — 3072F PR LOW RISK FOR RETINOPATHY: ICD-10-PCS | Mod: CPTII,,, | Performed by: STUDENT IN AN ORGANIZED HEALTH CARE EDUCATION/TRAINING PROGRAM

## 2022-05-30 PROCEDURE — 1159F MED LIST DOCD IN RCRD: CPT | Mod: CPTII,,, | Performed by: STUDENT IN AN ORGANIZED HEALTH CARE EDUCATION/TRAINING PROGRAM

## 2022-05-30 PROCEDURE — 1159F PR MEDICATION LIST DOCUMENTED IN MEDICAL RECORD: ICD-10-PCS | Mod: CPTII,,, | Performed by: STUDENT IN AN ORGANIZED HEALTH CARE EDUCATION/TRAINING PROGRAM

## 2022-05-30 PROCEDURE — 96133 NRPSYC TST EVAL PHYS/QHP EA: CPT | Mod: ,,, | Performed by: STUDENT IN AN ORGANIZED HEALTH CARE EDUCATION/TRAINING PROGRAM

## 2022-05-30 PROCEDURE — 96132 PR NEUROPSYCHOLOGIC TEST EVAL SVCS, 1ST HR: ICD-10-PCS | Mod: ,,, | Performed by: STUDENT IN AN ORGANIZED HEALTH CARE EDUCATION/TRAINING PROGRAM

## 2022-05-30 PROCEDURE — 99999 PR PBB SHADOW E&M-EST. PATIENT-LVL II: ICD-10-PCS | Mod: PBBFAC,,, | Performed by: STUDENT IN AN ORGANIZED HEALTH CARE EDUCATION/TRAINING PROGRAM

## 2022-05-30 PROCEDURE — 96132 NRPSYC TST EVAL PHYS/QHP 1ST: CPT | Mod: ,,, | Performed by: STUDENT IN AN ORGANIZED HEALTH CARE EDUCATION/TRAINING PROGRAM

## 2022-05-30 PROCEDURE — 96138 PSYCL/NRPSYC TECH 1ST: CPT | Mod: ,,, | Performed by: STUDENT IN AN ORGANIZED HEALTH CARE EDUCATION/TRAINING PROGRAM

## 2022-05-30 PROCEDURE — 3044F HG A1C LEVEL LT 7.0%: CPT | Mod: CPTII,,, | Performed by: STUDENT IN AN ORGANIZED HEALTH CARE EDUCATION/TRAINING PROGRAM

## 2022-05-30 PROCEDURE — 3044F PR MOST RECENT HEMOGLOBIN A1C LEVEL <7.0%: ICD-10-PCS | Mod: CPTII,,, | Performed by: STUDENT IN AN ORGANIZED HEALTH CARE EDUCATION/TRAINING PROGRAM

## 2022-05-30 PROCEDURE — 96138 PR PSYCH/NEUROPSYCH TEST ADMIN/SCORING, BY TECH, 2+ TESTS, 1ST 30 MIN: ICD-10-PCS | Mod: ,,, | Performed by: STUDENT IN AN ORGANIZED HEALTH CARE EDUCATION/TRAINING PROGRAM

## 2022-05-30 PROCEDURE — 99499 UNLISTED E&M SERVICE: CPT | Mod: S$PBB,,, | Performed by: STUDENT IN AN ORGANIZED HEALTH CARE EDUCATION/TRAINING PROGRAM

## 2022-05-30 PROCEDURE — 3072F LOW RISK FOR RETINOPATHY: CPT | Mod: CPTII,,, | Performed by: STUDENT IN AN ORGANIZED HEALTH CARE EDUCATION/TRAINING PROGRAM

## 2022-05-30 PROCEDURE — 3066F PR DOCUMENTATION OF TREATMENT FOR NEPHROPATHY: ICD-10-PCS | Mod: CPTII,,, | Performed by: STUDENT IN AN ORGANIZED HEALTH CARE EDUCATION/TRAINING PROGRAM

## 2022-05-30 PROCEDURE — 96139 PR PSYCH/NEUROPSYCH TEST ADMIN/SCORING, BY TECH, 2+ TESTS, EA ADDTL 30 MIN: ICD-10-PCS | Mod: ,,, | Performed by: STUDENT IN AN ORGANIZED HEALTH CARE EDUCATION/TRAINING PROGRAM

## 2022-05-30 PROCEDURE — 96133 PR NEUROPSYCHOLOGIC TEST EVAL SVCS, EA ADDTL HR: ICD-10-PCS | Mod: ,,, | Performed by: STUDENT IN AN ORGANIZED HEALTH CARE EDUCATION/TRAINING PROGRAM

## 2022-05-31 ENCOUNTER — HOSPITAL ENCOUNTER (OUTPATIENT)
Facility: HOSPITAL | Age: 42
Discharge: HOME | End: 2022-05-31
Attending: INTERNAL MEDICINE | Admitting: INTERNAL MEDICINE
Payer: MEDICAID

## 2022-05-31 ENCOUNTER — ANESTHESIA (OUTPATIENT)
Dept: ENDOSCOPY | Facility: HOSPITAL | Age: 42
End: 2022-05-31
Payer: MEDICAID

## 2022-05-31 ENCOUNTER — ANESTHESIA EVENT (OUTPATIENT)
Dept: ENDOSCOPY | Facility: HOSPITAL | Age: 42
End: 2022-05-31
Payer: MEDICAID

## 2022-05-31 ENCOUNTER — PATIENT MESSAGE (OUTPATIENT)
Dept: PSYCHIATRY | Facility: CLINIC | Age: 42
End: 2022-05-31
Payer: MEDICAID

## 2022-05-31 DIAGNOSIS — D50.9 IDA (IRON DEFICIENCY ANEMIA): ICD-10-CM

## 2022-05-31 LAB — GLUCOSE SERPL-MCNC: 99 MG/DL (ref 70–110)

## 2022-05-31 PROCEDURE — 88305 TISSUE EXAM BY PATHOLOGIST: ICD-10-PCS | Mod: 26,,, | Performed by: STUDENT IN AN ORGANIZED HEALTH CARE EDUCATION/TRAINING PROGRAM

## 2022-05-31 PROCEDURE — 00731 ANES UPR GI NDSC PX NOS: CPT | Performed by: INTERNAL MEDICINE

## 2022-05-31 PROCEDURE — 45378 PR COLONOSCOPY,DIAGNOSTIC: ICD-10-PCS | Mod: ,,, | Performed by: INTERNAL MEDICINE

## 2022-05-31 PROCEDURE — 27201012 HC FORCEPS, HOT/COLD, DISP: Performed by: INTERNAL MEDICINE

## 2022-05-31 PROCEDURE — 88342 IMHCHEM/IMCYTCHM 1ST ANTB: CPT | Performed by: STUDENT IN AN ORGANIZED HEALTH CARE EDUCATION/TRAINING PROGRAM

## 2022-05-31 PROCEDURE — 37000008 HC ANESTHESIA 1ST 15 MINUTES: Performed by: INTERNAL MEDICINE

## 2022-05-31 PROCEDURE — 88305 TISSUE EXAM BY PATHOLOGIST: CPT | Mod: 26,,, | Performed by: STUDENT IN AN ORGANIZED HEALTH CARE EDUCATION/TRAINING PROGRAM

## 2022-05-31 PROCEDURE — 88342 IMHCHEM/IMCYTCHM 1ST ANTB: CPT | Mod: 26,,, | Performed by: STUDENT IN AN ORGANIZED HEALTH CARE EDUCATION/TRAINING PROGRAM

## 2022-05-31 PROCEDURE — 25000003 PHARM REV CODE 250: Performed by: STUDENT IN AN ORGANIZED HEALTH CARE EDUCATION/TRAINING PROGRAM

## 2022-05-31 PROCEDURE — 37000009 HC ANESTHESIA EA ADD 15 MINS: Performed by: INTERNAL MEDICINE

## 2022-05-31 PROCEDURE — 88342 CHG IMMUNOCYTOCHEMISTRY: ICD-10-PCS | Mod: 26,,, | Performed by: STUDENT IN AN ORGANIZED HEALTH CARE EDUCATION/TRAINING PROGRAM

## 2022-05-31 PROCEDURE — 43239 EGD BIOPSY SINGLE/MULTIPLE: CPT | Mod: 51,,, | Performed by: INTERNAL MEDICINE

## 2022-05-31 PROCEDURE — 88305 TISSUE EXAM BY PATHOLOGIST: CPT | Performed by: STUDENT IN AN ORGANIZED HEALTH CARE EDUCATION/TRAINING PROGRAM

## 2022-05-31 PROCEDURE — 43239 PR EGD, FLEX, W/BIOPSY, SGL/MULTI: ICD-10-PCS | Mod: 51,,, | Performed by: INTERNAL MEDICINE

## 2022-05-31 PROCEDURE — 45378 DIAGNOSTIC COLONOSCOPY: CPT | Mod: ,,, | Performed by: INTERNAL MEDICINE

## 2022-05-31 PROCEDURE — 45378 DIAGNOSTIC COLONOSCOPY: CPT | Performed by: INTERNAL MEDICINE

## 2022-05-31 PROCEDURE — 63600175 PHARM REV CODE 636 W HCPCS: Performed by: STUDENT IN AN ORGANIZED HEALTH CARE EDUCATION/TRAINING PROGRAM

## 2022-05-31 PROCEDURE — 43239 EGD BIOPSY SINGLE/MULTIPLE: CPT | Performed by: INTERNAL MEDICINE

## 2022-05-31 RX ORDER — SODIUM CHLORIDE, SODIUM LACTATE, POTASSIUM CHLORIDE, CALCIUM CHLORIDE 600; 310; 30; 20 MG/100ML; MG/100ML; MG/100ML; MG/100ML
INJECTION, SOLUTION INTRAVENOUS CONTINUOUS
Status: DISCONTINUED | OUTPATIENT
Start: 2022-05-31 | End: 2022-05-31 | Stop reason: HOSPADM

## 2022-05-31 RX ORDER — ASPIRIN 81 MG/1
81 TABLET ORAL DAILY
COMMUNITY
End: 2022-07-19 | Stop reason: SDUPTHER

## 2022-05-31 RX ORDER — PROPOFOL 10 MG/ML
VIAL (ML) INTRAVENOUS
Status: DISCONTINUED | OUTPATIENT
Start: 2022-05-31 | End: 2022-05-31

## 2022-05-31 RX ORDER — LIDOCAINE HYDROCHLORIDE 10 MG/ML
INJECTION, SOLUTION EPIDURAL; INFILTRATION; INTRACAUDAL; PERINEURAL
Status: DISCONTINUED | OUTPATIENT
Start: 2022-05-31 | End: 2022-05-31

## 2022-05-31 RX ORDER — SODIUM CHLORIDE, SODIUM LACTATE, POTASSIUM CHLORIDE, CALCIUM CHLORIDE 600; 310; 30; 20 MG/100ML; MG/100ML; MG/100ML; MG/100ML
INJECTION, SOLUTION INTRAVENOUS CONTINUOUS PRN
Status: DISCONTINUED | OUTPATIENT
Start: 2022-05-31 | End: 2022-05-31

## 2022-05-31 RX ADMIN — PROPOFOL 50 MG: 10 INJECTION, EMULSION INTRAVENOUS at 12:05

## 2022-05-31 RX ADMIN — PROPOFOL 50 MG: 10 INJECTION, EMULSION INTRAVENOUS at 01:05

## 2022-05-31 RX ADMIN — LIDOCAINE HYDROCHLORIDE 100 MG: 10 INJECTION, SOLUTION EPIDURAL; INFILTRATION; INTRACAUDAL; PERINEURAL at 12:05

## 2022-05-31 RX ADMIN — SODIUM CHLORIDE, SODIUM LACTATE, POTASSIUM CHLORIDE, AND CALCIUM CHLORIDE: 600; 310; 30; 20 INJECTION, SOLUTION INTRAVENOUS at 12:05

## 2022-05-31 RX ADMIN — PROPOFOL 100 MG: 10 INJECTION, EMULSION INTRAVENOUS at 12:05

## 2022-05-31 NOTE — ANESTHESIA POSTPROCEDURE EVALUATION
Anesthesia Post Evaluation    Patient: Sangeetha June    Procedure(s) Performed: Procedure(s) (LRB):  EGD (ESOPHAGOGASTRODUODENOSCOPY)  (N/A)  COLONOSCOPY (N/A)    Final Anesthesia Type: MAC      Patient location during evaluation: GI PACU  Patient participation: Yes- Able to Participate  Level of consciousness: awake and alert and oriented  Post-procedure vital signs: reviewed and stable  Pain management: adequate  Airway patency: patent  EVERARDO mitigation strategies: Multimodal analgesia  PONV status at discharge: No PONV  Anesthetic complications: no      Cardiovascular status: blood pressure returned to baseline  Respiratory status: unassisted  Hydration status: euvolemic  Follow-up not needed.          Vitals Value Taken Time   /68 05/31/22 1140   Temp 37.2 °C (99 °F) 05/31/22 1140   Pulse 57 05/31/22 1140   Resp 13 05/31/22 1140   SpO2 97 % 05/31/22 1140         No case tracking events are documented in the log.      Pain/Kaylie Score: No data recorded

## 2022-05-31 NOTE — PROGRESS NOTES
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 oxybutynin, pantoprazole, pravastatin, and trazodone, and the following Facility-Administered Medications: lactated ringers.    Review of patient's allergies indicates:   Allergen Reactions    Lisinopril        Mental Health History     Mental Health History: Ms. June reported a mental health history notable for major depressive disorder, generalized anxiety disorder and an unspecified behavioral disorder during childhood. She denied a history of suicide attempt and reported past suicidal ideation. She denied a history of past inpatient psychiatric hospitalization. She was first diagnosed with depression at age 21 when she no lognger had her mother's care. Ms. June is followed by her referring medical psychologist and was previously seeing Kaleb Rader LCSW for individual therapy. She was uncertain why she had discontinued seeing him but reported benefit from that care and good rapport with Mr. Rader.      Family Mental Health History: Ms. June denied a relevant family history of mental  "health concerns.      Assessment of Risk: Ms. June reported past suicidal ideation, without plan or intent.  Based on today's interview, she was not deemed to be at significantly elevated risk of harm to self at this time but continued monitoring is appropriate at this time.     Substance Use: Denied.      Social History      Educational/ Linguistic History  Language:Ms. June's first language is English.   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   Education trajectory: She did report a history of significant behavioral problems when she was young. She reported lots of fights and that she was almost "put in the crazy house" due to her behavior at that time. She otherwise denied a history of attention problems, learning difficulties, or academic supports.      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   Work status: She has recently been placed on disability for unknown reasons.     Living/Social History  Born/raised[place] and moved to [place] at age 19.   Current living situation: With her boyfriend in a [place].   Social support:  Boyfriend, sisters, niece.      Behavioral Observations     Appearance:  Ms. June arrived on time for her appointment and was unaccompanied. She was appropriately dressed; adequately groomed; and appeared her stated age.     Gait/ Motor: No fine or gross motor abnormalities were observed. Gait was within normal limits.      Sensory: Corrected vision and hearing appeared adequate for testing purposes.    Speech/ language: Speech was variable with instances of soft or dysarthric speech. Receptive language appeared generally intact to speech but she had difficulties understanding prompts on a personality and psychological functioning assessment written at a fourth grade level that Ms. June perceived and noted were atypical of her. Expressive language was notable for " "neologisms during a naming task and atypical pronunciations during a reading task e.g., "crumble" for climb and "neebo" for knead.    Thought processes:  Ms. June appeared alert and oriented to person, place, time, and situation; she maintained her attention throughout the examination. Her thought processes were notable for distractions by internal thoughts or stimuli and she occasionally talked to herself during the evaluation.     Mood/affect/ behavior:  Rapport was easy to establish and maintain. Ms. June reported anxious mood and affect was typically mood-congruent; however she occasionally evidenced variable and atypical affect during testing. For example, she was observed to clap and gleefully laugh during select tests; and to rock and shake her body in her chair during other tasks.     Test observations:  During testing, she adequately understood and retained task instructions, completed tasks at a steady pace, and tolerated testing without the need for breaks or accommodation. She did ask "are we done?" Fifteen minutes into the testing process; however when she was informed that testing had just begun, Ms. June reported that she asked this due to anxiety and wished to complete the evaluation. During testing, she was noted to make many intrusion errors, sometimes including information completely unrelated to that which had been presented to her, e.g. her responses on a verbal list-learning task.     Results     Tests Administered (Manual norms used unless otherwise indicated): [employer] - Test of Premorbid Functioning (TOPF), Wechsler Adult Intelligence Scale 4th Ed. (WAIS-IV) select subtests (Digit Span, Arithmetic, Symbol Search, Coding, Vocabulary, Similarities, Matrix Reasoning, and Visual Puzzles), Controlled Oral Word Association Test (COWAT; [facility] norms), Semantic Fluency (Animals; [facility] norms), [place] Naming Test (BNT; [facility] norms), [place] Verbal Learning " Test, 3rd Ed. (CVLT-3), Wechsler Memory Scale, 4th Ed. Logical Memory (WMS IV-LM), Wechsler Memory Scale, 4th Ed. Visual Reproduction (WMS IV-VR), Yohan-Osterrieth Complex Figure Test (RCFT) Copy trial, [place] Card Sorting Test (WCST) - 64 card version, Trail Making Test (Trails A/B; [facility] norms), Grooved Pegboard Test ([facility] norms), Lee Depression Inventory, 2nd Ed. (BDI-2); and Lee Anxiety Inventory (REANNA), and Personality Assessment Inventory (ZURI; incomplete administration, not interpreted).    Performance Validity: Ms. June completed both stand-alone and embedded measures of task engagement. Below-cutoff performance on any one stand-alone measure and/ or any two embedded measures of task engagement is highly unlikely to occur outside the context of poor or inconsistent effort during testing. Ms. June scored below predetermined cutoff on one embedded measure of task engagement and above predetermined cutoffs on all other administered measures of task engagement. As such, there is no evidence to suggest poor or inconsistent engagement and their performance is deemed to be a reasonable reflection of their day-to-day cognitive status.     PREMORBID FUNCTIONING Raw Score Type of Standardized Score Standardized Score Percentile/CP Descriptor   TOPF simple dem. eFSIQ - SS 84 14 Low Average   TOPF pred. eFSIQ - SS 63 1 Exceptionally Low    TOPF simple + pred. eFSIQ - SS 71 3 Below Average   INTELLECTUAL FUNCTIONING Raw Score Type of Standardized Score Standardized Score Percentile/CP Descriptor   WAIS-IV         VCI - SS 76 5 Below Average   JOSEF - SS 75 5 Below Average   WMI - SS 66 1 Exceptionally Low    PSI - SS 74 4 Below Average   FSIQ - SS 68 2 Extremely Low   LANGUAGE FUNCTIONING Raw Score Type of Standardized Score Standardized Score Percentile/CP Descriptor   WAIS-IV Vocabulary 12 ss 4 2 Below Average   TOPF Word Reading 13 SS 70 2 Below Average   BNT-60 34 Tscore 35 7 Below Average   FAS 21  Tscore 35 7 Below Average   Animal Naming 13 Tscore 39 14 Low Average   VISUOSPATIAL FUNCTIONING Raw Score Type of Standardized Score Standardized Score Percentile/CP Descriptor   WAIS-IV JOSEF - SS 75 5 Below Average   WAIS-IV Matrix Reasoning 10 ss 6 9 Low Average   WAIS-IV Visual Puzzles 7 ss 5 5 Below Average   RCFT Copy   Exceptionally Low due to a poorly planned [id number] approach to the task. The rendering is identifiable as reflecting the target stimulus. Suggests perceptual-organizational deficit but not francheska perceptual deficit.   RCFT Time to Copy 299 - - 11-16 Low Average   VR Copy 43 - - >75 High Average   LEARNING & MEMORY Raw Score Type of Standardized Score Standardized Score Percentile/CP Descriptor   CVLT-3         Trials 1-5 (3, 6, 4, 5, 7) 25 SS 61 0.5 Exceptionally Low    List B Correct 4 ss 8 25 Average   Delayed Recall: Short Delay Free Recall Correct 7 ss 7 16 Low Average   Delayed Recall: Short Delay Cued Recall Correct 6 ss 5 5 Below Average   Delayed Recall: Long Delay Free Recall Correct 6 ss 5 5 Below Average   Delayed Recall: Long Delay Cued Recall Correct 6 ss 5 5 Below Average   Recognition Discriminability   (11 hits, 10 FP) 1 ss 3 1 Exceptionally Low    Yes/No Recognition: Recognition Discriminability Nonparametric 69 ss 3 1 Exceptionally Low    Recall Errors: Total Intrusions 29 ss 2 0.4 Exceptionally Low    WMS-IV Subtests         LM I 9 ss 2 0.4 Exceptionally Low    LM II 5 ss 2 0.4 Exceptionally Low    LM Recognition 20 - - 3-9 Below Average   VR I 26 ss 4 2 Below Average   VR II 14 ss 6 9 Low Average   VR II Recognition 1 - - <2 Exceptionally Low   ATTENTION/WORKING MEMORY Raw Score Type of Standardized Score Standardized Score Percentile/CP Descriptor   WAIS-IV Digit Span 16 ss 4 2 Below Average         DS Forward 7 ss 6 9 Low Average         DS Backward 5 ss 6 9 Low Average         DS Sequence 4 ss 5 5 Below Average         Longest Digit Forward 5 - - - -          Longest Digit Backward 3 - - - -         Longest Digit Sequence 3 - - - -   WAIS-IV Arithmetic 7 ss 4 2 Below Average   MENTAL PROCESSING SPEED Raw Score Type of Standardized Score Standardized Score Percentile/CP Descriptor   WAIS-IV Symbol Search 21 ss 6 9 Low Average   WAIS-IV Coding 37 ss 4 2 Below Average   TMT A (0 errors) 40 Tscore 41 18 Low Average   EXECUTIVE FUNCTIONING Raw Score Type of Standardized Score Standardized Score Percentile/CP Descriptor   TMT B (1 error) 144 Tscore 37 9 Low Average   WCST-64   N/A      Total Correct 46 SS - - -   Total Errors 18 SS 86 18 Low Average   Perseverative Resp. 16 SS 81 10 Low Average   Perseverative Err. 13 SS 82 12 Low Average   Nonperseverative Err. 5 SS 96 39 Average   Concept. Level Response 45 SS 91 27 Average   Categories Completed 3 - - >16 WNL   FMS 1 - - N/A N/A   Learning to Learn -13.64 - - 6-10 Below Average   WAIS-IV Similarities 19 ss 7 16 Low Average   WAIS-IV Matrix Reasoning 10 ss 6 9 Low Average   FRONTOMOTOR  Raw Score Type of Standardized Score Standardized Score Percentile/CP Descriptor   GPT DH 96 Tscore 37 9 Low Average   GPD  Tscore 38 12 Low Average   MOOD & PERSONALITY Raw Score Type of Standardized Score Standardized Score Percentile/CP Descriptor   BDI-2 33 - - - Severe   REANNA 45 - - - Severe   ss = scaled score (mean = 10, SD = 3); SS = standard score (mean = 100, SD = 15); Tscore mean = 50, SD = 10; zscore (mean = 0.00, SD = 1)       Billing Documentation     Time spent in review of pertinent records, conducting face to face interview with the patient, and documenting history: 191 minutes; 06776 & 28895(x2) Billed separately.  Time on review of neuropsychological test data, data interpretation, providing feedback about these results, and documentation of my interpretation: 255 minutes; 43812 &72624 (x3).  Psychometrist time spent in the administration and scoring of 2 or more neuropsychological tests 280 minutes; 35054 & 92579  (x8).    Signatures     Thank you for the opportunity to assist in the care of your patient. Please do not hesitate to contact me at [phone] or via Epic staff message if I can be of further assistance.          _____________________  Americo Garay, Ph.D.  Neuropsychologist  [facility]  Department of Neurology

## 2022-05-31 NOTE — H&P
PRE PROCEDURE H&P    Patient Name: Sangeetha June  MRN: [id number]  : 1980  Date of Procedure:  2022  Referring Physician: Jamar Martin MD  Primary Physician: Sam Garcia MD  Procedure Physician: Km Odom MD       Planned Procedure: Colonoscopy and EGD  Diagnosis: ROBERT   Chief Complaint: Same as above    HPI: Patient is an 42 y.o. female is here for the above.     Last colonoscopy: 4 years ago   Family history: None   Anticoagulation: None     Past Medical History:   Past Medical History:   Diagnosis Date    Anxiety     Breast cancer     Chest pain     Chest pain 2021    Cholecystitis without calculus     Decreased ROM of left shoulder 2/15/2022    Dizziness     Elevated C-reactive protein (CRP)     Essential hypertension     Fall 10/11/2021    Memory change     EVERARDO (obstructive sleep apnea)     Osteoarthritis     Other specified disorders of thyroid     Pre-diabetes 4/15/2021    Chronic, Stable, cont metformin    Screening mammogram, encounter for 4/15/2021    Shoulder injury     SOB (shortness of breath)     Thyroiditis     Vision disturbance 2021        Past Surgical History:  Past Surgical History:   Procedure Laterality Date    ARTHROSCOPIC REPAIR OF ROTATOR CUFF OF SHOULDER Left 2022    Procedure: REPAIR, ROTATOR CUFF, ARTHROSCOPIC;  Surgeon: Francisco Mathews MD;  Location: [facility] OR;  Service: Orthopedics;  Laterality: Left;    ARTHROSCOPY OF SHOULDER WITH DECOMPRESSION OF SUBACROMIAL SPACE Left 2022    Procedure: ARTHROSCOPY, SHOULDER, WITH SUBACROMIAL SPACE DECOMPRESSION;  Surgeon: Francisco Mathews MD;  Location: [facility] OR;  Service: Orthopedics;  Laterality: Left;    BREAST BIOPSY      FIXATION OF TENDON Left 2022    Procedure: FIXATION, TENDON;  Surgeon: Francisco Mathews MD;  Location: [facility] OR;  Service: Orthopedics;  Laterality: Left;    HYSTERECTOMY      INJECTION OF JOINT Left 2021     Procedure: Left suprascapular and axillary injection with local;  Surgeon: Bisi Cochran MD;  [facility];  Service: Pain Management;  Laterality: Left;    MASTECTOMY          Home Medications:  Prior to Admission medications    Medication Sig Start Date End Date Taking? Authorizing Provider   albuterol (PROVENTIL/VENTOLIN HFA) 90 mcg/actuation inhaler Inhale 2 puffs into the lungs every 6 (six) hours as needed for Wheezing. Rescue 10/29/21 10/29/22 Yes Sam Garcia MD   ascorbic acid, vitamin C, (VITAMIN C) 100 MG tablet Take 100 mg by mouth once daily.   Yes Historical Provider   aspirin (ECOTRIN) 81 MG EC tablet Take 81 mg by mouth once daily.   Yes Historical Provider   budesonide-formoterol 160-4.5 mcg (SYMBICORT) 160-4.5 mcg/actuation HFAA Inhale 2 puffs into the lungs every 12 (twelve) hours. Controller 3/16/22 3/16/23 Yes Evy Cid MD   busPIRone (BUSPAR) 15 MG tablet Take 1 tablet (15 mg total) by mouth 3 (three) times daily. 8/3/21 8/3/22 Yes Sam Garcia MD   celecoxib (CELEBREX) 200 MG capsule Take 1 capsule (200 mg total) by mouth 2 (two) times daily. 1/27/22  Yes Francisco Mathews MD   dulaglutide (TRULICITY) 3 mg/0.5 mL pen injector Inject 3 mg into the skin every 7 days. 5/17/22 8/9/22 Yes Sam Garcia MD   EScitalopram oxalate (LEXAPRO) 20 MG tablet Take 1 tablet (20 mg total) by mouth once daily. 8/3/21 7/29/22 Yes Sam Garcia MD   EUTHYROX 50 mcg tablet Take 1 tablet (50 mcg total) by mouth every morning. 9/23/21  Yes Sam Garcia MD   gabapentin (NEURONTIN) 100 MG capsule Take 1 capsule (100 mg total) by mouth 2 (two) times daily. 2/8/22 5/31/22 Yes Sam Garcia MD   hydroCHLOROthiazide (HYDRODIURIL) 25 MG tablet Take 1 tablet (25 mg total) by mouth daily as needed (edema, swelling). 8/3/21 8/3/22 Yes Sam Garcia MD   linaCLOtide (LINZESS) 145 mcg Cap capsule Take 1 capsule (145 mcg total) by mouth once daily. 4/26/22  Yes Katey  MILADIS Portillo PA-C   magnesium oxide (MAG-OX) 400 mg (241.3 mg magnesium) tablet Take 1 tablet (400 mg total) by mouth once daily. 12/3/21  Yes Asim Johnston MD   meloxicam (MOBIC) 15 MG tablet Take 1 tablet (15 mg total) by mouth once daily. 10/11/21  Yes Sam Garcia MD   metFORMIN (GLUCOPHAGE-XR) 500 MG ER 24hr tablet SMARTSI Tablet(s) By Mouth Every Evening 8/3/21  Yes Sam Garcia MD   multivit with min-folic acid (ADULT MULTIVITAMIN GUMMIES) 200 mcg Chew Take by mouth.   Yes Historical Provider   oxybutynin (DITROPAN-XL) 5 MG TR24 Take 1 tablet (5 mg total) by mouth once daily. 8/3/21 5/31/22 Yes Sam Garcia MD   pantoprazole (PROTONIX) 40 MG tablet Take 1 tablet (40 mg total) by mouth once daily. 10/28/21 10/28/22 Yes Isaiah Rowe MD   pravastatin (PRAVACHOL) 40 MG tablet Take 1 tablet (40 mg total) by mouth every evening. 8/3/21  Yes Sam Garcia MD   traZODone (DESYREL) 150 MG tablet Take 1 tablet (150 mg total) by mouth every evening. 8/3/21 7/29/22 Yes Sam Garcia MD   ammonium lactate 12 % Crea Apply 1 Act topically 2 (two) times daily. To feet. 22   Tiny Lyn DPM   hydrocortisone 0.5 % cream Apply topically 2 (two) times daily. 21   Historical Provider   olopatadine (PATANOL) 0.1 % ophthalmic solution  21   Historical Provider   traMADoL (ULTRAM) 50 mg tablet Take 1 tablet (50 mg total) by mouth every 6 (six) hours as needed for Pain. 22  Francisco Mathews MD        Allergies:  Review of patient's allergies indicates:   Allergen Reactions    Lisinopril         Social History:   Social History     Socioeconomic History    Marital status:    Tobacco Use    Smoking status: Never Smoker    Smokeless tobacco: Never Used   Substance and Sexual Activity    Alcohol use: Not Currently    Drug use: Not Currently     Types: Marijuana    Sexual activity: Yes       Family History:  Family History   Problem Relation Age of  "Onset    Colon cancer Mother 53        partial colectomy    Stroke Mother     Heart disease Mother     Stroke Sister     Diabetes Sister     Diabetes Brother     No Known Problems Brother     No Known Problems Maternal Grandmother     Prostate cancer Maternal Grandfather 70    Alcohol abuse Maternal Grandfather     No Known Problems Paternal Grandmother     No Known Problems Paternal Grandfather     Throat cancer Maternal Aunt         smoking hx    Lung cancer Maternal Uncle         hx smoking    Breast cancer Other 37        chemotherapy, planning for BL mastectomy    No Known Problems Other        ROS: No acute cardiac events, no acute respiratory complaints.     Physical Exam (all patients):    /68   Pulse (!) 57   Temp 99 °F (37.2 °C)   Resp 13   Ht 5' 5" (1.651 m)   Wt 131.1 kg (289 lb)   LMP  (LMP Unknown)   SpO2 97%   Breastfeeding No   BMI 48.09 kg/m²   Lungs: Clear to auscultation bilaterally, respirations unlabored  Heart: Regular rate and rhythm, S1 and S2 normal, no obvious murmurs  Abdomen:         Soft, non-tender, bowel sounds normal, no masses, no organomegaly    Lab Results   Component Value Date    WBC 5.87 04/22/2022    MCV 83 04/22/2022    RDW 18.8 (H) 04/22/2022     04/22/2022    GLU 94 04/22/2022    HGBA1C 6.5 (H) 01/07/2022    BUN 6 04/22/2022     04/22/2022    K 3.3 (L) 04/22/2022    CL 98 04/22/2022        SEDATION PLAN: per anesthesia      History reviewed, vital signs satisfactory, cardiopulmonary status satisfactory, sedation options, risks and plans have been discussed with the patient  All their questions were answered and the patient agrees to the sedation procedures as planned and the patient is deemed an appropriate candidate for the sedation as planned.    Procedure explained to patient, informed consent obtained and placed in chart.    Km Odom  5/31/2022  12:27 PM     "

## 2022-05-31 NOTE — PROVATION PATIENT INSTRUCTIONS
Discharge Summary/Instructions after an Endoscopic Procedure  Patient Name: Sangeetha June  Patient MRN: [id number]  Patient YOB: 1980  Tuesday, May 31, 2022 Km Odom MD  Dear patient,  As a result of recent federal legislation (The Federal Cures Act), you may   receive lab or pathology results from your procedure in your [facility]   account before your physician is able to contact you. Your physician or   their representative will relay the results to you with their   recommendations at their soonest availability.  Thank you,  RESTRICTIONS:  During your procedure today, you received medications for sedation.  These   medications may affect your judgment, balance and coordination.  Therefore,   for 24 hours, you have the following restrictions:   - DO NOT drive a car, operate machinery, make legal/financial decisions,   sign important papers or drink alcohol.    ACTIVITY:  Today: no heavy lifting, straining or running due to procedural   sedation/anesthesia.  The following day: return to full activity including work.  DIET:  Eat and drink normally unless instructed otherwise.     TREATMENT FOR COMMON SIDE EFFECTS:  - Mild abdominal pain, nausea, belching, bloating or excessive gas:  rest,   eat lightly and use a heating pad.  - Sore Throat: treat with throat lozenges and/or gargle with warm salt   water.  - Because air was used during the procedure, expelling large amounts of air   from your rectum or belching is normal.  - If a bowel prep was taken, you may not have a bowel movement for 1-3 days.    This is normal.  SYMPTOMS TO WATCH FOR AND REPORT TO YOUR PHYSICIAN:  1. Abdominal pain or bloating, other than gas cramps.  2. Chest pain.  3. Back pain.  4. Signs of infection such as: chills or fever occurring within 24 hours   after the procedure.  5. Rectal bleeding, which would show as bright red, maroon, or black stools.   (A tablespoon of blood from the rectum is not serious, especially  if   hemorrhoids are present.)  6. Vomiting.  7. Weakness or dizziness.  GO DIRECTLY TO THE NEAREST EMERGENCY ROOM IF YOU HAVE ANY OF THE FOLLOWING:      Difficulty breathing              Chills and/or fever over 101 F   Persistent vomiting and/or vomiting blood   Severe abdominal pain   Severe chest pain   Black, tarry stools   Bleeding- more than one tablespoon   Any other symptom or condition that you feel may need urgent attention  Your doctor recommends these additional instructions:  If any biopsies were taken, your doctors clinic will contact you in 1 to 2   weeks with any results.  - Discharge patient to home.   - Resume previous diet.   - Continue present medications.   - Await pathology results.   - Return to referring physician as previously scheduled.  For questions, problems or results please call your physician Km Odom MD at Work:  [phone]  If you have any questions about the above instructions, call the GI   department at [phone] or call the endoscopy unit at [phone]   from 7am until 3 pm.  [facility], EMERGENCY ROOM PHONE NUMBER:   [phone]  IF A COMPLICATION OR EMERGENCY SITUATION ARISES AND YOU ARE UNABLE TO REACH   YOUR PHYSICIAN - GO DIRECTLY TO THE EMERGENCY ROOM.  I have read or have had read to me these discharge instructions for my   procedure and have received a written copy.  I understand these   instructions and will follow-up with my physician if I have any questions.     __________________________________       _____________________________________  Nurse Signature                                          Patient/Designated   Responsible Party Signature  Km Odom MD  5/31/2022 12:56:08 PM  This report has been verified and signed electronically.  Dear patient,  As a result of recent federal legislation (The Federal Cures Act), you may   receive lab or pathology results from your procedure in your [facility]    account before your physician is able to contact you. Your physician or   their representative will relay the results to you with their   recommendations at their soonest availability.  Thank you,  PROVATION

## 2022-05-31 NOTE — PROVATION PATIENT INSTRUCTIONS
Discharge Summary/Instructions after an Endoscopic Procedure  Patient Name: Sangeetha June  Patient MRN: [id number]  Patient YOB: 1980  Tuesday, May 31, 2022 Km Odom MD  Dear patient,  As a result of recent federal legislation (The Federal Cures Act), you may   receive lab or pathology results from your procedure in your [facility]   account before your physician is able to contact you. Your physician or   their representative will relay the results to you with their   recommendations at their soonest availability.  Thank you,  RESTRICTIONS:  During your procedure today, you received medications for sedation.  These   medications may affect your judgment, balance and coordination.  Therefore,   for 24 hours, you have the following restrictions:   - DO NOT drive a car, operate machinery, make legal/financial decisions,   sign important papers or drink alcohol.    ACTIVITY:  Today: no heavy lifting, straining or running due to procedural   sedation/anesthesia.  The following day: return to full activity including work.  DIET:  Eat and drink normally unless instructed otherwise.     TREATMENT FOR COMMON SIDE EFFECTS:  - Mild abdominal pain, nausea, belching, bloating or excessive gas:  rest,   eat lightly and use a heating pad.  - Sore Throat: treat with throat lozenges and/or gargle with warm salt   water.  - Because air was used during the procedure, expelling large amounts of air   from your rectum or belching is normal.  - If a bowel prep was taken, you may not have a bowel movement for 1-3 days.    This is normal.  SYMPTOMS TO WATCH FOR AND REPORT TO YOUR PHYSICIAN:  1. Abdominal pain or bloating, other than gas cramps.  2. Chest pain.  3. Back pain.  4. Signs of infection such as: chills or fever occurring within 24 hours   after the procedure.  5. Rectal bleeding, which would show as bright red, maroon, or black stools.   (A tablespoon of blood from the rectum is not serious, especially  if   hemorrhoids are present.)  6. Vomiting.  7. Weakness or dizziness.  GO DIRECTLY TO THE NEAREST EMERGENCY ROOM IF YOU HAVE ANY OF THE FOLLOWING:      Difficulty breathing              Chills and/or fever over 101 F   Persistent vomiting and/or vomiting blood   Severe abdominal pain   Severe chest pain   Black, tarry stools   Bleeding- more than one tablespoon   Any other symptom or condition that you feel may need urgent attention  Your doctor recommends these additional instructions:  If any biopsies were taken, your doctors clinic will contact you in 1 to 2   weeks with any results.  - Patient has a contact number available for emergencies.  The signs and   symptoms of potential delayed complications were discussed with the   patient.  Return to normal activities tomorrow.  Written discharge   instructions were provided to the patient.   - Resume previous diet.   - Discharge patient to home (ambulatory).   - Continue present medications.   - Repeat colonoscopy in 10 years for surveillance.   - Return to GI clinic PRN.  For questions, problems or results please call your physician Km Odom MD at Work:  [phone]  If you have any questions about the above instructions, call the GI   department at [phone] or call the endoscopy unit at [phone]   from 7am until 3 pm.  [facility], EMERGENCY ROOM PHONE NUMBER:   [phone]  IF A COMPLICATION OR EMERGENCY SITUATION ARISES AND YOU ARE UNABLE TO REACH   YOUR PHYSICIAN - GO DIRECTLY TO THE EMERGENCY ROOM.  I have read or have had read to me these discharge instructions for my   procedure and have received a written copy.  I understand these   instructions and will follow-up with my physician if I have any questions.     __________________________________       _____________________________________  Nurse Signature                                          Patient/Designated   Responsible Party Signature  Km  RUBY Odom MD  5/31/2022 1:11:09 PM  This report has been verified and signed electronically.  Dear patient,  As a result of recent federal legislation (The Federal Cures Act), you may   receive lab or pathology results from your procedure in your [facility]   account before your physician is able to contact you. Your physician or   their representative will relay the results to you with their   recommendations at their soonest availability.  Thank you,  PROVATION

## 2022-05-31 NOTE — TRANSFER OF CARE
"Anesthesia Transfer of Care Note    Patient: Sangeetha June    Procedure(s) Performed: Procedure(s) (LRB):  EGD (ESOPHAGOGASTRODUODENOSCOPY)  (N/A)  COLONOSCOPY (N/A)    Patient location: PACU    Anesthesia Type: MAC    Transport from OR: Transported from OR on room air with adequate spontaneous ventilation    Post pain: adequate analgesia    Post assessment: no apparent anesthetic complications and tolerated procedure well    Post vital signs: stable    Level of consciousness: awake, responds to stimulation and alert    Nausea/Vomiting: no nausea/vomiting    Complications: none    Transfer of care protocol was followed      Last vitals:   Visit Vitals  /68   Pulse (!) 57   Temp 37.2 °C (99 °F)   Resp 13   Ht 5' 5" (1.651 m)   Wt 131.1 kg (289 lb)   LMP  (LMP Unknown)   SpO2 97%   Breastfeeding No   BMI 48.09 kg/m²     "

## 2022-05-31 NOTE — ANESTHESIA PREPROCEDURE EVALUATION
05/31/2022   Past Medical History:   Diagnosis Date    Anxiety     Breast cancer     Chest pain     Chest pain 7/2/2021    Cholecystitis without calculus     Decreased ROM of left shoulder 2/15/2022    Dizziness     Elevated C-reactive protein (CRP)     Essential hypertension     Fall 10/11/2021    Memory change     EVERARDO (obstructive sleep apnea)     Osteoarthritis     Other specified disorders of thyroid     Pre-diabetes 4/15/2021    Chronic, Stable, cont metformin    Screening mammogram, encounter for 4/15/2021    Shoulder injury     SOB (shortness of breath)     Thyroiditis     Vision disturbance 4/30/2021       Sangeetha June is a 42 y.o., female.  Past Surgical History:   Procedure Laterality Date    ARTHROSCOPIC REPAIR OF ROTATOR CUFF OF SHOULDER Left 1/27/2022    Procedure: REPAIR, ROTATOR CUFF, ARTHROSCOPIC;  Surgeon: Francisco Mathews MD;  Location: [facility] OR;  Service: Orthopedics;  Laterality: Left;    ARTHROSCOPY OF SHOULDER WITH DECOMPRESSION OF SUBACROMIAL SPACE Left 1/27/2022    Procedure: ARTHROSCOPY, SHOULDER, WITH SUBACROMIAL SPACE DECOMPRESSION;  Surgeon: Francisco Mathews MD;  Location: [facility] OR;  Service: Orthopedics;  Laterality: Left;    BREAST BIOPSY      FIXATION OF TENDON Left 1/27/2022    Procedure: FIXATION, TENDON;  Surgeon: Francisco Mathews MD;  Location: [facility] OR;  Service: Orthopedics;  Laterality: Left;    HYSTERECTOMY      INJECTION OF JOINT Left 11/12/2021    Procedure: Left suprascapular and axillary injection with local;  Surgeon: Bisi Cochran MD;  Location: [facility] PAIN MGT;  Service: Pain Management;  Laterality: Left;    MASTECTOMY           Pre-op Assessment    I have reviewed the Patient Summary Reports.    I have reviewed the Nursing Notes. I have reviewed the NPO Status.   I have reviewed the Medications.      Review of Systems  Anesthesia Hx:  No problems with previous Anesthesia  Denies Family Hx of Anesthesia complications.   Denies Personal Hx of Anesthesia complications.   Social:  No Alcohol Use, Non-Smoker    Hematology/Oncology:  Hematology Normal        Cardiovascular:   Hypertension AGUILAR ECG has been reviewed. Extensive cards w/u for AGUILAR, NEG.  EF 55%.   Pulmonary:   Sleep Apnea, CPAP    Education provided regarding risk of obstructive sleep apnea     Renal/:  Renal/ Normal     Hepatic/GI:   GERD    Musculoskeletal:   Arthritis   Denies Spine Disorders    Neurological:   CVA    Endocrine:   Diabetes, well controlled Hypothyroidism  Diabetes    Psych:   Psychiatric History anxiety          Physical Exam  General:  Morbid Obesity      Airway/Jaw/Neck:  Airway Findings: Mouth Opening: Normal   Tongue: Normal   General Airway Assessment: Adult Mallampati: II  TM Distance: Normal, at least 6 cm   Jaw/Neck Findings:  Neck ROM: Normal ROM   Neck Findings:     Eyes/Ears/Nose:  Eyes/Ears/Nose Findings:    Dental:  Dental Findings: Edentulous     Chest/Lungs:  Chest/Lungs Findings: Clear to auscultation, Normal Respiratory Rate      Heart/Vascular:  Heart Findings: Rate: Normal  Rhythm: Regular Rhythm  Sounds: Normal  Heart murmur: negative Vascular Findings: Normal    Abdomen:  Abdomen Findings: Normal    Musculoskeletal:  Musculoskeletal Findings: Normal   Skin:  Skin Findings: Normal    Mental Status:  Mental Status Findings:  Alert and Oriented         Anesthesia Plan  Type of Anesthesia, risks & benefits discussed:  Anesthesia Type:  MAC    Patient's Preference:   Plan Factors:          Intra-op Monitoring Plan: standard ASA monitors  Intra-op Monitoring Plan Comments:   Post Op Pain Control Plan: per primary service following discharge from PACU, multimodal analgesia and peripheral nerve block  Post Op Pain Control Plan Comments:     Induction:   IV  Beta Blocker:  Patient is not currently on a Beta-Blocker  (No further documentation required).       Informed Consent: Informed consent signed with the Patient and all parties understand the risks and agree with anesthesia plan.  All questions answered.  Anesthesia consent signed with patient.  ASA Score: 3     Day of Surgery Review of History & Physical: I have interviewed and examined the patient. I have reviewed the patient's H&P dated:              Ready For Surgery From Anesthesia Perspective.           Physical Exam  General: Morbid Obesity    Airway:  Mallampati: II   Mouth Opening: Normal  TM Distance: Normal, at least 6 cm  Tongue: Normal  Neck ROM: Normal ROM    Dental:  Edentulous    Chest/Lungs:  Clear to auscultation, Normal Respiratory Rate    Heart:  Rate: Normal  Rhythm: Regular Rhythm  Sounds: Normal          Anesthesia Plan  Type of Anesthesia, risks & benefits discussed:    Anesthesia Type: MAC  Intra-op Monitoring Plan: standard ASA monitors  Post Op Pain Control Plan: per primary service following discharge from PACU, multimodal analgesia and peripheral nerve block  Induction:  IV  Informed Consent: Informed consent signed with the Patient and all parties understand the risks and agree with anesthesia plan.  All questions answered.   ASA Score: 3  Day of Surgery Review of History & Physical: I have interviewed and examined the patient. I have reviewed the patient's H&P dated:     Ready For Surgery From Anesthesia Perspective.       .

## 2022-06-01 VITALS
BODY MASS INDEX: 48.15 KG/M2 | WEIGHT: 289 LBS | HEART RATE: 56 BPM | DIASTOLIC BLOOD PRESSURE: 69 MMHG | SYSTOLIC BLOOD PRESSURE: 120 MMHG | OXYGEN SATURATION: 99 % | HEIGHT: 65 IN | TEMPERATURE: 98 F | RESPIRATION RATE: 20 BRPM

## 2022-06-01 LAB — POCT GLUCOSE: 99 MG/DL (ref 70–110)

## 2022-06-02 ENCOUNTER — PATIENT MESSAGE (OUTPATIENT)
Dept: DIABETES | Facility: CLINIC | Age: 42
End: 2022-06-02
Payer: MEDICAID

## 2022-06-03 ENCOUNTER — PATIENT MESSAGE (OUTPATIENT)
Dept: PSYCHIATRY | Facility: CLINIC | Age: 42
End: 2022-06-03
Payer: MEDICAID

## 2022-06-07 ENCOUNTER — DOCUMENTATION ONLY (OUTPATIENT)
Dept: PSYCHIATRY | Facility: CLINIC | Age: 42
End: 2022-06-07
Payer: MEDICAID

## 2022-06-07 ENCOUNTER — OFFICE VISIT (OUTPATIENT)
Dept: NEUROLOGY | Facility: CLINIC | Age: 42
End: 2022-06-07
Payer: MEDICAID

## 2022-06-07 DIAGNOSIS — G31.84 MINOR NEUROCOGNITIVE DISORDER: Primary | ICD-10-CM

## 2022-06-07 DIAGNOSIS — F33.3 SEVERE EPISODE OF RECURRENT MAJOR DEPRESSIVE DISORDER, WITH PSYCHOTIC FEATURES: ICD-10-CM

## 2022-06-07 PROCEDURE — 99499 UNLISTED E&M SERVICE: CPT | Mod: S$PBB,,, | Performed by: STUDENT IN AN ORGANIZED HEALTH CARE EDUCATION/TRAINING PROGRAM

## 2022-06-07 PROCEDURE — 3061F NEG MICROALBUMINURIA REV: CPT | Mod: CPTII,,, | Performed by: STUDENT IN AN ORGANIZED HEALTH CARE EDUCATION/TRAINING PROGRAM

## 2022-06-07 PROCEDURE — 3072F LOW RISK FOR RETINOPATHY: CPT | Mod: CPTII,,, | Performed by: STUDENT IN AN ORGANIZED HEALTH CARE EDUCATION/TRAINING PROGRAM

## 2022-06-07 PROCEDURE — 3061F PR NEG MICROALBUMINURIA RESULT DOCUMENTED/REVIEW: ICD-10-PCS | Mod: CPTII,,, | Performed by: STUDENT IN AN ORGANIZED HEALTH CARE EDUCATION/TRAINING PROGRAM

## 2022-06-07 PROCEDURE — 1159F MED LIST DOCD IN RCRD: CPT | Mod: CPTII,,, | Performed by: STUDENT IN AN ORGANIZED HEALTH CARE EDUCATION/TRAINING PROGRAM

## 2022-06-07 PROCEDURE — 3044F HG A1C LEVEL LT 7.0%: CPT | Mod: CPTII,,, | Performed by: STUDENT IN AN ORGANIZED HEALTH CARE EDUCATION/TRAINING PROGRAM

## 2022-06-07 PROCEDURE — 99212 OFFICE O/P EST SF 10 MIN: CPT | Mod: PBBFAC | Performed by: STUDENT IN AN ORGANIZED HEALTH CARE EDUCATION/TRAINING PROGRAM

## 2022-06-07 PROCEDURE — 1159F PR MEDICATION LIST DOCUMENTED IN MEDICAL RECORD: ICD-10-PCS | Mod: CPTII,,, | Performed by: STUDENT IN AN ORGANIZED HEALTH CARE EDUCATION/TRAINING PROGRAM

## 2022-06-07 PROCEDURE — 99999 PR PBB SHADOW E&M-EST. PATIENT-LVL II: CPT | Mod: PBBFAC,,, | Performed by: STUDENT IN AN ORGANIZED HEALTH CARE EDUCATION/TRAINING PROGRAM

## 2022-06-07 PROCEDURE — 3066F PR DOCUMENTATION OF TREATMENT FOR NEPHROPATHY: ICD-10-PCS | Mod: CPTII,,, | Performed by: STUDENT IN AN ORGANIZED HEALTH CARE EDUCATION/TRAINING PROGRAM

## 2022-06-07 PROCEDURE — 3066F NEPHROPATHY DOC TX: CPT | Mod: CPTII,,, | Performed by: STUDENT IN AN ORGANIZED HEALTH CARE EDUCATION/TRAINING PROGRAM

## 2022-06-07 PROCEDURE — 3072F PR LOW RISK FOR RETINOPATHY: ICD-10-PCS | Mod: CPTII,,, | Performed by: STUDENT IN AN ORGANIZED HEALTH CARE EDUCATION/TRAINING PROGRAM

## 2022-06-07 PROCEDURE — 3044F PR MOST RECENT HEMOGLOBIN A1C LEVEL <7.0%: ICD-10-PCS | Mod: CPTII,,, | Performed by: STUDENT IN AN ORGANIZED HEALTH CARE EDUCATION/TRAINING PROGRAM

## 2022-06-07 PROCEDURE — 99999 PR PBB SHADOW E&M-EST. PATIENT-LVL II: ICD-10-PCS | Mod: PBBFAC,,, | Performed by: STUDENT IN AN ORGANIZED HEALTH CARE EDUCATION/TRAINING PROGRAM

## 2022-06-07 PROCEDURE — 99499 NO LOS: ICD-10-PCS | Mod: S$PBB,,, | Performed by: STUDENT IN AN ORGANIZED HEALTH CARE EDUCATION/TRAINING PROGRAM

## 2022-06-07 NOTE — PRE-PROCEDURE INSTRUCTIONS
Spoke with patient regarding procedure scheduled on 6.17    Arrival time 0650    Has patient been sick with fever or on antibiotics within the last 7 days? No    Does the patient have any open wounds, sores or rashes? No    Does the patient have any recent fractures? no    Has patient received a vaccination within the last 7 days? No    Received the COVID vaccination? yes    Has the patient stopped all medications as directed? Hold metformin am of procedure    Does patient have a pacemaker and or defibrillator? no    Does the patient have a ride to and from procedure and someone reliable to remain with patient? Theresa     Is the patient diabetic? yes    Does the patient have sleep apnea? Or use O2 at home? poonam     Is the patient receiving sedation? yes    Is the patient instructed to remain NPO beginning at midnight the night before their procedure? yes    Procedure location confirmed with patient? Yes    Covid- Denies signs/symptoms. Instructed to notify PAT/MD if any changes.

## 2022-06-07 NOTE — PATIENT INSTRUCTIONS
It was a pleasure working with you to complete your assessment and to provide you with feedback regarding your results. If you have any questions or concerns after reading your report and my recommendations that did not come up during our appointment, please do not hesitate to call our clinic at [phone] or send a [facility] message with any questions.     I will reach out to Dr. Jaquez to talk about options for an intensive outpatient program.         _____________________  Americo Garay, Ph.D.  Neuropsychologist  [facility]  Department of Neurology

## 2022-06-07 NOTE — PROGRESS NOTES
Ms. June attended a in-person feedback session to discuss the results from her recent neuropsychological testing (see report dated 05/30/2022). We discussed her test results, diagnoses, and my recommendations. Ms. Junevoiced her understanding of the information provided, and voiced her intention to follow through on the recommendations provided. All questions were answered to her satisfaction and Ms. June was provided with a copy of her report. she was encouraged to contact our office with any future questions or concerns.     Ms. June and I also specifically spoke about an intensive outpatient program (IOP) due to her current severity of psychiatric symptoms. Ms. June was motivated to pursue an IOP if available. I will also communicate with her referring provider about possible IOP placement at Ms. June's request.         _____________________  Americo Garay, Ph.D.  Neuropsychologist  [facility]  Department of Neurology     Billing Information:   Time spent discussing test results with the patient: 48 minutes  Total time spent for this encounter, including discussion of test results, review of pertinent records, and documentation of this encounter: 60 minutes  [address] (1) Billed separately.

## 2022-06-08 ENCOUNTER — PATIENT MESSAGE (OUTPATIENT)
Dept: OTOLARYNGOLOGY | Facility: CLINIC | Age: 42
End: 2022-06-08
Payer: MEDICAID

## 2022-06-09 ENCOUNTER — PATIENT MESSAGE (OUTPATIENT)
Dept: INTERNAL MEDICINE | Facility: CLINIC | Age: 42
End: 2022-06-09
Payer: MEDICAID

## 2022-06-12 LAB
FINAL PATHOLOGIC DIAGNOSIS: NORMAL
GROSS: NORMAL
Lab: NORMAL
MICROSCOPIC EXAM: NORMAL

## 2022-06-13 ENCOUNTER — TELEPHONE (OUTPATIENT)
Dept: GASTROENTEROLOGY | Facility: CLINIC | Age: 42
End: 2022-06-13
Payer: MEDICAID

## 2022-06-13 ENCOUNTER — OFFICE VISIT (OUTPATIENT)
Dept: PSYCHIATRY | Facility: CLINIC | Age: 42
End: 2022-06-13
Payer: MEDICAID

## 2022-06-13 DIAGNOSIS — D64.9 ANEMIA, UNSPECIFIED TYPE: Primary | ICD-10-CM

## 2022-06-13 DIAGNOSIS — Z09 NEED FOR CASE MANAGEMENT FOLLOW-UP: Primary | ICD-10-CM

## 2022-06-13 NOTE — LETTER
June 13, 2022        Patito Jaquez, PhD, [facility]  [address]  [place] [address]             '[facility]  [id number] [place] [address] [address] [place] [address]  Phone: [phone]  Fax: [phone]   Patient: Sangeetha June   MR Number: [id number]   YOB: 1980   Date of Visit: 6/13/2022     Dear Dr. Jaquez,       Patient agreed with referral to attend an intensive outpatient program. Pt stated that she would like to attend the program Pending sale to [facility]. A referral was forwarded to [facility] for intake on 06/13/22. Avni routed the fax confirmation and the agencys information to Gema Santana for follow-up procedures.      Please contact if there's any further assistance required.        Sincerely,  Asiya Singh LMSW

## 2022-06-13 NOTE — PROGRESS NOTES
Department of Psychiatry  Case Management Follow-Up        Visit type: in-person  The chief complaint leading to consultation is: Intensive Outpatient Therapy Recommended    60 minutes of total time spent on the encounter, which includes face to face time and non-face to face time preparing to see the patient (eg, review of referral notes), Obtaining and/or reviewing separately obtained history, Documenting information in the electronic or other health record, Independently interpreting results of research (not separately reported) and communicating results to the patient/family/caregiver.    Patient Name and   Sangeetha June, 1980    Referring Provider  Dr. Patito Jaquez    Diagnosis  1. Need for case management follow-up         Notes    RAUL met with Patient in-office on 2022 to follow up after Pt was seen by the psychotherapist. SW explained role and reviewed the referral.     The patient agreed with the referral to case management. Pt reported that she didn't want to be admitted to a hospital. SW educated the patient and reviewed with her the options for intensive outpatient programs. Patient expressed understanding. RAUL provided information for [facility], explaining that transportation is provided if needed. A IOP brochure from [facility] was given to the patient as well. RAUL sent a referral via fax to the agency. RAUL will continue to remain available.    Resources  [facility]  Address: [address], [place] [address]  Phone: [phone]       Total Time  60 minutes

## 2022-06-15 ENCOUNTER — OFFICE VISIT (OUTPATIENT)
Dept: PULMONOLOGY | Facility: CLINIC | Age: 42
End: 2022-06-15
Payer: MEDICAID

## 2022-06-15 ENCOUNTER — PATIENT MESSAGE (OUTPATIENT)
Dept: GASTROENTEROLOGY | Facility: CLINIC | Age: 42
End: 2022-06-15
Payer: MEDICAID

## 2022-06-15 ENCOUNTER — CLINICAL SUPPORT (OUTPATIENT)
Dept: PULMONOLOGY | Facility: CLINIC | Age: 42
End: 2022-06-15
Payer: MEDICAID

## 2022-06-15 VITALS
BODY MASS INDEX: 47.9 KG/M2 | DIASTOLIC BLOOD PRESSURE: 82 MMHG | HEART RATE: 72 BPM | SYSTOLIC BLOOD PRESSURE: 134 MMHG | OXYGEN SATURATION: 97 % | WEIGHT: 287.5 LBS | HEIGHT: 65 IN | RESPIRATION RATE: 18 BRPM

## 2022-06-15 DIAGNOSIS — Z85.3 HISTORY OF BREAST CANCER: ICD-10-CM

## 2022-06-15 DIAGNOSIS — R94.2 MIXED OBSTRUCTIVE AND RESTRICTIVE VENTILATORY DEFECT: ICD-10-CM

## 2022-06-15 DIAGNOSIS — J45.40 MODERATE PERSISTENT ASTHMA WITHOUT COMPLICATION: Primary | ICD-10-CM

## 2022-06-15 DIAGNOSIS — G47.33 MILD OBSTRUCTIVE SLEEP APNEA: ICD-10-CM

## 2022-06-15 DIAGNOSIS — J45.909 NOT WELL CONTROLLED ASTHMA WITHOUT COMPLICATION: ICD-10-CM

## 2022-06-15 DIAGNOSIS — Z82.5 FAMILY HISTORY OF ASTHMA: ICD-10-CM

## 2022-06-15 PROCEDURE — 3061F NEG MICROALBUMINURIA REV: CPT | Mod: CPTII,,, | Performed by: INTERNAL MEDICINE

## 2022-06-15 PROCEDURE — 99214 OFFICE O/P EST MOD 30 MIN: CPT | Mod: PBBFAC,25 | Performed by: INTERNAL MEDICINE

## 2022-06-15 PROCEDURE — 1160F PR REVIEW ALL MEDS BY PRESCRIBER/CLIN PHARMACIST DOCUMENTED: ICD-10-PCS | Mod: CPTII,,, | Performed by: INTERNAL MEDICINE

## 2022-06-15 PROCEDURE — 94060 PR EVAL OF BRONCHOSPASM: ICD-10-PCS | Mod: 26,S$PBB,, | Performed by: INTERNAL MEDICINE

## 2022-06-15 PROCEDURE — 3044F HG A1C LEVEL LT 7.0%: CPT | Mod: CPTII,,, | Performed by: INTERNAL MEDICINE

## 2022-06-15 PROCEDURE — 99999 PR PBB SHADOW E&M-EST. PATIENT-LVL IV: ICD-10-PCS | Mod: PBBFAC,,, | Performed by: INTERNAL MEDICINE

## 2022-06-15 PROCEDURE — 94726 PLETHYSMOGRAPHY LUNG VOLUMES: CPT | Mod: 26,S$PBB,, | Performed by: INTERNAL MEDICINE

## 2022-06-15 PROCEDURE — 94729 DIFFUSING CAPACITY: CPT | Mod: 26,S$PBB,, | Performed by: INTERNAL MEDICINE

## 2022-06-15 PROCEDURE — 3079F DIAST BP 80-89 MM HG: CPT | Mod: CPTII,,, | Performed by: INTERNAL MEDICINE

## 2022-06-15 PROCEDURE — 94726 PULM FUNCT TST PLETHYSMOGRAP: ICD-10-PCS | Mod: 26,S$PBB,, | Performed by: INTERNAL MEDICINE

## 2022-06-15 PROCEDURE — 99214 OFFICE O/P EST MOD 30 MIN: CPT | Mod: 25,S$PBB,, | Performed by: INTERNAL MEDICINE

## 2022-06-15 PROCEDURE — 94729 PR C02/MEMBANE DIFFUSE CAPACITY: ICD-10-PCS | Mod: 26,S$PBB,, | Performed by: INTERNAL MEDICINE

## 2022-06-15 PROCEDURE — 3066F PR DOCUMENTATION OF TREATMENT FOR NEPHROPATHY: ICD-10-PCS | Mod: CPTII,,, | Performed by: INTERNAL MEDICINE

## 2022-06-15 PROCEDURE — 1159F PR MEDICATION LIST DOCUMENTED IN MEDICAL RECORD: ICD-10-PCS | Mod: CPTII,,, | Performed by: INTERNAL MEDICINE

## 2022-06-15 PROCEDURE — 3008F PR BODY MASS INDEX (BMI) DOCUMENTED: ICD-10-PCS | Mod: CPTII,,, | Performed by: INTERNAL MEDICINE

## 2022-06-15 PROCEDURE — 3066F NEPHROPATHY DOC TX: CPT | Mod: CPTII,,, | Performed by: INTERNAL MEDICINE

## 2022-06-15 PROCEDURE — 1159F MED LIST DOCD IN RCRD: CPT | Mod: CPTII,,, | Performed by: INTERNAL MEDICINE

## 2022-06-15 PROCEDURE — 94726 PLETHYSMOGRAPHY LUNG VOLUMES: CPT | Mod: PBBFAC

## 2022-06-15 PROCEDURE — 3079F PR MOST RECENT DIASTOLIC BLOOD PRESSURE 80-89 MM HG: ICD-10-PCS | Mod: CPTII,,, | Performed by: INTERNAL MEDICINE

## 2022-06-15 PROCEDURE — 99999 PR PBB SHADOW E&M-EST. PATIENT-LVL IV: CPT | Mod: PBBFAC,,, | Performed by: INTERNAL MEDICINE

## 2022-06-15 PROCEDURE — 3072F LOW RISK FOR RETINOPATHY: CPT | Mod: CPTII,,, | Performed by: INTERNAL MEDICINE

## 2022-06-15 PROCEDURE — 94060 EVALUATION OF WHEEZING: CPT | Mod: PBBFAC

## 2022-06-15 PROCEDURE — 3061F PR NEG MICROALBUMINURIA RESULT DOCUMENTED/REVIEW: ICD-10-PCS | Mod: CPTII,,, | Performed by: INTERNAL MEDICINE

## 2022-06-15 PROCEDURE — 94729 DIFFUSING CAPACITY: CPT | Mod: PBBFAC

## 2022-06-15 PROCEDURE — 3075F PR MOST RECENT SYSTOLIC BLOOD PRESS GE 130-139MM HG: ICD-10-PCS | Mod: CPTII,,, | Performed by: INTERNAL MEDICINE

## 2022-06-15 PROCEDURE — 94060 EVALUATION OF WHEEZING: CPT | Mod: 26,S$PBB,, | Performed by: INTERNAL MEDICINE

## 2022-06-15 PROCEDURE — 3008F BODY MASS INDEX DOCD: CPT | Mod: CPTII,,, | Performed by: INTERNAL MEDICINE

## 2022-06-15 PROCEDURE — 1160F RVW MEDS BY RX/DR IN RCRD: CPT | Mod: CPTII,,, | Performed by: INTERNAL MEDICINE

## 2022-06-15 PROCEDURE — 99214 PR OFFICE/OUTPT VISIT, EST, LEVL IV, 30-39 MIN: ICD-10-PCS | Mod: 25,S$PBB,, | Performed by: INTERNAL MEDICINE

## 2022-06-15 PROCEDURE — 3044F PR MOST RECENT HEMOGLOBIN A1C LEVEL <7.0%: ICD-10-PCS | Mod: CPTII,,, | Performed by: INTERNAL MEDICINE

## 2022-06-15 PROCEDURE — 3075F SYST BP GE 130 - 139MM HG: CPT | Mod: CPTII,,, | Performed by: INTERNAL MEDICINE

## 2022-06-15 PROCEDURE — 3072F PR LOW RISK FOR RETINOPATHY: ICD-10-PCS | Mod: CPTII,,, | Performed by: INTERNAL MEDICINE

## 2022-06-15 NOTE — PROGRESS NOTES
Pulmonary Outpatient Follow Up Visit     Subjective:       Patient ID: Sangeetha June is a 42 y.o. female.    Chief Complaint: Sleep Apnea and Asthma      HPI             42-year-old female patient presenting for 3 months follow-up.      Initially evaluated December 2021 for shortness of breath on exertion referred by Cardiology.         Patient complained of shortness of breath on exertion, dry coughing intermittent wheezing for months.      Wakes up nightly to use albuterol , prescribed by PMD Dr. monteiro due to nightly coughing and wheezing.  Not a smoker.     Does have a brother and sister with asthma nieces and nephews with asthma.  Not a smoker.      I suspected asthma and started on Symbicort 80 mcg 2 puffs twice a day, felt  better however partially controlled asthma control test questionnaire was 11 today she has an asthma control test questionnaire score of 21 after escalating her Symbicort 160 mcg 2 puffs twice daily.     History of stage II left breast CA status post resection and adjuvant chemotherapy.     Mild obstructive sleep apnea compliant with auto PAP 5-20 cm water with nasal cushion .    Review of Systems   Constitutional: Positive for weight gain, fatigue and weakness. Negative for fever and chills.   HENT: Negative for nosebleeds.    Eyes: Negative for redness.   Respiratory: Positive for cough, shortness of breath, wheezing, dyspnea on extertion, use of rescue inhaler and somnolence. Negative for choking.    Cardiovascular: Positive for chest pain.   Genitourinary: Negative for hematuria.   Endocrine: Negative for cold intolerance.    Musculoskeletal: Positive for arthralgias, back pain and gait problem.   Skin: Negative for rash.   Gastrointestinal: Negative for vomiting.   Neurological: Negative for syncope.   Hematological: Negative for adenopathy.        History of left breast CA status post resection and adjuvant chemotherapy    Psychiatric/Behavioral: Positive for sleep disturbance. Negative for confusion.       Outpatient Encounter Medications as of 6/15/2022   Medication Sig Dispense Refill    albuterol (PROVENTIL/VENTOLIN HFA) 90 mcg/actuation inhaler Inhale 2 puffs into the lungs every 6 (six) hours as needed for Wheezing. Rescue 18 g 1    ammonium lactate 12 % Crea Apply 1 Act topically 2 (two) times daily. To feet. 140 g 2    ascorbic acid, vitamin C, (VITAMIN C) 100 MG tablet Take 100 mg by mouth once daily.      aspirin (ECOTRIN) 81 MG EC tablet Take 81 mg by mouth once daily.      budesonide-formoterol 160-4.5 mcg (SYMBICORT) 160-4.5 mcg/actuation HFAA Inhale 2 puffs into the lungs every 12 (twelve) hours. Controller 10.2 g 3    busPIRone (BUSPAR) 15 MG tablet Take 1 tablet (15 mg total) by mouth 3 (three) times daily. 270 tablet 3    celecoxib (CELEBREX) 200 MG capsule Take 1 capsule (200 mg total) by mouth 2 (two) times daily. 28 capsule 0    dulaglutide (TRULICITY) 3 mg/0.5 mL pen injector Inject 3 mg into the skin every 7 days. 12 pen 0    EScitalopram oxalate (LEXAPRO) 20 MG tablet Take 1 tablet (20 mg total) by mouth once daily. 90 tablet 3    EUTHYROX 50 mcg tablet Take 1 tablet (50 mcg total) by mouth every morning. 90 tablet 1    hydroCHLOROthiazide (HYDRODIURIL) 25 MG tablet Take 1 tablet (25 mg total) by mouth daily as needed (edema, swelling). 90 tablet 3    hydrocortisone 0.5 % cream Apply topically 2 (two) times daily.      linaCLOtide (LINZESS) 145 mcg Cap capsule Take 1 capsule (145 mcg total) by mouth once daily. 30 capsule 3    magnesium oxide (MAG-OX) 400 mg (241.3 mg magnesium) tablet Take 1 tablet (400 mg total) by mouth once daily. 90 tablet 1    meloxicam (MOBIC) 15 MG tablet Take 1 tablet (15 mg total) by mouth once daily. 30 tablet 0    metFORMIN (GLUCOPHAGE-XR) 500 MG ER 24hr tablet SMARTSI Tablet(s) By Mouth Every Evening 90 tablet 3    multivit with min-folic acid (ADULT  "MULTIVITAMIN GUMMIES) 200 mcg Chew Take by mouth.      olopatadine (PATANOL) 0.1 % ophthalmic solution       pantoprazole (PROTONIX) 40 MG tablet Take 1 tablet (40 mg total) by mouth once daily. 30 tablet 11    pravastatin (PRAVACHOL) 40 MG tablet Take 1 tablet (40 mg total) by mouth every evening. 90 tablet 3    traZODone (DESYREL) 150 MG tablet Take 1 tablet (150 mg total) by mouth every evening. 90 tablet 3    gabapentin (NEURONTIN) 100 MG capsule Take 1 capsule (100 mg total) by mouth 2 (two) times daily. 180 capsule 1    oxybutynin (DITROPAN-XL) 5 MG TR24 Take 1 tablet (5 mg total) by mouth once daily. 90 tablet 3     Facility-Administered Encounter Medications as of 6/15/2022   Medication Dose Route Frequency Provider Last Rate Last Admin    lactated ringers infusion   Intravenous Continuous Maty Thompson MD 10 mL/hr at 01/27/22 0636 Restarted at 01/27/22 0709       Objective:     Vital Signs (Most Recent)  Vital Signs  Pulse: 72  Resp: 18  SpO2: 97 %  BP: 134/82  Height and Weight  Height: 5' 5" (165.1 cm)  Weight: 130.4 kg (287 lb 7.7 oz)  BSA (Calculated - sq m): 2.45 sq meters  BMI (Calculated): 47.8  Weight in (lb) to have BMI = 25: 149.9]  Wt Readings from Last 2 Encounters:   06/15/22 130.4 kg (287 lb 7.7 oz)   05/31/22 131.1 kg (289 lb)       Physical Exam   Constitutional: She is oriented to person, place, and time. She appears well-developed and well-nourished. No distress. She is obese.   HENT:   Head: Normocephalic.   Cardiovascular: Normal rate.   Pulmonary/Chest: Normal expansion and effort normal. No stridor. No respiratory distress. She exhibits no tenderness.   Abdominal: She exhibits no distension.   Musculoskeletal:         General: No tenderness.      Cervical back: Neck supple.      Comments: Left breast CA sp mastectomy    Lymphadenopathy: No supraclavicular adenopathy is present.     She has no cervical adenopathy.   Neurological: She is alert and oriented to person, place, and " time. Gait normal.   Skin: Skin is warm. No cyanosis. Nails show no clubbing.   Psychiatric: She has a normal mood and affect. Her behavior is normal. Judgment and thought content normal.   Nursing note and vitals reviewed.      Laboratory  Lab Results   Component Value Date    WBC 5.87 04/22/2022    RBC 5.12 04/22/2022    HGB 13.3 04/22/2022    HCT 42.4 04/22/2022    MCV 83 04/22/2022    MCH 26.0 (L) 04/22/2022    MCHC 31.4 (L) 04/22/2022    RDW 18.8 (H) 04/22/2022     04/22/2022    MPV 9.4 04/22/2022    GRAN 3.2 04/22/2022    GRAN 53.9 04/22/2022    LYMPH 2.2 04/22/2022    LYMPH 37.1 04/22/2022    MONO 0.4 04/22/2022    MONO 7.2 04/22/2022    EOS 0.1 04/22/2022    BASO 0.03 04/22/2022    EOSINOPHIL 1.0 04/22/2022    BASOPHIL 0.5 04/22/2022       BMP  Lab Results   Component Value Date     04/22/2022    K 3.3 (L) 04/22/2022    CL 98 04/22/2022    CO2 28 04/22/2022    BUN 6 04/22/2022    CREATININE 0.8 04/22/2022    CALCIUM 9.8 04/22/2022    ANIONGAP 14 04/22/2022    ESTGFRAFRICA >60 04/22/2022    EGFRNONAA >60 04/22/2022    AST 19 04/22/2022    ALT 16 04/22/2022    PROT 7.6 04/22/2022       Lab Results   Component Value Date    BNP 15 10/29/2021    BNP <10 07/02/2021    BNP <10 05/04/2021       Lab Results   Component Value Date    TSH 1.414 04/22/2022       No results found for: SEDRATE    No results found for: CRP  Lab Results   Component Value Date    IGE 58 12/03/2021        No results found for: ASPERGILLUS  No results found for: AFUMIGATUSCL     No results found for: ACE     Diagnostic Results:  I have personally reviewed today the following studies:      Home sleep study mild obstructive sleep apnea AHI 9.6 events per hour.      Compliance report reviewed.  63% usage more than 4 hours.  ResidualAHI 3 events per hour      CT chest November 2021     COMPARISON:  Prior chest radiographs; PET-CT 05/26/2021     FINDINGS:  Thoracic aorta and great vessels are unremarkable.  Heart demonstrates mild  enlargement.  No pericardial effusion.  No pleural effusion.  No pathologically enlarged hilar, mediastinal or axillary lymph nodes.  Left axillary node dissection postsurgical findings present.  Left mastectomy changes noted.     Lungs demonstrate no focal opacity with diffuse hypoaeration changes.  Stable 4 mm right upper lobe nodule present adjacent to the minor fissure.  Nearby 3 mm calcified nodule present.  4 mm anterior right middle lobe nodule noted, better visualized currently but not felt to be changed from prior PET-CT.     Hepatic steatosis noted.  Left renal cyst.  No acute abnormality within the visualized upper abdomen.  No acute osseous abnormality.     Impression:     No acute abnormality      PFT 06/15/2022    Mixed obstructive and restrictive pattern.  Normal DLCO.  Small airways flow obstruction.  FEV1 increased by 12% but less than 200 mL.       Spirometry December 2021     Grade A-D -acceptable quality of tracingsModerately severe restriction (FVC 50-59% of predicted). Consider lung volumes if clinically indicatedFlow volume loop demonstrate an obstructive defect.Flow volume loops demonstrate a restrictive defect.Overall   there is moderately severe ventilatory impairment. (FEV1 > or equal to 50% of predicted and < or equal to 59% of predicted.)   Â Pattern of mixed obstruction and restriction    6 minute walking test December 2021 no need for O2 on exertion.        Assessment/Plan:   Moderate persistent asthma without complication    Mixed obstructive and restrictive ventilatory defect    Mild obstructive sleep apnea    Family history of asthma    History of breast cancer         Continue albuterol p.r.n.     Now controlled with Symbicort 160 mcg 2 puffs twice daily.    Reviewed CPAP compliance, 70% compliance with more than 4 hours a day and residual AHI of 1 event per hour.    Encouraged patient to continue CPAP therapy during sleep and increase hours and days of usage.      Obstructive  pattern asthma related and restriction likely related to patient body habitus/morbid obesity/hypoventilation.    Follow-up with Oncology.    Follow up in about 6 months (around 12/15/2022).    This note was prepared using voice recognition system and is likely to have sound alike errors that may have been overlooked even after proof reading.  Please call me with any questions    Discussed diagnosis, its evaluation, treatment and usual course. All questions answered.      Evy Cid MD

## 2022-06-17 LAB
BRPFT: NORMAL
DLCO ADJ PRE: 22.07 ML/(MIN*MMHG)
DLCO SINGLE BREATH LLN: 21.41
DLCO SINGLE BREATH PRE REF: 81.3 %
DLCO SINGLE BREATH REF: 27.15
DLCOC SBVA LLN: 3.57
DLCOC SBVA PRE REF: 188.2 %
DLCOC SBVA REF: 4.94
DLCOC SINGLE BREATH LLN: 21.41
DLCOC SINGLE BREATH PRE REF: 81.3 %
DLCOC SINGLE BREATH REF: 27.15
DLCOVA LLN: 3.57
DLCOVA PRE REF: 188.2 %
DLCOVA PRE: 9.3 ML/(MIN*MMHG*L)
DLCOVA REF: 4.94
DLVAADJ PRE: 9.3 ML/(MIN*MMHG*L)
ERV LLN: -16448.92
ERV PRE REF: 23.2 %
ERV REF: 1.08
FEF 25 75 CHG: 79.4 %
FEF 25 75 LLN: 1.46
FEF 25 75 POST REF: 64.1 %
FEF 25 75 PRE REF: 35.7 %
FEF 25 75 REF: 2.83
FET100 CHG: 1.7 %
FEV1 CHG: 12.7 %
FEV1 FVC CHG: 10.7 %
FEV1 FVC LLN: 71
FEV1 FVC POST REF: 103.9 %
FEV1 FVC PRE REF: 93.8 %
FEV1 FVC REF: 81
FEV1 LLN: 2.14
FEV1 POST REF: 48 %
FEV1 PRE REF: 42.5 %
FEV1 REF: 2.79
FRCPLETH LLN: 2.05
FRCPLETH PREREF: 57.7 %
FRCPLETH REF: 2.87
FVC CHG: 1.8 %
FVC LLN: 2.67
FVC POST REF: 45.9 %
FVC PRE REF: 45.1 %
FVC REF: 3.45
IVC PRE: 1.21 L
IVC SINGLE BREATH LLN: 2.67
IVC SINGLE BREATH PRE REF: 35.1 %
IVC SINGLE BREATH REF: 3.45
MVV LLN: 104
MVV PRE REF: 44.2 %
MVV REF: 122
PEF CHG: 10.8 %
PEF LLN: 4.8
PEF POST REF: 68.2 %
PEF PRE REF: 61.6 %
PEF REF: 7.02
POST FEF 25 75: 1.81 L/S
POST FET 100: 6.66 SEC
POST FEV1 FVC: 84.58 %
POST FEV1: 1.34 L
POST FVC: 1.58 L
POST PEF: 4.79 L/S
PRE DLCO: 22.07 ML/(MIN*MMHG)
PRE ERV: 0.25 L
PRE FEF 25 75: 1.01 L/S
PRE FET 100: 6.54 SEC
PRE FEV1 FVC: 76.38 %
PRE FEV1: 1.19 L
PRE FRC PL: 1.66 L
PRE FVC: 1.55 L
PRE MVV: 54 L/MIN
PRE PEF: 4.32 L/S
PRE RV: 1.04 L
PRE TLC: 3.25 L
RAW LLN: 3.06
RAW PRE REF: 83.8 %
RAW PRE: 2.56 CMH2O*S/L
RAW REF: 3.06
RV LLN: 1.22
RV PRE REF: 58.1 %
RV REF: 1.8
RVTLC LLN: 24
RVTLC PRE REF: 94.9 %
RVTLC PRE: 32.17 %
RVTLC REF: 34
TLC LLN: 4.51
TLC PRE REF: 59.1 %
TLC REF: 5.5
VA PRE: 2.49 L
VA SINGLE BREATH LLN: 5.35
VA SINGLE BREATH PRE REF: 46.5 %
VA SINGLE BREATH REF: 5.35
VC LLN: 2.67
VC PRE REF: 63.9 %
VC PRE: 2.2 L
VC REF: 3.45
VTGRAWPRE: 2.14 L

## 2022-06-17 NOTE — PROGRESS NOTES
Called patient to see if she was coming for procedure, patient stated she overslept. She will reschedule.

## 2022-06-22 ENCOUNTER — OFFICE VISIT (OUTPATIENT)
Dept: DERMATOLOGY | Facility: CLINIC | Age: 42
End: 2022-06-22
Payer: MEDICAID

## 2022-06-22 ENCOUNTER — LAB VISIT (OUTPATIENT)
Dept: LAB | Facility: HOSPITAL | Age: 42
End: 2022-06-22
Attending: INTERNAL MEDICINE
Payer: MEDICAID

## 2022-06-22 ENCOUNTER — OFFICE VISIT (OUTPATIENT)
Dept: ORTHOPEDICS | Facility: CLINIC | Age: 42
End: 2022-06-22
Payer: MEDICAID

## 2022-06-22 VITALS — HEIGHT: 65 IN | BODY MASS INDEX: 47.82 KG/M2 | WEIGHT: 287 LBS

## 2022-06-22 DIAGNOSIS — L98.9 SKIN LESION: ICD-10-CM

## 2022-06-22 DIAGNOSIS — M75.122 NONTRAUMATIC COMPLETE TEAR OF LEFT ROTATOR CUFF: Primary | ICD-10-CM

## 2022-06-22 DIAGNOSIS — D64.9 ANEMIA, UNSPECIFIED TYPE: ICD-10-CM

## 2022-06-22 DIAGNOSIS — L72.0 EPIDERMAL CYST: Primary | ICD-10-CM

## 2022-06-22 LAB — IGA SERPL-MCNC: 258 MG/DL (ref 40–350)

## 2022-06-22 PROCEDURE — 3061F NEG MICROALBUMINURIA REV: CPT | Mod: CPTII,,, | Performed by: STUDENT IN AN ORGANIZED HEALTH CARE EDUCATION/TRAINING PROGRAM

## 2022-06-22 PROCEDURE — 3066F NEPHROPATHY DOC TX: CPT | Mod: CPTII,,, | Performed by: PHYSICIAN ASSISTANT

## 2022-06-22 PROCEDURE — 99999 PR PBB SHADOW E&M-EST. PATIENT-LVL III: ICD-10-PCS | Mod: PBBFAC,,, | Performed by: PHYSICIAN ASSISTANT

## 2022-06-22 PROCEDURE — 99213 OFFICE O/P EST LOW 20 MIN: CPT | Mod: PBBFAC,27,PO | Performed by: PHYSICIAN ASSISTANT

## 2022-06-22 PROCEDURE — 3044F HG A1C LEVEL LT 7.0%: CPT | Mod: CPTII,,, | Performed by: STUDENT IN AN ORGANIZED HEALTH CARE EDUCATION/TRAINING PROGRAM

## 2022-06-22 PROCEDURE — 99999 PR PBB SHADOW E&M-EST. PATIENT-LVL III: ICD-10-PCS | Mod: PBBFAC,,, | Performed by: STUDENT IN AN ORGANIZED HEALTH CARE EDUCATION/TRAINING PROGRAM

## 2022-06-22 PROCEDURE — 99213 PR OFFICE/OUTPT VISIT, EST, LEVL III, 20-29 MIN: ICD-10-PCS | Mod: S$PBB,,, | Performed by: STUDENT IN AN ORGANIZED HEALTH CARE EDUCATION/TRAINING PROGRAM

## 2022-06-22 PROCEDURE — 1160F RVW MEDS BY RX/DR IN RCRD: CPT | Mod: CPTII,,, | Performed by: STUDENT IN AN ORGANIZED HEALTH CARE EDUCATION/TRAINING PROGRAM

## 2022-06-22 PROCEDURE — 99212 PR OFFICE/OUTPT VISIT, EST, LEVL II, 10-19 MIN: ICD-10-PCS | Mod: S$PBB,,, | Performed by: PHYSICIAN ASSISTANT

## 2022-06-22 PROCEDURE — 36415 COLL VENOUS BLD VENIPUNCTURE: CPT | Mod: PO | Performed by: INTERNAL MEDICINE

## 2022-06-22 PROCEDURE — 3066F NEPHROPATHY DOC TX: CPT | Mod: CPTII,,, | Performed by: STUDENT IN AN ORGANIZED HEALTH CARE EDUCATION/TRAINING PROGRAM

## 2022-06-22 PROCEDURE — 99213 OFFICE O/P EST LOW 20 MIN: CPT | Mod: PBBFAC,PO | Performed by: STUDENT IN AN ORGANIZED HEALTH CARE EDUCATION/TRAINING PROGRAM

## 2022-06-22 PROCEDURE — 3008F BODY MASS INDEX DOCD: CPT | Mod: CPTII,,, | Performed by: STUDENT IN AN ORGANIZED HEALTH CARE EDUCATION/TRAINING PROGRAM

## 2022-06-22 PROCEDURE — 83516 IMMUNOASSAY NONANTIBODY: CPT | Performed by: INTERNAL MEDICINE

## 2022-06-22 PROCEDURE — 3044F PR MOST RECENT HEMOGLOBIN A1C LEVEL <7.0%: ICD-10-PCS | Mod: CPTII,,, | Performed by: PHYSICIAN ASSISTANT

## 2022-06-22 PROCEDURE — 3072F LOW RISK FOR RETINOPATHY: CPT | Mod: CPTII,,, | Performed by: PHYSICIAN ASSISTANT

## 2022-06-22 PROCEDURE — 1160F RVW MEDS BY RX/DR IN RCRD: CPT | Mod: CPTII,,, | Performed by: PHYSICIAN ASSISTANT

## 2022-06-22 PROCEDURE — 82784 ASSAY IGA/IGD/IGG/IGM EACH: CPT | Performed by: INTERNAL MEDICINE

## 2022-06-22 PROCEDURE — 99999 PR PBB SHADOW E&M-EST. PATIENT-LVL III: CPT | Mod: PBBFAC,,, | Performed by: STUDENT IN AN ORGANIZED HEALTH CARE EDUCATION/TRAINING PROGRAM

## 2022-06-22 PROCEDURE — 3008F PR BODY MASS INDEX (BMI) DOCUMENTED: ICD-10-PCS | Mod: CPTII,,, | Performed by: STUDENT IN AN ORGANIZED HEALTH CARE EDUCATION/TRAINING PROGRAM

## 2022-06-22 PROCEDURE — 99999 PR PBB SHADOW E&M-EST. PATIENT-LVL III: CPT | Mod: PBBFAC,,, | Performed by: PHYSICIAN ASSISTANT

## 2022-06-22 PROCEDURE — 99212 OFFICE O/P EST SF 10 MIN: CPT | Mod: S$PBB,,, | Performed by: PHYSICIAN ASSISTANT

## 2022-06-22 PROCEDURE — 3066F PR DOCUMENTATION OF TREATMENT FOR NEPHROPATHY: ICD-10-PCS | Mod: CPTII,,, | Performed by: PHYSICIAN ASSISTANT

## 2022-06-22 PROCEDURE — 3066F PR DOCUMENTATION OF TREATMENT FOR NEPHROPATHY: ICD-10-PCS | Mod: CPTII,,, | Performed by: STUDENT IN AN ORGANIZED HEALTH CARE EDUCATION/TRAINING PROGRAM

## 2022-06-22 PROCEDURE — 1160F PR REVIEW ALL MEDS BY PRESCRIBER/CLIN PHARMACIST DOCUMENTED: ICD-10-PCS | Mod: CPTII,,, | Performed by: STUDENT IN AN ORGANIZED HEALTH CARE EDUCATION/TRAINING PROGRAM

## 2022-06-22 PROCEDURE — 1159F PR MEDICATION LIST DOCUMENTED IN MEDICAL RECORD: ICD-10-PCS | Mod: CPTII,,, | Performed by: STUDENT IN AN ORGANIZED HEALTH CARE EDUCATION/TRAINING PROGRAM

## 2022-06-22 PROCEDURE — 3072F PR LOW RISK FOR RETINOPATHY: ICD-10-PCS | Mod: CPTII,,, | Performed by: PHYSICIAN ASSISTANT

## 2022-06-22 PROCEDURE — 3044F PR MOST RECENT HEMOGLOBIN A1C LEVEL <7.0%: ICD-10-PCS | Mod: CPTII,,, | Performed by: STUDENT IN AN ORGANIZED HEALTH CARE EDUCATION/TRAINING PROGRAM

## 2022-06-22 PROCEDURE — 99213 OFFICE O/P EST LOW 20 MIN: CPT | Mod: S$PBB,,, | Performed by: STUDENT IN AN ORGANIZED HEALTH CARE EDUCATION/TRAINING PROGRAM

## 2022-06-22 PROCEDURE — 3061F NEG MICROALBUMINURIA REV: CPT | Mod: CPTII,,, | Performed by: PHYSICIAN ASSISTANT

## 2022-06-22 PROCEDURE — 3072F PR LOW RISK FOR RETINOPATHY: ICD-10-PCS | Mod: CPTII,,, | Performed by: STUDENT IN AN ORGANIZED HEALTH CARE EDUCATION/TRAINING PROGRAM

## 2022-06-22 PROCEDURE — 1159F MED LIST DOCD IN RCRD: CPT | Mod: CPTII,,, | Performed by: PHYSICIAN ASSISTANT

## 2022-06-22 PROCEDURE — 3061F PR NEG MICROALBUMINURIA RESULT DOCUMENTED/REVIEW: ICD-10-PCS | Mod: CPTII,,, | Performed by: STUDENT IN AN ORGANIZED HEALTH CARE EDUCATION/TRAINING PROGRAM

## 2022-06-22 PROCEDURE — 1159F MED LIST DOCD IN RCRD: CPT | Mod: CPTII,,, | Performed by: STUDENT IN AN ORGANIZED HEALTH CARE EDUCATION/TRAINING PROGRAM

## 2022-06-22 PROCEDURE — 1160F PR REVIEW ALL MEDS BY PRESCRIBER/CLIN PHARMACIST DOCUMENTED: ICD-10-PCS | Mod: CPTII,,, | Performed by: PHYSICIAN ASSISTANT

## 2022-06-22 PROCEDURE — 3044F HG A1C LEVEL LT 7.0%: CPT | Mod: CPTII,,, | Performed by: PHYSICIAN ASSISTANT

## 2022-06-22 PROCEDURE — 1159F PR MEDICATION LIST DOCUMENTED IN MEDICAL RECORD: ICD-10-PCS | Mod: CPTII,,, | Performed by: PHYSICIAN ASSISTANT

## 2022-06-22 PROCEDURE — 3072F LOW RISK FOR RETINOPATHY: CPT | Mod: CPTII,,, | Performed by: STUDENT IN AN ORGANIZED HEALTH CARE EDUCATION/TRAINING PROGRAM

## 2022-06-22 PROCEDURE — 3061F PR NEG MICROALBUMINURIA RESULT DOCUMENTED/REVIEW: ICD-10-PCS | Mod: CPTII,,, | Performed by: PHYSICIAN ASSISTANT

## 2022-06-22 NOTE — PROGRESS NOTES
Orthopaedics  Post-operative follow-up    Procedure Performed:  1. Left shoulder arthroscopic rotator cuff repair  2. Left shoulder arthroscopic biceps tenodesis  3. Left shoulder subacromial decompression     Date of Surgery: 01/27/2022    Subjective: Sangeetha June is now approximately 5 months out from her shoulder surgery.  Her pain and function have significantly improved from preop.  She is able to perform activities of daily living.  She demonstrates ability to reach overhead, to suspect overhead, and reach behind her without difficulty.  She does report that she has been having issues recently source keeping track of all her medications.    Exam:  incision sites healed, C/D/I  Fluid ROM with no crepitus  Active upright ROM: , , ER 60  Axillary nerve sensation and motor intact  Motor and sensory intact distally  Strong radial pulse, fingers warm and well perfused    Imaging:  No new imaging studies    Impression:  S/p left shoulder RCR, LHBT, SAD, third post-operative visit - doing well    Plan:  Her pain is significantly improved and she is now able to fully mobilize the shoulder for activities of daily living  Would like her to continue to emphasize active ROM and strengthening on her own  I anticipate that she will continue to improve her strength  Symptomatic treatment for pain / swelling  Instructed patient to call clinic if questions or concerns    Follow-up with me as needed          Francisco Mathews MD

## 2022-06-22 NOTE — PROGRESS NOTES
Subjective:       Patient ID:  Sangeetha June is a 42 y.o. female who presents for No chief complaint on file.    HPI    Review of Systems     Objective:    Physical Exam       Diagram Legend     Erythematous scaling macule/papule c/w actinic keratosis       Vascular papule c/w angioma      Pigmented verrucoid papule/plaque c/w seborrheic keratosis      Yellow umbilicated papule c/w sebaceous hyperplasia      Irregularly shaped tan macule c/w lentigo     1-2 mm smooth white papules consistent with Milia      Movable subcutaneous cyst with punctum c/w epidermal inclusion cyst      Subcutaneous movable cyst c/w pilar cyst      Firm pink to brown papule c/w dermatofibroma      Pedunculated fleshy papule(s) c/w skin tag(s)      Evenly pigmented macule c/w junctional nevus     Mildly variegated pigmented, slightly irregular-bordered macule c/w mildly atypical nevus      Flesh colored to evenly pigmented papule c/w intradermal nevus       Pink pearly papule/plaque c/w basal cell carcinoma      Erythematous hyperkeratotic cursted plaque c/w SCC      Surgical scar with no sign of skin cancer recurrence      Open and closed comedones      Inflammatory papules and pustules      Verrucoid papule consistent consistent with wart     Erythematous eczematous patches and plaques     Dystrophic onycholytic nail with subungual debris c/w onychomycosis     Umbilicated papule    Erythematous-base heme-crusted tan verrucoid plaque consistent with inflamed seborrheic keratosis     Erythematous Silvery Scaling Plaque c/w Psoriasis     See annotation      Assessment / Plan:        There are no diagnoses linked to this encounter.         No follow-ups on file.   (2) good, crying

## 2022-06-22 NOTE — PROGRESS NOTES
"    Subjective:       Patient ID:  Sangeetha June is a 42 y.o. female who presents for   Chief Complaint   Patient presents with    Spot     C/o spot on forehead     History of Present Illness: The patient presents with chief complaint of spot .  Location: left forehead (glabella)  Duration: 1 -2 years  Signs/Symptoms: itching, raised, pt states it was big, but the bump has gotten smaller and less symptomatic. Denies h/o drainage, bleeding, or warmth. Denies any active tenderness, itching. Denies any change in vision.    Prior treatments: tried expression at home with "hot needle" (no drainage per patient), lesion got bigger after this manipulation and then one morning it was smaller.    Denies personal or FHX of skin CA      Review of Systems   Constitutional: Negative for fever and chills.   Gastrointestinal: Negative for nausea and vomiting.   Skin: Positive for activity-related sunscreen use. Negative for itching, rash, dry skin, sun sensitivity, daily sunscreen use, recent sunburn, dry lips and abscesses.   Hematologic/Lymphatic: Does not bruise/bleed easily.        Objective:    Physical Exam   Constitutional: She appears well-developed and well-nourished. No distress.   Neurological: She is alert and oriented to person, place, and time. She is not disoriented.   Psychiatric: She has a normal mood and affect.   Skin:   Areas Examined (abnormalities noted in diagram):   Head / Face Inspection Performed  Neck Inspection Performed  Chest / Axilla Inspection Performed  Back Inspection Performed  RUE Inspected  LUE Inspection Performed              Diagram Legend     Erythematous scaling macule/papule c/w actinic keratosis       Vascular papule c/w angioma      Pigmented verrucoid papule/plaque c/w seborrheic keratosis      Yellow umbilicated papule c/w sebaceous hyperplasia      Irregularly shaped tan macule c/w lentigo     1-2 mm smooth white papules consistent with Milia      Movable subcutaneous cyst " with punctum c/w epidermal inclusion cyst      Subcutaneous movable cyst c/w pilar cyst      Firm pink to brown papule c/w dermatofibroma      Pedunculated fleshy papule(s) c/w skin tag(s)      Evenly pigmented macule c/w junctional nevus     Mildly variegated pigmented, slightly irregular-bordered macule c/w mildly atypical nevus      Flesh colored to evenly pigmented papule c/w intradermal nevus       Pink pearly papule/plaque c/w basal cell carcinoma      Erythematous hyperkeratotic cursted plaque c/w SCC      Surgical scar with no sign of skin cancer recurrence      Open and closed comedones      Inflammatory papules and pustules      Verrucoid papule consistent consistent with wart     Erythematous eczematous patches and plaques     Dystrophic onycholytic nail with subungual debris c/w onychomycosis     Umbilicated papule    Erythematous-base heme-crusted tan verrucoid plaque consistent with inflamed seborrheic keratosis     Erythematous Silvery Scaling Plaque c/w Psoriasis     See annotation      Assessment / Plan:        Epidermal cyst  Lesion size and symptoms are improving, agree to monitor. Reviewed red flag signs. Would consider referral to facial plastics if any changes.    Skin lesion  -     Ambulatory referral/consult to Dermatology       Follow up for to see Dermatology MD.

## 2022-06-23 ENCOUNTER — TELEPHONE (OUTPATIENT)
Dept: DIABETES | Facility: CLINIC | Age: 42
End: 2022-06-23
Payer: MEDICAID

## 2022-06-23 NOTE — TELEPHONE ENCOUNTER
Assisted w/ appt RS to pt preferences. Answered pt's questions related to BG targets and troubleshooting hyperglycemia/hypoglycemia. Advised pt to contact primary care since she reports dizziness that doesn't appear related to diabetes or BG levels. Pt verbalized understanding. Call ended well.

## 2022-06-27 LAB — TTG IGA SER-ACNC: 5 UNITS

## 2022-06-30 ENCOUNTER — PATIENT MESSAGE (OUTPATIENT)
Dept: GASTROENTEROLOGY | Facility: CLINIC | Age: 42
End: 2022-06-30
Payer: MEDICAID

## 2022-07-06 ENCOUNTER — PATIENT MESSAGE (OUTPATIENT)
Dept: PAIN MEDICINE | Facility: CLINIC | Age: 42
End: 2022-07-06
Payer: MEDICAID

## 2022-07-07 NOTE — TELEPHONE ENCOUNTER
Called pt to set up their procedure. Pt answered and procedure has been made. Inform pt on the procedure instruction. Pt  does not need to stop any blood thinners per . Pt understood and all question answered.     Gilles Otoole   Medical Assistant

## 2022-07-12 ENCOUNTER — HOSPITAL ENCOUNTER (OUTPATIENT)
Dept: RADIOLOGY | Facility: HOSPITAL | Age: 42
Discharge: HOME | End: 2022-07-12
Attending: PHYSICIAN ASSISTANT
Payer: MEDICAID

## 2022-07-12 ENCOUNTER — PATIENT MESSAGE (OUTPATIENT)
Dept: NEUROLOGY | Facility: CLINIC | Age: 42
End: 2022-07-12
Payer: MEDICAID

## 2022-07-12 ENCOUNTER — OFFICE VISIT (OUTPATIENT)
Dept: GASTROENTEROLOGY | Facility: CLINIC | Age: 42
End: 2022-07-12
Payer: MEDICAID

## 2022-07-12 ENCOUNTER — PATIENT MESSAGE (OUTPATIENT)
Dept: INTERNAL MEDICINE | Facility: CLINIC | Age: 42
End: 2022-07-12
Payer: MEDICAID

## 2022-07-12 VITALS
BODY MASS INDEX: 47.06 KG/M2 | WEIGHT: 282.44 LBS | DIASTOLIC BLOOD PRESSURE: 70 MMHG | OXYGEN SATURATION: 99 % | SYSTOLIC BLOOD PRESSURE: 100 MMHG | HEIGHT: 65 IN | HEART RATE: 64 BPM

## 2022-07-12 DIAGNOSIS — R19.7 DIARRHEA, UNSPECIFIED TYPE: ICD-10-CM

## 2022-07-12 DIAGNOSIS — R10.9 ABDOMINAL PAIN, UNSPECIFIED ABDOMINAL LOCATION: Primary | ICD-10-CM

## 2022-07-12 DIAGNOSIS — K59.04 CHRONIC IDIOPATHIC CONSTIPATION: ICD-10-CM

## 2022-07-12 DIAGNOSIS — R10.9 ABDOMINAL PAIN, UNSPECIFIED ABDOMINAL LOCATION: ICD-10-CM

## 2022-07-12 PROCEDURE — 3066F PR DOCUMENTATION OF TREATMENT FOR NEPHROPATHY: ICD-10-PCS | Mod: CPTII,,, | Performed by: PHYSICIAN ASSISTANT

## 2022-07-12 PROCEDURE — 3008F BODY MASS INDEX DOCD: CPT | Mod: CPTII,,, | Performed by: PHYSICIAN ASSISTANT

## 2022-07-12 PROCEDURE — 74019 XR ABDOMEN FLAT AND ERECT: ICD-10-PCS | Mod: 26,,, | Performed by: RADIOLOGY

## 2022-07-12 PROCEDURE — 1159F PR MEDICATION LIST DOCUMENTED IN MEDICAL RECORD: ICD-10-PCS | Mod: CPTII,,, | Performed by: PHYSICIAN ASSISTANT

## 2022-07-12 PROCEDURE — 3061F PR NEG MICROALBUMINURIA RESULT DOCUMENTED/REVIEW: ICD-10-PCS | Mod: CPTII,,, | Performed by: PHYSICIAN ASSISTANT

## 2022-07-12 PROCEDURE — 3078F PR MOST RECENT DIASTOLIC BLOOD PRESSURE < 80 MM HG: ICD-10-PCS | Mod: CPTII,,, | Performed by: PHYSICIAN ASSISTANT

## 2022-07-12 PROCEDURE — 3072F LOW RISK FOR RETINOPATHY: CPT | Mod: CPTII,,, | Performed by: PHYSICIAN ASSISTANT

## 2022-07-12 PROCEDURE — 3078F DIAST BP <80 MM HG: CPT | Mod: CPTII,,, | Performed by: PHYSICIAN ASSISTANT

## 2022-07-12 PROCEDURE — 3044F HG A1C LEVEL LT 7.0%: CPT | Mod: CPTII,,, | Performed by: PHYSICIAN ASSISTANT

## 2022-07-12 PROCEDURE — 99214 OFFICE O/P EST MOD 30 MIN: CPT | Mod: S$PBB,,, | Performed by: PHYSICIAN ASSISTANT

## 2022-07-12 PROCEDURE — 3072F PR LOW RISK FOR RETINOPATHY: ICD-10-PCS | Mod: CPTII,,, | Performed by: PHYSICIAN ASSISTANT

## 2022-07-12 PROCEDURE — 3074F PR MOST RECENT SYSTOLIC BLOOD PRESSURE < 130 MM HG: ICD-10-PCS | Mod: CPTII,,, | Performed by: PHYSICIAN ASSISTANT

## 2022-07-12 PROCEDURE — 3008F PR BODY MASS INDEX (BMI) DOCUMENTED: ICD-10-PCS | Mod: CPTII,,, | Performed by: PHYSICIAN ASSISTANT

## 2022-07-12 PROCEDURE — 99215 OFFICE O/P EST HI 40 MIN: CPT | Mod: PBBFAC | Performed by: PHYSICIAN ASSISTANT

## 2022-07-12 PROCEDURE — 99214 PR OFFICE/OUTPT VISIT, EST, LEVL IV, 30-39 MIN: ICD-10-PCS | Mod: S$PBB,,, | Performed by: PHYSICIAN ASSISTANT

## 2022-07-12 PROCEDURE — 74019 RADEX ABDOMEN 2 VIEWS: CPT | Mod: TC

## 2022-07-12 PROCEDURE — 99999 PR PBB SHADOW E&M-EST. PATIENT-LVL V: ICD-10-PCS | Mod: PBBFAC,,, | Performed by: PHYSICIAN ASSISTANT

## 2022-07-12 PROCEDURE — 3066F NEPHROPATHY DOC TX: CPT | Mod: CPTII,,, | Performed by: PHYSICIAN ASSISTANT

## 2022-07-12 PROCEDURE — 3061F NEG MICROALBUMINURIA REV: CPT | Mod: CPTII,,, | Performed by: PHYSICIAN ASSISTANT

## 2022-07-12 PROCEDURE — 99999 PR PBB SHADOW E&M-EST. PATIENT-LVL V: CPT | Mod: PBBFAC,,, | Performed by: PHYSICIAN ASSISTANT

## 2022-07-12 PROCEDURE — 74019 RADEX ABDOMEN 2 VIEWS: CPT | Mod: 26,,, | Performed by: RADIOLOGY

## 2022-07-12 PROCEDURE — 3044F PR MOST RECENT HEMOGLOBIN A1C LEVEL <7.0%: ICD-10-PCS | Mod: CPTII,,, | Performed by: PHYSICIAN ASSISTANT

## 2022-07-12 PROCEDURE — 3074F SYST BP LT 130 MM HG: CPT | Mod: CPTII,,, | Performed by: PHYSICIAN ASSISTANT

## 2022-07-12 PROCEDURE — 1159F MED LIST DOCD IN RCRD: CPT | Mod: CPTII,,, | Performed by: PHYSICIAN ASSISTANT

## 2022-07-12 RX ORDER — LURASIDONE HYDROCHLORIDE 20 MG/1
20 TABLET, FILM COATED ORAL DAILY
COMMUNITY
Start: 2022-07-11 | End: 2023-10-31

## 2022-07-12 NOTE — PROGRESS NOTES
Subjective:      Patient ID: Sangeetha June is a 42 y.o. female.    Chief Complaint: Anemia (Follow up)    HPI:  Patient reports to clinic today for follow up of abdominal pain. History of breast cancer. Has seen GI twice since November 2021. She also has history of ROBERT for which she has had EGD and colonoscopy with no significant findings. Most recent blood work from April shows normal hemoglobin and iron levels. Will repeat today. She denies any overt bleeding. She complains today of continued abdominal pain mostly to the RUQ. Can radiate to the epigastric area and LUQ as well. Additional symptoms include diarrhea that began last week. Numerous episodes per day. She has history of chronic constipation for which she is taking Linzess.   HIDA scan abnormal. Has been told in the past she needs to discuss cholecystectomy but she has not seen them as of yet. She is open to scheduling an appointment.     Review of Systems   Constitutional: Negative for activity change, appetite change, chills, diaphoresis, fatigue, fever and unexpected weight change.   HENT: Negative for trouble swallowing.    Respiratory: Negative for cough and shortness of breath.    Cardiovascular: Positive for chest pain (ongoing - has seen cardiology with negative workup).   Gastrointestinal: Positive for abdominal pain, constipation and diarrhea. Negative for abdominal distention, anal bleeding, blood in stool, nausea and vomiting.   Neurological: Positive for dizziness (comes and goes). Negative for weakness and light-headedness.   Psychiatric/Behavioral: Negative for dysphoric mood. The patient is nervous/anxious.        Medical History: Reviewed    Social History: Reviewed    Allergies: Reviewed    Objective:     Physical Exam  Constitutional:       General: She is not in acute distress.     Appearance: Normal appearance. She is obese. She is not ill-appearing, toxic-appearing or diaphoretic.   HENT:      Head: Normocephalic and  atraumatic.   Eyes:      General: No scleral icterus.     Extraocular Movements: Extraocular movements intact.   Cardiovascular:      Rate and Rhythm: Normal rate and regular rhythm.   Pulmonary:      Effort: Pulmonary effort is normal. No respiratory distress.   Abdominal:      General: Bowel sounds are normal. There is no distension.      Palpations: Abdomen is soft.      Tenderness: There is abdominal tenderness (Upper abdomen - worse to RUQ). There is no guarding.   Musculoskeletal:         General: Normal range of motion.      Cervical back: Normal range of motion.   Skin:     General: Skin is warm and dry.      Coloration: Skin is not jaundiced.   Neurological:      Mental Status: She is alert and oriented to person, place, and time.         Assessment:     1. Abdominal pain, unspecified abdominal location    2. Chronic idiopathic constipation    3. Diarrhea, unspecified type        Plan:     -Schedule Xray for further evaluation of abdominal pain and stool burden. Possible diarrhea is overflow.  -Recommend General surgery consult given abnormal HIDA and chronic RUQ pain and diarrhea. Reports she was scheduled for cholecystectomy in the past but this was put on hold due to breast cancer diagnosis. Will schedule appointment for gen surg consult.  -CBC with iron studies. Last labs 3 months ago. If labs show ROBERT, will schedule for VCE given negative EGD and colonoscopy. Last labs with normal hemoglobin and iron levels. Patient denies taking iron supplement.  -Chest pain with negative cardiac workup. Continue Protonix. Could be GI related. EGD negative. Continue to monitor.   -If Xray normal, schedule for stool studies.      Sangeetha was seen today for anemia.    Diagnoses and all orders for this visit:    Abdominal pain, unspecified abdominal location  -     X-Ray Abdomen Flat And Erect; Future  -     CBC Auto Differential; Future  -     IRON AND TIBC; Future  -     Ferritin; Future    Chronic idiopathic  constipation  -     X-Ray Abdomen Flat And Erect; Future  -     CBC Auto Differential; Future  -     IRON AND TIBC; Future  -     Ferritin; Future    Diarrhea, unspecified type  -     X-Ray Abdomen Flat And Erect; Future  -     CBC Auto Differential; Future  -     IRON AND TIBC; Future  -     Ferritin; Future        No follow-ups on file.    Thank you for the opportunity to participate in the care of this patient.   Katey Portillo PA-C.

## 2022-07-18 ENCOUNTER — CLINICAL SUPPORT (OUTPATIENT)
Dept: DIABETES | Facility: CLINIC | Age: 42
End: 2022-07-18
Payer: MEDICAID

## 2022-07-18 VITALS — BODY MASS INDEX: 46.7 KG/M2 | WEIGHT: 280.63 LBS

## 2022-07-18 DIAGNOSIS — E11.69 TYPE 2 DIABETES MELLITUS WITH OTHER SPECIFIED COMPLICATION, WITH LONG-TERM CURRENT USE OF INSULIN: ICD-10-CM

## 2022-07-18 DIAGNOSIS — Z79.4 TYPE 2 DIABETES MELLITUS WITH OTHER SPECIFIED COMPLICATION, WITH LONG-TERM CURRENT USE OF INSULIN: ICD-10-CM

## 2022-07-18 PROCEDURE — 99999 PR PBB SHADOW E&M-EST. PATIENT-LVL III: CPT | Mod: PBBFAC,,, | Performed by: DIETITIAN, REGISTERED

## 2022-07-18 PROCEDURE — 99213 OFFICE O/P EST LOW 20 MIN: CPT | Mod: PBBFAC | Performed by: DIETITIAN, REGISTERED

## 2022-07-18 PROCEDURE — G0108 DIAB MANAGE TRN  PER INDIV: HCPCS | Mod: PBBFAC | Performed by: DIETITIAN, REGISTERED

## 2022-07-18 PROCEDURE — 99999 PR PBB SHADOW E&M-EST. PATIENT-LVL III: ICD-10-PCS | Mod: PBBFAC,,, | Performed by: DIETITIAN, REGISTERED

## 2022-07-18 NOTE — PROGRESS NOTES
Diabetes Care Specialist Progress Note  Author: Alexandra Trent RD, CDE  Date: 7/18/2022    Program Intake  Reason for Diabetes Program Visit:: Intervention  Type of Intervention:: Individual  Education: Self-Management Skill Review  Current diabetes risk level:: low  In the last 12 months, have you::  (shoulder surgery)  Was the ER or hospital admission related to diabetes?: No    Lab Results   Component Value Date    HGBA1C 6.5 (H) 01/07/2022       Clinical    Problem Review  Active comorbidities affecting diabetes self-care.:  (HTN, HLD, CVA, s/p breast cancer, Hypothyroidism, Obesity by BMI, GERD)    Clinical Assessment  Current Diabetes Treatment: Injectable, Oral Medication, Diet, Exercise (Trulicity 3mg weekly (Wednesday dosing). Metformin xr 500mg 1tab daily.)  Have you ever experienced hypoglycemia (low blood sugar)?: no  Have you ever experienced hyperglycemia (high blood sugar)?: no    Medication Information  How many days a week do you miss your medications?: 2  Do you sometimes have difficulty refilling your medications?: Yes (see comment) (needs metformin refill and has appt w/ Dr. Garcia tomorrow)  Medication adherence impacting ability to self-manage diabetes?: Yes    Labs  Do you have regular lab work to monitor your medications?: Yes  Type of Regular Lab Work: A1c, Cholesterol, Microalbumin, CBC, BMP  Where do you get your labs drawn?: [facility]  Lab Compliance Barriers: No    Nutritional Status  Diet:  (Pt reports 1-2 meals w/ fruit snacks daily. No use MR. No excess carb from food portions; no excess sat fat or sodium.)  Meal Plan 24 Hour Recall - Breakfast: none OR 1c grits, eggs, deli turkey  Meal Plan 24 Hour Recall - Dinner: chix w/ 1/2c rice and green beans OR chix w/ rice 1/2 c and peas/corn 1/2c OR 1/2 beans, 1/3-1/2c rice w/ occs sausage  Meal Plan 24 Hour Recall - Snack: snacks on fruit; pat: water, tea/lemonade (pt unsure if diet or regular)  Change in appetite?: Yes  (decreased)  Recent Changes in Weight: Weight Loss (28 lbs since 2/14/22)  Was weight loss intentional or unintentional?: Intentional  Current nutritional status an area of need that is impacting patient's ability to self-manage diabetes?: Yes    Diabetes Self-Management Skills Assessment    Nutrition/Healthy Eating  Challenges to healthy eating::  (irregular meal patterns)  Method of carbohydrate measurement:: measuring cups/spoons (visual estimation)  Patient can identify foods that impact blood sugar.: yes  Nutrition/Healthy Eating Skills Assessment Completed:: Yes  Assessment indicates:: Instruction Needed  Area of need?: Yes    Physical Activity/Exercise  Patient's daily activity level:: sedentary  Patient formally exercises outside of work.: no  Reasons for not exercising::  (pt reports feeling dizzy and has appt w/ Dr. Garcia tomorrow)  Patient can identify reasons why exercise/physical activity is important in diabetes management.: no  Physical Activity/Exercise Skills Assessment Completed: : Yes  Assessment indicates:: Instruction Needed  Area of need?: Yes    Medications  Patient is able to describe current diabetes management routine.: yes  Patient is able to identify current diabetes medications, dosages, and appropriate timing of medications.: yes  Patient understands the purpose of the medications taken for diabetes.: no  Patient reports problems or concerns with current medication regimen.: yes (forgetting to dose couple times per week; pt uses pill organizer)  Medication Skills Assessment Completed:: Yes  Assessment indicates:: Instruction Needed  Area of need?: Yes    Home Blood Glucose Monitoring  Patient states that blood sugar is checked at home daily.: yes  Monitoring Method:: home glucometer  When you check what is your typical blood sugar range? : pt unable to provide BG history  Blood glucose logs:: no, encouraged to bring logs to provider visits  Home Blood Glucose Monitoring Skills  Assessment Completed: : Yes  Assessment indicates:: Instruction Needed  Area of need?: Yes    Assessment Summary and Plan  Based on today's diabetes care assessment, the following areas of need were identified:      Social 4/14/2022   Support No   Access to Mass Media/Tech No   Cognitive/Behavioral Health No   Culture/Church No   Communication No   Health Literacy No        Clinical 7/18/2022   Medication Adherence Yes - Will message Dr. Garcia regarding metformin refill needed.   Lab Compliance No   Nutritional Status Yes - see care plan        Diabetes Self-Management Skills 7/18/2022   Nutrition/Healthy Eating Yes - see care plan   Physical Activity/Exercise Yes - discussed benefits, strategies to improve; encouraged web based chair videos and short distance walking   Medication Yes - Encouraged pt to set phone reminder to assist w/ medication dose consistency.    Home Blood Glucose Monitoring Yes - see care plan       Today's interventions were provided through individual discussion, instruction, and written materials were provided.    Patient verbalized understanding of instruction and written materials.  Pt was able to return back demonstration of instructions today. Patient understood key points, needs reinforcement and further instruction.     Diabetes Self-Management Care Plan:  Today's Diabetes Self-Management Care Plan was developed with Sangeetha's input. Sangeetha has agreed to work toward the following goal(s) to improve his/her overall diabetes control.      Care Plan: Diabetes Management   Updates made since 6/18/2022 12:00 AM      Problem: Healthy Eating       Goal: Eat 3 meals daily - manage carb 30-45grams/meal, 5-15grams/snack.    Start Date: 4/14/2022   Expected End Date: 4/14/2023   This Visit's Progress: On track   Recent Progress: On track   Priority: High   Barriers: Cognitive Deficits   Note:    Encouraged overall progress w/ portion control mgmt. Reviewed strategies to improve meal spacing.  Discussed benefit, application of diabetes version MR shake (brands listed). Detailed food lists provided including non-sugar beverage options.      Problem: Blood Glucose Self-Monitoring       Goal: Patient agrees to check and record blood sugars 2 times per day (fst, 2hr ppd); bring meter/records to clinic.    Start Date: 4/14/2022   Expected End Date: 4/14/2023   This Visit's Progress: No change   Recent Progress: On track   Priority: Medium   Barriers: Cognitive Deficits   Note:    Encouraged consistency of BG testing and to bring meter to clinic visits.           Follow Up Plan   Follow up in about 4 months (around 11/18/2022).  -Review BG logs and A1C updates  -Eval goal progress  -Admin diabetes distress scale    Today's care plan and follow up schedule was discussed with patient.  Sangeetha verbalized understanding of the care plan, goals, and agrees to follow up plan.        The patient was encouraged to communicate with his/her health care provider/physician and care team regarding his/her condition(s) and treatment.  I provided the patient with my contact information today and encouraged to contact me via phone or [facility] Patient Portal as needed.     Length of Visit   Total Time: 60 Minutes

## 2022-07-18 NOTE — Clinical Note
Hi Dr. Garcia,  I met w/ Ms Sangeetha today for diabetes care. She is seeing you on tomorrow and needs refills on her metformin. Would you please assist? Thank you, Alexandra

## 2022-07-19 ENCOUNTER — TELEPHONE (OUTPATIENT)
Dept: INTERNAL MEDICINE | Facility: CLINIC | Age: 42
End: 2022-07-19

## 2022-07-19 ENCOUNTER — LAB VISIT (OUTPATIENT)
Dept: LAB | Facility: HOSPITAL | Age: 42
End: 2022-07-19
Attending: FAMILY MEDICINE
Payer: MEDICAID

## 2022-07-19 ENCOUNTER — OFFICE VISIT (OUTPATIENT)
Dept: INTERNAL MEDICINE | Facility: CLINIC | Age: 42
End: 2022-07-19
Payer: MEDICAID

## 2022-07-19 ENCOUNTER — PATIENT MESSAGE (OUTPATIENT)
Dept: ADMINISTRATIVE | Facility: OTHER | Age: 42
End: 2022-07-19
Payer: MEDICAID

## 2022-07-19 VITALS
HEART RATE: 70 BPM | OXYGEN SATURATION: 97 % | BODY MASS INDEX: 46.63 KG/M2 | DIASTOLIC BLOOD PRESSURE: 72 MMHG | TEMPERATURE: 99 F | SYSTOLIC BLOOD PRESSURE: 130 MMHG | WEIGHT: 280.19 LBS

## 2022-07-19 DIAGNOSIS — E78.5 HYPERLIPIDEMIA, UNSPECIFIED HYPERLIPIDEMIA TYPE: ICD-10-CM

## 2022-07-19 DIAGNOSIS — R32 URINARY INCONTINENCE, UNSPECIFIED TYPE: ICD-10-CM

## 2022-07-19 DIAGNOSIS — I10 HYPERTENSION, UNSPECIFIED TYPE: ICD-10-CM

## 2022-07-19 DIAGNOSIS — K59.04 CHRONIC IDIOPATHIC CONSTIPATION: ICD-10-CM

## 2022-07-19 DIAGNOSIS — G47.09 OTHER INSOMNIA: ICD-10-CM

## 2022-07-19 DIAGNOSIS — E11.9 TYPE 2 DIABETES MELLITUS WITHOUT COMPLICATION, WITHOUT LONG-TERM CURRENT USE OF INSULIN: ICD-10-CM

## 2022-07-19 DIAGNOSIS — R06.02 SOB (SHORTNESS OF BREATH): ICD-10-CM

## 2022-07-19 DIAGNOSIS — E11.9 TYPE 2 DIABETES MELLITUS WITHOUT COMPLICATION, WITHOUT LONG-TERM CURRENT USE OF INSULIN: Primary | ICD-10-CM

## 2022-07-19 DIAGNOSIS — F41.1 GAD (GENERALIZED ANXIETY DISORDER): ICD-10-CM

## 2022-07-19 DIAGNOSIS — R42 DIZZINESS: ICD-10-CM

## 2022-07-19 DIAGNOSIS — H53.9 VISION DISTURBANCE: ICD-10-CM

## 2022-07-19 PROBLEM — R20.2 TINGLING OF LEFT UPPER EXTREMITY: Status: RESOLVED | Noted: 2021-10-29 | Resolved: 2022-07-19

## 2022-07-19 PROBLEM — R29.898 WEAKNESS OF SHOULDER: Status: RESOLVED | Noted: 2022-02-15 | Resolved: 2022-07-19

## 2022-07-19 LAB
ALBUMIN SERPL BCP-MCNC: 4.1 G/DL (ref 3.5–5.2)
ALP SERPL-CCNC: 115 U/L (ref 55–135)
ALT SERPL W/O P-5'-P-CCNC: 18 U/L (ref 10–44)
ANION GAP SERPL CALC-SCNC: 11 MMOL/L (ref 8–16)
AST SERPL-CCNC: 20 U/L (ref 10–40)
BILIRUB SERPL-MCNC: 0.4 MG/DL (ref 0.1–1)
BUN SERPL-MCNC: 6 MG/DL (ref 6–20)
CALCIUM SERPL-MCNC: 9.9 MG/DL (ref 8.7–10.5)
CHLORIDE SERPL-SCNC: 97 MMOL/L (ref 95–110)
CHOLEST SERPL-MCNC: 148 MG/DL (ref 120–199)
CHOLEST/HDLC SERPL: 3.4 {RATIO} (ref 2–5)
CO2 SERPL-SCNC: 31 MMOL/L (ref 23–29)
CREAT SERPL-MCNC: 0.9 MG/DL (ref 0.5–1.4)
EST. GFR  (AFRICAN AMERICAN): >60 ML/MIN/1.73 M^2
EST. GFR  (NON AFRICAN AMERICAN): >60 ML/MIN/1.73 M^2
ESTIMATED AVG GLUCOSE: 108 MG/DL (ref 68–131)
GLUCOSE SERPL-MCNC: 81 MG/DL (ref 70–110)
HBA1C MFR BLD: 5.4 % (ref 4–5.6)
HDLC SERPL-MCNC: 44 MG/DL (ref 40–75)
HDLC SERPL: 29.7 % (ref 20–50)
LDLC SERPL CALC-MCNC: 84.6 MG/DL (ref 63–159)
NONHDLC SERPL-MCNC: 104 MG/DL
POTASSIUM SERPL-SCNC: 3.6 MMOL/L (ref 3.5–5.1)
PROT SERPL-MCNC: 7.7 G/DL (ref 6–8.4)
SODIUM SERPL-SCNC: 139 MMOL/L (ref 136–145)
TRIGL SERPL-MCNC: 97 MG/DL (ref 30–150)

## 2022-07-19 PROCEDURE — 3044F PR MOST RECENT HEMOGLOBIN A1C LEVEL <7.0%: ICD-10-PCS | Mod: CPTII,,, | Performed by: FAMILY MEDICINE

## 2022-07-19 PROCEDURE — 3044F HG A1C LEVEL LT 7.0%: CPT | Mod: CPTII,,, | Performed by: FAMILY MEDICINE

## 2022-07-19 PROCEDURE — 3061F NEG MICROALBUMINURIA REV: CPT | Mod: CPTII,,, | Performed by: FAMILY MEDICINE

## 2022-07-19 PROCEDURE — 1159F PR MEDICATION LIST DOCUMENTED IN MEDICAL RECORD: ICD-10-PCS | Mod: CPTII,,, | Performed by: FAMILY MEDICINE

## 2022-07-19 PROCEDURE — 3008F BODY MASS INDEX DOCD: CPT | Mod: CPTII,,, | Performed by: FAMILY MEDICINE

## 2022-07-19 PROCEDURE — 3066F PR DOCUMENTATION OF TREATMENT FOR NEPHROPATHY: ICD-10-PCS | Mod: CPTII,,, | Performed by: FAMILY MEDICINE

## 2022-07-19 PROCEDURE — 1160F RVW MEDS BY RX/DR IN RCRD: CPT | Mod: CPTII,,, | Performed by: FAMILY MEDICINE

## 2022-07-19 PROCEDURE — 99214 PR OFFICE/OUTPT VISIT, EST, LEVL IV, 30-39 MIN: ICD-10-PCS | Mod: S$PBB,,, | Performed by: FAMILY MEDICINE

## 2022-07-19 PROCEDURE — 83036 HEMOGLOBIN GLYCOSYLATED A1C: CPT | Performed by: FAMILY MEDICINE

## 2022-07-19 PROCEDURE — 99999 PR PBB SHADOW E&M-EST. PATIENT-LVL IV: CPT | Mod: PBBFAC,,, | Performed by: FAMILY MEDICINE

## 2022-07-19 PROCEDURE — 99999 PR PBB SHADOW E&M-EST. PATIENT-LVL IV: ICD-10-PCS | Mod: PBBFAC,,, | Performed by: FAMILY MEDICINE

## 2022-07-19 PROCEDURE — 3066F NEPHROPATHY DOC TX: CPT | Mod: CPTII,,, | Performed by: FAMILY MEDICINE

## 2022-07-19 PROCEDURE — 3078F DIAST BP <80 MM HG: CPT | Mod: CPTII,,, | Performed by: FAMILY MEDICINE

## 2022-07-19 PROCEDURE — 1159F MED LIST DOCD IN RCRD: CPT | Mod: CPTII,,, | Performed by: FAMILY MEDICINE

## 2022-07-19 PROCEDURE — 3078F PR MOST RECENT DIASTOLIC BLOOD PRESSURE < 80 MM HG: ICD-10-PCS | Mod: CPTII,,, | Performed by: FAMILY MEDICINE

## 2022-07-19 PROCEDURE — 99214 OFFICE O/P EST MOD 30 MIN: CPT | Mod: PBBFAC,PO | Performed by: FAMILY MEDICINE

## 2022-07-19 PROCEDURE — 3008F PR BODY MASS INDEX (BMI) DOCUMENTED: ICD-10-PCS | Mod: CPTII,,, | Performed by: FAMILY MEDICINE

## 2022-07-19 PROCEDURE — 3061F PR NEG MICROALBUMINURIA RESULT DOCUMENTED/REVIEW: ICD-10-PCS | Mod: CPTII,,, | Performed by: FAMILY MEDICINE

## 2022-07-19 PROCEDURE — 3072F LOW RISK FOR RETINOPATHY: CPT | Mod: CPTII,,, | Performed by: FAMILY MEDICINE

## 2022-07-19 PROCEDURE — 3075F SYST BP GE 130 - 139MM HG: CPT | Mod: CPTII,,, | Performed by: FAMILY MEDICINE

## 2022-07-19 PROCEDURE — 80053 COMPREHEN METABOLIC PANEL: CPT | Performed by: FAMILY MEDICINE

## 2022-07-19 PROCEDURE — 3072F PR LOW RISK FOR RETINOPATHY: ICD-10-PCS | Mod: CPTII,,, | Performed by: FAMILY MEDICINE

## 2022-07-19 PROCEDURE — 80061 LIPID PANEL: CPT | Performed by: FAMILY MEDICINE

## 2022-07-19 PROCEDURE — 36415 COLL VENOUS BLD VENIPUNCTURE: CPT | Mod: PO | Performed by: FAMILY MEDICINE

## 2022-07-19 PROCEDURE — 3075F PR MOST RECENT SYSTOLIC BLOOD PRESS GE 130-139MM HG: ICD-10-PCS | Mod: CPTII,,, | Performed by: FAMILY MEDICINE

## 2022-07-19 PROCEDURE — 1160F PR REVIEW ALL MEDS BY PRESCRIBER/CLIN PHARMACIST DOCUMENTED: ICD-10-PCS | Mod: CPTII,,, | Performed by: FAMILY MEDICINE

## 2022-07-19 PROCEDURE — 99214 OFFICE O/P EST MOD 30 MIN: CPT | Mod: S$PBB,,, | Performed by: FAMILY MEDICINE

## 2022-07-19 RX ORDER — PANTOPRAZOLE SODIUM 40 MG/1
40 TABLET, DELAYED RELEASE ORAL DAILY
Qty: 90 TABLET | Refills: 3 | Status: SHIPPED | OUTPATIENT
Start: 2022-07-19 | End: 2023-02-20 | Stop reason: SDUPTHER

## 2022-07-19 RX ORDER — TRAZODONE HYDROCHLORIDE 150 MG/1
150 TABLET ORAL NIGHTLY
Qty: 90 TABLET | Refills: 3 | Status: SHIPPED | OUTPATIENT
Start: 2022-07-19 | End: 2022-07-19 | Stop reason: SDUPTHER

## 2022-07-19 RX ORDER — TRAZODONE HYDROCHLORIDE 150 MG/1
150 TABLET ORAL NIGHTLY
Qty: 90 TABLET | Refills: 3 | Status: SHIPPED | OUTPATIENT
Start: 2022-07-19 | End: 2023-02-20 | Stop reason: SDUPTHER

## 2022-07-19 RX ORDER — OXYBUTYNIN CHLORIDE 5 MG/1
5 TABLET, EXTENDED RELEASE ORAL DAILY
Qty: 90 TABLET | Refills: 3 | Status: SHIPPED | OUTPATIENT
Start: 2022-07-19 | End: 2023-02-20

## 2022-07-19 RX ORDER — ESCITALOPRAM OXALATE 20 MG/1
20 TABLET ORAL DAILY
Qty: 90 TABLET | Refills: 3 | Status: SHIPPED | OUTPATIENT
Start: 2022-07-19 | End: 2022-07-19 | Stop reason: SDUPTHER

## 2022-07-19 RX ORDER — ALBUTEROL SULFATE 90 UG/1
2 AEROSOL, METERED RESPIRATORY (INHALATION) EVERY 6 HOURS PRN
Qty: 18 G | Refills: 1 | Status: SHIPPED | OUTPATIENT
Start: 2022-07-19 | End: 2022-07-19 | Stop reason: SDUPTHER

## 2022-07-19 RX ORDER — ESCITALOPRAM OXALATE 20 MG/1
20 TABLET ORAL DAILY
Qty: 90 TABLET | Refills: 3 | Status: SHIPPED | OUTPATIENT
Start: 2022-07-19 | End: 2023-02-20 | Stop reason: SDUPTHER

## 2022-07-19 RX ORDER — BUSPIRONE HYDROCHLORIDE 15 MG/1
15 TABLET ORAL 3 TIMES DAILY
Qty: 270 TABLET | Refills: 3 | Status: SHIPPED | OUTPATIENT
Start: 2022-07-19 | End: 2022-07-19 | Stop reason: SDUPTHER

## 2022-07-19 RX ORDER — PRAVASTATIN SODIUM 40 MG/1
40 TABLET ORAL NIGHTLY
Qty: 90 TABLET | Refills: 3 | Status: SHIPPED | OUTPATIENT
Start: 2022-07-19 | End: 2023-02-20 | Stop reason: SDUPTHER

## 2022-07-19 RX ORDER — DULAGLUTIDE 4.5 MG/.5ML
4.5 INJECTION, SOLUTION SUBCUTANEOUS
Qty: 12 PEN | Refills: 1 | Status: SHIPPED | OUTPATIENT
Start: 2022-07-19 | End: 2022-07-19 | Stop reason: SDUPTHER

## 2022-07-19 RX ORDER — PRAVASTATIN SODIUM 40 MG/1
40 TABLET ORAL NIGHTLY
Qty: 90 TABLET | Refills: 3 | Status: SHIPPED | OUTPATIENT
Start: 2022-07-19 | End: 2022-07-19 | Stop reason: SDUPTHER

## 2022-07-19 RX ORDER — ASPIRIN 81 MG/1
81 TABLET ORAL DAILY
Qty: 360 TABLET | Refills: 1 | Status: SHIPPED | OUTPATIENT
Start: 2022-07-19 | End: 2023-03-22 | Stop reason: SDUPTHER

## 2022-07-19 RX ORDER — PANTOPRAZOLE SODIUM 40 MG/1
40 TABLET, DELAYED RELEASE ORAL DAILY
Qty: 90 TABLET | Refills: 3 | Status: SHIPPED | OUTPATIENT
Start: 2022-07-19 | End: 2022-07-19 | Stop reason: SDUPTHER

## 2022-07-19 RX ORDER — ASPIRIN 81 MG/1
81 TABLET ORAL DAILY
Qty: 360 TABLET | Refills: 1 | Status: SHIPPED | OUTPATIENT
Start: 2022-07-19 | End: 2022-07-19 | Stop reason: SDUPTHER

## 2022-07-19 RX ORDER — DULAGLUTIDE 4.5 MG/.5ML
4.5 INJECTION, SOLUTION SUBCUTANEOUS
Qty: 12 PEN | Refills: 1 | Status: SHIPPED | OUTPATIENT
Start: 2022-07-19 | End: 2022-09-06

## 2022-07-19 RX ORDER — GABAPENTIN 300 MG/1
300 CAPSULE ORAL 2 TIMES DAILY
Qty: 180 CAPSULE | Refills: 0 | Status: SHIPPED | OUTPATIENT
Start: 2022-07-19 | End: 2022-07-19 | Stop reason: SDUPTHER

## 2022-07-19 RX ORDER — METFORMIN HYDROCHLORIDE 500 MG/1
TABLET, EXTENDED RELEASE ORAL
Qty: 90 TABLET | Refills: 3 | Status: SHIPPED | OUTPATIENT
Start: 2022-07-19 | End: 2022-07-19 | Stop reason: SDUPTHER

## 2022-07-19 RX ORDER — BUSPIRONE HYDROCHLORIDE 15 MG/1
15 TABLET ORAL 3 TIMES DAILY
Qty: 270 TABLET | Refills: 3 | Status: SHIPPED | OUTPATIENT
Start: 2022-07-19 | End: 2023-02-20 | Stop reason: SDUPTHER

## 2022-07-19 RX ORDER — METFORMIN HYDROCHLORIDE 500 MG/1
TABLET, EXTENDED RELEASE ORAL
Qty: 90 TABLET | Refills: 3 | Status: SHIPPED | OUTPATIENT
Start: 2022-07-19 | End: 2023-02-20 | Stop reason: SDUPTHER

## 2022-07-19 RX ORDER — OXYBUTYNIN CHLORIDE 5 MG/1
5 TABLET, EXTENDED RELEASE ORAL DAILY
Qty: 90 TABLET | Refills: 3 | Status: SHIPPED | OUTPATIENT
Start: 2022-07-19 | End: 2022-07-19 | Stop reason: SDUPTHER

## 2022-07-19 RX ORDER — HYDROCHLOROTHIAZIDE 25 MG/1
25 TABLET ORAL DAILY PRN
Qty: 90 TABLET | Refills: 3 | Status: SHIPPED | OUTPATIENT
Start: 2022-07-19 | End: 2023-02-20 | Stop reason: SDUPTHER

## 2022-07-19 RX ORDER — HYDROCHLOROTHIAZIDE 25 MG/1
25 TABLET ORAL DAILY PRN
Qty: 90 TABLET | Refills: 3 | Status: SHIPPED | OUTPATIENT
Start: 2022-07-19 | End: 2022-07-19 | Stop reason: SDUPTHER

## 2022-07-19 RX ORDER — GABAPENTIN 300 MG/1
300 CAPSULE ORAL 2 TIMES DAILY
Qty: 180 CAPSULE | Refills: 0 | Status: SHIPPED | OUTPATIENT
Start: 2022-07-19 | End: 2022-10-21 | Stop reason: SDUPTHER

## 2022-07-19 RX ORDER — ALBUTEROL SULFATE 90 UG/1
2 AEROSOL, METERED RESPIRATORY (INHALATION) EVERY 6 HOURS PRN
Qty: 18 G | Refills: 1 | Status: SHIPPED | OUTPATIENT
Start: 2022-07-19 | End: 2023-01-11 | Stop reason: SDUPTHER

## 2022-07-19 NOTE — PROGRESS NOTES
Subjective:       Patient ID: Sangeetha June is a 42 y.o. female.    Chief Complaint: Follow-up, Dizziness, and Headache    Dizziness, started after changing psych meds, improving now, but would like to see ENT    Dizziness:   Chronicity:  Chronic and new  Onset:  More than 1 month ago  Progression since onset:  Gradually improving  Frequency:  Constantly  Severity:  Mildno chest pain.    Review of Systems   Respiratory: Negative for shortness of breath.    Cardiovascular: Negative for chest pain.   Gastrointestinal: Negative for abdominal pain.   Neurological: Positive for dizziness.       Objective:      Physical Exam  Vitals and nursing note reviewed.   Constitutional:       General: She is not in acute distress.     Appearance: Normal appearance. She is well-developed. She is not diaphoretic.      Comments: In wheelchair   HENT:      Head: Normocephalic and atraumatic.   Pulmonary:      Effort: Pulmonary effort is normal. No respiratory distress.      Breath sounds: Normal breath sounds. No wheezing.   Skin:     General: Skin is warm and dry.      Findings: No erythema or rash.      Comments: Cyst to glabella   Neurological:      Mental Status: She is alert.         Assessment:       1. Type 2 diabetes mellitus without complication, without long-term current use of insulin    2. Vision disturbance    3. Dizziness    4. Urinary incontinence, unspecified type    5. Hyperlipidemia, unspecified hyperlipidemia type    6. Other insomnia    7. Hypertension, unspecified type    8. SOB (shortness of breath)    9. ANGIE (generalized anxiety disorder)    10. Chronic idiopathic constipation        Plan:     Problem List Items Addressed This Visit        Psychiatric    ANGIE (generalized anxiety disorder)    Overview     Chronic, Stable, cont lexapro and buspar           Relevant Medications    busPIRone (BUSPAR) 15 MG tablet    EScitalopram oxalate (LEXAPRO) 20 MG tablet       Ophtho    Vision disturbance        Pulmonary    SOB (shortness of breath)    Relevant Medications    albuterol (PROVENTIL/VENTOLIN HFA) 90 mcg/actuation inhaler       Cardiac/Vascular    Hyperlipidemia    Overview     Chronic, Stable, cont a statin           Relevant Medications    pravastatin (PRAVACHOL) 40 MG tablet    Hypertension    Overview     Chronic, Stable, cont hctz           Relevant Medications    hydroCHLOROthiazide (HYDRODIURIL) 25 MG tablet       Renal/    Urinary incontinence    Relevant Medications    oxybutynin (DITROPAN-XL) 5 MG TR24       Endocrine    Type 2 diabetes mellitus without complication, without long-term current use of insulin - Primary    Relevant Medications    aspirin (ECOTRIN) 81 MG EC tablet    metFORMIN (GLUCOPHAGE-XR) 500 MG ER 24hr tablet    dulaglutide (TRULICITY) 4.5 mg/0.5 mL pen injector    Other Relevant Orders    Lipid Panel    Microalbumin/Creatinine Ratio, Urine    Comprehensive Metabolic Panel    Hemoglobin A1C       GI    Chronic idiopathic constipation    Relevant Medications    linaCLOtide (LINZESS) 145 mcg Cap capsule       Other    Other insomnia    Overview     Chronic, stable, cont trazodone           Relevant Medications    traZODone (DESYREL) 150 MG tablet    Dizziness    Relevant Orders    Ambulatory referral/consult to ENT    Ambulatory referral/consult to Audiology

## 2022-07-19 NOTE — TELEPHONE ENCOUNTER
I tried calling, but I need help w scheduling a pt w Dr Dalton today. Per Dr Garcia, can you please schedule this pt at 3:15 ? I am unable to do so. Pt is in clinic finishing up alvina w Dr Garcia.

## 2022-07-19 NOTE — TELEPHONE ENCOUNTER
Pt was seen today w Dr Garcia.  meds were sent to wrong pharmacy.  All meds pended to new pharmacy.

## 2022-07-25 ENCOUNTER — OFFICE VISIT (OUTPATIENT)
Dept: OPHTHALMOLOGY | Facility: CLINIC | Age: 42
End: 2022-07-25
Payer: MEDICAID

## 2022-07-25 ENCOUNTER — OFFICE VISIT (OUTPATIENT)
Dept: PODIATRY | Facility: CLINIC | Age: 42
End: 2022-07-25
Payer: MEDICAID

## 2022-07-25 VITALS — WEIGHT: 280 LBS | HEIGHT: 65 IN | BODY MASS INDEX: 46.65 KG/M2

## 2022-07-25 DIAGNOSIS — E11.69 TYPE 2 DIABETES MELLITUS WITH OTHER SPECIFIED COMPLICATION, WITH LONG-TERM CURRENT USE OF INSULIN: Primary | ICD-10-CM

## 2022-07-25 DIAGNOSIS — H52.4 PRESBYOPIA OF BOTH EYES: ICD-10-CM

## 2022-07-25 DIAGNOSIS — E11.9 DIABETES MELLITUS WITHOUT COMPLICATION: ICD-10-CM

## 2022-07-25 DIAGNOSIS — E11.9 TYPE 2 DIABETES MELLITUS WITHOUT COMPLICATION, WITHOUT LONG-TERM CURRENT USE OF INSULIN: Primary | ICD-10-CM

## 2022-07-25 DIAGNOSIS — Z79.4 TYPE 2 DIABETES MELLITUS WITH OTHER SPECIFIED COMPLICATION, WITH LONG-TERM CURRENT USE OF INSULIN: Primary | ICD-10-CM

## 2022-07-25 DIAGNOSIS — L84 CORN OR CALLUS: ICD-10-CM

## 2022-07-25 DIAGNOSIS — E11.9 COMPREHENSIVE DIABETIC FOOT EXAMINATION, TYPE 2 DM, ENCOUNTER FOR: ICD-10-CM

## 2022-07-25 PROCEDURE — 92015 PR REFRACTION: ICD-10-PCS | Mod: ,,, | Performed by: OPTOMETRIST

## 2022-07-25 PROCEDURE — 2023F PR DILATED RETINAL EXAM W/O EVID OF RETINOPATHY: ICD-10-PCS | Mod: CPTII,,, | Performed by: OPTOMETRIST

## 2022-07-25 PROCEDURE — 92014 COMPRE OPH EXAM EST PT 1/>: CPT | Mod: S$PBB,,, | Performed by: OPTOMETRIST

## 2022-07-25 PROCEDURE — 3066F PR DOCUMENTATION OF TREATMENT FOR NEPHROPATHY: ICD-10-PCS | Mod: CPTII,,, | Performed by: OPTOMETRIST

## 2022-07-25 PROCEDURE — 2023F DILAT RTA XM W/O RTNOPTHY: CPT | Mod: CPTII,,, | Performed by: OPTOMETRIST

## 2022-07-25 PROCEDURE — 3061F NEG MICROALBUMINURIA REV: CPT | Mod: CPTII,,, | Performed by: OPTOMETRIST

## 2022-07-25 PROCEDURE — 3044F PR MOST RECENT HEMOGLOBIN A1C LEVEL <7.0%: ICD-10-PCS | Mod: CPTII,,, | Performed by: OPTOMETRIST

## 2022-07-25 PROCEDURE — 1160F PR REVIEW ALL MEDS BY PRESCRIBER/CLIN PHARMACIST DOCUMENTED: ICD-10-PCS | Mod: CPTII,,, | Performed by: OPTOMETRIST

## 2022-07-25 PROCEDURE — 3066F NEPHROPATHY DOC TX: CPT | Mod: CPTII,,, | Performed by: OPTOMETRIST

## 2022-07-25 PROCEDURE — 1160F RVW MEDS BY RX/DR IN RCRD: CPT | Mod: CPTII,,, | Performed by: OPTOMETRIST

## 2022-07-25 PROCEDURE — 3044F HG A1C LEVEL LT 7.0%: CPT | Mod: CPTII,,, | Performed by: OPTOMETRIST

## 2022-07-25 PROCEDURE — 1159F MED LIST DOCD IN RCRD: CPT | Mod: CPTII,,, | Performed by: OPTOMETRIST

## 2022-07-25 PROCEDURE — 3061F PR NEG MICROALBUMINURIA RESULT DOCUMENTED/REVIEW: ICD-10-PCS | Mod: CPTII,,, | Performed by: OPTOMETRIST

## 2022-07-25 PROCEDURE — 99999 PR PBB SHADOW E&M-EST. PATIENT-LVL III: ICD-10-PCS | Mod: PBBFAC,,, | Performed by: OPTOMETRIST

## 2022-07-25 PROCEDURE — 3072F LOW RISK FOR RETINOPATHY: CPT | Mod: CPTII,,, | Performed by: PODIATRIST

## 2022-07-25 PROCEDURE — 92014 PR EYE EXAM, EST PATIENT,COMPREHESV: ICD-10-PCS | Mod: S$PBB,,, | Performed by: OPTOMETRIST

## 2022-07-25 PROCEDURE — 3044F HG A1C LEVEL LT 7.0%: CPT | Mod: CPTII,,, | Performed by: PODIATRIST

## 2022-07-25 PROCEDURE — 3061F NEG MICROALBUMINURIA REV: CPT | Mod: CPTII,,, | Performed by: PODIATRIST

## 2022-07-25 PROCEDURE — 99213 OFFICE O/P EST LOW 20 MIN: CPT | Mod: PBBFAC,27 | Performed by: OPTOMETRIST

## 2022-07-25 PROCEDURE — 99214 PR OFFICE/OUTPT VISIT, EST, LEVL IV, 30-39 MIN: ICD-10-PCS | Mod: S$PBB,,, | Performed by: PODIATRIST

## 2022-07-25 PROCEDURE — 3066F NEPHROPATHY DOC TX: CPT | Mod: CPTII,,, | Performed by: PODIATRIST

## 2022-07-25 PROCEDURE — 1159F MED LIST DOCD IN RCRD: CPT | Mod: CPTII,,, | Performed by: PODIATRIST

## 2022-07-25 PROCEDURE — 99999 PR PBB SHADOW E&M-EST. PATIENT-LVL III: CPT | Mod: PBBFAC,,, | Performed by: OPTOMETRIST

## 2022-07-25 PROCEDURE — 99214 OFFICE O/P EST MOD 30 MIN: CPT | Mod: S$PBB,,, | Performed by: PODIATRIST

## 2022-07-25 PROCEDURE — 1159F PR MEDICATION LIST DOCUMENTED IN MEDICAL RECORD: ICD-10-PCS | Mod: CPTII,,, | Performed by: OPTOMETRIST

## 2022-07-25 PROCEDURE — 92015 DETERMINE REFRACTIVE STATE: CPT | Mod: ,,, | Performed by: OPTOMETRIST

## 2022-07-25 PROCEDURE — 3061F PR NEG MICROALBUMINURIA RESULT DOCUMENTED/REVIEW: ICD-10-PCS | Mod: CPTII,,, | Performed by: PODIATRIST

## 2022-07-25 PROCEDURE — 99213 OFFICE O/P EST LOW 20 MIN: CPT | Mod: PBBFAC | Performed by: PODIATRIST

## 2022-07-25 PROCEDURE — 3044F PR MOST RECENT HEMOGLOBIN A1C LEVEL <7.0%: ICD-10-PCS | Mod: CPTII,,, | Performed by: PODIATRIST

## 2022-07-25 PROCEDURE — 3066F PR DOCUMENTATION OF TREATMENT FOR NEPHROPATHY: ICD-10-PCS | Mod: CPTII,,, | Performed by: PODIATRIST

## 2022-07-25 PROCEDURE — 3008F BODY MASS INDEX DOCD: CPT | Mod: CPTII,,, | Performed by: PODIATRIST

## 2022-07-25 PROCEDURE — 3008F PR BODY MASS INDEX (BMI) DOCUMENTED: ICD-10-PCS | Mod: CPTII,,, | Performed by: PODIATRIST

## 2022-07-25 PROCEDURE — 99999 PR PBB SHADOW E&M-EST. PATIENT-LVL III: CPT | Mod: PBBFAC,,, | Performed by: PODIATRIST

## 2022-07-25 PROCEDURE — 3072F PR LOW RISK FOR RETINOPATHY: ICD-10-PCS | Mod: CPTII,,, | Performed by: PODIATRIST

## 2022-07-25 PROCEDURE — 1159F PR MEDICATION LIST DOCUMENTED IN MEDICAL RECORD: ICD-10-PCS | Mod: CPTII,,, | Performed by: PODIATRIST

## 2022-07-25 PROCEDURE — 99999 PR PBB SHADOW E&M-EST. PATIENT-LVL III: ICD-10-PCS | Mod: PBBFAC,,, | Performed by: PODIATRIST

## 2022-07-25 RX ORDER — AMMONIUM LACTATE 12 G/100G
1 CREAM TOPICAL 2 TIMES DAILY
Qty: 140 G | Refills: 2 | Status: SHIPPED | OUTPATIENT
Start: 2022-07-25 | End: 2023-09-14 | Stop reason: SDUPTHER

## 2022-07-25 NOTE — PRE-PROCEDURE INSTRUCTIONS
Spoke with patient regarding procedure scheduled on 7.29    Arrival time 1040    Has patient been sick with fever or on antibiotics within the last 7 days? No    Does the patient have any open wounds, sores or rashes? No    Does the patient have any recent fractures? no    Has patient received a vaccination within the last 7 days? No    Received the COVID vaccination? yes    Has the patient stopped all medications as directed? Hold metformin am of procedure    Does patient have a pacemaker and or defibrillator? no    Does the patient have a ride to and from procedure and someone reliable to remain with patient? Niece     Is the patient diabetic? yes    Does the patient have sleep apnea? Or use O2 at home? [place]     Is the patient receiving sedation? yes    Is the patient instructed to remain NPO beginning at midnight the night before their procedure? yes    Procedure location confirmed with patient? Yes    Covid- Denies signs/symptoms. Instructed to notify PAT/MD if any changes.

## 2022-07-25 NOTE — PROGRESS NOTES
PODIATRIC MEDICINE AND SURGERY     CHIEF COMPLAINT  Chief Complaint   Patient presents with    Diabetic Foot Exam     Diabetic routine exam, some tingling in both feet, diabetic, wears tennis and socks, last seen PCP Dr. Garcia on 07/19/22         HPI    SUBJECTIVE: Sangeetha June is a 42 y.o. female who  has a past medical history of Anxiety, Breast cancer, Chest pain, Chest pain (7/2/2021), Cholecystitis without calculus, Decreased ROM of left shoulder (2/15/2022), Dizziness, Elevated C-reactive protein (CRP), Essential hypertension, Fall (10/11/2021), Memory change, EVERARDO (obstructive sleep apnea), Osteoarthritis, Other specified disorders of thyroid, Pre-diabetes (4/15/2021), Screening mammogram, encounter for (4/15/2021), Shoulder injury, SOB (shortness of breath), Thyroiditis, Tingling of left upper extremity (10/29/2021), Vision disturbance (4/30/2021), and Weakness of shoulder (2/15/2022). Sangeethapresents to clinic for high risk diabetic foot exam and care.  Sangeetha admits numbness, burning, and/or tingling sensations in their feet. Patient has no other pedal complaints at this time.    This patient has documented high risk feet requiring routine maintenance secondary to diabetes mellitis and those secondary complications of diabetes, as mentioned.      HgA1c:   Hemoglobin A1C   Date Value Ref Range Status   07/19/2022 5.4 4.0 - 5.6 % Final     Comment:     ADA Screening Guidelines:  5.7-6.4%  Consistent with prediabetes  >or=6.5%  Consistent with diabetes    High levels of fetal hemoglobin interfere with the HbA1C  assay. Heterozygous hemoglobin variants (HbS, HgC, etc)do  not significantly interfere with this assay.   However, presence of multiple variants may affect accuracy.     01/07/2022 6.5 (H) 4.0 - 5.6 % Final     Comment:     ADA Screening Guidelines:  5.7-6.4%  Consistent with prediabetes  >or=6.5%  Consistent with diabetes    High levels of fetal hemoglobin interfere with the  HbA1C  assay. Heterozygous hemoglobin variants (HbS, HgC, etc)do  not significantly interfere with this assay.   However, presence of multiple variants may affect accuracy.     07/02/2021 6.3 (H) 4.0 - 5.6 % Final     Comment:     ADA Screening Guidelines:  5.7-6.4%  Consistent with prediabetes  >or=6.5%  Consistent with diabetes    High levels of fetal hemoglobin interfere with the HbA1C  assay. Heterozygous hemoglobin variants (HbS, HgC, etc)do  not significantly interfere with this assay.   However, presence of multiple variants may affect accuracy.           PMH  Past Medical History:   Diagnosis Date    Anxiety     Breast cancer     Chest pain     Chest pain 7/2/2021    Cholecystitis without calculus     Decreased ROM of left shoulder 2/15/2022    Dizziness     Elevated C-reactive protein (CRP)     Essential hypertension     Fall 10/11/2021    Memory change     EVERARDO (obstructive sleep apnea)     Osteoarthritis     Other specified disorders of thyroid     Pre-diabetes 4/15/2021    Chronic, Stable, cont metformin    Screening mammogram, encounter for 4/15/2021    Shoulder injury     SOB (shortness of breath)     Thyroiditis     Tingling of left upper extremity 10/29/2021    Vision disturbance 4/30/2021    Weakness of shoulder 2/15/2022     Patient Active Problem List   Diagnosis    Delayed immunizations    S/P mastectomy    Chemotherapy adverse reaction    History of breast cancer    Left rotator cuff tear arthropathy    ANGIE (generalized anxiety disorder)    Urinary incontinence    Hyperlipidemia    Other insomnia    AGUILAR (dyspnea on exertion)    S/P hysterectomy    Postmenopausal    Vision disturbance    Obesity, morbid, BMI 40.0-49.9    Malignant neoplasm of left breast in female, estrogen receptor negative    Meningioma    Murmur    Hypertension    Hypothyroidism    Microcytic anemia    Strain of lumbar region    Gait difficulty    Imbalance    Difficulty walking     Lumbago    Abdominal pain    Gastroesophageal reflux disease    SOB (shortness of breath)    Nontraumatic incomplete tear of left rotator cuff    Thyroid nodule    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 Rescue 18 g 1    ascorbic acid, vitamin C, (VITAMIN C) 100 MG tablet Take 100 mg by mouth once daily.      aspirin (ECOTRIN) 81 MG EC tablet Take 1 tablet (81 mg total) by mouth once daily. 360 tablet 1    budesonide-formoterol 160-4.5 mcg (SYMBICORT) 160-4.5 mcg/actuation HFAA Inhale 2 puffs into the lungs every 12 (twelve) hours. Controller 10.2 g 3    busPIRone (BUSPAR) 15 MG tablet Take 1 tablet (15 mg total) by mouth 3 (three) times daily. 270 tablet 3    celecoxib (CELEBREX) 200 MG capsule Take 1 capsule (200 mg total) by mouth 2 (two) times daily. 28 capsule 0    dulaglutide (TRULICITY) 4.5 mg/0.5 mL pen injector Inject 4.5 mg into the skin every 7 days. 12 pen 1    EScitalopram oxalate (LEXAPRO) 20 MG tablet Take 1 tablet (20 mg total) by mouth once daily. 90 tablet 3    EUTHYROX 50 mcg tablet Take 1 tablet (50 mcg total) by mouth every morning. 90 tablet 1    gabapentin (NEURONTIN) 300 MG capsule Take 1 capsule (300 mg total) by mouth 2 (two) times daily. 180 capsule 0    hydroCHLOROthiazide (HYDRODIURIL) 25 MG tablet Take 1 tablet (25 mg total) by mouth daily as needed (edema, swelling). 90 tablet 3    hydrocortisone 0.5 % cream Apply topically 2 (two) times daily.      LATUDA 20 mg Tab tablet Take 20 mg by mouth once daily.      linaCLOtide (LINZESS) 145 mcg  Cap capsule Take 1 capsule (145 mcg total) by mouth once daily. 90 capsule 3    magnesium oxide (MAG-OX) 400 mg (241.3 mg magnesium) tablet Take 1 tablet (400 mg total) by mouth once daily. 90 tablet 1    metFORMIN (GLUCOPHAGE-XR) 500 MG ER 24hr tablet SMARTSI Tablet(s) By Mouth Every Evening 90 tablet 3    multivit with min-folic acid (ADULT MULTIVITAMIN GUMMIES) 200 mcg Chew Take by mouth.      olopatadine (PATANOL) 0.1 % ophthalmic solution       oxybutynin (DITROPAN-XL) 5 MG TR24 Take 1 tablet (5 mg total) by mouth once daily. 90 tablet 3    pantoprazole (PROTONIX) 40 MG tablet Take 1 tablet (40 mg total) by mouth once daily. 90 tablet 3    pravastatin (PRAVACHOL) 40 MG tablet Take 1 tablet (40 mg total) by mouth every evening. 90 tablet 3    traZODone (DESYREL) 150 MG tablet Take 1 tablet (150 mg total) by mouth every evening. 90 tablet 3    [DISCONTINUED] ammonium lactate 12 % Crea Apply 1 Act topically 2 (two) times daily. To feet. 140 g 2     Current Facility-Administered Medications on File Prior to Visit   Medication Dose Route Frequency Provider Last Rate Last Admin    lactated ringers infusion   Intravenous Continuous Maty Thompson MD 10 mL/hr at 22 0636 Restarted at 22 0709       [facility]     Past Surgical History:   Procedure Laterality Date    ARTHROSCOPIC REPAIR OF ROTATOR CUFF OF SHOULDER Left 2022    Procedure: REPAIR, ROTATOR CUFF, ARTHROSCOPIC;  Surgeon: Francisco Mathews MD;  Location: [facility] OR;  Service: Orthopedics;  Laterality: Left;    ARTHROSCOPY OF SHOULDER WITH DECOMPRESSION OF SUBACROMIAL SPACE Left 2022    Procedure: ARTHROSCOPY, SHOULDER, WITH SUBACROMIAL SPACE DECOMPRESSION;  Surgeon: Francisco Mathews MD;  Location: [facility] OR;  Service: Orthopedics;  Laterality: Left;    BREAST BIOPSY      COLONOSCOPY N/A 2022    Procedure: COLONOSCOPY;  Surgeon: Km Odom MD;  Location: [facility];  Service: Endoscopy;  Laterality: N/A;     ESOPHAGOGASTRODUODENOSCOPY N/A 5/31/2022    Procedure: EGD (ESOPHAGOGASTRODUODENOSCOPY) ;  Surgeon: Km Odom MD;  Location: [facility] ENDO;  Service: Endoscopy;  Laterality: N/A;    FIXATION OF TENDON Left 1/27/2022    Procedure: FIXATION, TENDON;  Surgeon: Francisco Mathews MD;  Location: [facility] OR;  Service: Orthopedics;  Laterality: Left;    HYSTERECTOMY      INJECTION OF JOINT Left 11/12/2021    Procedure: Left suprascapular and axillary injection with local;  Surgeon: Bisi Cochran MD;  Location: [facility] PAIN MGT;  Service: Pain Management;  Laterality: Left;    MASTECTOMY          ALL  Review of patient's allergies indicates:   Allergen Reactions    Lisinopril        SOC     Social History     Tobacco Use    Smoking status: Never Smoker    Smokeless tobacco: Never Used   Substance Use Topics    Alcohol use: Not Currently    Drug use: Not Currently     Types: Marijuana         Family HX    Family History   Problem Relation Age of Onset    Colon cancer Mother 53        partial colectomy    Stroke Mother     Heart disease Mother     Stroke Sister     Diabetes Sister     Diabetes Brother     No Known Problems Brother     No Known Problems Maternal Grandmother     Prostate cancer Maternal Grandfather 70    Alcohol abuse Maternal Grandfather     No Known Problems Paternal Grandmother     No Known Problems Paternal Grandfather     Throat cancer Maternal Aunt         smoking hx    Lung cancer Maternal Uncle         hx smoking    Breast cancer Other 37        chemotherapy, planning for BL mastectomy    No Known Problems Other             REVIEW OF SYSTEMS  General: Denies any fever or chills  Chest: Denies shortness of breath, wheezing, coughing, or sputum production  Heart: Denies chest pain.  As noted above and per history of current illness above, otherwise negative in the remainder of the 14 systems.     PHYSICAL EXAM  Vitals:    07/25/22 0935   Weight: 127 kg (280 lb)  "  Height: 5' 5" (1.651 m)   PainSc: 0-No pain       GEN:  This patient is well-developed, well-nourished and appears stated age, well-oriented to person, place and time, and cooperative and pleasant on today's visit.      LOWER EXTREMITY    VASCULAR  DP pedal pulse 2/4 RIGHT, LEFT2/4   PT pedal pulse 2/4 RIGHT, LEFT2/4  Capillary refill time immediate to the toes.   Feet are warm to the touch. Skin temperature warm to warm from proximally to distally   There are no varicosities, telangiectasias noted to bilateral foot and ankle regions.   There are no ecchymoses noted to bilateral foot and ankle regions.   There is mild left gross lower extremity edema.    DERMATOLOGIC  Skin moist with healthy texture and turgor.  Thickened, dystrophic, TRIMMED discolored toenails with subungal debris 1-5 b/l   There are no open ulcerations, lacerations, or fissures to bilateral foot and ankle regions. There are no signs of infection as there is no erythema, no proximal-extending lymphangiitis, no fluctuance, or crepitus noted on palpation to bilateral foot and ankle regions.   There is no interdigital maceration.   There are diffuse plantar hyperkeratotic lesions noted to feet.    NEUROLOGIC  Protective sensation intact at 10/10 sites upon examination with Norris Weinsten 5.07 g monofilament.  Propioception intact at 1st MTPJ b/l.  Achilles and patellar deep tendon reflexes intact  Babinski reflex absent    ORTHOPEDIC/BIOMECHANICAL  No symptomatic structural abnormalities noted. Muscle strength is 5/5 for foot inverters, everters, plantarflexors, and dorsiflexors. Muscle tone is normal.  I  nspection/palpation of bone, joints and muscles unremarkable.    ASSESSMENT  Type 2 diabetes mellitus with other specified complication, with long-term current use of insulin    Corn or callus    Comprehensive diabetic foot examination, type 2 DM, encounter for    Other orders  -     ammonium lactate 12 % Crea; Apply 1 Act topically 2 (two) " times daily. To feet.  Dispense: 140 g; Refill: 2        PLAN  -The patient was examined and evaulated  -Discuss presenting problems, etiology, pathologic processes and management options with patient today.     I counseled the patient on his/her Diabetic Mellitus regarding today's clinical examination and objection findings. We did also discuss recent medication changes, pertinent labs and imaging evaluations and other medical consultation notes and progress notes. Greater than 50% of this visit was spent on counseling and coordination of care. Greater than 20 minutes of this appt. was spent on education about the diabetic foot, in relation to PVD and/or neuropathy, and the prevention of limb loss. Patient received these instructions in written format and discussed verbally.     Comprehensive in depth discussion provided in written format in regards to diabetic/at risk foot health and amputation prevention. Summary as noted below:  1. Anti-diabetic medications should be taken regularly to prevent complications.  2. Feet should be washed daily.  3. Lukewarm water should be used to wash feet.   4. The temperature of the water should be checked before washing feet.   5. Feet should be dried completely after washing and in between toes.  6. Talcum powder (Zeasorb ( should be used to keep the areas betweent he toes dry.   7. Lotion or moisturizing cream should be applied to the fet daily to prevent dryness of the skin. Examples provided to patient OTC.  8. Lotion should not be applied between the toes.  9. Socks should be changed daily. I recommend white socks in order to be able to note any drainage or bleeding if injury occurs.  10. Toenails should be trimmed straight across (if applicable).  11. Feet should be inspected at least once a day.  12. Diabetic patients should wear comfortable shoes. Examples provided.   13. The inside of the shoes should be inspected before wearing them.  14. Diabetic patients should not  walk barefoot.   15. Diabetic patients should consult their podiatrist if their feet have redness, blisters, cuts, or wounds.       Shoe gear is inspected and wear and proper fit/type. If applicable/covered, pt was provided prescription for diabetic shoes and/or custom molded inserts.  Shoe Fitting recommendations:  1. Have foot measured while standing.  2. Try on both shoes and walk around to be sure shoes are comfortable.  3. Allow at least a thumbs width of space at end of longest toe in shoes. Make sure you can wiggle toes inside shoe.  4. Try on shoes at end of the day due to foot swelling.   5. Look for shoes with a round and wide flexible  toe box, extra cushion, and thick/stiff heel. Check inside shoes and avoid thick seams.   6. Breathable anti-microbial or moisture wicking fabric is preferred. Leather uppers are optimal.     Patient is reminded of the importance of good nutrition and blood sugar control to help prevent podiatric complications of diabetes. I discussed proper blood glucose control and co-management with diabetes education team and patient's PCP and/or Endocrinology Advanced Practice Provider.  Patient  will continue to monitor the areas daily, inspect feet, wear protective shoe gear when ambulatory, moisturizer to maintain skin integrity.    Follow up as scheduled below or sooner if any problem arises with foot and/or ankle.       Disclaimer: This note was partially prepared using a voice recognition system and is likely to have sound alike errors within the text.        Future Appointments   Date Time Provider Department Center   7/25/2022  3:15 PM Jarrell Baeza OD [facility] OPHTHAL [facility]   8/3/2022  9:00 AM Patito Jaquez, PhD, MPAP [facility]   8/3/2022  9:40 AM Nati Segura PA-C [facility] ENT [facility]   8/4/2022  2:00 PM Kaleb Rader, LCSW [facility]   8/15/2022 10:20 AM Asim Johnston MD [facility] CARDIO [place]   8/19/2022  3:30 PM Raeann Bhandari, DO [facility]  GENSUR [facility]   8/29/2022  1:20 PM Toshia Jorge PA-C [facility] INT ZURI [facility]   9/22/2022 11:00 AM Ching Rodríguez MD [facility] DERM [facility]   10/17/2022  1:40 PM Asim Johnston MD [facility] CARDIO [place]   10/21/2022  1:40 PM Sam Garcia MD [facility] IM [place]   11/15/2022 11:30 AM Alexandra Trent RD, CDE [facility] DIBEDU [facility]   12/20/2022  1:00 PM Elizabeth Lejeune, NP [facility] SLEEP [facility]   6/5/2023 11:00 AM [facility] US3 [facility] ULSOUND [facility]   6/21/2023  1:20 PM Evy Cid MD [facility] PULMSVC [place]       Report Electronically Signed By:     Tiny Lyn DPM   Podiatry  [facility]  7/25/2022

## 2022-07-25 NOTE — PROGRESS NOTES
HPI     Diabetic Eye Exam     Comments: Patient states feels distance [facility] has decreases since last   visit, having trouble seeing the TV , she does not have them with her   today but wants to get a new glasses RX today.                Comments     FBS: 99 a few days ago  1. DM without eye involvement  2. Visual disturbance   3. Monocular diplopia   4. Dry eye syndrome     Diagnosed with diabetes in unsure been several years per patient  Lab Results       Component                Value               Date                       HGBA1C                   5.4                 07/19/2022                             Last edited by Katty Padilla, Patient Care Assistant on 7/25/2022 11:06   AM. (History)            Assessment /Plan     For exam results, see Encounter Report.    Type 2 diabetes mellitus without complication, without long-term current use of insulin    Diabetes mellitus without complication  Unsure years, last A1c 5.4 Stressed importance of DM control to preserve vision. No diabetic retinopathy was seen in either eye today. Continue strict blood glucose control.  Reviewed importance of yearly dilated eye exams. Continue close care with PCP regarding diabetes.  Presbyopia of both eyes  Eyeglass Final Rx     Eyeglass Final Rx       Sphere Cylinder Axis Add    Right -1.00 +1.25 180 +2.50    Left [place] +1.00 178 +2.50    Type: PAL    Expiration Date: 7/25/2023                RTC 1 yr for dilated eye exam or sooner if any changes to vision.   Discussed above and answered questions.

## 2022-07-27 ENCOUNTER — PATIENT MESSAGE (OUTPATIENT)
Dept: PULMONOLOGY | Facility: CLINIC | Age: 42
End: 2022-07-27
Payer: MEDICAID

## 2022-07-29 ENCOUNTER — HOSPITAL ENCOUNTER (OUTPATIENT)
Facility: HOSPITAL | Age: 42
Discharge: HOME | End: 2022-07-29
Attending: ANESTHESIOLOGY | Admitting: ANESTHESIOLOGY
Payer: MEDICAID

## 2022-07-29 VITALS
RESPIRATION RATE: 18 BRPM | OXYGEN SATURATION: 99 % | SYSTOLIC BLOOD PRESSURE: 135 MMHG | TEMPERATURE: 98 F | DIASTOLIC BLOOD PRESSURE: 85 MMHG | WEIGHT: 278.75 LBS | HEIGHT: 65 IN | HEART RATE: 55 BPM | BODY MASS INDEX: 46.44 KG/M2

## 2022-07-29 DIAGNOSIS — M46.1 SACROILIITIS: ICD-10-CM

## 2022-07-29 DIAGNOSIS — M70.61 GREATER TROCHANTERIC BURSITIS OF BOTH HIPS: ICD-10-CM

## 2022-07-29 DIAGNOSIS — M70.62 GREATER TROCHANTERIC BURSITIS OF BOTH HIPS: ICD-10-CM

## 2022-07-29 LAB — POCT GLUCOSE: 93 MG/DL (ref 70–110)

## 2022-07-29 PROCEDURE — 27096 INJECT SACROILIAC JOINT: CPT | Mod: LT

## 2022-07-29 PROCEDURE — 20610 DRAIN/INJ JOINT/BURSA W/O US: CPT | Mod: 50,59,, | Performed by: ANESTHESIOLOGY

## 2022-07-29 PROCEDURE — 27096 PR INJECTION,SACROILIAC JOINT: ICD-10-PCS | Mod: 50,,, | Performed by: ANESTHESIOLOGY

## 2022-07-29 PROCEDURE — 25500020 PHARM REV CODE 255: Performed by: ANESTHESIOLOGY

## 2022-07-29 PROCEDURE — 27096 INJECT SACROILIAC JOINT: CPT | Mod: 50 | Performed by: ANESTHESIOLOGY

## 2022-07-29 PROCEDURE — 27096 INJECT SACROILIAC JOINT: CPT | Mod: 50,,, | Performed by: ANESTHESIOLOGY

## 2022-07-29 PROCEDURE — 20610 DRAIN/INJ JOINT/BURSA W/O US: CPT | Mod: 50 | Performed by: ANESTHESIOLOGY

## 2022-07-29 PROCEDURE — 20610 PR DRAIN/INJECT LARGE JOINT/BURSA: ICD-10-PCS | Mod: 50,59,, | Performed by: ANESTHESIOLOGY

## 2022-07-29 PROCEDURE — 25000003 PHARM REV CODE 250: Performed by: ANESTHESIOLOGY

## 2022-07-29 PROCEDURE — 63600175 PHARM REV CODE 636 W HCPCS: Performed by: ANESTHESIOLOGY

## 2022-07-29 RX ORDER — FENTANYL CITRATE 50 UG/ML
INJECTION, SOLUTION INTRAMUSCULAR; INTRAVENOUS
Status: DISCONTINUED | OUTPATIENT
Start: 2022-07-29 | End: 2022-07-29 | Stop reason: HOSPADM

## 2022-07-29 RX ORDER — SODIUM BICARBONATE 1 MEQ/ML
SYRINGE (ML) INTRAVENOUS
Status: DISCONTINUED | OUTPATIENT
Start: 2022-07-29 | End: 2022-07-29 | Stop reason: HOSPADM

## 2022-07-29 RX ORDER — MIDAZOLAM HYDROCHLORIDE 1 MG/ML
INJECTION, SOLUTION INTRAMUSCULAR; INTRAVENOUS
Status: DISCONTINUED | OUTPATIENT
Start: 2022-07-29 | End: 2022-07-29 | Stop reason: HOSPADM

## 2022-07-29 RX ORDER — TRIAMCINOLONE ACETONIDE 40 MG/ML
INJECTION, SUSPENSION INTRA-ARTICULAR; INTRAMUSCULAR
Status: DISCONTINUED | OUTPATIENT
Start: 2022-07-29 | End: 2022-07-29 | Stop reason: HOSPADM

## 2022-07-29 RX ORDER — BUPIVACAINE HYDROCHLORIDE 2.5 MG/ML
INJECTION, SOLUTION EPIDURAL; INFILTRATION; INTRACAUDAL
Status: DISCONTINUED | OUTPATIENT
Start: 2022-07-29 | End: 2022-07-29 | Stop reason: HOSPADM

## 2022-07-29 NOTE — OP NOTE
Sangeetha June  42 y.o. female      Vitals:    07/29/22 1047   BP: 122/71   Pulse: (!) 53   Resp: 15   Temp:        Procedure Date:7/29/22      INFORMED CONSENT: The procedure, risks, benefits and options were discussed with patient. There are no contraindications to the procedure. The patient expressed understanding and agreed to proceed. The personnel performing the procedure was discussed. I verify that I personally obtained consent prior to the start of the procedure and the signed consent can be found on the patient's chart.       Anesthesia:   Conscious sedation provided by M.D    The patient was monitored with continuous pulse oximetry, EKG, and intermittent blood pressure monitors.  The patient was hemodynamically stable throughout the entire process was responsive to voice, and breathing spontaneously.  Supplemental O2 was provided at 2L/min via nasal cannula.  Patient was comfortable for the duration of the procedure. (See nurse documentation and case log for sedation time)    There was a total of 1mg IV Midazolam and 50mcg Fentanyl titrated for the procedure    Pre Procedure diagnosis: Sacroiliitis [M46.1]  Greater trochanteric bursitis of both hips [M70.61, M70.62]  Post-Procedure diagnosis: SAME      PROCEDURE:  1) Bilateral greater trochanteric bursa injection    2) Bilateral sacroiliac joint injection                            REASON FOR PROCEDURE:   Sacroiliitis [M46.1]  Greater trochanteric bursitis[M70.61]      MEDICATIONS INJECTED: 1mL 40mg/ml Kenalog and 4mL Bupivacaine 0.25% into each site    LOCAL ANESTHETIC USED: Xylocaine 1% 6ml     ESTIMATED BLOOD LOSS: None.   COMPLICATIONS: None.     TECHNIQUE:   Greater trochanteric bursa injection:  The area overlying the greater trochanteric bursa was identified using fluoroscopy, and the area overlying the skin was prepped and draped in usual sterile fashion. Local Xylocaine was injected by raising a wheel and going down to the periosteum  using a 27-gauge hypodermic needle. A 5 inch 22-gauge spinal needle was introduce into the Bilateral greater trochanteric bursa. Negative pressure applied to confirm no intravascular placement. Omnipaque was injected to confirm placement and to confirm that there was no vascular runoff. The medication was then injected slowly.  Displacement of the contrast after injection of the medication confirmed that the medication went into the area of the greater trochanteric bursa    Sacroiliac joint injection:   Laying in the prone position, the patient was prepped and draped in the usual sterile fashion using ChloraPrep and fenestrated drape.  The area was determined under fluoroscopy.  Local Xylocaine was injected by raising a wheel and going down to the periosteum using a 27-gauge hypodermic needle.  The 3.5 inch 22-gauge spinal needle was introduce into the Bilateral sacroiliac joint.  Negative pressure applied to confirm no intravascular placement.  Omnipaque was injected to confirm placement and to confirm that there was no vascular runoff.  The medication was then injected slowly.  The patient tolerated the procedure well.                       The patient was monitored for approximately 30 minutes after the procedure. Patient was given post procedure and discharge instructions to follow at home. We will see the patient back in two weeks or the patient may call to inform of status. The patient was discharged in a stable condition

## 2022-07-29 NOTE — PLAN OF CARE
Pt discharged per MD order. Pt transported by wheelchair to family vehicle without incident or complaint.

## 2022-07-29 NOTE — H&P
HPI  Patient presenting for Procedure(s) (LRB):  Bilateral SIJ +  Bilateral GT Bursa Injection RN IV Sedation (Bilateral)  Bilateral SIJ + Bilateral GT Bursa Injection RN IV Sedation (Bilateral)     Patient on Anti-coagulation No    No health changes since previous encounter    Past Medical History:   Diagnosis Date    Anxiety     Breast cancer     Chest pain     Chest pain 7/2/2021    Cholecystitis without calculus     Decreased ROM of left shoulder 2/15/2022    Dizziness     Elevated C-reactive protein (CRP)     Essential hypertension     Fall 10/11/2021    Memory change     EVERARDO (obstructive sleep apnea)     Osteoarthritis     Other specified disorders of thyroid     Pre-diabetes 4/15/2021    Chronic, Stable, cont metformin    Screening mammogram, encounter for 4/15/2021    Shoulder injury     SOB (shortness of breath)     Thyroiditis     Tingling of left upper extremity 10/29/2021    Vision disturbance 4/30/2021    Weakness of shoulder 2/15/2022     Past Surgical History:   Procedure Laterality Date    ARTHROSCOPIC REPAIR OF ROTATOR CUFF OF SHOULDER Left 1/27/2022    Procedure: REPAIR, ROTATOR CUFF, ARTHROSCOPIC;  Surgeon: Francisco Mathews MD;  Location: [facility] OR;  Service: Orthopedics;  Laterality: Left;    ARTHROSCOPY OF SHOULDER WITH DECOMPRESSION OF SUBACROMIAL SPACE Left 1/27/2022    Procedure: ARTHROSCOPY, SHOULDER, WITH SUBACROMIAL SPACE DECOMPRESSION;  Surgeon: Francisco Mathews MD;  Location: [facility] OR;  Service: Orthopedics;  Laterality: Left;    BREAST BIOPSY      COLONOSCOPY N/A 5/31/2022    Procedure: COLONOSCOPY;  Surgeon: Km Odom MD;  Location: [facility];  Service: Endoscopy;  Laterality: N/A;    ESOPHAGOGASTRODUODENOSCOPY N/A 5/31/2022    Procedure: EGD (ESOPHAGOGASTRODUODENOSCOPY) ;  Surgeon: Km Odom MD;  Location: [facility];  Service: Endoscopy;  Laterality: N/A;    FIXATION OF TENDON Left 1/27/2022    Procedure: FIXATION, TENDON;   Surgeon: Francisco Mathews MD;  Location: [facility] OR;  Service: Orthopedics;  Laterality: Left;    HYSTERECTOMY      INJECTION OF JOINT Left 11/12/2021    Procedure: Left suprascapular and axillary injection with local;  Surgeon: Bisi Cochran MD;  Location: [facility] PAIN [facility]GT;  Service: Pain Management;  Laterality: Left;    MASTECTOMY       Review of patient's allergies indicates:   Allergen Reactions    Lisinopril         Current Facility-Administered Medications on File Prior to Encounter   Medication Dose Route Frequency Provider Last Rate Last Admin    lactated ringers infusion   Intravenous Continuous Maty Thompson MD 10 mL/hr at 01/27/22 0636 Restarted at 01/27/22 0709     Current Outpatient Medications on File Prior to Encounter   Medication Sig Dispense Refill    ascorbic acid, vitamin C, (VITAMIN C) 100 MG tablet Take 100 mg by mouth once daily.      budesonide-formoterol 160-4.5 mcg (SYMBICORT) 160-4.5 mcg/actuation HFAA Inhale 2 puffs into the lungs every 12 (twelve) hours. Controller 10.2 g 3    celecoxib (CELEBREX) 200 MG capsule Take 1 capsule (200 mg total) by mouth 2 (two) times daily. 28 capsule 0    EUTHYROX 50 mcg tablet Take 1 tablet (50 mcg total) by mouth every morning. 90 tablet 1    hydrocortisone 0.5 % cream Apply topically 2 (two) times daily.      magnesium oxide (MAG-OX) 400 mg (241.3 mg magnesium) tablet Take 1 tablet (400 mg total) by mouth once daily. 90 tablet 1    multivit with min-folic acid (ADULT MULTIVITAMIN GUMMIES) 200 mcg Chew Take by mouth.      olopatadine (PATANOL) 0.1 % ophthalmic solution           PMHx, PSHx, Allergies, Medications reviewed in epic    ROS negative except pain complaints in HPI    OBJECTIVE:    LMP  (LMP Unknown)     PHYSICAL EXAMINATION:    GENERAL: Well appearing, in no acute distress, alert and oriented x3.  PSYCH:  Mood and affect appropriate.  SKIN: Skin color, texture, turgor normal, no rashes or lesions which will impact the  procedure.  CV: RRR with palpation of the radial artery.  PULM: No evidence of respiratory difficulty, symmetric chest rise. Clear to auscultation.  NEURO: Cranial nerves grossly intact.    Plan:    Proceed with procedure as planned Procedure(s) (LRB):  Bilateral SIJ +  Bilateral GT Bursa Injection RN IV Sedation (Bilateral)  Bilateral SIJ + Bilateral GT Bursa Injection RN IV Sedation (Bilateral)    Bisi Ccohran MD  07/29/2022

## 2022-07-29 NOTE — DISCHARGE SUMMARY
[facility] 1st Fl  Discharge Note  Short Stay    Procedure(s) (LRB):  Bilateral SIJ +  Bilateral GT Bursa Injection RN IV Sedation (Bilateral)  Bilateral SIJ + Bilateral GT Bursa Injection RN IV Sedation (Bilateral)    OUTCOME: Patient tolerated treatment/procedure well without complication and is now ready for discharge.    DISPOSITION: Home or Self Care    FINAL DIAGNOSIS:  <principal problem not specified>    FOLLOWUP: In clinic    DISCHARGE INSTRUCTIONS:  No discharge procedures on file.     TIME SPENT ON DISCHARGE: 15 minutes

## 2022-07-29 NOTE — DISCHARGE INSTRUCTIONS

## 2022-07-29 NOTE — PROGRESS NOTES
Pt's ride left the building to go to a drug screen and stated that he was unsure when he would be back. Pt was notified. She will wait with us until her ride returns.

## 2022-08-03 ENCOUNTER — OFFICE VISIT (OUTPATIENT)
Dept: OTOLARYNGOLOGY | Facility: CLINIC | Age: 42
End: 2022-08-03
Payer: MEDICAID

## 2022-08-03 ENCOUNTER — OFFICE VISIT (OUTPATIENT)
Dept: PSYCHIATRY | Facility: CLINIC | Age: 42
End: 2022-08-03
Payer: MEDICAID

## 2022-08-03 VITALS — DIASTOLIC BLOOD PRESSURE: 75 MMHG | HEART RATE: 78 BPM | SYSTOLIC BLOOD PRESSURE: 118 MMHG | TEMPERATURE: 98 F

## 2022-08-03 VITALS
BODY MASS INDEX: 46.22 KG/M2 | HEART RATE: 55 BPM | WEIGHT: 277.75 LBS | DIASTOLIC BLOOD PRESSURE: 80 MMHG | SYSTOLIC BLOOD PRESSURE: 123 MMHG

## 2022-08-03 DIAGNOSIS — F25.1 SCHIZOAFFECTIVE DISORDER, DEPRESSIVE TYPE: Primary | ICD-10-CM

## 2022-08-03 DIAGNOSIS — F41.1 GAD (GENERALIZED ANXIETY DISORDER): ICD-10-CM

## 2022-08-03 DIAGNOSIS — G31.84 MINOR NEUROCOGNITIVE DISORDER: ICD-10-CM

## 2022-08-03 DIAGNOSIS — R42 DIZZINESS: ICD-10-CM

## 2022-08-03 PROCEDURE — 99213 OFFICE O/P EST LOW 20 MIN: CPT | Mod: S$PBB,,, | Performed by: PHYSICIAN ASSISTANT

## 2022-08-03 PROCEDURE — 99999 PR PBB SHADOW E&M-EST. PATIENT-LVL IV: ICD-10-PCS | Mod: PBBFAC,,, | Performed by: PHYSICIAN ASSISTANT

## 2022-08-03 PROCEDURE — 3044F HG A1C LEVEL LT 7.0%: CPT | Mod: CPTII,,, | Performed by: PHYSICIAN ASSISTANT

## 2022-08-03 PROCEDURE — 99999 PR PBB SHADOW E&M-EST. PATIENT-LVL IV: CPT | Mod: PBBFAC,,, | Performed by: PHYSICIAN ASSISTANT

## 2022-08-03 PROCEDURE — 3074F SYST BP LT 130 MM HG: CPT | Mod: HP,HB,CPTII, | Performed by: PSYCHOLOGIST

## 2022-08-03 PROCEDURE — 3044F PR MOST RECENT HEMOGLOBIN A1C LEVEL <7.0%: ICD-10-PCS | Mod: CPTII,,, | Performed by: PHYSICIAN ASSISTANT

## 2022-08-03 PROCEDURE — 3044F PR MOST RECENT HEMOGLOBIN A1C LEVEL <7.0%: ICD-10-PCS | Mod: HP,HB,CPTII, | Performed by: PSYCHOLOGIST

## 2022-08-03 PROCEDURE — 99999 PR PBB SHADOW E&M-EST. PATIENT-LVL II: CPT | Mod: PBBFAC,HB,, | Performed by: PSYCHOLOGIST

## 2022-08-03 PROCEDURE — 3072F LOW RISK FOR RETINOPATHY: CPT | Mod: CPTII,,, | Performed by: PHYSICIAN ASSISTANT

## 2022-08-03 PROCEDURE — 99999 PR PBB SHADOW E&M-EST. PATIENT-LVL II: ICD-10-PCS | Mod: PBBFAC,HB,, | Performed by: PSYCHOLOGIST

## 2022-08-03 PROCEDURE — 3074F PR MOST RECENT SYSTOLIC BLOOD PRESSURE < 130 MM HG: ICD-10-PCS | Mod: CPTII,,, | Performed by: PHYSICIAN ASSISTANT

## 2022-08-03 PROCEDURE — 99215 PR OFFICE/OUTPT VISIT, EST, LEVL V, 40-54 MIN: ICD-10-PCS | Mod: S$PBB,HP,HB, | Performed by: PSYCHOLOGIST

## 2022-08-03 PROCEDURE — 3078F PR MOST RECENT DIASTOLIC BLOOD PRESSURE < 80 MM HG: ICD-10-PCS | Mod: CPTII,,, | Performed by: PHYSICIAN ASSISTANT

## 2022-08-03 PROCEDURE — 99212 OFFICE O/P EST SF 10 MIN: CPT | Mod: PBBFAC,27 | Performed by: PSYCHOLOGIST

## 2022-08-03 PROCEDURE — 3079F PR MOST RECENT DIASTOLIC BLOOD PRESSURE 80-89 MM HG: ICD-10-PCS | Mod: HP,HB,CPTII, | Performed by: PSYCHOLOGIST

## 2022-08-03 PROCEDURE — 3072F PR LOW RISK FOR RETINOPATHY: ICD-10-PCS | Mod: HP,HB,CPTII, | Performed by: PSYCHOLOGIST

## 2022-08-03 PROCEDURE — 3074F PR MOST RECENT SYSTOLIC BLOOD PRESSURE < 130 MM HG: ICD-10-PCS | Mod: HP,HB,CPTII, | Performed by: PSYCHOLOGIST

## 2022-08-03 PROCEDURE — 3008F BODY MASS INDEX DOCD: CPT | Mod: HP,HB,CPTII, | Performed by: PSYCHOLOGIST

## 2022-08-03 PROCEDURE — 99214 OFFICE O/P EST MOD 30 MIN: CPT | Mod: PBBFAC | Performed by: PHYSICIAN ASSISTANT

## 2022-08-03 PROCEDURE — 3078F DIAST BP <80 MM HG: CPT | Mod: CPTII,,, | Performed by: PHYSICIAN ASSISTANT

## 2022-08-03 PROCEDURE — 3066F NEPHROPATHY DOC TX: CPT | Mod: CPTII,,, | Performed by: PHYSICIAN ASSISTANT

## 2022-08-03 PROCEDURE — 3044F HG A1C LEVEL LT 7.0%: CPT | Mod: HP,HB,CPTII, | Performed by: PSYCHOLOGIST

## 2022-08-03 PROCEDURE — 3061F PR NEG MICROALBUMINURIA RESULT DOCUMENTED/REVIEW: ICD-10-PCS | Mod: HP,HB,CPTII, | Performed by: PSYCHOLOGIST

## 2022-08-03 PROCEDURE — 3066F PR DOCUMENTATION OF TREATMENT FOR NEPHROPATHY: ICD-10-PCS | Mod: CPTII,,, | Performed by: PHYSICIAN ASSISTANT

## 2022-08-03 PROCEDURE — 3072F LOW RISK FOR RETINOPATHY: CPT | Mod: HP,HB,CPTII, | Performed by: PSYCHOLOGIST

## 2022-08-03 PROCEDURE — 3008F PR BODY MASS INDEX (BMI) DOCUMENTED: ICD-10-PCS | Mod: HP,HB,CPTII, | Performed by: PSYCHOLOGIST

## 2022-08-03 PROCEDURE — 3079F DIAST BP 80-89 MM HG: CPT | Mod: HP,HB,CPTII, | Performed by: PSYCHOLOGIST

## 2022-08-03 PROCEDURE — 1159F PR MEDICATION LIST DOCUMENTED IN MEDICAL RECORD: ICD-10-PCS | Mod: CPTII,,, | Performed by: PHYSICIAN ASSISTANT

## 2022-08-03 PROCEDURE — 3066F PR DOCUMENTATION OF TREATMENT FOR NEPHROPATHY: ICD-10-PCS | Mod: HP,HB,CPTII, | Performed by: PSYCHOLOGIST

## 2022-08-03 PROCEDURE — 3061F NEG MICROALBUMINURIA REV: CPT | Mod: HP,HB,CPTII, | Performed by: PSYCHOLOGIST

## 2022-08-03 PROCEDURE — 3074F SYST BP LT 130 MM HG: CPT | Mod: CPTII,,, | Performed by: PHYSICIAN ASSISTANT

## 2022-08-03 PROCEDURE — 3061F PR NEG MICROALBUMINURIA RESULT DOCUMENTED/REVIEW: ICD-10-PCS | Mod: CPTII,,, | Performed by: PHYSICIAN ASSISTANT

## 2022-08-03 PROCEDURE — 99213 PR OFFICE/OUTPT VISIT, EST, LEVL III, 20-29 MIN: ICD-10-PCS | Mod: S$PBB,,, | Performed by: PHYSICIAN ASSISTANT

## 2022-08-03 PROCEDURE — 3066F NEPHROPATHY DOC TX: CPT | Mod: HP,HB,CPTII, | Performed by: PSYCHOLOGIST

## 2022-08-03 PROCEDURE — 3072F PR LOW RISK FOR RETINOPATHY: ICD-10-PCS | Mod: CPTII,,, | Performed by: PHYSICIAN ASSISTANT

## 2022-08-03 PROCEDURE — 99215 OFFICE O/P EST HI 40 MIN: CPT | Mod: S$PBB,HP,HB, | Performed by: PSYCHOLOGIST

## 2022-08-03 PROCEDURE — 3061F NEG MICROALBUMINURIA REV: CPT | Mod: CPTII,,, | Performed by: PHYSICIAN ASSISTANT

## 2022-08-03 PROCEDURE — 1159F MED LIST DOCD IN RCRD: CPT | Mod: CPTII,,, | Performed by: PHYSICIAN ASSISTANT

## 2022-08-03 NOTE — PROGRESS NOTES
"Subjective:       Patient ID: Sangeetha June is a 42 y.o. female.    Chief Complaint: Dizziness    Patient is a very pleasant 42 y.o. female here to see me today for the first time in consultation at the request of Dr. Garcia for evaluation of dizziness and gait imbalance.   She reports that the symptoms have been present for the last 2 years.  On average, the patient reports symptoms that occur approximately 1-2 times each day.  She describes the dizziness as a sensation of movement of surroundings (not spinning) and heaviness in her head and says that it lasts hours.  States her head feels "cloudy" at times and she has headaches intermittently too (frontal).  She takes Tylenol Migraine 3-4 times/week.   She has not noted any triggers and says it occurs in all positions.  Gets relief with closing her eyes.  She denies aural pressure, otalgia, otorrhea and hearing loss.  She has occasional tinnitus AU.  She has not started any new medications, and has not had any recent dietary changes.  She saw Dr. Rowe in 10/2021 with similar symptoms; thought not vestibular issue; possible atypical migraine.          Review of Systems   Constitutional: Negative for fever.   HENT: Positive for tinnitus. Negative for ear discharge, ear pain and hearing loss.    Neurological: Positive for dizziness and headaches.         Objective:      Physical Exam  Vitals reviewed.   Constitutional:       General: She is not in acute distress.     Appearance: She is well-developed.   HENT:      Head: Normocephalic and atraumatic.      Right Ear: Tympanic membrane, ear canal and external ear normal. No middle ear effusion. Tympanic membrane is not injected or erythematous.      Left Ear: Tympanic membrane, ear canal and external ear normal.  No middle ear effusion. Tympanic membrane is not injected or erythematous.      Nose: Nose normal. No mucosal edema or rhinorrhea.      Right Sinus: No maxillary sinus tenderness or frontal sinus " tenderness.      Left Sinus: No maxillary sinus tenderness or frontal sinus tenderness.      Mouth/Throat:      Mouth: Mucous membranes are not pale and not dry.      Pharynx: Uvula midline. No oropharyngeal exudate or posterior oropharyngeal erythema.      Comments: edentulous  Eyes:      General: Lids are normal. No scleral icterus.     Extraocular Movements:      Right eye: Normal extraocular motion and no nystagmus.      Left eye: Normal extraocular motion and no nystagmus.      Conjunctiva/sclera: Conjunctivae normal.      Right eye: Right conjunctiva is not injected. No chemosis.     Left eye: Left conjunctiva is not injected. No chemosis.     Pupils: Pupils are equal, round, and reactive to light.   Neck:      Trachea: Trachea and phonation normal.   Pulmonary:      Effort: Pulmonary effort is normal. No respiratory distress.      Breath sounds: No stridor.   Lymphadenopathy:      Head:      Right side of head: No preauricular or posterior auricular adenopathy.      Left side of head: No preauricular or posterior auricular adenopathy.      Cervical: No cervical adenopathy.   Skin:     General: Skin is warm and dry.      Findings: No erythema or rash.   Neurological:      Mental Status: She is alert and oriented to person, place, and time.      Cranial Nerves: No cranial nerve deficit.   Psychiatric:         Behavior: Behavior normal. Behavior is cooperative.             [place]-Hallpike:  Negative AU      AUDIOGRAM:            Assessment:       Problem List Items Addressed This Visit        Other    Dizziness          Plan:         Testing today is negative for BPPV.  I had a long discussion with the patient regarding their symptoms.  Dizziness, vertigo and disequilibrium are common symptoms reported by adults during visits to their doctors. They are all symptoms that can result from a peripheral vestibular disorder (a dysfunction of the balance organs of the inner ear) or central vestibular disorder (a  dysfunction of one or more parts of the central nervous system that help process balance and spatial information).  There are also non-vestibular causes of dizziness, and dizziness can be linked to a wide array of problems such as blood-flow irregularities from cardiovascular problems and blood pressure fluctuations.  I would recommend a VNG for further diagnostic testing to rule out inner ear pathology, and will contact the patient with further recommendations when that is complete.  She needs to be scheduled at [facility] for VNG.  Discussed with patient that if VNG is normal, she should followup with PCP for other causes of dizziness.  Not due for annual audiogram until late 10/2022 (denies any recent changes in hearing therefore no need to repeat sooner).

## 2022-08-03 NOTE — PROGRESS NOTES
"Outpatient Psychiatry Follow-Up Visit     8/3/2022      Clinical Status of Patient:  Outpatient (Ambulatory)    Chief Complaint:  Sangeetha June is a 42 y.o. female who presents today for follow-up of depression, anxiety and memory .      Impressions/Plan from last visit: Sangeetha had had cancer treatment, resulting in decompensation mentally. She is treated for depression and anxiety. She reported that she has had continued memory problems and showed gross impairment on mental status testing today. There are some oddities, however, and additional testing will be helpful in diagnostic clarity and treatment recommendations/prognosis.      · Medication Management: Discussed risks, benefits, and alternatives to treatment plan documented above with patient. I answered all patient questions related to this plan, and patient expressed understanding and agreement.   Continue Lexapro 20 mg, buspirone 15 mg tid, trazodone 150 mg hs (no refills needed)  · continue supportive counseling   · Will be getting CPAP--sleep hygiene  · Referral for neuropsych  · Get disability report--reportedly met with psychologist for CE in application  · Consulted with therapist     Follow up in about 3 months (around 5/15/2022) for medication management.     Interval History and Content of Current Session: Sangeetha attended her visit. She is taking her medicines--also taking Latuda. She said that she has anxiety sometimes. She is going to [facility]--she started about a month or two ago. They reportedly started her on Latuda (?)--she goes to groups--"I give hugs now. At first I would not let anyone touch me. I was scared of them." She reported that she sometimes sees people-"some things going on on this side [pointed to left side], but they turn out to be nothing."     She goes to [facility]--daily when she does not have doctors appointments. She goes from 9:30 to 1:30 pm.     She reported that her eye focus is bothering her today--pulling her forehead " "down.    She thinks that her memory has gotten "a little bit better." "As long as that anxiety is maintaining," she feels better. 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    Coordinate with case mgmt and therapist --provisional dx of schizoaffective as there seems to be a mood component, but it's unclear. Records from [facility] will be helpful. She will not continue with services here while attending the [facility]. No refills are needed today. Medicines--Lexapro 20 mg, buspirone 15 mg tid, trazodone 150 mg hs; Latuda 20 mg.    [Jarrod visit of 5/30/22: Ms. June has had a decline from her baseline cognitive status on the basis of neuropsychological testing. This decline is seen in the context of limitations or inefficiencies in daily living skills; however, it is unclear that cognitive factors alone would account for these limitations. Although a psychiatric syndrome is contributory, the onset of her cognitive and psychiatric difficulties share a proximal onset with chemotherapy treatment. As such, minor neurocognitive disorder is diagnosed. As to psychiatric concerns, limited psychological testing could be completed. Given that behavioral observations of inappropriate affect, thought disorganization, and child-like behavior persisted across interview and testing appointments  by 21 days it is likely that these symptoms were not isolated observations and have been observed for nearly one month during the course of this evaluation. As such, suspicion remains for a schizophrenia spectrum disorder; however, formal diagnosis is precluded as above. Fortunately, Ms. June remains under the care of her referring medical psychologist; differential diagnosis of psychotic symptoms is deferred to Dr. Jaquez.     Diagnoses  1. Minor neurocognitive disorder  Ambulatory referral/consult to Adult Neuropsychology   2. Severe episode of recurrent major depressive disorder, with psychotic " features        Rule-out: Schizophrenia spectrum disorder   3. ANGIE (generalized anxiety disorder)      4. Meningioma      5. History of chemotherapy      6. Malignant neoplasm of left breast in female, estrogen receptor negative, unspecified site of breast            Provider Recommendations:   1. Ms. June will be encouraged to follow up with her referring provider for ongoing care. Continued work with Dr. Jaquez in particular will be helpful because management of psychiatric symptoms is likely to improve day-to-day cognition.     2. Her referring medical psychologist is in the best position to consider whether empiric treatment with an antipsychotic medication would be safe and appropriate given concern for possible schizophrenia spectrum disorder.      3. Ms. June will be provided the below recommendations during a feedback session in order to help coordinate her care.      4. Scheduled repeat testing is not necessary at this time. However, If Ms. June has continued cognitive declines after continued management of psychiatric concerns, repeat testing would be indicated, and these data should be used as a baseline for comparison.]           Depression Patient Health Questionnaire 2/8/2022 10/11/2021   Over the last two weeks how often have you been bothered by little interest or pleasure in doing things Not at all Not at all   Over the last two weeks how often have you been bothered by feeling down, depressed or hopeless Not at all Not at all   PHQ-2 Total Score 0 0     0-4 = No intervention  5 to 9 = Mild  10 to 14 = Moderate  15 to 19 = Moderately severe  =20 = Severe    Review of Systems   · PSYCHIATRIC: Pertinant items are noted in the narrative.    Past Medical, Family and Social History: The patient's past medical, family and social history have been reviewed and updated as appropriate within the electronic medical record - see encounter notes.      Current Outpatient Medications:      albuterol (PROVENTIL/VENTOLIN HFA) 90 mcg/actuation inhaler, Inhale 2 puffs into the lungs every 6 (six) hours as needed for Wheezing. Rescue, Disp: 18 g, Rfl: 1    ammonium lactate 12 % Crea, Apply 1 Act topically 2 (two) times daily. To feet., Disp: 140 g, Rfl: 2    ascorbic acid, vitamin C, (VITAMIN C) 100 MG tablet, Take 100 mg by mouth once daily., Disp: , Rfl:     aspirin (ECOTRIN) 81 MG EC tablet, Take 1 tablet (81 mg total) by mouth once daily., Disp: 360 tablet, Rfl: 1    budesonide-formoterol 160-4.5 mcg (SYMBICORT) 160-4.5 mcg/actuation HFAA, Inhale 2 puffs into the lungs every 12 (twelve) hours. Controller, Disp: 10.2 g, Rfl: 3    busPIRone (BUSPAR) 15 MG tablet, Take 1 tablet (15 mg total) by mouth 3 (three) times daily., Disp: 270 tablet, Rfl: 3    celecoxib (CELEBREX) 200 MG capsule, Take 1 capsule (200 mg total) by mouth 2 (two) times daily., Disp: 28 capsule, Rfl: 0    dulaglutide (TRULICITY) 4.5 mg/0.5 mL pen injector, Inject 4.5 mg into the skin every 7 days., Disp: 12 pen, Rfl: 1    EScitalopram oxalate (LEXAPRO) 20 MG tablet, Take 1 tablet (20 mg total) by mouth once daily., Disp: 90 tablet, Rfl: 3    EUTHYROX 50 mcg tablet, Take 1 tablet (50 mcg total) by mouth every morning., Disp: 90 tablet, Rfl: 1    gabapentin (NEURONTIN) 300 MG capsule, Take 1 capsule (300 mg total) by mouth 2 (two) times daily., Disp: 180 capsule, Rfl: 0    hydroCHLOROthiazide (HYDRODIURIL) 25 MG tablet, Take 1 tablet (25 mg total) by mouth daily as needed (edema, swelling)., Disp: 90 tablet, Rfl: 3    hydrocortisone 0.5 % cream, Apply topically 2 (two) times daily., Disp: , Rfl:     LATUDA 20 mg Tab tablet, Take 20 mg by mouth once daily., Disp: , Rfl:     linaCLOtide (LINZESS) 145 mcg Cap capsule, Take 1 capsule (145 mcg total) by mouth once daily., Disp: 90 capsule, Rfl: 3    magnesium oxide (MAG-OX) 400 mg (241.3 mg magnesium) tablet, Take 1 tablet (400 mg total) by mouth once daily., Disp: 90 tablet,  Rfl: 1    metFORMIN (GLUCOPHAGE-XR) 500 MG ER 24hr tablet, SMARTSI Tablet(s) By Mouth Every Evening, Disp: 90 tablet, Rfl: 3    multivit with min-folic acid (ADULT MULTIVITAMIN GUMMIES) 200 mcg Chew, Take by mouth., Disp: , Rfl:     olopatadine (PATANOL) 0.1 % ophthalmic solution, , Disp: , Rfl:     oxybutynin (DITROPAN-XL) 5 MG TR24, Take 1 tablet (5 mg total) by mouth once daily., Disp: 90 tablet, Rfl: 3    pantoprazole (PROTONIX) 40 MG tablet, Take 1 tablet (40 mg total) by mouth once daily., Disp: 90 tablet, Rfl: 3    pravastatin (PRAVACHOL) 40 MG tablet, Take 1 tablet (40 mg total) by mouth every evening., Disp: 90 tablet, Rfl: 3    traZODone (DESYREL) 150 MG tablet, Take 1 tablet (150 mg total) by mouth every evening., Disp: 90 tablet, Rfl: 3  No current facility-administered medications for this visit.    Facility-Administered Medications Ordered in Other Visits:     lactated ringers infusion, , Intravenous, Continuous, Maty Thompson MD, Last Rate: 10 mL/hr at 22 0636, Restarted at 22 0709    Compliance: yes    Side effects: None    Risk Parameters:  Patient reports no suicidal ideation  Patient reports no homicidal ideation  Patient reports no self-injurious behavior  Patient reports no violent behavior    Exam (detailed: at least 9 elements; comprehensive: all 15 elements)   Constitutional  Vitals:  Most recent vital signs were reviewed.   Last 3 sets of Vitals    Vitals - 1 value per visit 2022 2022 8/3/2022   SYSTOLIC 133 135 123   DIASTOLIC 86 85 80   Pulse 54 55 55   Temp - - -   Resp 18 18 -   SPO2 98 99 -   Weight (lb) - - 277.78   Weight (kg) - - 126   Height - - -   BMI (Calculated) - - -   VISIT REPORT - - -   Pain Score  - - -   Some recent data might be hidden          General:  age appropriate, casually dressed, neatly groomed, wearing mask, scarf over hair--extensions with ml--very long     Musculoskeletal  Muscle Strength/Tone:  no tremor, no tic   Gait &  "Station:  A little off balance when first standing     Psychiatric  Speech:  no latency; no press, slight artic--understandable about 80%   Behavior: wnl   Mood & Affect:  "I feel good"  congruent and appropriate   Thought Process:  normal and logical   Associations:  intact   Thought Content:  reported some auditory/visual hallucinations--but when directly asked, then denied it; when asked about seeing things out of the corner of her eye, she said it happens every other day or something--"[facility]"   Insight:  has awareness of illness   Judgement: behavior is adequate to circumstances   Orientation:  grossly intact   Memory: not tested   Language: grossly intact   Attention Span & Concentration:  Grossly intact   Fund of Knowledge:  not tested     Assessment and Diagnosis   Status/Progress: Based on the examination today, the patient's problem(s) is/are improved and adequately but not ideally controlled.  New problems have not been presented today.   Co-morbidities are complicating management of the primary condition.  The working differential for this patient includes schizoaffective vs schizophrenia.     General Impression:     Encounter Diagnoses   Name Primary?    Schizoaffective disorder, depressive type Yes    ANGIE (generalized anxiety disorder)     Minor neurocognitive disorder          Intervention/Counseling/Treatment Plan   · Medication Management: Discussed risks, benefits, and alternatives to treatment plan documented above with patient. I answered all patient questions related to this plan, and patient expressed understanding and agreement.   Lexapro 20 mg, buspirone 15 mg tid, trazodone 150 mg hs; Latuda 20 mg--no refills needed today  · continue [facility]   · She may need more community supports even after discharge from the IOP--may need to consider different agency.    Return to Clinic: after discharge from IOP    Medication List with Changes/Refills   Current " Medications    ALBUTEROL (PROVENTIL/VENTOLIN HFA) 90 MCG/ACTUATION INHALER    Inhale 2 puffs into the lungs every 6 (six) hours as needed for Wheezing. Rescue    AMMONIUM LACTATE 12 % CREA    Apply 1 Act topically 2 (two) times daily. To feet.    ASCORBIC ACID, VITAMIN C, (VITAMIN C) 100 MG TABLET    Take 100 mg by mouth once daily.    ASPIRIN (ECOTRIN) 81 MG EC TABLET    Take 1 tablet (81 mg total) by mouth once daily.    BUDESONIDE-FORMOTEROL 160-4.5 MCG (SYMBICORT) 160-4.5 MCG/ACTUATION HFAA    Inhale 2 puffs into the lungs every 12 (twelve) hours. Controller    BUSPIRONE (BUSPAR) 15 MG TABLET    Take 1 tablet (15 mg total) by mouth 3 (three) times daily.    CELECOXIB (CELEBREX) 200 MG CAPSULE    Take 1 capsule (200 mg total) by mouth 2 (two) times daily.    DULAGLUTIDE (TRULICITY) 4.5 MG/0.5 ML PEN INJECTOR    Inject 4.5 mg into the skin every 7 days.    ESCITALOPRAM OXALATE (LEXAPRO) 20 MG TABLET    Take 1 tablet (20 mg total) by mouth once daily.    EUTHYROX 50 MCG TABLET    Take 1 tablet (50 mcg total) by mouth every morning.    GABAPENTIN (NEURONTIN) 300 MG CAPSULE    Take 1 capsule (300 mg total) by mouth 2 (two) times daily.    HYDROCHLOROTHIAZIDE (HYDRODIURIL) 25 MG TABLET    Take 1 tablet (25 mg total) by mouth daily as needed (edema, swelling).    HYDROCORTISONE 0.5 % CREAM    Apply topically 2 (two) times daily.    LATUDA 20 MG TAB TABLET    Take 20 mg by mouth once daily.    LINACLOTIDE (LINZESS) 145 MCG CAP CAPSULE    Take 1 capsule (145 mcg total) by mouth once daily.    MAGNESIUM OXIDE (MAG-OX) 400 MG (241.3 MG MAGNESIUM) TABLET    Take 1 tablet (400 mg total) by mouth once daily.    METFORMIN (GLUCOPHAGE-XR) 500 MG ER 24HR TABLET    SMARTSI Tablet(s) By Mouth Every Evening    MULTIVIT WITH MIN-FOLIC ACID (ADULT MULTIVITAMIN GUMMIES) 200 MCG CHEW    Take by mouth.    OLOPATADINE (PATANOL) 0.1 % OPHTHALMIC SOLUTION        OXYBUTYNIN (DITROPAN-XL) 5 MG TR24    Take 1 tablet (5 mg total) by mouth  once daily.    PANTOPRAZOLE (PROTONIX) 40 MG TABLET    Take 1 tablet (40 mg total) by mouth once daily.    PRAVASTATIN (PRAVACHOL) 40 MG TABLET    Take 1 tablet (40 mg total) by mouth every evening.    TRAZODONE (DESYREL) 150 MG TABLET    Take 1 tablet (150 mg total) by mouth every evening.        Time spent with pt including note preparation: 40 minutes     Patito Jaquez, PhD, MP  Advanced Practice Medical Psychologist  [facility]  [place] [id number] [phone]   [phone] fax

## 2022-08-03 NOTE — PATIENT INSTRUCTIONS
"[facility] DEPARTMENT OF PSYCHIATRY   PATIENT INFORMATION    We appreciate the opportunity to participate in your medical care and hope the following protocols will make it easier for you to receive quality treatment in our department.    PUNCTUALITY: Your appointment is scheduled for a fixed amount of time, reserved especially for you.  To get the benefit of your appointment, please arrive at least 15 minutes early to allow time for traffic, parking and registration.  Should you arrive more than 15 minutes late to your appointment, you will be rescheduled in order to assure your clinician has adequate time to assess you and provide helpful care.      APPOINTMENTS: Appointments are made by the nursing/front office staff or through the patient portal. Providers do not have access  to schedule appointments. Walk in appointments are not available. FOR EMERGENCIES, PLEASE GO THE CLOSEST EMERGENCY ROOM.    CANCELLATION/MISSED APPOINTMENTS:   In order to receive quality care, all appointments must be kept.  If you are unable to keep an appointment, please reschedule at least 3 days prior if possible. Late cancellations (within 24 hours of the appointment) and repeated no-show appointments may result in dismissal from the clinic. After two no show/late cancellation visits, you will receive a notice letter, alerting you to keep visits to prevent department dismissal. If another visit is missed after receipt of the notice, you will be discharged from the clinic. This policy is in effect to allow for other individuals on a long waiting list to be seen as soon as possible. Unlike other branches of medicine where several individuals can be scheduled in a 30 minute time slot, only one individual can be scheduled in any time slot in Psychiatry.     MESSAGES: For simple questions/concerns, you may contact your individual providers electronically through the "[facility]" portal or by calling [phone] " with messages relayed via office staff. If relevant, include pharmacy name and phone number, date of last visit and next scheduled visit, phone number where you can be reached throughout the day, and whether leaving a voicemail or message on an answering machine is acceptable. Messages will be returned by the Medical Assistant or Office Staff after your provider has reviewed the message.  Please allow 24 hours for a returned message before leaving another message. Messages will be checked each workday (Monday through Friday) during office hours (8:00 a.m. and 5:00 p.m.) and returned at most within one business day.  You may leave a non-urgent message after hours. Note that psychotherapy and medication management are not appropriate by telephone or the patient portal.    PRESCRIPTION REFILLS:  Please communicate with your prescriber about any refills you need during your appointment. You may also request refills through the [facility] portal (preferred) or by calling the clinic. Prescriptions will be filled during office hours.     Please do not wait until you are completely out of medication to request refills. Same day refills are not always possible. Patients may experience symptoms of withdrawal if they run out of medications. The patient assumes all responsibility when there is an issue with non-compliance with follow-up appointments and medications.  Some medications are controlled and regulated by the FDA and [place]. Some of these medications can not be refilled before 30 days and require a face to face appointment.     PAPERWORK REQUESTS: If you have any forms or letters that need to be completed by your doctor, please present these at the beginning of the appointment to ensure that information needed to complete them is obtained during the office visit. Paperwork will be returned within 7-10 business days. Staff will call you to  the paperwork when completed.    SPECIAL EVALUATIONS: Please note that our  "department is treatment-focused. As such, we focus on treatment-oriented evaluations and do not perform specialty or "forensic" evaluations. Examples are listed below.    Disability: We do not do disability evaluations.  Please contact Social Security Administration for evaluations and determinations. You will then sign releases allowing for records from your treatment providers to be forwarded to Social Security Administration to use in their evaluation.  Gun Permit: We do not offer Sound Judgment Evaluations or assessments leading to gun ownership, nor do we fill out or file paperwork relevant to owning, concealing or purchasing a firearm.  Emotional Support     Animals (KAYY): We do not provide documentation, including letters, to aid in the acclamation that an Emotional Support Animal is required. Note that ESAs are not trained to perform tasks or recognize particular signs or symptoms. Rather, they are distinguished by the close, emotional, and supportive bond between the animal and the owner.       SAMPLES: We do not provide samples of any medications. If you have financial difficulties and are on a limited income, you may qualify for Patient Assistance Programs from various pharmaceutical companies. This will require that you complete paperwork with your financial information, but this does not guarantee that the company will approve the application. Alternative medication options can be discussed.    REFERRALS/COORDINATION: You will be referred to other providers if we feel unable to adequately diagnose or treat your particular condition, or if collaboration with another provider would allow for better management of your condition.       Call In if problems  Call Report Side Effects   Encouraged to follow up with primary care / Gen Med MD for continued monitoring of general health and wellness  Call 911 Or go to ER if Acute Concerns (especially if any thoughts of harm to self or other)          Patito Jaquez, " PhD, MP  Advanced Practice Medical Psychologist  [facility]  [address]  [place] [id number] [phone]   [phone] fax

## 2022-08-05 ENCOUNTER — TELEPHONE (OUTPATIENT)
Dept: OTOLARYNGOLOGY | Facility: HOSPITAL | Age: 42
End: 2022-08-05
Payer: MEDICAID

## 2022-08-05 DIAGNOSIS — G43.909 MIGRAINE WITHOUT STATUS MIGRAINOSUS, NOT INTRACTABLE, UNSPECIFIED MIGRAINE TYPE: ICD-10-CM

## 2022-08-05 DIAGNOSIS — R42 DIZZINESS: Primary | ICD-10-CM

## 2022-08-05 NOTE — TELEPHONE ENCOUNTER
----- Message from Zhane Campbell, CCC-A sent at 8/5/2022  9:38 AM CDT -----  Regarding: [facility] referral  Please drop referral for A/[facility] (has medicaid). Thanks!

## 2022-08-12 ENCOUNTER — PATIENT MESSAGE (OUTPATIENT)
Dept: INTERNAL MEDICINE | Facility: CLINIC | Age: 42
End: 2022-08-12
Payer: MEDICAID

## 2022-08-12 ENCOUNTER — CLINICAL SUPPORT (OUTPATIENT)
Dept: AUDIOLOGY | Facility: CLINIC | Age: 42
End: 2022-08-12
Payer: MEDICAID

## 2022-08-12 DIAGNOSIS — I63.9 CEREBROVASCULAR ACCIDENT (CVA), UNSPECIFIED MECHANISM: Primary | ICD-10-CM

## 2022-08-12 DIAGNOSIS — R06.02 SOB (SHORTNESS OF BREATH): ICD-10-CM

## 2022-08-12 DIAGNOSIS — R42 DIZZINESS: ICD-10-CM

## 2022-08-12 DIAGNOSIS — G43.909 MIGRAINE WITHOUT STATUS MIGRAINOSUS, NOT INTRACTABLE, UNSPECIFIED MIGRAINE TYPE: ICD-10-CM

## 2022-08-12 DIAGNOSIS — R01.1 MURMUR: ICD-10-CM

## 2022-08-12 DIAGNOSIS — R26.89 IMBALANCE: Primary | ICD-10-CM

## 2022-08-12 PROCEDURE — 92540 PR VESTIBULAR EVAL NYSTAG FOVL&PERPH STIM OSCIL TRACKING: ICD-10-PCS | Mod: 26,S$PBB,, | Performed by: AUDIOLOGIST-HEARING AID FITTER

## 2022-08-12 PROCEDURE — 92537 CALORIC VSTBLR TEST W/REC: CPT | Mod: 26,S$PBB,, | Performed by: AUDIOLOGIST-HEARING AID FITTER

## 2022-08-12 PROCEDURE — 92537 PR CALORIC VSTBLR TEST W/REC BITHERMAL: ICD-10-PCS | Mod: 26,S$PBB,, | Performed by: AUDIOLOGIST-HEARING AID FITTER

## 2022-08-12 PROCEDURE — 92540 BASIC VESTIBULAR EVALUATION: CPT | Mod: 26,S$PBB,, | Performed by: AUDIOLOGIST-HEARING AID FITTER

## 2022-08-12 PROCEDURE — 92540 BASIC VESTIBULAR EVALUATION: CPT | Mod: PBBFAC | Performed by: AUDIOLOGIST-HEARING AID FITTER

## 2022-08-12 PROCEDURE — 92537 CALORIC VSTBLR TEST W/REC: CPT | Mod: PBBFAC | Performed by: AUDIOLOGIST-HEARING AID FITTER

## 2022-08-12 PROCEDURE — 92553 AUDIOMETRY AIR & BONE: CPT | Mod: PBBFAC | Performed by: AUDIOLOGIST-HEARING AID FITTER

## 2022-08-12 PROCEDURE — 92567 TYMPANOMETRY: CPT | Mod: PBBFAC | Performed by: AUDIOLOGIST-HEARING AID FITTER

## 2022-08-12 NOTE — PROGRESS NOTES
Referring provider: Nati Segura PA-C    Sangeetha June was seen 2022 for an audiological and vestibular evaluation.  Patient complains of dizziness and gait imbalance.  No change in symptoms since she last saw ENT on 2022. She denies hearing loss or tinnitus. No aural pressure, otalgia or otorrhea.       Audiology Report:  Results reveal normal hearing sensitivity 250-8000 Hz for the right ear, and normal hearing sensitivity 250-8000 Hz for the left ear. Speech reception and word recognition test were not tested today due to not clinically indicated. Tympanograms were Type A for the right ear and Type A for the left ear.    There is no change in hearing since her previous audiogram from 10/22/2021.      Videonystagmography Report (VNG):  Oculomotor function tests:  1. Sinusoidal tracking was normal.    2. Saccade revealed normal velocities and accuracies; latencies were prolonged.   3. Optokinetic nystagmus test was normal and symmetric.  Gaze test was absent for nystagmus.  Spontaneous test was absent for nystagmus.  Head-shake test was absent for after head-shake nystagmus.  Static Positional test was absent for nystagmus.  [place]-Hallpike Right was negative for BPPV.  [place]-Hallpike Left was negative for BPPV.  Bi-thermal caloric test was normal.  Fixation suppression following caloric irrigations was normal.  The following values were obtained:  Unilateral weakness (UW): 2% right ear  Directional preponderance (DP): 10% left beating  RC: 14 d/s   d/s   RW: 10 d/s  LW: 13 d/s    Summary: Normal VNG. No evidence of inner ear dysfunction or asymmetry, including BPPV.     Recommendations:  1. ENT review    Patient was counseled on the above findings.  Tracings are to be scanned.

## 2022-08-13 ENCOUNTER — PATIENT MESSAGE (OUTPATIENT)
Dept: INTERNAL MEDICINE | Facility: CLINIC | Age: 42
End: 2022-08-13
Payer: MEDICAID

## 2022-08-15 ENCOUNTER — OFFICE VISIT (OUTPATIENT)
Dept: CARDIOLOGY | Facility: CLINIC | Age: 42
End: 2022-08-15
Payer: MEDICAID

## 2022-08-15 ENCOUNTER — HOSPITAL ENCOUNTER (OUTPATIENT)
Dept: CARDIOLOGY | Facility: HOSPITAL | Age: 42
Discharge: HOME | End: 2022-08-15
Attending: INTERNAL MEDICINE
Payer: MEDICAID

## 2022-08-15 VITALS
SYSTOLIC BLOOD PRESSURE: 110 MMHG | HEIGHT: 65 IN | DIASTOLIC BLOOD PRESSURE: 80 MMHG | HEART RATE: 59 BPM | OXYGEN SATURATION: 99 % | WEIGHT: 274.06 LBS | BODY MASS INDEX: 45.66 KG/M2

## 2022-08-15 DIAGNOSIS — E78.49 OTHER HYPERLIPIDEMIA: ICD-10-CM

## 2022-08-15 DIAGNOSIS — I10 PRIMARY HYPERTENSION: ICD-10-CM

## 2022-08-15 DIAGNOSIS — E66.01 OBESITY, MORBID, BMI 40.0-49.9: ICD-10-CM

## 2022-08-15 DIAGNOSIS — R01.1 MURMUR: ICD-10-CM

## 2022-08-15 DIAGNOSIS — R06.02 SOB (SHORTNESS OF BREATH): Primary | ICD-10-CM

## 2022-08-15 DIAGNOSIS — R07.9 CHEST PAIN, UNSPECIFIED TYPE: ICD-10-CM

## 2022-08-15 DIAGNOSIS — R00.2 PALPITATIONS: ICD-10-CM

## 2022-08-15 DIAGNOSIS — Z85.3 HISTORY OF BREAST CANCER: ICD-10-CM

## 2022-08-15 DIAGNOSIS — R06.09 DOE (DYSPNEA ON EXERTION): ICD-10-CM

## 2022-08-15 DIAGNOSIS — I63.9 CEREBROVASCULAR ACCIDENT (CVA), UNSPECIFIED MECHANISM: ICD-10-CM

## 2022-08-15 DIAGNOSIS — R06.02 SOB (SHORTNESS OF BREATH): ICD-10-CM

## 2022-08-15 DIAGNOSIS — R60.0 LOCALIZED EDEMA: ICD-10-CM

## 2022-08-15 PROCEDURE — 99214 OFFICE O/P EST MOD 30 MIN: CPT | Mod: S$PBB,,, | Performed by: INTERNAL MEDICINE

## 2022-08-15 PROCEDURE — 3072F PR LOW RISK FOR RETINOPATHY: ICD-10-PCS | Mod: CPTII,,, | Performed by: INTERNAL MEDICINE

## 2022-08-15 PROCEDURE — 3066F PR DOCUMENTATION OF TREATMENT FOR NEPHROPATHY: ICD-10-PCS | Mod: CPTII,,, | Performed by: INTERNAL MEDICINE

## 2022-08-15 PROCEDURE — 3079F PR MOST RECENT DIASTOLIC BLOOD PRESSURE 80-89 MM HG: ICD-10-PCS | Mod: CPTII,,, | Performed by: INTERNAL MEDICINE

## 2022-08-15 PROCEDURE — 1160F PR REVIEW ALL MEDS BY PRESCRIBER/CLIN PHARMACIST DOCUMENTED: ICD-10-PCS | Mod: CPTII,,, | Performed by: INTERNAL MEDICINE

## 2022-08-15 PROCEDURE — 3074F SYST BP LT 130 MM HG: CPT | Mod: CPTII,,, | Performed by: INTERNAL MEDICINE

## 2022-08-15 PROCEDURE — 99214 OFFICE O/P EST MOD 30 MIN: CPT | Mod: PBBFAC,PO | Performed by: INTERNAL MEDICINE

## 2022-08-15 PROCEDURE — 3061F NEG MICROALBUMINURIA REV: CPT | Mod: CPTII,,, | Performed by: INTERNAL MEDICINE

## 2022-08-15 PROCEDURE — 1160F RVW MEDS BY RX/DR IN RCRD: CPT | Mod: CPTII,,, | Performed by: INTERNAL MEDICINE

## 2022-08-15 PROCEDURE — 1159F MED LIST DOCD IN RCRD: CPT | Mod: CPTII,,, | Performed by: INTERNAL MEDICINE

## 2022-08-15 PROCEDURE — 99999 PR PBB SHADOW E&M-EST. PATIENT-LVL IV: ICD-10-PCS | Mod: PBBFAC,,, | Performed by: INTERNAL MEDICINE

## 2022-08-15 PROCEDURE — 93005 ELECTROCARDIOGRAM TRACING: CPT | Mod: PO

## 2022-08-15 PROCEDURE — 99999 PR PBB SHADOW E&M-EST. PATIENT-LVL IV: CPT | Mod: PBBFAC,,, | Performed by: INTERNAL MEDICINE

## 2022-08-15 PROCEDURE — 3008F BODY MASS INDEX DOCD: CPT | Mod: CPTII,,, | Performed by: INTERNAL MEDICINE

## 2022-08-15 PROCEDURE — 3079F DIAST BP 80-89 MM HG: CPT | Mod: CPTII,,, | Performed by: INTERNAL MEDICINE

## 2022-08-15 PROCEDURE — 93010 EKG 12-LEAD: ICD-10-PCS | Mod: ,,, | Performed by: INTERNAL MEDICINE

## 2022-08-15 PROCEDURE — 3008F PR BODY MASS INDEX (BMI) DOCUMENTED: ICD-10-PCS | Mod: CPTII,,, | Performed by: INTERNAL MEDICINE

## 2022-08-15 PROCEDURE — 99214 PR OFFICE/OUTPT VISIT, EST, LEVL IV, 30-39 MIN: ICD-10-PCS | Mod: S$PBB,,, | Performed by: INTERNAL MEDICINE

## 2022-08-15 PROCEDURE — 93010 ELECTROCARDIOGRAM REPORT: CPT | Mod: ,,, | Performed by: INTERNAL MEDICINE

## 2022-08-15 PROCEDURE — 3066F NEPHROPATHY DOC TX: CPT | Mod: CPTII,,, | Performed by: INTERNAL MEDICINE

## 2022-08-15 PROCEDURE — 1159F PR MEDICATION LIST DOCUMENTED IN MEDICAL RECORD: ICD-10-PCS | Mod: CPTII,,, | Performed by: INTERNAL MEDICINE

## 2022-08-15 PROCEDURE — 3072F LOW RISK FOR RETINOPATHY: CPT | Mod: CPTII,,, | Performed by: INTERNAL MEDICINE

## 2022-08-15 PROCEDURE — 3074F PR MOST RECENT SYSTOLIC BLOOD PRESSURE < 130 MM HG: ICD-10-PCS | Mod: CPTII,,, | Performed by: INTERNAL MEDICINE

## 2022-08-15 PROCEDURE — 3061F PR NEG MICROALBUMINURIA RESULT DOCUMENTED/REVIEW: ICD-10-PCS | Mod: CPTII,,, | Performed by: INTERNAL MEDICINE

## 2022-08-15 PROCEDURE — 3044F HG A1C LEVEL LT 7.0%: CPT | Mod: CPTII,,, | Performed by: INTERNAL MEDICINE

## 2022-08-15 PROCEDURE — 3044F PR MOST RECENT HEMOGLOBIN A1C LEVEL <7.0%: ICD-10-PCS | Mod: CPTII,,, | Performed by: INTERNAL MEDICINE
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
## 2022-08-19 ENCOUNTER — OFFICE VISIT (OUTPATIENT)
Dept: SURGERY | Facility: CLINIC | Age: 42
End: 2022-08-19
Payer: MEDICAID

## 2022-08-19 ENCOUNTER — HOSPITAL ENCOUNTER (OUTPATIENT)
Dept: RADIOLOGY | Facility: HOSPITAL | Age: 42
Discharge: HOME | End: 2022-08-19
Payer: MEDICAID

## 2022-08-19 ENCOUNTER — HOSPITAL ENCOUNTER (OUTPATIENT)
Dept: CARDIOLOGY | Facility: HOSPITAL | Age: 42
Discharge: HOME | End: 2022-08-19
Payer: MEDICAID

## 2022-08-19 VITALS
TEMPERATURE: 97 F | BODY MASS INDEX: 46.04 KG/M2 | SYSTOLIC BLOOD PRESSURE: 128 MMHG | WEIGHT: 276.69 LBS | HEART RATE: 62 BPM | DIASTOLIC BLOOD PRESSURE: 81 MMHG

## 2022-08-19 DIAGNOSIS — K80.50 BILIARY COLIC: Primary | ICD-10-CM

## 2022-08-19 DIAGNOSIS — K80.50 BILIARY COLIC: ICD-10-CM

## 2022-08-19 PROCEDURE — 3008F BODY MASS INDEX DOCD: CPT | Mod: CPTII,,,

## 2022-08-19 PROCEDURE — 93010 EKG 12-LEAD: ICD-10-PCS | Mod: ,,, | Performed by: INTERNAL MEDICINE

## 2022-08-19 PROCEDURE — 3061F PR NEG MICROALBUMINURIA RESULT DOCUMENTED/REVIEW: ICD-10-PCS | Mod: CPTII,,,

## 2022-08-19 PROCEDURE — 71046 X-RAY EXAM CHEST 2 VIEWS: CPT | Mod: 26,,, | Performed by: RADIOLOGY

## 2022-08-19 PROCEDURE — 3074F PR MOST RECENT SYSTOLIC BLOOD PRESSURE < 130 MM HG: ICD-10-PCS | Mod: CPTII,,,

## 2022-08-19 PROCEDURE — 3066F NEPHROPATHY DOC TX: CPT | Mod: CPTII,,,

## 2022-08-19 PROCEDURE — 3061F NEG MICROALBUMINURIA REV: CPT | Mod: CPTII,,,

## 2022-08-19 PROCEDURE — 99214 OFFICE O/P EST MOD 30 MIN: CPT | Mod: S$PBB,,,

## 2022-08-19 PROCEDURE — 71046 X-RAY EXAM CHEST 2 VIEWS: CPT | Mod: TC

## 2022-08-19 PROCEDURE — 3008F PR BODY MASS INDEX (BMI) DOCUMENTED: ICD-10-PCS | Mod: CPTII,,,

## 2022-08-19 PROCEDURE — 1160F RVW MEDS BY RX/DR IN RCRD: CPT | Mod: CPTII,,,

## 2022-08-19 PROCEDURE — 3079F PR MOST RECENT DIASTOLIC BLOOD PRESSURE 80-89 MM HG: ICD-10-PCS | Mod: CPTII,,,

## 2022-08-19 PROCEDURE — 99214 PR OFFICE/OUTPT VISIT, EST, LEVL IV, 30-39 MIN: ICD-10-PCS | Mod: S$PBB,,,

## 2022-08-19 PROCEDURE — 3072F PR LOW RISK FOR RETINOPATHY: ICD-10-PCS | Mod: CPTII,,,

## 2022-08-19 PROCEDURE — 3066F PR DOCUMENTATION OF TREATMENT FOR NEPHROPATHY: ICD-10-PCS | Mod: CPTII,,,

## 2022-08-19 PROCEDURE — 99215 OFFICE O/P EST HI 40 MIN: CPT | Mod: PBBFAC,25

## 2022-08-19 PROCEDURE — 99999 PR PBB SHADOW E&M-EST. PATIENT-LVL V: CPT | Mod: PBBFAC,,,

## 2022-08-19 PROCEDURE — 3079F DIAST BP 80-89 MM HG: CPT | Mod: CPTII,,,

## 2022-08-19 PROCEDURE — 3074F SYST BP LT 130 MM HG: CPT | Mod: CPTII,,,

## 2022-08-19 PROCEDURE — 93010 ELECTROCARDIOGRAM REPORT: CPT | Mod: ,,, | Performed by: INTERNAL MEDICINE

## 2022-08-19 PROCEDURE — 1159F PR MEDICATION LIST DOCUMENTED IN MEDICAL RECORD: ICD-10-PCS | Mod: CPTII,,,

## 2022-08-19 PROCEDURE — 1159F MED LIST DOCD IN RCRD: CPT | Mod: CPTII,,,

## 2022-08-19 PROCEDURE — 3044F PR MOST RECENT HEMOGLOBIN A1C LEVEL <7.0%: ICD-10-PCS | Mod: CPTII,,,

## 2022-08-19 PROCEDURE — 3072F LOW RISK FOR RETINOPATHY: CPT | Mod: CPTII,,,

## 2022-08-19 PROCEDURE — 93005 ELECTROCARDIOGRAM TRACING: CPT

## 2022-08-19 PROCEDURE — 71046 XR CHEST PA AND LATERAL: ICD-10-PCS | Mod: 26,,, | Performed by: RADIOLOGY

## 2022-08-19 PROCEDURE — 1160F PR REVIEW ALL MEDS BY PRESCRIBER/CLIN PHARMACIST DOCUMENTED: ICD-10-PCS | Mod: CPTII,,,

## 2022-08-19 PROCEDURE — 3044F HG A1C LEVEL LT 7.0%: CPT | Mod: CPTII,,,

## 2022-08-19 PROCEDURE — 99999 PR PBB SHADOW E&M-EST. PATIENT-LVL V: ICD-10-PCS | Mod: PBBFAC,,,

## 2022-08-19 RX ORDER — SODIUM CHLORIDE, SODIUM LACTATE, POTASSIUM CHLORIDE, CALCIUM CHLORIDE 600; 310; 30; 20 MG/100ML; MG/100ML; MG/100ML; MG/100ML
INJECTION, SOLUTION INTRAVENOUS CONTINUOUS
Status: CANCELLED | OUTPATIENT
Start: 2022-08-19

## 2022-08-19 RX ORDER — ONDANSETRON 2 MG/ML
4 INJECTION INTRAMUSCULAR; INTRAVENOUS EVERY 12 HOURS PRN
Status: CANCELLED | OUTPATIENT
Start: 2022-08-19

## 2022-08-19 NOTE — H&P (VIEW-ONLY)
[facility]  History & Physical    SUBJECTIVE:     History of Present Illness:  Patient is a 42 y.o. female presents for evaluation of right upper quadrant abdominal pain that radiates to her epigastric region and LUQ. It has been occurring for a while now, over a year. She follows with GI for chronic constipation and has a history of ROBERT that she was worked up for as well. She underwent HIDA with an EF of 35% last fall, but she has delayed treatment due to recent treatment for breast cancer. The pain occurs nearly every day and has no aggravating or relieving factors. She denies associated fevers, chills, nausea, and vomiting. Her surgical history is significant for laparoscopic hysterectomy.     Chief Complaint   Patient presents with    Consult     Gallbladder        Review of patient's allergies indicates:   Allergen Reactions    Lisinopril        Current Outpatient Medications   Medication Sig Dispense Refill    albuterol (PROVENTIL/VENTOLIN HFA) 90 mcg/actuation inhaler Inhale 2 puffs into the lungs every 6 (six) hours as needed for Wheezing. Rescue 18 g 1    ammonium lactate 12 % Crea Apply 1 Act topically 2 (two) times daily. To feet. 140 g 2    ascorbic acid, vitamin C, (VITAMIN C) 100 MG tablet Take 100 mg by mouth once daily.      aspirin (ECOTRIN) 81 MG EC tablet Take 1 tablet (81 mg total) by mouth once daily. 360 tablet 1    budesonide-formoterol 160-4.5 mcg (SYMBICORT) 160-4.5 mcg/actuation HFAA Inhale 2 puffs into the lungs every 12 (twelve) hours. Controller 10.2 g 3    busPIRone (BUSPAR) 15 MG tablet Take 1 tablet (15 mg total) by mouth 3 (three) times daily. 270 tablet 3    celecoxib (CELEBREX) 200 MG capsule Take 1 capsule (200 mg total) by mouth 2 (two) times daily. 28 capsule 0    dulaglutide (TRULICITY) 4.5 mg/0.5 mL pen injector Inject 4.5 mg into the skin every 7 days. 12 pen 1    EScitalopram oxalate (LEXAPRO) 20 MG tablet Take 1 tablet (20 mg total) by mouth once  daily. 90 tablet 3    EUTHYROX 50 mcg tablet Take 1 tablet (50 mcg total) by mouth every morning. 90 tablet 1    gabapentin (NEURONTIN) 300 MG capsule Take 1 capsule (300 mg total) by mouth 2 (two) times daily. 180 capsule 0    hydroCHLOROthiazide (HYDRODIURIL) 25 MG tablet Take 1 tablet (25 mg total) by mouth daily as needed (edema, swelling). 90 tablet 3    hydrocortisone 0.5 % cream Apply topically 2 (two) times daily.      LATUDA 20 mg Tab tablet Take 20 mg by mouth once daily.      linaCLOtide (LINZESS) 145 mcg Cap capsule Take 1 capsule (145 mcg total) by mouth once daily. 90 capsule 3    magnesium oxide (MAG-OX) 400 mg (241.3 mg magnesium) tablet Take 1 tablet (400 mg total) by mouth once daily. 90 tablet 1    metFORMIN (GLUCOPHAGE-XR) 500 MG ER 24hr tablet SMARTSI Tablet(s) By Mouth Every Evening 90 tablet 3    multivit with min-folic acid (ADULT MULTIVITAMIN GUMMIES) 200 mcg Chew Take by mouth.      olopatadine (PATANOL) 0.1 % ophthalmic solution       oxybutynin (DITROPAN-XL) 5 MG TR24 Take 1 tablet (5 mg total) by mouth once daily. 90 tablet 3    pantoprazole (PROTONIX) 40 MG tablet Take 1 tablet (40 mg total) by mouth once daily. 90 tablet 3    pravastatin (PRAVACHOL) 40 MG tablet Take 1 tablet (40 mg total) by mouth every evening. 90 tablet 3    traZODone (DESYREL) 150 MG tablet Take 1 tablet (150 mg total) by mouth every evening. 90 tablet 3     No current facility-administered medications for this visit.     Facility-Administered Medications Ordered in Other Visits   Medication Dose Route Frequency Provider Last Rate Last Admin    lactated ringers infusion   Intravenous Continuous Maty Thompson MD 10 mL/hr at 22 0636 Restarted at 22 0709       Past Medical History:   Diagnosis Date    Anxiety     Breast cancer     Chest pain     Chest pain 2021    Cholecystitis without calculus     Decreased ROM of left shoulder 2/15/2022    Dizziness     Elevated C-reactive  protein (CRP)     Essential hypertension     Fall 10/11/2021    Memory change     EVERARDO (obstructive sleep apnea)     Osteoarthritis     Other specified disorders of thyroid     Pre-diabetes 4/15/2021    Chronic, Stable, cont metformin    Screening mammogram, encounter for 4/15/2021    Shoulder injury     SOB (shortness of breath)     Thyroiditis     Tingling of left upper extremity 10/29/2021    Vision disturbance 4/30/2021    Weakness of shoulder 2/15/2022     Past Surgical History:   Procedure Laterality Date    ARTHROSCOPIC REPAIR OF ROTATOR CUFF OF SHOULDER Left 1/27/2022    Procedure: REPAIR, ROTATOR CUFF, ARTHROSCOPIC;  Surgeon: Francisco Mathews MD;  Location: [facility] OR;  Service: Orthopedics;  Laterality: Left;    ARTHROSCOPY OF SHOULDER WITH DECOMPRESSION OF SUBACROMIAL SPACE Left 1/27/2022    Procedure: ARTHROSCOPY, SHOULDER, WITH SUBACROMIAL SPACE DECOMPRESSION;  Surgeon: Francisco Mathews MD;  Location: [facility] OR;  Service: Orthopedics;  Laterality: Left;    BREAST BIOPSY      COLONOSCOPY N/A 5/31/2022    Procedure: COLONOSCOPY;  Surgeon: Km Odom MD;  Location: [facility];  Service: Endoscopy;  Laterality: N/A;    ESOPHAGOGASTRODUODENOSCOPY N/A 5/31/2022    Procedure: EGD (ESOPHAGOGASTRODUODENOSCOPY) ;  Surgeon: Km Odom MD;  Location: [facility];  Service: Endoscopy;  Laterality: N/A;    FIXATION OF TENDON Left 1/27/2022    Procedure: FIXATION, TENDON;  Surgeon: Francisco Mathews MD;  Location: [facility] OR;  Service: Orthopedics;  Laterality: Left;    HYSTERECTOMY      INJECTION OF ANESTHETIC AGENT INTO SACROILIAC JOINT Bilateral 7/29/2022    Procedure: Bilateral SIJ +  Bilateral GT Bursa Injection RN IV Sedation;  Surgeon: Bisi Cochran MD;  Location: [facility] PAIN MGT;  Service: Pain Management;  Laterality: Bilateral;    INJECTION OF JOINT Left 11/12/2021    Procedure: Left suprascapular and axillary injection with local;  Surgeon: Bisi Cochran  MD;  Location: [facility] PAIN MGT;  Service: Pain Management;  Laterality: Left;    INJECTION OF JOINT Bilateral 7/29/2022    Procedure: Bilateral SIJ + Bilateral GT Bursa Injection RN IV Sedation;  Surgeon: Bisi Cochran MD;  Location: [facility] PAIN MGT;  Service: Pain Management;  Laterality: Bilateral;    MASTECTOMY       Family History   Problem Relation Age of Onset    Colon cancer Mother 53        partial colectomy    Stroke Mother     Heart disease Mother     Stroke Sister     Diabetes Sister     Diabetes Brother     No Known Problems Brother     No Known Problems Maternal Grandmother     Prostate cancer Maternal Grandfather 70    Alcohol abuse Maternal Grandfather     No Known Problems Paternal Grandmother     No Known Problems Paternal Grandfather     Throat cancer Maternal Aunt         smoking hx    Lung cancer Maternal Uncle         hx smoking    Breast cancer Other 37        chemotherapy, planning for BL mastectomy    No Known Problems Other      Social History     Tobacco Use    Smoking status: Never Smoker    Smokeless tobacco: Never Used   Substance Use Topics    Alcohol use: Not Currently    Drug use: Not Currently     Types: Marijuana        Review of Systems:  Review of Systems   Constitutional: Negative for chills, fatigue, fever and unexpected weight change.   Respiratory: Negative for cough, shortness of breath, wheezing and stridor.    Cardiovascular: Negative for chest pain, palpitations and leg swelling.   Gastrointestinal: Positive for abdominal pain. Negative for abdominal distention, constipation, diarrhea, nausea and vomiting.   Genitourinary: Negative for difficulty urinating, dysuria, frequency, hematuria and urgency.   Skin: Negative for color change, pallor, rash and wound.   Hematological: Does not bruise/bleed easily.       OBJECTIVE:     Vital Signs (Most Recent)  Temp: 97.4 °F (36.3 °C) (08/19/22 1432)  Pulse: 62 (08/19/22 1432)  BP: 128/81 (08/19/22 1432)     125.5  kg (276 lb 10.8 oz)     Physical Exam:  Physical Exam  Vitals reviewed.   Constitutional:       General: She is not in acute distress.     Appearance: She is well-developed. She is not ill-appearing.   HENT:      Head: Normocephalic and atraumatic.      Right Ear: External ear normal.      Left Ear: External ear normal.   Eyes:      Extraocular Movements: Extraocular movements intact.      Conjunctiva/sclera: Conjunctivae normal.   Cardiovascular:      Rate and Rhythm: Normal rate.   Pulmonary:      Effort: Pulmonary effort is normal. No respiratory distress.   Abdominal:      General: There is no distension.      Palpations: Abdomen is soft.      Tenderness: There is no abdominal tenderness.      Comments: Abdomen soft, non-distended. +TTP in RUQ and mid epigastric region with deep palpation. Well-healed scars from lap hysterectomy.    Musculoskeletal:      Cervical back: Neck supple.   Skin:     General: Skin is warm and dry.   Neurological:      Mental Status: She is alert and oriented to person, place, and time.   Psychiatric:         Behavior: Behavior normal.         Diagnostic Results:  HIDA from last fall reviewed.    ASSESSMENT/PLAN:     43 y/o female with likely biliary colic.     - Discussed physiology of the biliary system and pathophysiology of biliary colic.  - To OR for robotic cholecystectomy with Dr. Bhandari on 08/29/2022, who obtained informed consent.  - Pre-op: CBC, CMP, CXR, EKG  - RTC post-operatively.    Coby Mendez PA-C  [facility]      I spent a total of 30 minutes on the day of the visit.This includes face to face time and non-face to face time preparing to see the patient (eg, review of tests), obtaining and/or reviewing separately obtained history, documenting clinical information in the electronic or other health record, independently interpreting results and communicating results to the patient/family/caregiver, or care coordinator.

## 2022-08-19 NOTE — PROGRESS NOTES
[facility]  History & Physical    SUBJECTIVE:     History of Present Illness:  Patient is a 42 y.o. female presents for evaluation of right upper quadrant abdominal pain that radiates to her epigastric region and LUQ. It has been occurring for a while now, over a year. She follows with GI for chronic constipation and has a history of ROBERT that she was worked up for as well. She underwent HIDA with an EF of 35% last fall, but she has delayed treatment due to recent treatment for breast cancer. The pain occurs nearly every day and has no aggravating or relieving factors. She denies associated fevers, chills, nausea, and vomiting. Her surgical history is significant for laparoscopic hysterectomy.     Chief Complaint   Patient presents with    Consult     Gallbladder        Review of patient's allergies indicates:   Allergen Reactions    Lisinopril        Current Outpatient Medications   Medication Sig Dispense Refill    albuterol (PROVENTIL/VENTOLIN HFA) 90 mcg/actuation inhaler Inhale 2 puffs into the lungs every 6 (six) hours as needed for Wheezing. Rescue 18 g 1    ammonium lactate 12 % Crea Apply 1 Act topically 2 (two) times daily. To feet. 140 g 2    ascorbic acid, vitamin C, (VITAMIN C) 100 MG tablet Take 100 mg by mouth once daily.      aspirin (ECOTRIN) 81 MG EC tablet Take 1 tablet (81 mg total) by mouth once daily. 360 tablet 1    budesonide-formoterol 160-4.5 mcg (SYMBICORT) 160-4.5 mcg/actuation HFAA Inhale 2 puffs into the lungs every 12 (twelve) hours. Controller 10.2 g 3    busPIRone (BUSPAR) 15 MG tablet Take 1 tablet (15 mg total) by mouth 3 (three) times daily. 270 tablet 3    celecoxib (CELEBREX) 200 MG capsule Take 1 capsule (200 mg total) by mouth 2 (two) times daily. 28 capsule 0    dulaglutide (TRULICITY) 4.5 mg/0.5 mL pen injector Inject 4.5 mg into the skin every 7 days. 12 pen 1    EScitalopram oxalate (LEXAPRO) 20 MG tablet Take 1 tablet (20 mg total) by mouth once  daily. 90 tablet 3    EUTHYROX 50 mcg tablet Take 1 tablet (50 mcg total) by mouth every morning. 90 tablet 1    gabapentin (NEURONTIN) 300 MG capsule Take 1 capsule (300 mg total) by mouth 2 (two) times daily. 180 capsule 0    hydroCHLOROthiazide (HYDRODIURIL) 25 MG tablet Take 1 tablet (25 mg total) by mouth daily as needed (edema, swelling). 90 tablet 3    hydrocortisone 0.5 % cream Apply topically 2 (two) times daily.      LATUDA 20 mg Tab tablet Take 20 mg by mouth once daily.      linaCLOtide (LINZESS) 145 mcg Cap capsule Take 1 capsule (145 mcg total) by mouth once daily. 90 capsule 3    magnesium oxide (MAG-OX) 400 mg (241.3 mg magnesium) tablet Take 1 tablet (400 mg total) by mouth once daily. 90 tablet 1    metFORMIN (GLUCOPHAGE-XR) 500 MG ER 24hr tablet SMARTSI Tablet(s) By Mouth Every Evening 90 tablet 3    multivit with min-folic acid (ADULT MULTIVITAMIN GUMMIES) 200 mcg Chew Take by mouth.      olopatadine (PATANOL) 0.1 % ophthalmic solution       oxybutynin (DITROPAN-XL) 5 MG TR24 Take 1 tablet (5 mg total) by mouth once daily. 90 tablet 3    pantoprazole (PROTONIX) 40 MG tablet Take 1 tablet (40 mg total) by mouth once daily. 90 tablet 3    pravastatin (PRAVACHOL) 40 MG tablet Take 1 tablet (40 mg total) by mouth every evening. 90 tablet 3    traZODone (DESYREL) 150 MG tablet Take 1 tablet (150 mg total) by mouth every evening. 90 tablet 3     No current facility-administered medications for this visit.     Facility-Administered Medications Ordered in Other Visits   Medication Dose Route Frequency Provider Last Rate Last Admin    lactated ringers infusion   Intravenous Continuous Maty Thompson MD 10 mL/hr at 22 0636 Restarted at 22 0709       Past Medical History:   Diagnosis Date    Anxiety     Breast cancer     Chest pain     Chest pain 2021    Cholecystitis without calculus     Decreased ROM of left shoulder 2/15/2022    Dizziness     Elevated C-reactive  protein (CRP)     Essential hypertension     Fall 10/11/2021    Memory change     EVERARDO (obstructive sleep apnea)     Osteoarthritis     Other specified disorders of thyroid     Pre-diabetes 4/15/2021    Chronic, Stable, cont metformin    Screening mammogram, encounter for 4/15/2021    Shoulder injury     SOB (shortness of breath)     Thyroiditis     Tingling of left upper extremity 10/29/2021    Vision disturbance 4/30/2021    Weakness of shoulder 2/15/2022     Past Surgical History:   Procedure Laterality Date    ARTHROSCOPIC REPAIR OF ROTATOR CUFF OF SHOULDER Left 1/27/2022    Procedure: REPAIR, ROTATOR CUFF, ARTHROSCOPIC;  Surgeon: Francisco Mathews MD;  Location: [facility] OR;  Service: Orthopedics;  Laterality: Left;    ARTHROSCOPY OF SHOULDER WITH DECOMPRESSION OF SUBACROMIAL SPACE Left 1/27/2022    Procedure: ARTHROSCOPY, SHOULDER, WITH SUBACROMIAL SPACE DECOMPRESSION;  Surgeon: Francisco Mathews MD;  Location: [facility] OR;  Service: Orthopedics;  Laterality: Left;    BREAST BIOPSY      COLONOSCOPY N/A 5/31/2022    Procedure: COLONOSCOPY;  Surgeon: Km Odom MD;  Location: [facility];  Service: Endoscopy;  Laterality: N/A;    ESOPHAGOGASTRODUODENOSCOPY N/A 5/31/2022    Procedure: EGD (ESOPHAGOGASTRODUODENOSCOPY) ;  Surgeon: Km Odom MD;  Location: [facility];  Service: Endoscopy;  Laterality: N/A;    FIXATION OF TENDON Left 1/27/2022    Procedure: FIXATION, TENDON;  Surgeon: Francisco Mathews MD;  Location: [facility] OR;  Service: Orthopedics;  Laterality: Left;    HYSTERECTOMY      INJECTION OF ANESTHETIC AGENT INTO SACROILIAC JOINT Bilateral 7/29/2022    Procedure: Bilateral SIJ +  Bilateral GT Bursa Injection RN IV Sedation;  Surgeon: Bisi Cochran MD;  Location: [facility] PAIN MGT;  Service: Pain Management;  Laterality: Bilateral;    INJECTION OF JOINT Left 11/12/2021    Procedure: Left suprascapular and axillary injection with local;  Surgeon: Bisi Cochran  MD;  Location: [facility] PAIN MGT;  Service: Pain Management;  Laterality: Left;    INJECTION OF JOINT Bilateral 7/29/2022    Procedure: Bilateral SIJ + Bilateral GT Bursa Injection RN IV Sedation;  Surgeon: Bisi Cochran MD;  Location: [facility] PAIN MGT;  Service: Pain Management;  Laterality: Bilateral;    MASTECTOMY       Family History   Problem Relation Age of Onset    Colon cancer Mother 53        partial colectomy    Stroke Mother     Heart disease Mother     Stroke Sister     Diabetes Sister     Diabetes Brother     No Known Problems Brother     No Known Problems Maternal Grandmother     Prostate cancer Maternal Grandfather 70    Alcohol abuse Maternal Grandfather     No Known Problems Paternal Grandmother     No Known Problems Paternal Grandfather     Throat cancer Maternal Aunt         smoking hx    Lung cancer Maternal Uncle         hx smoking    Breast cancer Other 37        chemotherapy, planning for BL mastectomy    No Known Problems Other      Social History     Tobacco Use    Smoking status: Never Smoker    Smokeless tobacco: Never Used   Substance Use Topics    Alcohol use: Not Currently    Drug use: Not Currently     Types: Marijuana        Review of Systems:  Review of Systems   Constitutional: Negative for chills, fatigue, fever and unexpected weight change.   Respiratory: Negative for cough, shortness of breath, wheezing and stridor.    Cardiovascular: Negative for chest pain, palpitations and leg swelling.   Gastrointestinal: Positive for abdominal pain. Negative for abdominal distention, constipation, diarrhea, nausea and vomiting.   Genitourinary: Negative for difficulty urinating, dysuria, frequency, hematuria and urgency.   Skin: Negative for color change, pallor, rash and wound.   Hematological: Does not bruise/bleed easily.       OBJECTIVE:     Vital Signs (Most Recent)  Temp: 97.4 °F (36.3 °C) (08/19/22 1432)  Pulse: 62 (08/19/22 1432)  BP: 128/81 (08/19/22 1432)     125.5  kg (276 lb 10.8 oz)     Physical Exam:  Physical Exam  Vitals reviewed.   Constitutional:       General: She is not in acute distress.     Appearance: She is well-developed. She is not ill-appearing.   HENT:      Head: Normocephalic and atraumatic.      Right Ear: External ear normal.      Left Ear: External ear normal.   Eyes:      Extraocular Movements: Extraocular movements intact.      Conjunctiva/sclera: Conjunctivae normal.   Cardiovascular:      Rate and Rhythm: Normal rate.   Pulmonary:      Effort: Pulmonary effort is normal. No respiratory distress.   Abdominal:      General: There is no distension.      Palpations: Abdomen is soft.      Tenderness: There is no abdominal tenderness.      Comments: Abdomen soft, non-distended. +TTP in RUQ and mid epigastric region with deep palpation. Well-healed scars from lap hysterectomy.    Musculoskeletal:      Cervical back: Neck supple.   Skin:     General: Skin is warm and dry.   Neurological:      Mental Status: She is alert and oriented to person, place, and time.   Psychiatric:         Behavior: Behavior normal.         Diagnostic Results:  HIDA from last fall reviewed.    ASSESSMENT/PLAN:     43 y/o female with likely biliary colic.     - Discussed physiology of the biliary system and pathophysiology of biliary colic.  - To OR for robotic cholecystectomy with Dr. Bhandari on 08/29/2022, who obtained informed consent.  - Pre-op: CBC, CMP, CXR, EKG  - RTC post-operatively.    Coby Mendez PA-C  [facility]      I spent a total of 30 minutes on the day of the visit.This includes face to face time and non-face to face time preparing to see the patient (eg, review of tests), obtaining and/or reviewing separately obtained history, documenting clinical information in the electronic or other health record, independently interpreting results and communicating results to the patient/family/caregiver, or care coordinator.

## 2022-08-22 NOTE — PRE ADMISSION SCREENING
Pre op instructions reviewed with patient per phone on 8/22: Spoke about pre op process and surgery instructions, verbalized understanding.    Surgery is scheduled on 8/29. Please arrive at 9:45am. We will call you the afternoon prior to surgery to confirm arrival time, as it is subject to change due to cancellations & emergencies.    Please report to the [facility] (1st Floor) at [facility] located off of [facility] (2nd building on the left, in front of the flag pole),address: [address] [place] [address] [place]. [address]    INSTRUCTIONS IMPORTANT!!!  Do Not Eat, Drink, or Smoke after 12 midnight! NO WATER after midnight! OK to brush teeth, no gum, candy or mints!      Take only these medicines with a small swallow of water-morning of surgery:  Buspar  Lexapro  Euthyrox  Gabapentin  Latuda  Pantoprazole    TAKE YOUR LAST DOSE OF METFORMIN ON SATURDAY 8/27.    CONTINUE TO HOLD ASPIRIN, CELEBREX, MAGNESIUM AND MULTI-VITAMIN PRIOR TO SURGERY.    ____  NO Acrylic/fake nails or nail polish worn day of surgery (specifically hand/arm & foot surgeries).  ____  NO powder, lotions, deodorants, oils or creams on body.  ____  Please Remove All jewelry & piercings prior to surgery.  ____  Please Remove Dentures, Hearing Aids & Contact Lens prior to the start of surgery.  ____  Please bring photo ID and insurance information to hospital (Leave Valuables at Home).  ____  If going home the same day, arrange for a ride home. You will not be able to drive 24 hrs if Anesthesia was used.   ____  Females (ages 11-60) may need to give a urine sample the morning of surgery; please see Pre op Nurse prior to voiding.  ____  Wear clean, loose fitting clothing. Allow for dressings, bandages.  ____  Stop all Aspirin products, Ibuprofen, Advil, Motrin & Aleve at least 5-7 days before surgery, unless otherwise instructed by your doctor, or the nurse.   ____  Blood Thinners are stopped based on your Provider's recommendation; Call Surgeon's  Office to inquire when to stop/hold.  ____  Stop taking any Fish Oil supplements or Vitamins at least 5 days prior to surgery, unless instructed otherwise by your Doctor.      Bathing Instructions:    -Do not shave your face or body the day before or the day of surgery.              -Do not shave abdominal area prior to surgery   -Shower & Rinse your body as usual with anti-bacterial Soap (Dial)              -Use one packet of hibiclens to wash the surgical site (using your hand) gently for 5 minutes.                    -Do not scrub you skin too hard.        -Do not use hibiclens on your head, face, or genitals.   -Do not wash with anti-bacterial soap after you use the hibiclens.              -Rinse your body thoroughly.     [facility] Visitor/Ride Policy:  Only 2 adults allowed (over the age of 18) to accompany you to the Hospital. You Must have a ride home from a responsible adult that you know and trust. Medical Transport, Uber or Lyft can only be used if patient has a responsible adult to accompany them during ride home.    Post-Op Instructions: You will receive Post-op/Discharge instructions by your Discharge Nurse prior to going home. Please call your Surgeon's office with any post-surgery questions/concerns.    *Call [facility] with surgery instruction questions @ [phone] or [phone] (Mon-Fri 8 am to 4 pm)    *If you are running late or have questions the morning of surgery, please call the Surgery Dept @ [phone]  *Insurance/ Financial Questions, please call [phone].

## 2022-08-26 ENCOUNTER — TELEPHONE (OUTPATIENT)
Dept: PAIN MEDICINE | Facility: CLINIC | Age: 42
End: 2022-08-26
Payer: MEDICAID

## 2022-08-26 ENCOUNTER — TELEPHONE (OUTPATIENT)
Dept: PREADMISSION TESTING | Facility: HOSPITAL | Age: 42
End: 2022-08-26
Payer: MEDICAID

## 2022-08-26 NOTE — TELEPHONE ENCOUNTER
Called and spoke with Pt about the following:     Please arrive to [facility] main lobby at 7:20am on 8/29/22 for your scheduled procedure. NOTHING to eat or drink after midnight unless instructed otherwise by your Surgeon. Take only the medications instructed by your Pre Admit Nurse with small sip of water.

## 2022-08-28 ENCOUNTER — ANESTHESIA EVENT (OUTPATIENT)
Dept: SURGERY | Facility: HOSPITAL | Age: 42
End: 2022-08-28
Payer: MEDICAID

## 2022-08-28 NOTE — ANESTHESIA PREPROCEDURE EVALUATION
08/28/2022  Sangeetha June is a 42 y.o., female.    Patient Active Problem List   Diagnosis    Delayed immunizations    S/P mastectomy    Chemotherapy adverse reaction    History of breast cancer    Left rotator cuff tear arthropathy    ANGIE (generalized anxiety disorder)    Urinary incontinence    Hyperlipidemia    Other insomnia    AGUILAR (dyspnea on exertion)    S/P hysterectomy    Postmenopausal    Vision disturbance    Obesity, morbid, BMI 40.0-49.9    Malignant neoplasm of left breast in female, estrogen receptor negative    Meningioma    Murmur    Hypertension    Hypothyroidism    Microcytic anemia    Strain of lumbar region    Gait difficulty    Imbalance    Difficulty walking    Lumbago    Abdominal pain    Gastroesophageal reflux disease    SOB (shortness of breath)    Nontraumatic incomplete tear of left rotator cuff    Thyroid nodule    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    I have reviewed the NPO Status.   I have reviewed the Medications.     Review of Systems  Social:  Non-Smoker    Hematology/Oncology:  Hematology Normal       -- Cancer in past history:  Oncology Comments: Mastectomy      EENT/Dental:EENT/Dental Normal   Cardiovascular:   Hypertension AGUILAR ECG has been reviewed. Recent negative ECHO and Holter. Equivocal NMST, probably artifact  Recent visit with cardiologist for medical management of undetermined CP   Pulmonary:   Shortness of breath Sleep Apnea    Renal/:   Hysterectomy    Hepatic/GI:   GERD Biliary colic    Musculoskeletal:   Arthritis     Neurological:   CVA    Endocrine:   Diabetes Hypothyroidism Normal TFT's Morbid Obesity / BMI > 40  Dermatological:  Skin Normal    Psych:   anxiety          Physical Exam  General: Well nourished    Airway:  Mallampati: II   Mouth Opening: Normal  TM Distance: Normal  Neck ROM: Normal ROM    Dental:  Intact        Anesthesia Plan  Type of Anesthesia, risks & benefits discussed:    Anesthesia Type: Gen ETT  Intra-op Monitoring Plan: Standard ASA Monitors  Post Op Pain Control Plan: multimodal analgesia  Induction:  IV  Airway Plan: , Post-Induction  Informed Consent: Informed consent signed with the Patient and all parties understand the risks and agree with anesthesia plan.  All questions answered.   ASA Score: 3    Ready For Surgery From Anesthesia Perspective.     .      Chemistry        Component Value Date/Time     08/19/2022 1627    K 4.3 08/19/2022 1627     08/19/2022 1627    CO2 30 (H) 08/19/2022 1627    BUN 8 08/19/2022 1627    CREATININE 1.0 08/19/2022 1627    GLU 76 08/19/2022 1627        Component Value Date/Time    CALCIUM 9.7 08/19/2022 1627    ALKPHOS 101 08/19/2022 1627    AST 15 08/19/2022 1627    ALT 12 08/19/2022 1627    BILITOT 0.3 08/19/2022 1627    ESTGFRAFRICA >60.0 07/19/2022 1356    EGFRNONAA >60.0 07/19/2022 1356        Lab Results   Component Value Date    WBC 8.11 08/19/2022    HGB 13.8 08/19/2022    HCT 41.8 08/19/2022    MCV 92 08/19/2022     08/19/2022

## 2022-08-29 ENCOUNTER — ANESTHESIA (OUTPATIENT)
Dept: SURGERY | Facility: HOSPITAL | Age: 42
End: 2022-08-29
Payer: MEDICAID

## 2022-08-29 ENCOUNTER — PATIENT MESSAGE (OUTPATIENT)
Dept: HEMATOLOGY/ONCOLOGY | Facility: CLINIC | Age: 42
End: 2022-08-29
Payer: MEDICAID

## 2022-08-29 ENCOUNTER — HOSPITAL ENCOUNTER (OUTPATIENT)
Facility: HOSPITAL | Age: 42
Discharge: HOME | End: 2022-08-29
Attending: SURGERY | Admitting: SURGERY
Payer: MEDICAID

## 2022-08-29 DIAGNOSIS — K80.50 BILIARY COLIC: ICD-10-CM

## 2022-08-29 LAB — POCT GLUCOSE: 87 MG/DL (ref 70–110)

## 2022-08-29 PROCEDURE — 71000033 HC RECOVERY, INTIAL HOUR: Performed by: SURGERY

## 2022-08-29 PROCEDURE — 88304 PR  SURG PATH,LEVEL III: ICD-10-PCS | Mod: 26,,, | Performed by: PATHOLOGY

## 2022-08-29 PROCEDURE — 00790 ANES IPER UPR ABD NOS: CPT | Performed by: SURGERY

## 2022-08-29 PROCEDURE — 25000003 PHARM REV CODE 250: Performed by: SURGERY

## 2022-08-29 PROCEDURE — 88304 TISSUE EXAM BY PATHOLOGIST: CPT | Mod: 26,,, | Performed by: PATHOLOGY

## 2022-08-29 PROCEDURE — 63600175 PHARM REV CODE 636 W HCPCS: Performed by: NURSE ANESTHETIST, CERTIFIED REGISTERED

## 2022-08-29 PROCEDURE — 63600175 PHARM REV CODE 636 W HCPCS: Performed by: ANESTHESIOLOGY

## 2022-08-29 PROCEDURE — 36000710: Performed by: SURGERY

## 2022-08-29 PROCEDURE — 25000003 PHARM REV CODE 250: Performed by: ANESTHESIOLOGY

## 2022-08-29 PROCEDURE — 25000003 PHARM REV CODE 250: Performed by: NURSE ANESTHETIST, CERTIFIED REGISTERED

## 2022-08-29 PROCEDURE — 88304 TISSUE EXAM BY PATHOLOGIST: CPT | Performed by: PATHOLOGY

## 2022-08-29 PROCEDURE — 37000008 HC ANESTHESIA 1ST 15 MINUTES: Performed by: SURGERY

## 2022-08-29 PROCEDURE — 47562 LAPAROSCOPIC CHOLECYSTECTOMY: CPT | Mod: ,,, | Performed by: SURGERY

## 2022-08-29 PROCEDURE — 71000015 HC POSTOP RECOV 1ST HR: Performed by: SURGERY

## 2022-08-29 PROCEDURE — 27201423 OPTIME MED/SURG SUP & DEVICES STERILE SUPPLY: Performed by: SURGERY

## 2022-08-29 PROCEDURE — 37000009 HC ANESTHESIA EA ADD 15 MINS: Performed by: SURGERY

## 2022-08-29 PROCEDURE — 71000039 HC RECOVERY, EACH ADD'L HOUR: Performed by: SURGERY

## 2022-08-29 PROCEDURE — 47562 PR LAP,CHOLECYSTECTOMY: ICD-10-PCS | Mod: ,,, | Performed by: SURGERY

## 2022-08-29 PROCEDURE — 36000711: Performed by: SURGERY

## 2022-08-29 RX ORDER — ROCURONIUM BROMIDE 10 MG/ML
INJECTION, SOLUTION INTRAVENOUS
Status: DISCONTINUED | OUTPATIENT
Start: 2022-08-29 | End: 2022-08-29

## 2022-08-29 RX ORDER — SUCCINYLCHOLINE CHLORIDE 20 MG/ML
INJECTION INTRAMUSCULAR; INTRAVENOUS
Status: DISCONTINUED | OUTPATIENT
Start: 2022-08-29 | End: 2022-08-29

## 2022-08-29 RX ORDER — SODIUM CHLORIDE, SODIUM LACTATE, POTASSIUM CHLORIDE, CALCIUM CHLORIDE 600; 310; 30; 20 MG/100ML; MG/100ML; MG/100ML; MG/100ML
INJECTION, SOLUTION INTRAVENOUS CONTINUOUS PRN
Status: DISCONTINUED | OUTPATIENT
Start: 2022-08-29 | End: 2022-08-29

## 2022-08-29 RX ORDER — INDOCYANINE GREEN AND WATER 25 MG
KIT INJECTION
Status: DISCONTINUED | OUTPATIENT
Start: 2022-08-29 | End: 2022-08-29

## 2022-08-29 RX ORDER — DOCUSATE SODIUM 100 MG/1
100 CAPSULE, LIQUID FILLED ORAL 2 TIMES DAILY
Qty: 60 CAPSULE | Refills: 1 | Status: SHIPPED | OUTPATIENT
Start: 2022-08-29 | End: 2022-09-16 | Stop reason: ALTCHOICE

## 2022-08-29 RX ORDER — OXYCODONE AND ACETAMINOPHEN 5; 325 MG/1; MG/1
1 TABLET ORAL
Status: DISCONTINUED | OUTPATIENT
Start: 2022-08-29 | End: 2022-08-29 | Stop reason: HOSPADM

## 2022-08-29 RX ORDER — ACETAMINOPHEN 10 MG/ML
INJECTION, SOLUTION INTRAVENOUS
Status: DISCONTINUED | OUTPATIENT
Start: 2022-08-29 | End: 2022-08-29

## 2022-08-29 RX ORDER — FENTANYL CITRATE 50 UG/ML
INJECTION, SOLUTION INTRAMUSCULAR; INTRAVENOUS
Status: DISCONTINUED | OUTPATIENT
Start: 2022-08-29 | End: 2022-08-29

## 2022-08-29 RX ORDER — ONDANSETRON 4 MG/1
4 TABLET, FILM COATED ORAL EVERY 6 HOURS PRN
Qty: 10 TABLET | Refills: 1 | Status: SHIPPED | OUTPATIENT
Start: 2022-08-29 | End: 2023-06-23

## 2022-08-29 RX ORDER — PROPOFOL 10 MG/ML
VIAL (ML) INTRAVENOUS
Status: DISCONTINUED | OUTPATIENT
Start: 2022-08-29 | End: 2022-08-29

## 2022-08-29 RX ORDER — LIDOCAINE HYDROCHLORIDE 10 MG/ML
INJECTION, SOLUTION EPIDURAL; INFILTRATION; INTRACAUDAL; PERINEURAL
Status: DISCONTINUED | OUTPATIENT
Start: 2022-08-29 | End: 2022-08-29

## 2022-08-29 RX ORDER — LIDOCAINE HYDROCHLORIDE 10 MG/ML
INJECTION, SOLUTION EPIDURAL; INFILTRATION; INTRACAUDAL; PERINEURAL
Status: DISCONTINUED | OUTPATIENT
Start: 2022-08-29 | End: 2022-08-29 | Stop reason: HOSPADM

## 2022-08-29 RX ORDER — DEXAMETHASONE SODIUM PHOSPHATE 4 MG/ML
INJECTION, SOLUTION INTRA-ARTICULAR; INTRALESIONAL; INTRAMUSCULAR; INTRAVENOUS; SOFT TISSUE
Status: DISCONTINUED | OUTPATIENT
Start: 2022-08-29 | End: 2022-08-29

## 2022-08-29 RX ORDER — ONDANSETRON 2 MG/ML
INJECTION INTRAMUSCULAR; INTRAVENOUS
Status: DISCONTINUED | OUTPATIENT
Start: 2022-08-29 | End: 2022-08-29

## 2022-08-29 RX ORDER — NEOSTIGMINE METHYLSULFATE 1 MG/ML
INJECTION, SOLUTION INTRAVENOUS
Status: DISCONTINUED | OUTPATIENT
Start: 2022-08-29 | End: 2022-08-29

## 2022-08-29 RX ORDER — ONDANSETRON 2 MG/ML
4 INJECTION INTRAMUSCULAR; INTRAVENOUS DAILY PRN
Status: DISCONTINUED | OUTPATIENT
Start: 2022-08-29 | End: 2022-08-29 | Stop reason: HOSPADM

## 2022-08-29 RX ORDER — BUPIVACAINE HYDROCHLORIDE 2.5 MG/ML
INJECTION, SOLUTION EPIDURAL; INFILTRATION; INTRACAUDAL
Status: DISCONTINUED | OUTPATIENT
Start: 2022-08-29 | End: 2022-08-29 | Stop reason: HOSPADM

## 2022-08-29 RX ORDER — INDOCYANINE GREEN AND WATER 25 MG
2.5 KIT INJECTION ONCE
Status: DISCONTINUED | OUTPATIENT
Start: 2022-08-29 | End: 2022-08-29 | Stop reason: HOSPADM

## 2022-08-29 RX ORDER — HYDROMORPHONE HYDROCHLORIDE 2 MG/ML
0.2 INJECTION, SOLUTION INTRAMUSCULAR; INTRAVENOUS; SUBCUTANEOUS EVERY 5 MIN PRN
Status: DISCONTINUED | OUTPATIENT
Start: 2022-08-29 | End: 2022-08-29 | Stop reason: HOSPADM

## 2022-08-29 RX ORDER — ONDANSETRON 2 MG/ML
4 INJECTION INTRAMUSCULAR; INTRAVENOUS EVERY 12 HOURS PRN
Status: DISCONTINUED | OUTPATIENT
Start: 2022-08-29 | End: 2022-08-29 | Stop reason: HOSPADM

## 2022-08-29 RX ORDER — SODIUM CHLORIDE, SODIUM LACTATE, POTASSIUM CHLORIDE, CALCIUM CHLORIDE 600; 310; 30; 20 MG/100ML; MG/100ML; MG/100ML; MG/100ML
INJECTION, SOLUTION INTRAVENOUS CONTINUOUS
Status: DISCONTINUED | OUTPATIENT
Start: 2022-08-29 | End: 2022-08-29 | Stop reason: HOSPADM

## 2022-08-29 RX ORDER — IBUPROFEN 600 MG/1
600 TABLET ORAL EVERY 6 HOURS PRN
Qty: 25 TABLET | Refills: 0 | Status: SHIPPED | OUTPATIENT
Start: 2022-08-29 | End: 2022-09-16 | Stop reason: ALTCHOICE

## 2022-08-29 RX ORDER — MIDAZOLAM HYDROCHLORIDE 1 MG/ML
INJECTION INTRAMUSCULAR; INTRAVENOUS
Status: DISCONTINUED | OUTPATIENT
Start: 2022-08-29 | End: 2022-08-29

## 2022-08-29 RX ORDER — KETOROLAC TROMETHAMINE 30 MG/ML
15 INJECTION, SOLUTION INTRAMUSCULAR; INTRAVENOUS EVERY 8 HOURS PRN
Status: DISCONTINUED | OUTPATIENT
Start: 2022-08-29 | End: 2022-08-29 | Stop reason: HOSPADM

## 2022-08-29 RX ORDER — CEFAZOLIN SODIUM 1 G/3ML
INJECTION, POWDER, FOR SOLUTION INTRAMUSCULAR; INTRAVENOUS
Status: DISCONTINUED | OUTPATIENT
Start: 2022-08-29 | End: 2022-08-29

## 2022-08-29 RX ORDER — HYDROCODONE BITARTRATE AND ACETAMINOPHEN 7.5; 325 MG/1; MG/1
1 TABLET ORAL EVERY 4 HOURS PRN
Qty: 25 TABLET | Refills: 0 | Status: SHIPPED | OUTPATIENT
Start: 2022-08-29 | End: 2022-09-16 | Stop reason: ALTCHOICE

## 2022-08-29 RX ADMIN — DEXAMETHASONE SODIUM PHOSPHATE 8 MG: 4 INJECTION, SOLUTION INTRAMUSCULAR; INTRAVENOUS at 10:08

## 2022-08-29 RX ADMIN — ACETAMINOPHEN 1000 MG: 10 INJECTION, SOLUTION INTRAVENOUS at 11:08

## 2022-08-29 RX ADMIN — MIDAZOLAM HYDROCHLORIDE 2 MG: 1 INJECTION, SOLUTION INTRAMUSCULAR; INTRAVENOUS at 10:08

## 2022-08-29 RX ADMIN — PROPOFOL 140 MG: 10 INJECTION, EMULSION INTRAVENOUS at 10:08

## 2022-08-29 RX ADMIN — FENTANYL CITRATE 25 MCG: 50 INJECTION, SOLUTION INTRAMUSCULAR; INTRAVENOUS at 11:08

## 2022-08-29 RX ADMIN — LIDOCAINE HYDROCHLORIDE 100 MG: 10 INJECTION, SOLUTION EPIDURAL; INFILTRATION; INTRACAUDAL; PERINEURAL at 10:08

## 2022-08-29 RX ADMIN — ROCURONIUM BROMIDE 5 MG: 10 INJECTION, SOLUTION INTRAVENOUS at 10:08

## 2022-08-29 RX ADMIN — HYDROMORPHONE HYDROCHLORIDE 0.2 MG: 2 INJECTION, SOLUTION INTRAMUSCULAR; INTRAVENOUS; SUBCUTANEOUS at 12:08

## 2022-08-29 RX ADMIN — NEOSTIGMINE METHYLSULFATE 5 MG: 1 INJECTION INTRAVENOUS at 11:08

## 2022-08-29 RX ADMIN — FENTANYL CITRATE 50 MCG: 50 INJECTION, SOLUTION INTRAMUSCULAR; INTRAVENOUS at 10:08

## 2022-08-29 RX ADMIN — SUCCINYLCHOLINE CHLORIDE 140 MG: 20 INJECTION, SOLUTION INTRAMUSCULAR; INTRAVENOUS at 10:08

## 2022-08-29 RX ADMIN — SODIUM CHLORIDE, SODIUM LACTATE, POTASSIUM CHLORIDE, AND CALCIUM CHLORIDE: .6; .31; .03; .02 INJECTION, SOLUTION INTRAVENOUS at 10:08

## 2022-08-29 RX ADMIN — ONDANSETRON 4 MG: 2 INJECTION, SOLUTION INTRAMUSCULAR; INTRAVENOUS at 11:08

## 2022-08-29 RX ADMIN — KETOROLAC TROMETHAMINE 15 MG: 30 INJECTION, SOLUTION INTRAMUSCULAR at 12:08

## 2022-08-29 RX ADMIN — ROCURONIUM BROMIDE 25 MG: 10 INJECTION, SOLUTION INTRAVENOUS at 10:08

## 2022-08-29 RX ADMIN — OXYCODONE HYDROCHLORIDE AND ACETAMINOPHEN 1 TABLET: 5; 325 TABLET ORAL at 12:08

## 2022-08-29 RX ADMIN — SODIUM CHLORIDE, SODIUM LACTATE, POTASSIUM CHLORIDE, AND CALCIUM CHLORIDE: .6; .31; .03; .02 INJECTION, SOLUTION INTRAVENOUS at 11:08

## 2022-08-29 RX ADMIN — LIDOCAINE HYDROCHLORIDE 50 MG: 10 INJECTION, SOLUTION EPIDURAL; INFILTRATION; INTRACAUDAL; PERINEURAL at 11:08

## 2022-08-29 RX ADMIN — ROCURONIUM BROMIDE 10 MG: 10 INJECTION, SOLUTION INTRAVENOUS at 10:08

## 2022-08-29 RX ADMIN — INDOCYANINE GREEN 2.5 MG: KIT INTRAVENOUS at 10:08

## 2022-08-29 RX ADMIN — CEFAZOLIN 3 G: 1 INJECTION, POWDER, FOR SOLUTION INTRAMUSCULAR; INTRAVENOUS at 10:08

## 2022-08-29 RX ADMIN — GLYCOPYRROLATE 0.8 MG: 0.2 INJECTION, SOLUTION INTRAMUSCULAR; INTRAVENOUS at 11:08

## 2022-08-29 NOTE — PATIENT INSTRUCTIONS
Discharge Instructions    Hygiene and incision care:   You may shower but do not soak such as in a bathtub or pool. Your incisions are closed with absorbable sutures and there is surgical glue on top. The glue will eventually flake off on its own. Do not scrub your incisions, just allow warm soapy water to run over them.   If you develop fevers, worsening redness and/or pus-like drainage, call the office immediately.    Pain control:   You may take Tylenol (650 mg every 4 hours) and ibuprofen (600 mg every 6 hours) as well as alternate heat and cold packs for pain and swelling. Take ibuprofen with food as it can cause stomach upset and ulcers. Do not take ibuprofen if you have an increased risk of bleeding, history of stomach ulcers, are already taking an NSAID, or have kidney issues.   If taking narcotic pain medication, such as Norco (hydrocodone-acetaminophen) or Percocet (oxycodone-acetaminophen), do not drink alcohol or drive. Each Norco and Percocet tablet contains 325 mg of Tylenol; do not take more than 4000 mg of Tylenol per day. Narcotic pain medications can be constipating so be sure to drink plenty of water and take an over the counter stool softener such as colace (100 mg twice a day) or miralax (17 g once a day).    Activity:   No heavy lifting >15 lbs for 4 weeks while your incisions are healing as it might result in a hernia. Avoid straining, pushing, and pulling. It is okay to walk and slowly go up and down stairs.   Make sure to do leg and ankle exercises and take deep breaths frequently to avoid developing blood clots or pneumonia.    Diet:   You may resume your regular diet. Some people find it best to stick to soft, bland, and easily digestible foods for the first couple of days while the anesthesia is leaving their system or if they're taking narcotic pain medicine to avoid nausea and vomiting. Be sure to eat good, nutritious foods such as vegetables and lean proteins to give your body the  nutrients it needs to heal. I recommend also taking vitamin C as this can aid with wound healing.    For any questions or concerns, please do not hesitate to contact the office any time at [phone] or send me a [employer] message.

## 2022-08-29 NOTE — TRANSFER OF CARE
"Anesthesia Transfer of Care Note    Patient: Sangeetha June    Procedure(s) Performed: Procedure(s) (LRB):  XI ROBOTIC CHOLECYSTECTOMY (N/A)    Patient location: PACU    Anesthesia Type: general    Transport from OR: Transported from OR on room air with adequate spontaneous ventilation    Post pain: adequate analgesia    Post assessment: no apparent anesthetic complications    Post vital signs: stable    Level of consciousness: sedated and responds to stimulation    Nausea/Vomiting: no nausea/vomiting    Complications: none    Transfer of care protocol was followed      Last vitals:   Visit Vitals  BP (!) 142/78   Pulse 71   Temp 36.1 °C (97 °F) (Skin)   Resp 16   Ht 5' 5" (1.651 m)   Wt 126.1 kg (278 lb 1.8 oz)   LMP  (LMP Unknown)   SpO2 98%   Breastfeeding No   BMI 46.28 kg/m²     "

## 2022-08-29 NOTE — ANESTHESIA POSTPROCEDURE EVALUATION
Anesthesia Post Evaluation    Patient: Sangeetha June    Procedure(s) Performed: Procedure(s) (LRB):  XI ROBOTIC CHOLECYSTECTOMY (N/A)    Final Anesthesia Type: general      Patient location during evaluation: PACU  Patient participation: Yes- Able to Participate  Level of consciousness: awake and alert  Post-procedure vital signs: reviewed and stable  Pain management: adequate  Airway patency: patent  EVERARDO mitigation strategies: Verification of full reversal of neuromuscular block  PONV status at discharge: No PONV  Anesthetic complications: no      Cardiovascular status: hemodynamically stable  Respiratory status: spontaneous ventilation  Hydration status: euvolemic  Follow-up not needed.          Vitals Value Taken Time   /81 08/29/22 1326   Temp 36.3 °C (97.3 °F) 08/29/22 1325   Pulse 61 08/29/22 1329   Resp 14 08/29/22 1328   SpO2 92 % 08/29/22 1329   Vitals shown include unvalidated device data.      Event Time   Out of Recovery 13:31:24         Pain/Kaylie Score: Pain Rating Prior to Med Admin: 6 (8/29/2022 12:31 PM)  Kaylie Score: 10 (8/29/2022  1:15 PM)

## 2022-08-29 NOTE — OP NOTE
Sangeetha June  : 1980, MRN: [id number]  Date of procedure: 2022      Procedure: DaVinci-assisted laparoscopic cholecystectomy with indocyanine green cholangiography    Pre-procedure diagnosis: Biliary colic, symptomatic cholelithiasis  Post-procedure diagnosis: Same  Surgeon: Raeann Bhandari DO  Assistant: None  EBL: 10 mL  Specimen: Gallbladder  Drains: None  Complications: None apparent    Significant findings: Distended gallbladder with stones    Indications for procedure: The patient presents with RUQ abdominal postprandial pain and gallstones. After explaining the risks, benefits, and alternatives, the patient verbalized understanding and informed written consent was obtained.    Description of procedure: The patient was brought to the OR and placed in the supine position. SCDs, safety straps, and a footboard were applied. After general anesthesia was induced by the Anesthesia Department, the abdomen was prepped and draped in usual sterile fashion. A time out was taken to identify the correct patient, correct procedure, and correct anatomical site; all parties were in agreement.  Local anesthetic was injected into the skin. An 8 mm supraumbilical incision was made and, using a kocher, the fascia was grasped and elevated. A Veress needle was inserted and the abdomen was insufflated to 15 mm Hg. An 8 mm robotic trochar was inserted; no apparent damage to underlying structures had occurred upon entry. Additional 8 mm robotic ports were placed horizontal to the first port: two along the right abdomen and one on the left. The patient was placed in reverse Trendelenburg and rotated to the left. The DaVinci robot was docked.   The gallbladder was approached by first grasping the fundus and elevating it cephalad. There were omental attachments that were were taken down both bluntly and using careful electrocautery. Using careful blunt dissection, a window was created beneath the gallbladder neck to  expose the cystic duct and artery. Using electrocautery, I then began taking down the lateral peritoneal attachments to the gallbladder so that a clear view of the liver bed was visualized behind the cystic structures. The critical view of safety was achieved with an obvious, single duct and artery exiting and entering the gallbladder. ICG cholangiography confirmed the cystic duct branching from the common bile duct. The cystic duct and artery were clipped with 2 clips proximally, 1 distally, and transected in the middle using endoshears. The gallbladder was dissected off of the liver bed using electrocautery and placed into an endocatch bag. The area was irrigated with saline. The liver bed was inspected and good hemostasis was observed. The clips on the cystic duct and artery were inspected and found to be intact. The gallbladder was removed from the abdomen and the Face-Meinci robot was undocked.  The abdomen was desufflated. All incisions were closed with 4-0 monocryl in the subcuticular layer and dermabond was applied on top.    All sponge and instrument counts were deemed correct. The patient appeared to tolerate the procedure well and there were no apparent complications. The patient was transported to PACU in stable condition.    Raeann Bhandari,   General Surgery  [facility]

## 2022-08-30 VITALS
WEIGHT: 278.13 LBS | OXYGEN SATURATION: 93 % | HEART RATE: 59 BPM | DIASTOLIC BLOOD PRESSURE: 75 MMHG | SYSTOLIC BLOOD PRESSURE: 138 MMHG | BODY MASS INDEX: 46.34 KG/M2 | RESPIRATION RATE: 16 BRPM | TEMPERATURE: 97 F | HEIGHT: 65 IN

## 2022-08-31 NOTE — DISCHARGE SUMMARY
[facility] - Surgery (Hospital)  Discharge Note  Short Stay    Procedure(s) (LRB):  XI ROBOTIC CHOLECYSTECTOMY (N/A)    OUTCOME: Patient tolerated treatment/procedure well without complication and is now ready for discharge.    DISPOSITION: Home or Self Care    FINAL DIAGNOSIS:  <principal problem not specified>    FOLLOWUP: In clinic    DISCHARGE INSTRUCTIONS:  No discharge procedures on file.      Clinical Reference Documents Added to Patient Instructions         Document    CHOLECYSTECTOMY DISCHARGE INSTRUCTIONS (ENGLISH)    HYDROCODONE AND ACETAMINOPHEN, ADULT (ENGLISH)    IBUPROFEN, ADULT (ENGLISH)    ONDANSETRON, ADULT (ENGLISH)    SENNA, ADULT (ENGLISH)            TIME SPENT ON DISCHARGE: 5 minutes

## 2022-09-06 ENCOUNTER — TELEPHONE (OUTPATIENT)
Dept: SURGERY | Facility: CLINIC | Age: 42
End: 2022-09-06

## 2022-09-06 LAB
FINAL PATHOLOGIC DIAGNOSIS: NORMAL
GROSS: NORMAL
Lab: NORMAL

## 2022-09-06 NOTE — TELEPHONE ENCOUNTER
----- Message from Madina Ogden sent at 9/6/2022  1:53 PM CDT -----  Contact: [facility] would like a call back at [phone] ext [phone]  in regards to knowing if there was a restriction on the activities the patient could do after her procedure.      Thanks

## 2022-09-06 NOTE — TELEPHONE ENCOUNTER
Returned Patito's ( at [facility]) callback. She would like to know restrictions for the patient from the gallbladder surgery with Dr. Bhandari, Notified her that restrictions include: no strenuous activities, no lifting greater than 10 pounds, no twisting, no pushing/pulling, and no bending. She wants to know if patient is able to attend sessions and will only be sitting/participating and getting on the bus. Dr. Bhandari states that it will be fine. Patito verbalized understanding.

## 2022-09-06 NOTE — TELEPHONE ENCOUNTER
Attempted to call  Patito, left .     Called pt and notified her of her restrictions post-cholecystectomy and also post-op appointment on 9/16 with Coby Mendez where results from pathology will be discussed as well, pt is seeing Patito tomorrow and will give her [facility] phone number, pt verbalized understanding.

## 2022-09-14 ENCOUNTER — OFFICE VISIT (OUTPATIENT)
Dept: HEMATOLOGY/ONCOLOGY | Facility: CLINIC | Age: 42
End: 2022-09-14
Payer: MEDICAID

## 2022-09-14 ENCOUNTER — TELEPHONE (OUTPATIENT)
Dept: HEMATOLOGY/ONCOLOGY | Facility: CLINIC | Age: 42
End: 2022-09-14

## 2022-09-14 VITALS
DIASTOLIC BLOOD PRESSURE: 82 MMHG | SYSTOLIC BLOOD PRESSURE: 120 MMHG | HEART RATE: 72 BPM | WEIGHT: 273.56 LBS | TEMPERATURE: 98 F | OXYGEN SATURATION: 100 % | RESPIRATION RATE: 20 BRPM | HEIGHT: 66 IN | BODY MASS INDEX: 43.96 KG/M2

## 2022-09-14 DIAGNOSIS — C50.912 MALIGNANT NEOPLASM OF LEFT BREAST IN FEMALE, ESTROGEN RECEPTOR NEGATIVE, UNSPECIFIED SITE OF BREAST: ICD-10-CM

## 2022-09-14 DIAGNOSIS — Z15.89 MUTATION IN TP53 GENE: ICD-10-CM

## 2022-09-14 DIAGNOSIS — F41.1 GAD (GENERALIZED ANXIETY DISORDER): ICD-10-CM

## 2022-09-14 DIAGNOSIS — D32.9 MENINGIOMA: ICD-10-CM

## 2022-09-14 DIAGNOSIS — E66.01 OBESITY, MORBID, BMI 40.0-49.9: ICD-10-CM

## 2022-09-14 DIAGNOSIS — Z15.09 MUTATION IN TP53 GENE: ICD-10-CM

## 2022-09-14 DIAGNOSIS — Z17.1 MALIGNANT NEOPLASM OF LEFT BREAST IN FEMALE, ESTROGEN RECEPTOR NEGATIVE, UNSPECIFIED SITE OF BREAST: ICD-10-CM

## 2022-09-14 DIAGNOSIS — E04.1 THYROID NODULE: ICD-10-CM

## 2022-09-14 DIAGNOSIS — D50.9 MICROCYTIC ANEMIA: ICD-10-CM

## 2022-09-14 DIAGNOSIS — Z15.01 MUTATION IN TP53 GENE: ICD-10-CM

## 2022-09-14 PROCEDURE — 3074F PR MOST RECENT SYSTOLIC BLOOD PRESSURE < 130 MM HG: ICD-10-PCS | Mod: CPTII,,, | Performed by: INTERNAL MEDICINE

## 2022-09-14 PROCEDURE — 99999 PR PBB SHADOW E&M-EST. PATIENT-LVL IV: ICD-10-PCS | Mod: PBBFAC,,, | Performed by: INTERNAL MEDICINE

## 2022-09-14 PROCEDURE — 3061F PR NEG MICROALBUMINURIA RESULT DOCUMENTED/REVIEW: ICD-10-PCS | Mod: CPTII,,, | Performed by: INTERNAL MEDICINE

## 2022-09-14 PROCEDURE — 3066F PR DOCUMENTATION OF TREATMENT FOR NEPHROPATHY: ICD-10-PCS | Mod: CPTII,,, | Performed by: INTERNAL MEDICINE

## 2022-09-14 PROCEDURE — 3079F DIAST BP 80-89 MM HG: CPT | Mod: CPTII,,, | Performed by: INTERNAL MEDICINE

## 2022-09-14 PROCEDURE — 99214 PR OFFICE/OUTPT VISIT, EST, LEVL IV, 30-39 MIN: ICD-10-PCS | Mod: S$PBB,,, | Performed by: INTERNAL MEDICINE

## 2022-09-14 PROCEDURE — 99214 OFFICE O/P EST MOD 30 MIN: CPT | Mod: PBBFAC | Performed by: INTERNAL MEDICINE

## 2022-09-14 PROCEDURE — 3008F BODY MASS INDEX DOCD: CPT | Mod: CPTII,,, | Performed by: INTERNAL MEDICINE

## 2022-09-14 PROCEDURE — 1159F MED LIST DOCD IN RCRD: CPT | Mod: CPTII,,, | Performed by: INTERNAL MEDICINE

## 2022-09-14 PROCEDURE — 3079F PR MOST RECENT DIASTOLIC BLOOD PRESSURE 80-89 MM HG: ICD-10-PCS | Mod: CPTII,,, | Performed by: INTERNAL MEDICINE

## 2022-09-14 PROCEDURE — 99999 PR PBB SHADOW E&M-EST. PATIENT-LVL IV: CPT | Mod: PBBFAC,,, | Performed by: INTERNAL MEDICINE

## 2022-09-14 PROCEDURE — 3074F SYST BP LT 130 MM HG: CPT | Mod: CPTII,,, | Performed by: INTERNAL MEDICINE

## 2022-09-14 PROCEDURE — 3044F PR MOST RECENT HEMOGLOBIN A1C LEVEL <7.0%: ICD-10-PCS | Mod: CPTII,,, | Performed by: INTERNAL MEDICINE

## 2022-09-14 PROCEDURE — 3008F PR BODY MASS INDEX (BMI) DOCUMENTED: ICD-10-PCS | Mod: CPTII,,, | Performed by: INTERNAL MEDICINE

## 2022-09-14 PROCEDURE — 3044F HG A1C LEVEL LT 7.0%: CPT | Mod: CPTII,,, | Performed by: INTERNAL MEDICINE

## 2022-09-14 PROCEDURE — 3072F PR LOW RISK FOR RETINOPATHY: ICD-10-PCS | Mod: CPTII,,, | Performed by: INTERNAL MEDICINE

## 2022-09-14 PROCEDURE — 1159F PR MEDICATION LIST DOCUMENTED IN MEDICAL RECORD: ICD-10-PCS | Mod: CPTII,,, | Performed by: INTERNAL MEDICINE

## 2022-09-14 PROCEDURE — 3066F NEPHROPATHY DOC TX: CPT | Mod: CPTII,,, | Performed by: INTERNAL MEDICINE

## 2022-09-14 PROCEDURE — 3072F LOW RISK FOR RETINOPATHY: CPT | Mod: CPTII,,, | Performed by: INTERNAL MEDICINE

## 2022-09-14 PROCEDURE — 99214 OFFICE O/P EST MOD 30 MIN: CPT | Mod: S$PBB,,, | Performed by: INTERNAL MEDICINE

## 2022-09-14 PROCEDURE — 3061F NEG MICROALBUMINURIA REV: CPT | Mod: CPTII,,, | Performed by: INTERNAL MEDICINE

## 2022-09-14 NOTE — TELEPHONE ENCOUNTER
Returned call tavo. Pt states that she is in route to her appt.. she has transportation to getting here sooner.

## 2022-09-14 NOTE — ASSESSMENT & PLAN NOTE
Microcytic anemia of unknown etiology.  Resolved following IV iron therapy.  Reviewed recent upper and lower endoscopies that were unremarkable.

## 2022-09-14 NOTE — PROGRESS NOTES
Subjective:   Date of Visit: 9/14/22   ?   ?    REFERRING PROVIDER: No referring provider defined for this encounter.   ?   CHIEF COMPLAINT:  History of breast cancer???????   ?   ONCOLOGIC DIAGNOSIS:  Invasive ductal carcinoma, pT2 N0 M0  ?   CURRENT TREATMENT:  None    PAST TREATMENT:   -left breast mastectomy  -adjuvant chemotherapy with dose dense AC followed by 3 cycles of weekly Taxol  ?   ONCOLOGIC HISTORY:   -invasive ductal carcinoma-April 2020     HPI:  42-year-old female with past medical history that is significant for hypertension osteoarthritis thyroiditis, invasive ductal carcinoma status post left breast mastectomy and adjuvant chemotherapy presents today to establish with our clinic having relocated to [place].    Apparently in April of 2020, she presented to PCP with complaints of breast lump near sternum, painful and swollen.  Mammography/ultrasound breast showed a new 2.5 cm mass left breast at 9 Oclock middle depth.  Biopsy revealed invasive ductal carcinoma, ER positive by 1%, NC negative and HER2 Elieser negative, stage at pT2 N0 M0.  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    She underwent mastectomy on 06/24/2020 and the tumor was noted to be 3.1 cm in size.  0/16 lymph nodes were positive.  Tumor was essentially treated at triple negative and chemotherapy was recommended.  Echocardiogram performed May of 2020 showed ejection fraction of 55%.  Patient was treated with dose dense AC followed by weekly Taxol.  She received 3 cycles out of 12 of single agent Taxol with the last one being in October of 2020 before she started complaining of worsening cognitive abilities and speech problems.  Chemotherapy was discontinued.  Adjuvant radiation was not recommended as patient's tumor margins were noted to be about 1.3 cm, she had negative lymph nodes and tumor was T2.  Endocrine therapy was not recommended.    She has since been observed.  Recently relocated to  [place].  She presents today to establish with our clinic.    She complains of pain around MediPort which was last flushed in of 2020 per patient.  Also complains of left shoulder pain which she attributed to rotator cuff injury.  Denies fever, chills, nausea chest pain shortness of breath, abdominal pain, or dysuria.  Denies nipple inversion, breast pain palpable mass.    With regards to speech difficulty and cognitive impairment, MRI of the brain was obtained on 10/13/2020 and showed a left frontal meningioma with no acute intracranial pathology, repeat MRI was recommended in 6 months.  She complains of intermittent headache mostly frontal.    Family history is pertinent colon cancer, prostate cancer esophageal cancer.  No family history breast or ovarian cancer.    Review of Systems   Constitutional:  Positive for fatigue. Negative for activity change, appetite change, chills, fever and unexpected weight change.   HENT:  Negative for hearing loss, mouth sores, nosebleeds, sore throat, tinnitus, trouble swallowing and voice change.         Headache   Eyes:  Negative for visual disturbance.   Respiratory:  Negative for cough, chest tightness and shortness of breath.    Cardiovascular:  Negative for chest pain, palpitations and leg swelling.   Gastrointestinal:  Negative for abdominal pain, anal bleeding, blood in stool, constipation, diarrhea, nausea and vomiting.   Genitourinary:  Negative for dysuria, frequency, hematuria, pelvic pain, vaginal bleeding and vaginal pain.   Musculoskeletal:  Positive for gait problem. Negative for arthralgias, back pain, joint swelling and neck pain.        Left shoulder pain   Skin:  Negative for color change, pallor, rash and wound.   Allergic/Immunologic: Negative for immunocompromised state.   Neurological:  Positive for weakness. Negative for dizziness, tremors, syncope, speech difficulty, light-headedness and headaches.   Hematological:  Negative for adenopathy. Does not  bruise/bleed easily.   Psychiatric/Behavioral:  Negative for agitation, confusion, decreased concentration, hallucinations and sleep disturbance. The patient is not nervous/anxious.      ?   PAST MEDICAL HISTORY:   Past Medical History:   Diagnosis Date    Anxiety     Breast cancer     Chest pain     Chest pain 7/2/2021    Cholecystitis without calculus     Decreased ROM of left shoulder 2/15/2022    Dizziness     Elevated C-reactive protein (CRP)     Essential hypertension     Fall 10/11/2021    Memory change     EVERARDO (obstructive sleep apnea)     Osteoarthritis     Other specified disorders of thyroid     Pre-diabetes 4/15/2021    Chronic, Stable, cont metformin    Screening mammogram, encounter for 4/15/2021    Shoulder injury     SOB (shortness of breath)     Thyroiditis     Tingling of left upper extremity 10/29/2021    Vision disturbance 4/30/2021    Weakness of shoulder 2/15/2022    ?     PAST SURGICAL HISTORY:   Past Surgical History:   Procedure Laterality Date    ARTHROSCOPIC REPAIR OF ROTATOR CUFF OF SHOULDER Left 1/27/2022    Procedure: REPAIR, ROTATOR CUFF, ARTHROSCOPIC;  Surgeon: Francisco Mathews MD;  Location: [facility];  Service: Orthopedics;  Laterality: Left;    ARTHROSCOPY OF SHOULDER WITH DECOMPRESSION OF SUBACROMIAL SPACE Left 1/27/2022    Procedure: ARTHROSCOPY, SHOULDER, WITH SUBACROMIAL SPACE DECOMPRESSION;  Surgeon: Francisco Mathews MD;  Location: [facility];  Service: Orthopedics;  Laterality: Left;    BREAST BIOPSY      COLONOSCOPY N/A 5/31/2022    Procedure: COLONOSCOPY;  Surgeon: Km Odom MD;  Location: [facility];  Service: Endoscopy;  Laterality: N/A;    ESOPHAGOGASTRODUODENOSCOPY N/A 5/31/2022    Procedure: EGD (ESOPHAGOGASTRODUODENOSCOPY) ;  Surgeon: Km Odom MD;  Location: [facility];  Service: Endoscopy;  Laterality: N/A;    FIXATION OF TENDON Left 1/27/2022    Procedure: FIXATION, TENDON;  Surgeon: Francisco Mathews MD;  Location: [facility];   Service: Orthopedics;  Laterality: Left;    HYSTERECTOMY      INJECTION OF ANESTHETIC AGENT INTO SACROILIAC JOINT Bilateral 7/29/2022    Procedure: Bilateral SIJ +  Bilateral GT Bursa Injection RN IV Sedation;  Surgeon: Bisi Cochran MD;  Location: [facility];  Service: Pain Management;  Laterality: Bilateral;    INJECTION OF JOINT Left 11/12/2021    Procedure: Left suprascapular and axillary injection with local;  Surgeon: Bisi Cochran MD;  Location: [facility];  Service: Pain Management;  Laterality: Left;    INJECTION OF JOINT Bilateral 7/29/2022    Procedure: Bilateral SIJ + Bilateral GT Bursa Injection RN IV Sedation;  Surgeon: Bisi Cochran MD;  Location: [facility];  Service: Pain Management;  Laterality: Bilateral;    MASTECTOMY      ROBOT-ASSISTED CHOLECYSTECTOMY USING DA ROBERTA XI N/A 8/29/2022    Procedure: XI ROBOTIC CHOLECYSTECTOMY;  Surgeon: Raeann Bhandari DO;  Location: [facility];  Service: General;  Laterality: N/A;      ?   ALLERGIES:   Allergies as of 09/14/2022 - Reviewed 08/29/2022   Allergen Reaction Noted    Lisinopril  03/01/2021      ?   MEDICATIONS:?   No outpatient medications have been marked as taking for the 9/14/22 encounter (Office Visit) with Jamar Martin MD.      ?   SOCIAL HISTORY:?   Social History     Tobacco Use    Smoking status: Never    Smokeless tobacco: Never   Substance Use Topics    Alcohol use: Not Currently        ?   FAMILY HISTORY:   family history includes Alcohol abuse in her maternal grandfather; Breast cancer (age of onset: 37) in an other family member; Colon cancer (age of onset: 53) in her mother; Diabetes in her brother and sister; Heart disease in her mother; Lung cancer in her maternal uncle; No Known Problems in her brother, maternal grandmother, paternal grandfather, paternal grandmother, and another family member; Prostate cancer (age of onset: 70) in her maternal grandfather; Stroke in her mother and sister; Throat cancer in her  maternal aunt.   ?     Objective:      Physical Exam  Constitutional:       General: She is not in acute distress.     Appearance: She is well-developed. She is obese. She is not ill-appearing or toxic-appearing.   HENT:      Head: Normocephalic and atraumatic.      Mouth/Throat:      Pharynx: No oropharyngeal exudate.   Eyes:      General: No scleral icterus.        Right eye: No discharge.         Left eye: No discharge.      Conjunctiva/sclera: Conjunctivae normal.      Pupils: Pupils are equal, round, and reactive to light.   Neck:      Thyroid: No thyromegaly.   Cardiovascular:      Rate and Rhythm: Normal rate and regular rhythm.      Heart sounds: No murmur heard.  Pulmonary:      Effort: Pulmonary effort is normal. No respiratory distress.      Breath sounds: Normal breath sounds.   Chest:      Chest wall: Deformity (From mastectomy) present. No tenderness.   Breasts:     Left: Skin change present.   Abdominal:      General: Bowel sounds are normal. There is no distension.      Palpations: Abdomen is soft. There is no mass.      Tenderness: There is no abdominal tenderness. There is no guarding or rebound.   Musculoskeletal:         General: No tenderness. Normal range of motion.      Cervical back: Normal range of motion and neck supple.   Lymphadenopathy:      Cervical: No cervical adenopathy.      Right cervical: No superficial cervical adenopathy.     Left cervical: No superficial cervical adenopathy.      Upper Body:      Right upper body: No supraclavicular or pectoral adenopathy.      Left upper body: No supraclavicular or pectoral adenopathy.   Skin:     General: Skin is warm and dry.      Capillary Refill: Capillary refill takes 2 to 3 seconds.      Coloration: Skin is not pale.      Findings: No erythema or rash.   Neurological:      Mental Status: She is alert and oriented to person, place, and time.      Cranial Nerves: No cranial nerve deficit.      Sensory: No sensory deficit.   Psychiatric:     "     Behavior: Behavior normal. Behavior is cooperative.         Judgment: Judgment normal.       ?   There were no vitals filed for this visit.     ?     ECOG SCORE    2 - Capable of all selfcare but unable to carry out any work activities, active > 50% of hours             ?   Laboratory:  ?   No visits with results within 1 Day(s) from this visit.   Latest known visit with results is:   Admission on 08/29/2022, Discharged on 08/29/2022   Component Date Value Ref Range Status    POCT Glucose 08/29/2022 87  70 - 110 mg/dL Final    Final Pathologic Diagnosis 08/29/2022    Final                    Value:GALLBLADDER, CHOLECYSTECTOMY:  Chronic cholecystitis and cholelithiasis.  Negative for malignancy.      Gross 08/29/2022    Final                    Value:Surgery ID:  [id number]   Pathology ID:  [id number]  1. Received in formalin labeled "gallbladder with stones" is an 11.3 x 3.6 x  3.6 cm intact gallbladder.  The serosa is purple-gray and smooth.  Contains  red-green viscous bile and multiple red, bosselated calculi aggregating to  2.2 x 2.1 x 0.8 cm and up to 0.8 cm in greatest dimension.  The mucosa is red  and smooth.  The cystic is inked black.  Representative sections are  submitted in cassette 1A.  Grossed by: Molly Araiza      Disclaimer 08/29/2022    Final                    Value:Unless the case is a 'gross only' or additional testing only, the final  diagnosis for each specimen is based on a microscopic examination of  appropriate tissue sections.        ?   Tumor markers   ?   ?   Imaging: X-Ray Chest PA And Lateral  Narrative: EXAMINATION:  XR CHEST PA AND LATERAL    CLINICAL HISTORY:  Calculus of bile duct without cholangitis or cholecystitis without obstruction    TECHNIQUE:  PA and lateral views of the chest were performed.    COMPARISON:  07/02/2021.    FINDINGS:  The lungs are clear, with normal appearance of pulmonary vasculature and no pleural effusion or pneumothorax.    The cardiac silhouette is " normal in size. The hilar and mediastinal contours are unremarkable.    Bones are intact. Right subclavian Port-A-Cath remains in satisfactory position.  Prior left mastectomy and axillary tressa dissection.  Impression: 1. No acute chest disease.  2. Right subclavian Port-A-Cath.    Electronically signed by: Maverick Blunt  Date:    08/19/2022  Time:    16:33     ?      Pathology:  Pathology Results  (Last 10 years)                 08/29/22 1124  Specimen to Pathology, Surgery General Surgery Final result    Narrative:  Pre-op Diagnosis: Biliary colic [K80.50]   Procedure(s):   XI ROBOTIC CHOLECYSTECTOMY   Number of specimens: 1   Name of specimens:   1)  gallbladder with stones  --perm   Which provider would you like to cc?->RUBI TRINIDAD   Specimen total (fresh, frozen, permanent):->1       05/31/22 1314  Specimen to Pathology, Surgery Gastrointestinal tract Final result    Narrative:  Pre-op Diagnosis: ANGIE (generalized anxiety disorder) [F41.1]   Malignant neoplasm of left breast in female, estrogen   receptor negative, unspecified site of breast [C50.912,   Z17.1]   Microcytic anemia [D50.9]   Procedure(s):   EGD (ESOPHAGOGASTRODUODENOSCOPY) COLONOSCOPY   Number of specimens: 3   Name of specimens:   1.  Second portion Duodenum biopsy -R/O Celiac   2.  Duodenal bulb biopsy -R/O Celiac   3.  Random Gastric biopsy -R/O H Pylori   Which provider would you like to cc?->SHUN PERSAUD   Release to patient->Immediate   Specimen total (fresh, frozen, permanent):->3                ?   Assessment/Plan:       1. Malignant neoplasm of left breast in female, estrogen receptor negative, unspecified site of breast    2. 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 ANGIE (generalized anxiety disorder)    4. Thyroid nodule    5. Meningioma    6. Microcytic anemia    7. Obesity, morbid, BMI 40.0-49.9          Malignant neoplasm of left breast in female, estrogen receptor negative  Stage IIA (pT2 pN0 cM0) invasive ductal carcinoma, ER  1%, [place] negative HER2 Elieser negative status post left breast mastectomy and adjuvant chemotherapy. Completed dose dense AC plus 3/12 weekly Taxol.      Recently established with our clinic. Restaging PET-CT scan from 05/26/2021 showed no FDG PET/CT findings to suggest recurrent or metastatic disease.  Diagnostic mammogram from 02/23/2022 showed no evidence of malignancy in the right breast.    Patient is interested in reconstruction and was referred to plastic and general surgery services for further discussions.    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    Thyroid nodule  Was seen by Dr. Erickson, biopsy in December of 2021 revealed benign tissue.    Meningioma  Obtained MRI of brain that revealed a 9 mm round extra-axial enhancing mass overlying the left frontal lobe consistent with a small meningioma.  No other enhancing intracranial lesion identified.    Microcytic anemia  Microcytic anemia of unknown etiology.  Resolved following IV iron therapy.  Reviewed recent upper and lower endoscopies that were unremarkable.    Obesity, morbid, BMI 40.0-49.9  Continue to encourage lifestyle modification and exercise as tolerated.      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  -     CBC Auto Differential; Future; Expected date: 09/14/2022  -     Comprehensive Metabolic Panel; Future; Expected date: 09/14/2022    ANGIE (generalized anxiety disorder)  -     CBC Auto Differential; Future; Expected date: 09/14/2022  -     Comprehensive Metabolic Panel; Future; Expected date: 09/14/2022    Thyroid nodule  -     CBC Auto Differential; Future; Expected date: 09/14/2022  -     Comprehensive Metabolic Panel; Future; Expected date:  09/14/2022    Meningioma  -     CBC Auto Differential; Future; Expected date: 09/14/2022  -     Comprehensive Metabolic Panel; Future; Expected date: 09/14/2022    Microcytic anemia  -     CBC Auto Differential; Future; Expected date: 09/14/2022  -     Comprehensive Metabolic Panel; Future; Expected date: 09/14/2022    Obesity, morbid, BMI 40.0-49.9  -     CBC Auto Differential; Future; Expected date: 09/14/2022  -     Comprehensive Metabolic Panel; Future; Expected date: 09/14/2022     ?   Follow-Up: Follow up in about 2 months (around 11/14/2022).    BREANNE CROCKETT Md., Ph.D  Hematology & Oncology Department  Phone #: [phone]

## 2022-09-14 NOTE — TELEPHONE ENCOUNTER
----- Message from Madina Ogden sent at 9/14/2022 12:09 PM CDT -----  Contact: Sangeetha Duvall would like a call back at  [phone] in regards to knowing what time she can come today. She states that she has a ride right now that can bring her.      Thanks

## 2022-09-14 NOTE — ASSESSMENT & PLAN NOTE
Stage IIA (pT2 pN0 cM0) invasive ductal carcinoma, ER 1%, [place] negative HER2 Elieser negative status post left breast mastectomy and adjuvant chemotherapy. Completed dose dense AC plus 3/12 weekly Taxol.      Recently established with our clinic. Restaging PET-CT scan from 05/26/2021 showed no FDG PET/CT findings to suggest recurrent or metastatic disease.  Diagnostic mammogram from 02/23/2022 showed no evidence of malignancy in the right breast.    Patient is interested in reconstruction and was referred to plastic and general surgery services for further discussions.

## 2022-09-16 ENCOUNTER — LAB VISIT (OUTPATIENT)
Dept: LAB | Facility: HOSPITAL | Age: 42
End: 2022-09-16
Payer: MEDICAID

## 2022-09-16 ENCOUNTER — OFFICE VISIT (OUTPATIENT)
Dept: SURGERY | Facility: CLINIC | Age: 42
End: 2022-09-16
Payer: MEDICAID

## 2022-09-16 VITALS
TEMPERATURE: 98 F | DIASTOLIC BLOOD PRESSURE: 81 MMHG | HEART RATE: 66 BPM | BODY MASS INDEX: 43.77 KG/M2 | WEIGHT: 271.19 LBS | SYSTOLIC BLOOD PRESSURE: 121 MMHG

## 2022-09-16 DIAGNOSIS — K80.50 BILIARY COLIC: ICD-10-CM

## 2022-09-16 DIAGNOSIS — K80.50 BILIARY COLIC: Primary | ICD-10-CM

## 2022-09-16 LAB
ALBUMIN SERPL BCP-MCNC: 3.9 G/DL (ref 3.5–5.2)
ALP SERPL-CCNC: 130 U/L (ref 55–135)
ALT SERPL W/O P-5'-P-CCNC: 10 U/L (ref 10–44)
ANION GAP SERPL CALC-SCNC: 7 MMOL/L (ref 8–16)
AST SERPL-CCNC: 14 U/L (ref 10–40)
BASOPHILS # BLD AUTO: 0.05 K/UL (ref 0–0.2)
BASOPHILS NFR BLD: 0.9 % (ref 0–1.9)
BILIRUB SERPL-MCNC: 0.2 MG/DL (ref 0.1–1)
BUN SERPL-MCNC: 8 MG/DL (ref 6–20)
CALCIUM SERPL-MCNC: 10 MG/DL (ref 8.7–10.5)
CHLORIDE SERPL-SCNC: 99 MMOL/L (ref 95–110)
CO2 SERPL-SCNC: 32 MMOL/L (ref 23–29)
CREAT SERPL-MCNC: 0.9 MG/DL (ref 0.5–1.4)
DIFFERENTIAL METHOD: NORMAL
EOSINOPHIL # BLD AUTO: 0.1 K/UL (ref 0–0.5)
EOSINOPHIL NFR BLD: 1.7 % (ref 0–8)
ERYTHROCYTE [DISTWIDTH] IN BLOOD BY AUTOMATED COUNT: 14.2 % (ref 11.5–14.5)
EST. GFR  (NO RACE VARIABLE): >60 ML/MIN/1.73 M^2
GLUCOSE SERPL-MCNC: 74 MG/DL (ref 70–110)
HCT VFR BLD AUTO: 43.2 % (ref 37–48.5)
HGB BLD-MCNC: 13.9 G/DL (ref 12–16)
IMM GRANULOCYTES # BLD AUTO: 0.02 K/UL (ref 0–0.04)
IMM GRANULOCYTES NFR BLD AUTO: 0.3 % (ref 0–0.5)
LYMPHOCYTES # BLD AUTO: 2.5 K/UL (ref 1–4.8)
LYMPHOCYTES NFR BLD: 43 % (ref 18–48)
MCH RBC QN AUTO: 28.8 PG (ref 27–31)
MCHC RBC AUTO-ENTMCNC: 32.2 G/DL (ref 32–36)
MCV RBC AUTO: 90 FL (ref 82–98)
MONOCYTES # BLD AUTO: 0.4 K/UL (ref 0.3–1)
MONOCYTES NFR BLD: 7.7 % (ref 4–15)
NEUTROPHILS # BLD AUTO: 2.7 K/UL (ref 1.8–7.7)
NEUTROPHILS NFR BLD: 46.4 % (ref 38–73)
NRBC BLD-RTO: 0 /100 WBC
PLATELET # BLD AUTO: 361 K/UL (ref 150–450)
PMV BLD AUTO: 10.2 FL (ref 9.2–12.9)
POTASSIUM SERPL-SCNC: 4.5 MMOL/L (ref 3.5–5.1)
PROT SERPL-MCNC: 7.3 G/DL (ref 6–8.4)
RBC # BLD AUTO: 4.82 M/UL (ref 4–5.4)
SODIUM SERPL-SCNC: 138 MMOL/L (ref 136–145)
WBC # BLD AUTO: 5.75 K/UL (ref 3.9–12.7)

## 2022-09-16 PROCEDURE — 99999 PR PBB SHADOW E&M-EST. PATIENT-LVL V: ICD-10-PCS | Mod: PBBFAC,,,

## 2022-09-16 PROCEDURE — 1160F PR REVIEW ALL MEDS BY PRESCRIBER/CLIN PHARMACIST DOCUMENTED: ICD-10-PCS | Mod: CPTII,,,

## 2022-09-16 PROCEDURE — 3061F NEG MICROALBUMINURIA REV: CPT | Mod: CPTII,,,

## 2022-09-16 PROCEDURE — 3072F LOW RISK FOR RETINOPATHY: CPT | Mod: CPTII,,,

## 2022-09-16 PROCEDURE — 99024 PR POST-OP FOLLOW-UP VISIT: ICD-10-PCS | Mod: ,,,

## 2022-09-16 PROCEDURE — 3044F HG A1C LEVEL LT 7.0%: CPT | Mod: CPTII,,,

## 2022-09-16 PROCEDURE — 99999 PR PBB SHADOW E&M-EST. PATIENT-LVL V: CPT | Mod: PBBFAC,,,

## 2022-09-16 PROCEDURE — 36415 COLL VENOUS BLD VENIPUNCTURE: CPT

## 2022-09-16 PROCEDURE — 3079F PR MOST RECENT DIASTOLIC BLOOD PRESSURE 80-89 MM HG: ICD-10-PCS | Mod: CPTII,,,

## 2022-09-16 PROCEDURE — 3079F DIAST BP 80-89 MM HG: CPT | Mod: CPTII,,,

## 2022-09-16 PROCEDURE — 3074F SYST BP LT 130 MM HG: CPT | Mod: CPTII,,,

## 2022-09-16 PROCEDURE — 1160F RVW MEDS BY RX/DR IN RCRD: CPT | Mod: CPTII,,,

## 2022-09-16 PROCEDURE — 1159F MED LIST DOCD IN RCRD: CPT | Mod: CPTII,,,

## 2022-09-16 PROCEDURE — 1159F PR MEDICATION LIST DOCUMENTED IN MEDICAL RECORD: ICD-10-PCS | Mod: CPTII,,,

## 2022-09-16 PROCEDURE — 99024 POSTOP FOLLOW-UP VISIT: CPT | Mod: ,,,

## 2022-09-16 PROCEDURE — 3074F PR MOST RECENT SYSTOLIC BLOOD PRESSURE < 130 MM HG: ICD-10-PCS | Mod: CPTII,,,

## 2022-09-16 PROCEDURE — 3066F PR DOCUMENTATION OF TREATMENT FOR NEPHROPATHY: ICD-10-PCS | Mod: CPTII,,,

## 2022-09-16 PROCEDURE — 3008F PR BODY MASS INDEX (BMI) DOCUMENTED: ICD-10-PCS | Mod: CPTII,,,

## 2022-09-16 PROCEDURE — 3066F NEPHROPATHY DOC TX: CPT | Mod: CPTII,,,

## 2022-09-16 PROCEDURE — 3044F PR MOST RECENT HEMOGLOBIN A1C LEVEL <7.0%: ICD-10-PCS | Mod: CPTII,,,

## 2022-09-16 PROCEDURE — 3008F BODY MASS INDEX DOCD: CPT | Mod: CPTII,,,

## 2022-09-16 PROCEDURE — 3061F PR NEG MICROALBUMINURIA RESULT DOCUMENTED/REVIEW: ICD-10-PCS | Mod: CPTII,,,

## 2022-09-16 PROCEDURE — 80053 COMPREHEN METABOLIC PANEL: CPT

## 2022-09-16 PROCEDURE — 99215 OFFICE O/P EST HI 40 MIN: CPT | Mod: PBBFAC

## 2022-09-16 PROCEDURE — 85025 COMPLETE CBC W/AUTO DIFF WBC: CPT

## 2022-09-16 PROCEDURE — 3072F PR LOW RISK FOR RETINOPATHY: ICD-10-PCS | Mod: CPTII,,,

## 2022-09-16 RX ORDER — OXYCODONE HYDROCHLORIDE 5 MG/1
5 TABLET ORAL EVERY 6 HOURS PRN
Qty: 20 TABLET | Refills: 0 | Status: SHIPPED | OUTPATIENT
Start: 2022-09-16 | End: 2023-07-03

## 2022-09-16 NOTE — PATIENT INSTRUCTIONS
Metamucil or Benefiber for diarrhea.     Stop taking the stool softeners if you are having diarrhea.

## 2022-09-16 NOTE — PROGRESS NOTES
[facility]  History & Physical    SUBJECTIVE:     History of Present Illness:  Patient is a 42 y.o. female presents for evaluation of right upper quadrant abdominal pain that radiates to her epigastric region and LUQ. It has been occurring for a while now, over a year. She follows with GI for chronic constipation and has a history of ROBERT that she was worked up for as well. She underwent HIDA with an EF of 35% last fall, but she has delayed treatment due to recent treatment for breast cancer. The pain occurs nearly every day and has no aggravating or relieving factors. She denies associated fevers, chills, nausea, and vomiting. Her surgical history is significant for laparoscopic hysterectomy.    Interval History:  Since the last clinic visit, the patient underwent robotic cholecystectomy. She has had resolution of the above described pre-operative pain but now has a new pain localized to her right lateral abdomen that is related to motion. She is tolerating a regular diet and having some loose non-bloody stools. She denies fevers, chills, nausea, and vomiting.      Chief Complaint   Patient presents with    Post-op Evaluation         Review of patient's allergies indicates:   Allergen Reactions    Lisinopril        Current Outpatient Medications   Medication Sig Dispense Refill    albuterol (PROVENTIL/VENTOLIN HFA) 90 mcg/actuation inhaler Inhale 2 puffs into the lungs every 6 (six) hours as needed for Wheezing. Rescue 18 g 1    ammonium lactate 12 % Crea Apply 1 Act topically 2 (two) times daily. To feet. 140 g 2    ascorbic acid, vitamin C, (VITAMIN C) 100 MG tablet Take 100 mg by mouth once daily.      aspirin (ECOTRIN) 81 MG EC tablet Take 1 tablet (81 mg total) by mouth once daily. 360 tablet 1    budesonide-formoterol 160-4.5 mcg (SYMBICORT) 160-4.5 mcg/actuation HFAA Inhale 2 puffs into the lungs every 12 (twelve) hours. Controller 10.2 g 3    busPIRone (BUSPAR) 15 MG tablet Take 1 tablet (15 mg  total) by mouth 3 (three) times daily. 270 tablet 3    celecoxib (CELEBREX) 200 MG capsule Take 1 capsule (200 mg total) by mouth 2 (two) times daily. 28 capsule 0    dulaglutide (TRULICITY) 4.5 mg/0.5 mL pen injector Inject 4.5 mg into the skin every 7 days. 12 pen 0    EScitalopram oxalate (LEXAPRO) 20 MG tablet Take 1 tablet (20 mg total) by mouth once daily. 90 tablet 3    EUTHYROX 50 mcg tablet Take 1 tablet (50 mcg total) by mouth every morning. 90 tablet 1    gabapentin (NEURONTIN) 300 MG capsule Take 1 capsule (300 mg total) by mouth 2 (two) times daily. 180 capsule 0    hydroCHLOROthiazide (HYDRODIURIL) 25 MG tablet Take 1 tablet (25 mg total) by mouth daily as needed (edema, swelling). 90 tablet 3    hydrocortisone 0.5 % cream Apply topically 2 (two) times daily.      LATUDA 20 mg Tab tablet Take 20 mg by mouth once daily.      linaCLOtide (LINZESS) 145 mcg Cap capsule Take 1 capsule (145 mcg total) by mouth once daily. 90 capsule 3    magnesium oxide (MAG-OX) 400 mg (241.3 mg magnesium) tablet Take 1 tablet (400 mg total) by mouth once daily. 90 tablet 1    metFORMIN (GLUCOPHAGE-XR) 500 MG ER 24hr tablet SMARTSI Tablet(s) By Mouth Every Evening 90 tablet 3    multivit with min-folic acid (ADULT MULTIVITAMIN GUMMIES) 200 mcg Chew Take by mouth.      olopatadine (PATANOL) 0.1 % ophthalmic solution       ondansetron (ZOFRAN) 4 MG tablet Take 1 tablet (4 mg total) by mouth every 6 (six) hours as needed for Nausea. 10 tablet 1    oxybutynin (DITROPAN-XL) 5 MG TR24 Take 1 tablet (5 mg total) by mouth once daily. 90 tablet 3    pantoprazole (PROTONIX) 40 MG tablet Take 1 tablet (40 mg total) by mouth once daily. 90 tablet 3    pravastatin (PRAVACHOL) 40 MG tablet Take 1 tablet (40 mg total) by mouth every evening. 90 tablet 3    traZODone (DESYREL) 150 MG tablet Take 1 tablet (150 mg total) by mouth every evening. 90 tablet 3    oxyCODONE (ROXICODONE) 5 MG immediate release tablet Take 1 tablet (5 mg total)  by mouth every 6 (six) hours as needed for Pain. 20 tablet 0     No current facility-administered medications for this visit.     Facility-Administered Medications Ordered in Other Visits   Medication Dose Route Frequency Provider Last Rate Last Admin    lactated ringers infusion   Intravenous Continuous Maty Thompson MD 10 mL/hr at 01/27/22 0636 Restarted at 01/27/22 0709       Past Medical History:   Diagnosis Date    Anxiety     Breast cancer     Chest pain     Chest pain 7/2/2021    Cholecystitis without calculus     Decreased ROM of left shoulder 2/15/2022    Dizziness     Elevated C-reactive protein (CRP)     Essential hypertension     Fall 10/11/2021    Memory change     EVERARDO (obstructive sleep apnea)     Osteoarthritis     Other specified disorders of thyroid     Pre-diabetes 4/15/2021    Chronic, Stable, cont metformin    Screening mammogram, encounter for 4/15/2021    Shoulder injury     SOB (shortness of breath)     Thyroiditis     Tingling of left upper extremity 10/29/2021    Vision disturbance 4/30/2021    Weakness of shoulder 2/15/2022     Past Surgical History:   Procedure Laterality Date    ARTHROSCOPIC REPAIR OF ROTATOR CUFF OF SHOULDER Left 1/27/2022    Procedure: REPAIR, ROTATOR CUFF, ARTHROSCOPIC;  Surgeon: Francisco Mathews MD;  Location: [facility] OR;  Service: Orthopedics;  Laterality: Left;    ARTHROSCOPY OF SHOULDER WITH DECOMPRESSION OF SUBACROMIAL SPACE Left 1/27/2022    Procedure: ARTHROSCOPY, SHOULDER, WITH SUBACROMIAL SPACE DECOMPRESSION;  Surgeon: Francisco Mathews MD;  Location: [facility] OR;  Service: Orthopedics;  Laterality: Left;    BREAST BIOPSY      COLONOSCOPY N/A 5/31/2022    Procedure: COLONOSCOPY;  Surgeon: Km Odom MD;  Location: [facility];  Service: Endoscopy;  Laterality: N/A;    ESOPHAGOGASTRODUODENOSCOPY N/A 5/31/2022    Procedure: EGD (ESOPHAGOGASTRODUODENOSCOPY) ;  Surgeon: Km Odom MD;  Location: [facility];  Service: Endoscopy;   Laterality: N/A;    FIXATION OF TENDON Left 1/27/2022    Procedure: FIXATION, TENDON;  Surgeon: Francisco Mathews MD;  Location: [facility] OR;  Service: Orthopedics;  Laterality: Left;    HYSTERECTOMY      INJECTION OF ANESTHETIC AGENT INTO SACROILIAC JOINT Bilateral 7/29/2022    Procedure: Bilateral SIJ +  Bilateral GT Bursa Injection RN IV Sedation;  Surgeon: Bisi Cochran MD;  Location: [facility]GT;  Service: Pain Management;  Laterality: Bilateral;    INJECTION OF JOINT Left 11/12/2021    Procedure: Left suprascapular and axillary injection with local;  Surgeon: Bisi Cochran MD;  Location: [facility];  Service: Pain Management;  Laterality: Left;    INJECTION OF JOINT Bilateral 7/29/2022    Procedure: Bilateral SIJ + Bilateral GT Bursa Injection RN IV Sedation;  Surgeon: Bisi Cochran MD;  Location: [facility];  Service: Pain Management;  Laterality: Bilateral;    MASTECTOMY      ROBOT-ASSISTED CHOLECYSTECTOMY USING DA ROBERTA XI N/A 8/29/2022    Procedure: XI ROBOTIC CHOLECYSTECTOMY;  Surgeon: Raeann Bhandari DO;  Location: [facility] OR;  Service: General;  Laterality: N/A;     Family History   Problem Relation Age of Onset    Colon cancer Mother 53        partial colectomy    Stroke Mother     Heart disease Mother     Stroke Sister     Diabetes Sister     Diabetes Brother     No Known Problems Brother     No Known Problems Maternal Grandmother     Prostate cancer Maternal Grandfather 70    Alcohol abuse Maternal Grandfather     No Known Problems Paternal Grandmother     No Known Problems Paternal Grandfather     Throat cancer Maternal Aunt         smoking hx    Lung cancer Maternal Uncle         hx smoking    Breast cancer Other 37        chemotherapy, planning for BL mastectomy    No Known Problems Other      Social History     Tobacco Use    Smoking status: Never    Smokeless tobacco: Never   Substance Use Topics    Alcohol use: Not Currently    Drug use: Not Currently     Types: Marijuana         Review of Systems:  Review of Systems   Constitutional:  Negative for chills, fatigue, fever and unexpected weight change.   Respiratory:  Negative for cough, shortness of breath, wheezing and stridor.    Cardiovascular:  Negative for chest pain, palpitations and leg swelling.   Gastrointestinal:  Positive for abdominal pain and diarrhea. Negative for abdominal distention, anal bleeding, blood in stool, constipation, nausea and vomiting.   Genitourinary:  Negative for difficulty urinating, dysuria, frequency, hematuria and urgency.   Skin:  Negative for color change, pallor, rash and wound.   Hematological:  Does not bruise/bleed easily.     OBJECTIVE:     Vital Signs (Most Recent)  Temp: 97.9 °F (36.6 °C) (09/16/22 1215)  Pulse: 66 (09/16/22 1215)  BP: 121/81 (09/16/22 1215)     123 kg (271 lb 2.7 oz)     Physical Exam:  Physical Exam  Vitals reviewed.   Constitutional:       General: She is not in acute distress.     Appearance: She is well-developed. She is not ill-appearing.   HENT:      Head: Normocephalic and atraumatic.      Right Ear: External ear normal.      Left Ear: External ear normal.   Eyes:      Extraocular Movements: Extraocular movements intact.      Conjunctiva/sclera: Conjunctivae normal.   Cardiovascular:      Rate and Rhythm: Normal rate.   Pulmonary:      Effort: Pulmonary effort is normal. No respiratory distress.   Abdominal:      Comments: Abdomen soft, non-distended. Minimal TTP with deep palpation of right lateral abdomen near incisions. Incisions clean and dry, healing well without signs of infection   Musculoskeletal:      Cervical back: Neck supple.   Skin:     General: Skin is warm and dry.   Neurological:      Mental Status: She is alert and oriented to person, place, and time.   Psychiatric:         Behavior: Behavior normal.       Diagnostic Results:  HIDA from last fall reviewed.      Pathology:  Final Pathologic Diagnosis GALLBLADDER, CHOLECYSTECTOMY:   Chronic cholecystitis  and cholelithiasis.   Negative for malignancy.      ASSESSMENT/PLAN:     43 y/o female with likely biliary colic now s/p cholecystectomy with some likely musculoskeletal pain related to surgery but otherwise doing well.     - CMP and CBC today to evaluate for any complications. Will call with results.  - Pain medications refilled.  - Fiber for loose stools. Stop stool softener.  - Pathology reviewed.  - RTC as needed based on above or if symptoms do not resolve.    Coby Mendez PA-C  [facility]      I spent a total of 30 minutes on the day of the visit.This includes face to face time and non-face to face time preparing to see the patient (eg, review of tests), obtaining and/or reviewing separately obtained history, documenting clinical information in the electronic or other health record, independently interpreting results and communicating results to the patient/family/caregiver, or care coordinator.

## 2022-10-06 ENCOUNTER — PATIENT MESSAGE (OUTPATIENT)
Dept: HEMATOLOGY/ONCOLOGY | Facility: CLINIC | Age: 42
End: 2022-10-06

## 2022-10-06 ENCOUNTER — TELEPHONE (OUTPATIENT)
Dept: HEMATOLOGY/ONCOLOGY | Facility: CLINIC | Age: 42
End: 2022-10-06

## 2022-10-06 NOTE — TELEPHONE ENCOUNTER
Phone pt and she stated has not received message from surgery regarding her breast. She continues to have discomfort.

## 2022-10-07 ENCOUNTER — TELEPHONE (OUTPATIENT)
Dept: SURGERY | Facility: CLINIC | Age: 42
End: 2022-10-07

## 2022-10-07 DIAGNOSIS — C50.912 MALIGNANT NEOPLASM OF LEFT BREAST IN FEMALE, ESTROGEN RECEPTOR NEGATIVE, UNSPECIFIED SITE OF BREAST: Primary | ICD-10-CM

## 2022-10-07 DIAGNOSIS — Z17.1 MALIGNANT NEOPLASM OF LEFT BREAST IN FEMALE, ESTROGEN RECEPTOR NEGATIVE, UNSPECIFIED SITE OF BREAST: Primary | ICD-10-CM

## 2022-10-07 NOTE — TELEPHONE ENCOUNTER
Called and left voicemail with patient in regards to scheduling a consult with Dr. Sofia. Will call again later.      ----- Message from Rose Burton LPN sent at 10/7/2022  3:39 PM CDT -----  Hi ladies,   So after Dr Watson and I looked in this patient's chart we see she is already established with Dr Sofia. Please see Dr Sofia's last office note on 4/11/22. Sara or Roseanne can you please assist in scheduling this patient with Dr Sofia as he is following her for breast care.    ----- Message -----  From: Sara Nelson MA  Sent: 10/7/2022   2:25 PM CDT  To: Rose Burton LPN, Roseanne العلي MA    Please call patient to schedule an appointment with Dr. Watson. Referral is placed.    Thank you.  ----- Message -----  From: Shavon Montes LPN  Sent: 10/7/2022  12:55 PM CDT  To: Sara Nelson MA    Referral placed  ----- Message -----  From: Sara Nelson MA  Sent: 10/7/2022  11:54 AM CDT  To: Shavon Montes LPN    Sorry for the misinterpretation. Is there any way you could put in a referral to Breast Surgery Dr. Maxwell?       ----- Message -----  From: Shavon Montes LPN  Sent: 10/7/2022  10:31 AM CDT  To: Sara Nelson MA    I sent the message to you all after speaking to the patient. She stated that at her last visit with Dr. Martin he discussed referring her to surgery to discuss possible masectomy. Dr. Martin is no longer here in our dept to confirm. I will schedule her with another one of our providers to assess.  ----- Message -----  From: Sara Nelson MA  Sent: 10/6/2022   4:37 PM CDT  To: Shavon Montes LPN    I forwarded this message to Dr. Holbrook's staff. He is the provider following regarding her breast.    Thank you  ----- Message -----  From: Shavon Montes LPN  Sent: 10/6/2022   4:22 PM CDT  To: Thony [facility] Staff    Pt phoned and stated she had not received a call from surgery regarding her breast. She states she is still experiencing a lot of  discomfort. Please contact to assist.

## 2022-10-10 ENCOUNTER — TELEPHONE (OUTPATIENT)
Dept: SURGERY | Facility: CLINIC | Age: 42
End: 2022-10-10

## 2022-10-10 NOTE — TELEPHONE ENCOUNTER
----- Message from Rose Burton LPN sent at 10/7/2022  3:39 PM CDT -----  Hi ladies,   So after Dr Watson and I looked in this patient's chart we see she is already established with Dr Sofia. Please see Dr Sofia's last office note on 4/11/22. Sara or Roseanne can you please assist in scheduling this patient with Dr Sofia as he is following her for breast care.    ----- Message -----  From: Sara Nelson MA  Sent: 10/7/2022   2:25 PM CDT  To: Rose Burton LPN, Roseanne العلي MA    Please call patient to schedule an appointment with Dr. Watson. Referral is placed.    Thank you.  ----- Message -----  From: Shavon Montes LPN  Sent: 10/7/2022  12:55 PM CDT  To: Sara Nelson MA    Referral placed  ----- Message -----  From: Sara Nelson MA  Sent: 10/7/2022  11:54 AM CDT  To: Shavon Montes LPN    Sorry for the misinterpretation. Is there any way you could put in a referral to Breast Surgery Dr. Maxwell?       ----- Message -----  From: Shavon Montes LPN  Sent: 10/7/2022  10:31 AM CDT  To: Sara Nelson MA    I sent the message to you all after speaking to the patient. She stated that at her last visit with Dr. Martin he discussed referring her to surgery to discuss possible masectomy. Dr. Martin is no longer here in our dept to confirm. I will schedule her with another one of our providers to assess.  ----- Message -----  From: Sara Nelson MA  Sent: 10/6/2022   4:37 PM CDT  To: Shavon Montes LPN    I forwarded this message to Dr. Holbrook's staff. He is the provider following regarding her breast.    Thank you  ----- Message -----  From: Shavon Montes LPN  Sent: 10/6/2022   4:22 PM CDT  To: Thony Cary Staff    Pt phoned and stated she had not received a call from surgery regarding her breast. She states she is still experiencing a lot of discomfort. Please contact to assist.

## 2022-10-12 ENCOUNTER — PATIENT MESSAGE (OUTPATIENT)
Dept: SURGERY | Facility: HOSPITAL | Age: 42
End: 2022-10-12

## 2022-10-17 ENCOUNTER — OFFICE VISIT (OUTPATIENT)
Dept: CARDIOLOGY | Facility: CLINIC | Age: 42
End: 2022-10-17
Payer: MEDICAID

## 2022-10-17 VITALS
HEIGHT: 66 IN | WEIGHT: 272.5 LBS | BODY MASS INDEX: 43.79 KG/M2 | DIASTOLIC BLOOD PRESSURE: 74 MMHG | HEART RATE: 82 BPM | OXYGEN SATURATION: 100 % | SYSTOLIC BLOOD PRESSURE: 122 MMHG

## 2022-10-17 DIAGNOSIS — G47.30 SLEEP APNEA, UNSPECIFIED TYPE: ICD-10-CM

## 2022-10-17 DIAGNOSIS — E66.01 OBESITY, MORBID, BMI 40.0-49.9: ICD-10-CM

## 2022-10-17 DIAGNOSIS — E78.49 OTHER HYPERLIPIDEMIA: ICD-10-CM

## 2022-10-17 DIAGNOSIS — R06.02 SOB (SHORTNESS OF BREATH): Primary | ICD-10-CM

## 2022-10-17 DIAGNOSIS — Z01.810 PREOP CARDIOVASCULAR EXAM: ICD-10-CM

## 2022-10-17 DIAGNOSIS — R60.0 LOCALIZED EDEMA: ICD-10-CM

## 2022-10-17 DIAGNOSIS — R00.2 PALPITATIONS: ICD-10-CM

## 2022-10-17 DIAGNOSIS — I10 PRIMARY HYPERTENSION: ICD-10-CM

## 2022-10-17 DIAGNOSIS — R06.09 DOE (DYSPNEA ON EXERTION): ICD-10-CM

## 2022-10-17 DIAGNOSIS — R07.9 CHEST PAIN, UNSPECIFIED TYPE: ICD-10-CM

## 2022-10-17 DIAGNOSIS — R01.1 MURMUR: ICD-10-CM

## 2022-10-17 DIAGNOSIS — Z86.73 HISTORY OF CVA (CEREBROVASCULAR ACCIDENT): ICD-10-CM

## 2022-10-17 DIAGNOSIS — Z85.3 HISTORY OF BREAST CANCER: ICD-10-CM

## 2022-10-17 PROCEDURE — 99214 PR OFFICE/OUTPT VISIT, EST, LEVL IV, 30-39 MIN: ICD-10-PCS | Mod: S$PBB,,, | Performed by: INTERNAL MEDICINE

## 2022-10-17 PROCEDURE — 3074F SYST BP LT 130 MM HG: CPT | Mod: CPTII,,, | Performed by: INTERNAL MEDICINE

## 2022-10-17 PROCEDURE — 3078F DIAST BP <80 MM HG: CPT | Mod: CPTII,,, | Performed by: INTERNAL MEDICINE

## 2022-10-17 PROCEDURE — 3078F PR MOST RECENT DIASTOLIC BLOOD PRESSURE < 80 MM HG: ICD-10-PCS | Mod: CPTII,,, | Performed by: INTERNAL MEDICINE

## 2022-10-17 PROCEDURE — 99999 PR PBB SHADOW E&M-EST. PATIENT-LVL V: ICD-10-PCS | Mod: PBBFAC,,, | Performed by: INTERNAL MEDICINE

## 2022-10-17 PROCEDURE — 1159F PR MEDICATION LIST DOCUMENTED IN MEDICAL RECORD: ICD-10-PCS | Mod: CPTII,,, | Performed by: INTERNAL MEDICINE

## 2022-10-17 PROCEDURE — 3061F NEG MICROALBUMINURIA REV: CPT | Mod: CPTII,,, | Performed by: INTERNAL MEDICINE

## 2022-10-17 PROCEDURE — 3066F PR DOCUMENTATION OF TREATMENT FOR NEPHROPATHY: ICD-10-PCS | Mod: CPTII,,, | Performed by: INTERNAL MEDICINE

## 2022-10-17 PROCEDURE — 3044F PR MOST RECENT HEMOGLOBIN A1C LEVEL <7.0%: ICD-10-PCS | Mod: CPTII,,, | Performed by: INTERNAL MEDICINE

## 2022-10-17 PROCEDURE — 3066F NEPHROPATHY DOC TX: CPT | Mod: CPTII,,, | Performed by: INTERNAL MEDICINE

## 2022-10-17 PROCEDURE — 1159F MED LIST DOCD IN RCRD: CPT | Mod: CPTII,,, | Performed by: INTERNAL MEDICINE

## 2022-10-17 PROCEDURE — 3061F PR NEG MICROALBUMINURIA RESULT DOCUMENTED/REVIEW: ICD-10-PCS | Mod: CPTII,,, | Performed by: INTERNAL MEDICINE

## 2022-10-17 PROCEDURE — 1160F RVW MEDS BY RX/DR IN RCRD: CPT | Mod: CPTII,,, | Performed by: INTERNAL MEDICINE

## 2022-10-17 PROCEDURE — 3044F HG A1C LEVEL LT 7.0%: CPT | Mod: CPTII,,, | Performed by: INTERNAL MEDICINE

## 2022-10-17 PROCEDURE — 99214 OFFICE O/P EST MOD 30 MIN: CPT | Mod: S$PBB,,, | Performed by: INTERNAL MEDICINE

## 2022-10-17 PROCEDURE — 3072F LOW RISK FOR RETINOPATHY: CPT | Mod: CPTII,,, | Performed by: INTERNAL MEDICINE

## 2022-10-17 PROCEDURE — 99215 OFFICE O/P EST HI 40 MIN: CPT | Mod: PBBFAC,PO | Performed by: INTERNAL MEDICINE

## 2022-10-17 PROCEDURE — 1160F PR REVIEW ALL MEDS BY PRESCRIBER/CLIN PHARMACIST DOCUMENTED: ICD-10-PCS | Mod: CPTII,,, | Performed by: INTERNAL MEDICINE

## 2022-10-17 PROCEDURE — 3074F PR MOST RECENT SYSTOLIC BLOOD PRESSURE < 130 MM HG: ICD-10-PCS | Mod: CPTII,,, | Performed by: INTERNAL MEDICINE

## 2022-10-17 PROCEDURE — 99999 PR PBB SHADOW E&M-EST. PATIENT-LVL V: CPT | Mod: PBBFAC,,, | Performed by: INTERNAL MEDICINE

## 2022-10-17 PROCEDURE — 3072F PR LOW RISK FOR RETINOPATHY: ICD-10-PCS | Mod: CPTII,,, | Performed by: INTERNAL MEDICINE

## 2022-10-17 RX ORDER — DULAGLUTIDE 4.5 MG/.5ML
INJECTION, SOLUTION SUBCUTANEOUS
COMMUNITY
End: 2022-10-21 | Stop reason: SDUPTHER
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
## 2022-10-20 ENCOUNTER — OFFICE VISIT (OUTPATIENT)
Dept: SURGERY | Facility: CLINIC | Age: 42
End: 2022-10-20
Payer: MEDICAID

## 2022-10-20 VITALS
HEART RATE: 56 BPM | SYSTOLIC BLOOD PRESSURE: 123 MMHG | TEMPERATURE: 98 F | WEIGHT: 272.69 LBS | DIASTOLIC BLOOD PRESSURE: 79 MMHG | BODY MASS INDEX: 44.02 KG/M2

## 2022-10-20 DIAGNOSIS — Z90.12 ACQUIRED ABSENCE OF BREAST AND ABSENT NIPPLE, LEFT: Primary | ICD-10-CM

## 2022-10-20 PROCEDURE — 99999 PR PBB SHADOW E&M-EST. PATIENT-LVL IV: CPT | Mod: PBBFAC,,, | Performed by: SURGERY

## 2022-10-20 PROCEDURE — 3072F LOW RISK FOR RETINOPATHY: CPT | Mod: CPTII,,, | Performed by: SURGERY

## 2022-10-20 PROCEDURE — 3078F PR MOST RECENT DIASTOLIC BLOOD PRESSURE < 80 MM HG: ICD-10-PCS | Mod: CPTII,,, | Performed by: SURGERY

## 2022-10-20 PROCEDURE — 3044F HG A1C LEVEL LT 7.0%: CPT | Mod: CPTII,,, | Performed by: SURGERY

## 2022-10-20 PROCEDURE — 99999 PR PBB SHADOW E&M-EST. PATIENT-LVL IV: ICD-10-PCS | Mod: PBBFAC,,, | Performed by: SURGERY

## 2022-10-20 PROCEDURE — 99214 OFFICE O/P EST MOD 30 MIN: CPT | Mod: PBBFAC | Performed by: SURGERY

## 2022-10-20 PROCEDURE — 3066F NEPHROPATHY DOC TX: CPT | Mod: CPTII,,, | Performed by: SURGERY

## 2022-10-20 PROCEDURE — 1159F MED LIST DOCD IN RCRD: CPT | Mod: CPTII,,, | Performed by: SURGERY

## 2022-10-20 PROCEDURE — 3074F PR MOST RECENT SYSTOLIC BLOOD PRESSURE < 130 MM HG: ICD-10-PCS | Mod: CPTII,,, | Performed by: SURGERY

## 2022-10-20 PROCEDURE — 99214 PR OFFICE/OUTPT VISIT, EST, LEVL IV, 30-39 MIN: ICD-10-PCS | Mod: S$PBB,24,, | Performed by: SURGERY

## 2022-10-20 PROCEDURE — 3078F DIAST BP <80 MM HG: CPT | Mod: CPTII,,, | Performed by: SURGERY

## 2022-10-20 PROCEDURE — 3061F NEG MICROALBUMINURIA REV: CPT | Mod: CPTII,,, | Performed by: SURGERY

## 2022-10-20 PROCEDURE — 3066F PR DOCUMENTATION OF TREATMENT FOR NEPHROPATHY: ICD-10-PCS | Mod: CPTII,,, | Performed by: SURGERY

## 2022-10-20 PROCEDURE — 3074F SYST BP LT 130 MM HG: CPT | Mod: CPTII,,, | Performed by: SURGERY

## 2022-10-20 PROCEDURE — 3061F PR NEG MICROALBUMINURIA RESULT DOCUMENTED/REVIEW: ICD-10-PCS | Mod: CPTII,,, | Performed by: SURGERY

## 2022-10-20 PROCEDURE — 3072F PR LOW RISK FOR RETINOPATHY: ICD-10-PCS | Mod: CPTII,,, | Performed by: SURGERY

## 2022-10-20 PROCEDURE — 3044F PR MOST RECENT HEMOGLOBIN A1C LEVEL <7.0%: ICD-10-PCS | Mod: CPTII,,, | Performed by: SURGERY

## 2022-10-20 PROCEDURE — 1159F PR MEDICATION LIST DOCUMENTED IN MEDICAL RECORD: ICD-10-PCS | Mod: CPTII,,, | Performed by: SURGERY

## 2022-10-20 PROCEDURE — 99214 OFFICE O/P EST MOD 30 MIN: CPT | Mod: S$PBB,24,, | Performed by: SURGERY

## 2022-10-20 NOTE — PATIENT INSTRUCTIONS
According to our genetic counselor you do not need a prophylactic mastectomy on the left side.      Breast pain alone is not a reason to remove her breast.  I think he would benefit from a breast reduction.      I would recommend you contact the plastic surgeon about left-sided reconstruction and right-sided reduction.      The to plastic surgeons at work at [facility] or Dr. Collin Pinto and Dr. Tawny Isaac.  They are not [facility] physicians but they do surgeries at our facility.      The [facility] plastic surgeons are located in [place].      You may need to contact your Medicaid provider determine what plastic surgeons or under contract with them for your care.      If you have any questions or concerns please call the office.      Your oncologist should decide when your [employer] is no longer needed    Our office phone numbers are  [phone] and

## 2022-10-20 NOTE — PROGRESS NOTES
Patient ID: Sangeetha June is a 42 y.o. female.    Breast reconstruction    Chief Complaint: Follow-up (Breast )      HPI:  Patient underwent a mastectomy at an outside facility.  She was found to have a genetic abnormality that contributed to her breast cancer.  She was evaluated by our genetic department here in [place] and it was determined she does not need a prophylactic mastectomy on the right side.  She presents now for follow-up to discuss breast reconstruction per the Oncology service.  Her only complaint is some right-sided breast pain.    She recently underwent a robotic cholecystectomy by 1 of my partners for which she has no complaint      Review of Systems   Constitutional:  Negative for appetite change, chills, fatigue, fever and unexpected weight change.   HENT:  Negative for hearing loss and rhinorrhea.    Eyes:  Negative for visual disturbance.   Respiratory:  Negative for apnea, cough, shortness of breath and wheezing.    Cardiovascular:  Negative for chest pain and palpitations.   Gastrointestinal:  Negative for abdominal distention, abdominal pain, blood in stool, constipation, diarrhea, nausea and vomiting.   Genitourinary:  Negative for dysuria, frequency and urgency.   Musculoskeletal:  Negative for arthralgias and neck pain.   Skin:  Negative for rash.        Absent left breast, right-sided breast pain   Neurological:  Negative for seizures, weakness, numbness and headaches.   Hematological:  Negative for adenopathy. Does not bruise/bleed easily.   Psychiatric/Behavioral:  Negative for hallucinations. The patient is not nervous/anxious.      Current Outpatient Medications   Medication Sig Dispense Refill    albuterol (PROVENTIL/VENTOLIN HFA) 90 mcg/actuation inhaler Inhale 2 puffs into the lungs every 6 (six) hours as needed for Wheezing. Rescue 18 g 1    ammonium lactate 12 % Crea Apply 1 Act topically 2 (two) times daily. To feet. 140 g 2    ascorbic acid, vitamin C, (VITAMIN  C) 100 MG tablet Take 100 mg by mouth once daily.      aspirin (ECOTRIN) 81 MG EC tablet Take 1 tablet (81 mg total) by mouth once daily. 360 tablet 1    budesonide-formoterol 160-4.5 mcg (SYMBICORT) 160-4.5 mcg/actuation HFAA Inhale 2 puffs into the lungs every 12 (twelve) hours. Controller 10.2 g 3    busPIRone (BUSPAR) 15 MG tablet Take 1 tablet (15 mg total) by mouth 3 (three) times daily. 270 tablet 3    celecoxib (CELEBREX) 200 MG capsule Take 1 capsule (200 mg total) by mouth 2 (two) times daily. 28 capsule 0    dulaglutide (TRULICITY) 4.5 mg/0.5 mL pen injector Inject into the skin every 7 days.      EScitalopram oxalate (LEXAPRO) 20 MG tablet Take 1 tablet (20 mg total) by mouth once daily. 90 tablet 3    EUTHYROX 50 mcg tablet Take 1 tablet (50 mcg total) by mouth every morning. 90 tablet 1    hydroCHLOROthiazide (HYDRODIURIL) 25 MG tablet Take 1 tablet (25 mg total) by mouth daily as needed (edema, swelling). 90 tablet 3    hydrocortisone 0.5 % cream Apply topically 2 (two) times daily.      LATUDA 20 mg Tab tablet Take 20 mg by mouth once daily.      linaCLOtide (LINZESS) 145 mcg Cap capsule Take 1 capsule (145 mcg total) by mouth once daily. 90 capsule 3    magnesium oxide (MAG-OX) 400 mg (241.3 mg magnesium) tablet Take 1 tablet (400 mg total) by mouth once daily. 90 tablet 1    metFORMIN (GLUCOPHAGE-XR) 500 MG ER 24hr tablet SMARTSI Tablet(s) By Mouth Every Evening 90 tablet 3    multivit with min-folic acid (ADULT MULTIVITAMIN GUMMIES) 200 mcg Chew Take by mouth.      olopatadine (PATANOL) 0.1 % ophthalmic solution       ondansetron (ZOFRAN) 4 MG tablet Take 1 tablet (4 mg total) by mouth every 6 (six) hours as needed for Nausea. 10 tablet 1    oxyCODONE (ROXICODONE) 5 MG immediate release tablet Take 1 tablet (5 mg total) by mouth every 6 (six) hours as needed for Pain. 20 tablet 0    pantoprazole (PROTONIX) 40 MG tablet Take 1 tablet (40 mg total) by mouth once daily. 90 tablet 3    pravastatin  (PRAVACHOL) 40 MG tablet Take 1 tablet (40 mg total) by mouth every evening. 90 tablet 3    traZODone (DESYREL) 150 MG tablet Take 1 tablet (150 mg total) by mouth every evening. 90 tablet 3    gabapentin (NEURONTIN) 300 MG capsule Take 1 capsule (300 mg total) by mouth 2 (two) times daily. 180 capsule 0    oxybutynin (DITROPAN-XL) 5 MG TR24 Take 1 tablet (5 mg total) by mouth once daily. 90 tablet 3     No current facility-administered medications for this visit.     Facility-Administered Medications Ordered in Other Visits   Medication Dose Route Frequency Provider Last Rate Last Admin    lactated ringers infusion   Intravenous Continuous Maty Thompson MD 10 mL/hr at 01/27/22 0636 Restarted at 01/27/22 0709       Review of patient's allergies indicates:   Allergen Reactions    Lisinopril        Past Medical History:   Diagnosis Date    Anxiety     Breast cancer     Chest pain     Chest pain 7/2/2021    Cholecystitis without calculus     Decreased ROM of left shoulder 2/15/2022    Dizziness     Elevated C-reactive protein (CRP)     Essential hypertension     Fall 10/11/2021    Memory change     EVERARDO (obstructive sleep apnea)     Osteoarthritis     Other specified disorders of thyroid     Pre-diabetes 4/15/2021    Chronic, Stable, cont metformin    Screening mammogram, encounter for 4/15/2021    Shoulder injury     SOB (shortness of breath)     Thyroiditis     Tingling of left upper extremity 10/29/2021    Vision disturbance 4/30/2021    Weakness of shoulder 2/15/2022       Past Surgical History:   Procedure Laterality Date    ARTHROSCOPIC REPAIR OF ROTATOR CUFF OF SHOULDER Left 1/27/2022    Procedure: REPAIR, ROTATOR CUFF, ARTHROSCOPIC;  Surgeon: Francisco Mathews MD;  Location: [facility];  Service: Orthopedics;  Laterality: Left;    ARTHROSCOPY OF SHOULDER WITH DECOMPRESSION OF SUBACROMIAL SPACE Left 1/27/2022    Procedure: ARTHROSCOPY, SHOULDER, WITH SUBACROMIAL SPACE DECOMPRESSION;  Surgeon: Francisco GONSALEZ  MD Bisi;  Location: [facility] OR;  Service: Orthopedics;  Laterality: Left;    BREAST BIOPSY      COLONOSCOPY N/A 5/31/2022    Procedure: COLONOSCOPY;  Surgeon: Km Odom MD;  Location: [facility] ENDO;  Service: Endoscopy;  Laterality: N/A;    ESOPHAGOGASTRODUODENOSCOPY N/A 5/31/2022    Procedure: EGD (ESOPHAGOGASTRODUODENOSCOPY) ;  Surgeon: Km Odom MD;  Location: [facility] ENDO;  Service: Endoscopy;  Laterality: N/A;    FIXATION OF TENDON Left 1/27/2022    Procedure: FIXATION, TENDON;  Surgeon: Francisco Mathews MD;  Location: [facility] OR;  Service: Orthopedics;  Laterality: Left;    HYSTERECTOMY      INJECTION OF ANESTHETIC AGENT INTO SACROILIAC JOINT Bilateral 7/29/2022    Procedure: Bilateral SIJ +  Bilateral GT Bursa Injection RN IV Sedation;  Surgeon: Bisi Cochran MD;  Location: [facility] PAIN MGT;  Service: Pain Management;  Laterality: Bilateral;    INJECTION OF JOINT Left 11/12/2021    Procedure: Left suprascapular and axillary injection with local;  Surgeon: Bisi Cochran MD;  [facility];  Service: Pain Management;  Laterality: Left;    INJECTION OF JOINT Bilateral 7/29/2022    Procedure: Bilateral SIJ + Bilateral GT Bursa Injection RN IV Sedation;  Surgeon: Bisi Cochran MD;  Location: [facility] PAIN MGT;  Service: Pain Management;  Laterality: Bilateral;    MASTECTOMY      ROBOT-ASSISTED CHOLECYSTECTOMY USING DA ROBERTA XI N/A 8/29/2022    Procedure: XI ROBOTIC CHOLECYSTECTOMY;  Surgeon: Raeann Bhandari DO;  Location: [facility] OR;  Service: General;  Laterality: N/A;       Family History   Problem Relation Age of Onset    Colon cancer Mother 53        partial colectomy    Stroke Mother     Heart disease Mother     Stroke Sister     Diabetes Sister     Diabetes Brother     No Known Problems Brother     No Known Problems Maternal Grandmother     Prostate cancer Maternal Grandfather 70    Alcohol abuse Maternal Grandfather     No Known Problems Paternal Grandmother     No Known Problems  Paternal Grandfather     Throat cancer Maternal Aunt         smoking hx    Lung cancer Maternal Uncle         hx smoking    Breast cancer Other 37        chemotherapy, planning for BL mastectomy    No Known Problems Other        Social History     Socioeconomic History    Marital status:    Tobacco Use    Smoking status: Never    Smokeless tobacco: Never   Substance and Sexual Activity    Alcohol use: Not Currently    Drug use: Not Currently     Types: Marijuana    Sexual activity: Yes       Vitals:    10/20/22 1312   BP: 123/79   Pulse: (!) 56   Temp: 98.1 °F (36.7 °C)       Physical Exam  Vitals reviewed.   Constitutional:       Appearance: She is well-developed. She is obese.   HENT:      Head: Normocephalic.   Eyes:      Pupils: Pupils are equal, round, and reactive to light.   Neck:      Thyroid: No thyromegaly.      Vascular: No JVD.      Trachea: No tracheal deviation.   Cardiovascular:      Rate and Rhythm: Normal rate and regular rhythm.      Heart sounds: Normal heart sounds.   Pulmonary:      Breath sounds: Normal breath sounds. No wheezing.   Abdominal:      General: Bowel sounds are normal. There is no distension.      Palpations: Abdomen is soft. Abdomen is not rigid. There is no mass.      Tenderness: There is no abdominal tenderness. There is no guarding or rebound.      Comments: Obese.  Incisions are clean   Musculoskeletal:         General: Normal range of motion.   Lymphadenopathy:      Cervical: No cervical adenopathy.   Skin:     General: Skin is warm and dry.      Findings: No erythema or rash.      Comments: The left breast is absent.  The incision is healed well.      On the right side there are no skin changes, masses, nipple discharge nor axillary adenopathy.  The breast is rather large.  There is a right chest wall MediPort   Neurological:      Mental Status: She is oriented to person, place, and time.     Result:   Mammo Digital Diagnostic Right with Darrion     History:  Patient is  42 y.o. and is seen for diagnostic imaging.     Films Compared:  Compared to: 06/01/2021 Mammo Digital Diagnostic Right with Darrion     Findings:  This procedure was performed using tomosynthesis. Computer-aided detection was utilized in the interpretation of this examination.  Right  The right breast has scattered areas of fibroglandular density. There is no evidence of suspicious masses, calcifications, or other abnormal findings in the right breast.     Impression:  There is no mammographic evidence of malignancy in the right breast.     BI-RADS Category:   Overall: 1 - Negative     Recommendation:  Routine screening mammogram in 1 year is recommended.     Pathology from her cholecystectomy showed cholelithiasis and chronic cholecystitis        Assessment & Plan:     Absent left breast   Large right breast.      Given her age of 42 the patient would benefit from a left breast reconstruction for body image issues.  She would also likely benefit from a right breast reduction.      These procedures would be done by Plastic surgery.  There is no role for General surgery as she does not need a prophylactic right mastectomy.      The patient was given the information about the plastic surgeons work at the [facility] facilities in [place].  Was explained to her that [facility] is located in [place].  I also explained that she may need to contact her Medicaid provider determine with Plastic Surgeons they have on their physician less and she may need to see 1 of those.      We will see her back in general surgery as needed.

## 2022-10-21 ENCOUNTER — LAB VISIT (OUTPATIENT)
Dept: LAB | Facility: HOSPITAL | Age: 42
End: 2022-10-21
Attending: FAMILY MEDICINE
Payer: MEDICAID

## 2022-10-21 ENCOUNTER — OFFICE VISIT (OUTPATIENT)
Dept: INTERNAL MEDICINE | Facility: CLINIC | Age: 42
End: 2022-10-21
Payer: MEDICAID

## 2022-10-21 VITALS
BODY MASS INDEX: 44.12 KG/M2 | SYSTOLIC BLOOD PRESSURE: 136 MMHG | DIASTOLIC BLOOD PRESSURE: 80 MMHG | WEIGHT: 273.38 LBS | RESPIRATION RATE: 20 BRPM | HEART RATE: 86 BPM | TEMPERATURE: 98 F

## 2022-10-21 DIAGNOSIS — E11.9 TYPE 2 DIABETES MELLITUS WITHOUT COMPLICATION, WITHOUT LONG-TERM CURRENT USE OF INSULIN: Primary | ICD-10-CM

## 2022-10-21 DIAGNOSIS — I10 PRIMARY HYPERTENSION: ICD-10-CM

## 2022-10-21 DIAGNOSIS — E03.9 HYPOTHYROIDISM, UNSPECIFIED TYPE: ICD-10-CM

## 2022-10-21 DIAGNOSIS — E11.9 TYPE 2 DIABETES MELLITUS WITHOUT COMPLICATION, WITHOUT LONG-TERM CURRENT USE OF INSULIN: ICD-10-CM

## 2022-10-21 DIAGNOSIS — G62.9 NEUROPATHY: ICD-10-CM

## 2022-10-21 DIAGNOSIS — E78.49 OTHER HYPERLIPIDEMIA: ICD-10-CM

## 2022-10-21 LAB
ESTIMATED AVG GLUCOSE: 100 MG/DL (ref 68–131)
HBA1C MFR BLD: 5.1 % (ref 4–5.6)

## 2022-10-21 PROCEDURE — 1159F MED LIST DOCD IN RCRD: CPT | Mod: CPTII,,, | Performed by: FAMILY MEDICINE

## 2022-10-21 PROCEDURE — 3044F PR MOST RECENT HEMOGLOBIN A1C LEVEL <7.0%: ICD-10-PCS | Mod: CPTII,,, | Performed by: FAMILY MEDICINE

## 2022-10-21 PROCEDURE — 99214 PR OFFICE/OUTPT VISIT, EST, LEVL IV, 30-39 MIN: ICD-10-PCS | Mod: S$PBB,,, | Performed by: FAMILY MEDICINE

## 2022-10-21 PROCEDURE — 90686 IIV4 VACC NO PRSV 0.5 ML IM: CPT | Mod: PBBFAC,PO

## 2022-10-21 PROCEDURE — 99214 OFFICE O/P EST MOD 30 MIN: CPT | Mod: S$PBB,,, | Performed by: FAMILY MEDICINE

## 2022-10-21 PROCEDURE — 36415 COLL VENOUS BLD VENIPUNCTURE: CPT | Mod: PO | Performed by: FAMILY MEDICINE

## 2022-10-21 PROCEDURE — 3079F PR MOST RECENT DIASTOLIC BLOOD PRESSURE 80-89 MM HG: ICD-10-PCS | Mod: CPTII,,, | Performed by: FAMILY MEDICINE

## 2022-10-21 PROCEDURE — 3072F LOW RISK FOR RETINOPATHY: CPT | Mod: CPTII,,, | Performed by: FAMILY MEDICINE

## 2022-10-21 PROCEDURE — 3061F PR NEG MICROALBUMINURIA RESULT DOCUMENTED/REVIEW: ICD-10-PCS | Mod: CPTII,,, | Performed by: FAMILY MEDICINE

## 2022-10-21 PROCEDURE — 1159F PR MEDICATION LIST DOCUMENTED IN MEDICAL RECORD: ICD-10-PCS | Mod: CPTII,,, | Performed by: FAMILY MEDICINE

## 2022-10-21 PROCEDURE — 83036 HEMOGLOBIN GLYCOSYLATED A1C: CPT | Performed by: FAMILY MEDICINE

## 2022-10-21 PROCEDURE — 99999 PR PBB SHADOW E&M-EST. PATIENT-LVL IV: CPT | Mod: PBBFAC,,, | Performed by: FAMILY MEDICINE

## 2022-10-21 PROCEDURE — 3044F HG A1C LEVEL LT 7.0%: CPT | Mod: CPTII,,, | Performed by: FAMILY MEDICINE

## 2022-10-21 PROCEDURE — 3075F PR MOST RECENT SYSTOLIC BLOOD PRESS GE 130-139MM HG: ICD-10-PCS | Mod: CPTII,,, | Performed by: FAMILY MEDICINE

## 2022-10-21 PROCEDURE — 99999 PR PBB SHADOW E&M-EST. PATIENT-LVL IV: ICD-10-PCS | Mod: PBBFAC,,, | Performed by: FAMILY MEDICINE

## 2022-10-21 PROCEDURE — 3079F DIAST BP 80-89 MM HG: CPT | Mod: CPTII,,, | Performed by: FAMILY MEDICINE

## 2022-10-21 PROCEDURE — 3072F PR LOW RISK FOR RETINOPATHY: ICD-10-PCS | Mod: CPTII,,, | Performed by: FAMILY MEDICINE

## 2022-10-21 PROCEDURE — 3066F NEPHROPATHY DOC TX: CPT | Mod: CPTII,,, | Performed by: FAMILY MEDICINE

## 2022-10-21 PROCEDURE — 3075F SYST BP GE 130 - 139MM HG: CPT | Mod: CPTII,,, | Performed by: FAMILY MEDICINE

## 2022-10-21 PROCEDURE — 3061F NEG MICROALBUMINURIA REV: CPT | Mod: CPTII,,, | Performed by: FAMILY MEDICINE

## 2022-10-21 PROCEDURE — 3066F PR DOCUMENTATION OF TREATMENT FOR NEPHROPATHY: ICD-10-PCS | Mod: CPTII,,, | Performed by: FAMILY MEDICINE

## 2022-10-21 PROCEDURE — 99214 OFFICE O/P EST MOD 30 MIN: CPT | Mod: PBBFAC,PO | Performed by: FAMILY MEDICINE

## 2022-10-21 RX ORDER — LEVOTHYROXINE SODIUM 50 UG/1
50 TABLET ORAL EVERY MORNING
Qty: 90 TABLET | Refills: 1 | Status: SHIPPED | OUTPATIENT
Start: 2022-10-21 | End: 2023-02-20 | Stop reason: SDUPTHER

## 2022-10-21 RX ORDER — GABAPENTIN 300 MG/1
300 CAPSULE ORAL 2 TIMES DAILY
Qty: 180 CAPSULE | Refills: 1 | Status: SHIPPED | OUTPATIENT
Start: 2022-10-21 | End: 2023-02-20 | Stop reason: SDUPTHER

## 2022-10-21 RX ORDER — DULAGLUTIDE 4.5 MG/.5ML
4.5 INJECTION, SOLUTION SUBCUTANEOUS
Qty: 12 PEN | Refills: 1 | Status: SHIPPED | OUTPATIENT
Start: 2022-10-21 | End: 2022-12-30 | Stop reason: SDUPTHER

## 2022-10-21 NOTE — PROGRESS NOTES
Subjective:       Patient ID: Sangeetha June is a 42 y.o. female.    Chief Complaint: No chief complaint on file.    Diabetes  She presents for her follow-up diabetic visit. She has type 2 diabetes mellitus. Pertinent negatives for hypoglycemia include no dizziness or headaches. Pertinent negatives for diabetes include no chest pain, no fatigue and no foot paresthesias. Symptoms are stable.   Review of Systems   Constitutional:  Negative for fatigue.   Respiratory:  Negative for shortness of breath.    Cardiovascular:  Negative for chest pain.   Gastrointestinal:  Negative for abdominal pain.   Neurological:  Negative for dizziness and headaches.     Objective:      Physical Exam  Vitals and nursing note reviewed.   Constitutional:       General: She is not in acute distress.     Appearance: Normal appearance. She is well-developed. She is not diaphoretic.   HENT:      Head: Normocephalic and atraumatic.   Pulmonary:      Effort: Pulmonary effort is normal. No respiratory distress.      Breath sounds: Normal breath sounds. No wheezing.   Skin:     General: Skin is warm and dry.      Findings: No erythema or rash.   Neurological:      Mental Status: She is alert.       Assessment:       1. Type 2 diabetes mellitus without complication, without long-term current use of insulin    2. Primary hypertension    3. Hypothyroidism, unspecified type    4. Other hyperlipidemia    5. Neuropathy        Plan:     Problem List Items Addressed This Visit          Cardiac/Vascular    Hyperlipidemia    Overview     Chronic, Stable, cont a statin         Hypertension    Overview     Chronic, Stable, cont hctz            Endocrine    Hypothyroidism    Overview     Chronic, Stable, cont synthroid         Relevant Medications    EUTHYROX 50 mcg tablet    Type 2 diabetes mellitus without complication, without long-term current use of insulin - Primary    Relevant Medications    dulaglutide (TRULICITY) 4.5 mg/0.5 mL pen injector     Other Relevant Orders    Hemoglobin A1C     Other Visit Diagnoses       Neuropathy        Relevant Orders    EMG W/ ULTRASOUND AND NERVE CONDUCTION TEST 2 Extremities

## 2022-10-31 ENCOUNTER — PATIENT MESSAGE (OUTPATIENT)
Dept: CARDIOLOGY | Facility: CLINIC | Age: 42
End: 2022-10-31

## 2022-11-07 ENCOUNTER — PATIENT MESSAGE (OUTPATIENT)
Dept: PAIN MEDICINE | Facility: CLINIC | Age: 42
End: 2022-11-07

## 2022-11-08 ENCOUNTER — HOSPITAL ENCOUNTER (OUTPATIENT)
Dept: PREADMISSION TESTING | Facility: HOSPITAL | Age: 42
Discharge: HOME | End: 2022-11-08
Attending: SURGERY
Payer: MEDICARE

## 2022-11-08 VITALS
HEART RATE: 62 BPM | OXYGEN SATURATION: 97 % | SYSTOLIC BLOOD PRESSURE: 110 MMHG | TEMPERATURE: 99 F | DIASTOLIC BLOOD PRESSURE: 67 MMHG | RESPIRATION RATE: 17 BRPM

## 2022-11-08 DIAGNOSIS — E04.1 THYROID NODULE: ICD-10-CM

## 2022-11-08 DIAGNOSIS — J45.909 ASTHMA, UNSPECIFIED ASTHMA SEVERITY, UNSPECIFIED WHETHER COMPLICATED, UNSPECIFIED WHETHER PERSISTENT: ICD-10-CM

## 2022-11-08 DIAGNOSIS — Z17.1 MALIGNANT NEOPLASM OF LEFT BREAST IN FEMALE, ESTROGEN RECEPTOR NEGATIVE, UNSPECIFIED SITE OF BREAST: ICD-10-CM

## 2022-11-08 DIAGNOSIS — E03.9 HYPOTHYROIDISM, UNSPECIFIED TYPE: ICD-10-CM

## 2022-11-08 DIAGNOSIS — E11.9 TYPE 2 DIABETES MELLITUS WITHOUT COMPLICATION, WITHOUT LONG-TERM CURRENT USE OF INSULIN: ICD-10-CM

## 2022-11-08 DIAGNOSIS — E66.01 OBESITY, MORBID, BMI 40.0-49.9: ICD-10-CM

## 2022-11-08 DIAGNOSIS — C50.912 MALIGNANT NEOPLASM OF LEFT BREAST IN FEMALE, ESTROGEN RECEPTOR NEGATIVE, UNSPECIFIED SITE OF BREAST: ICD-10-CM

## 2022-11-08 DIAGNOSIS — I10 PRIMARY HYPERTENSION: ICD-10-CM

## 2022-11-08 DIAGNOSIS — F31.9 BIPOLAR AFFECTIVE DISORDER, REMISSION STATUS UNSPECIFIED: ICD-10-CM

## 2022-11-08 DIAGNOSIS — I63.9 CEREBROVASCULAR ACCIDENT (CVA), UNSPECIFIED MECHANISM: ICD-10-CM

## 2022-11-08 DIAGNOSIS — E78.49 OTHER HYPERLIPIDEMIA: ICD-10-CM

## 2022-11-08 PROBLEM — F20.9 SCHIZOPHRENIA: Status: ACTIVE | Noted: 2022-11-08

## 2022-11-08 PROBLEM — G89.29 CHRONIC PAIN: Status: ACTIVE | Noted: 2022-11-08

## 2022-11-08 NOTE — ASSESSMENT & PLAN NOTE
- followed by pain management ; pt on gabapentin and undergoing injections, no oral pain meds currently   - per pt Dr. Pinto will defer oral meds post op to pain management   - pt has a call out to pain management and will reach out again to discuss.

## 2022-11-08 NOTE — ASSESSMENT & PLAN NOTE
- continue thyroid replacement , take levothyroxine AM of surgery  - Continue to follow up with PCP

## 2022-11-08 NOTE — ASSESSMENT & PLAN NOTE
PFTs as above  - no home O2  - uses symbicort PRN, albuterol PRN   - last albuterol 1 week ago used due to anxiety attack with relief of sx  - follows with loy fernandez 6/2022   - pt has Obstructive pattern asthma related and restriction likely related to patient body habitus/morbid obesity/hypoventilation  - Continue to follow up with Pulm

## 2022-11-08 NOTE — DISCHARGE INSTRUCTIONS
To confirm, your doctor has instructed you that surgery is scheduled for 11/15.       Pre admit office will call the afternoon prior to surgery between 1PM and 3PM with arrival time.    Surgery will be at [facility],  The address is [id number] [place] [address]. [place]  [id number].      IMPORTANT INSTRUCTIONS!    Do not eat or drink after 12 midnight, including water.   Do not smoke or use chewing tobacco after 12 midnight  OK to brush teeth, but no gum, candy, or mints!      Take only these medicines with a small swallow of water-morning of surgery.     Buspar (Buspirone)   Escitalopram (Lexapro)  Levothyroxine (Synthroid)  Gabapentin (Neurontin)       ____ Stop Aspirin, Ibuprofen, Motrin and Aleve at least 5-7 days before surgery, unless otherwise instructed by your doctor, or the nurse.   You MAY use Tylenol/acetaminophen until day of surgery.      ____  If you take diabetic medication, do NOT take morning of surgery unless instructed by Doctor. Metformin must be stopped 24 hrs prior to surgery time.       ____ Stop taking any Fish Oil supplements or Vitamins at least 5 days prior to surgery, unless instructed otherwise by your Doctor.       Bathing Instructions: The night before surgery and the morning prior to coming to the hospital:    - Shower & rinse your body as usual with anti-bacterial Soap (Dial or Jayde 2000)   -Hibiclens (if indicated) use AFTER anti-bacterial soap; 1 packet PM/1 packet in AM on surgical site only   -Do not use hibiclens on your head, face, or genitals.    -Do not wash with anti-bacterial soap after you use the hibiclens.    -Do not shave surgical site 5-7 days prior to surgery.    -Pubic hair 7 days prior to surgery (gyn pt's).      Pediatric patients do not need to use anti-bacterial soap or Hibiclens.             After Bathing:   __ No powder, lotions, creams, or body spray to skin     __No deodorant for any breast procedure, PORT, or upper arm surgery     __ No makeup,  mascara, nail polish or artificial nails        **SURGERY WILL BE CANCELLED IF ARTIFICIAL/NAIL POLISH IS PRESENT!!!**    __ Please remove all piercings and leave all jewelry at home.    **SURGERY WILL BE CANCELLED IF PIERCINGS ARE PRESENT!!!**      __ Dentures, Hearing Aids and Contact Lens need to be removed prior to the start of surgery.      __ Wear clean, loose-fitting clothing. Allow for dressings/bandages/surgical equipment     __ You must have transportation, and they MUST stay the entire time.       [facility] Visitor/Ride Policy:   Only 1 adult allowed (over the age of 18) to accompany you into Pre-op/Recovery Surgery Dept and must stay through the entire length of admission.     Must have a ride home from a responsible adult that you know and trust.      Pediatric Patients are allowed 2 adult visitors.     Medical Transport, Uber or Lyft can only be used if patient has a responsible adult to accompany them during ride home.         Post-Op Instructions: You will receive surgery post-op instructions by your Discharge Nurse prior to going home.     Surgical Site Infection:   Prevention of surgical site infections:   -Keep incisions clean and dry.   -Do not soak/submerge incisions in water until completely healed.   -Do not apply lotions, powders, creams, or deodorants to site.   -Always make sure hands are cleaned with antibacterial soap/ alcohol-based   prior to touching the surgical site.       Signs and symptoms:               -Redness and pain around the area where you had surgery               -Drainage of cloudy fluid from your surgical wound               -Fever over 100.4 or chills     >>>Call Surgeon office/on-call Surgeon if you experience any of these signs & symptoms post-surgery.        *Please Call [facility] Pre-Admissions Department with surgery instruction questions at [phone].     *Insurance Questions, please call [phone] or [phone]

## 2022-11-08 NOTE — H&P (VIEW-ONLY)
Preoperative History and Physical  [facility]                                                                   Chief Complaint: Preoperative evaluation     History of Present Illness:      Sangeetha June is a 42 y.o. female with PMH of HTN, HLD, EVERARDO, asthma, bipolar, depression, GERD, h/o breast CA, neuropathy, BMI 44 , prior CVA who presents to the office today for a preoperative consultation at the request of Dr. Pinto  who plans on performing mammoplasty reduction and R and tissue expander on the Right  on November 15.     Functional Status:      The patient is able to climb a flight of stairs with fatigue at top of stairs. The patient is able to ambulate  without difficulty- pt reports supposed to walk with cane for balance, but does not use . The patient's functional status is not affected by the surgical problem. The patient's functional status is affected by shortness of breath, short distances, no Sob in house, can walk to mailbox no SOB. CP associated with anxiety not with activity .   MET score greater than 4    Past Medical History:      Past Medical History:   Diagnosis Date    Anxiety     Asthma 11/8/2022    Breast cancer     Chest pain     Chest pain 07/02/2021    Cholecystitis without calculus     Decreased ROM of left shoulder 02/15/2022    Diabetes mellitus     Dizziness     Elevated C-reactive protein (CRP)     Essential hypertension     Fall 10/11/2021    Memory change     EVERARDO (obstructive sleep apnea)     Osteoarthritis     Other specified disorders of thyroid     Pre-diabetes 04/15/2021    Chronic, Stable, cont metformin    Screening mammogram, encounter for 04/15/2021    Shoulder injury     SOB (shortness of breath)     Stroke     Thyroiditis     Tingling of left upper extremity 10/29/2021    Vision disturbance 04/30/2021    Weakness of shoulder 02/15/2022        Past Surgical History:      Past Surgical History:   Procedure  Laterality Date    ARTHROSCOPIC REPAIR OF ROTATOR CUFF OF SHOULDER Left 1/27/2022    Procedure: REPAIR, ROTATOR CUFF, ARTHROSCOPIC;  Surgeon: Francisco Mathews MD;  Location: [facility] OR;  Service: Orthopedics;  Laterality: Left;    ARTHROSCOPY OF SHOULDER WITH DECOMPRESSION OF SUBACROMIAL SPACE Left 1/27/2022    Procedure: ARTHROSCOPY, SHOULDER, WITH SUBACROMIAL SPACE DECOMPRESSION;  Surgeon: Francisco Mathews MD;  Location: [facility] OR;  Service: Orthopedics;  Laterality: Left;    BREAST BIOPSY      COLONOSCOPY N/A 5/31/2022    Procedure: COLONOSCOPY;  Surgeon: Km Odom MD;  Location: [facility] ENDO;  Service: Endoscopy;  Laterality: N/A;    ESOPHAGOGASTRODUODENOSCOPY N/A 5/31/2022    Procedure: EGD (ESOPHAGOGASTRODUODENOSCOPY) ;  Surgeon: Km Odom MD;  Location: [facility] ENDO;  Service: Endoscopy;  Laterality: N/A;    FIXATION OF TENDON Left 1/27/2022    Procedure: FIXATION, TENDON;  Surgeon: Francisco Mathews MD;  Location: [facility] OR;  Service: Orthopedics;  Laterality: Left;    HYSTERECTOMY      INJECTION OF ANESTHETIC AGENT INTO SACROILIAC JOINT Bilateral 7/29/2022    Procedure: Bilateral SIJ +  Bilateral GT Bursa Injection RN IV Sedation;  Surgeon: Bisi Cochran MD;  Location: [facility] PAIN MGT;  Service: Pain Management;  Laterality: Bilateral;    INJECTION OF JOINT Left 11/12/2021    Procedure: Left suprascapular and axillary injection with local;  Surgeon: Bisi Cochran MD;  Location: [facility] PAIN MGT;  Service: Pain Management;  Laterality: Left;    INJECTION OF JOINT Bilateral 7/29/2022    Procedure: Bilateral SIJ + Bilateral GT Bursa Injection RN IV Sedation;  Surgeon: Bisi Cochran MD;  Location: [facility] PAIN MGT;  Service: Pain Management;  Laterality: Bilateral;    MASTECTOMY      ROBOT-ASSISTED CHOLECYSTECTOMY USING DA ROBERTA XI N/A 8/29/2022    Procedure: XI ROBOTIC CHOLECYSTECTOMY;  Surgeon: Raeann Bhandari DO;  Location: [facility] OR;  Service: General;  Laterality: N/A;         Social History:      Social History     Socioeconomic History    Marital status:    Tobacco Use    Smoking status: Never    Smokeless tobacco: Never   Substance and Sexual Activity    Alcohol use: Not Currently    Drug use: Not Currently     Types: Marijuana     Comment: none currently    Sexual activity: Yes        Family History:      Family History   Problem Relation Age of Onset    Colon cancer Mother 53        partial colectomy    Stroke Mother     Heart disease Mother     Diabetes Sister     Diabetes Brother     Diabetes Brother     Diabetes Brother     No Known Problems Maternal Grandmother     Prostate cancer Maternal Grandfather 70    Alcohol abuse Maternal Grandfather     No Known Problems Paternal Grandmother     No Known Problems Paternal Grandfather     Throat cancer Maternal Aunt         smoking hx    Lung cancer Maternal Uncle         hx smoking    Breast cancer Other 37        chemotherapy, planning for BL mastectomy    No Known Problems Other        Allergies:      Review of patient's allergies indicates:   Allergen Reactions    Lisinopril        Medications:      Current Outpatient Medications   Medication Sig    albuterol (PROVENTIL/VENTOLIN HFA) 90 mcg/actuation inhaler Inhale 2 puffs into the lungs every 6 (six) hours as needed for Wheezing. Rescue    ammonium lactate 12 % Crea Apply 1 Act topically 2 (two) times daily. To feet.    ascorbic acid, vitamin C, (VITAMIN C) 100 MG tablet Take 100 mg by mouth once daily.    budesonide-formoterol 160-4.5 mcg (SYMBICORT) 160-4.5 mcg/actuation HFAA Inhale 2 puffs into the lungs every 12 (twelve) hours. Controller    busPIRone (BUSPAR) 15 MG tablet Take 1 tablet (15 mg total) by mouth 3 (three) times daily.    dulaglutide (TRULICITY) 4.5 mg/0.5 mL pen injector Inject 4.5 mg into the skin every 7 days.    EScitalopram oxalate (LEXAPRO) 20 MG tablet Take 1 tablet (20 mg total) by mouth once daily.    EUTHYROX 50 mcg tablet Take 1 tablet (50 mcg  total) by mouth every morning.    gabapentin (NEURONTIN) 300 MG capsule Take 1 capsule (300 mg total) by mouth 2 (two) times daily.    hydroCHLOROthiazide (HYDRODIURIL) 25 MG tablet Take 1 tablet (25 mg total) by mouth daily as needed (edema, swelling).    linaCLOtide (LINZESS) 145 mcg Cap capsule Take 1 capsule (145 mcg total) by mouth once daily.    metFORMIN (GLUCOPHAGE-XR) 500 MG ER 24hr tablet SMARTSI Tablet(s) By Mouth Every Evening    multivit with min-folic acid (ADULT MULTIVITAMIN GUMMIES) 200 mcg Chew Take by mouth.    ondansetron (ZOFRAN) 4 MG tablet Take 1 tablet (4 mg total) by mouth every 6 (six) hours as needed for Nausea.    oxybutynin (DITROPAN-XL) 5 MG TR24 Take 1 tablet (5 mg total) by mouth once daily.    pantoprazole (PROTONIX) 40 MG tablet Take 1 tablet (40 mg total) by mouth once daily.    pravastatin (PRAVACHOL) 40 MG tablet Take 1 tablet (40 mg total) by mouth every evening.    traZODone (DESYREL) 150 MG tablet Take 1 tablet (150 mg total) by mouth every evening.    aspirin (ECOTRIN) 81 MG EC tablet Take 1 tablet (81 mg total) by mouth once daily. (Patient not taking: Reported on 2022)    celecoxib (CELEBREX) 200 MG capsule Take 1 capsule (200 mg total) by mouth 2 (two) times daily. (Patient not taking: Reported on 2022)    hydrocortisone 0.5 % cream Apply topically 2 (two) times daily.    LATUDA 20 mg Tab tablet Take 20 mg by mouth once daily.    magnesium oxide (MAG-OX) 400 mg (241.3 mg magnesium) tablet Take 1 tablet (400 mg total) by mouth once daily.    olopatadine (PATANOL) 0.1 % ophthalmic solution     oxyCODONE (ROXICODONE) 5 MG immediate release tablet Take 1 tablet (5 mg total) by mouth every 6 (six) hours as needed for Pain. (Patient not taking: Reported on 10/21/2022)     No current facility-administered medications for this encounter.     Facility-Administered Medications Ordered in Other Encounters   Medication    lactated ringers infusion       Vitals:       Vitals:    11/08/22 1103   BP: 110/67   Pulse: 62   Resp: 17   Temp: 98.6 °F (37 °C)       Review of Systems:        Constitutional: Negative for fever, chills, weight loss, malaise/fatigue and diaphoresis.   HENT: Negative for hearing loss, ear pain, nosebleeds, congestion, sore throat, neck pain, tinnitus and ear discharge.    Eyes: Negative for , double vision, photophobia, pain, discharge and redness. Blurred vision - chronic   Respiratory: Negative for cough, hemoptysis, sputum production, shortness of breath, wheezing and stridor.  Sleeps elevated at night 3-4pillows for neck comfort   Cardiovascular: Negative for, orthopnea, claudication, leg swelling and PND. + CP , palpitations with anxiety   Gastrointestinal: Negative for heartburn, nausea, vomiting, abdominal pain, , blood in stool and melena. + diarrhea, constipation  Genitourinary: Negative for dysuria, urgency, frequency, hematuria and flank pain.   Musculoskeletal: Negative for myalgias, falls. + back pain, R shoulder pain   Skin: Negative for itching and rash.   Neurological: Negative for dizziness, , tremors, sensory change, speech change, focal weakness, seizures, loss of consciousness, weakness and headaches. Tingling- feet and L shoulder / axilla - priro lymph node dissection site   Endo/Heme/Allergies: Negative for environmental allergies and polydipsia. Does not bruise/bleed easily. No prior DVT  Psychiatric/Behavioral: positive  for depression, schizophrenia, bipolar , anxiety     Physical Exam:      Constitutional: Appears well-developed, well-nourished and in no acute distress.  Patient is oriented to person, place, and time.   Head: Normocephalic and atraumatic. Mucous membranes moist.  Neck: Neck supple no mass.   Cardiovascular: Normal rate and regular rhythm.  S1 S2 appreciated by ascultation.  Pulmonary/Chest: Effort normal and clear to auscultation bilaterally. No respiratory distress.   Abdomen: Soft. Non-tender and non-distended.  Neurological: Patient is alert and oriented to person, place and time. Answers questions appropriately Moves all extremities.  Skin: Warm and dry. No lesions.  Extremities: No clubbing, cyanosis or edema.    Laboratory data:      Reviewed and noted in plan where applicable. Please see chart for full laboratory data.    10/28/22 labs at [facility] will be scanned into media   Na 137  K 3.8  Cl 98   CO2 28  Bun 11  Cr 0.84  Glucose 76    WBC 6.72  Hb15  Hct 43.3  Plt 349   Predictors of intubation difficulty:       Morbid obesity? yes - 44   Anatomically abnormal facies? no   Prominent incisors? no   Receding mandible? no   Short, thick neck? no   Neck range of motion: normal   Dentition: Edentulous  Based on the Modified Mallampati, patient is a mallampati score: II (hard and soft palate, upper portion of tonsils anduvula visible)    Cardiographics:      EC22 SB 52  Echocardiogram:  2021  The left ventricle is normal in size with moderate concentric hypertrophy and normal systolic function.  The estimated ejection fraction is 55%.  Normal left ventricular diastolic function.  Normal right ventricular size with normal right ventricular systolic function.  Normal central venous pressure (3 mmHg).  The estimated PA systolic pressure is 31 mmHg.  10/2021 stress     Equivocal myocardial perfusion scan.    There is a moderate intensity, fixed defect consistent with scar in the apical wall(s). VS apical artifact    The gated perfusion images showed an ejection fraction of 60% at rest. The gated perfusion images showed an ejection fraction of 53% post stress.    The EKG portion of this study is negative for ischemia.  Imaging:      Chest x-ray:  22    TECHNIQUE:  PA and lateral views of the chest were performed.     COMPARISON:  2021.     FINDINGS:  The lungs are clear, with normal appearance of pulmonary vasculature and no pleural effusion or pneumothorax.     The cardiac silhouette is normal in size. The  hilar and mediastinal contours are unremarkable.     Bones are intact. Right subclavian Port-A-Cath remains in satisfactory position.  Prior left mastectomy and axillary tressa dissection.     Impression:     1. No acute chest disease.  2. Right subclavian Port-A-Cath.     PFT 06/15/2022     Mixed obstructive and restrictive pattern.  Normal DLCO.  Small airways flow obstruction.  FEV1 increased by 12% but less than 200 mL.         Spirometry December 2021      Grade A-D -acceptable quality of tracingsModerately severe restriction (FVC 50-59% of predicted). Consider lung volumes if clinically indicatedFlow volume loop demonstrate an obstructive defect.Flow volume loops demonstrate a restrictive defect.Overall   there is moderately severe ventilatory impairment. (FEV1 > or equal to 50% of predicted and < or equal to 59% of predicted.)   Â Pattern of mixed obstruction and restriction     6 minute walking test December 2021 no need for O2 on exertion.      Assessment and Plan:      Malignant neoplasm of left breast in female, estrogen receptor negative  - pt presents at the reques of Dr. Pinto who plans on performing a mammoplasty reduction on 11/15/22  - pt with H/o breast Ca   - s/p mastectomy 2019 Left, followed by chemo   - to have reduction on R and tissue expander on the Left   - Known risk factors for perioperative complications: None  DM2, prior CVA, EVERARDO, BMI44    Difficulty with intubation is not anticipated.    Cardiac Risk Estimation:  Per Revised Cardiac Risk Index patient is a Class II risk with a 6.0% risk of a major cardiac event.      1.) Preoperative workup as follows: ECG, hemoglobin, hematocrit, electrolytes, creatinine, glucose, liver function studies.  2.) Change in medication regimen before surgery: hold NSAIds, ASA on hold - resume when safe with surgeon .  3.) Prophylaxis for cardiac events with perioperative beta-blockers: not indicated.  4.) Invasive hemodynamic monitoring perioperatively: not  indicated.  5.) Deep vein thrombosis prophylaxis postoperatively: intermittent pneumatic compression boots and regimen to be chosen by surgical team.  6.) Surveillance for postoperative MI with ECG immediately postoperatively and on postoperati ve days 1 and 2 AND troponin levels 24 hours postoperatively and on day 4 or hospital discharge (whichever comes first): not indicated.  7.) Current medications which may produce withdrawal symptoms if withheld perioperatively: none  8.) Other measures: cleared by cards 10/17/22 low periop risk for breast surgery no further testing indicated pre op       Hypertension  - well controlled  - continue home HCTZ- hold AM of surgery  - Continue to follow up with PCP       Hyperlipidemia  - continue home pravachol  - Continue to follow up with PCP       Type 2 diabetes mellitus without complication, without long-term current use of insulin  - 10/21/22 A1c 5.1   - continue home trulicity and metformin  - hold metformin 24 hours prior to surgery  - no diabetic meds AM of surgery   - Continue to follow up with PCP       Hx of CVA (cerebral vascular accident)  - dx 2019   - residual effects balance problems  - Continue to follow up with PCP / neuro     Hypothyroidism  - continue thyroid replacement , take levothyroxine AM of surgery  - Continue to follow up with PCP       Thyroid nodule  - L thyroid nodule stable on 5/2022 imaging  - no dysphagia , no Sob at rest   - continue f/u with PCP as indicated     Asthma  PFTs as above  - no home O2  - uses symbicort PRN, albuterol PRN   - last albuterol 1 week ago used due to anxiety attack with relief of sx  - follows with loy fernandez 6/2022   - pt has Obstructive pattern asthma related and restriction likely related to patient body habitus/morbid obesity/hypoventilation  - Continue to follow up with Pulm     [facility]ia  [facility]  - recent medication adjustment  - cognitive therapy 4 days weekly   -  hospitalization 1 month ago , improvement in symptoms since d/c    - continue home Buspar, lexapro     Bipolar disorder   pt followed by behavorial health unit  - recent medication adjustment  - cognitive therapy 4 days weekly   - hospitalization 1 month ago , improvement in symptoms since d/c    - continue home Buspar, lexapro   - Continue to follow up with Psych       Chronic pain  - followed by pain management ; pt on gabapentin and undergoing injections, no oral pain meds currently   - per pt Dr. Pinto will defer oral meds post op to pain management   - pt has a call out to pain management and will reach out again to discuss.     Obesity, morbid, BMI 40.0-49.9  - self guided weight loss and diet   - BMI  44

## 2022-11-08 NOTE — ASSESSMENT & PLAN NOTE
- L thyroid nodule stable on 5/2022 imaging  - no dysphagia , no Sob at rest   - continue f/u with PCP as indicated

## 2022-11-08 NOTE — ASSESSMENT & PLAN NOTE
- 10/21/22 A1c 5.1   - continue home trulicity and metformin  - hold metformin 24 hours prior to surgery  - no diabetic meds AM of surgery   - Continue to follow up with PCP

## 2022-11-08 NOTE — ASSESSMENT & PLAN NOTE
- pt presents at the reques of Dr. Pinto who plans on performing a mammoplasty reduction on 11/15/22  - pt with H/o breast Ca   - s/p mastectomy 2019 Left, followed by chemo   - to have reduction on R and tissue expander on the Left   - Known risk factors for perioperative complications: None  DM2, prior CVA, EVERARDO, BMI44    Difficulty with intubation is not anticipated.    Cardiac Risk Estimation:  Per Revised Cardiac Risk Index patient is a Class II risk with a 6.0% risk of a major cardiac event.      1.) Preoperative workup as follows: ECG, hemoglobin, hematocrit, electrolytes, creatinine, glucose, liver function studies.  2.) Change in medication regimen before surgery: hold NSAIds, ASA on hold - resume when safe with surgeon .  3.) Prophylaxis for cardiac events with perioperative beta-blockers: not indicated.  4.) Invasive hemodynamic monitoring perioperatively: not indicated.  5.) Deep vein thrombosis prophylaxis postoperatively: intermittent pneumatic compression boots and regimen to be chosen by surgical team.  6.) Surveillance for postoperative MI with ECG immediately postoperatively and on postoperati ve days 1 and 2 AND troponin levels 24 hours postoperatively and on day 4 or hospital discharge (whichever comes first): not indicated.  7.) Current medications which may produce withdrawal symptoms if withheld perioperatively: none  8.) Other measures: cleared by cards 10/17/22 low periop risk for breast surgery no further testing indicated pre op

## 2022-11-08 NOTE — H&P
Preoperative History and Physical  [facility]                                                                   Chief Complaint: Preoperative evaluation     History of Present Illness:      Sangeetha June is a 42 y.o. female with PMH of HTN, HLD, EVERARDO, asthma, bipolar, depression, GERD, h/o breast CA, neuropathy, BMI 44 , prior CVA who presents to the office today for a preoperative consultation at the request of Dr. Pinto  who plans on performing mammoplasty reduction and R and tissue expander on the Right  on November 15.     Functional Status:      The patient is able to climb a flight of stairs with fatigue at top of stairs. The patient is able to ambulate  without difficulty- pt reports supposed to walk with cane for balance, but does not use . The patient's functional status is not affected by the surgical problem. The patient's functional status is affected by shortness of breath, short distances, no Sob in house, can walk to mailbox no SOB. CP associated with anxiety not with activity .   MET score greater than 4    Past Medical History:      Past Medical History:   Diagnosis Date    Anxiety     Asthma 11/8/2022    Breast cancer     Chest pain     Chest pain 07/02/2021    Cholecystitis without calculus     Decreased ROM of left shoulder 02/15/2022    Diabetes mellitus     Dizziness     Elevated C-reactive protein (CRP)     Essential hypertension     Fall 10/11/2021    Memory change     EVERARDO (obstructive sleep apnea)     Osteoarthritis     Other specified disorders of thyroid     Pre-diabetes 04/15/2021    Chronic, Stable, cont metformin    Screening mammogram, encounter for 04/15/2021    Shoulder injury     SOB (shortness of breath)     Stroke     Thyroiditis     Tingling of left upper extremity 10/29/2021    Vision disturbance 04/30/2021    Weakness of shoulder 02/15/2022        Past Surgical History:      Past Surgical History:   Procedure  Laterality Date    ARTHROSCOPIC REPAIR OF ROTATOR CUFF OF SHOULDER Left 1/27/2022    Procedure: REPAIR, ROTATOR CUFF, ARTHROSCOPIC;  Surgeon: Francisco Mathews MD;  Location: [facility] OR;  Service: Orthopedics;  Laterality: Left;    ARTHROSCOPY OF SHOULDER WITH DECOMPRESSION OF SUBACROMIAL SPACE Left 1/27/2022    Procedure: ARTHROSCOPY, SHOULDER, WITH SUBACROMIAL SPACE DECOMPRESSION;  Surgeon: Francisco Mathews MD;  Location: [facility] OR;  Service: Orthopedics;  Laterality: Left;    BREAST BIOPSY      COLONOSCOPY N/A 5/31/2022    Procedure: COLONOSCOPY;  Surgeon: Km Odom MD;  Location: [facility] ENDO;  Service: Endoscopy;  Laterality: N/A;    ESOPHAGOGASTRODUODENOSCOPY N/A 5/31/2022    Procedure: EGD (ESOPHAGOGASTRODUODENOSCOPY) ;  Surgeon: Km Odom MD;  Location: [facility] ENDO;  Service: Endoscopy;  Laterality: N/A;    FIXATION OF TENDON Left 1/27/2022    Procedure: FIXATION, TENDON;  Surgeon: Francisco Mathews MD;  Location: [facility] OR;  Service: Orthopedics;  Laterality: Left;    HYSTERECTOMY      INJECTION OF ANESTHETIC AGENT INTO SACROILIAC JOINT Bilateral 7/29/2022    Procedure: Bilateral SIJ +  Bilateral GT Bursa Injection RN IV Sedation;  Surgeon: Bisi Cochran MD;  Location: [facility] PAIN MGT;  Service: Pain Management;  Laterality: Bilateral;    INJECTION OF JOINT Left 11/12/2021    Procedure: Left suprascapular and axillary injection with local;  Surgeon: Bisi Cochran MD;  Location: [facility] PAIN MGT;  Service: Pain Management;  Laterality: Left;    INJECTION OF JOINT Bilateral 7/29/2022    Procedure: Bilateral SIJ + Bilateral GT Bursa Injection RN IV Sedation;  Surgeon: Bisi Cochran MD;  Location: [facility] PAIN MGT;  Service: Pain Management;  Laterality: Bilateral;    MASTECTOMY      ROBOT-ASSISTED CHOLECYSTECTOMY USING DA ROBERTA XI N/A 8/29/2022    Procedure: XI ROBOTIC CHOLECYSTECTOMY;  Surgeon: Raeann Bhandari DO;  Location: [facility] OR;  Service: General;  Laterality: N/A;         Social History:      Social History     Socioeconomic History    Marital status:    Tobacco Use    Smoking status: Never    Smokeless tobacco: Never   Substance and Sexual Activity    Alcohol use: Not Currently    Drug use: Not Currently     Types: Marijuana     Comment: none currently    Sexual activity: Yes        Family History:      Family History   Problem Relation Age of Onset    Colon cancer Mother 53        partial colectomy    Stroke Mother     Heart disease Mother     Diabetes Sister     Diabetes Brother     Diabetes Brother     Diabetes Brother     No Known Problems Maternal Grandmother     Prostate cancer Maternal Grandfather 70    Alcohol abuse Maternal Grandfather     No Known Problems Paternal Grandmother     No Known Problems Paternal Grandfather     Throat cancer Maternal Aunt         smoking hx    Lung cancer Maternal Uncle         hx smoking    Breast cancer Other 37        chemotherapy, planning for BL mastectomy    No Known Problems Other        Allergies:      Review of patient's allergies indicates:   Allergen Reactions    Lisinopril        Medications:      Current Outpatient Medications   Medication Sig    albuterol (PROVENTIL/VENTOLIN HFA) 90 mcg/actuation inhaler Inhale 2 puffs into the lungs every 6 (six) hours as needed for Wheezing. Rescue    ammonium lactate 12 % Crea Apply 1 Act topically 2 (two) times daily. To feet.    ascorbic acid, vitamin C, (VITAMIN C) 100 MG tablet Take 100 mg by mouth once daily.    budesonide-formoterol 160-4.5 mcg (SYMBICORT) 160-4.5 mcg/actuation HFAA Inhale 2 puffs into the lungs every 12 (twelve) hours. Controller    busPIRone (BUSPAR) 15 MG tablet Take 1 tablet (15 mg total) by mouth 3 (three) times daily.    dulaglutide (TRULICITY) 4.5 mg/0.5 mL pen injector Inject 4.5 mg into the skin every 7 days.    EScitalopram oxalate (LEXAPRO) 20 MG tablet Take 1 tablet (20 mg total) by mouth once daily.    EUTHYROX 50 mcg tablet Take 1 tablet (50 mcg  total) by mouth every morning.    gabapentin (NEURONTIN) 300 MG capsule Take 1 capsule (300 mg total) by mouth 2 (two) times daily.    hydroCHLOROthiazide (HYDRODIURIL) 25 MG tablet Take 1 tablet (25 mg total) by mouth daily as needed (edema, swelling).    linaCLOtide (LINZESS) 145 mcg Cap capsule Take 1 capsule (145 mcg total) by mouth once daily.    metFORMIN (GLUCOPHAGE-XR) 500 MG ER 24hr tablet SMARTSI Tablet(s) By Mouth Every Evening    multivit with min-folic acid (ADULT MULTIVITAMIN GUMMIES) 200 mcg Chew Take by mouth.    ondansetron (ZOFRAN) 4 MG tablet Take 1 tablet (4 mg total) by mouth every 6 (six) hours as needed for Nausea.    oxybutynin (DITROPAN-XL) 5 MG TR24 Take 1 tablet (5 mg total) by mouth once daily.    pantoprazole (PROTONIX) 40 MG tablet Take 1 tablet (40 mg total) by mouth once daily.    pravastatin (PRAVACHOL) 40 MG tablet Take 1 tablet (40 mg total) by mouth every evening.    traZODone (DESYREL) 150 MG tablet Take 1 tablet (150 mg total) by mouth every evening.    aspirin (ECOTRIN) 81 MG EC tablet Take 1 tablet (81 mg total) by mouth once daily. (Patient not taking: Reported on 2022)    celecoxib (CELEBREX) 200 MG capsule Take 1 capsule (200 mg total) by mouth 2 (two) times daily. (Patient not taking: Reported on 2022)    hydrocortisone 0.5 % cream Apply topically 2 (two) times daily.    LATUDA 20 mg Tab tablet Take 20 mg by mouth once daily.    magnesium oxide (MAG-OX) 400 mg (241.3 mg magnesium) tablet Take 1 tablet (400 mg total) by mouth once daily.    olopatadine (PATANOL) 0.1 % ophthalmic solution     oxyCODONE (ROXICODONE) 5 MG immediate release tablet Take 1 tablet (5 mg total) by mouth every 6 (six) hours as needed for Pain. (Patient not taking: Reported on 10/21/2022)     No current facility-administered medications for this encounter.     Facility-Administered Medications Ordered in Other Encounters   Medication    lactated ringers infusion       Vitals:       Vitals:    11/08/22 1103   BP: 110/67   Pulse: 62   Resp: 17   Temp: 98.6 °F (37 °C)       Review of Systems:        Constitutional: Negative for fever, chills, weight loss, malaise/fatigue and diaphoresis.   HENT: Negative for hearing loss, ear pain, nosebleeds, congestion, sore throat, neck pain, tinnitus and ear discharge.    Eyes: Negative for , double vision, photophobia, pain, discharge and redness. Blurred vision - chronic   Respiratory: Negative for cough, hemoptysis, sputum production, shortness of breath, wheezing and stridor.  Sleeps elevated at night 3-4pillows for neck comfort   Cardiovascular: Negative for, orthopnea, claudication, leg swelling and PND. + CP , palpitations with anxiety   Gastrointestinal: Negative for heartburn, nausea, vomiting, abdominal pain, , blood in stool and melena. + diarrhea, constipation  Genitourinary: Negative for dysuria, urgency, frequency, hematuria and flank pain.   Musculoskeletal: Negative for myalgias, falls. + back pain, R shoulder pain   Skin: Negative for itching and rash.   Neurological: Negative for dizziness, , tremors, sensory change, speech change, focal weakness, seizures, loss of consciousness, weakness and headaches. Tingling- feet and L shoulder / axilla - priro lymph node dissection site   Endo/Heme/Allergies: Negative for environmental allergies and polydipsia. Does not bruise/bleed easily. No prior DVT  Psychiatric/Behavioral: positive  for depression, schizophrenia, bipolar , anxiety     Physical Exam:      Constitutional: Appears well-developed, well-nourished and in no acute distress.  Patient is oriented to person, place, and time.   Head: Normocephalic and atraumatic. Mucous membranes moist.  Neck: Neck supple no mass.   Cardiovascular: Normal rate and regular rhythm.  S1 S2 appreciated by ascultation.  Pulmonary/Chest: Effort normal and clear to auscultation bilaterally. No respiratory distress.   Abdomen: Soft. Non-tender and non-distended.  Neurological: Patient is alert and oriented to person, place and time. Answers questions appropriately Moves all extremities.  Skin: Warm and dry. No lesions.  Extremities: No clubbing, cyanosis or edema.    Laboratory data:      Reviewed and noted in plan where applicable. Please see chart for full laboratory data.    10/28/22 labs at [facility] will be scanned into media   Na 137  K 3.8  Cl 98   CO2 28  Bun 11  Cr 0.84  Glucose 76    WBC 6.72  Hb15  Hct 43.3  Plt 349   Predictors of intubation difficulty:       Morbid obesity? yes - 44   Anatomically abnormal facies? no   Prominent incisors? no   Receding mandible? no   Short, thick neck? no   Neck range of motion: normal   Dentition: Edentulous  Based on the Modified Mallampati, patient is a mallampati score: II (hard and soft palate, upper portion of tonsils anduvula visible)    Cardiographics:      EC22 SB 52  Echocardiogram:  2021  The left ventricle is normal in size with moderate concentric hypertrophy and normal systolic function.  The estimated ejection fraction is 55%.  Normal left ventricular diastolic function.  Normal right ventricular size with normal right ventricular systolic function.  Normal central venous pressure (3 mmHg).  The estimated PA systolic pressure is 31 mmHg.  10/2021 stress     Equivocal myocardial perfusion scan.    There is a moderate intensity, fixed defect consistent with scar in the apical wall(s). VS apical artifact    The gated perfusion images showed an ejection fraction of 60% at rest. The gated perfusion images showed an ejection fraction of 53% post stress.    The EKG portion of this study is negative for ischemia.  Imaging:      Chest x-ray:  22    TECHNIQUE:  PA and lateral views of the chest were performed.     COMPARISON:  2021.     FINDINGS:  The lungs are clear, with normal appearance of pulmonary vasculature and no pleural effusion or pneumothorax.     The cardiac silhouette is normal in size. The  hilar and mediastinal contours are unremarkable.     Bones are intact. Right subclavian Port-A-Cath remains in satisfactory position.  Prior left mastectomy and axillary tressa dissection.     Impression:     1. No acute chest disease.  2. Right subclavian Port-A-Cath.     PFT 06/15/2022     Mixed obstructive and restrictive pattern.  Normal DLCO.  Small airways flow obstruction.  FEV1 increased by 12% but less than 200 mL.         Spirometry December 2021      Grade A-D -acceptable quality of tracingsModerately severe restriction (FVC 50-59% of predicted). Consider lung volumes if clinically indicatedFlow volume loop demonstrate an obstructive defect.Flow volume loops demonstrate a restrictive defect.Overall   there is moderately severe ventilatory impairment. (FEV1 > or equal to 50% of predicted and < or equal to 59% of predicted.)   Â Pattern of mixed obstruction and restriction     6 minute walking test December 2021 no need for O2 on exertion.      Assessment and Plan:      Malignant neoplasm of left breast in female, estrogen receptor negative  - pt presents at the reques of Dr. Pinto who plans on performing a mammoplasty reduction on 11/15/22  - pt with H/o breast Ca   - s/p mastectomy 2019 Left, followed by chemo   - to have reduction on R and tissue expander on the Left   - Known risk factors for perioperative complications: None  DM2, prior CVA, EVERARDO, BMI44    Difficulty with intubation is not anticipated.    Cardiac Risk Estimation:  Per Revised Cardiac Risk Index patient is a Class II risk with a 6.0% risk of a major cardiac event.      1.) Preoperative workup as follows: ECG, hemoglobin, hematocrit, electrolytes, creatinine, glucose, liver function studies.  2.) Change in medication regimen before surgery: hold NSAIds, ASA on hold - resume when safe with surgeon .  3.) Prophylaxis for cardiac events with perioperative beta-blockers: not indicated.  4.) Invasive hemodynamic monitoring perioperatively: not  indicated.  5.) Deep vein thrombosis prophylaxis postoperatively: intermittent pneumatic compression boots and regimen to be chosen by surgical team.  6.) Surveillance for postoperative MI with ECG immediately postoperatively and on postoperati ve days 1 and 2 AND troponin levels 24 hours postoperatively and on day 4 or hospital discharge (whichever comes first): not indicated.  7.) Current medications which may produce withdrawal symptoms if withheld perioperatively: none  8.) Other measures: cleared by cards 10/17/22 low periop risk for breast surgery no further testing indicated pre op       Hypertension  - well controlled  - continue home HCTZ- hold AM of surgery  - Continue to follow up with PCP       Hyperlipidemia  - continue home pravachol  - Continue to follow up with PCP       Type 2 diabetes mellitus without complication, without long-term current use of insulin  - 10/21/22 A1c 5.1   - continue home trulicity and metformin  - hold metformin 24 hours prior to surgery  - no diabetic meds AM of surgery   - Continue to follow up with PCP       Hx of CVA (cerebral vascular accident)  - dx 2019   - residual effects balance problems  - Continue to follow up with PCP / neuro     Hypothyroidism  - continue thyroid replacement , take levothyroxine AM of surgery  - Continue to follow up with PCP       Thyroid nodule  - L thyroid nodule stable on 5/2022 imaging  - no dysphagia , no Sob at rest   - continue f/u with PCP as indicated     Asthma  PFTs as above  - no home O2  - uses symbicort PRN, albuterol PRN   - last albuterol 1 week ago used due to anxiety attack with relief of sx  - follows with loy fernandez 6/2022   - pt has Obstructive pattern asthma related and restriction likely related to patient body habitus/morbid obesity/hypoventilation  - Continue to follow up with Pulm     [facility]  [facility]  - recent medication adjustment  - cognitive therapy 4 days weekly   -  hospitalization 1 month ago , improvement in symptoms since d/c    - continue home Buspar, lexapro     Bipolar disorder   pt followed by behavorial health unit  - recent medication adjustment  - cognitive therapy 4 days weekly   - hospitalization 1 month ago , improvement in symptoms since d/c    - continue home Buspar, lexapro   - Continue to follow up with Psych       Chronic pain  - followed by pain management ; pt on gabapentin and undergoing injections, no oral pain meds currently   - per pt Dr. Pinto will defer oral meds post op to pain management   - pt has a call out to pain management and will reach out again to discuss.     Obesity, morbid, BMI 40.0-49.9  - self guided weight loss and diet   - BMI  44

## 2022-11-08 NOTE — ASSESSMENT & PLAN NOTE
- pt followed by behavorial health unit  - recent medication adjustment  - cognitive therapy 4 days weekly   - hospitalization 1 month ago , improvement in symptoms since d/c    - continue home Buspar, lexapro

## 2022-11-08 NOTE — ASSESSMENT & PLAN NOTE
pt followed by behavorial health unit  - recent medication adjustment  - cognitive therapy 4 days weekly   - hospitalization 1 month ago , improvement in symptoms since d/c    - continue home Buspar, lexapro   - Continue to follow up with Psych

## 2022-11-08 NOTE — PROGRESS NOTES
To confirm, your doctor has instructed you that surgery is scheduled for 11/15.       Pre admit office will call the afternoon prior to surgery between 1PM and 3PM with arrival time.    Surgery will be at [facility],  The address is [id number] [place] [address]. [place]  [id number].      IMPORTANT INSTRUCTIONS!    Do not eat or drink after 12 midnight, including water.   Do not smoke or use chewing tobacco after 12 midnight  OK to brush teeth, but no gum, candy, or mints!      Take only these medicines with a small swallow of water-morning of surgery.     Buspar (Buspirone)   Escitalopram (Lexapro)  Levothyroxine (Synthroid)  Gabapentin (Neurontin)       ____ Stop Aspirin, Ibuprofen, Motrin and Aleve at least 5-7 days before surgery, unless otherwise instructed by your doctor, or the nurse.   You MAY use Tylenol/acetaminophen until day of surgery.      ____  If you take diabetic medication, do NOT take morning of surgery unless instructed by Doctor. Metformin must be stopped 24 hrs prior to surgery time.       ____ Stop taking any Fish Oil supplements or Vitamins at least 5 days prior to surgery, unless instructed otherwise by your Doctor.       Bathing Instructions: The night before surgery and the morning prior to coming to the hospital:    - Shower & rinse your body as usual with anti-bacterial Soap (Dial or Jayde 2000)   -Hibiclens (if indicated) use AFTER anti-bacterial soap; 1 packet PM/1 packet in AM on surgical site only   -Do not use hibiclens on your head, face, or genitals.    -Do not wash with anti-bacterial soap after you use the hibiclens.    -Do not shave surgical site 5-7 days prior to surgery.    -Pubic hair 7 days prior to surgery (gyn pt's).      Pediatric patients do not need to use anti-bacterial soap or Hibiclens.             After Bathing:   __ No powder, lotions, creams, or body spray to skin     __No deodorant for any breast procedure, PORT, or upper arm surgery     __ No makeup,  mascara, nail polish or artificial nails        **SURGERY WILL BE CANCELLED IF ARTIFICIAL/NAIL POLISH IS PRESENT!!!**    __ Please remove all piercings and leave all jewelry at home.    **SURGERY WILL BE CANCELLED IF PIERCINGS ARE PRESENT!!!**      __ Dentures, Hearing Aids and Contact Lens need to be removed prior to the start of surgery.      __ Wear clean, loose-fitting clothing. Allow for dressings/bandages/surgical equipment     __ You must have transportation, and they MUST stay the entire time.       [facility] Visitor/Ride Policy:   Only 1 adult allowed (over the age of 18) to accompany you into Pre-op/Recovery Surgery Dept and must stay through the entire length of admission.     Must have a ride home from a responsible adult that you know and trust.      Pediatric Patients are allowed 2 adult visitors.     Medical Transport, Uber or Lyft can only be used if patient has a responsible adult to accompany them during ride home.         Post-Op Instructions: You will receive surgery post-op instructions by your Discharge Nurse prior to going home.     Surgical Site Infection:   Prevention of surgical site infections:   -Keep incisions clean and dry.   -Do not soak/submerge incisions in water until completely healed.   -Do not apply lotions, powders, creams, or deodorants to site.   -Always make sure hands are cleaned with antibacterial soap/ alcohol-based   prior to touching the surgical site.       Signs and symptoms:               -Redness and pain around the area where you had surgery               -Drainage of cloudy fluid from your surgical wound               -Fever over 100.4 or chills     >>>Call Surgeon office/on-call Surgeon if you experience any of these signs & symptoms post-surgery.        *Please Call [facility] Pre-Admissions Department with surgery instruction questions at [phone].     *Insurance Questions, please call [phone] or [phone]

## 2022-11-12 ENCOUNTER — PATIENT MESSAGE (OUTPATIENT)
Dept: DIABETES | Facility: CLINIC | Age: 42
End: 2022-11-12

## 2022-11-12 ENCOUNTER — ANESTHESIA EVENT (OUTPATIENT)
Dept: SURGERY | Facility: HOSPITAL | Age: 42
End: 2022-11-12
Payer: MEDICAID

## 2022-11-12 NOTE — ANESTHESIA PREPROCEDURE EVALUATION
11/12/2022   Past Medical History:   Diagnosis Date    Anxiety     Asthma 11/8/2022    Breast cancer     Chest pain     Chest pain 07/02/2021    Cholecystitis without calculus     Decreased ROM of left shoulder 02/15/2022    Diabetes mellitus     Dizziness     Elevated C-reactive protein (CRP)     Essential hypertension     Fall 10/11/2021    Memory change     EVERARDO (obstructive sleep apnea)     Osteoarthritis     Other specified disorders of thyroid     Pre-diabetes 04/15/2021    Chronic, Stable, cont metformin    Screening mammogram, encounter for 04/15/2021    Shoulder injury     SOB (shortness of breath)     Stroke     Thyroiditis     Tingling of left upper extremity 10/29/2021    Vision disturbance 04/30/2021    Weakness of shoulder 02/15/2022       Sangeetha June is a 42 y.o., female.  Past Surgical History:   Procedure Laterality Date    ARTHROSCOPIC REPAIR OF ROTATOR CUFF OF SHOULDER Left 1/27/2022    Procedure: REPAIR, ROTATOR CUFF, ARTHROSCOPIC;  Surgeon: Francisco Mathews MD;  Location: [facility] OR;  Service: Orthopedics;  Laterality: Left;    ARTHROSCOPY OF SHOULDER WITH DECOMPRESSION OF SUBACROMIAL SPACE Left 1/27/2022    Procedure: ARTHROSCOPY, SHOULDER, WITH SUBACROMIAL SPACE DECOMPRESSION;  Surgeon: Francisco Mathews MD;  Location: [facility] OR;  Service: Orthopedics;  Laterality: Left;    BREAST BIOPSY      COLONOSCOPY N/A 5/31/2022    Procedure: COLONOSCOPY;  Surgeon: Km Odom MD;  Location: [facility];  Service: Endoscopy;  Laterality: N/A;    ESOPHAGOGASTRODUODENOSCOPY N/A 5/31/2022    Procedure: EGD (ESOPHAGOGASTRODUODENOSCOPY) ;  Surgeon: Km Odom MD;  Location: [facility];  Service: Endoscopy;  Laterality: N/A;    FIXATION OF TENDON Left 1/27/2022    Procedure: FIXATION, TENDON;  Surgeon: Francisco Mathews MD;   Location: [facility] OR;  Service: Orthopedics;  Laterality: Left;    HYSTERECTOMY      INJECTION OF ANESTHETIC AGENT INTO SACROILIAC JOINT Bilateral 7/29/2022    Procedure: Bilateral SIJ +  Bilateral GT Bursa Injection RN IV Sedation;  Surgeon: Bisi Cochran MD;  Location: [facility] PAIN MGT;  Service: Pain Management;  Laterality: Bilateral;    INJECTION OF JOINT Left 11/12/2021    Procedure: Left suprascapular and axillary injection with local;  Surgeon: Bisi Cochran MD;  Location: [facility] PAIN MGT;  Service: Pain Management;  Laterality: Left;    INJECTION OF JOINT Bilateral 7/29/2022    Procedure: Bilateral SIJ + Bilateral GT Bursa Injection RN IV Sedation;  Surgeon: Bisi Cochran MD;  Location: [facility] PAIN MGT;  Service: Pain Management;  Laterality: Bilateral;    MASTECTOMY      ROBOT-ASSISTED CHOLECYSTECTOMY USING DA ROBERTA XI N/A 8/29/2022    Procedure: XI ROBOTIC CHOLECYSTECTOMY;  Surgeon: Raeann Bhandari DO;  Location: [facility] OR;  Service: General;  Laterality: N/A;         Pre-op Assessment    I have reviewed the Patient Summary Reports.    I have reviewed the Nursing Notes. I have reviewed the NPO Status.   I have reviewed the Medications.     Review of Systems  Anesthesia Hx:  No problems with previous Anesthesia  Denies Family Hx of Anesthesia complications.   Denies Personal Hx of Anesthesia complications.   Social:  No Alcohol Use, Non-Smoker    Hematology/Oncology:  Hematology Normal        Cardiovascular:   Hypertension AGUILAR ECG has been reviewed. Extensive cards w/u for AGUILAR, NEG.  EF 55%    6/21 Nuclear Stress Test    Equivocal myocardial perfusion scan.    There is a moderate intensity, fixed defect consistent with scar in the apical wall(s). VS apical artifact    The gated perfusion images showed an ejection fraction of 60% at rest. The gated perfusion images showed an ejection fraction of 53% post stress.    The EKG portion of this study is negative for ischemia.      Pulmonary:   Sleep Apnea     Education provided regarding risk of obstructive sleep apnea     Renal/:  Renal/ Normal     Hepatic/GI:   GERD    Musculoskeletal:   Arthritis   Denies Spine Disorders    Neurological:   CVA    Endocrine:   Hypothyroidism    Psych:   anxiety          Physical Exam  General:  Morbid Obesity      Airway/Jaw/Neck:  Airway Findings: Mouth Opening: Normal   Tongue: Normal   General Airway Assessment: Adult Mallampati: II  TM Distance: Normal, at least 6 cm   Jaw/Neck Findings:  Neck ROM: Normal ROM   Neck Findings:     Eyes/Ears/Nose:  Eyes/Ears/Nose Findings:    Dental:  Dental Findings: Edentulous     Chest/Lungs:  Chest/Lungs Findings: Clear to auscultation, Normal Respiratory Rate      Heart/Vascular:  Heart Findings: Rate: Normal  Rhythm: Regular Rhythm  Sounds: Normal  Heart murmur: negative Vascular Findings: Normal    Abdomen:  Abdomen Findings: Normal    Musculoskeletal:  Musculoskeletal Findings: Left arm weakness (previous cva)   Skin:  Skin Findings: Normal    Mental Status:  Mental Status Findings:  Alert and Oriented         Anesthesia Plan  Type of Anesthesia, risks & benefits discussed:  Anesthesia Type:  general    Patient's Preference:   Plan Factors:          Intra-op Monitoring Plan: standard ASA monitors  Intra-op Monitoring Plan Comments:   Post Op Pain Control Plan: per primary service following discharge from PACU and multimodal analgesia  Post Op Pain Control Plan Comments:     Induction:   IV  Beta Blocker:  Patient is not currently on a Beta-Blocker (No further documentation required).       Informed Consent: Informed consent signed with the Patient and all parties understand the risks and agree with anesthesia plan.  All questions answered.  Anesthesia consent signed with patient.  ASA Score: 3     Day of Surgery Review of History & Physical:    H&P Update referred to the surgeon/provider.          Ready For Surgery From Anesthesia Perspective.           Physical Exam  General: Morbid  Obesity    Airway:  Mallampati: II   Mouth Opening: Normal  TM Distance: Normal, at least 6 cm  Tongue: Normal  Neck ROM: Normal ROM    Dental:  Edentulous    Chest/Lungs:  Clear to auscultation, Normal Respiratory Rate    Heart:  Rate: Normal  Rhythm: Regular Rhythm  Sounds: Normal          Anesthesia Plan  Type of Anesthesia, risks & benefits discussed:    Anesthesia Type: general  Intra-op Monitoring Plan: standard ASA monitors  Post Op Pain Control Plan: per primary service following discharge from PACU and multimodal analgesia  Induction:  IV  Informed Consent: Informed consent signed with the Patient and all parties understand the risks and agree with anesthesia plan.  All questions answered.   ASA Score: 3  Day of Surgery Review of History & Physical: H&P Update referred to the surgeon/provider.    Ready For Surgery From Anesthesia Perspective.       .

## 2022-11-15 ENCOUNTER — ANESTHESIA (OUTPATIENT)
Dept: SURGERY | Facility: HOSPITAL | Age: 42
End: 2022-11-15
Payer: MEDICAID

## 2022-11-15 ENCOUNTER — HOSPITAL ENCOUNTER (OUTPATIENT)
Facility: HOSPITAL | Age: 42
Discharge: HOME | End: 2022-11-16
Attending: SURGERY | Admitting: SURGERY
Payer: MEDICAID

## 2022-11-15 DIAGNOSIS — Z90.12 ABSENCE OF BREAST, ACQUIRED, LEFT: Primary | ICD-10-CM

## 2022-11-15 LAB
POCT GLUCOSE: 118 MG/DL (ref 70–110)
POCT GLUCOSE: 122 MG/DL (ref 70–110)
POCT GLUCOSE: 125 MG/DL (ref 70–110)
POCT GLUCOSE: 78 MG/DL (ref 70–110)

## 2022-11-15 PROCEDURE — 37000009 HC ANESTHESIA EA ADD 15 MINS: Performed by: SURGERY

## 2022-11-15 PROCEDURE — 63600175 PHARM REV CODE 636 W HCPCS: Performed by: NURSE ANESTHETIST, CERTIFIED REGISTERED

## 2022-11-15 PROCEDURE — 88305 TISSUE EXAM BY PATHOLOGIST: CPT | Performed by: PATHOLOGY

## 2022-11-15 PROCEDURE — D9220A PRA ANESTHESIA: Mod: CRNA,,, | Performed by: NURSE ANESTHETIST, CERTIFIED REGISTERED

## 2022-11-15 PROCEDURE — 36000706: Performed by: SURGERY

## 2022-11-15 PROCEDURE — 88305 TISSUE EXAM BY PATHOLOGIST: CPT | Mod: 26,,, | Performed by: PATHOLOGY

## 2022-11-15 PROCEDURE — 25000003 PHARM REV CODE 250: Performed by: SURGERY

## 2022-11-15 PROCEDURE — D9220A PRA ANESTHESIA: Mod: ANES,,, | Performed by: ANESTHESIOLOGY

## 2022-11-15 PROCEDURE — 71000033 HC RECOVERY, INTIAL HOUR: Performed by: SURGERY

## 2022-11-15 PROCEDURE — 94799 UNLISTED PULMONARY SVC/PX: CPT

## 2022-11-15 PROCEDURE — 25000003 PHARM REV CODE 250: Performed by: NURSE ANESTHETIST, CERTIFIED REGISTERED

## 2022-11-15 PROCEDURE — C1729 CATH, DRAINAGE: HCPCS | Performed by: SURGERY

## 2022-11-15 PROCEDURE — 36000707: Performed by: SURGERY

## 2022-11-15 PROCEDURE — D9220A PRA ANESTHESIA: ICD-10-PCS | Mod: CRNA,,, | Performed by: NURSE ANESTHETIST, CERTIFIED REGISTERED

## 2022-11-15 PROCEDURE — 71000039 HC RECOVERY, EACH ADD'L HOUR: Performed by: SURGERY

## 2022-11-15 PROCEDURE — 99900035 HC TECH TIME PER 15 MIN (STAT)

## 2022-11-15 PROCEDURE — C1789 PROSTHESIS, BREAST, IMP: HCPCS | Performed by: SURGERY

## 2022-11-15 PROCEDURE — 88305 TISSUE EXAM BY PATHOLOGIST: ICD-10-PCS | Mod: 26,,, | Performed by: PATHOLOGY

## 2022-11-15 PROCEDURE — 63600175 PHARM REV CODE 636 W HCPCS: Performed by: SURGERY

## 2022-11-15 PROCEDURE — D9220A PRA ANESTHESIA: ICD-10-PCS | Mod: ANES,,, | Performed by: ANESTHESIOLOGY

## 2022-11-15 PROCEDURE — 63600175 PHARM REV CODE 636 W HCPCS: Performed by: ANESTHESIOLOGY

## 2022-11-15 PROCEDURE — 37000008 HC ANESTHESIA 1ST 15 MINUTES: Performed by: SURGERY

## 2022-11-15 DEVICE — IMPLANTABLE DEVICE: Type: IMPLANTABLE DEVICE | Site: BREAST | Status: FUNCTIONAL

## 2022-11-15 RX ORDER — MIDAZOLAM HYDROCHLORIDE 1 MG/ML
INJECTION INTRAMUSCULAR; INTRAVENOUS
Status: DISCONTINUED | OUTPATIENT
Start: 2022-11-15 | End: 2022-11-15

## 2022-11-15 RX ORDER — FENTANYL CITRATE 50 UG/ML
INJECTION, SOLUTION INTRAMUSCULAR; INTRAVENOUS
Status: DISCONTINUED | OUTPATIENT
Start: 2022-11-15 | End: 2022-11-15

## 2022-11-15 RX ORDER — FENTANYL CITRATE 50 UG/ML
25 INJECTION, SOLUTION INTRAMUSCULAR; INTRAVENOUS EVERY 5 MIN PRN
Status: DISCONTINUED | OUTPATIENT
Start: 2022-11-15 | End: 2022-11-15 | Stop reason: HOSPADM

## 2022-11-15 RX ORDER — ONDANSETRON 4 MG/1
8 TABLET, ORALLY DISINTEGRATING ORAL EVERY 8 HOURS PRN
Status: DISCONTINUED | OUTPATIENT
Start: 2022-11-15 | End: 2022-11-16 | Stop reason: HOSPADM

## 2022-11-15 RX ORDER — HYDROCHLOROTHIAZIDE 25 MG/1
25 TABLET ORAL DAILY PRN
Status: DISCONTINUED | OUTPATIENT
Start: 2022-11-15 | End: 2022-11-16

## 2022-11-15 RX ORDER — LIDOCAINE HYDROCHLORIDE 20 MG/ML
INJECTION INTRAVENOUS
Status: DISCONTINUED | OUTPATIENT
Start: 2022-11-15 | End: 2022-11-15

## 2022-11-15 RX ORDER — CYCLOBENZAPRINE HCL 5 MG
10 TABLET ORAL 3 TIMES DAILY
Status: DISCONTINUED | OUTPATIENT
Start: 2022-11-15 | End: 2022-11-16 | Stop reason: HOSPADM

## 2022-11-15 RX ORDER — ALBUTEROL SULFATE 90 UG/1
2 AEROSOL, METERED RESPIRATORY (INHALATION) EVERY 6 HOURS PRN
Status: DISCONTINUED | OUTPATIENT
Start: 2022-11-15 | End: 2022-11-16 | Stop reason: HOSPADM

## 2022-11-15 RX ORDER — PROPOFOL 10 MG/ML
VIAL (ML) INTRAVENOUS
Status: DISCONTINUED | OUTPATIENT
Start: 2022-11-15 | End: 2022-11-15

## 2022-11-15 RX ORDER — NEOMYCIN AND POLYMYXIN B SULFATES 40; 200000 MG/ML; [USP'U]/ML
SOLUTION IRRIGATION
Status: DISPENSED
Start: 2022-11-15 | End: 2022-11-15

## 2022-11-15 RX ORDER — DIPHENHYDRAMINE HYDROCHLORIDE 50 MG/ML
INJECTION INTRAMUSCULAR; INTRAVENOUS
Status: DISCONTINUED | OUTPATIENT
Start: 2022-11-15 | End: 2022-11-15

## 2022-11-15 RX ORDER — SODIUM CHLORIDE, SODIUM LACTATE, POTASSIUM CHLORIDE, CALCIUM CHLORIDE 600; 310; 30; 20 MG/100ML; MG/100ML; MG/100ML; MG/100ML
INJECTION, SOLUTION INTRAVENOUS CONTINUOUS
Status: DISCONTINUED | OUTPATIENT
Start: 2022-11-15 | End: 2022-11-15

## 2022-11-15 RX ORDER — IBUPROFEN 200 MG
24 TABLET ORAL
Status: DISCONTINUED | OUTPATIENT
Start: 2022-11-15 | End: 2022-11-16 | Stop reason: HOSPADM

## 2022-11-15 RX ORDER — CEFAZOLIN SODIUM 1 G/3ML
INJECTION, POWDER, FOR SOLUTION INTRAMUSCULAR; INTRAVENOUS
Status: DISPENSED
Start: 2022-11-15 | End: 2022-11-15

## 2022-11-15 RX ORDER — IBUPROFEN 200 MG
600 TABLET ORAL EVERY 6 HOURS PRN
Status: DISCONTINUED | OUTPATIENT
Start: 2022-11-15 | End: 2022-11-16 | Stop reason: HOSPADM

## 2022-11-15 RX ORDER — SODIUM CHLORIDE 0.9 % (FLUSH) 0.9 %
10 SYRINGE (ML) INJECTION
Status: DISCONTINUED | OUTPATIENT
Start: 2022-11-15 | End: 2022-11-15

## 2022-11-15 RX ORDER — DEXAMETHASONE SODIUM PHOSPHATE 4 MG/ML
INJECTION, SOLUTION INTRA-ARTICULAR; INTRALESIONAL; INTRAMUSCULAR; INTRAVENOUS; SOFT TISSUE
Status: DISCONTINUED | OUTPATIENT
Start: 2022-11-15 | End: 2022-11-15

## 2022-11-15 RX ORDER — NEOMYCIN AND POLYMYXIN B SULFATES 40; 200000 MG/ML; [USP'U]/ML
SOLUTION IRRIGATION
Status: DISCONTINUED | OUTPATIENT
Start: 2022-11-15 | End: 2022-11-15

## 2022-11-15 RX ORDER — LIDOCAINE HYDROCHLORIDE 10 MG/ML
1 INJECTION, SOLUTION EPIDURAL; INFILTRATION; INTRACAUDAL; PERINEURAL ONCE
Status: DISCONTINUED | OUTPATIENT
Start: 2022-11-15 | End: 2022-11-15

## 2022-11-15 RX ORDER — CEFAZOLIN SODIUM 2 G/50ML
2 SOLUTION INTRAVENOUS
Status: DISCONTINUED | OUTPATIENT
Start: 2022-11-15 | End: 2022-11-15

## 2022-11-15 RX ORDER — NALOXONE HCL 0.4 MG/ML
VIAL (ML) INJECTION
Status: DISCONTINUED | OUTPATIENT
Start: 2022-11-15 | End: 2022-11-15

## 2022-11-15 RX ORDER — SUCCINYLCHOLINE CHLORIDE 20 MG/ML
INJECTION INTRAMUSCULAR; INTRAVENOUS
Status: DISCONTINUED | OUTPATIENT
Start: 2022-11-15 | End: 2022-11-15

## 2022-11-15 RX ORDER — CHLORHEXIDINE GLUCONATE ORAL RINSE 1.2 MG/ML
10 SOLUTION DENTAL 2 TIMES DAILY
Status: DISCONTINUED | OUTPATIENT
Start: 2022-11-15 | End: 2022-11-16 | Stop reason: HOSPADM

## 2022-11-15 RX ORDER — CEFAZOLIN SODIUM 2 G/50ML
2 SOLUTION INTRAVENOUS
Status: COMPLETED | OUTPATIENT
Start: 2022-11-15 | End: 2022-11-16

## 2022-11-15 RX ORDER — ONDANSETRON 8 MG/1
8 TABLET, ORALLY DISINTEGRATING ORAL EVERY 8 HOURS PRN
Status: DISCONTINUED | OUTPATIENT
Start: 2022-11-15 | End: 2022-11-15

## 2022-11-15 RX ORDER — ALBUTEROL SULFATE 0.83 MG/ML
2.5 SOLUTION RESPIRATORY (INHALATION) EVERY 4 HOURS PRN
Status: DISCONTINUED | OUTPATIENT
Start: 2022-11-15 | End: 2022-11-16 | Stop reason: HOSPADM

## 2022-11-15 RX ORDER — ONDANSETRON 2 MG/ML
INJECTION INTRAMUSCULAR; INTRAVENOUS
Status: DISCONTINUED | OUTPATIENT
Start: 2022-11-15 | End: 2022-11-15

## 2022-11-15 RX ORDER — HYDROMORPHONE HYDROCHLORIDE 2 MG/ML
INJECTION, SOLUTION INTRAMUSCULAR; INTRAVENOUS; SUBCUTANEOUS
Status: DISCONTINUED | OUTPATIENT
Start: 2022-11-15 | End: 2022-11-15

## 2022-11-15 RX ORDER — CEFAZOLIN SODIUM 1 G/3ML
INJECTION, POWDER, FOR SOLUTION INTRAMUSCULAR; INTRAVENOUS
Status: DISCONTINUED | OUTPATIENT
Start: 2022-11-15 | End: 2022-11-15

## 2022-11-15 RX ORDER — ONDANSETRON 2 MG/ML
4 INJECTION INTRAMUSCULAR; INTRAVENOUS ONCE AS NEEDED
Status: DISCONTINUED | OUTPATIENT
Start: 2022-11-15 | End: 2022-11-16 | Stop reason: HOSPADM

## 2022-11-15 RX ORDER — IBUPROFEN 200 MG
16 TABLET ORAL
Status: DISCONTINUED | OUTPATIENT
Start: 2022-11-15 | End: 2022-11-16 | Stop reason: HOSPADM

## 2022-11-15 RX ORDER — NEOSTIGMINE METHYLSULFATE 1 MG/ML
INJECTION, SOLUTION INTRAVENOUS
Status: DISCONTINUED | OUTPATIENT
Start: 2022-11-15 | End: 2022-11-15

## 2022-11-15 RX ORDER — ONDANSETRON 4 MG/1
4 TABLET, ORALLY DISINTEGRATING ORAL EVERY 6 HOURS PRN
Status: DISCONTINUED | OUTPATIENT
Start: 2022-11-15 | End: 2022-11-16 | Stop reason: HOSPADM

## 2022-11-15 RX ORDER — TRAZODONE HYDROCHLORIDE 150 MG/1
150 TABLET ORAL NIGHTLY
Status: DISCONTINUED | OUTPATIENT
Start: 2022-11-15 | End: 2022-11-16

## 2022-11-15 RX ORDER — LEVOTHYROXINE SODIUM 25 UG/1
50 TABLET ORAL EVERY MORNING
Status: DISCONTINUED | OUTPATIENT
Start: 2022-11-16 | End: 2022-11-16 | Stop reason: HOSPADM

## 2022-11-15 RX ORDER — HYDROCODONE BITARTRATE AND ACETAMINOPHEN 5; 325 MG/1; MG/1
1 TABLET ORAL
Status: DISCONTINUED | OUTPATIENT
Start: 2022-11-15 | End: 2022-11-16 | Stop reason: HOSPADM

## 2022-11-15 RX ORDER — HYDROCODONE BITARTRATE AND ACETAMINOPHEN 5; 325 MG/1; MG/1
1 TABLET ORAL EVERY 4 HOURS PRN
Status: DISCONTINUED | OUTPATIENT
Start: 2022-11-15 | End: 2022-11-16 | Stop reason: HOSPADM

## 2022-11-15 RX ORDER — MEPERIDINE HYDROCHLORIDE 25 MG/ML
12.5 INJECTION INTRAMUSCULAR; INTRAVENOUS; SUBCUTANEOUS ONCE
Status: DISCONTINUED | OUTPATIENT
Start: 2022-11-15 | End: 2022-11-16 | Stop reason: HOSPADM

## 2022-11-15 RX ORDER — CHLORHEXIDINE GLUCONATE ORAL RINSE 1.2 MG/ML
10 SOLUTION DENTAL
Status: DISCONTINUED | OUTPATIENT
Start: 2022-11-15 | End: 2022-11-15

## 2022-11-15 RX ORDER — BUSPIRONE HYDROCHLORIDE 15 MG/1
15 TABLET ORAL 3 TIMES DAILY
Status: DISCONTINUED | OUTPATIENT
Start: 2022-11-15 | End: 2022-11-16

## 2022-11-15 RX ORDER — GABAPENTIN 100 MG/1
300 CAPSULE ORAL 2 TIMES DAILY
Status: DISCONTINUED | OUTPATIENT
Start: 2022-11-15 | End: 2022-11-16 | Stop reason: HOSPADM

## 2022-11-15 RX ORDER — ROCURONIUM BROMIDE 10 MG/ML
INJECTION, SOLUTION INTRAVENOUS
Status: DISCONTINUED | OUTPATIENT
Start: 2022-11-15 | End: 2022-11-15

## 2022-11-15 RX ORDER — DIPHENHYDRAMINE HYDROCHLORIDE 50 MG/ML
25 INJECTION INTRAMUSCULAR; INTRAVENOUS EVERY 6 HOURS PRN
Status: DISCONTINUED | OUTPATIENT
Start: 2022-11-15 | End: 2022-11-16 | Stop reason: HOSPADM

## 2022-11-15 RX ORDER — ESCITALOPRAM OXALATE 10 MG/1
20 TABLET ORAL DAILY
Status: DISCONTINUED | OUTPATIENT
Start: 2022-11-16 | End: 2022-11-16 | Stop reason: HOSPADM

## 2022-11-15 RX ORDER — GLUCAGON 1 MG
1 KIT INJECTION
Status: DISCONTINUED | OUTPATIENT
Start: 2022-11-15 | End: 2022-11-16 | Stop reason: HOSPADM

## 2022-11-15 RX ADMIN — CEFAZOLIN SODIUM 2 G: 2 SOLUTION INTRAVENOUS at 08:11

## 2022-11-15 RX ADMIN — HYDROMORPHONE HYDROCHLORIDE 0.6 MG: 2 INJECTION, SOLUTION INTRAMUSCULAR; INTRAVENOUS; SUBCUTANEOUS at 12:11

## 2022-11-15 RX ADMIN — Medication 100 MG: at 12:11

## 2022-11-15 RX ADMIN — ROCURONIUM BROMIDE 10 MG: 10 INJECTION, SOLUTION INTRAVENOUS at 01:11

## 2022-11-15 RX ADMIN — PROPOFOL 200 MG: 10 INJECTION, EMULSION INTRAVENOUS at 12:11

## 2022-11-15 RX ADMIN — ROCURONIUM BROMIDE 10 MG: 10 INJECTION, SOLUTION INTRAVENOUS at 12:11

## 2022-11-15 RX ADMIN — SUCCINYLCHOLINE CHLORIDE 150 MG: 20 INJECTION, SOLUTION INTRAMUSCULAR; INTRAVENOUS at 12:11

## 2022-11-15 RX ADMIN — SODIUM CHLORIDE, SODIUM LACTATE, POTASSIUM CHLORIDE, AND CALCIUM CHLORIDE: .6; .31; .03; .02 INJECTION, SOLUTION INTRAVENOUS at 12:11

## 2022-11-15 RX ADMIN — FENTANYL CITRATE 25 MCG: 50 INJECTION, SOLUTION INTRAMUSCULAR; INTRAVENOUS at 04:11

## 2022-11-15 RX ADMIN — CYCLOBENZAPRINE HYDROCHLORIDE 10 MG: 5 TABLET, FILM COATED ORAL at 08:11

## 2022-11-15 RX ADMIN — CHLORHEXIDINE GLUCONATE 10 ML: 1.2 RINSE ORAL at 08:11

## 2022-11-15 RX ADMIN — CHLORHEXIDINE GLUCONATE 10 ML: 1.2 RINSE ORAL at 12:11

## 2022-11-15 RX ADMIN — DIPHENHYDRAMINE HYDROCHLORIDE 12.5 MG: 50 INJECTION INTRAMUSCULAR; INTRAVENOUS at 01:11

## 2022-11-15 RX ADMIN — GABAPENTIN 300 MG: 100 CAPSULE ORAL at 08:11

## 2022-11-15 RX ADMIN — HYDROMORPHONE HYDROCHLORIDE 0.4 MG: 2 INJECTION, SOLUTION INTRAMUSCULAR; INTRAVENOUS; SUBCUTANEOUS at 01:11

## 2022-11-15 RX ADMIN — SUGAMMADEX 100 MG: 100 INJECTION, SOLUTION INTRAVENOUS at 03:11

## 2022-11-15 RX ADMIN — GLYCOPYRROLATE 0.6 MG: 0.2 INJECTION, SOLUTION INTRAMUSCULAR; INTRAVITREAL at 03:11

## 2022-11-15 RX ADMIN — NEOSTIGMINE METHYLSULFATE 3 MG: 1 INJECTION INTRAVENOUS at 03:11

## 2022-11-15 RX ADMIN — DEXTROSE 3 G: 50 INJECTION, SOLUTION INTRAVENOUS at 12:11

## 2022-11-15 RX ADMIN — MIDAZOLAM HYDROCHLORIDE 2 MG: 1 INJECTION INTRAMUSCULAR; INTRAVENOUS at 12:11

## 2022-11-15 RX ADMIN — IBUPROFEN 600 MG: 200 TABLET, FILM COATED ORAL at 08:11

## 2022-11-15 RX ADMIN — NALOXONE HYDROCHLORIDE 0.04 MCG: 0.4 INJECTION, SOLUTION INTRAMUSCULAR; INTRAVENOUS; SUBCUTANEOUS at 03:11

## 2022-11-15 RX ADMIN — ONDANSETRON 4 MG: 2 INJECTION, SOLUTION INTRAMUSCULAR; INTRAVENOUS at 02:11

## 2022-11-15 RX ADMIN — ROCURONIUM BROMIDE 20 MG: 10 INJECTION, SOLUTION INTRAVENOUS at 12:11

## 2022-11-15 RX ADMIN — DEXAMETHASONE SODIUM PHOSPHATE 4 MG: 4 INJECTION, SOLUTION INTRA-ARTICULAR; INTRALESIONAL; INTRAMUSCULAR; INTRAVENOUS; SOFT TISSUE at 12:11

## 2022-11-15 RX ADMIN — FENTANYL CITRATE 100 MCG: 50 INJECTION, SOLUTION INTRAMUSCULAR; INTRAVENOUS at 12:11

## 2022-11-15 NOTE — CARE UPDATE
Pt alert. Resting comfortably. Respirations are even and unlabored. Pt incontinent -pure wick in use. PO fluids tolerated. HOB elevated and drains are secured to bra. Will continue to monitor.

## 2022-11-15 NOTE — DISCHARGE INSTRUCTIONS
Nozin Instructions  Goal: the goal of Nozin is to reduce the risk of post-procedural infections by bacteria in the nasal cavity. Think of it as hand  for your nose.    How to use:    1. Shake Nozin bottle well    2. Take a cotton swab and apply 4 drops to one tip    3. Insert cotton tip into one nostril, being sure not to go deeper into nose than tip of the swab.    4. Swab nostril 6 times counterclockwise and 6 times clockwise. Make sure to swab the inside front pocket of the nostril.    5. Take swab out and apply 2 drops to the same cotton tip. Repeat steps 3 and 4 in the other nostril.        Do steps 1-5 twice a day for 7 days.        ANITA GORDON  PLASTIC SURGERY  [facility]    POSTOPERATIVE INSTRUCTIONS FOR BREAST REDUCTION    Following discharge, return home and rest for the remainder of the day and night. Someone  should stay with you for the next 24-48 hours. Please have someone assist you to the  restroom during this period. You should be up and walking around for short periods of time  several times (at least three times as a minimum) throughout the day, with assistance,  following your surgical procedure.    Take the prescribed pain medication as directed, but always eat something first (15-30  minutes before). You should expect discomfort, but severe pain is unusual and should be  reported immediately to the office. Do not drink alcohol while on pain medication. Please be  sure that you have an adequate supply of pain medication to last through the night or  weekend. If your pain is severe, you may rotate your narcotic with Ibuprofen every 4 hours.  Pain medication will only be refilled durine business hours.    Diet may be as desired, We advise you to start off with liquids and progress as tolerated. Avoid  fried, rich foods for the first 24 hours after surgery. We also encourage you to drink 6-8 glasses  of water daily and, if taking prescription medications, begin a  stool softener.    Beginning 48 hours after surgery, you may remove everything down to the skin (bra, white  padding, AND yellow tape along incisions) and shower with soap and water daily. Put body  wash/soap in palm of hand and gently rub to clean incision areas. Do not use washcloth or  loofah. We recommend wearing a lanyard to shower and loop drains through. Replace bra  and line incisions with padding (we recommend ultra thin unscented maxi pads) to collect any  drainage, You will wear the compression bra for 6 full weeks after surgery. Drainage is normal.    ***NO ICE/HEAT ON SURGICAL AREAS***    If drains are present, you will be instructed on how to care for your drains upon discharge.  Follow drain instructions on attached sheet. Empty and record drain output as directed.  Make sure to include date, time and volume of fluid removed. Anything from clear to yellow  to red to dark red is perfectly normal. Bring volume sheet with you to your 1s post op visit.    **The less you do, the less the drains put out, the QUICKER they come out**    It is extremely important that you limit the use of your arms for the first 3 weeks following  surgery. Do not lift your arms to reach for things. No heavy lifting, pushing, or pulling should  be avoided for the next three to four weeks. No aerobic exercise or weightlifting. Do not lift  anything heavier than a gallon of milk.    No submerging in water, hot tubs, or swimming until directed to by Dr. Pinto.    Sore throat and/or fever is common the 1st few days after surgery.    It is also not unusual for one breast to hurt, bruise, or swell more than the other. Please call  us immediately with a significant increase in pain, size of either breast, bleeding/drainage, or  rash. This could be evidenced by saturation of dressings and garment, or it may be noted that  one breast is unusually larger than the other.    Do not hesitate to call our office if you have any questions or  problems, The answering service  will contact a physician for you after hours. The number to call is [phone]. Please DO  NOT go to the ER before calling our office.     You may reach me directly at [phone] (textor call) or email anytime at [email or web address].    POST-OP APPOINTMENT:  at [facility]  [address] [place] [address] [address]  LATHA Jerez [id number]

## 2022-11-15 NOTE — PLAN OF CARE
Report given to ALVIN Marie, transfer criteria met. Patient admitted to overnight observation, patient transferred to [facility] room 3. Called patient's significant other, Theresa, informed him patient transferred and for further updates he may call the nurses' station at [phone].

## 2022-11-15 NOTE — TRANSFER OF CARE
"Anesthesia Transfer of Care Note    Patient: Sangeetha June    Procedure(s) Performed: Procedure(s) (LRB):  MAMMOPLASTY, REDUCTION (Right)  INSERTION, TISSUE EXPANDER, BREAST (Left)    Patient location: PACU    Anesthesia Type: general    Transport from OR: Transported from OR on room air with adequate spontaneous ventilation    Post pain: adequate analgesia    Post assessment: no apparent anesthetic complications    Post vital signs: stable    Level of consciousness: responds to stimulation    Nausea/Vomiting: no nausea/vomiting    Complications: none    Transfer of care protocol was followed      Last vitals:   Visit Vitals  /80 (BP Location: Right arm, Patient Position: Sitting)   Pulse 68   Temp 36.1 °C (96.9 °F) (Temporal)   Resp 20   Ht 5' 6" (1.676 m)   Wt 121.5 kg (267 lb 13.7 oz)   LMP  (LMP Unknown)   SpO2 99%   Breastfeeding No   BMI 43.23 kg/m²     "

## 2022-11-15 NOTE — BRIEF OP NOTE
[facility] - Periop Services  Brief Operative Note    Surgery Date: 11/15/2022     Surgeon(s) and Role:     * Janice Gordon MD - Primary    Assisting Surgeon: None    Pre-op Diagnosis:  Personal history of malignant neoplasm of breast [Z85.3]  Hypertrophy of breast [N62]  Status post left mastectomy [Z90.12]    Post-op Diagnosis:  Post-Op Diagnosis Codes:     * Personal history of malignant neoplasm of breast [Z85.3]     * Hypertrophy of breast [N62]     * Status post left mastectomy [Z90.12]    Procedure(s) (LRB):  MAMMOPLASTY, REDUCTION (Right)  INSERTION, TISSUE EXPANDER, BREAST (Left)    Anesthesia: General    Operative Findings: Right breast reduction, left tissue expander placement    Estimated Blood Loss: 50 cc         Specimens:   Specimen (24h ago, onward)       Start     Ordered    11/15/22 1432  Specimen to Pathology, Surgery Breast  Once        Comments: Pre-op Diagnosis: Personal history of malignant neoplasm of breast [Z85.3]Hypertrophy of breast [N62]Status post left mastectomy [Z90.12]Procedure(s):MAMMOPLASTY, REDUCTIONINSERTION, TISSUE EXPANDER, BREAST Number of specimens: 1Name of specimens: 1. Right Breast Tissue- PERM     References:    Click here for ordering Quick Tip   Question Answer Comment   Procedure Type: Breast    Specimen Class: Routine/Screening    Which provider would you like to cc? JANICE GORDON    Release to patient Immediate        11/15/22 1436                      Discharge Note    OUTCOME: Patient tolerated treatment/procedure well without complication and is now ready for discharge.    [facility]    FINAL DIAGNOSIS:  left breast cancer    FOLLOWUP: In clinic    DISCHARGE INSTRUCTIONS:  Follow instructions from my office.

## 2022-11-15 NOTE — ANESTHESIA PROCEDURE NOTES
Intubation    Date/Time: 11/15/2022 12:38 PM  Performed by: Mary Ellen Lamb CRNA  Authorized by: Maty Thompson MD     Intubation:     Induction:  Intravenous    Intubated:  Postinduction    Mask Ventilation:  Easy mask    Attempts:  1    Attempted By:  CRNA    Method of Intubation:  Direct    Blade:  Garcia 2    Laryngeal View Grade: Grade I - full view of cords      Difficult Airway Encountered?: No      Complications:  None    Airway Device:  Oral endotracheal tube    Airway Device Size:  7.0    Style/Cuff Inflation:  Cuffed    Inflation Amount (mL):  5    Tube secured:  21    Secured at:  The lips    Placement Verified By:  Capnometry and Revisualization with laryngoscopy (Bilateral Breath Sounds)    Complicating Factors:  None    Findings Post-Intubation:  BS equal bilateral     No masses; no nipple discharge

## 2022-11-15 NOTE — OP NOTE
[facility]  Surgery Department  Operative Note    SUMMARY     Date of Procedure: 11/15/2022     Procedure: Procedure(s) (LRB):  MAMMOPLASTY, REDUCTION (Right)  INSERTION, TISSUE EXPANDER, BREAST (Left)     Surgeon(s) and Role:     * Collin Pinto MD - Primary    Assisting Surgeon: None    Pre-Operative Diagnosis: Personal history of malignant neoplasm of breast [Z85.3]  Hypertrophy of breast [N62]  Status post left mastectomy [Z90.12]    Post-Operative Diagnosis: Post-Op Diagnosis Codes:     * Personal history of malignant neoplasm of breast [Z85.3]     * Hypertrophy of breast [N62]     * Status post left mastectomy [Z90.12]    Anesthesia: General    Operative Findings (including complications, if any): Right breast reduction with inferior pedicle, left tissue expander placement    Description of Technical Procedures:  Ms. Leland Barber is a pleasant 42-year-old female with a history of left-sided breast cancer.  She had a mastectomy 2 years ago and is now very asymmetrical.  She has used with all of her treatments.  She is interested in a left-sided tissue expander for breast reconstruction and a right-sided reduction for symmetry.  Risks, benefits, alternatives, possible complications were discussed and she agreed to proceed with surgery description of operative report.  Informed consent was obtained from the patient preoperative appointment.  The morning of surgery patient was seen examined and marked.  Operative plan was reconfirmed the patient along with markings.  Antibiotics and SCDs were begun in holding.  Patient was then brought to the operating room placed supine on operating table.  General endotracheal anesthesia was administered without difficulty.  Patient's chest was prepped and draped in standard sterile fashion.  First local anesthetic was administered along the Wise pattern skin markings on the right breast along the prior mastectomy incision on the left.  First the left breast  was addressed.  Ten blade scalp was used to open the prior mastectomy incision laterally centered over the edge of pectoralis.  Dissection was then carried down through the soft tissue to the level of the lateral border of the pectoralis muscle.  Of note there was space was looked like an old seroma pocket anterior to the pectoralis below the soft skin and soft tissue.  It was empty but appeared to be an old result seroma.  Next the lateral border of the pectoralis was grasped with an Allis clamp and electrocautery was then used to elevate a retropectoral pocket.  Attachments inferiorly were lysed to allow for better inferior placement tissue expander.  Once the retropectoral pocket was created of sufficient size contained tissue expander, meticulous hemostasis was achieved with electrocautery.  Pocket was washed copiously with antibiotic irrigation Betadine.  New sterile gloves were changed.  600 cc tissue expander was then opened up washed antibiotic irrigation.  Betadine was placed in the pocket in on the skin in the tissue expanders in place retropectoral pocket insufficient position was sutured to the chest wall using interrupted 2-0 Prolene.  Next the overlying soft tissue laterally was closed using buried figure-of-eight 2-0 Vicryl suture to reapproximate soft tissue over the expander.  15 Ukrainian drain had been placed in the pocket and sutured in place prior to closure.  Soft tissue was then closed in layers with 2-0 Vicryl 3-0 Vicryl and 3-0 PDS.  Percutaneously the tissue expander was insufflated to 100 cc.  Next a right-sided reduction for symmetry was performed.  9 cm wide inferiorly based pedicle was then supply blood to the nipple.  42 mm cookie cutter was used for the areolar diameter.  Tourniquet was placed around the breast and inferior pedicle was de-epithelialized.  Bovie electrocautery then used to remove tissue from the superior medial lateral aspects of the breast to achieve desired dimensions.   Meticulous hemostasis was achieved with electrocautery.  Total of 610 g removed from the right side.  Fifteen Irish drains placed pocket and suture placed with a 2-0 silk.  Breast was then closed sequentially using 2-0 Vicryl in the deep tissue, 3-0 Vicryl Insorb staple and deep dermis, and 3-0 Monocryl the skin.  The nipples healthy and viable at the end the case.  Biopatch was placed around the drains.  Sterile pressure dressings placed on top.  Patient tolerated procedure well was brought to come in excellent condition    Significant Surgical Tasks Conducted by the Assistant(s), if Applicable: none    Estimated Blood Loss (EBL): 50 cc           Implants:   Implant Name Type Inv. Item Serial No.  Lot No. LRB No. Used Action   EXPANDER ARACEL[employer] EP 600CC - [id number]  EXPANDER AFRIC[facility] EP 600CC [id number] [employer]  Left 1 Implanted       Specimens:   Specimen (24h ago, onward)       Start     Ordered    11/15/22 1432  Specimen to Pathology, Surgery Breast  Once        Comments: Pre-op Diagnosis: Personal history of malignant neoplasm of breast [Z85.3]Hypertrophy of breast [N62]Status post left mastectomy [Z90.12]Procedure(s):MAMMOPLASTY, REDUCTIONINSERTION, TISSUE EXPANDER, BREAST Number of specimens: 1Name of specimens: 1. Right Breast Tissue- PERM     References:    Click here for ordering Quick Tip   Question Answer Comment   Procedure Type: Breast    Specimen Class: Routine/Screening    Which provider would you like to cc? JANICE GORDON    Release to patient Immediate        11/15/22 1436                            Condition: Good    Disposition: PACU - then floor for observation    Attestation: I was present and scrubbed throughout the entire procedure.

## 2022-11-16 VITALS
RESPIRATION RATE: 18 BRPM | OXYGEN SATURATION: 98 % | SYSTOLIC BLOOD PRESSURE: 137 MMHG | BODY MASS INDEX: 43.05 KG/M2 | WEIGHT: 267.88 LBS | DIASTOLIC BLOOD PRESSURE: 75 MMHG | TEMPERATURE: 98 F | HEIGHT: 66 IN | HEART RATE: 65 BPM

## 2022-11-16 PROCEDURE — 25000003 PHARM REV CODE 250: Performed by: SURGERY

## 2022-11-16 PROCEDURE — 63600175 PHARM REV CODE 636 W HCPCS: Performed by: SURGERY

## 2022-11-16 PROCEDURE — 94799 UNLISTED PULMONARY SVC/PX: CPT

## 2022-11-16 RX ADMIN — CHLORHEXIDINE GLUCONATE 10 ML: 1.2 RINSE ORAL at 10:11

## 2022-11-16 RX ADMIN — CYCLOBENZAPRINE HYDROCHLORIDE 10 MG: 5 TABLET, FILM COATED ORAL at 10:11

## 2022-11-16 RX ADMIN — HYDROCODONE BITARTRATE AND ACETAMINOPHEN 1 TABLET: 5; 325 TABLET ORAL at 10:11

## 2022-11-16 RX ADMIN — CEFAZOLIN SODIUM 2 G: 2 SOLUTION INTRAVENOUS at 05:11

## 2022-11-16 RX ADMIN — ESCITALOPRAM OXALATE 20 MG: 10 TABLET, FILM COATED ORAL at 10:11

## 2022-11-16 RX ADMIN — HYDROCODONE BITARTRATE AND ACETAMINOPHEN 1 TABLET: 5; 325 TABLET ORAL at 05:11

## 2022-11-16 RX ADMIN — GABAPENTIN 300 MG: 100 CAPSULE ORAL at 10:11

## 2022-11-16 RX ADMIN — LEVOTHYROXINE SODIUM 50 MCG: 0.03 TABLET ORAL at 07:11

## 2022-11-16 NOTE — PROGRESS NOTES
[facility] - Med/Surg  General Surgery  Progress Note    Subjective:     Interval History:Overall doing well overnight.  Rhys PO.  No N/V. Needed an in/out cath to void once.  Pain well controlled.    Post-Op Info:  Procedure(s) (LRB):  MAMMOPLASTY, REDUCTION (Right)  INSERTION, TISSUE EXPANDER, BREAST (Left)   1 Day Post-Op      Medications:  Continuous Infusions:  Scheduled Meds:   busPIRone  15 mg Oral TID    chlorhexidine  10 mL Mouth/Throat BID    cyclobenzaprine  10 mg Oral TID    EScitalopram oxalate  20 mg Oral Daily    gabapentin  300 mg Oral BID    levothyroxine  50 mcg Oral QAM    meperidine  12.5 mg Intravenous Once    traZODone  150 mg Oral QHS     PRN Meds:albuterol, albuterol sulfate, dextrose 10%, dextrose 10%, diphenhydrAMINE, glucagon (human recombinant), glucose, glucose, hydroCHLOROthiazide, HYDROcodone-acetaminophen, HYDROcodone-acetaminophen, ibuprofen, ondansetron, ondansetron, ondansetron     Objective:     Vital Signs (Most Recent):  Temp: 98 °F (36.7 °C) (11/15/22 1921)  Pulse: 65 (11/16/22 0542)  Resp: 18 (11/16/22 0542)  BP: 137/75 (11/15/22 1921)  SpO2: 98 % (11/16/22 0542) Vital Signs (24h Range):  Temp:  [96.9 °F (36.1 °C)-98 °F (36.7 °C)] 98 °F (36.7 °C)  Pulse:  [64-77] 65  Resp:  [13-20] 18  SpO2:  [93 %-100 %] 98 %  BP: (114-153)/(69-95) 137/75       Intake/Output Summary (Last 24 hours) at 11/16/2022 0634  Last data filed at 11/16/2022 0246  Gross per 24 hour   Intake 5050 ml   Output 720 ml   Net 4330 ml     VSS AF  Gen- AO x 3, NAD, in bed with SCD on  Ht - RRR  Chest - non labored respirations  Breasts - dressings in place, soft, DANIE with appropriate serosanguinous output, no sign of infection or fluid collection    Significant Labs:  none    Significant Diagnostics:  none    Assessment/Plan:     Active Diagnoses:    Diagnosis Date Noted POA    PRINCIPAL PROBLEM:  Malignant neoplasm of left breast in female, estrogen receptor negative [C50.912, Z17.1] 05/09/2021 Not Applicable       Problems Resolved During this Admission:   S/p left tissue expander and right reduction for symmetry  Doing great  Needs to void prior to leaving  [place]  D/C once voiding  Follow instructions from the office.      Collin Pinto MD  General Surgery  [facility]/Surg

## 2022-11-16 NOTE — PLAN OF CARE
Alert and oriented x4. Respirations even and unlabored. No acute distress noted. Surgical bra in place with 2 DANIE drains noted to sites. Minimal sanguinous output noted in both bulbs. No acute distress noted. Safety measures intact and ongoing. Denies needs. Will continue to monitor per POC.

## 2022-11-17 NOTE — PROGRESS NOTES
Subjective:       Patient ID: Sangeetha June is a 42 y.o. female.    Chief Complaint: Breast cancer survivorship and f/u    ONCOLOGIC DIAGNOSIS:  Invasive ductal carcinoma, pT2 N0 M0  ?   CURRENT TREATMENT:  None     PAST TREATMENT:   -left breast mastectomy  -adjuvant chemotherapy with dose dense AC followed by 3 cycles of weekly Taxol  ?   ONCOLOGIC HISTORY:   -invasive ductal carcinoma-April 2020      HPI:  42-year-old female with past medical history that is significant for hypertension osteoarthritis thyroiditis, invasive ductal carcinoma status post left breast mastectomy and adjuvant chemotherapy presents today for f/u     April of 2020, age of 41 y/o, she presented to PCP with complaints of breast lump near sternum, painful and swollen.  Mammography/ultrasound breast showed a new 2.5 cm mass left breast at 9 Oclock middle depth.  Biopsy revealed invasive ductal carcinoma, ER positive by 1%, AK negative and HER2 Elieser negative, stage at pT2 N0 M0.  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     She underwent mastectomy on 06/24/2020 and the tumor was noted to be 3.1 cm in size.  0/16 lymph nodes were positive.  Tumor was essentially treated at triple negative and chemotherapy was recommended.  Echocardiogram performed May of 2020 showed ejection fraction of 55%.  Patient was treated with dose dense AC followed by weekly Taxol.  She received 3 cycles out of 12 of single agent Taxol with the last one being in October of 2020 before she started complaining of worsening cognitive abilities and speech problems.  Chemotherapy was discontinued.      Adjuvant radiation was not recommended as patient's tumor margins were noted to be about 1.3 cm, she had negative lymph nodes and tumor was T2.      Endocrine therapy was not recommended.     She has since been observed.       Denies fever, chills, nausea chest pain shortness of breath, abdominal pain, or dysuria.  Denies nipple  inversion, breast pain palpable mass.     With regards to speech difficulty and cognitive impairment, MRI of the brain was obtained on 10/13/2020 and showed a left frontal meningioma with no acute intracranial pathology, repeat MRI was recommended in 6 months.    5/24/2021- MRI of brain revealed-   9 mm round extra-axial enhancing mass overlying the left frontal lobe consistent with a small meningioma.  No other enhancing intracranial lesion identified.  10/2021 repeat brain MRI revealed stable findings     She complains of intermittent headache mostly frontal. States has been worse since her plastic surgery- frontal and radiates to the right side and back of head- denies any visual changes or nausea. No weakness    Last PET 5/26/2021- no evidence of metastatic disease  Last visit with Dr. 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  No family history breast or ovarian cancer.    ECOG SCORE    2 - Capable of all selfcare but unable to carry out any work activities, active > 50% of hours    Past Medical History:   Diagnosis Date    Anxiety     Asthma 11/8/2022    Breast cancer     Chest pain     Chest pain 07/02/2021    Cholecystitis without calculus     Decreased ROM of left shoulder 02/15/2022    Diabetes mellitus     Dizziness     Elevated C-reactive protein (CRP)     Essential hypertension     Fall 10/11/2021    Memory change     EVERARDO (obstructive sleep apnea)     Osteoarthritis     Other specified disorders of thyroid     Pre-diabetes 04/15/2021    Chronic, Stable, cont metformin    Screening mammogram, encounter for 04/15/2021    Shoulder injury     SOB (shortness of breath)      Stroke     Thyroiditis     Tingling of left upper extremity 10/29/2021    Vision disturbance 04/30/2021    Weakness of shoulder 02/15/2022     Past Surgical History:   Procedure Laterality Date    ARTHROSCOPIC REPAIR OF ROTATOR CUFF OF SHOULDER Left 1/27/2022    Procedure: REPAIR, ROTATOR CUFF, ARTHROSCOPIC;  Surgeon: Francisco Mathews MD;  Location: [facility];  Service: Orthopedics;  Laterality: Left;    ARTHROSCOPY OF SHOULDER WITH DECOMPRESSION OF SUBACROMIAL SPACE Left 1/27/2022    Procedure: ARTHROSCOPY, SHOULDER, WITH SUBACROMIAL SPACE DECOMPRESSION;  Surgeon: Francisco Mathews MD;  Location: [facility] OR;  Service: Orthopedics;  Laterality: Left;    BREAST BIOPSY      COLONOSCOPY N/A 5/31/2022    Procedure: COLONOSCOPY;  Surgeon: Km Odom MD;  Location: [facility];  Service: Endoscopy;  Laterality: N/A;    ESOPHAGOGASTRODUODENOSCOPY N/A 5/31/2022    Procedure: EGD (ESOPHAGOGASTRODUODENOSCOPY) ;  Surgeon: Km Odom MD;  Location: [facility];  Service: Endoscopy;  Laterality: N/A;    FIXATION OF TENDON Left 1/27/2022    Procedure: FIXATION, TENDON;  Surgeon: Francisco Mathews MD;  Location: [facility] OR;  Service: Orthopedics;  Laterality: Left;    HYSTERECTOMY      INJECTION OF ANESTHETIC AGENT INTO SACROILIAC JOINT Bilateral 7/29/2022    Procedure: Bilateral SIJ +  Bilateral GT Bursa Injection RN IV Sedation;  Surgeon: Bisi Cochran MD;  Location: [facility]IN [facility]GT;  Service: Pain Management;  Laterality: Bilateral;    INJECTION OF JOINT Left 11/12/2021    Procedure: Left suprascapular and axillary injection with local;  Surgeon: Bisi Cochran MD;  Location: [facility];  Service: Pain Management;  Laterality: Left;    INJECTION OF JOINT Bilateral 7/29/2022    Procedure: Bilateral SIJ + Bilateral GT Bursa Injection RN IV Sedation;  Surgeon: Bisi Cochran MD;  Location: [facility];  Service: Pain Management;  Laterality: Bilateral;    INSERTION OF BREAST TISSUE EXPANDER Left  11/15/2022    Procedure: INSERTION, TISSUE EXPANDER, BREAST;  Surgeon: Collin Pinto MD;  Location: [facility] OR;  Service: Plastics;  Laterality: Left;    MASTECTOMY      ROBOT-ASSISTED CHOLECYSTECTOMY USING DA ROBERTA XI N/A 8/29/2022    Procedure: XI ROBOTIC CHOLECYSTECTOMY;  Surgeon: Raeann Bhandari DO;  Location: [facility] OR;  Service: General;  Laterality: N/A;    TOTAL REDUCTION MAMMOPLASTY Right 11/15/2022    Procedure: MAMMOPLASTY, REDUCTION;  Surgeon: Collin Pinto MD;  Location: [facility] OR;  Service: Plastics;  Laterality: Right;     Family History   Problem Relation Age of Onset    Colon cancer Mother 53        partial colectomy    Stroke Mother     Heart disease Mother     Diabetes Sister     Diabetes Brother     Diabetes Brother     Diabetes Brother     No Known Problems Maternal Grandmother     Prostate cancer Maternal Grandfather 70    Alcohol abuse Maternal Grandfather     No Known Problems Paternal Grandmother     No Known Problems Paternal Grandfather     Throat cancer Maternal Aunt         smoking hx    Lung cancer Maternal Uncle         hx smoking    Breast cancer Other 37        chemotherapy, planning for BL mastectomy    No Known Problems Other      Social History     Socioeconomic History    Marital status:    Tobacco Use    Smoking status: Never    Smokeless tobacco: Never   Substance and Sexual Activity    Alcohol use: Not Currently    Drug use: Not Currently     Types: Marijuana     Comment: none currently    Sexual activity: Yes       Current Outpatient Medications   Medication Sig Dispense Refill    albuterol (PROVENTIL/VENTOLIN HFA) 90 mcg/actuation inhaler Inhale 2 puffs into the lungs every 6 (six) hours as needed for Wheezing. Rescue 18 g 1    ammonium lactate 12 % Crea Apply 1 Act topically 2 (two) times daily. To feet. 140 g 2    ascorbic acid, vitamin C, (VITAMIN C) 100 MG tablet Take 100 mg by mouth once daily.      aspirin (ECOTRIN) 81 MG EC tablet Take 1 tablet (81 mg total)  by mouth once daily. (Patient not taking: Reported on 2022) 360 tablet 1    budesonide-formoterol 160-4.5 mcg (SYMBICORT) 160-4.5 mcg/actuation HFAA Inhale 2 puffs into the lungs every 12 (twelve) hours. Controller 10.2 g 3    busPIRone (BUSPAR) 15 MG tablet Take 1 tablet (15 mg total) by mouth 3 (three) times daily. 270 tablet 3    celecoxib (CELEBREX) 200 MG capsule Take 1 capsule (200 mg total) by mouth 2 (two) times daily. (Patient not taking: Reported on 2022) 28 capsule 0    cephALEXin (KEFLEX) 500 MG capsule TAKE 1 CAPSULE BY MOUTH EVERY 6 HOURS FOR 14 DAYS 56 capsule 0    dulaglutide (TRULICITY) 4.5 mg/0.5 mL pen injector Inject 4.5 mg into the skin every 7 days. 12 pen 1    EScitalopram oxalate (LEXAPRO) 20 MG tablet Take 1 tablet (20 mg total) by mouth once daily. 90 tablet 3    EUTHYROX 50 mcg tablet Take 1 tablet (50 mcg total) by mouth every morning. 90 tablet 1    gabapentin (NEURONTIN) 300 MG capsule Take 1 capsule (300 mg total) by mouth 2 (two) times daily. 180 capsule 1    hydroCHLOROthiazide (HYDRODIURIL) 25 MG tablet Take 1 tablet (25 mg total) by mouth daily as needed (edema, swelling). 90 tablet 3    HYDROcodone-acetaminophen (NORCO) 5-325 mg per tablet Take 1 tablet by mouth every 6 (six) hours as needed for pain. 28 tablet 0    hydrocortisone 0.5 % cream Apply topically 2 (two) times daily.      LATUDA 20 mg Tab tablet Take 20 mg by mouth once daily.      linaCLOtide (LINZESS) 145 mcg Cap capsule Take 1 capsule (145 mcg total) by mouth once daily. 90 capsule 3    magnesium oxide (MAG-OX) 400 mg (241.3 mg magnesium) tablet Take 1 tablet (400 mg total) by mouth once daily. 90 tablet 1    metFORMIN (GLUCOPHAGE-XR) 500 MG ER 24hr tablet SMARTSI Tablet(s) By Mouth Every Evening 90 tablet 3    multivit with min-folic acid (ADULT MULTIVITAMIN GUMMIES) 200 mcg Chew Take by mouth.      olopatadine (PATANOL) 0.1 % ophthalmic solution       ondansetron (ZOFRAN) 4 MG tablet Take 1 tablet (4  "mg total) by mouth every 6 (six) hours as needed for Nausea. 10 tablet 1    ondansetron (ZOFRAN-ODT) 8 MG TbDL DISSOLVE 1 TABLET ON TONGUE EVERY 6 HOURS AS NEEDED FOR NAUSEA 10 tablet 0    oxybutynin (DITROPAN-XL) 5 MG TR24 Take 1 tablet (5 mg total) by mouth once daily. 90 tablet 3    oxyCODONE (ROXICODONE) 5 MG immediate release tablet Take 1 tablet (5 mg total) by mouth every 6 (six) hours as needed for Pain. (Patient not taking: Reported on 10/21/2022) 20 tablet 0    pantoprazole (PROTONIX) 40 MG tablet Take 1 tablet (40 mg total) by mouth once daily. 90 tablet 3    pravastatin (PRAVACHOL) 40 MG tablet Take 1 tablet (40 mg total) by mouth every evening. 90 tablet 3    traZODone (DESYREL) 150 MG tablet Take 1 tablet (150 mg total) by mouth every evening. 90 tablet 3     No current facility-administered medications for this visit.     Facility-Administered Medications Ordered in Other Visits   Medication Dose Route Frequency Provider Last Rate Last Admin    lactated ringers infusion   Intravenous Continuous Maty Thompson MD 10 mL/hr at 01/27/22 0636 Restarted at 01/27/22 0709     Review of patient's allergies indicates:   Allergen Reactions    Lisinopril        Review of Systems   Constitutional: Negative.    HENT: Negative.     Eyes: Negative.    Respiratory: Negative.     Cardiovascular: Negative.    Gastrointestinal: Negative.         No reflux   Endocrine: Negative.    Genitourinary: Negative.    Musculoskeletal: Negative.    Skin: Negative.    Allergic/Immunologic: Negative.    Neurological: Negative.    Hematological: Negative.  Negative for adenopathy.   Psychiatric/Behavioral: Negative.       Breast: no nipple discharge and no breast mass or pain.   Objective:      Vitals:    12/01/22 1341   BP: 105/76   Pulse: 74   Temp: 97.5 °F (36.4 °C)   Weight: 120.9 kg (266 lb 8.6 oz)   Height: 5' 6" (1.676 m)     Physical Exam  Constitutional:       Appearance: She is well-developed.   HENT:      Head: " Normocephalic and atraumatic.      Right Ear: External ear normal.      Left Ear: External ear normal.      Mouth/Throat:      Pharynx: No oropharyngeal exudate.   Eyes:      General: No scleral icterus.        Right eye: No discharge.         Left eye: No discharge.      Conjunctiva/sclera: Conjunctivae normal.      Pupils: Pupils are equal, round, and reactive to light.   Neck:      Thyroid: No thyromegaly.   Cardiovascular:      Rate and Rhythm: Normal rate and regular rhythm.      Heart sounds: Normal heart sounds.   Pulmonary:      Effort: Pulmonary effort is normal.      Breath sounds: Normal breath sounds.   Chest:   Breasts:     Right: No inverted nipple, mass, nipple discharge, skin change or tenderness.      Left: No inverted nipple, mass, nipple discharge, skin change or tenderness.          Comments: Recent right breast mastopexy and left breast expander placement- healing well- no redness or drainage from incisions  Abdominal:      General: Bowel sounds are normal.      Palpations: Abdomen is soft.   Musculoskeletal:         General: Normal range of motion.      Right shoulder: No crepitus. Normal strength.      Cervical back: Normal range of motion and neck supple.   Lymphadenopathy:      Head:      Right side of head: No submental, submandibular, tonsillar, preauricular, posterior auricular or occipital adenopathy.      Left side of head: No submental, submandibular, tonsillar, preauricular, posterior auricular or occipital adenopathy.      Cervical: No cervical adenopathy.      Right cervical: No superficial or posterior cervical adenopathy.     Left cervical: No superficial or posterior cervical adenopathy.      Upper Body:      Right upper body: No supraclavicular or axillary adenopathy.      Left upper body: No supraclavicular or axillary adenopathy.   Skin:     General: Skin is warm and dry.      Coloration: Skin is not pale.      Findings: No erythema or rash.   Neurological:      General: No  focal deficit present.      Mental Status: She is alert and oriented to person, place, and time.      Cranial Nerves: No cranial nerve deficit or facial asymmetry.      Sensory: Sensation is intact. No sensory deficit.      Motor: No weakness or tremor.      Coordination: Coordination normal.      Deep Tendon Reflexes: Reflexes are normal and symmetric.   Psychiatric:         Behavior: Behavior normal.         Thought Content: Thought content normal.         Judgment: Judgment normal.       Lab Review: CBC:   Lab Results   Component Value Date    WBC 5.75 09/16/2022    RBC 4.82 09/16/2022    HGB 13.9 09/16/2022    HCT 43.2 09/16/2022     09/16/2022     BMP:   Lab Results   Component Value Date    GLU 74 09/16/2022     09/16/2022    K 4.5 09/16/2022    CL 99 09/16/2022    CO2 32 (H) 09/16/2022    BUN 8 09/16/2022    CREATININE 0.9 09/16/2022    CALCIUM 10.0 09/16/2022          Assessment:       1. Malignant neoplasm of left breast in female, estrogen receptor negative, unspecified site of breast    2. 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 Meningioma    4. Status post left mastectomy    5. Encounter for follow-up surveillance of breast cancer    6. Encounter for breast cancer screening using non-mammogram modality    7. Health education/counseling    8. Counseling and coordination of care    9. Chronic nonintractable headache, unspecified headache type    10. Acute nonintractable headache, unspecified headache type          Plan:       Malignant neoplasm of left breast in female, estrogen receptor negative  Stage IIA (pT2 pN0 cM0) invasive ductal carcinoma, ER 1%, WA negative HER2 Elieser negative status post left breast mastectomy and adjuvant chemotherapy. Completed dose dense AC plus 3/12 weekly Taxol.      Restaging PET-CT scan from 05/26/2021 showed no FDG PET/CT findings to suggest recurrent or metastatic disease. Diagnostic mammogram from 02/23/2022 showed no evidence of malignancy in the right breast.    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     Thyroid nodule  Was seen by Dr. Erickson, biopsy in December of 2021 revealed benign tissue.     Meningioma  History of -  MRI of brain that revealed a 9 mm round extra-axial enhancing mass overlying the left frontal lobe consistent with a small meningioma.  No other enhancing intracranial lesion identified.    Pt has had recent exacerbation of her headaches since her plastic surgery- taking tylenol with some relief- will do MRI of brain for evaluation and f/u of the known meningioma     Microcytic anemia  Microcytic anemia of unknown etiology.  Resolved following IV iron therapy.  Reviewed recent upper and lower endoscopies that were unremarkable.     Obesity, morbid, BMI 40.0-49.9  Continue to encourage lifestyle modification and exercise as tolerated.        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   Follow-Up: Follow up in about 3 months (around 3/2023).

## 2022-11-25 LAB
FINAL PATHOLOGIC DIAGNOSIS: NORMAL
GROSS: NORMAL
Lab: NORMAL

## 2022-12-01 ENCOUNTER — INFUSION (OUTPATIENT)
Dept: INFUSION THERAPY | Facility: HOSPITAL | Age: 42
End: 2022-12-01
Attending: PHYSICIAN ASSISTANT
Payer: MEDICARE

## 2022-12-01 ENCOUNTER — OFFICE VISIT (OUTPATIENT)
Dept: SURGERY | Facility: CLINIC | Age: 42
End: 2022-12-01
Payer: MEDICARE

## 2022-12-01 VITALS
TEMPERATURE: 98 F | HEART RATE: 74 BPM | BODY MASS INDEX: 42.84 KG/M2 | WEIGHT: 266.56 LBS | HEIGHT: 66 IN | DIASTOLIC BLOOD PRESSURE: 76 MMHG | SYSTOLIC BLOOD PRESSURE: 105 MMHG

## 2022-12-01 DIAGNOSIS — Z15.89 MUTATION IN TP53 GENE: ICD-10-CM

## 2022-12-01 DIAGNOSIS — R51.9 ACUTE NONINTRACTABLE HEADACHE, UNSPECIFIED HEADACHE TYPE: ICD-10-CM

## 2022-12-01 DIAGNOSIS — R51.9 CHRONIC NONINTRACTABLE HEADACHE, UNSPECIFIED HEADACHE TYPE: ICD-10-CM

## 2022-12-01 DIAGNOSIS — Z15.09 MUTATION IN TP53 GENE: ICD-10-CM

## 2022-12-01 DIAGNOSIS — D32.9 MENINGIOMA: ICD-10-CM

## 2022-12-01 DIAGNOSIS — Z17.1 MALIGNANT NEOPLASM OF LEFT BREAST IN FEMALE, ESTROGEN RECEPTOR NEGATIVE, UNSPECIFIED SITE OF BREAST: Primary | ICD-10-CM

## 2022-12-01 DIAGNOSIS — Z85.3 ENCOUNTER FOR FOLLOW-UP SURVEILLANCE OF BREAST CANCER: ICD-10-CM

## 2022-12-01 DIAGNOSIS — C50.912 MALIGNANT NEOPLASM OF LEFT BREAST IN FEMALE, ESTROGEN RECEPTOR NEGATIVE, UNSPECIFIED SITE OF BREAST: Primary | ICD-10-CM

## 2022-12-01 DIAGNOSIS — Z08 ENCOUNTER FOR FOLLOW-UP SURVEILLANCE OF BREAST CANCER: ICD-10-CM

## 2022-12-01 DIAGNOSIS — Z15.01 MUTATION IN TP53 GENE: ICD-10-CM

## 2022-12-01 DIAGNOSIS — Z71.9 HEALTH EDUCATION/COUNSELING: ICD-10-CM

## 2022-12-01 DIAGNOSIS — Z90.12 STATUS POST LEFT MASTECTOMY: ICD-10-CM

## 2022-12-01 DIAGNOSIS — Z12.39 ENCOUNTER FOR BREAST CANCER SCREENING USING NON-MAMMOGRAM MODALITY: ICD-10-CM

## 2022-12-01 DIAGNOSIS — Z71.89 COUNSELING AND COORDINATION OF CARE: ICD-10-CM

## 2022-12-01 DIAGNOSIS — G89.29 CHRONIC NONINTRACTABLE HEADACHE, UNSPECIFIED HEADACHE TYPE: ICD-10-CM

## 2022-12-01 PROCEDURE — 99215 OFFICE O/P EST HI 40 MIN: CPT | Mod: S$PBB,,, | Performed by: NURSE PRACTITIONER

## 2022-12-01 PROCEDURE — A4216 STERILE WATER/SALINE, 10 ML: HCPCS | Performed by: NURSE PRACTITIONER

## 2022-12-01 PROCEDURE — 63600175 PHARM REV CODE 636 W HCPCS: Performed by: NURSE PRACTITIONER

## 2022-12-01 PROCEDURE — 99215 PR OFFICE/OUTPT VISIT, EST, LEVL V, 40-54 MIN: ICD-10-PCS | Mod: S$PBB,,, | Performed by: NURSE PRACTITIONER

## 2022-12-01 PROCEDURE — 99999 PR PBB SHADOW E&M-EST. PATIENT-LVL III: CPT | Mod: PBBFAC,,, | Performed by: NURSE PRACTITIONER

## 2022-12-01 PROCEDURE — 99999 PR PBB SHADOW E&M-EST. PATIENT-LVL III: ICD-10-PCS | Mod: PBBFAC,,, | Performed by: NURSE PRACTITIONER

## 2022-12-01 PROCEDURE — 99213 OFFICE O/P EST LOW 20 MIN: CPT | Mod: PBBFAC | Performed by: NURSE PRACTITIONER

## 2022-12-01 PROCEDURE — 25000003 PHARM REV CODE 250: Performed by: NURSE PRACTITIONER

## 2022-12-01 PROCEDURE — 96523 IRRIG DRUG DELIVERY DEVICE: CPT | Mod: 59

## 2022-12-01 RX ORDER — HEPARIN 100 UNIT/ML
500 SYRINGE INTRAVENOUS
Status: DISCONTINUED | OUTPATIENT
Start: 2022-12-01 | End: 2022-12-01 | Stop reason: HOSPADM

## 2022-12-01 RX ORDER — SODIUM CHLORIDE 0.9 % (FLUSH) 0.9 %
10 SYRINGE (ML) INJECTION
Status: CANCELLED | OUTPATIENT
Start: 2022-12-01

## 2022-12-01 RX ORDER — SODIUM CHLORIDE 0.9 % (FLUSH) 0.9 %
10 SYRINGE (ML) INJECTION
Status: DISCONTINUED | OUTPATIENT
Start: 2022-12-01 | End: 2022-12-01 | Stop reason: HOSPADM

## 2022-12-01 RX ORDER — HEPARIN 100 UNIT/ML
500 SYRINGE INTRAVENOUS
Status: CANCELLED | OUTPATIENT
Start: 2022-12-01

## 2022-12-01 RX ADMIN — HEPARIN 500 UNITS: 100 SYRINGE at 02:12

## 2022-12-01 RX ADMIN — SODIUM CHLORIDE, PRESERVATIVE FREE 10 ML: 5 INJECTION INTRAVENOUS at 02:12

## 2022-12-01 NOTE — NURSING
"Pt here for Mediport Flush. [facility] mediport accessed with a 20g 1" sanchez via sterile technique.  Excellent blood return noted.  Flushed with 10ml NS and 5 ml heparin solution.  Needle D/C, site without redness, swelling, or drainage noted.  Dressing applied.  Patient tolerated well.  Patient to return to clinic in 8 weeks    "

## 2022-12-07 NOTE — ANESTHESIA POSTPROCEDURE EVALUATION
Anesthesia Post Evaluation    Patient: Sangeetha June    Procedure(s) Performed: Procedure(s) (LRB):  MAMMOPLASTY, REDUCTION (Right)  INSERTION, TISSUE EXPANDER, BREAST (Left)    Final Anesthesia Type: general      Patient location during evaluation: PACU  Patient participation: Yes- Able to Participate  Level of consciousness: awake and alert and oriented  Post-procedure vital signs: reviewed and stable  Pain management: adequate  Airway patency: patent    PONV status at discharge: No PONV  Anesthetic complications: no      Cardiovascular status: blood pressure returned to baseline, stable and hemodynamically stable  Respiratory status: unassisted  Hydration status: euvolemic  Follow-up not needed.          Vitals Value Taken Time   /76 12/01/22 1341   Temp 36.4 °C (97.5 °F) 12/01/22 1341   Pulse 74 12/01/22 1341   Resp 18 11/16/22 1005   SpO2 98 % 11/16/22 0542         Event Time   Out of Recovery 17:01:00         Pain/Kaylie Score: No data recorded

## 2022-12-09 ENCOUNTER — PATIENT MESSAGE (OUTPATIENT)
Dept: SURGERY | Facility: CLINIC | Age: 42
End: 2022-12-09
Payer: MEDICAID

## 2022-12-19 ENCOUNTER — PATIENT MESSAGE (OUTPATIENT)
Dept: PULMONOLOGY | Facility: CLINIC | Age: 42
End: 2022-12-19
Payer: MEDICARE

## 2022-12-19 DIAGNOSIS — Z85.3 ENCOUNTER FOR FOLLOW-UP SURVEILLANCE OF BREAST CANCER: Primary | ICD-10-CM

## 2022-12-19 DIAGNOSIS — Z08 ENCOUNTER FOR FOLLOW-UP SURVEILLANCE OF BREAST CANCER: Primary | ICD-10-CM

## 2022-12-20 ENCOUNTER — PATIENT MESSAGE (OUTPATIENT)
Dept: SLEEP MEDICINE | Facility: CLINIC | Age: 42
End: 2022-12-20
Payer: MEDICARE

## 2022-12-21 ENCOUNTER — PATIENT MESSAGE (OUTPATIENT)
Dept: ADMINISTRATIVE | Facility: OTHER | Age: 42
End: 2022-12-21
Payer: MEDICARE

## 2022-12-21 ENCOUNTER — TELEPHONE (OUTPATIENT)
Dept: SLEEP MEDICINE | Facility: CLINIC | Age: 42
End: 2022-12-21
Payer: MEDICARE

## 2022-12-21 NOTE — TELEPHONE ENCOUNTER
----- Message from Kenrick Otoole sent at 12/21/2022 11:03 AM CST -----  Contact: [phone]  Pt is calling regarding appt . Please call back at [phone] . Thanks/dj

## 2022-12-29 ENCOUNTER — HOSPITAL ENCOUNTER (OUTPATIENT)
Dept: RADIOLOGY | Facility: HOSPITAL | Age: 42
Discharge: HOME | End: 2022-12-29
Attending: NURSE PRACTITIONER
Payer: MEDICARE

## 2022-12-29 DIAGNOSIS — D32.9 MENINGIOMA: ICD-10-CM

## 2022-12-29 PROCEDURE — 25500020 PHARM REV CODE 255: Performed by: NURSE PRACTITIONER

## 2022-12-29 PROCEDURE — A9585 GADOBUTROL INJECTION: HCPCS | Performed by: NURSE PRACTITIONER

## 2022-12-29 PROCEDURE — 70553 MRI BRAIN STEM W/O & W/DYE: CPT | Mod: TC

## 2022-12-29 PROCEDURE — 70553 MRI BRAIN W WO CONTRAST: ICD-10-PCS | Mod: 26,,, | Performed by: RADIOLOGY

## 2022-12-29 PROCEDURE — 70553 MRI BRAIN STEM W/O & W/DYE: CPT | Mod: 26,,, | Performed by: RADIOLOGY

## 2022-12-29 RX ORDER — GADOBUTROL 604.72 MG/ML
10 INJECTION INTRAVENOUS
Status: COMPLETED | OUTPATIENT
Start: 2022-12-29 | End: 2022-12-29

## 2022-12-29 RX ADMIN — GADOBUTROL 10 ML: 604.72 INJECTION INTRAVENOUS at 01:12

## 2022-12-30 ENCOUNTER — PATIENT MESSAGE (OUTPATIENT)
Dept: SURGERY | Facility: CLINIC | Age: 42
End: 2022-12-30
Payer: MEDICARE

## 2023-01-03 ENCOUNTER — ANESTHESIA EVENT (OUTPATIENT)
Dept: SURGERY | Facility: HOSPITAL | Age: 43
End: 2023-01-03
Payer: MEDICARE

## 2023-01-03 ENCOUNTER — TELEPHONE (OUTPATIENT)
Dept: PREADMISSION TESTING | Facility: HOSPITAL | Age: 43
End: 2023-01-03
Payer: MEDICARE

## 2023-01-03 NOTE — TELEPHONE ENCOUNTER
Pre op instructions reviewed with patient per phone.      To confirm, your doctor has instructed you: Surgery is scheduled for 1/4/23.     Pre admit office will call the afternoon prior to surgery between 1PM and 3PM with arrival time.    Surgery will be at [facility],  The address is [address]. [place]  [id number].      IMPORTANT INSTRUCTIONS!    Do not eat or drink after 12 midnight, including water. Do not smoke or use chewing tobacco after 12 midnight  OK to brush teeth, but no gum, candy, or mints!      *Take only these medicines with a small swallow of water-morning of surgery*     Buspar, Lexapro, Synthroid, Gabapentin       ____ Stop Aspirin, Ibuprofen, Motrin and Aleve at least 5-7 days before surgery, unless otherwise instructed by your doctor, or the nurse.   You MAY use Tylenol/acetaminophen until day of surgery.      ____  If you take diabetic medication, do NOT take morning of surgery unless instructed by Doctor. Metformin must be stopped 24 hrs prior to surgery time.       ____ Stop taking any Fish Oil supplements or Vitamins at least 5 days prior to surgery, unless instructed otherwise by your Doctor.     Bathing Instructions: The night before surgery and the morning prior to coming to the hospital:    - Shower & rinse your body as usual with anti-bacterial Soap (Dial or Lever 2000)   -Hibiclens (if indicated) use AFTER anti-bacterial soap; 1 packet PM/1 packet in AM on surgical site only   -Do not use hibiclens on your head, face, or genitals.    -Do not wash with anti-bacterial soap after you use the hibiclens.    -Do not shave surgical site 5-7 days prior to surgery.    -Pubic hair 7 days prior to surgery (gyn pt's).      Pediatric patients do not need to use anti-bacterial soap or Hibiclens.             After Bathing:   __ No powder, lotions, creams, or body spray to skin     __No deodorant for any breast procedure, PORT, or upper arm surgery     __ No makeup, mascara,  nail polish or artificial nails       **SURGERY WILL BE CANCELLED IF ARTIFICIAL/NAIL POLISH IS PRESENT!!!**    __ Please remove all piercings and leave all jewelry at home.    **SURGERY WILL BE CANCELLED IF PIERCINGS ARE PRESENT!!!**      __ Dentures, Hearing Aids and Contact Lens need to be removed prior to the start of surgery.      __ Wear clean, loose-fitting clothing. Allow for dressings/bandages/surgical equipment     __ You must have transportation, and they MUST stay the entire time.         [facility] Visitor/Ride Policy:   Only 1 adult allowed (over the age of 18) to accompany you into Pre-op/Recovery Surgery Dept and must stay through the entire length of admission.     Must have a ride home from a responsible adult that you know and trust.      Pediatric Patients are allowed 2 adult visitors.     Medical Transport, Uber or Lyft can only be used if patient has a responsible adult to accompany them during ride home.           Post-Op Instructions: You will receive surgery post-op instructions by your Discharge Nurse prior to going home.     Surgical Site Infection:   Prevention of surgical site infections:   -Keep incisions clean and dry.   -Do not soak/submerge incisions in water until completely healed.   -Do not apply lotions, powders, creams, or deodorants to site.   -Always make sure hands are cleaned with antibacterial soap/ alcohol-based   prior to touching the surgical site.       Signs and symptoms:               -Redness and pain around the area where you had surgery               -Drainage of cloudy fluid from your surgical wound               -Fever over 100.4 or chills     >>>Call Surgeon office/on-call Surgeon if you experience any of these signs & symptoms post-surgery.        *Please Call [facility] Pre-Admissions Department with surgery instruction questions at [phone].     *Insurance Questions, please call [phone] or [phone]    Spoke about pre op process and surgery  instructions, verbalized understanding.

## 2023-01-03 NOTE — ANESTHESIA PREPROCEDURE EVALUATION
01/03/2023   Past Medical History:   Diagnosis Date    Anxiety     Asthma 11/8/2022    Breast cancer     Chest pain     Chest pain 07/02/2021    Cholecystitis without calculus     Decreased ROM of left shoulder 02/15/2022    Diabetes mellitus     Dizziness     Elevated C-reactive protein (CRP)     Essential hypertension     Fall 10/11/2021    Memory change     EVERARDO (obstructive sleep apnea)     Osteoarthritis     Other specified disorders of thyroid     Pre-diabetes 04/15/2021    Chronic, Stable, cont metformin    Screening mammogram, encounter for 04/15/2021    Shoulder injury     SOB (shortness of breath)     Stroke     Thyroiditis     Tingling of left upper extremity 10/29/2021    Vision disturbance 04/30/2021    Weakness of shoulder 02/15/2022       Sangeetha June is a 42 y.o., female.  Past Surgical History:   Procedure Laterality Date    ARTHROSCOPIC REPAIR OF ROTATOR CUFF OF SHOULDER Left 1/27/2022    Procedure: REPAIR, ROTATOR CUFF, ARTHROSCOPIC;  Surgeon: Francisco Mathews MD;  Location: [facility] OR;  Service: Orthopedics;  Laterality: Left;    ARTHROSCOPY OF SHOULDER WITH DECOMPRESSION OF SUBACROMIAL SPACE Left 1/27/2022    Procedure: ARTHROSCOPY, SHOULDER, WITH SUBACROMIAL SPACE DECOMPRESSION;  Surgeon: Francisco Mathews MD;  Location: [facility] OR;  Service: Orthopedics;  Laterality: Left;    BREAST BIOPSY      COLONOSCOPY N/A 5/31/2022    Procedure: COLONOSCOPY;  Surgeon: Km Odom MD;  Location: [facility];  Service: Endoscopy;  Laterality: N/A;    ESOPHAGOGASTRODUODENOSCOPY N/A 5/31/2022    Procedure: EGD (ESOPHAGOGASTRODUODENOSCOPY) ;  Surgeon: Km Odom MD;  Location: [facility];  Service: Endoscopy;  Laterality: N/A;    FIXATION OF TENDON Left 1/27/2022    Procedure: FIXATION, TENDON;  Surgeon: Francisco Mathews MD;   Location: [facility] OR;  Service: Orthopedics;  Laterality: Left;    HYSTERECTOMY      INJECTION OF ANESTHETIC AGENT INTO SACROILIAC JOINT Bilateral 7/29/2022    Procedure: Bilateral SIJ +  Bilateral GT Bursa Injection RN IV Sedation;  Surgeon: Bisi Cochran MD;  Location: [facility] PAIN MGT;  Service: Pain Management;  Laterality: Bilateral;    INJECTION OF JOINT Left 11/12/2021    Procedure: Left suprascapular and axillary injection with local;  Surgeon: Bisi Cochran MD;  Location: [facility] PAIN MGT;  Service: Pain Management;  Laterality: Left;    INJECTION OF JOINT Bilateral 7/29/2022    Procedure: Bilateral SIJ + Bilateral GT Bursa Injection RN IV Sedation;  Surgeon: Bisi Cochran MD;  Location: [facility] PAIN MGT;  Service: Pain Management;  Laterality: Bilateral;    INSERTION OF BREAST TISSUE EXPANDER Left 11/15/2022    Procedure: INSERTION, TISSUE EXPANDER, BREAST;  Surgeon: Collin Pinto MD;  Location: [facility] OR;  Service: Plastics;  Laterality: Left;    MASTECTOMY      ROBOT-ASSISTED CHOLECYSTECTOMY USING DA ROBERTA XI N/A 8/29/2022    Procedure: XI ROBOTIC CHOLECYSTECTOMY;  Surgeon: Raeann Bhandari DO;  Location: [facility] OR;  Service: General;  Laterality: N/A;    TOTAL REDUCTION MAMMOPLASTY Right 11/15/2022    Procedure: MAMMOPLASTY, REDUCTION;  Surgeon: Collin Pinto MD;  Location: [facility];  Service: Plastics;  Laterality: Right;         Pre-op Assessment    I have reviewed the Patient Summary Reports.    I have reviewed the Nursing Notes. I have reviewed the NPO Status.   I have reviewed the Medications.     Review of Systems  Anesthesia Hx:  No problems with previous Anesthesia Grade 1 view with Garcia 2. History of prior surgery of interest to airway management or planning: Previous anesthesia: General Airway issues documented on chart review include easy direct laryngoscopy  Denies Family Hx of Anesthesia complications.   Denies Personal Hx of Anesthesia complications.   Social:  No Alcohol Use,  Non-Smoker    Hematology/Oncology:  Hematology Normal        Cardiovascular:   Hypertension AGUILAR ECG has been reviewed. Extensive cards w/u for AGUILAR, NEG.  EF 55%    6/21 Nuclear Stress Test    Equivocal myocardial perfusion scan.    There is a moderate intensity, fixed defect consistent with scar in the apical wall(s). VS apical artifact    The gated perfusion images showed an ejection fraction of 60% at rest. The gated perfusion images showed an ejection fraction of 53% post stress.    The EKG portion of this study is negative for ischemia.      Pulmonary:   Sleep Apnea    Education provided regarding risk of obstructive sleep apnea     Renal/:  Renal/ Normal     Hepatic/GI:   GERD    Musculoskeletal:   Arthritis   Denies Spine Disorders    Neurological:   CVA H/O meningioma, remote.    Endocrine:   Hypothyroidism    Psych:   anxiety          Physical Exam  General:  Morbid Obesity      Airway/Jaw/Neck:  Airway Findings: Mouth Opening: Normal   Tongue: Normal   General Airway Assessment: Adult Mallampati: II  TM Distance: Normal, at least 6 cm   Jaw/Neck Findings:  Neck ROM: Normal ROM   Neck Findings:     Eyes/Ears/Nose:  Eyes/Ears/Nose Findings:    Dental:  Dental Findings: Edentulous     Chest/Lungs:  Chest/Lungs Findings: Clear to auscultation, Normal Respiratory Rate      Heart/Vascular:  Heart Findings: Rate: Normal  Rhythm: Regular Rhythm  Sounds: Normal  Heart murmur: negative Vascular Findings: Normal    Abdomen:  Abdomen Findings: Normal    Musculoskeletal:  Musculoskeletal Findings: Left arm weakness (previous cva)   Skin:  Skin Findings: Normal    Mental Status:  Mental Status Findings:  Alert and Oriented         Anesthesia Plan  Type of Anesthesia, risks & benefits discussed:  Anesthesia Type:  general    Patient's Preference:   Plan Factors:          Intra-op Monitoring Plan: standard ASA monitors  Intra-op Monitoring Plan Comments:   Post Op Pain Control Plan: per primary service following  discharge from PACU and multimodal analgesia  Post Op Pain Control Plan Comments:     Induction:   IV  Beta Blocker:  Patient is not currently on a Beta-Blocker (No further documentation required).       Informed Consent: Informed consent signed with the Patient and all parties understand the risks and agree with anesthesia plan.  All questions answered.  Anesthesia consent signed with patient.  ASA Score: 3     Day of Surgery Review of History & Physical:    H&P Update referred to the surgeon/provider.          Ready For Surgery From Anesthesia Perspective.           Physical Exam  General: Morbid Obesity    Airway:  Mallampati: II   Mouth Opening: Normal  TM Distance: Normal, at least 6 cm  Tongue: Normal  Neck ROM: Normal ROM    Dental:  Edentulous    Chest/Lungs:  Clear to auscultation, Normal Respiratory Rate    Heart:  Rate: Normal  Rhythm: Regular Rhythm  Sounds: Normal          Anesthesia Plan  Type of Anesthesia, risks & benefits discussed:    Anesthesia Type: general  Intra-op Monitoring Plan: standard ASA monitors  Post Op Pain Control Plan: per primary service following discharge from PACU and multimodal analgesia  Induction:  IV  Informed Consent: Informed consent signed with the Patient and all parties understand the risks and agree with anesthesia plan.  All questions answered.   ASA Score: 3  Day of Surgery Review of History & Physical: H&P Update referred to the surgeon/provider.    Ready For Surgery From Anesthesia Perspective.       .

## 2023-01-04 ENCOUNTER — HOSPITAL ENCOUNTER (OUTPATIENT)
Facility: HOSPITAL | Age: 43
Discharge: HOME | End: 2023-01-04
Attending: SURGERY | Admitting: SURGERY
Payer: MEDICARE

## 2023-01-04 ENCOUNTER — ANESTHESIA (OUTPATIENT)
Dept: SURGERY | Facility: HOSPITAL | Age: 43
End: 2023-01-04
Payer: MEDICARE

## 2023-01-04 VITALS
OXYGEN SATURATION: 98 % | BODY MASS INDEX: 41.47 KG/M2 | WEIGHT: 264.25 LBS | DIASTOLIC BLOOD PRESSURE: 69 MMHG | SYSTOLIC BLOOD PRESSURE: 147 MMHG | RESPIRATION RATE: 14 BRPM | TEMPERATURE: 98 F | HEIGHT: 67 IN | HEART RATE: 65 BPM

## 2023-01-04 DIAGNOSIS — T85.49XA: Primary | ICD-10-CM

## 2023-01-04 LAB
POCT GLUCOSE: 79 MG/DL (ref 70–110)
POCT GLUCOSE: 89 MG/DL (ref 70–110)

## 2023-01-04 PROCEDURE — D9220A PRA ANESTHESIA: ICD-10-PCS | Mod: ANES,,, | Performed by: ANESTHESIOLOGY

## 2023-01-04 PROCEDURE — 37000009 HC ANESTHESIA EA ADD 15 MINS: Performed by: SURGERY

## 2023-01-04 PROCEDURE — C1729 CATH, DRAINAGE: HCPCS | Performed by: SURGERY

## 2023-01-04 PROCEDURE — 88300 SURGICAL PATH GROSS: CPT | Mod: 26,,, | Performed by: PATHOLOGY

## 2023-01-04 PROCEDURE — 87077 CULTURE AEROBIC IDENTIFY: CPT | Mod: 59 | Performed by: SURGERY

## 2023-01-04 PROCEDURE — 71000015 HC POSTOP RECOV 1ST HR: Performed by: SURGERY

## 2023-01-04 PROCEDURE — 25000003 PHARM REV CODE 250: Performed by: NURSE ANESTHETIST, CERTIFIED REGISTERED

## 2023-01-04 PROCEDURE — 87070 CULTURE OTHR SPECIMN AEROBIC: CPT | Performed by: SURGERY

## 2023-01-04 PROCEDURE — 25000003 PHARM REV CODE 250: Performed by: SURGERY

## 2023-01-04 PROCEDURE — 88300 PR  SURG PATH,GROSS,LEVEL I: ICD-10-PCS | Mod: 26,,, | Performed by: PATHOLOGY

## 2023-01-04 PROCEDURE — 63600175 PHARM REV CODE 636 W HCPCS: Performed by: NURSE ANESTHETIST, CERTIFIED REGISTERED

## 2023-01-04 PROCEDURE — 63600175 PHARM REV CODE 636 W HCPCS: Performed by: SURGERY

## 2023-01-04 PROCEDURE — 37000008 HC ANESTHESIA 1ST 15 MINUTES: Performed by: SURGERY

## 2023-01-04 PROCEDURE — 87186 SC STD MICRODIL/AGAR DIL: CPT | Performed by: SURGERY

## 2023-01-04 PROCEDURE — 88300 SURGICAL PATH GROSS: CPT | Performed by: PATHOLOGY

## 2023-01-04 PROCEDURE — 36000706: Performed by: SURGERY

## 2023-01-04 PROCEDURE — 63600175 PHARM REV CODE 636 W HCPCS: Performed by: ANESTHESIOLOGY

## 2023-01-04 PROCEDURE — 82962 GLUCOSE BLOOD TEST: CPT | Performed by: SURGERY

## 2023-01-04 PROCEDURE — D9220A PRA ANESTHESIA: Mod: CRNA,,, | Performed by: NURSE ANESTHETIST, CERTIFIED REGISTERED

## 2023-01-04 PROCEDURE — 87075 CULTR BACTERIA EXCEPT BLOOD: CPT | Performed by: SURGERY

## 2023-01-04 PROCEDURE — 36000707: Performed by: SURGERY

## 2023-01-04 PROCEDURE — D9220A PRA ANESTHESIA: ICD-10-PCS | Mod: CRNA,,, | Performed by: NURSE ANESTHETIST, CERTIFIED REGISTERED

## 2023-01-04 PROCEDURE — 71000033 HC RECOVERY, INTIAL HOUR: Performed by: SURGERY

## 2023-01-04 PROCEDURE — D9220A PRA ANESTHESIA: Mod: ANES,,, | Performed by: ANESTHESIOLOGY

## 2023-01-04 RX ORDER — FENTANYL CITRATE 50 UG/ML
25 INJECTION, SOLUTION INTRAMUSCULAR; INTRAVENOUS EVERY 5 MIN PRN
Status: DISCONTINUED | OUTPATIENT
Start: 2023-01-04 | End: 2023-01-04 | Stop reason: HOSPADM

## 2023-01-04 RX ORDER — LIDOCAINE HYDROCHLORIDE 10 MG/ML
1 INJECTION, SOLUTION EPIDURAL; INFILTRATION; INTRACAUDAL; PERINEURAL ONCE
Status: DISCONTINUED | OUTPATIENT
Start: 2023-01-04 | End: 2023-01-04 | Stop reason: HOSPADM

## 2023-01-04 RX ORDER — CHLORHEXIDINE GLUCONATE ORAL RINSE 1.2 MG/ML
10 SOLUTION DENTAL
Status: DISCONTINUED | OUTPATIENT
Start: 2023-01-04 | End: 2023-01-04 | Stop reason: HOSPADM

## 2023-01-04 RX ORDER — NEOMYCIN AND POLYMYXIN B SULFATES 40; 200000 MG/ML; [USP'U]/ML
SOLUTION IRRIGATION
Status: DISCONTINUED
Start: 2023-01-04 | End: 2023-01-04 | Stop reason: HOSPADM

## 2023-01-04 RX ORDER — ONDANSETRON 2 MG/ML
INJECTION INTRAMUSCULAR; INTRAVENOUS
Status: DISCONTINUED | OUTPATIENT
Start: 2023-01-04 | End: 2023-01-04

## 2023-01-04 RX ORDER — NEOMYCIN AND POLYMYXIN B SULFATES 40; 200000 MG/ML; [USP'U]/ML
SOLUTION IRRIGATION
Status: DISCONTINUED | OUTPATIENT
Start: 2023-01-04 | End: 2023-01-04 | Stop reason: HOSPADM

## 2023-01-04 RX ORDER — DIPHENHYDRAMINE HYDROCHLORIDE 50 MG/ML
25 INJECTION INTRAMUSCULAR; INTRAVENOUS EVERY 6 HOURS PRN
Status: DISCONTINUED | OUTPATIENT
Start: 2023-01-04 | End: 2023-01-04 | Stop reason: HOSPADM

## 2023-01-04 RX ORDER — DEXAMETHASONE SODIUM PHOSPHATE 4 MG/ML
INJECTION, SOLUTION INTRA-ARTICULAR; INTRALESIONAL; INTRAMUSCULAR; INTRAVENOUS; SOFT TISSUE
Status: DISCONTINUED | OUTPATIENT
Start: 2023-01-04 | End: 2023-01-04

## 2023-01-04 RX ORDER — SUCCINYLCHOLINE CHLORIDE 20 MG/ML
INJECTION INTRAMUSCULAR; INTRAVENOUS
Status: DISCONTINUED | OUTPATIENT
Start: 2023-01-04 | End: 2023-01-04

## 2023-01-04 RX ORDER — FENTANYL CITRATE 50 UG/ML
INJECTION, SOLUTION INTRAMUSCULAR; INTRAVENOUS
Status: DISCONTINUED | OUTPATIENT
Start: 2023-01-04 | End: 2023-01-04

## 2023-01-04 RX ORDER — CEFAZOLIN SODIUM 1 G/3ML
INJECTION, POWDER, FOR SOLUTION INTRAMUSCULAR; INTRAVENOUS
Status: DISCONTINUED | OUTPATIENT
Start: 2023-01-04 | End: 2023-01-04 | Stop reason: HOSPADM

## 2023-01-04 RX ORDER — LIDOCAINE HCL/PF 100 MG/5ML
SYRINGE (ML) INTRAVENOUS
Status: DISCONTINUED | OUTPATIENT
Start: 2023-01-04 | End: 2023-01-04

## 2023-01-04 RX ORDER — CEFAZOLIN SODIUM 2 G/50ML
2 SOLUTION INTRAVENOUS
Status: COMPLETED | OUTPATIENT
Start: 2023-01-04 | End: 2023-01-04

## 2023-01-04 RX ORDER — BUPIVACAINE HYDROCHLORIDE AND EPINEPHRINE 2.5; 5 MG/ML; UG/ML
INJECTION, SOLUTION EPIDURAL; INFILTRATION; INTRACAUDAL; PERINEURAL
Status: DISCONTINUED
Start: 2023-01-04 | End: 2023-01-04 | Stop reason: HOSPADM

## 2023-01-04 RX ORDER — CHLORHEXIDINE GLUCONATE ORAL RINSE 1.2 MG/ML
10 SOLUTION DENTAL 2 TIMES DAILY
Status: DISCONTINUED | OUTPATIENT
Start: 2023-01-04 | End: 2023-01-04 | Stop reason: HOSPADM

## 2023-01-04 RX ORDER — ONDANSETRON 2 MG/ML
4 INJECTION INTRAMUSCULAR; INTRAVENOUS ONCE AS NEEDED
Status: DISCONTINUED | OUTPATIENT
Start: 2023-01-04 | End: 2023-01-04 | Stop reason: HOSPADM

## 2023-01-04 RX ORDER — HYDROCODONE BITARTRATE AND ACETAMINOPHEN 5; 325 MG/1; MG/1
1 TABLET ORAL
Status: DISCONTINUED | OUTPATIENT
Start: 2023-01-04 | End: 2023-01-04 | Stop reason: HOSPADM

## 2023-01-04 RX ORDER — HYDROCODONE BITARTRATE AND ACETAMINOPHEN 5; 325 MG/1; MG/1
1 TABLET ORAL EVERY 4 HOURS PRN
Status: DISCONTINUED | OUTPATIENT
Start: 2023-01-04 | End: 2023-01-04 | Stop reason: HOSPADM

## 2023-01-04 RX ORDER — BUPIVACAINE HYDROCHLORIDE AND EPINEPHRINE 2.5; 5 MG/ML; UG/ML
INJECTION, SOLUTION EPIDURAL; INFILTRATION; INTRACAUDAL; PERINEURAL
Status: DISCONTINUED | OUTPATIENT
Start: 2023-01-04 | End: 2023-01-04 | Stop reason: HOSPADM

## 2023-01-04 RX ORDER — DIPHENHYDRAMINE HYDROCHLORIDE 50 MG/ML
INJECTION INTRAMUSCULAR; INTRAVENOUS
Status: DISCONTINUED | OUTPATIENT
Start: 2023-01-04 | End: 2023-01-04

## 2023-01-04 RX ORDER — LIDOCAINE HYDROCHLORIDE AND EPINEPHRINE 10; 10 MG/ML; UG/ML
INJECTION, SOLUTION INFILTRATION; PERINEURAL
Status: DISCONTINUED | OUTPATIENT
Start: 2023-01-04 | End: 2023-01-04 | Stop reason: HOSPADM

## 2023-01-04 RX ORDER — ROCURONIUM BROMIDE 10 MG/ML
INJECTION, SOLUTION INTRAVENOUS
Status: DISCONTINUED | OUTPATIENT
Start: 2023-01-04 | End: 2023-01-04

## 2023-01-04 RX ORDER — SODIUM CHLORIDE, SODIUM LACTATE, POTASSIUM CHLORIDE, CALCIUM CHLORIDE 600; 310; 30; 20 MG/100ML; MG/100ML; MG/100ML; MG/100ML
INJECTION, SOLUTION INTRAVENOUS CONTINUOUS
Status: DISCONTINUED | OUTPATIENT
Start: 2023-01-04 | End: 2023-01-04 | Stop reason: HOSPADM

## 2023-01-04 RX ORDER — MIDAZOLAM HYDROCHLORIDE 1 MG/ML
INJECTION, SOLUTION INTRAMUSCULAR; INTRAVENOUS
Status: DISCONTINUED | OUTPATIENT
Start: 2023-01-04 | End: 2023-01-04

## 2023-01-04 RX ORDER — CEFAZOLIN SODIUM 1 G/3ML
INJECTION, POWDER, FOR SOLUTION INTRAMUSCULAR; INTRAVENOUS
Status: DISCONTINUED
Start: 2023-01-04 | End: 2023-01-04 | Stop reason: HOSPADM

## 2023-01-04 RX ORDER — SODIUM CHLORIDE 0.9 % (FLUSH) 0.9 %
10 SYRINGE (ML) INJECTION
Status: DISCONTINUED | OUTPATIENT
Start: 2023-01-04 | End: 2023-01-04 | Stop reason: HOSPADM

## 2023-01-04 RX ORDER — PROPOFOL 10 MG/ML
INJECTION, EMULSION INTRAVENOUS
Status: DISCONTINUED | OUTPATIENT
Start: 2023-01-04 | End: 2023-01-04

## 2023-01-04 RX ORDER — MEPERIDINE HYDROCHLORIDE 25 MG/ML
12.5 INJECTION INTRAMUSCULAR; INTRAVENOUS; SUBCUTANEOUS ONCE
Status: DISCONTINUED | OUTPATIENT
Start: 2023-01-04 | End: 2023-01-04 | Stop reason: HOSPADM

## 2023-01-04 RX ADMIN — DIPHENHYDRAMINE HYDROCHLORIDE 6.25 MG: 50 INJECTION INTRAMUSCULAR; INTRAVENOUS at 04:01

## 2023-01-04 RX ADMIN — SUCCINYLCHOLINE CHLORIDE 200 MG: 20 INJECTION, SOLUTION INTRAMUSCULAR; INTRAVENOUS at 04:01

## 2023-01-04 RX ADMIN — ROCURONIUM BROMIDE 10 MG: 10 INJECTION, SOLUTION INTRAVENOUS at 04:01

## 2023-01-04 RX ADMIN — SODIUM CHLORIDE, SODIUM LACTATE, POTASSIUM CHLORIDE, AND CALCIUM CHLORIDE: 600; 310; 30; 20 INJECTION, SOLUTION INTRAVENOUS at 04:01

## 2023-01-04 RX ADMIN — Medication 80 MG: at 04:01

## 2023-01-04 RX ADMIN — FENTANYL CITRATE 100 MCG: 50 INJECTION, SOLUTION INTRAMUSCULAR; INTRAVENOUS at 04:01

## 2023-01-04 RX ADMIN — PROPOFOL 150 MG: 10 INJECTION, EMULSION INTRAVENOUS at 04:01

## 2023-01-04 RX ADMIN — DEXAMETHASONE SODIUM PHOSPHATE 8 MG: 4 INJECTION, SOLUTION INTRA-ARTICULAR; INTRALESIONAL; INTRAMUSCULAR; INTRAVENOUS; SOFT TISSUE at 04:01

## 2023-01-04 RX ADMIN — ONDANSETRON 4 MG: 2 INJECTION, SOLUTION INTRAMUSCULAR; INTRAVENOUS at 04:01

## 2023-01-04 RX ADMIN — MIDAZOLAM 2 MG: 1 INJECTION INTRAMUSCULAR; INTRAVENOUS at 04:01

## 2023-01-04 RX ADMIN — CEFAZOLIN SODIUM 2 G: 2 SOLUTION INTRAVENOUS at 04:01

## 2023-01-04 NOTE — BRIEF OP NOTE
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 Left breast tissue expander     References:    Click here for ordering Quick Tip   Question Answer Comment   Procedure Type: Breast    Which provider would you like to cc? JANICE GORDON    Release to patient Immediate        01/04/23 1631                      Discharge Note    OUTCOME: Patient tolerated treatment/procedure well without complication and is now ready for discharge.    DISPOSITION: Home or Self Care    FINAL DIAGNOSIS:  Infected left breast tissue expander    FOLLOWUP: In clinic    DISCHARGE INSTRUCTIONS:  Follow instructions from the office

## 2023-01-04 NOTE — TRANSFER OF CARE
"1726Anesthesia Transfer of Care Note    Patient: Sangeetha June    Procedure(s) Performed: Procedure(s) (LRB):  REMOVAL, TISSUE EXPANDER (Left)    Patient location: PACU    Anesthesia Type: general    Transport from OR: Transported from OR on room air with adequate spontaneous ventilation    Post pain: adequate analgesia    Post assessment: no apparent anesthetic complications and tolerated procedure well    Post vital signs: stable    Level of consciousness: awake and alert    Nausea/Vomiting: no nausea/vomiting    Complications: none    Transfer of care protocol was followed      Last vitals:   Visit Vitals  /85 (BP Location: Right arm, Patient Position: Sitting)   Pulse 71   Temp 36.3 °C (97.4 °F) (Temporal)   Resp 20   Ht 5' 7" (1.702 m)   Wt 119.8 kg (264 lb 3.5 oz)   LMP  (LMP Unknown)   SpO2 98%   Breastfeeding No   BMI 41.38 kg/m²     "

## 2023-01-04 NOTE — INTERVAL H&P NOTE
The patient has been examined and the H&P has been reviewed:    No changes.  Proceed to surgery.    Risks, benefits and alternative options discussed and understood by patient/family.

## 2023-01-04 NOTE — OP NOTE
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 June is a pleasant 42-year-old female who underwent a left-sided tissue expander placement along with a right-sided reduction.  She was doing well until she called the office stating that she had drainage along the lateral portion of her expander.  She was started on a course of oral and antibiotics and seen a few days later with no improvement.  Secondary to the open wound, at this point she needs removal of her expander.  Risks, benefits, alternatives, possible complications were discussed and she agreed to proceed with surgery.    Description of operative report.  Informed consent was obtained from the patient preoperative appointment.  Morning of surgery patient was seen examined and marked.  Operative plan was reconfirmed with the patient along with markings.  Antibiotics and SCDs were begun in holding.  Patient was then brought to the operative room placed supine on the operating table.  General endotracheal anesthesia was administered at difficulty.  Patient's chest was prepped and draped in standard sterile fashion.  First 10 blade scalp was used to make an  incision along the prior mastectomy scar.  This was done after local was administered along elliptical planned incision removed part of the lower pole the breast.  Next Bovie electrocautery to dissect down through subcutaneous tissue.  Tissue expander was medially identified below the skin laterally where there is an open draining wound.  Tissue expander was deflated and removed from the pocket.  Culture swabs were taken in a.  Proximally 100 cc of murky fluid consistent with an abscess.  Inferiorly there was a noted very thin area along the inferior mastectomy skin flap and the skin was removed in elliptical fashion along the prior mastectomy incision back to healthy bleeding tissue.  Pectoralis muscle was noted to have curled up and retracted superiorly.  This was freed from the overlying mastectomy skin flap maintaining healthy blood supply to the skin.  Pectoralis muscle was then tacked inferiorly to help replace the muscle in proper position using interrupted 2-0 Vicryl so near the IMF.  Pocket was then washed copiously with antibiotic irrigation.  Two additional L of saline was used for irrigation.  Meticulous hemostasis was achieved with electrocautery.  Nineteen Occitan drain was placed in pocket sutured place with a 2-0 silk.  2-0 Vicryl was then used to reapproximate subcutaneous tissue.  3-0 Vicryl used to reapproximate deep dermis.  3-0 Monocryl was used to close the skin.  Sterile dressings placed on top.  Patient tolerated procedure well was brought to recovery in excellent condition.  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 Left breast tissue expander     References:    Click here for ordering Quick Tip   Question Answer Comment   Procedure Type: Breast    Which provider would you like to cc? JANICE GORDON    Release to patient Immediate        01/04/23 0461                            Condition: Good    Disposition: PACU - hemodynamically stable    Attestation: I was present and scrubbed throughout the entire procedure.

## 2023-01-04 NOTE — H&P
[facility] Peri Services  History & Physical - Short Stay    Subjective:      Chief Complaint/Reason for Admission: left breast tissue expander infection        History of Present Illness:  Patient is a 42 y.o. female presents with left breast tissue expander with draining seroma and thinness of skin.  Has failed outpatient antibiotics.  Needs her expander removed.     Pain Scale:  0    No medications prior to admission.       Review of patient's allergies indicates:   Allergen Reactions    Lisinopril         Past Medical History:   Diagnosis Date    Anxiety     Asthma 11/8/2022    Breast cancer     Chest pain     Chest pain 07/02/2021    Cholecystitis without calculus     Decreased ROM of left shoulder 02/15/2022    Diabetes mellitus     Dizziness     Elevated C-reactive protein (CRP)     Essential hypertension     Fall 10/11/2021    Memory change     EVERARDO (obstructive sleep apnea)     Osteoarthritis     Other specified disorders of thyroid     Pre-diabetes 04/15/2021    Chronic, Stable, cont metformin    Screening mammogram, encounter for 04/15/2021    Shoulder injury     SOB (shortness of breath)     Stroke     Thyroiditis     Tingling of left upper extremity 10/29/2021    Vision disturbance 04/30/2021    Weakness of shoulder 02/15/2022       Review of Systems:  No CP, no SOB, no H/a    OBJECTIVE:     Vital Signs (Most Recent):  VSS AF    Physical Exam:  Gen - AO x 3, NAD  Neck - supple  Chest - non labored respirations  Ht - RRR  Left breast - draining seroma, tissue thin along incision, no erythema or edema  Abd - Soft, NT  Ext - no CCE    Laboratory  none    Diagnostic Results:  none    ASSESSMENT/PLAN:   41 y/o with left tissue expander draining seroma/ possibly infected and impending extrusion of tissue expander.    To OR for removal of left tissue expander.

## 2023-01-04 NOTE — DISCHARGE INSTRUCTIONS
Nozin Instructions  Goal: the goal of Nozin is to reduce the risk of post-procedural infections by bacteria in the nasal cavity. Think of it as hand  for your nose.    How to use:    1. Shake Nozin bottle well    2. Take a cotton swab and apply 4 drops to one tip    3. Insert cotton tip into one nostril, being sure not to go deeper into nose than tip of the swab.    4. Swab nostril 6 times counterclockwise and 6 times clockwise. Make sure to swab the inside front pocket of the nostril.    5. Take swab out and apply 2 drops to the same cotton tip. Repeat steps 3 and 4 in the other nostril.        Do steps 1-5 twice a day for 7 days.        ANITA GORDON  PLASTIC SURGERY  [facility]    POSTOPERATIVE INSTRUCTIONS FOR BREAST RECONSTRUCTION    Following discharge, return home and rest for the remainder of the day and night. Someone  should stay with you for the next 24-48 hours. Please have someone assist you to the  restroom during this period. You should be up and walking around for short periods of time  several times (at least three times as a minimum) throughout the day, with assistance,  following your surgical procedure.    Take the prescribed pain medication as directed, but always eat something first (15-30  minutes before). You should expect discomfort, but severe pain is unusual and should be  reported immediately to the office. Do not drink alcohol while on pain medication. Please be  sure that you have an adequate supply of pain medication to last through the night or  weekend. If your pain is severe, you may rotate the narcotic with Ibuprofen every 4 hours.  Pain medication will only be refilled during business hours.    Diet may be as desired. We advise you to start off with liquids and progress as tolerated.  Avoid fried, rich foods for the first 24 hours after surgery, We also encourage you to drink 6-8  glasses of water daily and, if taking prescription medications.  begin a stool softener.    If TISSUE EXPANDER AND/OR IMPLANT & DRAINS are present, DO NOT SHOWER, You may  sponge bathe as needed, keep drain sites CLEAN & DRY, Dr Pinto will remove all bandages in  clinic at your first post op.    If you have drains with no expander or implant, you may shower as needed, 48 hours after  surgery, you may remove everything down to the skin (bra, white padding, tape) and shower  with body wash of your choice. Replace bra and line incisions with padding (we recommend  ultra-thin pantiliners) to collect any drainage. If drains are present, you will be instructed on  how to care for your drains upon discharge, Follow drain instructions on attached sheet.  Empty and record drain output as directed. Make sure to include date, time and volume of  fluid removed. Anything from clear to yellow to red to dark red is perfectly normal  *Bring volume sheet with you to your 1st post op visit. Drainage is normal*    It is extremely important that you limit the use of your arms for the first 3 weeks following  surgery. Do not lift your arms to reach for things. No heavy lifting, pushing, or pulling should  be avoided for the next three to four weeks. No aerobic exercise or weightlifting. Do not lift  anything heavier than a gallon of milk.    No submerging in water, hot tubs, or swimming until directed to by Dr. Pinto.    It is also not unusual for one breast to hurt, bruise, or swell more than the other. Please call  us immediately with a significant increase in pain, size of either breast, bleeding/drainage, or  rash. This could be evidenced by saturation of dressings and bra, or it may be noted that one  breast is unusually larger than the other.    A mild sore throat is not uncommon for 24-36 hours following general anesthesia, Lozenges  may be helpful. Headache, vague soreness, fatigue, or stiffness are common side effects of  anesthesia and may persist for several days.    Do not hesitate to  call our office if you have any questions or problems. The answering service  will contact a physician for you after hours, The number to call is [phone]. Please DO NOT  go to the ER before calling our office.     You may reach me directly at [phone] (text or call) or email anytime at [email or web address].    POST-OP APPOINTMENT:  at [facility]astic Surgery  [address] [place] [address] [address]  [place] [address]

## 2023-01-06 NOTE — ANESTHESIA POSTPROCEDURE EVALUATION
Anesthesia Post Evaluation    Patient: Sangeetha June    Procedure(s) Performed: Procedure(s) (LRB):  REMOVAL, TISSUE EXPANDER (Left)    Final Anesthesia Type: general      Patient location during evaluation: PACU  Patient participation: Yes- Able to Participate  Level of consciousness: awake and alert and oriented  Post-procedure vital signs: reviewed and stable  Pain management: adequate  Airway patency: patent    PONV status at discharge: No PONV  Anesthetic complications: no      Cardiovascular status: blood pressure returned to baseline, stable and hemodynamically stable  Respiratory status: unassisted  Hydration status: euvolemic  Follow-up not needed.          Vitals Value Taken Time   /69 01/04/23 1805   Temp 36.5 °C (97.7 °F) 01/04/23 1726   Pulse 65 01/04/23 1805   Resp 14 01/04/23 1805   SpO2 98 % 01/04/23 1805         Event Time   Out of Recovery 17:56:00         Pain/Kaylie Score: No data recorded

## 2023-01-09 LAB
BACTERIA SPEC AEROBE CULT: ABNORMAL
BACTERIA SPEC AEROBE CULT: ABNORMAL

## 2023-01-10 LAB — BACTERIA SPEC ANAEROBE CULT: NORMAL

## 2023-01-11 ENCOUNTER — OFFICE VISIT (OUTPATIENT)
Dept: SLEEP MEDICINE | Facility: CLINIC | Age: 43
End: 2023-01-11
Payer: MEDICARE

## 2023-01-11 VITALS
HEART RATE: 72 BPM | RESPIRATION RATE: 18 BRPM | DIASTOLIC BLOOD PRESSURE: 80 MMHG | WEIGHT: 276.25 LBS | SYSTOLIC BLOOD PRESSURE: 120 MMHG | BODY MASS INDEX: 43.36 KG/M2 | OXYGEN SATURATION: 100 % | HEIGHT: 67 IN

## 2023-01-11 DIAGNOSIS — J45.909 ASTHMA, UNSPECIFIED ASTHMA SEVERITY, UNSPECIFIED WHETHER COMPLICATED, UNSPECIFIED WHETHER PERSISTENT: Primary | ICD-10-CM

## 2023-01-11 DIAGNOSIS — J45.909 NOT WELL CONTROLLED ASTHMA WITHOUT COMPLICATION: ICD-10-CM

## 2023-01-11 DIAGNOSIS — R06.02 SOB (SHORTNESS OF BREATH): ICD-10-CM

## 2023-01-11 DIAGNOSIS — G47.33 OSA (OBSTRUCTIVE SLEEP APNEA): ICD-10-CM

## 2023-01-11 DIAGNOSIS — R94.2 MIXED OBSTRUCTIVE AND RESTRICTIVE VENTILATORY DEFECT: ICD-10-CM

## 2023-01-11 DIAGNOSIS — G47.09 OTHER INSOMNIA: ICD-10-CM

## 2023-01-11 PROCEDURE — 99214 PR OFFICE/OUTPT VISIT, EST, LEVL IV, 30-39 MIN: ICD-10-PCS | Mod: S$PBB,,, | Performed by: NURSE PRACTITIONER

## 2023-01-11 PROCEDURE — 99999 PR PBB SHADOW E&M-EST. PATIENT-LVL III: CPT | Mod: PBBFAC,,, | Performed by: NURSE PRACTITIONER

## 2023-01-11 PROCEDURE — 99999 PR PBB SHADOW E&M-EST. PATIENT-LVL III: ICD-10-PCS | Mod: PBBFAC,,, | Performed by: NURSE PRACTITIONER

## 2023-01-11 PROCEDURE — 99213 OFFICE O/P EST LOW 20 MIN: CPT | Mod: PBBFAC | Performed by: NURSE PRACTITIONER

## 2023-01-11 PROCEDURE — 99214 OFFICE O/P EST MOD 30 MIN: CPT | Mod: S$PBB,,, | Performed by: NURSE PRACTITIONER

## 2023-01-11 RX ORDER — BUDESONIDE AND FORMOTEROL FUMARATE DIHYDRATE 160; 4.5 UG/1; UG/1
2 AEROSOL RESPIRATORY (INHALATION) EVERY 12 HOURS
Qty: 10.2 G | Refills: 3 | Status: SHIPPED | OUTPATIENT
Start: 2023-01-11 | End: 2023-03-20 | Stop reason: SDUPTHER

## 2023-01-11 RX ORDER — ALBUTEROL SULFATE 90 UG/1
2 AEROSOL, METERED RESPIRATORY (INHALATION) EVERY 4 HOURS PRN
Qty: 8.5 G | Refills: 1 | Status: SHIPPED | OUTPATIENT
Start: 2023-01-11 | End: 2023-03-20 | Stop reason: SDUPTHER

## 2023-01-11 NOTE — PROGRESS NOTES
"Subjective:      Patient ID: Sangeetha June is a 43 y.o. female.    Chief Complaint: Sleep Apnea    HPI  Presents to office for everardo and asthma. She feels like her asthma is controlled. Takes symbicort and albuterol.   She has not worn her CPAP in a few months due to surgeries.     Patient Active Problem List   Diagnosis    Delayed immunizations    S/P mastectomy    Chemotherapy adverse reaction    History of breast cancer    Left rotator cuff tear arthropathy    ANGIE (generalized anxiety disorder)    Urinary incontinence    Hyperlipidemia    Other insomnia    AGUILAR (dyspnea on exertion)    S/P hysterectomy    Postmenopausal    Vision disturbance    Obesity, morbid, BMI 40.0-49.9    Malignant neoplasm of left breast in female, estrogen receptor negative    Meningioma    Murmur    Hypertension    Hypothyroidism    Microcytic anemia    Strain of lumbar region    Gait difficulty    Imbalance    Difficulty walking    Lumbago    Abdominal pain    Gastroesophageal reflux disease    SOB (shortness of breath)    Nontraumatic incomplete tear of left rotator cuff    Thyroid nodule    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    Review of Systems   Constitutional: Negative.    HENT: Negative.     Respiratory: Negative.     Cardiovascular: Negative.    Musculoskeletal: Negative.    Gastrointestinal: Negative.    Neurological: Negative.  "   Psychiatric/Behavioral: Negative.     Objective:      Physical Exam  Constitutional:       Appearance: She is obese.   HENT:      Head: Normocephalic and atraumatic.      Nose: Nose normal.   Cardiovascular:      Rate and Rhythm: Normal rate and regular rhythm.   Pulmonary:      Effort: Pulmonary effort is normal.      Breath sounds: Normal breath sounds.   Musculoskeletal:      Cervical back: Normal range of motion.   Skin:     General: Skin is warm and dry.      Comments: David drain   Neurological:      General: No focal deficit present.      Mental Status: She is alert and oriented to person, place, and time.   Psychiatric:         Mood and Affect: Mood normal.         Behavior: Behavior normal.     Personal Diagnostic Review        Assessment:       1. Asthma, unspecified asthma severity, unspecified whether complicated, unspecified whether persistent    2. EVERARDO (obstructive sleep apnea)    3. Other insomnia    4. Not well controlled asthma without complication    5. Mixed obstructive and restrictive ventilatory defect    6. SOB (shortness of breath)          Outpatient Encounter Medications as of 1/11/2023   Medication Sig Dispense Refill    ammonium lactate 12 % Crea Apply 1 Act topically 2 (two) times daily. To feet. 140 g 2    ascorbic acid, vitamin C, (VITAMIN C) 100 MG tablet Take 100 mg by mouth once daily.      aspirin (ECOTRIN) 81 MG EC tablet Take 1 tablet (81 mg total) by mouth once daily. 360 tablet 1    busPIRone (BUSPAR) 15 MG tablet Take 1 tablet (15 mg total) by mouth 3 (three) times daily. 270 tablet 3    celecoxib (CELEBREX) 200 MG capsule Take 1 capsule (200 mg total) by mouth 2 (two) times daily. 28 capsule 0    cephALEXin (KEFLEX) 500 MG capsule TAKE 1 CAPSULE BY MOUTH EVERY 6 HOURS FOR 14 DAYS 56 capsule 0    dulaglutide (TRULICITY) 4.5 mg/0.5 mL pen injector Inject 4.5 mg into the skin every 7 days. 4 pen 0    EScitalopram oxalate (LEXAPRO) 20 MG tablet Take 1 tablet (20 mg total) by mouth  once daily. 90 tablet 3    EUTHYROX 50 mcg tablet Take 1 tablet (50 mcg total) by mouth every morning. 90 tablet 1    gabapentin (NEURONTIN) 300 MG capsule Take 1 capsule (300 mg total) by mouth 2 (two) times daily. 180 capsule 1    hydroCHLOROthiazide (HYDRODIURIL) 25 MG tablet Take 1 tablet (25 mg total) by mouth daily as needed (edema, swelling). 90 tablet 3    HYDROcodone-acetaminophen (NORCO) 5-325 mg per tablet Take 1 tablet by mouth every 6 (six) hours as needed for pain. 28 tablet 0    hydrocortisone 0.5 % cream Apply topically 2 (two) times daily.      LATUDA 20 mg Tab tablet Take 20 mg by mouth once daily.      linaCLOtide (LINZESS) 145 mcg Cap capsule Take 1 capsule (145 mcg total) by mouth once daily. 90 capsule 3    magnesium oxide (MAG-OX) 400 mg (241.3 mg magnesium) tablet Take 1 tablet (400 mg total) by mouth once daily. 90 tablet 1    metFORMIN (GLUCOPHAGE-XR) 500 MG ER 24hr tablet SMARTSI Tablet(s) By Mouth Every Evening 90 tablet 3    multivit with min-folic acid (ADULT MULTIVITAMIN GUMMIES) 200 mcg Chew Take by mouth.      olopatadine (PATANOL) 0.1 % ophthalmic solution       ondansetron (ZOFRAN) 4 MG tablet Take 1 tablet (4 mg total) by mouth every 6 (six) hours as needed for Nausea. 10 tablet 1    ondansetron (ZOFRAN-ODT) 8 MG TbDL DISSOLVE 1 TABLET ON TONGUE EVERY 6 HOURS AS NEEDED FOR NAUSEA 10 tablet 0    oxyCODONE (ROXICODONE) 5 MG immediate release tablet Take 1 tablet (5 mg total) by mouth every 6 (six) hours as needed for Pain. 20 tablet 0    pantoprazole (PROTONIX) 40 MG tablet Take 1 tablet (40 mg total) by mouth once daily. 90 tablet 3    pravastatin (PRAVACHOL) 40 MG tablet Take 1 tablet (40 mg total) by mouth every evening. 90 tablet 3    traZODone (DESYREL) 150 MG tablet Take 1 tablet (150 mg total) by mouth every evening. 90 tablet 3    [DISCONTINUED] albuterol (PROVENTIL/VENTOLIN HFA) 90 mcg/actuation inhaler Inhale 2 puffs into the lungs every 6 (six) hours as needed for  Wheezing. Rescue 18 g 1    [DISCONTINUED] budesonide-formoterol 160-4.5 mcg (SYMBICORT) 160-4.5 mcg/actuation HFAA Inhale 2 puffs into the lungs every 12 (twelve) hours. Controller 10.2 g 3    albuterol (PROVENTIL/VENTOLIN HFA) 90 mcg/actuation inhaler Inhale 2 puffs into the lungs every 4 (four) hours as needed for Wheezing. Rescue 8.5 g 1    budesonide-formoterol 160-4.5 mcg (SYMBICORT) 160-4.5 mcg/actuation HFAA Inhale 2 puffs into the lungs every 12 (twelve) hours. Controller 10.2 g 3    oxybutynin (DITROPAN-XL) 5 MG TR24 Take 1 tablet (5 mg total) by mouth once daily. 90 tablet 3     Facility-Administered Encounter Medications as of 1/11/2023   Medication Dose Route Frequency Provider Last Rate Last Admin    lactated ringers infusion   Intravenous Continuous Maty Thompson MD 10 mL/hr at 01/27/22 0636 New Bag at 01/04/23 1603     No orders of the defined types were placed in this encounter.    Plan:        Problem List Items Addressed This Visit          Pulmonary    SOB (shortness of breath)    Relevant Medications    albuterol (PROVENTIL/VENTOLIN HFA) 90 mcg/actuation inhaler    Asthma - Primary    Relevant Medications    budesonide-formoterol 160-4.5 mcg (SYMBICORT) 160-4.5 mcg/actuation HFAA    albuterol (PROVENTIL/VENTOLIN HFA) 90 mcg/actuation inhaler       Other    Other insomnia    Overview     Chronic, stable, cont trazodone         EVERARDO (obstructive sleep apnea)     Other Visit Diagnoses       Not well controlled asthma without complication        Relevant Medications    budesonide-formoterol 160-4.5 mcg (SYMBICORT) 160-4.5 mcg/actuation HFAA    Mixed obstructive and restrictive ventilatory defect        Relevant Medications    budesonide-formoterol 160-4.5 mcg (SYMBICORT) 160-4.5 mcg/actuation HFAA        Denies increased asthma symptoms or increased use of rescue inhaler.   Start back on CPAP.

## 2023-01-12 LAB
FINAL PATHOLOGIC DIAGNOSIS: NORMAL
GROSS: NORMAL
Lab: NORMAL

## 2023-01-25 ENCOUNTER — TELEPHONE (OUTPATIENT)
Dept: INTERNAL MEDICINE | Facility: CLINIC | Age: 43
End: 2023-01-25
Payer: MEDICARE

## 2023-01-25 NOTE — TELEPHONE ENCOUNTER
Called pt and left a vm stating that her scheduled appt on 1/31/23 was the earliest we have but please call us back if she needs to further discuss.

## 2023-01-25 NOTE — TELEPHONE ENCOUNTER
----- Message from Kelsy Otoole sent at 1/24/2023 11:08 AM CST -----  Contact: Sangeetha Johnson is calling in regards to getting an earlier appt on 01/31/2023.Please call back at [phone]        Thanks  TIGIST

## 2023-01-30 ENCOUNTER — INFUSION (OUTPATIENT)
Dept: INFUSION THERAPY | Facility: HOSPITAL | Age: 43
End: 2023-01-30
Attending: NURSE PRACTITIONER
Payer: MEDICARE

## 2023-01-30 DIAGNOSIS — C50.912 MALIGNANT NEOPLASM OF LEFT BREAST IN FEMALE, ESTROGEN RECEPTOR NEGATIVE, UNSPECIFIED SITE OF BREAST: Primary | ICD-10-CM

## 2023-01-30 DIAGNOSIS — Z17.1 MALIGNANT NEOPLASM OF LEFT BREAST IN FEMALE, ESTROGEN RECEPTOR NEGATIVE, UNSPECIFIED SITE OF BREAST: Primary | ICD-10-CM

## 2023-01-30 PROCEDURE — 63600175 PHARM REV CODE 636 W HCPCS: Performed by: NURSE PRACTITIONER

## 2023-01-30 PROCEDURE — 96523 IRRIG DRUG DELIVERY DEVICE: CPT

## 2023-01-30 PROCEDURE — A4216 STERILE WATER/SALINE, 10 ML: HCPCS | Performed by: NURSE PRACTITIONER

## 2023-01-30 PROCEDURE — 25000003 PHARM REV CODE 250: Performed by: NURSE PRACTITIONER

## 2023-01-30 RX ORDER — SODIUM CHLORIDE 0.9 % (FLUSH) 0.9 %
10 SYRINGE (ML) INJECTION
Status: DISCONTINUED | OUTPATIENT
Start: 2023-01-30 | End: 2023-01-30 | Stop reason: HOSPADM

## 2023-01-30 RX ORDER — SODIUM CHLORIDE 0.9 % (FLUSH) 0.9 %
10 SYRINGE (ML) INJECTION
Status: CANCELLED | OUTPATIENT
Start: 2023-01-30

## 2023-01-30 RX ORDER — HEPARIN 100 UNIT/ML
500 SYRINGE INTRAVENOUS
Status: DISCONTINUED | OUTPATIENT
Start: 2023-01-30 | End: 2023-01-30 | Stop reason: HOSPADM

## 2023-01-30 RX ORDER — HEPARIN 100 UNIT/ML
500 SYRINGE INTRAVENOUS
Status: CANCELLED | OUTPATIENT
Start: 2023-01-30

## 2023-01-30 RX ADMIN — HEPARIN 500 UNITS: 100 SYRINGE at 11:01

## 2023-01-30 RX ADMIN — SODIUM CHLORIDE, PRESERVATIVE FREE 10 ML: 5 INJECTION INTRAVENOUS at 11:01

## 2023-01-30 NOTE — DISCHARGE INSTRUCTIONS
[facility]  [address] [facility]  [id number] [place] [address] Drive  [phone] phone     [phone] fax  Hours of Operation: Monday- Friday 8:00am- 5:00pm  After hours phone  [phone]  Hematology / Oncology Physicians on call:    Dr. Dante Martin      Nurse Practitioners:    LYNDA Rodriguez NP Jessica Porter, PA Phaon Dunbar, NP      Please don't hesitate to call if you have any concerns.

## 2023-01-30 NOTE — PROGRESS NOTES
Subjective:       Patient ID: Sangeetha June is a 43 y.o. female.    Chief Complaint: Breast cancer survivorship and f/u    ONCOLOGIC DIAGNOSIS:  Invasive ductal carcinoma, pT2 N0 M0  ?   CURRENT TREATMENT:  None     PAST TREATMENT:   -left breast mastectomy  -adjuvant chemotherapy with dose dense AC followed by 3 cycles of weekly Taxol  ?   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 cancer, sarcomas, CNS tumors, adrenal gland tumors, leukemias and MDS    Last visit with me 12/2022     HPI:  43-year-old female with past medical history that is significant for hypertension osteoarthritis thyroiditis, invasive ductal carcinoma status post left breast mastectomy and adjuvant chemotherapy presents today for f/u    Pt reports breast pain    Onset- 6 mons to one year  Location- right breast at 12 oclock and medial right breast  Duration- intermittent- may last a few hours and go away a few days or weeks then returns for a few days  Quality- soreness  Rates as-8  Pt relates just recently noted a nodule along the right breast nipple incision line- no change since noting - tender to palpation.   Changed- with movement change- feels better when holds breast close to chest  Change in meds, physical activity, caffeine or supplements- none     April of 2020, age of 39 y/o, she presented to PCP with complaints of breast lump near sternum, painful and swollen.  Mammography/ultrasound breast showed a new 2.5 cm mass left breast at 9 Oclock middle depth.  Biopsy revealed invasive ductal carcinoma, ER positive by 1%, NH negative and HER2 Elieser negative, stage at pT2 N0 M0.  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     She underwent mastectomy on 06/24/2020 and the tumor was noted to be 3.1 cm in size.  0/16 lymph nodes were positive.  Tumor was essentially treated at triple negative and chemotherapy was recommended.  Echocardiogram  performed May of 2020 showed ejection fraction of 55%.  Patient was treated with dose dense AC followed by weekly Taxol.  She received 3 cycles out of 12 of single agent Taxol with the last one being in October of 2020 before she started complaining of worsening cognitive abilities and speech problems.  Chemotherapy was discontinued.      Adjuvant radiation was not recommended as patient's tumor margins were noted to be about 1.3 cm, she had negative lymph nodes and tumor was T2.      Endocrine therapy was not recommended.     She has since been observed.  has mediport in place and last flush 1/30/2023     Denies fever, chills, nausea chest pain shortness of breath, abdominal pain, or dysuria.  Denies nipple inversion, breast pain palpable mass.     With regards to speech difficulty and cognitive impairment, MRI of the brain was obtained on 10/13/2020 and showed a left frontal meningioma with no acute intracranial pathology, repeat MRI was recommended in 6 months.    5/24/2021- MRI of brain revealed-   9 mm round extra-axial enhancing mass overlying the left frontal lobe consistent with a small meningioma.  No other enhancing intracranial lesion identified.  10/2021 repeat brain MRI revealed stable findings    12/29/2022- MRI brain  EXAMINATION:  MRI BRAIN W WO CONTRAST     CLINICAL HISTORY:  Headache, chronic, new features or increased frequency;history of meningioma- having new onset of headaches;.  Benign neoplasm of meninges, unspecified     TECHNIQUE:  Multiplanar multisequence MR imaging of the brain was performed before and after the administration of 10 mL Gadavist  intravenous contrast.     COMPARISON:  10/8/21     FINDINGS:  Intracranial compartment:     Ventricles and sulci are normal in size for age without evidence of hydrocephalus. No extra-axial blood or fluid collections.     Stable mild T2 prolongation in the left insular region.  No evidence of acute hemorrhage.  Grossly stable 9 x 10 mm enhancing  left frontal meningioma with mild mass effect on the adjacent left frontal lobe without evidence of signal within the brain parenchyma.  No abnormal enhancement.     Normal vascular flow voids are preserved.     Skull/extracranial contents (limited evaluation): Bone marrow signal intensity is normal.     Impression:     No acute/subacute infarct, midline shift or extra-axial fluid collection.     Stable left frontal meningioma.     No additional acute findings are present.         She complains of intermittent headache mostly frontal. Takes tylenol and ibuprofen with no relief- had eye exam recently with no improvement with glasses change- no nausea. No weakness    Last PET 5/26/2021- no evidence of metastatic disease  Last visit with Dr. Martin 9/14/2022    11/15/2022- Plastic surgery procedure left expander placement and right breast reduction  1/2023 pt had left expander removed due to infection    Genetic testing revealed negative Myrisk- had a mutation of the TP53 gene- classified as inconclusive  TP53 can be related to osteosarcomas, breast osteosarcomas and hematologic malignancies     Family history is pertinent colon cancer, prostate cancer esophageal cancer.  No family history breast or ovarian cancer.    ECOG SCORE    2 - Capable of all selfcare but unable to carry out any work activities, active > 50% of hours    Past Medical History:   Diagnosis Date    Anxiety     Asthma 11/8/2022    Breast cancer     Chest pain     Chest pain 07/02/2021    Cholecystitis without calculus     Decreased ROM of left shoulder 02/15/2022    Diabetes mellitus     Dizziness     Elevated C-reactive protein (CRP)     Essential hypertension     Fall 10/11/2021    Memory change     EVERARDO (obstructive sleep apnea)     Osteoarthritis     Other specified disorders of thyroid     Pre-diabetes 04/15/2021    Chronic, Stable, cont metformin    Screening mammogram, encounter for 04/15/2021    Shoulder injury     SOB (shortness of breath)      Stroke     Thyroiditis     Tingling of left upper extremity 10/29/2021    Vision disturbance 04/30/2021    Weakness of shoulder 02/15/2022     Past Surgical History:   Procedure Laterality Date    ARTHROSCOPIC REPAIR OF ROTATOR CUFF OF SHOULDER Left 1/27/2022    Procedure: REPAIR, ROTATOR CUFF, ARTHROSCOPIC;  Surgeon: Francisco Mathews MD;  Location: [facility];  Service: Orthopedics;  Laterality: Left;    ARTHROSCOPY OF SHOULDER WITH DECOMPRESSION OF SUBACROMIAL SPACE Left 1/27/2022    Procedure: ARTHROSCOPY, SHOULDER, WITH SUBACROMIAL SPACE DECOMPRESSION;  Surgeon: Francisco Mathews MD;  Location: [facility] OR;  Service: Orthopedics;  Laterality: Left;    BREAST BIOPSY      COLONOSCOPY N/A 5/31/2022    Procedure: COLONOSCOPY;  Surgeon: Km Odom MD;  Location: [facility];  Service: Endoscopy;  Laterality: N/A;    ESOPHAGOGASTRODUODENOSCOPY N/A 5/31/2022    Procedure: EGD (ESOPHAGOGASTRODUODENOSCOPY) ;  Surgeon: Km Odom MD;  Location: [facility];  Service: Endoscopy;  Laterality: N/A;    FIXATION OF TENDON Left 1/27/2022    Procedure: FIXATION, TENDON;  Surgeon: Francisco Mathews MD;  Location: [facility] OR;  Service: Orthopedics;  Laterality: Left;    HYSTERECTOMY      INJECTION OF ANESTHETIC AGENT INTO SACROILIAC JOINT Bilateral 7/29/2022    Procedure: Bilateral SIJ +  Bilateral GT Bursa Injection RN IV Sedation;  Surgeon: Bisi Cochran MD;  Location: [facility];  Service: Pain Management;  Laterality: Bilateral;    INJECTION OF JOINT Left 11/12/2021    Procedure: Left suprascapular and axillary injection with local;  Surgeon: Bisi Cochran MD;  Location: [facility];  Service: Pain Management;  Laterality: Left;    INJECTION OF JOINT Bilateral 7/29/2022    Procedure: Bilateral SIJ + Bilateral GT Bursa Injection RN IV Sedation;  Surgeon: Bisi Cochran MD;  Location: [facility];  Service: Pain Management;  Laterality: Bilateral;    INSERTION OF BREAST TISSUE EXPANDER Left  11/15/2022    Procedure: INSERTION, TISSUE EXPANDER, BREAST;  Surgeon: Collin Pinto MD;  Location: [facility] OR;  Service: Plastics;  Laterality: Left;    MASTECTOMY      ROBOT-ASSISTED CHOLECYSTECTOMY USING DA ROBERTA XI N/A 8/29/2022    Procedure: XI ROBOTIC CHOLECYSTECTOMY;  Surgeon: Raeann Bhandari DO;  Location: [facility] OR;  Service: General;  Laterality: N/A;    TISSUE EXPANDER REMOVAL Left 1/4/2023    Procedure: REMOVAL, TISSUE EXPANDER;  Surgeon: Collin Pinto MD;  Location: [facility] OR;  Service: Plastics;  Laterality: Left;    TOTAL REDUCTION MAMMOPLASTY Right 11/15/2022    Procedure: MAMMOPLASTY, REDUCTION;  Surgeon: Collin Pinto MD;  Location: [facility] OR;  Service: Plastics;  Laterality: Right;     Family History   Problem Relation Age of Onset    Colon cancer Mother 53        partial colectomy    Stroke Mother     Heart disease Mother     Diabetes Sister     Diabetes Brother     Diabetes Brother     Diabetes Brother     No Known Problems Maternal Grandmother     Prostate cancer Maternal Grandfather 70    Alcohol abuse Maternal Grandfather     No Known Problems Paternal Grandmother     No Known Problems Paternal Grandfather     Throat cancer Maternal Aunt         smoking hx    Lung cancer Maternal Uncle         hx smoking    Breast cancer Other 37        chemotherapy, planning for BL mastectomy    No Known Problems Other      Social History     Socioeconomic History    Marital status:    Tobacco Use    Smoking status: Never    Smokeless tobacco: Never   Substance and Sexual Activity    Alcohol use: Not Currently    Drug use: Not Currently     Types: Marijuana     Comment: none currently    Sexual activity: Yes       Current Outpatient Medications   Medication Sig Dispense Refill    albuterol (PROVENTIL/VENTOLIN HFA) 90 mcg/actuation inhaler Inhale 2 puffs into the lungs every 4 (four) hours as needed for Wheezing. Rescue 8.5 g 1    ammonium lactate 12 % Crea Apply 1 Act topically 2 (two) times  daily. To feet. 140 g 2    ascorbic acid, vitamin C, (VITAMIN C) 100 MG tablet Take 100 mg by mouth once daily.      aspirin (ECOTRIN) 81 MG EC tablet Take 1 tablet (81 mg total) by mouth once daily. 360 tablet 1    budesonide-formoterol 160-4.5 mcg (SYMBICORT) 160-4.5 mcg/actuation HFAA Inhale 2 puffs into the lungs every 12 (twelve) hours. Controller 10.2 g 3    busPIRone (BUSPAR) 15 MG tablet Take 1 tablet (15 mg total) by mouth 3 (three) times daily. 270 tablet 3    celecoxib (CELEBREX) 200 MG capsule Take 1 capsule (200 mg total) by mouth 2 (two) times daily. 28 capsule 0    cephALEXin (KEFLEX) 500 MG capsule TAKE 1 CAPSULE BY MOUTH EVERY 6 HOURS FOR 14 DAYS 56 capsule 0    dulaglutide (TRULICITY) 4.5 mg/0.5 mL pen injector Inject 4.5 mg into the skin every 7 days. 4 pen 0    EScitalopram oxalate (LEXAPRO) 20 MG tablet Take 1 tablet (20 mg total) by mouth once daily. 90 tablet 3    EUTHYROX 50 mcg tablet Take 1 tablet (50 mcg total) by mouth every morning. 90 tablet 1    gabapentin (NEURONTIN) 300 MG capsule Take 1 capsule (300 mg total) by mouth 2 (two) times daily. 180 capsule 1    hydroCHLOROthiazide (HYDRODIURIL) 25 MG tablet Take 1 tablet (25 mg total) by mouth daily as needed (edema, swelling). 90 tablet 3    HYDROcodone-acetaminophen (NORCO) 5-325 mg per tablet Take 1 tablet by mouth every 6 (six) hours as needed for pain. 28 tablet 0    hydrocortisone 0.5 % cream Apply topically 2 (two) times daily.      LATUDA 20 mg Tab tablet Take 20 mg by mouth once daily.      linaCLOtide (LINZESS) 145 mcg Cap capsule Take 1 capsule (145 mcg total) by mouth once daily. 90 capsule 3    magnesium oxide (MAG-OX) 400 mg (241.3 mg magnesium) tablet Take 1 tablet (400 mg total) by mouth once daily. 90 tablet 1    metFORMIN (GLUCOPHAGE-XR) 500 MG ER 24hr tablet SMARTSI Tablet(s) By Mouth Every Evening 90 tablet 3    multivit with min-folic acid (ADULT MULTIVITAMIN GUMMIES) 200 mcg Chew Take by mouth.      olopatadine  (PATANOL) 0.1 % ophthalmic solution       ondansetron (ZOFRAN) 4 MG tablet Take 1 tablet (4 mg total) by mouth every 6 (six) hours as needed for Nausea. 10 tablet 1    ondansetron (ZOFRAN-ODT) 8 MG TbDL DISSOLVE 1 TABLET ON TONGUE EVERY 6 HOURS AS NEEDED FOR NAUSEA 10 tablet 0    oxybutynin (DITROPAN-XL) 5 MG TR24 Take 1 tablet (5 mg total) by mouth once daily. 90 tablet 3    oxyCODONE (ROXICODONE) 5 MG immediate release tablet Take 1 tablet (5 mg total) by mouth every 6 (six) hours as needed for Pain. 20 tablet 0    pantoprazole (PROTONIX) 40 MG tablet Take 1 tablet (40 mg total) by mouth once daily. 90 tablet 3    pravastatin (PRAVACHOL) 40 MG tablet Take 1 tablet (40 mg total) by mouth every evening. 90 tablet 3    traZODone (DESYREL) 150 MG tablet Take 1 tablet (150 mg total) by mouth every evening. 90 tablet 3     No current facility-administered medications for this visit.     Facility-Administered Medications Ordered in Other Visits   Medication Dose Route Frequency Provider Last Rate Last Admin    lactated ringers infusion   Intravenous Continuous Maty Thompson MD 10 mL/hr at 01/27/22 0636 New Bag at 01/04/23 1603     Review of patient's allergies indicates:   Allergen Reactions    Lisinopril     Lurasidone Other (See Comments)     Uncontrolled movement       Review of Systems   Constitutional: Negative.    HENT: Negative.     Eyes: Negative.    Respiratory: Negative.     Cardiovascular: Negative.    Gastrointestinal: Negative.         No reflux   Endocrine: Negative.    Genitourinary: Negative.    Musculoskeletal: Negative.    Skin: Negative.    Allergic/Immunologic: Negative.    Neurological: Negative.    Hematological: Negative.  Negative for adenopathy.   Psychiatric/Behavioral: Negative.       Breast: no nipple discharge - see HPI.   Objective:      Vitals:    02/01/23 1038   BP: 112/73   Pulse: (!) 58   Resp: 16   Temp: 98.2 °F (36.8 °C)   TempSrc: Oral   SpO2: 98%   Weight: 125.7 kg (277 lb 1.9 oz)  "  Height: 5' 7" (1.702 m)       Physical Exam  Constitutional:       Appearance: She is well-developed.   HENT:      Head: Normocephalic and atraumatic.      Right Ear: External ear normal.      Left Ear: External ear normal.      Mouth/Throat:      Pharynx: No oropharyngeal exudate.   Eyes:      General: No scleral icterus.        Right eye: No discharge.         Left eye: No discharge.      Conjunctiva/sclera: Conjunctivae normal.      Pupils: Pupils are equal, round, and reactive to light.   Neck:      Thyroid: No thyromegaly.   Cardiovascular:      Rate and Rhythm: Normal rate and regular rhythm.      Heart sounds: Normal heart sounds.   Pulmonary:      Effort: Pulmonary effort is normal.      Breath sounds: Normal breath sounds.   Chest:   Breasts:     Right: No inverted nipple, mass, nipple discharge, skin change or tenderness.      Left: No inverted nipple, mass, nipple discharge, skin change or tenderness.          Comments: right breast mastopexy   Abdominal:      General: Bowel sounds are normal.      Palpations: Abdomen is soft.   Musculoskeletal:         General: Normal range of motion.      Right shoulder: No crepitus. Normal strength.      Cervical back: Normal range of motion and neck supple.   Lymphadenopathy:      Head:      Right side of head: No submental, submandibular, tonsillar, preauricular, posterior auricular or occipital adenopathy.      Left side of head: No submental, submandibular, tonsillar, preauricular, posterior auricular or occipital adenopathy.      Cervical: No cervical adenopathy.      Right cervical: No superficial or posterior cervical adenopathy.     Left cervical: No superficial or posterior cervical adenopathy.      Upper Body:      Right upper body: No supraclavicular or axillary adenopathy.      Left upper body: No supraclavicular or axillary adenopathy.   Skin:     General: Skin is warm and dry.      Coloration: Skin is not pale.      Findings: No erythema or rash. "   Neurological:      General: No focal deficit present.      Mental Status: She is alert and oriented to person, place, and time.      Cranial Nerves: No cranial nerve deficit or facial asymmetry.      Sensory: Sensation is intact. No sensory deficit.      Motor: No weakness or tremor.      Coordination: Coordination normal.      Deep Tendon Reflexes: Reflexes are normal and symmetric.   Psychiatric:         Behavior: Behavior normal.         Thought Content: Thought content normal.         Judgment: Judgment normal.       Lab Review: CBC:   Lab Results   Component Value Date    WBC 5.75 09/16/2022    RBC 4.82 09/16/2022    HGB 13.9 09/16/2022    HCT 43.2 09/16/2022     09/16/2022     BMP:   Lab Results   Component Value Date    GLU 74 09/16/2022     09/16/2022    K 4.5 09/16/2022    CL 99 09/16/2022    CO2 32 (H) 09/16/2022    BUN 8 09/16/2022    CREATININE 0.9 12/29/2022    CALCIUM 10.0 09/16/2022          Assessment:       1. Status post left mastectomy    2. 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 Malignant neoplasm of left breast in female, estrogen receptor negative, unspecified site of breast    4. Encounter for follow-up surveillance of breast cancer    5. Counseling on health promotion and disease prevention    6. Counseling and coordination of care    7. Mastodynia    8. Malignant neoplasm of breast associated with mutation in TP53 gene    9. Subareolar mass of right breast            Plan:       Malignant neoplasm of left breast in female, estrogen receptor negative  Stage IIA (pT2 pN0 cM0) invasive ductal carcinoma, ER 1%, LA negative HER2 Elieser negative status post left breast mastectomy and adjuvant chemotherapy. Completed dose dense AC plus 3/12 weekly Taxol.      Restaging PET-CT scan from 05/26/2021 showed no FDG PET/CT findings to suggest recurrent or metastatic disease. Diagnostic mammogram from 02/23/2022 showed no evidence of malignancy in the right breast.    S/p right breast mastopexy 11/2022-  and left breast expander removal 1/2023 for infection    Palpable right breast nodule at 1 oclock areolar region over scar- Recommend right diagnostic mammo and ultrasound for further evaluation- will communicate results over my chart per pt request    Right breast pain- most likely related to hormonal fluctuations   Recommend good support bra, decrease caffeine fat and salt intake. May try heat or ice, topical analgesics, heat patches, vit E, fish oil or evening primrose oil. If not contraindicated may try tylenol or nsaid for 3 days with food to decrease inflammation.    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     Thyroid nodule  Was seen by Dr. Erickson, biopsy in December of 2021 revealed benign tissue.     Meningioma  History of -  MRI of brain that revealed a 9 mm round extra-axial enhancing mass overlying the left frontal lobe consistent with a small meningioma.  No other enhancing intracranial lesion identified. Stable [id number]    Microcytic anemia  Microcytic anemia of unknown etiology.  Resolved following IV iron therapy.  Reviewed recent upper and lower endoscopies that were unremarkable. 5/2022- 10 year f/u colon recommended     Obesity, morbid, BMI 40.0-49.9  Continue to encourage lifestyle modification and exercise as tolerated.        ?         ?   Follow-Up: Follow up in about 6 weeks if imaging results are negative . Will recheck vs refer for excision of abnormality

## 2023-02-01 ENCOUNTER — OFFICE VISIT (OUTPATIENT)
Dept: SURGERY | Facility: CLINIC | Age: 43
End: 2023-02-01
Payer: MEDICARE

## 2023-02-01 VITALS
TEMPERATURE: 98 F | SYSTOLIC BLOOD PRESSURE: 112 MMHG | RESPIRATION RATE: 16 BRPM | WEIGHT: 277.13 LBS | HEIGHT: 67 IN | OXYGEN SATURATION: 98 % | DIASTOLIC BLOOD PRESSURE: 73 MMHG | BODY MASS INDEX: 43.49 KG/M2 | HEART RATE: 58 BPM

## 2023-02-01 DIAGNOSIS — Z71.89 COUNSELING AND COORDINATION OF CARE: ICD-10-CM

## 2023-02-01 DIAGNOSIS — Z90.12 STATUS POST LEFT MASTECTOMY: Primary | ICD-10-CM

## 2023-02-01 DIAGNOSIS — C50.912 MALIGNANT NEOPLASM OF LEFT BREAST IN FEMALE, ESTROGEN RECEPTOR NEGATIVE, UNSPECIFIED SITE OF BREAST: ICD-10-CM

## 2023-02-01 DIAGNOSIS — Z71.89 COUNSELING ON HEALTH PROMOTION AND DISEASE PREVENTION: ICD-10-CM

## 2023-02-01 DIAGNOSIS — Z08 ENCOUNTER FOR FOLLOW-UP SURVEILLANCE OF BREAST CANCER: ICD-10-CM

## 2023-02-01 DIAGNOSIS — N63.41 SUBAREOLAR MASS OF RIGHT BREAST: ICD-10-CM

## 2023-02-01 DIAGNOSIS — Z15.89 MUTATION IN TP53 GENE: ICD-10-CM

## 2023-02-01 DIAGNOSIS — N64.4 MASTODYNIA: ICD-10-CM

## 2023-02-01 DIAGNOSIS — Z15.89 MALIGNANT NEOPLASM OF BREAST ASSOCIATED WITH MUTATION IN TP53 GENE: ICD-10-CM

## 2023-02-01 DIAGNOSIS — Z85.3 ENCOUNTER FOR FOLLOW-UP SURVEILLANCE OF BREAST CANCER: ICD-10-CM

## 2023-02-01 DIAGNOSIS — Z15.02 MALIGNANT NEOPLASM OF BREAST ASSOCIATED WITH MUTATION IN TP53 GENE: ICD-10-CM

## 2023-02-01 DIAGNOSIS — Z15.01 MUTATION IN TP53 GENE: ICD-10-CM

## 2023-02-01 DIAGNOSIS — Z17.1 MALIGNANT NEOPLASM OF LEFT BREAST IN FEMALE, ESTROGEN RECEPTOR NEGATIVE, UNSPECIFIED SITE OF BREAST: ICD-10-CM

## 2023-02-01 DIAGNOSIS — C50.919 MALIGNANT NEOPLASM OF BREAST ASSOCIATED WITH MUTATION IN TP53 GENE: ICD-10-CM

## 2023-02-01 DIAGNOSIS — Z15.09 MALIGNANT NEOPLASM OF BREAST ASSOCIATED WITH MUTATION IN TP53 GENE: ICD-10-CM

## 2023-02-01 DIAGNOSIS — Z15.09 MUTATION IN TP53 GENE: ICD-10-CM

## 2023-02-01 PROCEDURE — 99999 PR PBB SHADOW E&M-EST. PATIENT-LVL V: CPT | Mod: PBBFAC,,, | Performed by: NURSE PRACTITIONER

## 2023-02-01 PROCEDURE — 99215 OFFICE O/P EST HI 40 MIN: CPT | Mod: S$PBB,,, | Performed by: NURSE PRACTITIONER

## 2023-02-01 PROCEDURE — 99999 PR PBB SHADOW E&M-EST. PATIENT-LVL V: ICD-10-PCS | Mod: PBBFAC,,, | Performed by: NURSE PRACTITIONER

## 2023-02-01 PROCEDURE — 99215 PR OFFICE/OUTPT VISIT, EST, LEVL V, 40-54 MIN: ICD-10-PCS | Mod: S$PBB,,, | Performed by: NURSE PRACTITIONER

## 2023-02-01 PROCEDURE — 99215 OFFICE O/P EST HI 40 MIN: CPT | Mod: PBBFAC | Performed by: NURSE PRACTITIONER

## 2023-02-08 ENCOUNTER — OFFICE VISIT (OUTPATIENT)
Dept: HEMATOLOGY/ONCOLOGY | Facility: CLINIC | Age: 43
End: 2023-02-08
Payer: MEDICARE

## 2023-02-08 VITALS
DIASTOLIC BLOOD PRESSURE: 85 MMHG | SYSTOLIC BLOOD PRESSURE: 128 MMHG | RESPIRATION RATE: 20 BRPM | BODY MASS INDEX: 43.46 KG/M2 | WEIGHT: 276.88 LBS | OXYGEN SATURATION: 98 % | HEART RATE: 59 BPM | TEMPERATURE: 98 F | HEIGHT: 67 IN

## 2023-02-08 DIAGNOSIS — Z15.01 MUTATION IN TP53 GENE: Primary | ICD-10-CM

## 2023-02-08 DIAGNOSIS — Z15.09 MUTATION IN TP53 GENE: Primary | ICD-10-CM

## 2023-02-08 DIAGNOSIS — Z15.89 MUTATION IN TP53 GENE: Primary | ICD-10-CM

## 2023-02-08 PROCEDURE — 99213 PR OFFICE/OUTPT VISIT, EST, LEVL III, 20-29 MIN: ICD-10-PCS | Mod: S$PBB,,, | Performed by: INTERNAL MEDICINE

## 2023-02-08 PROCEDURE — 99999 PR PBB SHADOW E&M-EST. PATIENT-LVL III: CPT | Mod: PBBFAC,,, | Performed by: INTERNAL MEDICINE

## 2023-02-08 PROCEDURE — 99213 OFFICE O/P EST LOW 20 MIN: CPT | Mod: S$PBB,,, | Performed by: INTERNAL MEDICINE

## 2023-02-08 PROCEDURE — 99213 OFFICE O/P EST LOW 20 MIN: CPT | Mod: PBBFAC | Performed by: INTERNAL MEDICINE

## 2023-02-08 PROCEDURE — 99999 PR PBB SHADOW E&M-EST. PATIENT-LVL III: ICD-10-PCS | Mod: PBBFAC,,, | Performed by: INTERNAL MEDICINE

## 2023-02-08 NOTE — PROGRESS NOTES
Subjective:      Patient ID: Sangeetha June is a 43 y.o. female.    Chief Complaint: No chief complaint on file.      HPI: This is a 43 year old woman with history of stage IIA left breast cancer  ER 1%,/FL negative, HER2 negative.- s/p mastectomy followed by dose dese AC plus 3/12 weekly Taxol. Taxol was stopped after cycle 3 due to intolerance. TP53 gene mutation was positive. She also has history of small left frontal meningioma followed with periodic MRI.    Review of Systems   Constitutional:  Negative for chills and fever.   HENT:  Negative for mouth sores and sore throat.    Respiratory:  Negative for cough and shortness of breath.    Cardiovascular:  Negative for chest pain and palpitations.   Gastrointestinal:  Negative for abdominal pain and blood in stool.   Genitourinary:  Negative for hematuria.   Neurological:  Negative for dizziness and headaches.   Psychiatric/Behavioral:  Negative for agitation and confusion.      Objective:     Physical Exam  Constitutional:       Appearance: Normal appearance.   HENT:      Head: Normocephalic and atraumatic.      Mouth/Throat:      Pharynx: Oropharynx is clear. No oropharyngeal exudate or posterior oropharyngeal erythema.   Eyes:      Pupils: Pupils are equal, round, and reactive to light.   Cardiovascular:      Heart sounds:     No friction rub. No gallop.   Pulmonary:      Effort: No respiratory distress.      Breath sounds: Normal breath sounds. No rhonchi.   Abdominal:      Tenderness: There is no abdominal tenderness. There is no guarding or rebound.   Musculoskeletal:      Cervical back: Neck supple.      Right lower leg: No edema.      Left lower leg: No edema.   Skin:     Findings: No erythema or rash.   Neurological:      Mental Status: She is alert and oriented to person, place, and time.   Psychiatric:         Mood and Affect: Mood normal.         Behavior: Behavior normal.         Thought Content: Thought content normal.         Judgment:  Judgment normal.       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  2. History of small meningioma     Plan:     Continue right breast yearly mammograms.  Follow Meningioma with periodic brain MRI.  RTC: 6 months - CBC/CMP.

## 2023-02-15 ENCOUNTER — HOSPITAL ENCOUNTER (OUTPATIENT)
Dept: RADIOLOGY | Facility: HOSPITAL | Age: 43
Discharge: HOME | End: 2023-02-15
Attending: NURSE PRACTITIONER
Payer: MEDICARE

## 2023-02-15 ENCOUNTER — TELEPHONE (OUTPATIENT)
Dept: SURGERY | Facility: CLINIC | Age: 43
End: 2023-02-15
Payer: MEDICARE

## 2023-02-15 VITALS — BODY MASS INDEX: 43.25 KG/M2 | HEIGHT: 67 IN | WEIGHT: 275.56 LBS

## 2023-02-15 DIAGNOSIS — N28.89 OTHER SPECIFIED DISORDERS OF KIDNEY AND URETER: Primary | ICD-10-CM

## 2023-02-15 DIAGNOSIS — Z15.89 MALIGNANT NEOPLASM OF BREAST ASSOCIATED WITH MUTATION IN TP53 GENE: ICD-10-CM

## 2023-02-15 DIAGNOSIS — Z15.09 MALIGNANT NEOPLASM OF BREAST ASSOCIATED WITH MUTATION IN TP53 GENE: ICD-10-CM

## 2023-02-15 DIAGNOSIS — Z15.02 MALIGNANT NEOPLASM OF BREAST ASSOCIATED WITH MUTATION IN TP53 GENE: ICD-10-CM

## 2023-02-15 DIAGNOSIS — C50.919 MALIGNANT NEOPLASM OF BREAST ASSOCIATED WITH MUTATION IN TP53 GENE: ICD-10-CM

## 2023-02-15 DIAGNOSIS — N64.4 MASTODYNIA: ICD-10-CM

## 2023-02-15 DIAGNOSIS — N63.41 SUBAREOLAR MASS OF RIGHT BREAST: ICD-10-CM

## 2023-02-15 PROCEDURE — 76700 US EXAM ABDOM COMPLETE: CPT | Mod: TC

## 2023-02-15 PROCEDURE — 77061 BREAST TOMOSYNTHESIS UNI: CPT | Mod: 26,RT,, | Performed by: RADIOLOGY

## 2023-02-15 PROCEDURE — 77061 MAMMO DIGITAL DIAGNOSTIC RIGHT WITH TOMO: ICD-10-PCS | Mod: 26,RT,, | Performed by: RADIOLOGY

## 2023-02-15 PROCEDURE — 77065 DX MAMMO INCL CAD UNI: CPT | Mod: 26,RT,, | Performed by: RADIOLOGY

## 2023-02-15 PROCEDURE — 76642 US BREAST RIGHT LIMITED: ICD-10-PCS | Mod: 26,RT,, | Performed by: RADIOLOGY

## 2023-02-15 PROCEDURE — 77065 MAMMO DIGITAL DIAGNOSTIC RIGHT WITH TOMO: ICD-10-PCS | Mod: 26,RT,, | Performed by: RADIOLOGY

## 2023-02-15 PROCEDURE — 77065 DX MAMMO INCL CAD UNI: CPT | Mod: TC,RT

## 2023-02-15 PROCEDURE — 76700 US ABDOMEN COMPLETE: ICD-10-PCS | Mod: 26,,, | Performed by: RADIOLOGY

## 2023-02-15 PROCEDURE — 76642 ULTRASOUND BREAST LIMITED: CPT | Mod: TC,RT

## 2023-02-15 PROCEDURE — 76700 US EXAM ABDOM COMPLETE: CPT | Mod: 26,,, | Performed by: RADIOLOGY

## 2023-02-15 PROCEDURE — 76642 ULTRASOUND BREAST LIMITED: CPT | Mod: 26,RT,, | Performed by: RADIOLOGY

## 2023-02-15 NOTE — TELEPHONE ENCOUNTER
----- Message from Elli Huang NP sent at 2/15/2023  3:28 PM CST -----  Regarding: MRI abd  Please schedule for MRI of abd with and without contrast.  Elli

## 2023-02-16 ENCOUNTER — PATIENT MESSAGE (OUTPATIENT)
Dept: HEMATOLOGY/ONCOLOGY | Facility: CLINIC | Age: 43
End: 2023-02-16
Payer: MEDICARE

## 2023-02-16 ENCOUNTER — PATIENT MESSAGE (OUTPATIENT)
Dept: SURGERY | Facility: CLINIC | Age: 43
End: 2023-02-16
Payer: MEDICARE

## 2023-02-19 NOTE — PROGRESS NOTES
Subjective:       Patient ID: Sangeetha June is a 43 y.o. female.    Chief Complaint: Diabetes    Diabetes  She presents for her follow-up diabetic visit. She has type 2 diabetes mellitus. Her disease course has been stable. Pertinent negatives for diabetes include no chest pain. Pertinent negatives for hypoglycemia complications include no blackouts and no hospitalization. Symptoms are stable.   Review of Systems   Respiratory:  Negative for shortness of breath.    Cardiovascular:  Negative for chest pain.   Gastrointestinal:  Negative for abdominal pain.     Objective:      Physical Exam  Vitals and nursing note reviewed.   Constitutional:       General: She is not in acute distress.     Appearance: Normal appearance. She is well-developed. She is not diaphoretic.   HENT:      Head: Normocephalic and atraumatic.   Pulmonary:      Effort: Pulmonary effort is normal. No respiratory distress.      Breath sounds: Normal breath sounds. No wheezing.   Skin:     General: Skin is warm and dry.      Findings: No erythema or rash.   Neurological:      Mental Status: She is alert.       Assessment:       1. Type 2 diabetes mellitus with other specified complication, without long-term current use of insulin    2. Obesity, morbid, BMI 40.0-49.9    3. Other chronic pain    4. Memory change    5. Vision disturbance    6. Primary hypertension    7. Other hyperlipidemia    8. Type 2 diabetes mellitus without complication, without long-term current use of insulin    9. ANGIE (generalized anxiety disorder)    10. Hypertension, unspecified type    11. Other insomnia    12. Hyperlipidemia, unspecified hyperlipidemia type    13. Hypothyroidism, unspecified type    14. Gait difficulty    15. Meningioma          Plan:     Problem List Items Addressed This Visit          Neuro    Memory change    Overview     Reported memory problems; word-finding problems post chemo         Current Assessment & Plan      to eval pt and assist  with pt medication         Chronic pain       Psychiatric    ANGIE (generalized anxiety disorder)    Overview     Chronic, Stable, cont lexapro and buspar         Relevant Medications    EScitalopram oxalate (LEXAPRO) 20 MG tablet    busPIRone (BUSPAR) 15 MG tablet       Ophtho    Vision disturbance    Relevant Orders    Ambulatory referral/consult to Optometry       Cardiac/Vascular    Hyperlipidemia    Overview     Chronic, Stable, cont a statin         Relevant Medications    pravastatin (PRAVACHOL) 40 MG tablet    Hypertension    Overview     Chronic, Stable, cont hctz         Relevant Medications    hydroCHLOROthiazide (HYDRODIURIL) 25 MG tablet       Oncology    Meningioma       Endocrine    Obesity, morbid, BMI 40.0-49.9    Hypothyroidism    Overview     Chronic, Stable, cont synthroid         Relevant Medications    EUTHYROX 50 mcg tablet    Other Relevant Orders    Thyroid peroxidase antibody (Completed)    TSH (Completed)    T3 (Completed)    T4 (Completed)    T4, free (Completed)    Type 2 diabetes mellitus, without long-term current use of insulin - Primary    Overview     cont metformin, trulicity         Current Assessment & Plan     HH to monitor and assist with meds         Relevant Medications    metFORMIN (GLUCOPHAGE-XR) 500 MG ER 24hr tablet    semaglutide (OZEMPIC) 0.25 mg or 0.5 mg(2 mg/1.5 mL) pen injector    Other Relevant Orders    Hemoglobin A1C (Completed)       Other    Other insomnia    Overview     Chronic, stable, cont trazodone         Relevant Medications    traZODone (DESYREL) 150 MG tablet    Gait difficulty    Current Assessment & Plan     HH to eval for PT / OT

## 2023-02-20 ENCOUNTER — LAB VISIT (OUTPATIENT)
Dept: LAB | Facility: HOSPITAL | Age: 43
End: 2023-02-20
Attending: FAMILY MEDICINE
Payer: MEDICARE

## 2023-02-20 ENCOUNTER — OFFICE VISIT (OUTPATIENT)
Dept: INTERNAL MEDICINE | Facility: CLINIC | Age: 43
End: 2023-02-20
Payer: MEDICARE

## 2023-02-20 VITALS
BODY MASS INDEX: 42.91 KG/M2 | HEIGHT: 67 IN | SYSTOLIC BLOOD PRESSURE: 110 MMHG | HEART RATE: 76 BPM | WEIGHT: 273.38 LBS | DIASTOLIC BLOOD PRESSURE: 80 MMHG | TEMPERATURE: 98 F

## 2023-02-20 DIAGNOSIS — D32.9 MENINGIOMA: ICD-10-CM

## 2023-02-20 DIAGNOSIS — E11.69 TYPE 2 DIABETES MELLITUS WITH OTHER SPECIFIED COMPLICATION, WITHOUT LONG-TERM CURRENT USE OF INSULIN: Primary | ICD-10-CM

## 2023-02-20 DIAGNOSIS — E78.49 OTHER HYPERLIPIDEMIA: ICD-10-CM

## 2023-02-20 DIAGNOSIS — E11.9 TYPE 2 DIABETES MELLITUS WITHOUT COMPLICATION, WITHOUT LONG-TERM CURRENT USE OF INSULIN: ICD-10-CM

## 2023-02-20 DIAGNOSIS — F41.1 GAD (GENERALIZED ANXIETY DISORDER): ICD-10-CM

## 2023-02-20 DIAGNOSIS — E03.9 HYPOTHYROIDISM, UNSPECIFIED TYPE: ICD-10-CM

## 2023-02-20 DIAGNOSIS — I10 HYPERTENSION, UNSPECIFIED TYPE: ICD-10-CM

## 2023-02-20 DIAGNOSIS — I10 PRIMARY HYPERTENSION: ICD-10-CM

## 2023-02-20 DIAGNOSIS — G47.09 OTHER INSOMNIA: ICD-10-CM

## 2023-02-20 DIAGNOSIS — E66.01 OBESITY, MORBID, BMI 40.0-49.9: ICD-10-CM

## 2023-02-20 DIAGNOSIS — R26.9 GAIT DIFFICULTY: ICD-10-CM

## 2023-02-20 DIAGNOSIS — H53.9 VISION DISTURBANCE: ICD-10-CM

## 2023-02-20 DIAGNOSIS — E78.5 HYPERLIPIDEMIA, UNSPECIFIED HYPERLIPIDEMIA TYPE: ICD-10-CM

## 2023-02-20 DIAGNOSIS — G89.29 OTHER CHRONIC PAIN: ICD-10-CM

## 2023-02-20 DIAGNOSIS — R41.3 MEMORY CHANGE: ICD-10-CM

## 2023-02-20 PROBLEM — M46.1 SACROILIITIS: Status: RESOLVED | Noted: 2022-07-29 | Resolved: 2023-02-20

## 2023-02-20 PROBLEM — F20.9 SCHIZOPHRENIA: Status: RESOLVED | Noted: 2022-11-08 | Resolved: 2023-02-20

## 2023-02-20 LAB
ESTIMATED AVG GLUCOSE: 97 MG/DL (ref 68–131)
HBA1C MFR BLD: 5 % (ref 4–5.6)
T3 SERPL-MCNC: 130 NG/DL (ref 60–180)
T4 FREE SERPL-MCNC: 0.8 NG/DL (ref 0.71–1.51)
T4 SERPL-MCNC: 9.4 UG/DL (ref 4.5–11.5)
TSH SERPL DL<=0.005 MIU/L-ACNC: 1.99 UIU/ML (ref 0.4–4)

## 2023-02-20 PROCEDURE — 84439 ASSAY OF FREE THYROXINE: CPT | Performed by: FAMILY MEDICINE

## 2023-02-20 PROCEDURE — 99999 PR PBB SHADOW E&M-EST. PATIENT-LVL V: CPT | Mod: PBBFAC,,, | Performed by: FAMILY MEDICINE

## 2023-02-20 PROCEDURE — 84436 ASSAY OF TOTAL THYROXINE: CPT | Performed by: FAMILY MEDICINE

## 2023-02-20 PROCEDURE — 84443 ASSAY THYROID STIM HORMONE: CPT | Performed by: FAMILY MEDICINE

## 2023-02-20 PROCEDURE — 36415 COLL VENOUS BLD VENIPUNCTURE: CPT | Mod: PO | Performed by: FAMILY MEDICINE

## 2023-02-20 PROCEDURE — 99215 OFFICE O/P EST HI 40 MIN: CPT | Mod: PBBFAC,PO | Performed by: FAMILY MEDICINE

## 2023-02-20 PROCEDURE — 84480 ASSAY TRIIODOTHYRONINE (T3): CPT | Performed by: FAMILY MEDICINE

## 2023-02-20 PROCEDURE — 99214 PR OFFICE/OUTPT VISIT, EST, LEVL IV, 30-39 MIN: ICD-10-PCS | Mod: S$PBB,,, | Performed by: FAMILY MEDICINE

## 2023-02-20 PROCEDURE — 99999 PR PBB SHADOW E&M-EST. PATIENT-LVL V: ICD-10-PCS | Mod: PBBFAC,,, | Performed by: FAMILY MEDICINE

## 2023-02-20 PROCEDURE — 83036 HEMOGLOBIN GLYCOSYLATED A1C: CPT | Performed by: FAMILY MEDICINE

## 2023-02-20 PROCEDURE — 99214 OFFICE O/P EST MOD 30 MIN: CPT | Mod: S$PBB,,, | Performed by: FAMILY MEDICINE

## 2023-02-20 RX ORDER — METFORMIN HYDROCHLORIDE 500 MG/1
TABLET, EXTENDED RELEASE ORAL
Qty: 90 TABLET | Refills: 1 | Status: SHIPPED | OUTPATIENT
Start: 2023-02-20 | End: 2023-03-22 | Stop reason: SDUPTHER

## 2023-02-20 RX ORDER — DULAGLUTIDE 4.5 MG/.5ML
4.5 INJECTION, SOLUTION SUBCUTANEOUS
Qty: 4 PEN | Refills: 0 | Status: CANCELLED | OUTPATIENT
Start: 2023-02-20

## 2023-02-20 RX ORDER — GABAPENTIN 300 MG/1
300 CAPSULE ORAL 2 TIMES DAILY
Qty: 180 CAPSULE | Refills: 1 | Status: SHIPPED | OUTPATIENT
Start: 2023-02-20 | End: 2023-06-23 | Stop reason: SDUPTHER

## 2023-02-20 RX ORDER — TRAZODONE HYDROCHLORIDE 150 MG/1
150 TABLET ORAL NIGHTLY
Qty: 90 TABLET | Refills: 1 | Status: SHIPPED | OUTPATIENT
Start: 2023-02-20 | End: 2023-06-23 | Stop reason: SDUPTHER

## 2023-02-20 RX ORDER — LEVOTHYROXINE SODIUM 50 UG/1
50 TABLET ORAL EVERY MORNING
Qty: 90 TABLET | Refills: 1 | Status: SHIPPED | OUTPATIENT
Start: 2023-02-20 | End: 2023-06-23 | Stop reason: SDUPTHER

## 2023-02-20 RX ORDER — PRAVASTATIN SODIUM 40 MG/1
40 TABLET ORAL NIGHTLY
Qty: 90 TABLET | Refills: 1 | Status: SHIPPED | OUTPATIENT
Start: 2023-02-20 | End: 2023-05-09 | Stop reason: SDUPTHER

## 2023-02-20 RX ORDER — ESCITALOPRAM OXALATE 20 MG/1
20 TABLET ORAL DAILY
Qty: 90 TABLET | Refills: 1 | Status: SHIPPED | OUTPATIENT
Start: 2023-02-20 | End: 2023-03-22 | Stop reason: SDUPTHER

## 2023-02-20 RX ORDER — BUSPIRONE HYDROCHLORIDE 15 MG/1
15 TABLET ORAL 3 TIMES DAILY
Qty: 270 TABLET | Refills: 1 | Status: SHIPPED | OUTPATIENT
Start: 2023-02-20 | End: 2023-06-23 | Stop reason: SDUPTHER

## 2023-02-20 RX ORDER — PANTOPRAZOLE SODIUM 40 MG/1
40 TABLET, DELAYED RELEASE ORAL DAILY
Qty: 90 TABLET | Refills: 1 | Status: SHIPPED | OUTPATIENT
Start: 2023-02-20 | End: 2023-03-22 | Stop reason: SDUPTHER

## 2023-02-20 RX ORDER — SEMAGLUTIDE 1.34 MG/ML
INJECTION, SOLUTION SUBCUTANEOUS
Qty: 1 PEN | Refills: 0 | Status: SHIPPED | OUTPATIENT
Start: 2023-02-20 | End: 2023-03-24

## 2023-02-20 RX ORDER — HYDROCHLOROTHIAZIDE 25 MG/1
25 TABLET ORAL DAILY PRN
Qty: 90 TABLET | Refills: 1 | Status: SHIPPED | OUTPATIENT
Start: 2023-02-20 | End: 2023-06-23 | Stop reason: SDUPTHER

## 2023-02-21 ENCOUNTER — TELEPHONE (OUTPATIENT)
Dept: SURGERY | Facility: CLINIC | Age: 43
End: 2023-02-21
Payer: MEDICARE

## 2023-02-21 LAB — THYROPEROXIDASE IGG SERPL-ACNC: 254.4 IU/ML

## 2023-02-21 NOTE — TELEPHONE ENCOUNTER
Called pt to inform that her 3/7/23 appt, for mammo is not needed, per NP, and that she will see her after the scheduled MRI. And new appt will be made, pt verbalized understanding, and that she will view on her portal.

## 2023-02-23 ENCOUNTER — PATIENT MESSAGE (OUTPATIENT)
Dept: HEMATOLOGY/ONCOLOGY | Facility: CLINIC | Age: 43
End: 2023-02-23
Payer: MEDICARE

## 2023-02-24 DIAGNOSIS — R94.6 ABNORMAL THYROID EXAM: Primary | ICD-10-CM

## 2023-02-25 PROCEDURE — G0180 PR HOME HEALTH MD CERTIFICATION: ICD-10-PCS | Mod: ,,, | Performed by: FAMILY MEDICINE

## 2023-02-25 PROCEDURE — G0180 MD CERTIFICATION HHA PATIENT: HCPCS | Mod: ,,, | Performed by: FAMILY MEDICINE

## 2023-02-26 ENCOUNTER — PATIENT MESSAGE (OUTPATIENT)
Dept: INTERNAL MEDICINE | Facility: CLINIC | Age: 43
End: 2023-02-26
Payer: MEDICARE

## 2023-02-27 ENCOUNTER — PATIENT MESSAGE (OUTPATIENT)
Dept: INTERNAL MEDICINE | Facility: CLINIC | Age: 43
End: 2023-02-27
Payer: MEDICARE

## 2023-03-07 ENCOUNTER — TELEPHONE (OUTPATIENT)
Dept: INFUSION THERAPY | Facility: HOSPITAL | Age: 43
End: 2023-03-07
Payer: MEDICARE

## 2023-03-07 ENCOUNTER — PATIENT MESSAGE (OUTPATIENT)
Dept: INTERNAL MEDICINE | Facility: CLINIC | Age: 43
End: 2023-03-07
Payer: MEDICARE

## 2023-03-14 ENCOUNTER — DOCUMENTATION ONLY (OUTPATIENT)
Dept: SURGERY | Facility: CLINIC | Age: 43
End: 2023-03-14
Payer: MEDICARE

## 2023-03-14 ENCOUNTER — OFFICE VISIT (OUTPATIENT)
Dept: PSYCHIATRY | Facility: CLINIC | Age: 43
End: 2023-03-14
Payer: MEDICARE

## 2023-03-14 ENCOUNTER — TELEPHONE (OUTPATIENT)
Dept: SURGERY | Facility: CLINIC | Age: 43
End: 2023-03-14
Payer: MEDICARE

## 2023-03-14 ENCOUNTER — INFUSION (OUTPATIENT)
Dept: INFUSION THERAPY | Facility: HOSPITAL | Age: 43
End: 2023-03-14
Attending: PHYSICIAN ASSISTANT
Payer: MEDICARE

## 2023-03-14 VITALS
OXYGEN SATURATION: 98 % | HEART RATE: 67 BPM | SYSTOLIC BLOOD PRESSURE: 116 MMHG | RESPIRATION RATE: 16 BRPM | DIASTOLIC BLOOD PRESSURE: 76 MMHG | TEMPERATURE: 98 F

## 2023-03-14 DIAGNOSIS — C50.912 MALIGNANT NEOPLASM OF LEFT BREAST IN FEMALE, ESTROGEN RECEPTOR NEGATIVE, UNSPECIFIED SITE OF BREAST: Primary | ICD-10-CM

## 2023-03-14 DIAGNOSIS — F41.1 GENERALIZED ANXIETY DISORDER: Primary | ICD-10-CM

## 2023-03-14 DIAGNOSIS — Z17.1 MALIGNANT NEOPLASM OF LEFT BREAST IN FEMALE, ESTROGEN RECEPTOR NEGATIVE, UNSPECIFIED SITE OF BREAST: Primary | ICD-10-CM

## 2023-03-14 PROCEDURE — 63600175 PHARM REV CODE 636 W HCPCS: Performed by: NURSE PRACTITIONER

## 2023-03-14 PROCEDURE — A4216 STERILE WATER/SALINE, 10 ML: HCPCS | Performed by: NURSE PRACTITIONER

## 2023-03-14 PROCEDURE — 96523 IRRIG DRUG DELIVERY DEVICE: CPT

## 2023-03-14 PROCEDURE — 90834 PR PSYCHOTHERAPY W/PATIENT, 45 MIN: ICD-10-PCS | Mod: ,,, | Performed by: SOCIAL WORKER

## 2023-03-14 PROCEDURE — 25000003 PHARM REV CODE 250: Performed by: NURSE PRACTITIONER

## 2023-03-14 PROCEDURE — 90834 PSYTX W PT 45 MINUTES: CPT | Mod: ,,, | Performed by: SOCIAL WORKER

## 2023-03-14 RX ORDER — SODIUM CHLORIDE 0.9 % (FLUSH) 0.9 %
10 SYRINGE (ML) INJECTION
Status: DISCONTINUED | OUTPATIENT
Start: 2023-03-14 | End: 2023-03-14 | Stop reason: HOSPADM

## 2023-03-14 RX ORDER — HEPARIN 100 UNIT/ML
500 SYRINGE INTRAVENOUS
Status: DISCONTINUED | OUTPATIENT
Start: 2023-03-14 | End: 2023-03-14 | Stop reason: HOSPADM

## 2023-03-14 RX ORDER — HEPARIN 100 UNIT/ML
500 SYRINGE INTRAVENOUS
Status: CANCELLED | OUTPATIENT
Start: 2023-03-14

## 2023-03-14 RX ORDER — SODIUM CHLORIDE 0.9 % (FLUSH) 0.9 %
10 SYRINGE (ML) INJECTION
Status: CANCELLED | OUTPATIENT
Start: 2023-03-14

## 2023-03-14 RX ADMIN — HEPARIN 500 UNITS: 100 SYRINGE at 11:03

## 2023-03-14 RX ADMIN — SODIUM CHLORIDE, PRESERVATIVE FREE 10 ML: 5 INJECTION INTRAVENOUS at 11:03

## 2023-03-14 NOTE — PROGRESS NOTES
Pt having right breast tenderness- thinks the nodule is getting bigger- not sure- more tender- has been drinking more tea lately. Will decrease tea intake and see me in 2 weeks for recheck.  Elli

## 2023-03-14 NOTE — PROGRESS NOTES
Individual Psychotherapy (PhD/LCSW)    3/14/2023    Site:  [facility]         Therapeutic Intervention: Met with patient.  Outpatient - Insight oriented psychotherapy 45 min - CPT code 63411    Chief complaint/reason for encounter: depression and anxiety     Interval history and content of current session:  Patient presents to ongoing individual therapy due to depression and anxiety.  She was last in session on 22.  She was admitted to [facility] in September of last year.  She then attended the day program.  She is not sure how long she was in the program.  She was given three months refills of her medication, but she has run out.  She has not seen Dr. Jaquez since 22.  She is willing to return for medication management.  She continues in her relationship with her boyfriend.  He is working overnight at a plant doing pipefitting work.  She reports that their relationship is more stable.  Her father is living with her temporarily.  His long time girlfriend .  They were living in [place].  Her father plans to return to [place] soon.  The patient is dealing with fatigue.  She does get good sleep at night.  She will go to bed at 10pm and she will wake at 8am.  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  She does not believe she can return to the work due to her physical problems.  Note that the patient is grieving her previous independent.  Encourage the patient to do the small tasks that she can at home.  Emphasize the need for follow up with Dr. Jaquez for medication management.  She attended her ex father in law's .  Her son came into town from [place] to attend the .  The patient was happy to have her son there.  Her son tried to go to welding school, but they found out he went to a school that has a bad reputation.  He plans to reenter school soon.    Treatment plan:  Target symptoms: depression, anxiety   Why chosen therapy is appropriate versus another modality: relevant to  diagnosis  Outcome monitoring methods: self-report, observation  Therapeutic intervention type: insight oriented psychotherapy, supportive psychotherapy, interactive psychotherapy     Risk parameters:  Patient reports no suicidal ideation  Patient reports no homicidal ideation  Patient reports no self-injurious behavior  Patient reports no violent behavior     Verbal deficits: None     Patient's response to intervention:  The patient's response to intervention is accepting, motivated.     Progress toward goals and other mental status changes:  The patient's progress toward goals is limited.     Diagnosis:   Generalized Anxiety Disorder     Plan:  individual psychotherapy  medication management by Dr. Jaquez  Psychological testing completed with Americo Garay P.h.D.     Return to clinic: as scheduled     Length of Service (minutes):   45

## 2023-03-14 NOTE — TELEPHONE ENCOUNTER
Called pt to inform that NP states that she does not need scheduled mammo today, and that she recent had mammo in Feb.to cancel appt. Pt verbalixed understanding, but did want NP to be notified that she has been having issues and pain with the knots. NP notified.

## 2023-03-14 NOTE — PROGRESS NOTES
Subjective:       Patient ID: Sangeetha June is a 43 y.o. female.    Chief Complaint: Breast cancer survivorship and f/u    ONCOLOGIC DIAGNOSIS:  Invasive ductal carcinoma, pT2 N0 M0  ?   CURRENT TREATMENT:  None     PAST TREATMENT:   -left breast mastectomy  -adjuvant chemotherapy with dose dense AC followed by 3 cycles of weekly Taxol  ?   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 cancer, sarcomas, CNS tumors, adrenal gland tumors, leukemias and MDS    Last visit with me 2/1/2023     HPI:  43-year-old female with past medical history that is significant for hypertension osteoarthritis thyroiditis, invasive ductal carcinoma status post left breast mastectomy and adjuvant chemotherapy presents today for f/u    Pt reported breast pain about one month ago    Onset- 6 mons to one year  Location- right breast at 12 oclock and medial right breast  Duration- intermittent- may last a few hours and go away a few days or weeks then returns for a few days  Quality- soreness  Rated as-8  Pt relates just recently noted a nodule along the right breast nipple incision line- no change since noting - tender to palpation.   Changed- with movement change- feels better when holds breast close to chest  Change in meds, physical activity, caffeine or supplements- none     April of 2020, age of 41 y/o, she presented to PCP with complaints of breast lump near sternum, painful and swollen.  Mammography/ultrasound breast showed a new 2.5 cm mass left breast at 9 Oclock middle depth.  Biopsy revealed invasive ductal carcinoma, ER positive by 1%, DC negative and HER2 Elieser negative, stage at pT2 N0 M0.  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     She underwent mastectomy on 06/24/2020 and the tumor was noted to be 3.1 cm in size.  0/16 lymph nodes were positive.  Tumor was essentially treated at triple negative and chemotherapy was  recommended.  Echocardiogram performed May of 2020 showed ejection fraction of 55%.  Patient was treated with dose dense AC followed by weekly Taxol.  She received 3 cycles out of 12 of single agent Taxol with the last one being in October of 2020 before she started complaining of worsening cognitive abilities and speech problems.  Chemotherapy was discontinued.      Adjuvant radiation was not recommended as patient's tumor margins were noted to be about 1.3 cm, she had negative lymph nodes and tumor was T2.      Endocrine therapy was not recommended.     She has since been observed.  has mediport in place and last flush 1/30/2023     Denies fever, chills, nausea chest pain shortness of breath, abdominal pain, or dysuria.  Denies nipple inversion, breast pain palpable mass.     With regards to speech difficulty and cognitive impairment, MRI of the brain was obtained on 10/13/2020 and showed a left frontal meningioma with no acute intracranial pathology, repeat MRI was recommended in 6 months.    5/24/2021- MRI of brain revealed-   9 mm round extra-axial enhancing mass overlying the left frontal lobe consistent with a small meningioma.  No other enhancing intracranial lesion identified.  10/2021 repeat brain MRI revealed stable findings    12/29/2022- MRI brain  EXAMINATION:  MRI BRAIN W WO CONTRAST     CLINICAL HISTORY:  Headache, chronic, new features or increased frequency;history of meningioma- having new onset of headaches;.  Benign neoplasm of meninges, unspecified     TECHNIQUE:  Multiplanar multisequence MR imaging of the brain was performed before and after the administration of 10 mL Gadavist  intravenous contrast.     COMPARISON:  10/8/21     FINDINGS:  Intracranial compartment:     Ventricles and sulci are normal in size for age without evidence of hydrocephalus. No extra-axial blood or fluid collections.     Stable mild T2 prolongation in the left insular region.  No evidence of acute hemorrhage.   Grossly stable 9 x 10 mm enhancing left frontal meningioma with mild mass effect on the adjacent left frontal lobe without evidence of signal within the brain parenchyma.  No abnormal enhancement.     Normal vascular flow voids are preserved.     Skull/extracranial contents (limited evaluation): Bone marrow signal intensity is normal.     Impression:     No acute/subacute infarct, midline shift or extra-axial fluid collection.     Stable left frontal meningioma.     No additional acute findings are present.         She complains of intermittent headache mostly frontal. Takes tylenol and ibuprofen with no relief- had eye exam recently with no improvement with glasses change- no nausea. No weakness    Last PET 5/26/2021- no evidence of metastatic disease  Last visit with Dr. Martin 9/14/2022    11/15/2022- Plastic surgery procedure left expander placement and right breast reduction  1/2023 pt had left expander removed due to infection    Genetic testing revealed negative Myrisk- had a mutation of the TP53 gene- classified as inconclusive  TP53 can be related to osteosarcomas, breast osteosarcomas and hematologic malignancies     Family history is pertinent colon cancer, prostate cancer esophageal cancer.  No family history breast or ovarian cancer.    ECOG SCORE    2 - Capable of all selfcare but unable to carry out any work activities, active > 50% of hours      Interval History:    2/15/2023- right diagnostic mammo   Result:   Mammo Digital Diagnostic Right with Darrion  US Breast Right Limited     History:  Patient is 43 y.o. and is seen for diagnostic imaging.     Films Compared:  Compared to: 02/23/2022 Mammo Digital Diagnostic Right with Darrion and 06/01/2021 Mammo Digital Diagnostic Right with Darrion     Findings:  This procedure was performed using tomosynthesis. Computer-aided detection was utilized in the interpretation of this examination.  The right breast has scattered areas of fibroglandular density.  Limited  ultrasound was performed in the retroareolar region and axilla.  Mammo Digital Diagnostic Right with Darrion  Right  Post-Surgical Finding: There are post-surgical findings from a previous breast reduction seen in the right breast.   Lymph Node: There are multiple similar lymph nodes seen in the right axilla on the MLO view. These were not demonstrated previously.  Calcifications: There are benign calcifications seen in the right breast.    An implanted port is partially demonstrated on the MLO view.      Limited US right sub areolar and axillary regions.  There is a mixed echogenic finding in the palpable area of concern that corresponds to the recent surgical scar that is consistent with fat necrosis.   Right axillary lymph nodes have a normal appearance.        Impression:  Mammo Digital Diagnostic Right with Darrion  There is no mammographic evidence of malignancy in the right breast.     US Breast Right Limited  There is no sonographic evidence of malignancy in the right breast.     BI-RADS Category:   Overall: 2 - Benign     Recommendation:  Diagnostic mammogram in 1 year is recommended    2/15/2023- ultrasound abdomen  EXAM: US ABDOMEN COMPLETE     CLINICAL HISTORY: TP53 mutation- at risk for adrenal gland tumors; [C50.919]-Malignant neoplasm of unspecified site of unspecified female breast./[Z15.02]-Genetic susceptibility to malignant neoplasm of ovary./[Z15.89]-Genetic susceptibility to other disease./[Z15.09]     COMPARISON: None available.     TECHNIQUE: Ultrasound evaluation of the abdomen including the use of grayscale, color, and Doppler techniques.     FINDINGS:  Liver: Measures 16.6 cm. Increased hepatic echotexture with decreased through transmission.  Main portal vein flows towards the liver.     Gallbladder: Surgically absent.     Bile ducts: No biliary ductal dilation.  CBD measures 4 mm in diameter.     Kidneys: The right kidney measures 11.2 cm.  No hydronephrosis or nephrolithiasis.  There is a 9 mm  cyst at the inferior pole.     The left kidney measures 12.4 cm.  There is a dominant cyst at the mid kidney measuring up to 7.7 cm.  There are 2 complex left hypoechoic/mixed cystic and solid lesions measuring 2.1 and 1.3 cm respectively.     Pancreas: Visualized segments are unremarkable.     Spleen: The spleen is normal in size, 9.8 cm.  There is a hypoechoic region in the spleen measuring up to 2.6 cm.     Vessels: The visualized segments of the abdominal aorta are normal in caliber. The visualized IVC is unremarkable.     Miscellaneous: No ascites identified.        Impression:     1.    There are 2 left renal lesions which are indeterminate.  Abdominal MRI with and without contrast renal mass protocol is recommended for further evaluation.  2.    There is an indeterminate 2.6 cm splenic lesion.  Hopefully this can be evaluated on the renal mass MRI.  3.    The adrenal glands are not typically evaluated sonographically and were not demonstrated on this exam.  They could be evaluated on the abdominal MRI.      3/29/2023- MRI of abdomen- scheduled -      Past Medical History:   Diagnosis Date    Anxiety     Asthma 11/8/2022    Breast cancer     Chest pain     Chest pain 07/02/2021    Cholecystitis without calculus     Decreased ROM of left shoulder 02/15/2022    Diabetes mellitus     Dizziness     Elevated C-reactive protein (CRP)     Essential hypertension     Fall 10/11/2021    Memory change     EVERARDO (obstructive sleep apnea)     Osteoarthritis     Other specified disorders of thyroid     Pre-diabetes 04/15/2021    Chronic, Stable, cont metformin    Screening mammogram, encounter for 04/15/2021    Shoulder injury     SOB (shortness of breath)     Stroke     Thyroiditis     Tingling of left upper extremity 10/29/2021    Vision disturbance 04/30/2021    Weakness of shoulder 02/15/2022     Past Surgical History:   Procedure Laterality Date    ARTHROSCOPIC REPAIR OF ROTATOR CUFF OF SHOULDER Left 1/27/2022     Procedure: REPAIR, ROTATOR CUFF, ARTHROSCOPIC;  Surgeon: Francisco Mathews MD;  Location: [facility] OR;  Service: Orthopedics;  Laterality: Left;    ARTHROSCOPY OF SHOULDER WITH DECOMPRESSION OF SUBACROMIAL SPACE Left 1/27/2022    Procedure: ARTHROSCOPY, SHOULDER, WITH SUBACROMIAL SPACE DECOMPRESSION;  Surgeon: Francisco Mathews MD;  Location: [facility] OR;  Service: Orthopedics;  Laterality: Left;    BREAST BIOPSY      COLONOSCOPY N/A 5/31/2022    Procedure: COLONOSCOPY;  Surgeon: Km Odom MD;  Location: [facility];  Service: Endoscopy;  Laterality: N/A;    ESOPHAGOGASTRODUODENOSCOPY N/A 5/31/2022    Procedure: EGD (ESOPHAGOGASTRODUODENOSCOPY) ;  Surgeon: Km Odom MD;  Location: [facility];  Service: Endoscopy;  Laterality: N/A;    FIXATION OF TENDON Left 1/27/2022    Procedure: FIXATION, TENDON;  Surgeon: Francisco Mathews MD;  Location: [facility] OR;  Service: Orthopedics;  Laterality: Left;    HYSTERECTOMY      INJECTION OF ANESTHETIC AGENT INTO SACROILIAC JOINT Bilateral 7/29/2022    Procedure: Bilateral SIJ +  Bilateral GT Bursa Injection RN IV Sedation;  Surgeon: Bisi Cochran MD;  Location: [facility]IN [facility]GT;  Service: Pain Management;  Laterality: Bilateral;    INJECTION OF JOINT Left 11/12/2021    Procedure: Left suprascapular and axillary injection with local;  Surgeon: Bisi Cochran MD;  Location: [facility];  Service: Pain Management;  Laterality: Left;    INJECTION OF JOINT Bilateral 7/29/2022    Procedure: Bilateral SIJ + Bilateral GT Bursa Injection RN IV Sedation;  Surgeon: Bisi Cochran MD;  Location: [facility];  Service: Pain Management;  Laterality: Bilateral;    INSERTION OF BREAST TISSUE EXPANDER Left 11/15/2022    Procedure: INSERTION, TISSUE EXPANDER, BREAST;  Surgeon: Collin Pinto MD;  Location: [facility] OR;  Service: Plastics;  Laterality: Left;    MASTECTOMY      ROBOT-ASSISTED CHOLECYSTECTOMY USING DA ROBERTA XI N/A 8/29/2022    Procedure: XI ROBOTIC  CHOLECYSTECTOMY;  Surgeon: Raeann Bhandari DO;  Location: [facility] OR;  Service: General;  Laterality: N/A;    TISSUE EXPANDER REMOVAL Left 1/4/2023    Procedure: REMOVAL, TISSUE EXPANDER;  Surgeon: Collin Pinto MD;  Location: [facility] OR;  Service: Plastics;  Laterality: Left;    TOTAL REDUCTION MAMMOPLASTY Right 11/15/2022    Procedure: MAMMOPLASTY, REDUCTION;  Surgeon: Collin Pinto MD;  Location: [facility] OR;  Service: Plastics;  Laterality: Right;     Family History   Problem Relation Age of Onset    Colon cancer Mother 53        partial colectomy    Stroke Mother     Heart disease Mother     Breast cancer Sister     Diabetes Sister     Throat cancer Maternal Aunt         smoking hx    Lung cancer Maternal Uncle         hx smoking    No Known Problems Maternal Grandmother     Prostate cancer Maternal Grandfather 70    Alcohol abuse Maternal Grandfather     No Known Problems Paternal Grandmother     No Known Problems Paternal Grandfather     Diabetes Brother     Diabetes Brother     Diabetes Brother     Breast cancer Other 37        chemotherapy, planning for BL mastectomy    No Known Problems Other      Social History     Socioeconomic History    Marital status:    Tobacco Use    Smoking status: Never    Smokeless tobacco: Never   Substance and Sexual Activity    Alcohol use: Not Currently    Drug use: Not Currently     Types: Marijuana     Comment: none currently    Sexual activity: Yes       Current Outpatient Medications   Medication Sig Dispense Refill    albuterol (PROVENTIL/VENTOLIN HFA) 90 mcg/actuation inhaler Inhale 2 puffs into the lungs every 4 (four) hours as needed for Wheezing. Rescue 8.5 g 1    ammonium lactate 12 % Crea Apply 1 Act topically 2 (two) times daily. To feet. 140 g 2    ascorbic acid, vitamin C, (VITAMIN C) 1000 MG tablet Take 1 tablet (1,000 mg total) by mouth once daily. 90 tablet 3    aspirin (ECOTRIN) 81 MG EC tablet Take 1 tablet (81 mg total) by mouth once daily. 360  tablet 1    budesonide-formoterol 160-4.5 mcg (SYMBICORT) 160-4.5 mcg/actuation HFAA Inhale 2 puffs into the lungs every 12 (twelve) hours. Controller 10.2 g 11    busPIRone (BUSPAR) 15 MG tablet Take 1 tablet (15 mg total) by mouth 3 (three) times daily. 270 tablet 1    celecoxib (CELEBREX) 200 MG capsule Take 1 capsule (200 mg total) by mouth 2 (two) times daily. 28 capsule 0    cephALEXin (KEFLEX) 500 MG capsule TAKE 1 CAPSULE BY MOUTH EVERY 6 HOURS FOR 14 DAYS 56 capsule 0    dulaglutide (TRULICITY) 3 mg/0.5 mL pen injector Inject 3 mg into the skin every 7 days. 12 pen 1    EScitalopram oxalate (LEXAPRO) 20 MG tablet Take 1 tablet (20 mg total) by mouth once daily. 90 tablet 1    EUTHYROX 50 mcg tablet Take 1 tablet (50 mcg total) by mouth every morning. 90 tablet 1    gabapentin (NEURONTIN) 300 MG capsule Take 1 capsule (300 mg total) by mouth 2 (two) times daily. 180 capsule 1    hydroCHLOROthiazide (HYDRODIURIL) 25 MG tablet Take 1 tablet (25 mg total) by mouth daily as needed (edema, swelling). 90 tablet 1    HYDROcodone-acetaminophen (NORCO) 5-325 mg per tablet Take 1 tablet by mouth every 6 (six) hours as needed for pain. 28 tablet 0    hydrocortisone 0.5 % cream Apply topically 2 (two) times daily.      LATUDA 20 mg Tab tablet Take 20 mg by mouth once daily.      linaCLOtide (LINZESS) 145 mcg Cap capsule Take 1 capsule (145 mcg total) by mouth once daily. 90 capsule 3    magnesium oxide (MAG-OX) 400 mg (241.3 mg magnesium) tablet Take 1 tablet (400 mg total) by mouth once daily. 90 tablet 1    metFORMIN (GLUCOPHAGE-XR) 500 MG ER 24hr tablet SMARTSI Tablet(s) By Mouth Every Evening 90 tablet 1    multivit with min-folic acid (ADULT MULTIVITAMIN GUMMIES) 200 mcg Chew Take 1 tablet by mouth once daily. 90 tablet 3    olopatadine (PATANOL) 0.1 % ophthalmic solution       ondansetron (ZOFRAN) 4 MG tablet Take 1 tablet (4 mg total) by mouth every 6 (six) hours as needed for Nausea. 10 tablet 1     "ondansetron (ZOFRAN-ODT) 8 MG TbDL DISSOLVE 1 TABLET ON TONGUE EVERY 6 HOURS AS NEEDED FOR NAUSEA 10 tablet 0    oxyCODONE (ROXICODONE) 5 MG immediate release tablet Take 1 tablet (5 mg total) by mouth every 6 (six) hours as needed for Pain. 20 tablet 0    pantoprazole (PROTONIX) 40 MG tablet Take 1 tablet (40 mg total) by mouth once daily. 90 tablet 1    pravastatin (PRAVACHOL) 40 MG tablet Take 1 tablet (40 mg total) by mouth every evening. 90 tablet 1    traZODone (DESYREL) 150 MG tablet Take 1 tablet (150 mg total) by mouth every evening. 90 tablet 1    oxybutynin (DITROPAN-XL) 5 MG TR24 Take 1 tablet (5 mg total) by mouth once daily. 90 tablet 3     No current facility-administered medications for this visit.     Facility-Administered Medications Ordered in Other Visits   Medication Dose Route Frequency Provider Last Rate Last Admin    lactated ringers infusion   Intravenous Continuous Maty Thompson MD 10 mL/hr at 01/27/22 0636 New Bag at 01/04/23 1603     Review of patient's allergies indicates:   Allergen Reactions    Lisinopril     Lurasidone Other (See Comments)     Uncontrolled movement       Review of Systems   Constitutional: Negative.    HENT: Negative.     Eyes: Negative.    Respiratory: Negative.     Cardiovascular: Negative.    Gastrointestinal: Negative.         No reflux   Endocrine: Negative.    Genitourinary: Negative.    Musculoskeletal: Negative.    Skin: Negative.    Allergic/Immunologic: Negative.    Neurological: Negative.    Hematological: Negative.  Negative for adenopathy.   Psychiatric/Behavioral: Negative.       Breast: no nipple discharge - see HPI.   Objective:      Vitals:    03/28/23 1307   BP: (!) 133/90   Pulse: 63   Resp: 16   Weight: 132.9 kg (292 lb 15.9 oz)   Height: 5' 6" (1.676 m)         Physical Exam  Constitutional:       Appearance: She is well-developed.   HENT:      Head: Normocephalic and atraumatic.      Right Ear: External ear normal.      Left Ear: External ear " normal.      Mouth/Throat:      Pharynx: No oropharyngeal exudate.   Eyes:      General: No scleral icterus.        Right eye: No discharge.         Left eye: No discharge.      Conjunctiva/sclera: Conjunctivae normal.      Pupils: Pupils are equal, round, and reactive to light.   Neck:      Thyroid: No thyromegaly.   Cardiovascular:      Rate and Rhythm: Normal rate and regular rhythm.      Heart sounds: Normal heart sounds.   Pulmonary:      Effort: Pulmonary effort is normal.      Breath sounds: Normal breath sounds.   Chest:   Breasts:     Right: No inverted nipple, mass, nipple discharge, skin change or tenderness.      Left: No inverted nipple, mass, nipple discharge, skin change or tenderness.          Comments: right breast mastopexy   Abdominal:      General: Bowel sounds are normal.      Palpations: Abdomen is soft.   Musculoskeletal:         General: Normal range of motion.      Right shoulder: No crepitus. Normal strength.      Cervical back: Normal range of motion and neck supple.   Lymphadenopathy:      Head:      Right side of head: No submental, submandibular, tonsillar, preauricular, posterior auricular or occipital adenopathy.      Left side of head: No submental, submandibular, tonsillar, preauricular, posterior auricular or occipital adenopathy.      Cervical: No cervical adenopathy.      Right cervical: No superficial or posterior cervical adenopathy.     Left cervical: No superficial or posterior cervical adenopathy.      Upper Body:      Right upper body: No supraclavicular or axillary adenopathy.      Left upper body: No supraclavicular or axillary adenopathy.   Skin:     General: Skin is warm and dry.      Coloration: Skin is not pale.      Findings: No erythema or rash.   Neurological:      General: No focal deficit present.      Mental Status: She is alert and oriented to person, place, and time.      Cranial Nerves: No cranial nerve deficit or facial asymmetry.      Sensory: Sensation is  intact. No sensory deficit.      Motor: No weakness or tremor.      Coordination: Coordination normal.      Deep Tendon Reflexes: Reflexes are normal and symmetric.   Psychiatric:         Behavior: Behavior normal.         Thought Content: Thought content normal.         Judgment: Judgment normal.       Lab Review: CBC:   Lab Results   Component Value Date    WBC 5.75 09/16/2022    RBC 4.82 09/16/2022    HGB 13.9 09/16/2022    HCT 43.2 09/16/2022     09/16/2022     BMP:   Lab Results   Component Value Date     (H) 03/20/2023     03/20/2023    K 3.6 03/20/2023     03/20/2023    CO2 25 03/20/2023    BUN 6 03/20/2023    CREATININE 0.8 03/20/2023    CALCIUM 8.9 03/20/2023          Assessment:       1. Malignant neoplasm of left breast in female, estrogen receptor negative, unspecified site of breast    2. 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 Status post left mastectomy    4. Renal mass of unknown nature    5. Splenic lesion    6. Counseling on health promotion and disease prevention    7. Counseling and coordination of care              Plan:       Malignant neoplasm of left breast in female, estrogen receptor negative  Stage IIA (pT2 pN0 cM0) invasive ductal carcinoma, ER 1%, SC negative HER2 Elieser negative status post left breast mastectomy and adjuvant chemotherapy. Completed dose dense AC plus 3/12 weekly Taxol.      Restaging PET-CT scan from 05/26/2021 showed no FDG PET/CT findings to suggest recurrent or metastatic disease. Diagnostic mammogram from 02/23/2022 showed no evidence of malignancy in the right breast.    S/p right breast mastopexy 11/2022- and left breast expander removal 1/2023 for infection    Palpable right breast nodule at 1 oclock areolar region over scar- right diagnostic mammo and ultrasound - 2/15/2023   Mammo Digital Diagnostic Right with Darrion  Right  Post-Surgical Finding: There are post-surgical findings from a previous breast reduction seen in the right breast.   Lymph Node:  There are multiple similar lymph nodes seen in the right axilla on the MLO view. These were not demonstrated previously.  Calcifications: There are benign calcifications seen in the right breast.    An implanted port is partially demonstrated on the MLO view.      Limited US right sub areolar and axillary regions.  There is a mixed echogenic finding in the palpable area of concern that corresponds to the recent surgical scar that is consistent with fat necrosis.   Right axillary lymph nodes have a normal appearance.        Impression:  Mammo Digital Diagnostic Right with Darrion  There is no mammographic evidence of malignancy in the right breast.     US Breast Right Limited  There is no sonographic evidence of malignancy in the right breast.     BI-RADS Category:   Overall: 2 - Benign     Recommendation:  Diagnostic mammogram in 1 year is recommended.          Right breast pain- most likely related to hormonal fluctuations - scar tissue  Recommend good support bra, decrease caffeine fat and salt intake. May try heat or ice, topical analgesics, heat patches, vit E, fish oil or evening primrose oil. If not contraindicated may try tylenol or nsaid for 3 days with food to decrease inflammation.    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 Chance following  Recommend ultrasound of abdomen for evaluation for adrenal tumor screening   2/15/2023 ultrasound of abdomen revealed 2 left renal lesions and a splenic lesion- adrenals not seen- referral to urology placed for eval or 2 renal lesions   3/29/2023- MRI of abdomen scheduled- results pending- will message or  call pt with recommendations     ANGIE (generalized anxiety disorder)  Followed by psychiatrist.     Thyroid nodule  Was seen by Dr. Erickson, biopsy in December of 2021 revealed benign tissue.     Meningioma  History of -  MRI of brain that revealed a 9 mm round extra-axial enhancing mass overlying the left frontal lobe consistent with a small meningioma.  No other enhancing intracranial lesion identified. Stable [id number]    Microcytic anemia  Microcytic anemia of unknown etiology.  Resolved following IV iron therapy.  Reviewed recent upper and lower endoscopies that were unremarkable. 5/2022- 10 year f/u colon recommended     Obesity, morbid, BMI 40.0-49.9  Continue to encourage lifestyle modification and exercise as tolerated.        ?         ?   Follow-Up: Follow up in 6 mons if imaging does not show concerning findings

## 2023-03-15 ENCOUNTER — PATIENT MESSAGE (OUTPATIENT)
Dept: INTERNAL MEDICINE | Facility: CLINIC | Age: 43
End: 2023-03-15
Payer: MEDICARE

## 2023-03-20 ENCOUNTER — HOSPITAL ENCOUNTER (OUTPATIENT)
Dept: RADIOLOGY | Facility: HOSPITAL | Age: 43
Discharge: HOME | End: 2023-03-20
Attending: NURSE PRACTITIONER
Payer: MEDICARE

## 2023-03-20 DIAGNOSIS — N28.89 OTHER SPECIFIED DISORDERS OF KIDNEY AND URETER: ICD-10-CM

## 2023-03-20 DIAGNOSIS — J45.909 NOT WELL CONTROLLED ASTHMA WITHOUT COMPLICATION: ICD-10-CM

## 2023-03-20 DIAGNOSIS — R06.02 SOB (SHORTNESS OF BREATH): ICD-10-CM

## 2023-03-20 DIAGNOSIS — R94.2 MIXED OBSTRUCTIVE AND RESTRICTIVE VENTILATORY DEFECT: ICD-10-CM

## 2023-03-20 DIAGNOSIS — J45.909 ASTHMA, UNSPECIFIED ASTHMA SEVERITY, UNSPECIFIED WHETHER COMPLICATED, UNSPECIFIED WHETHER PERSISTENT: ICD-10-CM

## 2023-03-20 RX ORDER — BUDESONIDE AND FORMOTEROL FUMARATE DIHYDRATE 160; 4.5 UG/1; UG/1
2 AEROSOL RESPIRATORY (INHALATION) EVERY 12 HOURS
Qty: 10.2 G | Refills: 11 | Status: SHIPPED | OUTPATIENT
Start: 2023-03-20 | End: 2024-03-19

## 2023-03-20 RX ORDER — ALBUTEROL SULFATE 90 UG/1
2 AEROSOL, METERED RESPIRATORY (INHALATION) EVERY 4 HOURS PRN
Qty: 8.5 G | Refills: 1 | Status: SHIPPED | OUTPATIENT
Start: 2023-03-20 | End: 2023-03-22

## 2023-03-22 DIAGNOSIS — F41.1 GAD (GENERALIZED ANXIETY DISORDER): ICD-10-CM

## 2023-03-22 DIAGNOSIS — E11.9 TYPE 2 DIABETES MELLITUS WITHOUT COMPLICATION, WITHOUT LONG-TERM CURRENT USE OF INSULIN: ICD-10-CM

## 2023-03-22 DIAGNOSIS — J45.909 ASTHMA, UNSPECIFIED ASTHMA SEVERITY, UNSPECIFIED WHETHER COMPLICATED, UNSPECIFIED WHETHER PERSISTENT: ICD-10-CM

## 2023-03-22 DIAGNOSIS — R06.02 SOB (SHORTNESS OF BREATH): ICD-10-CM

## 2023-03-22 RX ORDER — ASPIRIN 81 MG/1
81 TABLET ORAL DAILY
Qty: 360 TABLET | Refills: 1 | Status: SHIPPED | OUTPATIENT
Start: 2023-03-22 | End: 2023-05-09 | Stop reason: SDUPTHER

## 2023-03-22 RX ORDER — METFORMIN HYDROCHLORIDE 500 MG/1
TABLET, EXTENDED RELEASE ORAL
Qty: 90 TABLET | Refills: 1 | Status: SHIPPED | OUTPATIENT
Start: 2023-03-22 | End: 2023-05-09 | Stop reason: SDUPTHER

## 2023-03-22 RX ORDER — PANTOPRAZOLE SODIUM 40 MG/1
40 TABLET, DELAYED RELEASE ORAL DAILY
Qty: 90 TABLET | Refills: 1 | Status: SHIPPED | OUTPATIENT
Start: 2023-03-22 | End: 2023-06-23 | Stop reason: SDUPTHER

## 2023-03-22 RX ORDER — ESCITALOPRAM OXALATE 20 MG/1
20 TABLET ORAL DAILY
Qty: 90 TABLET | Refills: 1 | Status: SHIPPED | OUTPATIENT
Start: 2023-03-22 | End: 2023-06-23 | Stop reason: SDUPTHER

## 2023-03-22 RX ORDER — IBUPROFEN 100 MG/5ML
1000 SUSPENSION, ORAL (FINAL DOSE FORM) ORAL DAILY
Qty: 90 TABLET | Refills: 3 | Status: SHIPPED | OUTPATIENT
Start: 2023-03-22 | End: 2024-03-21

## 2023-03-22 RX ORDER — ALBUTEROL SULFATE 90 UG/1
2 AEROSOL, METERED RESPIRATORY (INHALATION) EVERY 4 HOURS PRN
Qty: 8.5 G | Refills: 1 | Status: SHIPPED | OUTPATIENT
Start: 2023-03-22 | End: 2024-02-27

## 2023-03-22 RX ORDER — ASPIRIN 325 MG
1 TABLET ORAL DAILY
Qty: 90 TABLET | Refills: 3 | Status: SHIPPED | OUTPATIENT
Start: 2023-03-22 | End: 2024-03-21

## 2023-03-22 NOTE — TELEPHONE ENCOUNTER
----- Message from Goldenkoko Gilliland sent at 3/22/2023 12:01 PM CDT -----  Contact: [phone]  Type:  RX Refill Request    Who Called: [facility]  Refill or New Rx:Refill  RX Name and Strength: albuterol (PROVENTIL/VENTOLIN HFA) 90 mcg/actuation inhaler, multivit with min-folic acid (ADULT MULTIVITAMIN GUMMIES) 200 mcg Chew, and ascorbic acid, vitamin C, (VITAMIN C) 100 MG tablet  How is the patient currently taking it? (ex. 1XDay):  Is this a 30 day or 90 day RX: 90  Preferred Pharmacy with phone number: [facility]  Local or Mail Order:Mail  Ordering Provider: Dr. Garcia  Would the patient rather a call back or a response via [facility]? Call  Best Call Back Number: [phone]  Additional Information:

## 2023-03-22 NOTE — TELEPHONE ENCOUNTER
----- Message from Bridger Lantigua sent at 3/22/2023  2:08 PM CDT -----  Contact: ni ( [facility] ) - [phone]  .Type:  RX Refill Request    Who Called: Ni   Refill or New Rx:refill   RX Name and Strength:ascorbic acid, vitamin C, (VITAMIN C) 100 MG tablet  How is the patient currently taking it? (ex. 1XDay):1 x day   Is this a 30 day or 90 day RX:unknown   Preferred Pharmacy with phone number:   [facility]-[address] [address]  Phone: [phone] Fax: [phone]  Local or Mail Order:mail order  Ordering Provider:Jose Miles   Would the patient rather a call back or a response via [facility]? Call back   Best Call Back Number:[phone]  Additional Information:

## 2023-03-23 ENCOUNTER — PATIENT MESSAGE (OUTPATIENT)
Dept: INTERNAL MEDICINE | Facility: CLINIC | Age: 43
End: 2023-03-23
Payer: MEDICARE

## 2023-03-24 ENCOUNTER — TELEPHONE (OUTPATIENT)
Dept: INTERNAL MEDICINE | Facility: CLINIC | Age: 43
End: 2023-03-24
Payer: MEDICARE

## 2023-03-24 ENCOUNTER — PATIENT MESSAGE (OUTPATIENT)
Dept: INTERNAL MEDICINE | Facility: CLINIC | Age: 43
End: 2023-03-24
Payer: MEDICARE

## 2023-03-24 RX ORDER — DULAGLUTIDE 3 MG/.5ML
3 INJECTION, SOLUTION SUBCUTANEOUS
Qty: 12 PEN | Refills: 1 | Status: SHIPPED | OUTPATIENT
Start: 2023-03-24 | End: 2023-04-03

## 2023-03-24 NOTE — TELEPHONE ENCOUNTER
Ms Sangeetha told the pharmacist that her dose of Trulicitiy was suppose to be lowered and not for the 4.5 mg. Dr Garcia says that he doesn't recall discussing this with her, but her Hem A1C is stable so she can go down to 3mg. I will send new rx

## 2023-03-28 ENCOUNTER — OFFICE VISIT (OUTPATIENT)
Dept: SURGERY | Facility: CLINIC | Age: 43
End: 2023-03-28
Payer: MEDICARE

## 2023-03-28 VITALS
DIASTOLIC BLOOD PRESSURE: 90 MMHG | HEART RATE: 63 BPM | SYSTOLIC BLOOD PRESSURE: 133 MMHG | RESPIRATION RATE: 16 BRPM | WEIGHT: 293 LBS | BODY MASS INDEX: 47.09 KG/M2 | HEIGHT: 66 IN

## 2023-03-28 DIAGNOSIS — Z90.12 STATUS POST LEFT MASTECTOMY: ICD-10-CM

## 2023-03-28 DIAGNOSIS — Z71.89 COUNSELING AND COORDINATION OF CARE: ICD-10-CM

## 2023-03-28 DIAGNOSIS — D73.89 SPLENIC LESION: ICD-10-CM

## 2023-03-28 DIAGNOSIS — C50.912 MALIGNANT NEOPLASM OF LEFT BREAST IN FEMALE, ESTROGEN RECEPTOR NEGATIVE, UNSPECIFIED SITE OF BREAST: Primary | ICD-10-CM

## 2023-03-28 DIAGNOSIS — Z08 ENCOUNTER FOR FOLLOW-UP SURVEILLANCE OF BREAST CANCER: ICD-10-CM

## 2023-03-28 DIAGNOSIS — D32.9 MENINGIOMA: ICD-10-CM

## 2023-03-28 DIAGNOSIS — Z15.89 MUTATION IN TP53 GENE: ICD-10-CM

## 2023-03-28 DIAGNOSIS — Z17.1 MALIGNANT NEOPLASM OF LEFT BREAST IN FEMALE, ESTROGEN RECEPTOR NEGATIVE, UNSPECIFIED SITE OF BREAST: Primary | ICD-10-CM

## 2023-03-28 DIAGNOSIS — Z15.01 MUTATION IN TP53 GENE: ICD-10-CM

## 2023-03-28 DIAGNOSIS — Z15.09 MUTATION IN TP53 GENE: ICD-10-CM

## 2023-03-28 DIAGNOSIS — Z71.89 COUNSELING ON HEALTH PROMOTION AND DISEASE PREVENTION: ICD-10-CM

## 2023-03-28 DIAGNOSIS — N28.89 RENAL MASS OF UNKNOWN NATURE: ICD-10-CM

## 2023-03-28 DIAGNOSIS — Z85.3 ENCOUNTER FOR FOLLOW-UP SURVEILLANCE OF BREAST CANCER: ICD-10-CM

## 2023-03-28 PROCEDURE — 99999 PR PBB SHADOW E&M-EST. PATIENT-LVL III: CPT | Mod: PBBFAC,,, | Performed by: NURSE PRACTITIONER

## 2023-03-28 PROCEDURE — 99213 OFFICE O/P EST LOW 20 MIN: CPT | Mod: PBBFAC | Performed by: NURSE PRACTITIONER

## 2023-03-28 PROCEDURE — 99214 PR OFFICE/OUTPT VISIT, EST, LEVL IV, 30-39 MIN: ICD-10-PCS | Mod: S$PBB,,, | Performed by: NURSE PRACTITIONER

## 2023-03-28 PROCEDURE — 99214 OFFICE O/P EST MOD 30 MIN: CPT | Mod: S$PBB,,, | Performed by: NURSE PRACTITIONER

## 2023-03-28 PROCEDURE — 99999 PR PBB SHADOW E&M-EST. PATIENT-LVL III: ICD-10-PCS | Mod: PBBFAC,,, | Performed by: NURSE PRACTITIONER

## 2023-03-29 ENCOUNTER — PATIENT MESSAGE (OUTPATIENT)
Dept: HEPATOLOGY | Facility: CLINIC | Age: 43
End: 2023-03-29
Payer: MEDICARE

## 2023-03-29 ENCOUNTER — HOSPITAL ENCOUNTER (OUTPATIENT)
Dept: RADIOLOGY | Facility: HOSPITAL | Age: 43
Discharge: HOME | End: 2023-03-29
Attending: NURSE PRACTITIONER
Payer: MEDICARE

## 2023-03-29 DIAGNOSIS — K76.0 FATTY LIVER: Primary | ICD-10-CM

## 2023-03-29 PROCEDURE — 74183 MRI ABD W/O CNTR FLWD CNTR: CPT | Mod: 26,,, | Performed by: RADIOLOGY

## 2023-03-29 PROCEDURE — 25500020 PHARM REV CODE 255: Mod: PN | Performed by: NURSE PRACTITIONER

## 2023-03-29 PROCEDURE — 74183 MRI ABD W/O CNTR FLWD CNTR: CPT | Mod: TC,PN

## 2023-03-29 PROCEDURE — 74183 MRI ABDOMEN W WO CONTRAST: ICD-10-PCS | Mod: 26,,, | Performed by: RADIOLOGY

## 2023-03-29 PROCEDURE — A9585 GADOBUTROL INJECTION: HCPCS | Mod: PN | Performed by: NURSE PRACTITIONER

## 2023-03-29 RX ORDER — GADOBUTROL 604.72 MG/ML
10 INJECTION INTRAVENOUS
Status: COMPLETED | OUTPATIENT
Start: 2023-03-29 | End: 2023-03-29

## 2023-03-29 RX ADMIN — GADOBUTROL 10 ML: 604.72 INJECTION INTRAVENOUS at 01:03

## 2023-03-30 ENCOUNTER — PATIENT MESSAGE (OUTPATIENT)
Dept: DIABETES | Facility: CLINIC | Age: 43
End: 2023-03-30
Payer: MEDICARE

## 2023-03-30 ENCOUNTER — OFFICE VISIT (OUTPATIENT)
Dept: HEPATOLOGY | Facility: CLINIC | Age: 43
End: 2023-03-30
Payer: MEDICARE

## 2023-03-30 VITALS
BODY MASS INDEX: 46.52 KG/M2 | SYSTOLIC BLOOD PRESSURE: 134 MMHG | WEIGHT: 289.44 LBS | HEART RATE: 70 BPM | DIASTOLIC BLOOD PRESSURE: 80 MMHG | HEIGHT: 66 IN

## 2023-03-30 DIAGNOSIS — K76.0 FATTY LIVER: ICD-10-CM

## 2023-03-30 PROCEDURE — 99214 OFFICE O/P EST MOD 30 MIN: CPT | Mod: S$PBB,,, | Performed by: INTERNAL MEDICINE

## 2023-03-30 PROCEDURE — 99999 PR PBB SHADOW E&M-EST. PATIENT-LVL III: ICD-10-PCS | Mod: PBBFAC,,, | Performed by: INTERNAL MEDICINE

## 2023-03-30 PROCEDURE — 99213 OFFICE O/P EST LOW 20 MIN: CPT | Mod: PBBFAC | Performed by: INTERNAL MEDICINE

## 2023-03-30 PROCEDURE — 99214 PR OFFICE/OUTPT VISIT, EST, LEVL IV, 30-39 MIN: ICD-10-PCS | Mod: S$PBB,,, | Performed by: INTERNAL MEDICINE

## 2023-03-30 PROCEDURE — 99999 PR PBB SHADOW E&M-EST. PATIENT-LVL III: CPT | Mod: PBBFAC,,, | Performed by: INTERNAL MEDICINE

## 2023-03-30 NOTE — PROGRESS NOTES
Subjective:     Sangeetha June is here for evaluation of fatty liver.    HPI  Sangeetha June is a 43 F with long hx of DM, weight over 200 lbs found to have hepatomegaly on MRI of the abdomen    Metabolic issues: hyperlipidemia, DM   ETOH:no  BMI:46  Diet:high carb diet  Exercise: poor balance    Review of Systems   Constitutional:  Negative for fatigue, fever and unexpected weight change.   Gastrointestinal:  Negative for abdominal distention, abdominal pain, blood in stool, nausea and vomiting.   Musculoskeletal:  Negative for arthralgias and gait problem.   Psychiatric/Behavioral:  Negative for confusion and sleep disturbance.      Objective:     Physical Exam  Vitals reviewed.   Constitutional:       Appearance: Normal appearance. She is obese.   Eyes:      General: No scleral icterus.  Pulmonary:      Effort: Pulmonary effort is normal.   Abdominal:      General: Bowel sounds are normal. There is no distension.      Palpations: There is no mass.      Tenderness: There is no abdominal tenderness.   Musculoskeletal:      Right lower leg: No edema.      Left lower leg: No edema.   Skin:     Coloration: Skin is not jaundiced.   Neurological:      Mental Status: She is alert and oriented to person, place, and time.   Psychiatric:         Thought Content: Thought content normal.       Computed MELD-Na score unavailable. Necessary lab results were not found in the last year.  Computed MELD score unavailable. Necessary lab results were not found in the last year.    WBC   Date Value Ref Range Status   09/16/2022 5.75 3.90 - 12.70 K/uL Final     Hemoglobin   Date Value Ref Range Status   09/16/2022 13.9 12.0 - 16.0 g/dL Final     Hematocrit   Date Value Ref Range Status   09/16/2022 43.2 37.0 - 48.5 % Final     Platelets   Date Value Ref Range Status   09/16/2022 361 150 - 450 K/uL Final     BUN   Date Value Ref Range Status   03/20/2023 6 6 - 20 mg/dL Final     Creatinine   Date Value Ref Range Status    03/20/2023 0.8 0.5 - 1.4 mg/dL Final     Glucose   Date Value Ref Range Status   03/20/2023 118 (H) 70 - 110 mg/dL Final     Calcium   Date Value Ref Range Status   03/20/2023 8.9 8.7 - 10.5 mg/dL Final     Sodium   Date Value Ref Range Status   03/20/2023 141 136 - 145 mmol/L Final     Potassium   Date Value Ref Range Status   03/20/2023 3.6 3.5 - 5.1 mmol/L Final     Chloride   Date Value Ref Range Status   03/20/2023 104 95 - 110 mmol/L Final     Magnesium   Date Value Ref Range Status   07/02/2021 1.5 (L) 1.6 - 2.6 mg/dL Final     AST   Date Value Ref Range Status   03/20/2023 16 10 - 40 U/L Final     ALT   Date Value Ref Range Status   03/20/2023 15 10 - 44 U/L Final     Alkaline Phosphatase   Date Value Ref Range Status   03/20/2023 102 55 - 135 U/L Final     Total Bilirubin   Date Value Ref Range Status   03/20/2023 0.2 0.1 - 1.0 mg/dL Final     Comment:     For infants and newborns, interpretation of results should be based  on gestational age, weight and in agreement with clinical  observations.    Premature Infant recommended reference ranges:  Up to 24 hours.............<8.0 mg/dL  Up to 48 hours............<12.0 mg/dL  3-5 days..................<15.0 mg/dL  6-29 days.................<15.0 mg/dL       Albumin   Date Value Ref Range Status   03/20/2023 3.7 3.5 - 5.2 g/dL Final     No results found for: INR      Assessment/Plan:     1. Fatty liver  Ambulatory referral/consult to Hepatology           1.NAFLD(Non alcoholic fatty liver disease)  LFTs are in normal limits  Recent hemoglobin A1c is 5.0  Has steatosis on US abd  Educated patient on spectrum of fatty liver disease and potential for cirrhosis if CAT present  Explored dietary habits and discussed dietary recommendations- Low calorie, low carbohydrate, high protein diet mediterranean with goal of loosing >3-5% to improve steatosis   Discussed consistent daily exercise    US abd - 3/2023    1.    There are 2 left renal lesions which are  indeterminate.  Abdominal MRI with and without contrast renal mass protocol is recommended for further evaluation.  2.    There is an indeterminate 2.6 cm splenic lesion.  Hopefully this can be evaluated on the renal mass MRI.    She is scheduled for MRI and a urology consult prior to my visit, follow Splenic lesion     RTC in 6 months with preclinic labs    I have reviewed existing labs, imaging, procedures. Educated patient about disease process, prognosis. Ordered required labs, images and discussed treatment plan.     Kayleen Singh MD  Transplant Hepatologist  Dept of Hepatology, [facility]

## 2023-03-30 NOTE — TELEPHONE ENCOUNTER
Spoke with pt. She reports trying to coord diabetes medications after visit with Dr. Singh. She's not been on Trulicity and reports appetite and weight have increased. Assisted pt jessica appt w/ provider NICA Nuñez. Encouraged pt to fu with me for diabetes education and meal planning support. She will need new referral to diabetes education since prior one is outdated. Pt verbalized understanding. Successful call.

## 2023-04-03 ENCOUNTER — OFFICE VISIT (OUTPATIENT)
Dept: DIABETES | Facility: CLINIC | Age: 43
End: 2023-04-03
Payer: MEDICARE

## 2023-04-03 ENCOUNTER — PATIENT MESSAGE (OUTPATIENT)
Dept: PULMONOLOGY | Facility: CLINIC | Age: 43
End: 2023-04-03
Payer: MEDICARE

## 2023-04-03 DIAGNOSIS — E11.9 TYPE 2 DIABETES MELLITUS WITHOUT COMPLICATION, WITHOUT LONG-TERM CURRENT USE OF INSULIN: ICD-10-CM

## 2023-04-03 DIAGNOSIS — I63.9 CEREBROVASCULAR ACCIDENT (CVA), UNSPECIFIED MECHANISM: Primary | ICD-10-CM

## 2023-04-03 DIAGNOSIS — E78.2 MIXED HYPERLIPIDEMIA: ICD-10-CM

## 2023-04-03 DIAGNOSIS — I10 PRIMARY HYPERTENSION: ICD-10-CM

## 2023-04-03 DIAGNOSIS — E03.9 HYPOTHYROIDISM, UNSPECIFIED TYPE: ICD-10-CM

## 2023-04-03 DIAGNOSIS — E66.01 OBESITY, MORBID, BMI 40.0-49.9: ICD-10-CM

## 2023-04-03 PROCEDURE — 99214 PR OFFICE/OUTPT VISIT, EST, LEVL IV, 30-39 MIN: ICD-10-PCS | Mod: 95,,, | Performed by: NURSE PRACTITIONER

## 2023-04-03 PROCEDURE — 99214 OFFICE O/P EST MOD 30 MIN: CPT | Mod: 95,,, | Performed by: NURSE PRACTITIONER

## 2023-04-03 RX ORDER — DULAGLUTIDE 0.75 MG/.5ML
0.75 INJECTION, SOLUTION SUBCUTANEOUS WEEKLY
Qty: 4 PEN | Refills: 0 | Status: SHIPPED | OUTPATIENT
Start: 2023-04-03 | End: 2023-05-09 | Stop reason: SDUPTHER

## 2023-04-03 NOTE — PATIENT INSTRUCTIONS
PATIENT INSTRUCTIONS    Restart Trulicity at 0.75 mg subcutaneously every 7 days.   Continue Glucophage  mg by mouth daily.   Continue Lifestyle modification with diabetic diet and at least 30 minutes of physical activity daily recommended.   Blood Sugar Goals:       Fastin-130.       1-2 hours after a meal: Less than 180.

## 2023-04-03 NOTE — PROGRESS NOTES
Telemedicine Visit    The patient location is home  The chief complaint leading to consultation is: Diabetes    Visit type: audiovisual    Face to Face time with patient: 20  40 minutes of total time spent on the encounter, which includes face to face time and non-face to face time preparing to see the patient (eg, review of tests), Obtaining and/or reviewing separately obtained history, Documenting clinical information in the electronic or other health record, Independently interpreting results (not separately reported) and communicating results to the patient/family/caregiver, or Care coordination (not separately reported).  Each patient to whom he or she provides medical services by telemedicine is:  (1) informed of the relationship between the physician and patient and the respective role of any other health care provider with respect to management of the patient; and (2) notified that he or she may decline to receive medical services by telemedicine and may withdraw from such care at any time.  Notes:         Sangeetha June is a 43 y.o. female who  has a past medical history of Anxiety, Asthma (11/8/2022), Breast cancer, Chest pain, Chest pain (07/02/2021), Cholecystitis without calculus, Decreased ROM of left shoulder (02/15/2022), Diabetes mellitus, Dizziness, Elevated C-reactive protein (CRP), Essential hypertension, Fall (10/11/2021), Memory change, EVERARDO (obstructive sleep apnea), Osteoarthritis, Other specified disorders of thyroid, Pre-diabetes (04/15/2021), Screening mammogram, encounter for (04/15/2021), Shoulder injury, SOB (shortness of breath), Stroke, Thyroiditis, Tingling of left upper extremity (10/29/2021), Vision disturbance (04/30/2021), and Weakness of shoulder (02/15/2022)., who present for an initial evaluation of Type 2 diabetes mellitus.     CHIEF COMPLAINT: Diabetes Consultation    PCP: Sam Garcia MD     The patient was initially diagnosed with diabetes  for 2 years.  "    Previous failed treatments include:  none    Social Documentation:  Patient lives in [place] with Boni.   Occupation: Disabled.  Exercise: No formal exercise.   Diet: "very poor", does not cook. Boni cooks her meals for her.     Current monitoring regimen: capillary blood glucose monitoring with finger sticks.   Last checked 2 days ago - was 172 fasting.     Current Diabetes related symptoms/problems include visual disturbances and have been unchanged.     Diabetes related complications:   cerebrovascular disease.   denies Pancreatitis  denies Gastroparesis  denies DKA  denies Hx/family Hx of MEN2/MTC  denies Frequent UTIs/yeast infections    Cardiovascular Risk Factors: dyslipidemia, hypertension, obesity (BMI >30 kg/m2), and sedentary lifestyle.    Any episodes of hypoglycemia?  No.   Hypoglycemia Unawareness? no  Severe hypoglycemia requiring 3rd party? no    Seen by Diabetes Education in last year? yes    Diabetes Medications               dulaglutide (TRULICITY) 3 mg/0.5 mL pen injector Inject 3 mg into the skin every 7 days. - HAS NOT TAKE IN 2 MONTHS.     metFORMIN (GLUCOPHAGE-XR) 500 MG ER 24hr tablet SMARTSI Tablet(s) By Mouth Every Evening     DIABETES DISEASE MANAGEMENT STATUS  Statin: Taking  ACE/ARB: Not taking  Screening or Prevention Patient's value Goal Complete/Controlled?   HgA1C Testing and Control   Lab Results   Component Value Date    HGBA1C 5.0 2023      Annually/Less than 8% Yes     Lipid profile : 2022 Annually Yes     LDL control Lab Results   Component Value Date    LDLCALC 84.6 2022    Annually/Less than 100 mg/dl  Yes     Nephropathy screening Lab Results   Component Value Date    LABMICR 26.0 2022     No results found for: PROTEINUA  No results found for: UTPCR   Annually Yes     Blood pressure BP Readings from Last 1 Encounters:   23 134/80    Less than 140/90 Yes     Dilated retinal exam : 2022 Annually Yes     Foot exam   : 2022 " Annually Yes     Patient's medications, allergies, past medical, surgical, social and family histories were reviewed and updated as appropriate.     Review of Systems   Constitutional:  Negative for weight loss.   Eyes:  Negative for blurred vision and double vision.   Cardiovascular:  Negative for chest pain.   Gastrointestinal:  Negative for nausea and vomiting.   Genitourinary:  Negative for frequency.   Musculoskeletal:  Negative for falls.   Neurological:  Negative for dizziness and weakness.   Endo/Heme/Allergies:  Negative for polydipsia.   Psychiatric/Behavioral:  Negative for depression.    All other systems reviewed and are negative.          Physical Exam  Constitutional:       Appearance: Normal appearance.   HENT:      Head: Normocephalic and atraumatic.   Pulmonary:      Effort: No respiratory distress.   Musculoskeletal:      Cervical back: Normal range of motion.   Neurological:      Mental Status: She is alert and oriented to person, place, and time.   Psychiatric:         Mood and Affect: Mood normal.         Behavior: Behavior normal.      There were no vitals taken for this visit.  Wt Readings from Last 3 Encounters:   03/30/23 131.3 kg (289 lb 7.4 oz)   03/28/23 132.9 kg (292 lb 15.9 oz)   02/20/23 124 kg (273 lb 5.9 oz)       LAB REVIEW  Lab Results   Component Value Date     03/20/2023    K 3.6 03/20/2023     03/20/2023    CO2 25 03/20/2023    BUN 6 03/20/2023    CREATININE 0.8 03/20/2023    CALCIUM 8.9 03/20/2023    ANIONGAP 12 03/20/2023    EGFRNORACEVR >60 03/20/2023     No results found for: CPEPTIDE, GLUTAMICACID, INSLNABS  Hemoglobin A1C   Date Value Ref Range Status   02/20/2023 5.0 4.0 - 5.6 % Final     Comment:     ADA Screening Guidelines:  5.7-6.4%  Consistent with prediabetes  >or=6.5%  Consistent with diabetes    High levels of fetal hemoglobin interfere with the HbA1C  assay. Heterozygous hemoglobin variants (HbS, HgC, etc)do  not significantly interfere with this  assay.   However, presence of multiple variants may affect accuracy.     10/21/2022 5.1 4.0 - 5.6 % Final     Comment:     ADA Screening Guidelines:  5.7-6.4%  Consistent with prediabetes  >or=6.5%  Consistent with diabetes    High levels of fetal hemoglobin interfere with the HbA1C  assay. Heterozygous hemoglobin variants (HbS, HgC, etc)do  not significantly interfere with this assay.   However, presence of multiple variants may affect accuracy.     07/19/2022 5.4 4.0 - 5.6 % Final     Comment:     ADA Screening Guidelines:  5.7-6.4%  Consistent with prediabetes  >or=6.5%  Consistent with diabetes    High levels of fetal hemoglobin interfere with the HbA1C  assay. Heterozygous hemoglobin variants (HbS, HgC, etc)do  not significantly interfere with this assay.   However, presence of multiple variants may affect accuracy.         ASSESSMENT    ICD-10-CM ICD-9-CM   1. Cerebrovascular accident (CVA), unspecified mechanism  I63.9 434.91   2. Mixed hyperlipidemia  E78.2 272.2   3. Primary hypertension  I10 401.9   4. Obesity, morbid, BMI 40.0-49.9  E66.01 278.01   5. Hypothyroidism, unspecified type  E03.9 244.9   6. Type 2 diabetes mellitus without complication, without long-term current use of insulin  E11.9 250.00        PLAN  Diagnoses and all orders for this visit:    Cerebrovascular accident (CVA), unspecified mechanism    Mixed hyperlipidemia    Primary hypertension    Obesity, morbid, BMI 40.0-49.9    Hypothyroidism, unspecified type    Type 2 diabetes mellitus without complication, without long-term current use of insulin  -     dulaglutide (TRULICITY) 0.75 mg/0.5 mL pen injector; Inject 0.75 mg into the skin once a week.      Reviewed pathophysiology of diabetes, complications related to the disease, importance of annual dilated eye exam and daily foot examination. Explained MOA, SE, dosage of medications. Written instructions given and reviewed with patient and patient verbalizes understanding.     Today: was  doing well on trulicity 3 mg, but then was not able to get it due to backorder and has not taken in 2 months. Will restart at 0.75, f/u 6 weeks.     PATIENT INSTRUCTIONS    Restart Trulicity at 0.75 mg subcutaneously every 7 days.   Continue Glucophage  mg by mouth daily.   Continue Lifestyle modification with diabetic diet and at least 30 minutes of physical activity daily recommended.   Blood Sugar Goals:       Fastin-130.       1-2 hours after a meal: Less than 180.     Follow up in about 6 weeks (around 5/15/2023) for VIRTUAL.    Portions of this note were prepared with [employer] voice recognition transcription software. Grammatical errors, including garbled syntax, mangle pronouns, and other bizarre constructions may be attributed to that software system.

## 2023-04-05 ENCOUNTER — EXTERNAL HOME HEALTH (OUTPATIENT)
Dept: HOME HEALTH SERVICES | Facility: HOSPITAL | Age: 43
End: 2023-04-05
Payer: MEDICARE

## 2023-04-12 ENCOUNTER — TELEPHONE (OUTPATIENT)
Dept: DIABETES | Facility: CLINIC | Age: 43
End: 2023-04-12
Payer: MEDICARE

## 2023-04-12 DIAGNOSIS — Z79.4 TYPE 2 DIABETES MELLITUS WITH OTHER SPECIFIED COMPLICATION, WITH LONG-TERM CURRENT USE OF INSULIN: Primary | ICD-10-CM

## 2023-04-12 DIAGNOSIS — E11.69 TYPE 2 DIABETES MELLITUS WITH OTHER SPECIFIED COMPLICATION, WITH LONG-TERM CURRENT USE OF INSULIN: Primary | ICD-10-CM

## 2023-04-14 DIAGNOSIS — R07.9 CHEST PAIN, UNSPECIFIED TYPE: Primary | ICD-10-CM

## 2023-04-17 ENCOUNTER — OFFICE VISIT (OUTPATIENT)
Dept: CARDIOLOGY | Facility: CLINIC | Age: 43
End: 2023-04-17
Payer: MEDICARE

## 2023-04-17 ENCOUNTER — HOSPITAL ENCOUNTER (OUTPATIENT)
Dept: CARDIOLOGY | Facility: HOSPITAL | Age: 43
Discharge: HOME | End: 2023-04-17
Attending: INTERNAL MEDICINE
Payer: MEDICARE

## 2023-04-17 VITALS
HEART RATE: 65 BPM | HEIGHT: 66 IN | SYSTOLIC BLOOD PRESSURE: 124 MMHG | BODY MASS INDEX: 46.16 KG/M2 | OXYGEN SATURATION: 98 % | DIASTOLIC BLOOD PRESSURE: 82 MMHG | WEIGHT: 287.25 LBS

## 2023-04-17 DIAGNOSIS — Z85.3 HISTORY OF BREAST CANCER: ICD-10-CM

## 2023-04-17 DIAGNOSIS — G47.30 SLEEP APNEA, UNSPECIFIED TYPE: ICD-10-CM

## 2023-04-17 DIAGNOSIS — R06.09 DOE (DYSPNEA ON EXERTION): ICD-10-CM

## 2023-04-17 DIAGNOSIS — R60.0 LOCALIZED EDEMA: ICD-10-CM

## 2023-04-17 DIAGNOSIS — Z86.73 HISTORY OF CVA (CEREBROVASCULAR ACCIDENT): ICD-10-CM

## 2023-04-17 DIAGNOSIS — I10 PRIMARY HYPERTENSION: ICD-10-CM

## 2023-04-17 DIAGNOSIS — R00.2 PALPITATIONS: ICD-10-CM

## 2023-04-17 DIAGNOSIS — F41.1 GAD (GENERALIZED ANXIETY DISORDER): ICD-10-CM

## 2023-04-17 DIAGNOSIS — E66.01 OBESITY, MORBID, BMI 40.0-49.9: ICD-10-CM

## 2023-04-17 DIAGNOSIS — R01.1 MURMUR: ICD-10-CM

## 2023-04-17 DIAGNOSIS — R07.9 CHEST PAIN, UNSPECIFIED TYPE: ICD-10-CM

## 2023-04-17 DIAGNOSIS — E78.49 OTHER HYPERLIPIDEMIA: ICD-10-CM

## 2023-04-17 DIAGNOSIS — R06.02 SOB (SHORTNESS OF BREATH): Primary | ICD-10-CM

## 2023-04-17 PROCEDURE — 3079F DIAST BP 80-89 MM HG: CPT | Mod: HCNC,CPTII,S$GLB, | Performed by: INTERNAL MEDICINE

## 2023-04-17 PROCEDURE — 3044F HG A1C LEVEL LT 7.0%: CPT | Mod: HCNC,CPTII,S$GLB, | Performed by: INTERNAL MEDICINE

## 2023-04-17 PROCEDURE — 1159F PR MEDICATION LIST DOCUMENTED IN MEDICAL RECORD: ICD-10-PCS | Mod: HCNC,CPTII,S$GLB, | Performed by: INTERNAL MEDICINE

## 2023-04-17 PROCEDURE — 99214 OFFICE O/P EST MOD 30 MIN: CPT | Mod: HCNC,S$GLB,, | Performed by: INTERNAL MEDICINE

## 2023-04-17 PROCEDURE — 99999 PR PBB SHADOW E&M-EST. PATIENT-LVL III: CPT | Mod: PBBFAC,HCNC,, | Performed by: INTERNAL MEDICINE

## 2023-04-17 PROCEDURE — 1160F PR REVIEW ALL MEDS BY PRESCRIBER/CLIN PHARMACIST DOCUMENTED: ICD-10-PCS | Mod: HCNC,CPTII,S$GLB, | Performed by: INTERNAL MEDICINE

## 2023-04-17 PROCEDURE — 99999 PR PBB SHADOW E&M-EST. PATIENT-LVL III: ICD-10-PCS | Mod: PBBFAC,HCNC,, | Performed by: INTERNAL MEDICINE

## 2023-04-17 PROCEDURE — 3008F PR BODY MASS INDEX (BMI) DOCUMENTED: ICD-10-PCS | Mod: HCNC,CPTII,S$GLB, | Performed by: INTERNAL MEDICINE

## 2023-04-17 PROCEDURE — 1160F RVW MEDS BY RX/DR IN RCRD: CPT | Mod: HCNC,CPTII,S$GLB, | Performed by: INTERNAL MEDICINE

## 2023-04-17 PROCEDURE — 3072F PR LOW RISK FOR RETINOPATHY: ICD-10-PCS | Mod: HCNC,CPTII,S$GLB, | Performed by: INTERNAL MEDICINE

## 2023-04-17 PROCEDURE — 3074F PR MOST RECENT SYSTOLIC BLOOD PRESSURE < 130 MM HG: ICD-10-PCS | Mod: HCNC,CPTII,S$GLB, | Performed by: INTERNAL MEDICINE

## 2023-04-17 PROCEDURE — 93010 ELECTROCARDIOGRAM REPORT: CPT | Mod: HCNC,,, | Performed by: INTERNAL MEDICINE

## 2023-04-17 PROCEDURE — 93005 ELECTROCARDIOGRAM TRACING: CPT | Mod: HCNC,PO

## 2023-04-17 PROCEDURE — 93010 EKG 12-LEAD: ICD-10-PCS | Mod: HCNC,,, | Performed by: INTERNAL MEDICINE

## 2023-04-17 PROCEDURE — 1159F MED LIST DOCD IN RCRD: CPT | Mod: HCNC,CPTII,S$GLB, | Performed by: INTERNAL MEDICINE

## 2023-04-17 PROCEDURE — 3074F SYST BP LT 130 MM HG: CPT | Mod: HCNC,CPTII,S$GLB, | Performed by: INTERNAL MEDICINE

## 2023-04-17 PROCEDURE — 3079F PR MOST RECENT DIASTOLIC BLOOD PRESSURE 80-89 MM HG: ICD-10-PCS | Mod: HCNC,CPTII,S$GLB, | Performed by: INTERNAL MEDICINE

## 2023-04-17 PROCEDURE — 3008F BODY MASS INDEX DOCD: CPT | Mod: HCNC,CPTII,S$GLB, | Performed by: INTERNAL MEDICINE

## 2023-04-17 PROCEDURE — 3044F PR MOST RECENT HEMOGLOBIN A1C LEVEL <7.0%: ICD-10-PCS | Mod: HCNC,CPTII,S$GLB, | Performed by: INTERNAL MEDICINE

## 2023-04-17 PROCEDURE — 3072F LOW RISK FOR RETINOPATHY: CPT | Mod: HCNC,CPTII,S$GLB, | Performed by: INTERNAL MEDICINE

## 2023-04-17 PROCEDURE — 99214 PR OFFICE/OUTPT VISIT, EST, LEVL IV, 30-39 MIN: ICD-10-PCS | Mod: HCNC,S$GLB,, | Performed by: INTERNAL MEDICINE
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
## 2023-04-19 ENCOUNTER — OFFICE VISIT (OUTPATIENT)
Dept: UROLOGY | Facility: CLINIC | Age: 43
End: 2023-04-19
Payer: MEDICARE

## 2023-04-19 ENCOUNTER — CLINICAL SUPPORT (OUTPATIENT)
Dept: DIABETES | Facility: CLINIC | Age: 43
End: 2023-04-19
Payer: MEDICARE

## 2023-04-19 VITALS
DIASTOLIC BLOOD PRESSURE: 86 MMHG | BODY MASS INDEX: 46.77 KG/M2 | HEART RATE: 75 BPM | HEIGHT: 66 IN | SYSTOLIC BLOOD PRESSURE: 118 MMHG | TEMPERATURE: 99 F | WEIGHT: 291 LBS

## 2023-04-19 VITALS — BODY MASS INDEX: 46.97 KG/M2 | WEIGHT: 291 LBS

## 2023-04-19 DIAGNOSIS — E11.69 TYPE 2 DIABETES MELLITUS WITH OTHER SPECIFIED COMPLICATION, WITH LONG-TERM CURRENT USE OF INSULIN: ICD-10-CM

## 2023-04-19 DIAGNOSIS — N28.1 RENAL CYST: ICD-10-CM

## 2023-04-19 DIAGNOSIS — Z79.4 TYPE 2 DIABETES MELLITUS WITH OTHER SPECIFIED COMPLICATION, WITH LONG-TERM CURRENT USE OF INSULIN: ICD-10-CM

## 2023-04-19 DIAGNOSIS — N32.81 OAB (OVERACTIVE BLADDER): Primary | ICD-10-CM

## 2023-04-19 LAB
BILIRUB SERPL-MCNC: NEGATIVE MG/DL
BLOOD URINE, POC: NEGATIVE
CLARITY, POC UA: CLEAR
COLOR, POC UA: NORMAL
GLUCOSE UR QL STRIP: NEGATIVE
KETONES UR QL STRIP: NEGATIVE
LEUKOCYTE ESTERASE URINE, POC: NEGATIVE
NITRITE, POC UA: NEGATIVE
PH, POC UA: 6
PROTEIN, POC: NEGATIVE
SPECIFIC GRAVITY, POC UA: 1.02
UROBILINOGEN, POC UA: 0.2

## 2023-04-19 PROCEDURE — 99999 PR PBB SHADOW E&M-EST. PATIENT-LVL III: ICD-10-PCS | Mod: PBBFAC,HCNC,, | Performed by: UROLOGY

## 2023-04-19 PROCEDURE — 99212 OFFICE O/P EST SF 10 MIN: CPT | Mod: PBBFAC | Performed by: DIETITIAN, REGISTERED

## 2023-04-19 PROCEDURE — G0108 DIAB MANAGE TRN  PER INDIV: HCPCS | Mod: HCNC,S$GLB,, | Performed by: DIETITIAN, REGISTERED

## 2023-04-19 PROCEDURE — 81002 URINALYSIS NONAUTO W/O SCOPE: CPT | Mod: HCNC,S$GLB,, | Performed by: UROLOGY

## 2023-04-19 PROCEDURE — 1160F RVW MEDS BY RX/DR IN RCRD: CPT | Mod: HCNC,CPTII,S$GLB, | Performed by: UROLOGY

## 2023-04-19 PROCEDURE — 3008F BODY MASS INDEX DOCD: CPT | Mod: HCNC,CPTII,S$GLB, | Performed by: UROLOGY

## 2023-04-19 PROCEDURE — 3074F SYST BP LT 130 MM HG: CPT | Mod: HCNC,CPTII,S$GLB, | Performed by: UROLOGY

## 2023-04-19 PROCEDURE — 1159F PR MEDICATION LIST DOCUMENTED IN MEDICAL RECORD: ICD-10-PCS | Mod: HCNC,CPTII,S$GLB, | Performed by: UROLOGY

## 2023-04-19 PROCEDURE — 3072F PR LOW RISK FOR RETINOPATHY: ICD-10-PCS | Mod: HCNC,CPTII,S$GLB, | Performed by: UROLOGY

## 2023-04-19 PROCEDURE — 3044F HG A1C LEVEL LT 7.0%: CPT | Mod: HCNC,CPTII,S$GLB, | Performed by: UROLOGY

## 2023-04-19 PROCEDURE — 3008F PR BODY MASS INDEX (BMI) DOCUMENTED: ICD-10-PCS | Mod: HCNC,CPTII,S$GLB, | Performed by: UROLOGY

## 2023-04-19 PROCEDURE — 99999 PR PBB SHADOW E&M-EST. PATIENT-LVL III: CPT | Mod: PBBFAC,HCNC,, | Performed by: UROLOGY

## 2023-04-19 PROCEDURE — 81002 POCT URINE DIPSTICK WITHOUT MICROSCOPE: ICD-10-PCS | Mod: HCNC,S$GLB,, | Performed by: UROLOGY

## 2023-04-19 PROCEDURE — 99999 PR PBB SHADOW E&M-EST. PATIENT-LVL II: CPT | Mod: PBBFAC,HCNC,, | Performed by: DIETITIAN, REGISTERED

## 2023-04-19 PROCEDURE — 1160F PR REVIEW ALL MEDS BY PRESCRIBER/CLIN PHARMACIST DOCUMENTED: ICD-10-PCS | Mod: HCNC,CPTII,S$GLB, | Performed by: UROLOGY

## 2023-04-19 PROCEDURE — 99204 OFFICE O/P NEW MOD 45 MIN: CPT | Mod: HCNC,S$GLB,, | Performed by: UROLOGY

## 2023-04-19 PROCEDURE — 3079F DIAST BP 80-89 MM HG: CPT | Mod: HCNC,CPTII,S$GLB, | Performed by: UROLOGY

## 2023-04-19 PROCEDURE — G0108 PR DIAB MANAGE TRN  PER INDIV: ICD-10-PCS | Mod: HCNC,S$GLB,, | Performed by: DIETITIAN, REGISTERED

## 2023-04-19 PROCEDURE — 3079F PR MOST RECENT DIASTOLIC BLOOD PRESSURE 80-89 MM HG: ICD-10-PCS | Mod: HCNC,CPTII,S$GLB, | Performed by: UROLOGY

## 2023-04-19 PROCEDURE — 3072F LOW RISK FOR RETINOPATHY: CPT | Mod: HCNC,CPTII,S$GLB, | Performed by: UROLOGY

## 2023-04-19 PROCEDURE — 99999 PR PBB SHADOW E&M-EST. PATIENT-LVL II: ICD-10-PCS | Mod: PBBFAC,HCNC,, | Performed by: DIETITIAN, REGISTERED

## 2023-04-19 PROCEDURE — 3074F PR MOST RECENT SYSTOLIC BLOOD PRESSURE < 130 MM HG: ICD-10-PCS | Mod: HCNC,CPTII,S$GLB, | Performed by: UROLOGY

## 2023-04-19 PROCEDURE — 1159F MED LIST DOCD IN RCRD: CPT | Mod: HCNC,CPTII,S$GLB, | Performed by: UROLOGY

## 2023-04-19 PROCEDURE — G0108 DIAB MANAGE TRN  PER INDIV: HCPCS | Mod: PBBFAC,HCNC | Performed by: DIETITIAN, REGISTERED

## 2023-04-19 PROCEDURE — 3044F PR MOST RECENT HEMOGLOBIN A1C LEVEL <7.0%: ICD-10-PCS | Mod: HCNC,CPTII,S$GLB, | Performed by: UROLOGY

## 2023-04-19 PROCEDURE — 99204 PR OFFICE/OUTPT VISIT, NEW, LEVL IV, 45-59 MIN: ICD-10-PCS | Mod: HCNC,S$GLB,, | Performed by: UROLOGY

## 2023-04-19 RX ORDER — OXYBUTYNIN CHLORIDE 10 MG/1
10 TABLET, EXTENDED RELEASE ORAL DAILY
Qty: 30 TABLET | Refills: 11 | Status: SHIPPED | OUTPATIENT
Start: 2023-04-19 | End: 2023-06-14

## 2023-04-19 NOTE — PROGRESS NOTES
"Diabetes Care Specialist Progress Note  Author: Alexandra Trent RD, CDE  Date: 4/19/2023    Program Intake  Reason for Diabetes Program Visit:: Initial Diabetes Assessment  Type of Intervention:: Individual  Education: Self-Management Skill Review  Current diabetes risk level:: low  In the last 12 months, have you::  (pt denies recent ER/hosp visits)    Lab Results   Component Value Date    HGBA1C 5.0 02/20/2023       Clinical    Problem Review  Active comorbidities affecting diabetes self-care.:  (HTN, HLD, CVA, s/p breast cancer, Hypothyroidism, Obesity by BMI, GERD)    Clinical Assessment  Current Diabetes Treatment:  (Trulicity 0.75mg weekly. Metformin xr 500mg 1tab daily.)  Have you ever experienced hypoglycemia (low blood sugar)?:  (pt states "unsure" because of freq anxiety w/ similar symptoms)  Have you ever experienced hyperglycemia (high blood sugar)?: yes  In the last month, how often have you experienced high blood sugar?: more than once a day  Are you able to tell when your blood sugar is high?: Yes  What are your symptoms?: thirst, frequent urination, dizziness  Have you ever been hospitalized because your blood sugar was high?: no    Medication Information  How many days a week do you miss your medications?: Never  Do you sometimes have difficulty refilling your medications?: No  Medication adherence impacting ability to self-manage diabetes?: No    Labs  Type of Regular Lab Work: A1c, Cholesterol, Microalbumin, CBC, BMP  Where do you get your labs drawn?: [facility]  Lab Compliance Barriers: No    Nutritional Status  Meal Plan 24 Hour Recall - Breakfast: none  Meal Plan 24 Hour Recall - Lunch: fried fish, fried rice 1.5c, salad  Meal Plan 24 Hour Recall - Dinner: leftovers  Meal Plan 24 Hour Recall - Snack: 7pm dry cereal (froot loops); pat: tea, water  Recent Changes in Weight: Weight Gain (~18lbs since 2/23)  Current nutritional status an area of need that is impacting patient's ability to " self-manage diabetes?: Yes    Additional Social History    Support  Does anyone support you with your diabetes care?: yes  Who supports you?: significant other, self  Who takes you to your medical appointments?: family member  Is Support an area impacting ability to self-manage diabetes?: No    Access to Mass Media & Technology  Does the patient have access to any of the following devices or technologies?: Smart phone, Internet Access  Media or technology needs impacting ability to self-manage diabetes?: No    Cognitive/Behavioral Health  Alert and Oriented: Yes  Difficulty Thinking: No  Requires Prompting: No  Requires assistance for routine expression?: No  Cognitive or behavioral barriers impacting ability to self-manage diabetes?: No    Culture/Religious  Culture or Voodoo beliefs that may impact ability to access healthcare: No    Communication  Language preference: English  Hearing Problems: Yes (decreased left side)  Vision Problems: Yes  Vision problem type:: Decreased Vision  Vision Assistance: Glasses  Communication needs impacting ability to self-manage diabetes?: No    Health Literacy  Preferred Learning Method: Face to Face, Hands On  How often do you need to have someone help you read instructions, pamphlets, or written material from your doctor or pharmacy?:  (boyfriend assists)  Health literacy needs impacting ability to self-manage diabetes?: No      Diabetes Self-Management Skills Assessment    Diabetes Disease Process/Treatment Options  Diabetes Disease Process/Treatment Options: Skills Assessment Completed: No  Deferred due to:: Time    Nutrition/Healthy Eating  Challenges to healthy eating::  (irregular meal patterns, sugary pat, fried foods)  Method of carbohydrate measurement:: no method  Patient can identify foods that impact blood sugar.: no (see comments)  Nutrition/Healthy Eating Skills Assessment Completed:: Yes  Assessment indicates:: Knowledge deficit, Instruction Needed  Area of  need?: Yes    Physical Activity/Exercise  Physical Activity/Exercise Skills Assessment Completed: : No  Deffered due to:: Time    Medications  Patient is able to describe current diabetes management routine.: yes  Diabetes management routine:: injectable medications, oral medications  Patient is able to identify current diabetes medications, dosages, and appropriate timing of medications.: yes  Patient understands the purpose of the medications taken for diabetes.: no  Patient reports problems or concerns with current medication regimen.: no  Medication Skills Assessment Completed:: Yes  Assessment indicates:: Instruction Needed  Area of need?: Yes    Home Blood Glucose Monitoring  Patient states that blood sugar is checked at home daily.: yes  Monitoring Method:: home glucometer  Home glucometer meter type:: unknown  When you check what is your typical blood sugar range? : Per recall, fst -180; not testing other times  Blood glucose logs:: no, encouraged to bring logs to provider visits  Home Blood Glucose Monitoring Skills Assessment Completed: : Yes  Assessment indicates:: Instruction Needed, Knowledge deficit  Area of need?: Yes    Acute Complications  Patient is able to identify types of acute complications: No  Acute Complications Skills Assessment Completed: : Yes  Assessment indicates:: Knowledge deficit, Instruction Needed  Area of need?: Yes    Chronic Complications  Chronic Complications Skills Assessment Completed: : No  Deferred due to:: Time    Psychosocial/Coping  Patient can identify ways of coping with chronic disease.: yes  Patient-stated ways of coping with chronic disease:: support from loved ones  Psychosocial/Coping Skills Assessment Completed: : Yes  Assessment indicates:: Adequate understanding  Area of need?: No    Assessment Summary and Plan  Based on today's diabetes care assessment, the following areas of need were identified:      Social 4/19/2023   Support No   Access to [employer]  Media/Tech No   Cognitive/Behavioral Health No   Culture/Congregation No   Communication No   Health Literacy No      Clinical 4/19/2023   Medication Adherence No   Lab Compliance No   Nutritional Status Yes - see care plan      Diabetes Self-Management Skills 4/19/2023   Nutrition/Healthy Eating Yes - see care plan   Medication Yes - encouraged consistency of dosing; deferred additional educ today due visit time and pt meal plan goal setting   Home Blood Glucose Monitoring Yes   Reviewed benefits, techniques of self-monitoring blood glucose. Discussed appropriate timing and frequency to SMBG, education on parameters on when to notify provider and advised patient to bring logs to all appts with PCP/Endocrinologist/Diabetes Care Specialist.    Acute Complications Yes  Discussed prevention, identification signs/symptoms and treatment of hyperglycemia and hypoglycemia and when to contact clinic.     Psychosocial/Coping No        Today's interventions were provided through individual discussion, instruction, and written materials were provided.    Patient verbalized understanding of instruction and written materials.  Pt was able to return back demonstration of instructions today. Patient understood key points, needs reinforcement and further instruction.     Diabetes Self-Management Care Plan:  Today's Diabetes Self-Management Care Plan was developed with Sangeetha's input. Sangeetha has agreed to work toward the following goal(s) to improve his/her overall diabetes control.      Care Plan: Diabetes Management   Updates made since 3/20/2023 12:00 AM        Problem: Healthy Eating         Goal: Eat 3 meals daily - manage carb 30-45grams/meal, 5-15grams/snack. Completed 4/19/2023   Start Date: 4/14/2022   Expected End Date: 4/14/2023   This Visit's Progress: Not met   Recent Progress: On track   Priority: High   Barriers: Other (comments)   Note:    New care plan created today.       Problem: Blood Glucose Self-Monitoring          Goal: Patient agrees to check and record blood sugars 2 times per day (fst, 2hr ppd); bring meter/records to clinic. Completed 4/19/2023   Start Date: 4/14/2022   Expected End Date: 4/14/2023   This Visit's Progress: No change   Recent Progress: No change   Priority: Medium   Barriers: Other (comments)   Note:    New care plan created today.       Care Plan: Diabetes Management   Updates made since 3/20/2023 12:00 AM        Problem: Healthy Eating         Goal: Eat 3 meals daily - manage carb 30-45grams/meal, 5-15grams/snack.    Start Date: 4/19/2023   Expected End Date: 4/19/2024   Priority: High   Barriers: Knowledge deficit; Cognitive Deficits        Task: Reviewed the sources and role of Carbohydrate, Protein, and Fat and how each nutrient impacts blood sugar. Completed 4/19/2023        Task: Recommended replacing beverages containing high sugar content with noncaloric/sugar free options and/or water. Completed 4/19/2023        Task: Review the importance of balancing carbohydrates with each meal using portion control techniques to count servings of carbohydrate and label reading to identify serving size and amount of total carbs per serving. Completed 4/19/2023        Task: Provided Sample plate method and reviewed the use of the plate to estimate amounts of carbohydrate per meal. Completed 4/19/2023          Follow Up Plan   Follow up in about 3 weeks (around 5/10/2023).  -review BG logs/meter  -eval goal progress    Today's care plan and follow up schedule was discussed with patient.  Sangeetha verbalized understanding of the care plan, goals, and agrees to follow up plan.        The patient was encouraged to communicate with his/her health care provider/physician and care team regarding his/her condition(s) and treatment.  I provided the patient with my contact information today and encouraged to contact me via phone or [facility] Patient Portal as needed.     Length of Visit   Total Time: 60 Minutes

## 2023-04-19 NOTE — PROGRESS NOTES
Chief Complaint:  OAB, renal cysts    HPI:   Sangeetha June is a 43 y.o. female that presents today as a referral from LYNDA Huang for renal cysts.  Patient had an abdominal ultrasound obtained which revealed a large left renal cyst as well as cysts with possible solid components.  She had a follow-up MRI with and without contrast which revealed that these did not have any enhancement and were completely benign.  Patient also notes issues with urinary incontinence and overactive bladder.  She also notes urge incontinence as well as stress incontinence.  She notes that her urgency and urge incontinence are more bothersome by far.  She wears 2-5 pads per day.  She also has nocturia x2.  She has been on oxybutynin 5 mg in the past but does not think that she is currently taking this.  She does think that it helped though.  She denies any dysuria or gross hematuria.  She does have a family history of prostate cancer in her maternal grandfather.    PMH:  Past Medical History:   Diagnosis Date    Anxiety     Asthma 11/8/2022    Breast cancer     Chest pain     Chest pain 07/02/2021    Cholecystitis without calculus     Decreased ROM of left shoulder 02/15/2022    Diabetes mellitus     Dizziness     Elevated C-reactive protein (CRP)     Essential hypertension     Fall 10/11/2021    Memory change     EVERARDO (obstructive sleep apnea)     Osteoarthritis     Other specified disorders of thyroid     Pre-diabetes 04/15/2021    Chronic, Stable, cont metformin    Screening mammogram, encounter for 04/15/2021    Shoulder injury     SOB (shortness of breath)     Stroke     Thyroiditis     Tingling of left upper extremity 10/29/2021    Vision disturbance 04/30/2021    Weakness of shoulder 02/15/2022       PSH:  Past Surgical History:   Procedure Laterality Date    ARTHROSCOPIC REPAIR OF ROTATOR CUFF OF SHOULDER Left 1/27/2022    Procedure: REPAIR, ROTATOR CUFF, ARTHROSCOPIC;  Surgeon: Francisco Mathews MD;  Location: [facility] OR;   Service: Orthopedics;  Laterality: Left;    ARTHROSCOPY OF SHOULDER WITH DECOMPRESSION OF SUBACROMIAL SPACE Left 1/27/2022    Procedure: ARTHROSCOPY, SHOULDER, WITH SUBACROMIAL SPACE DECOMPRESSION;  Surgeon: Francisco Mathews MD;  Location: [facility] OR;  Service: Orthopedics;  Laterality: Left;    BREAST BIOPSY      COLONOSCOPY N/A 5/31/2022    Procedure: COLONOSCOPY;  Surgeon: Km Odom MD;  Location: [facility] ENDO;  Service: Endoscopy;  Laterality: N/A;    ESOPHAGOGASTRODUODENOSCOPY N/A 5/31/2022    Procedure: EGD (ESOPHAGOGASTRODUODENOSCOPY) ;  Surgeon: Km Odom MD;  Location: [facility] ENDO;  Service: Endoscopy;  Laterality: N/A;    FIXATION OF TENDON Left 1/27/2022    Procedure: FIXATION, TENDON;  Surgeon: Francisco Mathews MD;  Location: [facility] OR;  Service: Orthopedics;  Laterality: Left;    HYSTERECTOMY      INJECTION OF ANESTHETIC AGENT INTO SACROILIAC JOINT Bilateral 7/29/2022    Procedure: Bilateral SIJ +  Bilateral GT Bursa Injection RN IV Sedation;  Surgeon: Bisi Cochran MD;  Location: [facility] PAIN MGT;  Service: Pain Management;  Laterality: Bilateral;    INJECTION OF JOINT Left 11/12/2021    Procedure: Left suprascapular and axillary injection with local;  Surgeon: Bisi Cochran MD;  Location: [facility] PAIN MGT;  Service: Pain Management;  Laterality: Left;    INJECTION OF JOINT Bilateral 7/29/2022    Procedure: Bilateral SIJ + Bilateral GT Bursa Injection RN IV Sedation;  Surgeon: Bisi Cochran MD;  Location: [facility] PAIN MGT;  Service: Pain Management;  Laterality: Bilateral;    INSERTION OF BREAST TISSUE EXPANDER Left 11/15/2022    Procedure: INSERTION, TISSUE EXPANDER, BREAST;  Surgeon: Collin Pinto MD;  Location: [facility] OR;  Service: Plastics;  Laterality: Left;    MASTECTOMY      ROBOT-ASSISTED CHOLECYSTECTOMY USING DA ROBERTA XI N/A 8/29/2022    Procedure: XI ROBOTIC CHOLECYSTECTOMY;  Surgeon: Raeann Bhandari DO;  Location: [facility] OR;  Service: General;  Laterality: N/A;     TISSUE EXPANDER REMOVAL Left 1/4/2023    Procedure: REMOVAL, TISSUE EXPANDER;  Surgeon: Collin Pinto MD;  Location: [facility] OR;  Service: Plastics;  Laterality: Left;    TOTAL REDUCTION MAMMOPLASTY Right 11/15/2022    Procedure: MAMMOPLASTY, REDUCTION;  Surgeon: Collin Pinto MD;  Location: [facility] OR;  Service: Plastics;  Laterality: Right;       Family History:  Family History   Problem Relation Age of Onset    Colon cancer Mother 53        partial colectomy    Stroke Mother     Heart disease Mother     Breast cancer Sister     Diabetes Sister     Throat cancer Maternal Aunt         smoking hx    Lung cancer Maternal Uncle         hx smoking    No Known Problems Maternal Grandmother     Prostate cancer Maternal Grandfather 70    Alcohol abuse Maternal Grandfather     No Known Problems Paternal Grandmother     No Known Problems Paternal Grandfather     Diabetes Brother     Diabetes Brother     Diabetes Brother     Breast cancer Other 37        chemotherapy, planning for BL mastectomy    No Known Problems Other        Social History:  Social History     Tobacco Use    Smoking status: Never    Smokeless tobacco: Never   Substance Use Topics    Alcohol use: Not Currently    Drug use: Not Currently     Types: Marijuana     Comment: none currently        Review of Systems:  General: No fever, chills  Skin: No rashes  Chest:  Denies cough and sputum production  Heart: Denies chest pain  Resp: Denies dyspnea  Abdomen: Denies diarrhea, abdominal pain, hematemesis, or blood in stool.  Musculoskeletal: No joint stiffness or swelling. Denies back pain.  : see HPI  Neuro: no dizziness or weakness    Allergies:  Lisinopril and Lurasidone    Medications:    Current Outpatient Medications:     albuterol (PROVENTIL/VENTOLIN HFA) 90 mcg/actuation inhaler, Inhale 2 puffs into the lungs every 4 (four) hours as needed for Wheezing. Rescue, Disp: 8.5 g, Rfl: 1    ammonium lactate 12 % Crea, Apply 1 Act topically 2 (two) times daily.  To feet., Disp: 140 g, Rfl: 2    ascorbic acid, vitamin C, (VITAMIN C) 1000 MG tablet, Take 1 tablet (1,000 mg total) by mouth once daily., Disp: 90 tablet, Rfl: 3    aspirin (ECOTRIN) 81 MG EC tablet, Take 1 tablet (81 mg total) by mouth once daily., Disp: 360 tablet, Rfl: 1    budesonide-formoterol 160-4.5 mcg (SYMBICORT) 160-4.5 mcg/actuation HFAA, Inhale 2 puffs into the lungs every 12 (twelve) hours. Controller, Disp: 10.2 g, Rfl: 11    busPIRone (BUSPAR) 15 MG tablet, Take 1 tablet (15 mg total) by mouth 3 (three) times daily., Disp: 270 tablet, Rfl: 1    celecoxib (CELEBREX) 200 MG capsule, Take 1 capsule (200 mg total) by mouth 2 (two) times daily., Disp: 28 capsule, Rfl: 0    cephALEXin (KEFLEX) 500 MG capsule, TAKE 1 CAPSULE BY MOUTH EVERY 6 HOURS FOR 14 DAYS, Disp: 56 capsule, Rfl: 0    dulaglutide (TRULICITY) 0.75 mg/0.5 mL pen injector, Inject 0.75 mg into the skin once a week., Disp: 4 pen, Rfl: 0    EScitalopram oxalate (LEXAPRO) 20 MG tablet, Take 1 tablet (20 mg total) by mouth once daily., Disp: 90 tablet, Rfl: 1    EUTHYROX 50 mcg tablet, Take 1 tablet (50 mcg total) by mouth every morning., Disp: 90 tablet, Rfl: 1    gabapentin (NEURONTIN) 300 MG capsule, Take 1 capsule (300 mg total) by mouth 2 (two) times daily., Disp: 180 capsule, Rfl: 1    hydroCHLOROthiazide (HYDRODIURIL) 25 MG tablet, Take 1 tablet (25 mg total) by mouth daily as needed (edema, swelling)., Disp: 90 tablet, Rfl: 1    HYDROcodone-acetaminophen (NORCO) 5-325 mg per tablet, Take 1 tablet by mouth every 6 (six) hours as needed for pain., Disp: 28 tablet, Rfl: 0    hydrocortisone 0.5 % cream, Apply topically 2 (two) times daily., Disp: , Rfl:     LATUDA 20 mg Tab tablet, Take 20 mg by mouth once daily., Disp: , Rfl:     linaCLOtide (LINZESS) 145 mcg Cap capsule, Take 1 capsule (145 mcg total) by mouth once daily., Disp: 90 capsule, Rfl: 3    magnesium oxide (MAG-OX) 400 mg (241.3 mg magnesium) tablet, Take 1 tablet (400 mg total)  by mouth once daily., Disp: 90 tablet, Rfl: 1    metFORMIN (GLUCOPHAGE-XR) 500 MG ER 24hr tablet, SMARTSI Tablet(s) By Mouth Every Evening, Disp: 90 tablet, Rfl: 1    multivit with min-folic acid (ADULT MULTIVITAMIN GUMMIES) 200 mcg Chew, Take 1 tablet by mouth once daily., Disp: 90 tablet, Rfl: 3    olopatadine (PATANOL) 0.1 % ophthalmic solution, , Disp: , Rfl:     ondansetron (ZOFRAN) 4 MG tablet, Take 1 tablet (4 mg total) by mouth every 6 (six) hours as needed for Nausea., Disp: 10 tablet, Rfl: 1    ondansetron (ZOFRAN-ODT) 8 MG TbDL, DISSOLVE 1 TABLET ON TONGUE EVERY 6 HOURS AS NEEDED FOR NAUSEA, Disp: 10 tablet, Rfl: 0    oxyCODONE (ROXICODONE) 5 MG immediate release tablet, Take 1 tablet (5 mg total) by mouth every 6 (six) hours as needed for Pain., Disp: 20 tablet, Rfl: 0    pantoprazole (PROTONIX) 40 MG tablet, Take 1 tablet (40 mg total) by mouth once daily., Disp: 90 tablet, Rfl: 1    pravastatin (PRAVACHOL) 40 MG tablet, Take 1 tablet (40 mg total) by mouth every evening., Disp: 90 tablet, Rfl: 1    traZODone (DESYREL) 150 MG tablet, Take 1 tablet (150 mg total) by mouth every evening., Disp: 90 tablet, Rfl: 1    oxybutynin (DITROPAN-XL) 10 MG 24 hr tablet, Take 1 tablet (10 mg total) by mouth once daily., Disp: 30 tablet, Rfl: 11  No current facility-administered medications for this visit.    Facility-Administered Medications Ordered in Other Visits:     lactated ringers infusion, , Intravenous, Continuous, Maty Thompson MD, Last Rate: 10 mL/hr at 22 0636, New Bag at 23 1603    Physical Exam:  Vitals:    23 0748   BP: 118/86   Pulse: 75   Temp: 98.5 °F (36.9 °C)     Body mass index is 46.97 kg/m².  General: awake, alert, cooperative  Head: NC/AT  Ears: external ears normal  Eyes: sclera normal  Lungs: normal inspiration, NAD  Heart: well-perfused  Skin: The skin is warm and dry  Ext: No c/c/e.  Neuro: grossly intact, AOx3    RADIOLOGY:  MRI ABDOMEN W WO CONTRAST  2023      CLINICAL HISTORY:  Renal mass.  Other specified disorders of kidney and ureter     TECHNIQUE:  Multiplanar, multisequence MR imaging was performed through the abdomen before and after the intravenous administration of 10 cc Gadavist contrast.     COMPARISON:  Abdominal ultrasound on 02/15/2023     FINDINGS:  Benign bilateral renal cysts measuring up to 7.7 cm at the lower pole the left kidney and 2.2 cm at the upper pole.  The previously described hypoechoic complicated cysts in the left kidney do not demonstrate internal enhancement and are consistent with benign Bosniak 2 category cysts.  No hydronephrosis or perinephric stranding.     Enlarged, fatty liver.  No liver lesions.  The hepatic and portal veins are patent.     Aortic caliber is within normal limits.  The pancreas, adrenal glands, and visualized bowel is within normal limits.  Small fat containing umbilical hernia.  There is a 3 cm hypointense lesion in the dome of the spleen that demonstrates homogeneous enhancement similar to the adjacent splenic parenchyma characteristic of a benign hamartoma.     There is an enlarged left common iliac lymph node measuring up to 3.7 x 2.1 cm.     Impression:     Benign bilateral renal cysts.     Mildly enlarged, fatty liver.     Enlarged, stable left common iliac lymph node that was present on the previous PET.    LABS:  I personally reviewed the following lab values:  Lab Results   Component Value Date    WBC 5.75 09/16/2022    HGB 13.9 09/16/2022    HCT 43.2 09/16/2022     09/16/2022     03/20/2023    K 3.6 03/20/2023     03/20/2023    CREATININE 0.8 03/20/2023    BUN 6 03/20/2023    CO2 25 03/20/2023    TSH 1.988 02/20/2023    HGBA1C 5.0 02/20/2023    CHOL 148 07/19/2022    TRIG 97 07/19/2022    HDL 44 07/19/2022    ALT 15 03/20/2023    AST 16 03/20/2023       URINALYSIS:  Urinalysis obtained in clinic today specific gravity 1.025 pH six, negative for all parameters      Assessment/Plan:    Sangeetha June is a 43 y.o. female with:    OAB - patient notes that she found oxybutynin helpful, will increase her dosage to 10 mg daily, follow-up eight weeks for symptom check    Renal cysts - large left renal cyst but all of her cysts are completely benign, no need for further imaging in relation to her renal cysts      Thank you for allowing me the opportunity to participate in this patient's care.     Issac Pablo MD  Urology

## 2023-05-09 ENCOUNTER — TELEPHONE (OUTPATIENT)
Dept: DIABETES | Facility: CLINIC | Age: 43
End: 2023-05-09

## 2023-05-09 ENCOUNTER — INFUSION (OUTPATIENT)
Dept: INFUSION THERAPY | Facility: HOSPITAL | Age: 43
End: 2023-05-09
Attending: PHYSICIAN ASSISTANT
Payer: MEDICARE

## 2023-05-09 ENCOUNTER — PATIENT MESSAGE (OUTPATIENT)
Dept: DIABETES | Facility: CLINIC | Age: 43
End: 2023-05-09

## 2023-05-09 ENCOUNTER — PATIENT MESSAGE (OUTPATIENT)
Dept: CARDIOLOGY | Facility: CLINIC | Age: 43
End: 2023-05-09
Payer: MEDICARE

## 2023-05-09 ENCOUNTER — PATIENT MESSAGE (OUTPATIENT)
Dept: GASTROENTEROLOGY | Facility: CLINIC | Age: 43
End: 2023-05-09
Payer: MEDICARE

## 2023-05-09 ENCOUNTER — PATIENT MESSAGE (OUTPATIENT)
Dept: INTERNAL MEDICINE | Facility: CLINIC | Age: 43
End: 2023-05-09
Payer: MEDICARE

## 2023-05-09 ENCOUNTER — CLINICAL SUPPORT (OUTPATIENT)
Dept: DIABETES | Facility: CLINIC | Age: 43
End: 2023-05-09
Payer: MEDICARE

## 2023-05-09 ENCOUNTER — PATIENT MESSAGE (OUTPATIENT)
Dept: DIABETES | Facility: CLINIC | Age: 43
End: 2023-05-09
Payer: MEDICARE

## 2023-05-09 VITALS — BODY MASS INDEX: 46.9 KG/M2 | WEIGHT: 290.56 LBS

## 2023-05-09 DIAGNOSIS — Z79.4 TYPE 2 DIABETES MELLITUS WITH OTHER SPECIFIED COMPLICATION, WITH LONG-TERM CURRENT USE OF INSULIN: Primary | ICD-10-CM

## 2023-05-09 DIAGNOSIS — Z17.1 MALIGNANT NEOPLASM OF LEFT BREAST IN FEMALE, ESTROGEN RECEPTOR NEGATIVE, UNSPECIFIED SITE OF BREAST: Primary | ICD-10-CM

## 2023-05-09 DIAGNOSIS — E11.9 TYPE 2 DIABETES MELLITUS WITHOUT COMPLICATION, WITHOUT LONG-TERM CURRENT USE OF INSULIN: ICD-10-CM

## 2023-05-09 DIAGNOSIS — C50.912 MALIGNANT NEOPLASM OF LEFT BREAST IN FEMALE, ESTROGEN RECEPTOR NEGATIVE, UNSPECIFIED SITE OF BREAST: Primary | ICD-10-CM

## 2023-05-09 DIAGNOSIS — E78.5 HYPERLIPIDEMIA, UNSPECIFIED HYPERLIPIDEMIA TYPE: ICD-10-CM

## 2023-05-09 DIAGNOSIS — E11.69 TYPE 2 DIABETES MELLITUS WITH OTHER SPECIFIED COMPLICATION, WITH LONG-TERM CURRENT USE OF INSULIN: Primary | ICD-10-CM

## 2023-05-09 PROCEDURE — 25000003 PHARM REV CODE 250: Performed by: NURSE PRACTITIONER

## 2023-05-09 PROCEDURE — A4216 STERILE WATER/SALINE, 10 ML: HCPCS | Performed by: NURSE PRACTITIONER

## 2023-05-09 PROCEDURE — G0108 PR DIAB MANAGE TRN  PER INDIV: ICD-10-PCS | Mod: S$GLB,,, | Performed by: DIETITIAN, REGISTERED

## 2023-05-09 PROCEDURE — 99999 PR PBB SHADOW E&M-EST. PATIENT-LVL II: ICD-10-PCS | Mod: PBBFAC,,, | Performed by: DIETITIAN, REGISTERED

## 2023-05-09 PROCEDURE — 99999 PR PBB SHADOW E&M-EST. PATIENT-LVL II: CPT | Mod: PBBFAC,,, | Performed by: DIETITIAN, REGISTERED

## 2023-05-09 PROCEDURE — G0108 DIAB MANAGE TRN  PER INDIV: HCPCS | Mod: S$GLB,,, | Performed by: DIETITIAN, REGISTERED

## 2023-05-09 PROCEDURE — 63600175 PHARM REV CODE 636 W HCPCS: Performed by: NURSE PRACTITIONER

## 2023-05-09 PROCEDURE — 96523 IRRIG DRUG DELIVERY DEVICE: CPT

## 2023-05-09 RX ORDER — HEPARIN 100 UNIT/ML
500 SYRINGE INTRAVENOUS
Status: CANCELLED | OUTPATIENT
Start: 2023-05-09

## 2023-05-09 RX ORDER — SODIUM CHLORIDE 0.9 % (FLUSH) 0.9 %
10 SYRINGE (ML) INJECTION
Status: CANCELLED | OUTPATIENT
Start: 2023-05-09

## 2023-05-09 RX ORDER — PRAVASTATIN SODIUM 40 MG/1
40 TABLET ORAL NIGHTLY
Qty: 90 TABLET | Refills: 1 | Status: SHIPPED | OUTPATIENT
Start: 2023-05-09 | End: 2024-02-13 | Stop reason: SDUPTHER

## 2023-05-09 RX ORDER — DULAGLUTIDE 0.75 MG/.5ML
0.75 INJECTION, SOLUTION SUBCUTANEOUS WEEKLY
Qty: 4 PEN | Refills: 2 | Status: SHIPPED | OUTPATIENT
Start: 2023-05-09 | End: 2023-05-09 | Stop reason: DRUGHIGH

## 2023-05-09 RX ORDER — DULAGLUTIDE 0.75 MG/.5ML
0.75 INJECTION, SOLUTION SUBCUTANEOUS WEEKLY
Qty: 4 PEN | Refills: 2 | Status: CANCELLED | OUTPATIENT
Start: 2023-05-09 | End: 2024-05-08

## 2023-05-09 RX ORDER — HEPARIN 100 UNIT/ML
500 SYRINGE INTRAVENOUS
Status: DISCONTINUED | OUTPATIENT
Start: 2023-05-09 | End: 2023-05-09 | Stop reason: HOSPADM

## 2023-05-09 RX ORDER — SODIUM CHLORIDE 0.9 % (FLUSH) 0.9 %
10 SYRINGE (ML) INJECTION
Status: DISCONTINUED | OUTPATIENT
Start: 2023-05-09 | End: 2023-05-09 | Stop reason: HOSPADM

## 2023-05-09 RX ORDER — DULAGLUTIDE 1.5 MG/.5ML
1.5 INJECTION, SOLUTION SUBCUTANEOUS
Qty: 4 PEN | Refills: 2 | Status: SHIPPED | OUTPATIENT
Start: 2023-05-09 | End: 2023-06-23

## 2023-05-09 RX ORDER — METFORMIN HYDROCHLORIDE 500 MG/1
500 TABLET, EXTENDED RELEASE ORAL 2 TIMES DAILY WITH MEALS
Qty: 180 TABLET | Refills: 1 | Status: SHIPPED | OUTPATIENT
Start: 2023-05-09 | End: 2024-02-27

## 2023-05-09 RX ORDER — ASPIRIN 81 MG/1
81 TABLET ORAL DAILY
Qty: 360 TABLET | Refills: 1 | Status: SHIPPED | OUTPATIENT
Start: 2023-05-09

## 2023-05-09 RX ADMIN — Medication 10 ML: at 10:05

## 2023-05-09 RX ADMIN — HEPARIN 500 UNITS: 100 SYRINGE at 10:05

## 2023-05-09 NOTE — DISCHARGE INSTRUCTIONS
[facility]  [address] [address]  [address]  [phone] phone     [phone] fax  Hours of Operation: Monday- Friday 8:00am- 5:00pm  After hours phone  [phone]  Hematology / Oncology Physicians on call      [address] Dr., NP Sydney Prescott, LYNDA Nolasco FNP    Please call with any concerns regarding your appointment today.

## 2023-05-09 NOTE — Clinical Note
Andres Marvin, I met with Ms Sangeetha today. She reports readiness to increase metformin xr to twice daily and thought new Rx had been sent pharm. She seems to be tolerating current regimen but may benefit from increase Trulicity to further support meal plan improvement.   She's still struggling with inconsistent meals and processed foods; hopefully our discussion today will help. Her next visit with you 5/15. Thanks! Alexandra

## 2023-05-09 NOTE — TELEPHONE ENCOUNTER
LOV 4/3/23   Alert-The patient is alert, awake and responds to voice. The patient is oriented to time, place, and person. The triage nurse is able to obtain subjective information.

## 2023-05-09 NOTE — TELEPHONE ENCOUNTER
----- Message from MOLLY Mathias sent at 5/9/2023  1:42 PM CDT -----  Hey, I just sent the metformin for BID. Thanks!    ----- Message -----  From: Alexandra Trent RD, CDE  Sent: 5/9/2023   9:58 AM CDT  To: MOLLY Mathias    Hi Shavonne, I met with Ms Vivas today. She reports readiness to increase metformin xr to twice daily and thought new Rx had been sent pharm. She seems to be tolerating current regimen but may benefit from increase Trulicity to further support meal plan improvement.     She's still struggling with inconsistent meals and processed foods; hopefully our discussion today will help. Her next visit with you 5/15. Thanks! Alexandra

## 2023-05-09 NOTE — NURSING
"Pt here for Mediport Flush. Right chestwall mediport accessed with a 20g 1" sanchez via sterile technique.  Excellent blood return noted. Flushed with 10ml NS and 5 ml heparin solution. Needle D/C, site without redness, swelling, or drainage noted. Dressing applied. Patient tolerated well. Patient to return to clinic July 5, 2023.  "

## 2023-05-09 NOTE — PROGRESS NOTES
Diabetes Care Specialist Progress Note  Author: Alexandra Trent RD, CDE  Date: 5/9/2023    Program Intake  Reason for Diabetes Program Visit:: Intervention (DM referral 4/12/23)  Type of Intervention:: Individual  Education: Self-Management Skill Review  Current diabetes risk level:: high  In the last 12 months, have you::  (pt denies recent ER/hosp visits)    Lab Results   Component Value Date    HGBA1C 5.0 02/20/2023       Clinical    Problem Review  Active comorbidities affecting diabetes self-care.:  (HTN, HLD, CVA, s/p breast cancer, Hypothyroidism, Obesity by BMI, GERD)    Clinical Assessment  Current Diabetes Treatment: Oral Medication, Injectable, Diet (Trulicity 0.75mg weekly. Metformin XR 500mg 1tab daily.)  Have you ever experienced hypoglycemia (low blood sugar)?: yes (Pt reports one recent episode, BG 70s, which she treated using oj, fruit w/ peanut butter.)  Are you able to tell when your blood sugar is low?: Yes  What symptoms do you experience?: Unable to describe  Have you ever been hospitalized because your blood sugar was too low?: no  Have you ever experienced hyperglycemia (high blood sugar)?: yes  In the last month, how often have you experienced high blood sugar?: once every other week  Are you able to tell when your blood sugar is high?: Yes  What are your symptoms?: dizziness  Have you ever been hospitalized because your blood sugar was high?: no    Medication Information  How many days a week do you miss your medications?: Never  Do you sometimes have difficulty refilling your medications?: No  Medication adherence impacting ability to self-manage diabetes?: No    Labs  Do you have regular lab work to monitor your medications?: Yes  Type of Regular Lab Work: A1c, Cholesterol, Microalbumin, BMP  Where do you get your labs drawn?: [facility]  Lab Compliance Barriers: No    Nutritional Status  Diet:  (Pt reports 1-2meals daily. Excess carb, sat fat and sodium from processed foods, reg cola.  Pt reports minimal cooking. Most meals are restaurant sandwiches, canned soups.)  Meal Plan 24 Hour Recall - Breakfast: hashbrown  Meal Plan 24 Hour Recall - Lunch: sandwich ham/michelle/brenda on white bread, no sides - reg cola  Meal Plan 24 Hour Recall - Dinner: yogurt  Meal Plan 24 Hour Recall - Snack: fruit; pat: water, green tea diet, limiting reg cola  Change in appetite?: Yes (decreased due knee pain and active with g'children)  Recent Changes in Weight:  (recently stable; gain ~26 lbs since 1/23)  Current nutritional status an area of need that is impacting patient's ability to self-manage diabetes?: Yes    Diabetes Self-Management Skills Assessment    Nutrition/Healthy Eating  Challenges to healthy eating::  (irregular meal patterns, sugary pat, processed foods)  Method of carbohydrate measurement:: no method  Patient can identify foods that impact blood sugar.: no (see comments)  Nutrition/Healthy Eating Skills Assessment Completed:: Yes  Assessment indicates:: Instruction Needed, Knowledge deficit  Area of need?: Yes    Physical Activity/Exercise  Patient's daily activity level:: sedentary  Patient formally exercises outside of work.: no  Reasons for not exercising:: physically unable to exercise currently (pt reports leg pain; she plans to discuss more with Dr. Garcia)  Patient can identify reasons why exercise/physical activity is important in diabetes management.: no  Physical Activity/Exercise Skills Assessment Completed: : Yes  Assessment indicates:: Instruction Needed, Knowledge deficit  Area of need?: Yes    Medications  Patient is able to describe current diabetes management routine.: yes  Diabetes management routine:: injectable medications, oral medications  Patient is able to identify current diabetes medications, dosages, and appropriate timing of medications.: yes  Patient understands the purpose of the medications taken for diabetes.: no  Patient reports problems or concerns with current  medication regimen.: yes  Medication regimen problems/concerns:: unsure of doses (Pt reports readiness to increase metformin to twice daily and thought new Rx had been sent pharm. She has upcoming visit w/ Irene.)  Assessment indicates:: Instruction Needed, Knowledge deficit  Area of need?: Yes    Home Blood Glucose Monitoring  Patient states that blood sugar is checked at home daily.: yes  Monitoring Method:: home glucometer  Home glucometer meter type:: unknown  How often do you check your blood sugar?: Occasionally  When you check what is your typical blood sugar range? : Per recall, pt testing BG weekly w/ fst avg .  Blood glucose logs:: no, encouraged to bring logs to provider visits  Home Blood Glucose Monitoring Skills Assessment Completed: : Yes  Assessment indicates:: Instruction Needed  Area of need?: Yes    Assessment Summary and Plan  Based on today's diabetes care assessment, the following areas of need were identified:      Social 4/19/2023   Support No   Access to Mass Media/Tech No   Cognitive/Behavioral Health No   Culture/Yazidism No   Communication No   Health Literacy No        Clinical 5/9/2023   Medication Adherence No   Lab Compliance No   Nutritional Status Yes -see care plan        Diabetes Self-Management Skills 5/9/2023   Nutrition/Healthy Eating Yes - see care plan   Physical Activity/Exercise Yes - pt reports leg pain; she plans to discuss more with Dr. Garcia   Medication Yes - message sent to provider Irene regarding pt interest to increase metformin    Home Blood Glucose Monitoring Yes - encouraged testing consistency and to bring meter clinic       Today's interventions were provided through individual discussion, instruction, and written materials were provided.    Patient verbalized understanding of instruction and written materials.  Pt was able to return back demonstration of instructions today. Patient understood key points, needs reinforcement and further  instruction.     Diabetes Self-Management Care Plan:  Today's Diabetes Self-Management Care Plan was developed with Sangeetha's input. Sangeetha has agreed to work toward the following goal(s) to improve his/her overall diabetes control.      Care Plan: Diabetes Management   Updates made since 4/9/2023 12:00 AM        Problem: Healthy Eating         Goal: Eat 3 meals daily - manage carb 30-45grams/meal, 5-15grams/snack. Completed 4/19/2023   Start Date: 4/14/2022   Expected End Date: 4/14/2023   This Visit's Progress: Not met   Recent Progress: On track   Priority: High   Barriers: Other (comments)   Note:    New care plan created today.       Problem: Blood Glucose Self-Monitoring         Goal: Patient agrees to check and record blood sugars 2 times per day (fst, 2hr ppd); bring meter/records to clinic. Completed 4/19/2023   Start Date: 4/14/2022   Expected End Date: 4/14/2023   This Visit's Progress: No change   Recent Progress: No change   Priority: Medium   Barriers: Other (comments)   Note:    New care plan created today.       Care Plan: Diabetes Management   Updates made since 4/9/2023 12:00 AM        Problem: Healthy Eating         Goal: Eat 3 meals daily - manage carb 30-45grams/meal, 5-15grams/snack.    Start Date: 4/19/2023   Expected End Date: 4/12/2024   This Visit's Progress: No change   Priority: High   Barriers: Knowledge deficit; Cognitive Deficits   Note:    Discussed role meal spacing and carb/pro balance w/ strategies to support (written grocery list and menu ideas).        Task: Reviewed the sources and role of Carbohydrate, Protein, and Fat and how each nutrient impacts blood sugar. Completed 4/19/2023        Task: Recommended replacing beverages containing high sugar content with noncaloric/sugar free options and/or water. Completed 4/19/2023        Task: Review the importance of balancing carbohydrates with each meal using portion control techniques to count servings of carbohydrate and label reading  to identify serving size and amount of total carbs per serving. Completed 4/19/2023        Task: Provided Sample plate method and reviewed the use of the plate to estimate amounts of carbohydrate per meal. Completed 4/19/2023          Follow Up Plan   Follow up in about 6 weeks (around 6/20/2023).  -review BG logs  -eval goal progress    Today's care plan and follow up schedule was discussed with patient.  Sangeetha verbalized understanding of the care plan, goals, and agrees to follow up plan.        The patient was encouraged to communicate with his/her health care provider/physician and care team regarding his/her condition(s) and treatment.  I provided the patient with my contact information today and encouraged to contact me via phone or [facility] Patient Portal as needed.     Length of Visit   Total Time: 60 Minutes

## 2023-05-15 ENCOUNTER — OFFICE VISIT (OUTPATIENT)
Dept: DIABETES | Facility: CLINIC | Age: 43
End: 2023-05-15
Payer: MEDICARE

## 2023-05-15 DIAGNOSIS — E78.2 MIXED HYPERLIPIDEMIA: ICD-10-CM

## 2023-05-15 DIAGNOSIS — Z79.4 TYPE 2 DIABETES MELLITUS WITH OTHER SPECIFIED COMPLICATION, WITH LONG-TERM CURRENT USE OF INSULIN: Primary | ICD-10-CM

## 2023-05-15 DIAGNOSIS — I63.9 CEREBROVASCULAR ACCIDENT (CVA), UNSPECIFIED MECHANISM: ICD-10-CM

## 2023-05-15 DIAGNOSIS — E66.01 OBESITY, MORBID, BMI 40.0-49.9: ICD-10-CM

## 2023-05-15 DIAGNOSIS — I10 PRIMARY HYPERTENSION: ICD-10-CM

## 2023-05-15 DIAGNOSIS — E11.69 TYPE 2 DIABETES MELLITUS WITH OTHER SPECIFIED COMPLICATION, WITH LONG-TERM CURRENT USE OF INSULIN: Primary | ICD-10-CM

## 2023-05-15 PROCEDURE — 99214 PR OFFICE/OUTPT VISIT, EST, LEVL IV, 30-39 MIN: ICD-10-PCS | Mod: 95,,, | Performed by: NURSE PRACTITIONER

## 2023-05-15 PROCEDURE — 3072F PR LOW RISK FOR RETINOPATHY: ICD-10-PCS | Mod: CPTII,95,, | Performed by: NURSE PRACTITIONER

## 2023-05-15 PROCEDURE — 3072F LOW RISK FOR RETINOPATHY: CPT | Mod: CPTII,95,, | Performed by: NURSE PRACTITIONER

## 2023-05-15 PROCEDURE — 3044F HG A1C LEVEL LT 7.0%: CPT | Mod: CPTII,95,, | Performed by: NURSE PRACTITIONER

## 2023-05-15 PROCEDURE — 99214 OFFICE O/P EST MOD 30 MIN: CPT | Mod: 95,,, | Performed by: NURSE PRACTITIONER

## 2023-05-15 PROCEDURE — 3044F PR MOST RECENT HEMOGLOBIN A1C LEVEL <7.0%: ICD-10-PCS | Mod: CPTII,95,, | Performed by: NURSE PRACTITIONER

## 2023-05-15 NOTE — PATIENT INSTRUCTIONS
PATIENT INSTRUCTIONS     Continue Trulicity 1.5 mg subcutaneously every 7 days.   Continue Glucophage  mg by mouth daily.     Blood Sugar Goals:       Fastin-130.       1-2 hours after a meal: Less than 180.

## 2023-05-15 NOTE — PROGRESS NOTES
"The patient location is: Home  The chief complaint leading to consultation is: Diabetes    Visit type: audiovisual    Face to Face time with patient: 15  30 minutes of total time spent on the encounter, which includes face to face time and non-face to face time preparing to see the patient (eg, review of tests), Obtaining and/or reviewing separately obtained history, Documenting clinical information in the electronic or other health record, Independently interpreting results (not separately reported) and communicating results to the patient/family/caregiver, or Care coordination (not separately reported).  Each patient to whom he or she provides medical services by telemedicine is:  (1) informed of the relationship between the physician and patient and the respective role of any other health care provider with respect to management of the patient; and (2) notified that he or she may decline to receive medical services by telemedicine and may withdraw from such care at any time.    Notes:    Sangeetha June is a 43 y.o. female who presents for a follow up evaluation of Type 2 diabetes mellitus.     CHIEF COMPLAINT: Diabetes Consultation    PCP: Sam Garcia MD      Initial visit with me - 4/3/2023    The patient was initially diagnosed with diabetes  for 2 years.      Previous failed treatments include:  none     Social Documentation:  Patient lives in [place] with Boni.   Occupation: Disabled.  Exercise: No formal exercise.   Diet: "very poor", does not cook. Boni cooks her meals for her.       Diabetes related complications:   cerebrovascular disease.   denies Pancreatitis  denies Gastroparesis  denies DKA  denies Hx/family Hx of MEN2/MTC  denies Frequent UTIs/yeast infections     Cardiovascular Risk Factors: dyslipidemia, hypertension, obesity (BMI >30 kg/m2), and sedentary lifestyle.    Diabetes Medications               dulaglutide (TRULICITY) 1.5 mg/0.5 mL pen injector Inject 1.5 mg into the skin " every 7 days.    metFORMIN (GLUCOPHAGE-XR) 500 MG ER 24hr tablet Take 1 tablet (500 mg total) by mouth 2 (two) times daily with meals.     Current monitoring regimen: capillary blood glucose monitoring with finger sticks.  Last checked on Thursday: 116    Recent hypoglycemic episodes: No.   Patient compliant with glucose checks and medication administration? Yes    DIABETES MANAGEMENT STATUS  Statin: Taking  ACE/ARB: Not taking  Screening or Prevention Patient's value Goal Complete/Controlled?   HgA1C Testing and Control   Lab Results   Component Value Date    HGBA1C 5.0 02/20/2023      Annually/Less than 8% Yes   Lipid profile : 07/19/2022 Annually Yes   LDL control Lab Results   Component Value Date    LDLCALC 84.6 07/19/2022    Annually/Less than 100 mg/dl  Yes   Nephropathy screening Lab Results   Component Value Date    LABMICR 26.0 07/19/2022     No results found for: PROTEINUA  No results found for: UTPCR   Annually Yes   Blood pressure BP Readings from Last 1 Encounters:   04/19/23 118/86    Less than 140/90 Yes   Dilated retinal exam : 07/25/2022 Annually Yes   Foot exam   : 07/25/2022 Annually Yes   Patient's medications, allergies, surgical, social and family histories were reviewed and updated as appropriate.     Review of Systems   Constitutional:  Negative for weight loss.   Eyes:  Negative for blurred vision and double vision.   Cardiovascular:  Negative for chest pain.   Gastrointestinal:  Negative for nausea and vomiting.   Genitourinary:  Negative for frequency.   Musculoskeletal:  Negative for falls.   Neurological:  Negative for dizziness and weakness.   Endo/Heme/Allergies:  Negative for polydipsia.   Psychiatric/Behavioral:  Negative for depression.    All other systems reviewed and are negative.     Physical Exam  Constitutional:       Appearance: Normal appearance.   HENT:      Head: Normocephalic and atraumatic.   Pulmonary:      Effort: No respiratory distress.   Musculoskeletal:       Cervical back: Normal range of motion.   Neurological:      Mental Status: She is alert and oriented to person, place, and time.   Psychiatric:         Mood and Affect: Mood normal.         Behavior: Behavior normal.      There were no vitals taken for this visit.  Wt Readings from Last 3 Encounters:   05/09/23 131.8 kg (290 lb 9.1 oz)   04/19/23 132 kg (291 lb 0.1 oz)   04/19/23 132 kg (291 lb 0.1 oz)       LAB REVIEW  Lab Results   Component Value Date     03/20/2023    K 3.6 03/20/2023     03/20/2023    CO2 25 03/20/2023    BUN 6 03/20/2023    CREATININE 0.8 03/20/2023    CALCIUM 8.9 03/20/2023    ANIONGAP 12 03/20/2023    EGFRNORACEVR >60 03/20/2023     No results found for: CPEPTIDE, GLUTAMICACID, INSLNABS  Hemoglobin A1C   Date Value Ref Range Status   02/20/2023 5.0 4.0 - 5.6 % Final     Comment:     ADA Screening Guidelines:  5.7-6.4%  Consistent with prediabetes  >or=6.5%  Consistent with diabetes    High levels of fetal hemoglobin interfere with the HbA1C  assay. Heterozygous hemoglobin variants (HbS, HgC, etc)do  not significantly interfere with this assay.   However, presence of multiple variants may affect accuracy.     10/21/2022 5.1 4.0 - 5.6 % Final     Comment:     ADA Screening Guidelines:  5.7-6.4%  Consistent with prediabetes  >or=6.5%  Consistent with diabetes    High levels of fetal hemoglobin interfere with the HbA1C  assay. Heterozygous hemoglobin variants (HbS, HgC, etc)do  not significantly interfere with this assay.   However, presence of multiple variants may affect accuracy.     07/19/2022 5.4 4.0 - 5.6 % Final     Comment:     ADA Screening Guidelines:  5.7-6.4%  Consistent with prediabetes  >or=6.5%  Consistent with diabetes    High levels of fetal hemoglobin interfere with the HbA1C  assay. Heterozygous hemoglobin variants (HbS, HgC, etc)do  not significantly interfere with this assay.   However, presence of multiple variants may affect accuracy.          ASSESSMENT     ICD-10-CM ICD-9-CM   1. Type 2 diabetes mellitus with other specified complication, with long-term current use of insulin  E11.69 250.80    Z79.4 V58.67   2. Cerebrovascular accident (CVA), unspecified mechanism  I63.9 434.91   3. Mixed hyperlipidemia  E78.2 272.2   4. Primary hypertension  I10 401.9   5. Obesity, morbid, BMI 40.0-49.9  E66.01 278.01       PLAN  Diagnoses and all orders for this visit:    Type 2 diabetes mellitus with other specified complication, with long-term current use of insulin    Cerebrovascular accident (CVA), unspecified mechanism    Mixed hyperlipidemia    Primary hypertension    Obesity, morbid, BMI 40.0-49.9        Reviewed pathophysiology of diabetes, complications related to the disease, importance of annual dilated eye exam and daily foot examination. Explained MOA, SE, dosage of medications. Written instructions given and reviewed with patient and patient verbalizes understanding.     4/3/2023 Initial visit: was doing well on trulicity 3 mg, but then was not able to get it due to backorder and has not taken in 2 months. Will restart at 0.75, f/u 6 weeks.     5/15/2023 - patient doing well, has [facility] supplies, will need to get with [facility] team for set up. Doing well with trulicity. F/u 3 months.      PATIENT INSTRUCTIONS     Continue Trulicity 1.5 mg subcutaneously every 7 days.   Continue Glucophage  mg by mouth daily.     Blood Sugar Goals:       Fastin-130.       1-2 hours after a meal: Less than 180.     Follow up in about 3 months (around 8/15/2023) for [facility]UAL.    Portions of this note were prepared with [employer] voice recognition transcription software. Grammatical errors, including garbled syntax, mangle pronouns, and other bizarre constructions may be attributed to that software system.

## 2023-05-16 ENCOUNTER — PATIENT MESSAGE (OUTPATIENT)
Dept: ADMINISTRATIVE | Facility: OTHER | Age: 43
End: 2023-05-16
Payer: MEDICARE

## 2023-05-16 ENCOUNTER — PATIENT MESSAGE (OUTPATIENT)
Dept: DIABETES | Facility: CLINIC | Age: 43
End: 2023-05-16
Payer: MEDICARE

## 2023-05-16 ENCOUNTER — PATIENT MESSAGE (OUTPATIENT)
Dept: INTERNAL MEDICINE | Facility: CLINIC | Age: 43
End: 2023-05-16
Payer: MEDICARE

## 2023-05-17 ENCOUNTER — OFFICE VISIT (OUTPATIENT)
Dept: INTERNAL MEDICINE | Facility: CLINIC | Age: 43
End: 2023-05-17
Payer: MEDICARE

## 2023-05-17 ENCOUNTER — HOSPITAL ENCOUNTER (OUTPATIENT)
Dept: RADIOLOGY | Facility: HOSPITAL | Age: 43
Discharge: HOME | End: 2023-05-17
Attending: NURSE PRACTITIONER
Payer: MEDICARE

## 2023-05-17 VITALS
HEART RATE: 76 BPM | DIASTOLIC BLOOD PRESSURE: 72 MMHG | WEIGHT: 284.81 LBS | SYSTOLIC BLOOD PRESSURE: 118 MMHG | OXYGEN SATURATION: 97 % | TEMPERATURE: 98 F | BODY MASS INDEX: 45.97 KG/M2

## 2023-05-17 DIAGNOSIS — M25.562 CHRONIC PAIN OF LEFT KNEE: Primary | ICD-10-CM

## 2023-05-17 DIAGNOSIS — G89.29 CHRONIC PAIN OF LEFT KNEE: ICD-10-CM

## 2023-05-17 DIAGNOSIS — G89.29 CHRONIC PAIN OF LEFT KNEE: Primary | ICD-10-CM

## 2023-05-17 DIAGNOSIS — G89.29 CHRONIC PAIN OF RIGHT KNEE: ICD-10-CM

## 2023-05-17 DIAGNOSIS — M25.561 CHRONIC PAIN OF RIGHT KNEE: ICD-10-CM

## 2023-05-17 DIAGNOSIS — M25.562 CHRONIC PAIN OF LEFT KNEE: ICD-10-CM

## 2023-05-17 PROCEDURE — 99999 PR PBB SHADOW E&M-EST. PATIENT-LVL III: ICD-10-PCS | Mod: PBBFAC,,, | Performed by: NURSE PRACTITIONER

## 2023-05-17 PROCEDURE — 1160F RVW MEDS BY RX/DR IN RCRD: CPT | Mod: CPTII,,, | Performed by: NURSE PRACTITIONER

## 2023-05-17 PROCEDURE — 73562 PR  X-RAY KNEE 3 VIEW: ICD-10-PCS | Mod: 26,LT,, | Performed by: RADIOLOGY

## 2023-05-17 PROCEDURE — 3074F PR MOST RECENT SYSTOLIC BLOOD PRESSURE < 130 MM HG: ICD-10-PCS | Mod: CPTII,,, | Performed by: NURSE PRACTITIONER

## 2023-05-17 PROCEDURE — 3008F PR BODY MASS INDEX (BMI) DOCUMENTED: ICD-10-PCS | Mod: CPTII,,, | Performed by: NURSE PRACTITIONER

## 2023-05-17 PROCEDURE — 99213 PR OFFICE/OUTPT VISIT, EST, LEVL III, 20-29 MIN: ICD-10-PCS | Mod: ,,, | Performed by: NURSE PRACTITIONER

## 2023-05-17 PROCEDURE — 3044F HG A1C LEVEL LT 7.0%: CPT | Mod: CPTII,,, | Performed by: NURSE PRACTITIONER

## 2023-05-17 PROCEDURE — 3078F PR MOST RECENT DIASTOLIC BLOOD PRESSURE < 80 MM HG: ICD-10-PCS | Mod: CPTII,,, | Performed by: NURSE PRACTITIONER

## 2023-05-17 PROCEDURE — 3072F PR LOW RISK FOR RETINOPATHY: ICD-10-PCS | Mod: CPTII,,, | Performed by: NURSE PRACTITIONER

## 2023-05-17 PROCEDURE — 3044F PR MOST RECENT HEMOGLOBIN A1C LEVEL <7.0%: ICD-10-PCS | Mod: CPTII,,, | Performed by: NURSE PRACTITIONER

## 2023-05-17 PROCEDURE — 3074F SYST BP LT 130 MM HG: CPT | Mod: CPTII,,, | Performed by: NURSE PRACTITIONER

## 2023-05-17 PROCEDURE — 73562 X-RAY EXAM OF KNEE 3: CPT | Mod: 26,LT,, | Performed by: RADIOLOGY

## 2023-05-17 PROCEDURE — 3008F BODY MASS INDEX DOCD: CPT | Mod: CPTII,,, | Performed by: NURSE PRACTITIONER

## 2023-05-17 PROCEDURE — 99213 OFFICE O/P EST LOW 20 MIN: CPT | Mod: ,,, | Performed by: NURSE PRACTITIONER

## 2023-05-17 PROCEDURE — 1159F PR MEDICATION LIST DOCUMENTED IN MEDICAL RECORD: ICD-10-PCS | Mod: CPTII,,, | Performed by: NURSE PRACTITIONER

## 2023-05-17 PROCEDURE — 3078F DIAST BP <80 MM HG: CPT | Mod: CPTII,,, | Performed by: NURSE PRACTITIONER

## 2023-05-17 PROCEDURE — 1159F MED LIST DOCD IN RCRD: CPT | Mod: CPTII,,, | Performed by: NURSE PRACTITIONER

## 2023-05-17 PROCEDURE — 1160F PR REVIEW ALL MEDS BY PRESCRIBER/CLIN PHARMACIST DOCUMENTED: ICD-10-PCS | Mod: CPTII,,, | Performed by: NURSE PRACTITIONER

## 2023-05-17 PROCEDURE — 73562 X-RAY EXAM OF KNEE 3: CPT | Mod: 26,RT,, | Performed by: RADIOLOGY

## 2023-05-17 PROCEDURE — 99999 PR PBB SHADOW E&M-EST. PATIENT-LVL III: CPT | Mod: PBBFAC,,, | Performed by: NURSE PRACTITIONER

## 2023-05-17 PROCEDURE — 73562 X-RAY EXAM OF KNEE 3: CPT | Mod: TC,50,FY,PO

## 2023-05-17 PROCEDURE — 3072F LOW RISK FOR RETINOPATHY: CPT | Mod: CPTII,,, | Performed by: NURSE PRACTITIONER
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
## 2023-05-23 ENCOUNTER — OFFICE VISIT (OUTPATIENT)
Dept: SPORTS MEDICINE | Facility: CLINIC | Age: 43
End: 2023-05-23
Payer: MEDICARE

## 2023-05-23 DIAGNOSIS — M25.561 BILATERAL CHRONIC KNEE PAIN: Primary | ICD-10-CM

## 2023-05-23 DIAGNOSIS — M22.2X2 PATELLOFEMORAL PAIN SYNDROME OF BOTH KNEES: ICD-10-CM

## 2023-05-23 DIAGNOSIS — M25.562 BILATERAL CHRONIC KNEE PAIN: Primary | ICD-10-CM

## 2023-05-23 DIAGNOSIS — M22.2X1 PATELLOFEMORAL PAIN SYNDROME OF BOTH KNEES: ICD-10-CM

## 2023-05-23 DIAGNOSIS — G89.29 BILATERAL CHRONIC KNEE PAIN: Primary | ICD-10-CM

## 2023-05-23 DIAGNOSIS — M17.0 PRIMARY OSTEOARTHRITIS OF BOTH KNEES: ICD-10-CM

## 2023-05-23 PROCEDURE — 3044F PR MOST RECENT HEMOGLOBIN A1C LEVEL <7.0%: ICD-10-PCS | Mod: CPTII,S$GLB,, | Performed by: STUDENT IN AN ORGANIZED HEALTH CARE EDUCATION/TRAINING PROGRAM

## 2023-05-23 PROCEDURE — 3072F PR LOW RISK FOR RETINOPATHY: ICD-10-PCS | Mod: CPTII,S$GLB,, | Performed by: STUDENT IN AN ORGANIZED HEALTH CARE EDUCATION/TRAINING PROGRAM

## 2023-05-23 PROCEDURE — 99999 PR PBB SHADOW E&M-EST. PATIENT-LVL III: ICD-10-PCS | Mod: PBBFAC,,, | Performed by: STUDENT IN AN ORGANIZED HEALTH CARE EDUCATION/TRAINING PROGRAM

## 2023-05-23 PROCEDURE — 3044F HG A1C LEVEL LT 7.0%: CPT | Mod: CPTII,S$GLB,, | Performed by: STUDENT IN AN ORGANIZED HEALTH CARE EDUCATION/TRAINING PROGRAM

## 2023-05-23 PROCEDURE — 3072F LOW RISK FOR RETINOPATHY: CPT | Mod: CPTII,S$GLB,, | Performed by: STUDENT IN AN ORGANIZED HEALTH CARE EDUCATION/TRAINING PROGRAM

## 2023-05-23 PROCEDURE — 1160F PR REVIEW ALL MEDS BY PRESCRIBER/CLIN PHARMACIST DOCUMENTED: ICD-10-PCS | Mod: CPTII,S$GLB,, | Performed by: STUDENT IN AN ORGANIZED HEALTH CARE EDUCATION/TRAINING PROGRAM

## 2023-05-23 PROCEDURE — 20610 DRAIN/INJ JOINT/BURSA W/O US: CPT | Mod: 50,S$GLB,, | Performed by: STUDENT IN AN ORGANIZED HEALTH CARE EDUCATION/TRAINING PROGRAM

## 2023-05-23 PROCEDURE — 20610 LARGE JOINT ASPIRATION/INJECTION: BILATERAL KNEE: ICD-10-PCS | Mod: 50,S$GLB,, | Performed by: STUDENT IN AN ORGANIZED HEALTH CARE EDUCATION/TRAINING PROGRAM

## 2023-05-23 PROCEDURE — 1159F PR MEDICATION LIST DOCUMENTED IN MEDICAL RECORD: ICD-10-PCS | Mod: CPTII,S$GLB,, | Performed by: STUDENT IN AN ORGANIZED HEALTH CARE EDUCATION/TRAINING PROGRAM

## 2023-05-23 PROCEDURE — 1159F MED LIST DOCD IN RCRD: CPT | Mod: CPTII,S$GLB,, | Performed by: STUDENT IN AN ORGANIZED HEALTH CARE EDUCATION/TRAINING PROGRAM

## 2023-05-23 PROCEDURE — 1160F RVW MEDS BY RX/DR IN RCRD: CPT | Mod: CPTII,S$GLB,, | Performed by: STUDENT IN AN ORGANIZED HEALTH CARE EDUCATION/TRAINING PROGRAM

## 2023-05-23 PROCEDURE — 99999 PR PBB SHADOW E&M-EST. PATIENT-LVL III: CPT | Mod: PBBFAC,,, | Performed by: STUDENT IN AN ORGANIZED HEALTH CARE EDUCATION/TRAINING PROGRAM

## 2023-05-23 PROCEDURE — 99214 OFFICE O/P EST MOD 30 MIN: CPT | Mod: 25,S$GLB,, | Performed by: STUDENT IN AN ORGANIZED HEALTH CARE EDUCATION/TRAINING PROGRAM

## 2023-05-23 PROCEDURE — 99214 PR OFFICE/OUTPT VISIT, EST, LEVL IV, 30-39 MIN: ICD-10-PCS | Mod: 25,S$GLB,, | Performed by: STUDENT IN AN ORGANIZED HEALTH CARE EDUCATION/TRAINING PROGRAM

## 2023-05-23 RX ORDER — TRIAMCINOLONE ACETONIDE 40 MG/ML
20 INJECTION, SUSPENSION INTRA-ARTICULAR; INTRAMUSCULAR
Status: DISCONTINUED | OUTPATIENT
Start: 2023-05-23 | End: 2023-05-23 | Stop reason: HOSPADM

## 2023-05-23 RX ORDER — DICLOFENAC SODIUM 75 MG/1
75 TABLET, DELAYED RELEASE ORAL 2 TIMES DAILY PRN
Qty: 60 TABLET | Refills: 2 | Status: SHIPPED | OUTPATIENT
Start: 2023-05-23 | End: 2024-01-16 | Stop reason: SDUPTHER

## 2023-05-23 RX ADMIN — TRIAMCINOLONE ACETONIDE 20 MG: 40 INJECTION, SUSPENSION INTRA-ARTICULAR; INTRAMUSCULAR at 11:05

## 2023-05-23 NOTE — PATIENT INSTRUCTIONS
Assessment:  Sangeetha June is a 43 y.o. female with a chief complaint of Pain, Swelling, and Numbness of the Right Knee and Pain, Swelling, and Numbness of the Left Knee    Encounter Diagnoses   Name Primary?    Bilateral chronic knee pain Yes    Primary osteoarthritis of both knees     Patellofemoral pain syndrome of both knees       Plan:  XR reviewed - mild-to-moderate degenerative changes of both knees  Labs reviewed - A1c 5.0%, Cr 0.8, GFR > 60, LFTs WNL  History and clinical exam is consistent with chronic bilateral knee today due to underlying osteoarthritis and patellofemoral syndrome in both knees.  Diagnosis, treatment options, prognosis discussed today  Has not improved thus far with Tylenol, and ibuprofen  Will prescribe her diclofenac, 75 mg, take twice daily with meals, as needed for pain  Will refer for PT at [facility] - 2-3x/week for the next 6-8 weeks  Bilateral knee corticosteroid injections today  We discussed the proper protocols after the injection such as no submerging pools, baths tubs, or hot tubs for 24 hr.  Showering is okay today.  We also discussed that blood sugars can be elevated after an injection and asked patient to properly check their sugars over the next few days and contact their PCP if there are any concerns.  We discussed red flags such as fevers, chills, red, warm, tender joint at the area of injection to please seek medical care immediately.      Follow-up: 3 months or sooner if there are any problems between now and then.    Thank you for choosing [facility] and Dr. Donaldo Sumner for your orthopedic & sports medicine care. It is our goal to provide you with exceptional care that will help keep you healthy, active, and get you back in the game.    Please do not hesitate to reach out to us via email, phone, or MyChart with any questions, concerns, or feedback.    If you are experiencing pain/discomfort ,or have questions after 5pm and  would like to be connected to the [facility] on-call team, please call this number and specify which Sports Medicine provider is treating you: [phone]       Although there is not a cure for arthritis, there are effective ways to improve symptoms.     Exercise & Activity:  I recommend low impact activities such as elliptical and bicycle   Walking is great for arthritis: https://www.Customer.io.com/3-reasons-walking-with-knee-arthritis/  If walking long distances, I recommend good quality well-cushioned shoes. Varsity sports can help you find the right ones: https://www.[employer] Pric[employer] Jukebox.Jobvite/  Aquatic and pool therapy is often helpful because it lessens the impact on the joint, strengthens the leg and thigh muscles, and helps to control swelling.   Knee motion is important to the health of the knee.     Knee Braces:  A compression knee sleeve can help limit swelling and provide proprioceptive feedback.     Prescriptions & Medications:  I do recommend formal physical therapy or at minimum a home exercise program.   Over the counter analgesic (pain-relieving) medications can help. Examples are Tylenol, Ibuprofen, and Aleve. Check with your primary care physician to make sure you don't have contra-indications to taking those medicines.  Some over the counter supplement solutions such as glucosamine and chondroitin may help with symptoms, although the evidence is mixed.    Healthy Lifestyle:  Excess body weight can have a negative impact on joint health and on pain. I recommend healthy lifestyle choices including nutrition and exercise that help reach and maintain an ideal body weight. Tips for Exercise: https://www.Customer.io.com/13-exercise-tips-for-a-healthier-you/  Some diets cause increased inflammation. I recommend a balanced wholesome diet including some foods such as olives that are shown to decrease inflammation. More diet information available here:  https://www.FlyBridGe.[employer]/8-best-foods-for-knee-arthritis/

## 2023-05-23 NOTE — PROGRESS NOTES
Patient ID: Sangeetha June  YOB: 1980  MRN: [id number]    Chief Complaint: Pain, Swelling, and Numbness of the Right Knee and Pain, Swelling, and Numbness of the Left Knee    Referred By: Milvia Shafer NP    Occupation: Data Unavailable      History of Present Illness: Sangeetha June is a right-hand dominant 43 y.o. female who presents today with Pain, Swelling, and Numbness of the Right Knee and Pain, Swelling, and Numbness of the Left Knee    Pain is been present for several years, but went away when she was undergoing chemotherapy for breast cancer.  She finished chemotherapy in 2020.  She noticed over the past month or so, steady recurrence of bilateral knee pain, now with left more significant than the right.  Reports the pain radiates down to bilateral calves.  Associated with numbness, swelling, and overall feeling very tight throughout the knee.  Describes the pain is aching, throbbing, shooting, and stinging.  Pain is constant.  Worse with walking, going up and down stairs, and after riding in a car.  He has tried ibuprofen and Tylenol without much improvement.  Currently 5/10 severity.    Past Medical History:   Past Medical History:   Diagnosis Date    Anxiety     Asthma 11/8/2022    Breast cancer     Chest pain     Chest pain 07/02/2021    Cholecystitis without calculus     Decreased ROM of left shoulder 02/15/2022    Diabetes mellitus     Dizziness     Elevated C-reactive protein (CRP)     Essential hypertension     Fall 10/11/2021    Memory change     EVERARDO (obstructive sleep apnea)     Osteoarthritis     Other specified disorders of thyroid     Pre-diabetes 04/15/2021    Chronic, Stable, cont metformin    Screening mammogram, encounter for 04/15/2021    Shoulder injury     SOB (shortness of breath)     Stroke     Thyroiditis     Tingling of left upper extremity 10/29/2021    Vision disturbance 04/30/2021    Weakness of shoulder 02/15/2022     Past Surgical History:    Procedure Laterality Date    ARTHROSCOPIC REPAIR OF ROTATOR CUFF OF SHOULDER Left 1/27/2022    Procedure: REPAIR, ROTATOR CUFF, ARTHROSCOPIC;  Surgeon: Francisco Mathews MD;  Location: [facility] OR;  Service: Orthopedics;  Laterality: Left;    ARTHROSCOPY OF SHOULDER WITH DECOMPRESSION OF SUBACROMIAL SPACE Left 1/27/2022    Procedure: ARTHROSCOPY, SHOULDER, WITH SUBACROMIAL SPACE DECOMPRESSION;  Surgeon: Francisco Mathews MD;  Location: [facility] OR;  Service: Orthopedics;  Laterality: Left;    BREAST BIOPSY      COLONOSCOPY N/A 5/31/2022    Procedure: COLONOSCOPY;  Surgeon: Km Odom MD;  Location: [facility] ENDO;  Service: Endoscopy;  Laterality: N/A;    ESOPHAGOGASTRODUODENOSCOPY N/A 5/31/2022    Procedure: EGD (ESOPHAGOGASTRODUODENOSCOPY) ;  Surgeon: Km Odom MD;  Location: [facility] ENDO;  Service: Endoscopy;  Laterality: N/A;    FIXATION OF TENDON Left 1/27/2022    Procedure: FIXATION, TENDON;  Surgeon: Francisco Mathews MD;  Location: [facility] OR;  Service: Orthopedics;  Laterality: Left;    HYSTERECTOMY      INJECTION OF ANESTHETIC AGENT INTO SACROILIAC JOINT Bilateral 7/29/2022    Procedure: Bilateral SIJ +  Bilateral GT Bursa Injection RN IV Sedation;  Surgeon: Bisi Cochran MD;  Location: [facility] PAIN MGT;  Service: Pain Management;  Laterality: Bilateral;    INJECTION OF JOINT Left 11/12/2021    Procedure: Left suprascapular and axillary injection with local;  Surgeon: Bisi Cochrna MD;  Location: [facility] PAIN MGT;  Service: Pain Management;  Laterality: Left;    INJECTION OF JOINT Bilateral 7/29/2022    Procedure: Bilateral SIJ + Bilateral GT Bursa Injection RN IV Sedation;  Surgeon: Bisi Cochran MD;  Location: [facility] PAIN MGT;  Service: Pain Management;  Laterality: Bilateral;    INSERTION OF BREAST TISSUE EXPANDER Left 11/15/2022    Procedure: INSERTION, TISSUE EXPANDER, BREAST;  Surgeon: Collin Pinto MD;  Location: [facility];  Service: Plastics;  Laterality: Left;    MASTECTOMY       ROBOT-ASSISTED CHOLECYSTECTOMY USING DA ROBERTA XI N/A 8/29/2022    Procedure: XI ROBOTIC CHOLECYSTECTOMY;  Surgeon: Raeann Bhandari DO;  Location: [facility] OR;  Service: General;  Laterality: N/A;    TISSUE EXPANDER REMOVAL Left 1/4/2023    Procedure: REMOVAL, TISSUE EXPANDER;  Surgeon: Collin Pinto MD;  Location: [facility] OR;  Service: Plastics;  Laterality: Left;    TOTAL REDUCTION MAMMOPLASTY Right 11/15/2022    Procedure: MAMMOPLASTY, REDUCTION;  Surgeon: Collin Pinto MD;  Location: [facility] OR;  Service: Plastics;  Laterality: Right;     Family History   Problem Relation Age of Onset    Colon cancer Mother 53        partial colectomy    Stroke Mother     Heart disease Mother     Breast cancer Sister     Diabetes Sister     Throat cancer Maternal Aunt         smoking hx    Lung cancer Maternal Uncle         hx smoking    No Known Problems Maternal Grandmother     Prostate cancer Maternal Grandfather 70    Alcohol abuse Maternal Grandfather     No Known Problems Paternal Grandmother     No Known Problems Paternal Grandfather     Diabetes Brother     Diabetes Brother     Diabetes Brother     Breast cancer Other 37        chemotherapy, planning for BL mastectomy    No Known Problems Other      Social History     Socioeconomic History    Marital status:    Tobacco Use    Smoking status: Never    Smokeless tobacco: Never   Substance and Sexual Activity    Alcohol use: Not Currently    Drug use: Not Currently     Types: Marijuana     Comment: none currently    Sexual activity: Yes     Medication List with Changes/Refills   New Medications    DICLOFENAC (VOLTAREN) 75 MG EC TABLET    Take 1 tablet (75 mg total) by mouth 2 (two) times daily as needed.   Current Medications    ALBUTEROL (PROVENTIL/VENTOLIN HFA) 90 MCG/ACTUATION INHALER    Inhale 2 puffs into the lungs every 4 (four) hours as needed for Wheezing. Rescue    AMMONIUM LACTATE 12 % CREA    Apply 1 Act topically 2 (two) times daily. To feet.    ASCORBIC  ACID, VITAMIN C, (VITAMIN C) 1000 MG TABLET    Take 1 tablet (1,000 mg total) by mouth once daily.    ASPIRIN (ECOTRIN) 81 MG EC TABLET    Take 1 tablet (81 mg total) by mouth once daily.    BUDESONIDE-FORMOTEROL 160-4.5 MCG (SYMBICORT) 160-4.5 MCG/ACTUATION HFAA    Inhale 2 puffs into the lungs every 12 (twelve) hours. Controller    BUSPIRONE (BUSPAR) 15 MG TABLET    Take 1 tablet (15 mg total) by mouth 3 (three) times daily.    CELECOXIB (CELEBREX) 200 MG CAPSULE    Take 1 capsule (200 mg total) by mouth 2 (two) times daily.    CEPHALEXIN (KEFLEX) 500 MG CAPSULE    TAKE 1 CAPSULE BY MOUTH EVERY 6 HOURS FOR 14 DAYS    DULAGLUTIDE (TRULICITY) 1.5 MG/0.5 ML PEN INJECTOR    Inject 1.5 mg into the skin every 7 days.    ESCITALOPRAM OXALATE (LEXAPRO) 20 MG TABLET    Take 1 tablet (20 mg total) by mouth once daily.    EUTHYROX 50 MCG TABLET    Take 1 tablet (50 mcg total) by mouth every morning.    GABAPENTIN (NEURONTIN) 300 MG CAPSULE    Take 1 capsule (300 mg total) by mouth 2 (two) times daily.    HYDROCHLOROTHIAZIDE (HYDRODIURIL) 25 MG TABLET    Take 1 tablet (25 mg total) by mouth daily as needed (edema, swelling).    HYDROCODONE-ACETAMINOPHEN (NORCO) 5-325 MG PER TABLET    Take 1 tablet by mouth every 6 (six) hours as needed for pain.    HYDROCORTISONE 0.5 % CREAM    Apply topically 2 (two) times daily.    LATUDA 20 MG TAB TABLET    Take 20 mg by mouth once daily.    LINACLOTIDE (LINZESS) 145 MCG CAP CAPSULE    Take 1 capsule (145 mcg total) by mouth once daily.    MAGNESIUM OXIDE (MAG-OX) 400 MG (241.3 MG MAGNESIUM) TABLET    Take 1 tablet (400 mg total) by mouth once daily.    METFORMIN (GLUCOPHAGE-XR) 500 MG ER 24HR TABLET    Take 1 tablet (500 mg total) by mouth 2 (two) times daily with meals.    MULTIVIT WITH MIN-FOLIC ACID (ADULT MULTIVITAMIN GUMMIES) 200 MCG CHEW    Take 1 tablet by mouth once daily.    OLOPATADINE (PATANOL) 0.1 % OPHTHALMIC SOLUTION        ONDANSETRON (ZOFRAN) 4 MG TABLET    Take 1 tablet  (4 mg total) by mouth every 6 (six) hours as needed for Nausea.    ONDANSETRON (ZOFRAN-ODT) 8 MG TBDL    DISSOLVE 1 TABLET ON TONGUE EVERY 6 HOURS AS NEEDED FOR NAUSEA    OXYBUTYNIN (DITROPAN-XL) 10 MG 24 HR TABLET    Take 1 tablet (10 mg total) by mouth once daily.    OXYCODONE (ROXICODONE) 5 MG IMMEDIATE RELEASE TABLET    Take 1 tablet (5 mg total) by mouth every 6 (six) hours as needed for Pain.    PANTOPRAZOLE (PROTONIX) 40 MG TABLET    Take 1 tablet (40 mg total) by mouth once daily.    PRAVASTATIN (PRAVACHOL) 40 MG TABLET    Take 1 tablet (40 mg total) by mouth every evening.    TRAZODONE (DESYREL) 150 MG TABLET    Take 1 tablet (150 mg total) by mouth every evening.     Review of patient's allergies indicates:   Allergen Reactions    Lisinopril     Lurasidone Other (See Comments)     Uncontrolled movement       Physical Exam:   There is no height or weight on file to calculate BMI.    Physical Exam  Constitutional:       General: She is not in acute distress.     Appearance: Normal appearance. She is obese.   HENT:      Head: Normocephalic and atraumatic.   Eyes:      Extraocular Movements: Extraocular movements intact.      Conjunctiva/sclera: Conjunctivae normal.   Pulmonary:      Effort: Pulmonary effort is normal. No respiratory distress.   Musculoskeletal:      Right knee: No swelling, deformity or effusion.      Instability Tests: Medial Junito test negative and lateral Junito test negative.      Left knee: No swelling, deformity or effusion.      Instability Tests: Medial Junito test negative and lateral Junito test negative.   Skin:     General: Skin is warm and dry.   Neurological:      General: No focal deficit present.      Mental Status: She is alert and oriented to person, place, and time.   Psychiatric:         Mood and Affect: Mood normal.     Detailed MSK exam:   General    Constitutional: She is oriented to person, place, and time. No distress.   HENT:   Head: Normocephalic and  atraumatic.   Eyes: Conjunctivae are normal.   Pulmonary/Chest: Effort normal. No respiratory distress.   Abdominal: Normal appearance.   Neurological: She is alert and oriented to person, place, and time.   Psychiatric: Mood normal.           Right Knee Exam     Inspection   Swelling: absent  Effusion: absent  Deformity: absent  Bruising: absent    Tenderness   The patient is tender to palpation of the medial joint line.    Crepitus   The patient has crepitus of the medial joint line.    Range of Motion   The patient has normal right knee ROM.    Tests   Meniscus   Junito:  Medial - negative Lateral - negative  Ligament Examination   MCL - Valgus: normal (0 to 2mm)  LCL - Varus: normal  Patella   Patellar Tracking: normal    Other   Popliteal (Baker's) Cyst: absent  Sensation: normal    Left Knee Exam     Inspection   Swelling: absent  Effusion: absent  Deformity: absent  Bruising: absent    Tenderness   The patient tender to palpation of the medial joint line.    Crepitus   The patient has crepitus of the medial joint line.    Range of Motion   The patient has normal left knee ROM.    Tests   Meniscus   Junito:  Medial - negative Lateral - negative  Stability   MCL - Valgus: normal (0 to 2mm)  LCL - Varus: normal (0 to 2mm)  Patella   Patellar Tracking: normal    Other   Popliteal (Baker's) Cyst: absent  Sensation: normal    Muscle Strength   Right Lower Extremity   Hip Abduction: 4/5   Quadriceps:  5/5   Hamstrin/5   Left Lower Extremity   Hip Abduction: 4/5   Quadriceps:  5/5   Hamstrin/5      Imaging:  X-ray Knee Ortho Bilateral  Narrative: EXAMINATION:  XR KNEE ORTHO BILAT    CLINICAL HISTORY:  Pain in left knee    TECHNIQUE:  AP standing, lateral and Merchant views of both knees were performed.    COMPARISON:  None    FINDINGS:  There is mild joint space narrowing and osteophyte formation seen involving all 3 compartments of both knees.  Small suprapatellar joint effusion seen on the left.  No  acute fracture or dislocation.  Impression: As above    Electronically signed by: Jose Juan Keys DO  Date:    05/17/2023  Time:    13:19      Relevant imaging results were reviewed and interpreted by me and per my read: Moderate medial compartment narrowing.  Tricompartmental degenerative and osteophytic changes, most pronounced in the patellofemoral compartments.  Normal alignment.  No fractures or other acute abnormalities.    This was discussed with the patient and / or family today.     Patient Instructions   Assessment:  Sangeetha June is a 43 y.o. female with a chief complaint of Pain, Swelling, and Numbness of the Right Knee and Pain, Swelling, and Numbness of the Left Knee    Encounter Diagnoses   Name Primary?    Bilateral chronic knee pain Yes    Primary osteoarthritis of both knees     Patellofemoral pain syndrome of both knees       Plan:  XR reviewed - mild-to-moderate degenerative changes of both knees  Labs reviewed - A1c 5.0%, Cr 0.8, GFR > 60, LFTs WNL  History and clinical exam is consistent with chronic bilateral knee today due to underlying osteoarthritis and patellofemoral syndrome in both knees.  Diagnosis, treatment options, prognosis discussed today  Has not improved thus far with Tylenol, and ibuprofen  Will prescribe her diclofenac, 75 mg, take twice daily with meals, as needed for pain  Will refer for PT at [facility] - 2-3x/week for the next 6-8 weeks  Bilateral knee corticosteroid injections today  We discussed the proper protocols after the injection such as no submerging pools, baths tubs, or hot tubs for 24 hr.  Showering is okay today.  We also discussed that blood sugars can be elevated after an injection and asked patient to properly check their sugars over the next few days and contact their PCP if there are any concerns.  We discussed red flags such as fevers, chills, red, warm, tender joint at the area of injection to please seek medical care immediately.       Follow-up: 3 months or sooner if there are any problems between now and then.    Thank you for choosing [facility] and Dr. Donaldo Sumner for your orthopedic & sports medicine care. It is our goal to provide you with exceptional care that will help keep you healthy, active, and get you back in the game.    Please do not hesitate to reach out to us via email, phone, or MyChart with any questions, concerns, or feedback.    If you are experiencing pain/discomfort ,or have questions after 5pm and would like to be connected to the [facility] on-call team, please call this number and specify which Sports Medicine provider is treating you: [phone]       A copy of today's visit note has been sent to the referring provider.           Donaldo Sumner MD  Primary Care Sports Medicine    Disclaimer: This note was prepared using a voice recognition system and is likely to have sound alike errors within the text.

## 2023-05-23 NOTE — PROCEDURES
Large Joint Aspiration/Injection: bilateral knee    Date/Time: 5/23/2023 11:20 AM  Performed by: Donaldo Sumner MD  Authorized by: Donaldo Sumner MD     Consent Done?:  Yes (Verbal)  Indications:  Arthritis and pain  Site marked: the procedure site was marked    Timeout: prior to procedure the correct patient, procedure, and site was verified      Local anesthesia used?: Yes    Anesthesia:  Local infiltration  Local anesthetic:  Bupivacaine 0.5% without epinephrine, lidocaine 1% without epinephrine and topical anesthetic  Anesthetic total (ml):  4      Details:  Needle Size:  22 G  Ultrasonic Guidance for needle placement?: No    Approach:  Anterolateral  Location:  Knee  Laterality:  Bilateral  Site:  Bilateral knee  Medications (Right):  20 mg triamcinolone acetonide 40 mg/mL  Medications (Left):  20 mg triamcinolone acetonide 40 mg/mL  Patient tolerance:  Patient tolerated the procedure well with no immediate complications     We discussed the proper protocols after the injection such as no submerging pools, baths tubs, or hot tubs for 24 hr.  Showering is okay today.  We also discussed that blood sugars can be elevated after an injection and asked patient to properly checked her sugars over the next few days and contact their PCP if there are any concerns.  We discussed red flags such as fevers, chills, red, warm, tender joint at the area of injection to please seek medical care immediately.

## 2023-05-26 ENCOUNTER — OFFICE VISIT (OUTPATIENT)
Dept: PSYCHIATRY | Facility: CLINIC | Age: 43
End: 2023-05-26
Payer: MEDICAID

## 2023-05-26 DIAGNOSIS — F41.1 GENERALIZED ANXIETY DISORDER: Primary | ICD-10-CM

## 2023-05-26 PROCEDURE — 3044F HG A1C LEVEL LT 7.0%: CPT | Mod: CPTII,,, | Performed by: SOCIAL WORKER

## 2023-05-26 PROCEDURE — 3044F PR MOST RECENT HEMOGLOBIN A1C LEVEL <7.0%: ICD-10-PCS | Mod: CPTII,,, | Performed by: SOCIAL WORKER

## 2023-05-26 PROCEDURE — 3072F PR LOW RISK FOR RETINOPATHY: ICD-10-PCS | Mod: CPTII,,, | Performed by: SOCIAL WORKER

## 2023-05-26 PROCEDURE — 3072F LOW RISK FOR RETINOPATHY: CPT | Mod: CPTII,,, | Performed by: SOCIAL WORKER

## 2023-05-26 PROCEDURE — 90834 PR PSYCHOTHERAPY W/PATIENT, 45 MIN: ICD-10-PCS | Mod: ,,, | Performed by: SOCIAL WORKER

## 2023-05-26 PROCEDURE — 90834 PSYTX W PT 45 MINUTES: CPT | Mod: ,,, | Performed by: SOCIAL WORKER

## 2023-05-29 DIAGNOSIS — Z15.89 MUTATION IN TP53 GENE: Primary | ICD-10-CM

## 2023-05-29 DIAGNOSIS — Z15.09 MUTATION IN TP53 GENE: Primary | ICD-10-CM

## 2023-05-29 DIAGNOSIS — Z15.01 MUTATION IN TP53 GENE: Primary | ICD-10-CM

## 2023-05-31 NOTE — PROGRESS NOTES
Individual Psychotherapy (PhD/LCSW)    5/26/2023    Site:  [facility]         Therapeutic Intervention: Met with patient.  Outpatient - Insight oriented psychotherapy 45 min - CPT code 73728    Chief complaint/reason for encounter: depression and anxiety     Interval history and content of current session: Patient presents to ongoing individual therapy due to depression and anxiety.  She was last in session on 3/14/2023.  She is brought to the appointment.  She has continued to deal with medical issues.  Her father has been helping her to get to medical appointments.  She deals with anxiety.  She fears people being in her neighborhood that wish her harm.  She has had family members assure her that she will be okay.  She does not like to be alone.  Her  has been busy with work.  She keeps herself busy with housework and preparing meals.  She has repaired her relationship with her sister in [place].  They will talk occasionally.  Encourage the patient to keep a regular schedule.  Emphasize the need for good sleep, nutrition, and physical activity for good mental health.  Note that paranoia can cause difficulty with clarity.  Praise steps to get to her medial appointments.  She was happy to hear from her son on Mother's Day.  She is hoping to see him soon.  He continues to live in [place] where the patient lived for many years.  She admits that her life was very different when she lived there.  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  She does not believe that she will be able to live alone or work again.    Treatment plan:  Target symptoms: depression, anxiety   Why chosen therapy is appropriate versus another modality: relevant to diagnosis  Outcome monitoring methods: self-report, observation  Therapeutic intervention type: insight oriented psychotherapy, supportive psychotherapy, interactive psychotherapy     Risk parameters:  Patient reports no suicidal ideation  Patient reports no homicidal  ideation  Patient reports no self-injurious behavior  Patient reports no violent behavior     Verbal deficits: None     Patient's response to intervention:  The patient's response to intervention is accepting, motivated.     Progress toward goals and other mental status changes:  The patient's progress toward goals is limited.     Diagnosis:   Generalized Anxiety Disorder     Plan:  individual psychotherapy  medication management by Dr. Jaquez  Psychological testing completed with Americo Garay P.h.D.     Return to clinic: as scheduled     Length of Service (minutes):   45

## 2023-06-05 ENCOUNTER — HOSPITAL ENCOUNTER (OUTPATIENT)
Dept: RADIOLOGY | Facility: HOSPITAL | Age: 43
Discharge: HOME | End: 2023-06-05
Attending: PHYSICIAN ASSISTANT
Payer: MEDICARE

## 2023-06-05 DIAGNOSIS — E04.1 THYROID NODULE: Primary | ICD-10-CM

## 2023-06-05 DIAGNOSIS — E04.1 THYROID NODULE: ICD-10-CM

## 2023-06-05 PROCEDURE — 76536 US EXAM OF HEAD AND NECK: CPT | Mod: 26,,, | Performed by: STUDENT IN AN ORGANIZED HEALTH CARE EDUCATION/TRAINING PROGRAM

## 2023-06-05 PROCEDURE — 76536 US EXAM OF HEAD AND NECK: CPT | Mod: TC

## 2023-06-05 PROCEDURE — 76536 US SOFT TISSUE HEAD NECK THYROID: ICD-10-PCS | Mod: 26,,, | Performed by: STUDENT IN AN ORGANIZED HEALTH CARE EDUCATION/TRAINING PROGRAM

## 2023-06-14 ENCOUNTER — TELEPHONE (OUTPATIENT)
Dept: DIABETES | Facility: CLINIC | Age: 43
End: 2023-06-14
Payer: MEDICARE

## 2023-06-14 ENCOUNTER — OFFICE VISIT (OUTPATIENT)
Dept: UROLOGY | Facility: CLINIC | Age: 43
End: 2023-06-14
Payer: MEDICARE

## 2023-06-14 VITALS
SYSTOLIC BLOOD PRESSURE: 123 MMHG | WEIGHT: 284 LBS | DIASTOLIC BLOOD PRESSURE: 84 MMHG | HEART RATE: 63 BPM | BODY MASS INDEX: 43.04 KG/M2 | HEIGHT: 68 IN

## 2023-06-14 DIAGNOSIS — N32.81 OAB (OVERACTIVE BLADDER): Primary | ICD-10-CM

## 2023-06-14 PROCEDURE — 99214 OFFICE O/P EST MOD 30 MIN: CPT | Mod: S$GLB,,, | Performed by: UROLOGY

## 2023-06-14 PROCEDURE — 1159F MED LIST DOCD IN RCRD: CPT | Mod: CPTII,S$GLB,, | Performed by: UROLOGY

## 2023-06-14 PROCEDURE — 99999 PR PBB SHADOW E&M-EST. PATIENT-LVL III: ICD-10-PCS | Mod: PBBFAC,,, | Performed by: UROLOGY

## 2023-06-14 PROCEDURE — 1160F RVW MEDS BY RX/DR IN RCRD: CPT | Mod: CPTII,S$GLB,, | Performed by: UROLOGY

## 2023-06-14 PROCEDURE — 3079F DIAST BP 80-89 MM HG: CPT | Mod: CPTII,S$GLB,, | Performed by: UROLOGY

## 2023-06-14 PROCEDURE — 3044F HG A1C LEVEL LT 7.0%: CPT | Mod: CPTII,S$GLB,, | Performed by: UROLOGY

## 2023-06-14 PROCEDURE — 3074F SYST BP LT 130 MM HG: CPT | Mod: CPTII,S$GLB,, | Performed by: UROLOGY

## 2023-06-14 PROCEDURE — 3072F PR LOW RISK FOR RETINOPATHY: ICD-10-PCS | Mod: CPTII,S$GLB,, | Performed by: UROLOGY

## 2023-06-14 PROCEDURE — 1159F PR MEDICATION LIST DOCUMENTED IN MEDICAL RECORD: ICD-10-PCS | Mod: CPTII,S$GLB,, | Performed by: UROLOGY

## 2023-06-14 PROCEDURE — 1160F PR REVIEW ALL MEDS BY PRESCRIBER/CLIN PHARMACIST DOCUMENTED: ICD-10-PCS | Mod: CPTII,S$GLB,, | Performed by: UROLOGY

## 2023-06-14 PROCEDURE — 3072F LOW RISK FOR RETINOPATHY: CPT | Mod: CPTII,S$GLB,, | Performed by: UROLOGY

## 2023-06-14 PROCEDURE — 3079F PR MOST RECENT DIASTOLIC BLOOD PRESSURE 80-89 MM HG: ICD-10-PCS | Mod: CPTII,S$GLB,, | Performed by: UROLOGY

## 2023-06-14 PROCEDURE — 3008F PR BODY MASS INDEX (BMI) DOCUMENTED: ICD-10-PCS | Mod: CPTII,S$GLB,, | Performed by: UROLOGY

## 2023-06-14 PROCEDURE — 99999 PR PBB SHADOW E&M-EST. PATIENT-LVL III: CPT | Mod: PBBFAC,,, | Performed by: UROLOGY

## 2023-06-14 PROCEDURE — 3044F PR MOST RECENT HEMOGLOBIN A1C LEVEL <7.0%: ICD-10-PCS | Mod: CPTII,S$GLB,, | Performed by: UROLOGY

## 2023-06-14 PROCEDURE — 99214 PR OFFICE/OUTPT VISIT, EST, LEVL IV, 30-39 MIN: ICD-10-PCS | Mod: S$GLB,,, | Performed by: UROLOGY

## 2023-06-14 PROCEDURE — 3074F PR MOST RECENT SYSTOLIC BLOOD PRESSURE < 130 MM HG: ICD-10-PCS | Mod: CPTII,S$GLB,, | Performed by: UROLOGY

## 2023-06-14 PROCEDURE — 3008F BODY MASS INDEX DOCD: CPT | Mod: CPTII,S$GLB,, | Performed by: UROLOGY

## 2023-06-14 RX ORDER — SOLIFENACIN SUCCINATE 10 MG/1
10 TABLET, FILM COATED ORAL DAILY
Qty: 30 TABLET | Refills: 11 | Status: SHIPPED | OUTPATIENT
Start: 2023-06-14 | End: 2024-06-13

## 2023-06-14 NOTE — PROGRESS NOTES
Chief Complaint:  OAB, renal cysts    HPI:   06/14/2023 - patient returns today for follow-up, has been taking the oxybutynin as prescribed but has not really noticed an improvement, she still notes urgency and urge incontinence, denies any side effects, denies gross hematuria    04/19/2023 - 42 yo female that presents for renal cysts.  Patient had an abdominal ultrasound obtained which revealed a large left renal cyst as well as cysts with possible solid components.  She had a follow-up MRI with and without contrast which revealed that these did not have any enhancement and were completely benign.  Patient also notes issues with urinary incontinence and overactive bladder.  She also notes urge incontinence as well as stress incontinence.  She notes that her urgency and urge incontinence are more bothersome by far.  She wears 2-5 pads per day.  She also has nocturia x2.  She has been on oxybutynin 5 mg in the past but does not think that she is currently taking this.  She does think that it helped though.  She denies any dysuria or gross hematuria.  She does have a family history of prostate cancer in her maternal grandfather.    PMH:  Past Medical History:   Diagnosis Date    Anxiety     Asthma 11/8/2022    Breast cancer     Chest pain     Chest pain 07/02/2021    Cholecystitis without calculus     Decreased ROM of left shoulder 02/15/2022    Diabetes mellitus     Dizziness     Elevated C-reactive protein (CRP)     Essential hypertension     Fall 10/11/2021    Memory change     EVERARDO (obstructive sleep apnea)     Osteoarthritis     Other specified disorders of thyroid     Pre-diabetes 04/15/2021    Chronic, Stable, cont metformin    Screening mammogram, encounter for 04/15/2021    Shoulder injury     SOB (shortness of breath)     Stroke     Thyroiditis     Tingling of left upper extremity 10/29/2021    Vision disturbance 04/30/2021    Weakness of shoulder 02/15/2022       PSH:  Past Surgical History:   Procedure  Laterality Date    ARTHROSCOPIC REPAIR OF ROTATOR CUFF OF SHOULDER Left 1/27/2022    Procedure: REPAIR, ROTATOR CUFF, ARTHROSCOPIC;  Surgeon: Francisco Mathews MD;  Location: [facility] OR;  Service: Orthopedics;  Laterality: Left;    ARTHROSCOPY OF SHOULDER WITH DECOMPRESSION OF SUBACROMIAL SPACE Left 1/27/2022    Procedure: ARTHROSCOPY, SHOULDER, WITH SUBACROMIAL SPACE DECOMPRESSION;  Surgeon: Francisco Mathews MD;  Location: [facility] OR;  Service: Orthopedics;  Laterality: Left;    BREAST BIOPSY      COLONOSCOPY N/A 5/31/2022    Procedure: COLONOSCOPY;  Surgeon: Km Odom MD;  Location: [facility] ENDO;  Service: Endoscopy;  Laterality: N/A;    ESOPHAGOGASTRODUODENOSCOPY N/A 5/31/2022    Procedure: EGD (ESOPHAGOGASTRODUODENOSCOPY) ;  Surgeon: Km Odom MD;  Location: [facility] ENDO;  Service: Endoscopy;  Laterality: N/A;    FIXATION OF TENDON Left 1/27/2022    Procedure: FIXATION, TENDON;  Surgeon: Francisco Mathews MD;  Location: [facility] OR;  Service: Orthopedics;  Laterality: Left;    HYSTERECTOMY      INJECTION OF ANESTHETIC AGENT INTO SACROILIAC JOINT Bilateral 7/29/2022    Procedure: Bilateral SIJ +  Bilateral GT Bursa Injection RN IV Sedation;  Surgeon: Bisi Cochran MD;  Location: [facility] PAIN MGT;  Service: Pain Management;  Laterality: Bilateral;    INJECTION OF JOINT Left 11/12/2021    Procedure: Left suprascapular and axillary injection with local;  Surgeon: Bisi Cochran MD;  Location: [facility] PAIN MGT;  Service: Pain Management;  Laterality: Left;    INJECTION OF JOINT Bilateral 7/29/2022    Procedure: Bilateral SIJ + Bilateral GT Bursa Injection RN IV Sedation;  Surgeon: Bisi Cochran MD;  Location: [facility] PAIN MGT;  Service: Pain Management;  Laterality: Bilateral;    INSERTION OF BREAST TISSUE EXPANDER Left 11/15/2022    Procedure: INSERTION, TISSUE EXPANDER, BREAST;  Surgeon: Collin Pinto MD;  Location: [facility] OR;  Service: Plastics;  Laterality: Left;    MASTECTOMY       ROBOT-ASSISTED CHOLECYSTECTOMY USING DA ROBERTA XI N/A 8/29/2022    Procedure: XI ROBOTIC CHOLECYSTECTOMY;  Surgeon: Raeann Bhandari DO;  Location: [facility] OR;  Service: General;  Laterality: N/A;    TISSUE EXPANDER REMOVAL Left 1/4/2023    Procedure: REMOVAL, TISSUE EXPANDER;  Surgeon: Collin Pinto MD;  Location: [facility] OR;  Service: Plastics;  Laterality: Left;    TOTAL REDUCTION MAMMOPLASTY Right 11/15/2022    Procedure: MAMMOPLASTY, REDUCTION;  Surgeon: Collin Pinto MD;  Location: [facility] OR;  Service: Plastics;  Laterality: Right;       Family History:  Family History   Problem Relation Age of Onset    Colon cancer Mother 53        partial colectomy    Stroke Mother     Heart disease Mother     Breast cancer Sister     Diabetes Sister     Throat cancer Maternal Aunt         smoking hx    Lung cancer Maternal Uncle         hx smoking    No Known Problems Maternal Grandmother     Prostate cancer Maternal Grandfather 70    Alcohol abuse Maternal Grandfather     No Known Problems Paternal Grandmother     No Known Problems Paternal Grandfather     Diabetes Brother     Diabetes Brother     Diabetes Brother     Breast cancer Other 37        chemotherapy, planning for BL mastectomy    No Known Problems Other        Social History:  Social History     Tobacco Use    Smoking status: Never    Smokeless tobacco: Never   Substance Use Topics    Alcohol use: Not Currently    Drug use: Not Currently     Types: Marijuana     Comment: none currently        Review of Systems:  General: No fever, chills  Skin: No rashes  Chest:  Denies cough and sputum production  Heart: Denies chest pain  Resp: Denies dyspnea  Abdomen: Denies diarrhea, abdominal pain, hematemesis, or blood in stool.  Musculoskeletal: No joint stiffness or swelling. Denies back pain.  : see HPI  Neuro: no dizziness or weakness    Allergies:  Lisinopril and Lurasidone    Medications:    Current Outpatient Medications:     albuterol (PROVENTIL/VENTOLIN HFA) 90  mcg/actuation inhaler, Inhale 2 puffs into the lungs every 4 (four) hours as needed for Wheezing. Rescue, Disp: 8.5 g, Rfl: 1    ammonium lactate 12 % Crea, Apply 1 Act topically 2 (two) times daily. To feet., Disp: 140 g, Rfl: 2    ascorbic acid, vitamin C, (VITAMIN C) 1000 MG tablet, Take 1 tablet (1,000 mg total) by mouth once daily., Disp: 90 tablet, Rfl: 3    aspirin (ECOTRIN) 81 MG EC tablet, Take 1 tablet (81 mg total) by mouth once daily., Disp: 360 tablet, Rfl: 1    budesonide-formoterol 160-4.5 mcg (SYMBICORT) 160-4.5 mcg/actuation HFAA, Inhale 2 puffs into the lungs every 12 (twelve) hours. Controller, Disp: 10.2 g, Rfl: 11    busPIRone (BUSPAR) 15 MG tablet, Take 1 tablet (15 mg total) by mouth 3 (three) times daily., Disp: 270 tablet, Rfl: 1    celecoxib (CELEBREX) 200 MG capsule, Take 1 capsule (200 mg total) by mouth 2 (two) times daily., Disp: 28 capsule, Rfl: 0    cephALEXin (KEFLEX) 500 MG capsule, TAKE 1 CAPSULE BY MOUTH EVERY 6 HOURS FOR 14 DAYS, Disp: 56 capsule, Rfl: 0    diclofenac (VOLTAREN) 75 MG EC tablet, Take 1 tablet (75 mg total) by mouth 2 (two) times daily as needed., Disp: 60 tablet, Rfl: 2    dulaglutide (TRULICITY) 1.5 mg/0.5 mL pen injector, Inject 1.5 mg into the skin every 7 days., Disp: 4 pen, Rfl: 2    EScitalopram oxalate (LEXAPRO) 20 MG tablet, Take 1 tablet (20 mg total) by mouth once daily., Disp: 90 tablet, Rfl: 1    EUTHYROX 50 mcg tablet, Take 1 tablet (50 mcg total) by mouth every morning., Disp: 90 tablet, Rfl: 1    hydroCHLOROthiazide (HYDRODIURIL) 25 MG tablet, Take 1 tablet (25 mg total) by mouth daily as needed (edema, swelling)., Disp: 90 tablet, Rfl: 1    HYDROcodone-acetaminophen (NORCO) 5-325 mg per tablet, Take 1 tablet by mouth every 6 (six) hours as needed for pain., Disp: 28 tablet, Rfl: 0    hydrocortisone 0.5 % cream, Apply topically 2 (two) times daily., Disp: , Rfl:     LATUDA 20 mg Tab tablet, Take 20 mg by mouth once daily., Disp: , Rfl:      linaCLOtide (LINZESS) 145 mcg Cap capsule, Take 1 capsule (145 mcg total) by mouth once daily., Disp: 90 capsule, Rfl: 3    magnesium oxide (MAG-OX) 400 mg (241.3 mg magnesium) tablet, Take 1 tablet (400 mg total) by mouth once daily., Disp: 90 tablet, Rfl: 1    metFORMIN (GLUCOPHAGE-XR) 500 MG ER 24hr tablet, Take 1 tablet (500 mg total) by mouth 2 (two) times daily with meals., Disp: 180 tablet, Rfl: 1    multivit with min-folic acid (ADULT MULTIVITAMIN GUMMIES) 200 mcg Chew, Take 1 tablet by mouth once daily., Disp: 90 tablet, Rfl: 3    olopatadine (PATANOL) 0.1 % ophthalmic solution, , Disp: , Rfl:     ondansetron (ZOFRAN) 4 MG tablet, Take 1 tablet (4 mg total) by mouth every 6 (six) hours as needed for Nausea., Disp: 10 tablet, Rfl: 1    ondansetron (ZOFRAN-ODT) 8 MG TbDL, DISSOLVE 1 TABLET ON TONGUE EVERY 6 HOURS AS NEEDED FOR NAUSEA, Disp: 10 tablet, Rfl: 0    oxyCODONE (ROXICODONE) 5 MG immediate release tablet, Take 1 tablet (5 mg total) by mouth every 6 (six) hours as needed for Pain., Disp: 20 tablet, Rfl: 0    pantoprazole (PROTONIX) 40 MG tablet, Take 1 tablet (40 mg total) by mouth once daily., Disp: 90 tablet, Rfl: 1    pravastatin (PRAVACHOL) 40 MG tablet, Take 1 tablet (40 mg total) by mouth every evening., Disp: 90 tablet, Rfl: 1    traZODone (DESYREL) 150 MG tablet, Take 1 tablet (150 mg total) by mouth every evening., Disp: 90 tablet, Rfl: 1    gabapentin (NEURONTIN) 300 MG capsule, Take 1 capsule (300 mg total) by mouth 2 (two) times daily., Disp: 180 capsule, Rfl: 1    solifenacin (VESICARE) 10 MG tablet, Take 1 tablet (10 mg total) by mouth once daily., Disp: 30 tablet, Rfl: 11  No current facility-administered medications for this visit.    Facility-Administered Medications Ordered in Other Visits:     lactated ringers infusion, , Intravenous, Continuous, Maty Thompson MD, Last Rate: 10 mL/hr at 01/27/22 0636, New Bag at 01/04/23 1603    Physical Exam:  Vitals:    06/14/23 0913   BP:  123/84   Pulse: 63     Body mass index is 43.18 kg/m².  General: awake, alert, cooperative  Head: NC/AT  Ears: external ears normal  Eyes: sclera normal  Lungs: normal inspiration, NAD  Heart: well-perfused  Skin: The skin is warm and dry  Ext: No c/c/e.  Neuro: grossly intact, AOx3    RADIOLOGY:  MRI ABDOMEN W WO CONTRAST  03/29/2023     CLINICAL HISTORY:  Renal mass.  Other specified disorders of kidney and ureter     TECHNIQUE:  Multiplanar, multisequence MR imaging was performed through the abdomen before and after the intravenous administration of 10 cc Gadavist contrast.     COMPARISON:  Abdominal ultrasound on 02/15/2023     FINDINGS:  Benign bilateral renal cysts measuring up to 7.7 cm at the lower pole the left kidney and 2.2 cm at the upper pole.  The previously described hypoechoic complicated cysts in the left kidney do not demonstrate internal enhancement and are consistent with benign Bosniak 2 category cysts.  No hydronephrosis or perinephric stranding.     Enlarged, fatty liver.  No liver lesions.  The hepatic and portal veins are patent.     Aortic caliber is within normal limits.  The pancreas, adrenal glands, and visualized bowel is within normal limits.  Small fat containing umbilical hernia.  There is a 3 cm hypointense lesion in the dome of the spleen that demonstrates homogeneous enhancement similar to the adjacent splenic parenchyma characteristic of a benign hamartoma.     There is an enlarged left common iliac lymph node measuring up to 3.7 x 2.1 cm.     Impression:     Benign bilateral renal cysts.     Mildly enlarged, fatty liver.     Enlarged, stable left common iliac lymph node that was present on the previous PET.    LABS:  I personally reviewed the following lab values:  Lab Results   Component Value Date    WBC 5.75 09/16/2022    HGB 13.9 09/16/2022    HCT 43.2 09/16/2022     09/16/2022     03/20/2023    K 3.6 03/20/2023     03/20/2023    CREATININE 0.8 03/20/2023     BUN 6 03/20/2023    CO2 25 03/20/2023    TSH 1.988 02/20/2023    HGBA1C 5.0 02/20/2023    CHOL 148 07/19/2022    TRIG 97 07/19/2022    HDL 44 07/19/2022    ALT 15 03/20/2023    AST 16 03/20/2023       URINALYSIS:  Urinalysis obtained in clinic today specific gravity 1.025 pH six, negative for all parameters      Assessment/Plan:   Sangeetha June is a 43 y.o. female with:    OAB - refractory to ditropan, will switch to vesicare 10mg, f/u 8 weeks for symptom check    Renal cysts - large left renal cyst but all of her cysts are completely benign, no need for further imaging in relation to her renal cysts      Issac Pablo MD  Urology

## 2023-06-19 ENCOUNTER — CLINICAL SUPPORT (OUTPATIENT)
Dept: DIABETES | Facility: CLINIC | Age: 43
End: 2023-06-19
Payer: MEDICARE

## 2023-06-19 VITALS — BODY MASS INDEX: 42.77 KG/M2 | WEIGHT: 281.31 LBS

## 2023-06-19 DIAGNOSIS — E11.69 TYPE 2 DIABETES MELLITUS WITH OTHER SPECIFIED COMPLICATION, WITH LONG-TERM CURRENT USE OF INSULIN: Primary | ICD-10-CM

## 2023-06-19 DIAGNOSIS — Z79.4 TYPE 2 DIABETES MELLITUS WITH OTHER SPECIFIED COMPLICATION, WITH LONG-TERM CURRENT USE OF INSULIN: Primary | ICD-10-CM

## 2023-06-19 PROCEDURE — G0108 PR DIAB MANAGE TRN  PER INDIV: ICD-10-PCS | Mod: S$GLB,,, | Performed by: DIETITIAN, REGISTERED

## 2023-06-19 PROCEDURE — G0108 DIAB MANAGE TRN  PER INDIV: HCPCS | Mod: S$GLB,,, | Performed by: DIETITIAN, REGISTERED

## 2023-06-19 PROCEDURE — 99999 PR PBB SHADOW E&M-EST. PATIENT-LVL II: CPT | Mod: PBBFAC,,, | Performed by: DIETITIAN, REGISTERED

## 2023-06-19 PROCEDURE — 99999 PR PBB SHADOW E&M-EST. PATIENT-LVL II: ICD-10-PCS | Mod: PBBFAC,,, | Performed by: DIETITIAN, REGISTERED

## 2023-06-19 NOTE — PROGRESS NOTES
Diabetes Care Specialist Progress Note  Author: Alexandra Trent RD, CDE  Date: 6/19/2023    Program Intake  Reason for Diabetes Program Visit:: Intervention (DM referral 4/12/23)  Type of Intervention:: Individual  Education: Self-Management Skill Review  Current diabetes risk level:: high    Lab Results   Component Value Date    HGBA1C 5.0 02/20/2023       Clinical    Problem Review  Active comorbidities affecting diabetes self-care.:  (HTN, HLD, CVA, s/p breast cancer, Hypothyroidism, Obesity by BMI, GERD)    Clinical Assessment  Current Diabetes Treatment:  (Trulicity 1.5mg weekly. Metformin XR 500mg 1tab twice daily.)  Have you ever experienced hypoglycemia (low blood sugar)?: yes (Pt reports couple episodes low BG symptoms w/ BG 80s, treated using food.)  Are you able to tell when your blood sugar is low?: Yes (headaches)  What symptoms do you experience?: Unable to describe  Have you ever experienced hyperglycemia (high blood sugar)?:  (none recent)    Medication Information  How many days a week do you miss your medications?: Never  Do you sometimes have difficulty refilling your medications?: No  Medication adherence impacting ability to self-manage diabetes?: No    Labs  Do you have regular lab work to monitor your medications?: Yes  Type of Regular Lab Work: A1c, Cholesterol, Microalbumin, BMP  Where do you get your labs drawn?: [facility]  Lab Compliance Barriers: No    Nutritional Status  Diet:  (Pt reports 1-2meals daily. Excess carb, sat fat and sodium from fruit (several pieces and/or large bowl), starch portions.  cooks meals. Inadequate volume non-starchy vegetables.)  Meal Plan 24 Hour Recall - Breakfast: grits, eggs OR orange juice  Meal Plan 24 Hour Recall - Lunch: none  Meal Plan 24 Hour Recall - Dinner: okra w/ shrimp/sausage, rice 1c, potato salad 1/3c  Meal Plan 24 Hour Recall - Snack: fruit, cucumber, ice cream 1c; pat: water, diet tea, oj once daily, avoiding sugary pat  Recent  Changes in Weight: Weight Loss (~10lbs since 4/23)  Current nutritional status an area of need that is impacting patient's ability to self-manage diabetes?: Yes    Diabetes Self-Management Skills Assessment    Nutrition/Healthy Eating  Challenges to healthy eating:: portion control (irregular meal patterns)  Method of carbohydrate measurement::  (avoiding sugary pat, fried foods)  Patient can identify foods that impact blood sugar.: yes  Patient-identified foods:: starches (bread, pasta, rice, cereal), starchy vegetables (corn, peas, beans), sweets, soda  Nutrition/Healthy Eating Skills Assessment Completed:: Yes  Assessment indicates:: Instruction Needed, Knowledge deficit  Area of need?: Yes    Physical Activity/Exercise  Patient's daily activity level:: lightly active  Patient formally exercises outside of work.: no  Reasons for not exercising::  (Pt reports leg/knee pain improved, and she is planning to start walking at local track.)  Patient can identify forms of physical activity.: yes  Stated forms of physical activity:: any movement performed by muscles that uses energy, housework  Patient can identify reasons why exercise/physical activity is important in diabetes management.: no  Physical Activity/Exercise Skills Assessment Completed: : Yes  Assessment indicates:: Instruction Needed, Knowledge deficit  Area of need?: Yes    Medications  Patient is able to describe current diabetes management routine.: yes  Diabetes management routine:: injectable medications, oral medications  Patient is able to identify current diabetes medications, dosages, and appropriate timing of medications.: yes  Patient reports problems or concerns with current medication regimen.: no  Medication Skills Assessment Completed:: Yes  Assessment indicates:: Adequate understanding  Area of need?: No    Home Blood Glucose Monitoring  Patient states that blood sugar is checked at home daily.: yes  Monitoring Method:: home glucometer  When  you check what is your typical blood sugar range? : per recall, pt reports BG couple 80-90, mostly 120s, rare 180.  Blood glucose logs:: no, encouraged to bring logs to provider visits  Home Blood Glucose Monitoring Skills Assessment Completed: : Yes  Assessment indicates:: Adequate understanding  Area of need?: No    Assessment Summary and Plan  Based on today's diabetes care assessment, the following areas of need were identified:      Social 4/19/2023   Support No   Access to Mass Media/Tech No   Cognitive/Behavioral Health No   Culture/Rastafarian No   Communication No   Health Literacy No        Clinical 6/19/2023   Medication Adherence No   Lab Compliance No   Nutritional Status Yes -see care plan        Diabetes Self-Management Skills 6/19/2023   Nutrition/Healthy Eating Yes -see care plan   Physical Activity/Exercise Yes -see care plan   Medication No   Home Blood Glucose Monitoring No     Today's interventions were provided through individual discussion, instruction, and written materials were provided.    Patient verbalized understanding of instruction and written materials.  Pt was able to return back demonstration of instructions today. Patient understood key points, needs reinforcement and further instruction.     Diabetes Self-Management Care Plan:  Today's Diabetes Self-Management Care Plan was developed with Sangeetha's input. Sangeetha has agreed to work toward the following goal(s) to improve his/her overall diabetes control.      Care Plan: Diabetes Management   Updates made since 5/20/2023 12:00 AM        Problem: Healthy Eating         Goal: Eat 3 meals daily - manage carb 30-45grams/meal, 5-15grams/snack.    Start Date: 4/19/2023   Expected End Date: 4/12/2024   This Visit's Progress: On track   Recent Progress: No change   Priority: High   Barriers: Knowledge deficit; Cognitive Deficits   Note:    Encouraged progress. Discussed role meal spacing and carb/pro balance w/ strategies to support. Reviewed carb  portion mgmt using hand size estimation. Instructed on specific meal ideas using pt preferences.       Problem: Physical Activity and Exercise         Goal: Patient agrees to increase physical activity to a goal of 150min/wk.    Start Date: 6/19/2023   Expected End Date: 4/12/2024   Priority: Medium   Barriers: No Barriers Identified        Task: Discussed role of physical activity on reducing insulin resistance and improvement in overall glycemic control. Completed 6/19/2023        Task: Discussed role of physical activity as it relates to weight loss Completed 6/19/2023        Task: Offered suggestions on how patient could increase their regular physical activity Completed 6/19/2023          Follow Up Plan   Follow up in about 6 weeks (around 7/31/2023).  -review BG logs  -eval goals    Today's care plan and follow up schedule was discussed with patient.  Sangeetha verbalized understanding of the care plan, goals, and agrees to follow up plan.        The patient was encouraged to communicate with his/her health care provider/physician and care team regarding his/her condition(s) and treatment.  I provided the patient with my contact information today and encouraged to contact me via phone or [facility] Patient Portal as needed.     Length of Visit   Total Time: 60 Minutes

## 2023-06-20 ENCOUNTER — TELEPHONE (OUTPATIENT)
Dept: NEUROLOGY | Facility: CLINIC | Age: 43
End: 2023-06-20

## 2023-06-20 NOTE — TELEPHONE ENCOUNTER
Spoke with patient who reports intermittent cluster like headaches. Patient has been referred to Neurology Elli Huang NP for Meningioma and TP53 gene Mutation. Patient confirmed new appointment with Dr. Briceño in resident clinic.

## 2023-06-20 NOTE — TELEPHONE ENCOUNTER
----- Message from Rajani Perez sent at 6/20/2023 11:24 AM CDT -----  Contact: Sangeetha  ..Type:  Sooner Apoointment Request    Caller is requesting a sooner appointment.  Caller declined first available appointment listed below.  Caller will not accept being placed on the waitlist and is requesting a message be sent to doctor.  Name of Caller:Sangeetha   When is the first available appointment?10/03/2023  Symptoms:headaches/balance is off   Would the patient rather a call back or a response via [facility]? call  Best Call Back Number:.[phone]   Additional Information:   Thanks  LR

## 2023-06-23 ENCOUNTER — LAB VISIT (OUTPATIENT)
Dept: LAB | Facility: HOSPITAL | Age: 43
End: 2023-06-23
Attending: FAMILY MEDICINE
Payer: MEDICARE

## 2023-06-23 ENCOUNTER — OFFICE VISIT (OUTPATIENT)
Dept: INTERNAL MEDICINE | Facility: CLINIC | Age: 43
End: 2023-06-23
Payer: MEDICARE

## 2023-06-23 VITALS
TEMPERATURE: 98 F | WEIGHT: 287.5 LBS | BODY MASS INDEX: 43.57 KG/M2 | HEART RATE: 62 BPM | HEIGHT: 68 IN | DIASTOLIC BLOOD PRESSURE: 70 MMHG | SYSTOLIC BLOOD PRESSURE: 120 MMHG

## 2023-06-23 DIAGNOSIS — E11.69 TYPE 2 DIABETES MELLITUS WITH OTHER SPECIFIED COMPLICATION, WITHOUT LONG-TERM CURRENT USE OF INSULIN: Primary | ICD-10-CM

## 2023-06-23 DIAGNOSIS — E11.9 TYPE 2 DIABETES MELLITUS WITHOUT COMPLICATION, WITHOUT LONG-TERM CURRENT USE OF INSULIN: ICD-10-CM

## 2023-06-23 DIAGNOSIS — E03.9 HYPOTHYROIDISM, UNSPECIFIED TYPE: ICD-10-CM

## 2023-06-23 DIAGNOSIS — G47.09 OTHER INSOMNIA: ICD-10-CM

## 2023-06-23 DIAGNOSIS — E78.2 MIXED HYPERLIPIDEMIA: ICD-10-CM

## 2023-06-23 DIAGNOSIS — I10 PRIMARY HYPERTENSION: ICD-10-CM

## 2023-06-23 DIAGNOSIS — K59.04 CHRONIC IDIOPATHIC CONSTIPATION: ICD-10-CM

## 2023-06-23 DIAGNOSIS — E11.69 TYPE 2 DIABETES MELLITUS WITH OTHER SPECIFIED COMPLICATION, WITHOUT LONG-TERM CURRENT USE OF INSULIN: ICD-10-CM

## 2023-06-23 DIAGNOSIS — I10 HYPERTENSION, UNSPECIFIED TYPE: ICD-10-CM

## 2023-06-23 DIAGNOSIS — F41.1 GAD (GENERALIZED ANXIETY DISORDER): ICD-10-CM

## 2023-06-23 DIAGNOSIS — D32.9 MENINGIOMA: ICD-10-CM

## 2023-06-23 DIAGNOSIS — G44.009 CLUSTER HEADACHE, NOT INTRACTABLE, UNSPECIFIED CHRONICITY PATTERN: ICD-10-CM

## 2023-06-23 LAB
ESTIMATED AVG GLUCOSE: 105 MG/DL (ref 68–131)
HBA1C MFR BLD: 5.3 % (ref 4–5.6)

## 2023-06-23 PROCEDURE — 99214 OFFICE O/P EST MOD 30 MIN: CPT | Mod: S$GLB,,, | Performed by: FAMILY MEDICINE

## 2023-06-23 PROCEDURE — 3008F PR BODY MASS INDEX (BMI) DOCUMENTED: ICD-10-PCS | Mod: CPTII,S$GLB,, | Performed by: FAMILY MEDICINE

## 2023-06-23 PROCEDURE — 3074F PR MOST RECENT SYSTOLIC BLOOD PRESSURE < 130 MM HG: ICD-10-PCS | Mod: CPTII,S$GLB,, | Performed by: FAMILY MEDICINE

## 2023-06-23 PROCEDURE — 99214 PR OFFICE/OUTPT VISIT, EST, LEVL IV, 30-39 MIN: ICD-10-PCS | Mod: S$GLB,,, | Performed by: FAMILY MEDICINE

## 2023-06-23 PROCEDURE — 3044F HG A1C LEVEL LT 7.0%: CPT | Mod: CPTII,S$GLB,, | Performed by: FAMILY MEDICINE

## 2023-06-23 PROCEDURE — 1159F MED LIST DOCD IN RCRD: CPT | Mod: CPTII,S$GLB,, | Performed by: FAMILY MEDICINE

## 2023-06-23 PROCEDURE — 3044F PR MOST RECENT HEMOGLOBIN A1C LEVEL <7.0%: ICD-10-PCS | Mod: CPTII,S$GLB,, | Performed by: FAMILY MEDICINE

## 2023-06-23 PROCEDURE — 1160F RVW MEDS BY RX/DR IN RCRD: CPT | Mod: CPTII,S$GLB,, | Performed by: FAMILY MEDICINE

## 2023-06-23 PROCEDURE — 99999 PR PBB SHADOW E&M-EST. PATIENT-LVL V: CPT | Mod: PBBFAC,,, | Performed by: FAMILY MEDICINE

## 2023-06-23 PROCEDURE — 1159F PR MEDICATION LIST DOCUMENTED IN MEDICAL RECORD: ICD-10-PCS | Mod: CPTII,S$GLB,, | Performed by: FAMILY MEDICINE

## 2023-06-23 PROCEDURE — 84443 ASSAY THYROID STIM HORMONE: CPT | Performed by: FAMILY MEDICINE

## 2023-06-23 PROCEDURE — 3078F DIAST BP <80 MM HG: CPT | Mod: CPTII,S$GLB,, | Performed by: FAMILY MEDICINE

## 2023-06-23 PROCEDURE — 3072F LOW RISK FOR RETINOPATHY: CPT | Mod: CPTII,S$GLB,, | Performed by: FAMILY MEDICINE

## 2023-06-23 PROCEDURE — 3074F SYST BP LT 130 MM HG: CPT | Mod: CPTII,S$GLB,, | Performed by: FAMILY MEDICINE

## 2023-06-23 PROCEDURE — 3078F PR MOST RECENT DIASTOLIC BLOOD PRESSURE < 80 MM HG: ICD-10-PCS | Mod: CPTII,S$GLB,, | Performed by: FAMILY MEDICINE

## 2023-06-23 PROCEDURE — 3008F BODY MASS INDEX DOCD: CPT | Mod: CPTII,S$GLB,, | Performed by: FAMILY MEDICINE

## 2023-06-23 PROCEDURE — 84439 ASSAY OF FREE THYROXINE: CPT | Performed by: FAMILY MEDICINE

## 2023-06-23 PROCEDURE — 83036 HEMOGLOBIN GLYCOSYLATED A1C: CPT | Performed by: FAMILY MEDICINE

## 2023-06-23 PROCEDURE — 3072F PR LOW RISK FOR RETINOPATHY: ICD-10-PCS | Mod: CPTII,S$GLB,, | Performed by: FAMILY MEDICINE

## 2023-06-23 PROCEDURE — 1160F PR REVIEW ALL MEDS BY PRESCRIBER/CLIN PHARMACIST DOCUMENTED: ICD-10-PCS | Mod: CPTII,S$GLB,, | Performed by: FAMILY MEDICINE

## 2023-06-23 PROCEDURE — 36415 COLL VENOUS BLD VENIPUNCTURE: CPT | Mod: PO | Performed by: FAMILY MEDICINE

## 2023-06-23 PROCEDURE — 99999 PR PBB SHADOW E&M-EST. PATIENT-LVL V: ICD-10-PCS | Mod: PBBFAC,,, | Performed by: FAMILY MEDICINE

## 2023-06-23 RX ORDER — LEVOTHYROXINE SODIUM 50 UG/1
50 TABLET ORAL EVERY MORNING
Qty: 90 TABLET | Refills: 1 | Status: SHIPPED | OUTPATIENT
Start: 2023-06-23 | End: 2023-12-08

## 2023-06-23 RX ORDER — ESCITALOPRAM OXALATE 20 MG/1
20 TABLET ORAL DAILY
Qty: 90 TABLET | Refills: 1 | Status: SHIPPED | OUTPATIENT
Start: 2023-06-23 | End: 2023-10-31

## 2023-06-23 RX ORDER — TIRZEPATIDE 7.5 MG/.5ML
7.5 INJECTION, SOLUTION SUBCUTANEOUS
Qty: 4 PEN | Refills: 0 | Status: SHIPPED | OUTPATIENT
Start: 2023-06-23 | End: 2023-08-15 | Stop reason: DRUGHIGH

## 2023-06-23 RX ORDER — BUSPIRONE HYDROCHLORIDE 15 MG/1
15 TABLET ORAL 3 TIMES DAILY
Qty: 270 TABLET | Refills: 1 | Status: SHIPPED | OUTPATIENT
Start: 2023-06-23 | End: 2023-10-31 | Stop reason: SDUPTHER

## 2023-06-23 RX ORDER — TRAZODONE HYDROCHLORIDE 150 MG/1
150 TABLET ORAL NIGHTLY
Qty: 90 TABLET | Refills: 1 | Status: SHIPPED | OUTPATIENT
Start: 2023-06-23 | End: 2023-10-31 | Stop reason: SDUPTHER

## 2023-06-23 RX ORDER — GABAPENTIN 300 MG/1
300 CAPSULE ORAL 2 TIMES DAILY
Qty: 180 CAPSULE | Refills: 1 | Status: SHIPPED | OUTPATIENT
Start: 2023-06-23 | End: 2023-11-06 | Stop reason: SDUPTHER

## 2023-06-23 RX ORDER — DULAGLUTIDE 1.5 MG/.5ML
1.5 INJECTION, SOLUTION SUBCUTANEOUS
Qty: 4 PEN | Refills: 2 | Status: CANCELLED | OUTPATIENT
Start: 2023-06-23 | End: 2024-06-22

## 2023-06-23 RX ORDER — HYDROCHLOROTHIAZIDE 25 MG/1
25 TABLET ORAL DAILY PRN
Qty: 90 TABLET | Refills: 1 | Status: SHIPPED | OUTPATIENT
Start: 2023-06-23 | End: 2023-11-06 | Stop reason: SDUPTHER

## 2023-06-23 RX ORDER — PANTOPRAZOLE SODIUM 40 MG/1
40 TABLET, DELAYED RELEASE ORAL DAILY
Qty: 90 TABLET | Refills: 1 | Status: SHIPPED | OUTPATIENT
Start: 2023-06-23 | End: 2023-11-06 | Stop reason: SDUPTHER

## 2023-06-23 NOTE — PROGRESS NOTES
Subjective:       Patient ID: Sangeetha June is a 43 y.o. female.    Chief Complaint: Follow-up    Diabetes  She presents for her follow-up diabetic visit. She has type 2 diabetes mellitus. Her disease course has been stable. Hypoglycemia symptoms include headaches. Pertinent negatives for hypoglycemia include no confusion. Pertinent negatives for diabetes include no chest pain. Symptoms are stable.   Review of Systems   Respiratory:  Negative for shortness of breath.    Cardiovascular:  Negative for chest pain.   Gastrointestinal:  Negative for abdominal pain.   Neurological:  Positive for headaches.   Psychiatric/Behavioral:  Negative for confusion.      Objective:      Physical Exam  Vitals and nursing note reviewed.   Constitutional:       General: She is not in acute distress.     Appearance: Normal appearance. She is well-developed. She is not diaphoretic.      Comments: In wheelchair   HENT:      Head: Normocephalic and atraumatic.   Pulmonary:      Effort: Pulmonary effort is normal. No respiratory distress.      Breath sounds: Normal breath sounds. No wheezing.   Skin:     General: Skin is warm and dry.      Findings: No erythema or rash.   Neurological:      Mental Status: She is alert.       Assessment:       1. Type 2 diabetes mellitus with other specified complication, without long-term current use of insulin    2. Mixed hyperlipidemia    3. Primary hypertension    4. ANGIE (generalized anxiety disorder)    5. Type 2 diabetes mellitus without complication, without long-term current use of insulin    6. Hypothyroidism, unspecified type    7. Hypertension, unspecified type    8. Chronic idiopathic constipation    9. Other insomnia    10. Meningioma    11. Cluster headache, not intractable, unspecified chronicity pattern        Plan:     Problem List Items Addressed This Visit          Psychiatric    ANGIE (generalized anxiety disorder)    Overview     Chronic, Stable, cont lexapro and buspar          Relevant Medications    busPIRone (BUSPAR) 15 MG tablet    EScitalopram oxalate (LEXAPRO) 20 MG tablet       Cardiac/Vascular    Hyperlipidemia    Overview     Chronic, Stable, cont a statin         Hypertension    Overview     Chronic, Stable, cont hctz         Relevant Medications    hydroCHLOROthiazide (HYDRODIURIL) 25 MG tablet       Oncology    Meningioma    Relevant Orders    Ambulatory referral/consult to Neurology       Endocrine    Hypothyroidism    Overview     Chronic, Stable, cont synthroid         Relevant Medications    EUTHYROX 50 mcg tablet    Other Relevant Orders    TSH    T4, FREE    Type 2 diabetes mellitus, without long-term current use of insulin - Primary    Overview     cont metformin, trulicity         Relevant Medications    tirzepatide (MOUNJARO) 7.5 mg/0.5 mL PnIj    Other Relevant Orders    Ambulatory referral/consult to Podiatry    Microalbumin/Creatinine Ratio, Urine    Hemoglobin A1C       GI    Chronic idiopathic constipation    Relevant Medications    linaCLOtide (LINZESS) 145 mcg Cap capsule       Other    Other insomnia    Overview     Chronic, stable, cont trazodone         Relevant Medications    traZODone (DESYREL) 150 MG tablet     Other Visit Diagnoses       Cluster headache, not intractable, unspecified chronicity pattern        Relevant Orders    Ambulatory referral/consult to Neurology

## 2023-06-24 LAB
T4 FREE SERPL-MCNC: 0.95 NG/DL (ref 0.71–1.51)
TSH SERPL DL<=0.005 MIU/L-ACNC: 1.21 UIU/ML (ref 0.4–4)

## 2023-06-30 ENCOUNTER — OFFICE VISIT (OUTPATIENT)
Dept: PSYCHIATRY | Facility: CLINIC | Age: 43
End: 2023-06-30
Payer: MEDICAID

## 2023-06-30 DIAGNOSIS — F41.1 GENERALIZED ANXIETY DISORDER: Primary | ICD-10-CM

## 2023-06-30 PROCEDURE — 90834 PR PSYCHOTHERAPY W/PATIENT, 45 MIN: ICD-10-PCS | Mod: ,,, | Performed by: SOCIAL WORKER

## 2023-06-30 PROCEDURE — 3066F PR DOCUMENTATION OF TREATMENT FOR NEPHROPATHY: ICD-10-PCS | Mod: CPTII,,, | Performed by: SOCIAL WORKER

## 2023-06-30 PROCEDURE — 3061F NEG MICROALBUMINURIA REV: CPT | Mod: CPTII,,, | Performed by: SOCIAL WORKER

## 2023-06-30 PROCEDURE — 3044F HG A1C LEVEL LT 7.0%: CPT | Mod: CPTII,,, | Performed by: SOCIAL WORKER

## 2023-06-30 PROCEDURE — 3061F PR NEG MICROALBUMINURIA RESULT DOCUMENTED/REVIEW: ICD-10-PCS | Mod: CPTII,,, | Performed by: SOCIAL WORKER

## 2023-06-30 PROCEDURE — 3072F LOW RISK FOR RETINOPATHY: CPT | Mod: CPTII,,, | Performed by: SOCIAL WORKER

## 2023-06-30 PROCEDURE — 90834 PSYTX W PT 45 MINUTES: CPT | Mod: ,,, | Performed by: SOCIAL WORKER

## 2023-06-30 PROCEDURE — 3072F PR LOW RISK FOR RETINOPATHY: ICD-10-PCS | Mod: CPTII,,, | Performed by: SOCIAL WORKER

## 2023-06-30 PROCEDURE — 3044F PR MOST RECENT HEMOGLOBIN A1C LEVEL <7.0%: ICD-10-PCS | Mod: CPTII,,, | Performed by: SOCIAL WORKER

## 2023-06-30 PROCEDURE — 3066F NEPHROPATHY DOC TX: CPT | Mod: CPTII,,, | Performed by: SOCIAL WORKER

## 2023-06-30 NOTE — PROGRESS NOTES
Individual Psychotherapy (PhD/LCSW)    2023    Site:  [facility]         Therapeutic Intervention: Met with patient.  Outpatient - Insight oriented psychotherapy 45 min - CPT code 11232    Chief complaint/reason for encounter: depression and anxiety     Interval history and content of current session: Patient presents to ongoing individual therapy due to depression and anxiety.  She was last in session on 23.  She is frustrated with what cancer has done to her life.  She recalls that she had a three bedroom condo in [place].  She now has a much smaller home.  She recently went to the hospital for dehydration.  She received fluids.  She was not drinking much water prior.  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  She describes the tasks that had to be done after a person  to clean the body.  She made sure that the tasks were completed well.  She believes that her care gave dignity to the person.  She does feel thankful for the support of her boyfriend.  He continues to work in the chemical plants.  Emphasize the good feeling she had from caring for others is now what others are experiencing in caring for her.  Explore if the patient has her basic needs met.  Note that she is not in a position of control any longer.  Reflect that the residents of the nursing home and those who  did not choose to do so.  She does enjoy caring for her grandchildren.  She talks to her son on a daily basis.  He is supportive.  Her mother was sent to [place] for school.  The patient is not sure why that area was chosen.  Her  and her grandchildren are waiting for her in the car.  She has been spending time indoors due to the recent heat wave.    Treatment plan:  Target symptoms: depression, anxiety   Why chosen therapy is appropriate versus another modality: relevant to diagnosis  Outcome monitoring methods: self-report, observation  Therapeutic  intervention type: insight oriented psychotherapy, supportive psychotherapy, interactive psychotherapy     Risk parameters:  Patient reports no suicidal ideation  Patient reports no homicidal ideation  Patient reports no self-injurious behavior  Patient reports no violent behavior     Verbal deficits: None     Patient's response to intervention:  The patient's response to intervention is accepting, motivated.     Progress toward goals and other mental status changes:  The patient's progress toward goals is limited.     Diagnosis:   Generalized Anxiety Disorder     Plan:  individual psychotherapy  medication management by Dr. Jaquez  Psychological testing completed with Americo Garay P.h.D.     Return to clinic: as scheduled     Length of Service (minutes):   45

## 2023-07-03 ENCOUNTER — OFFICE VISIT (OUTPATIENT)
Dept: HEPATOLOGY | Facility: CLINIC | Age: 43
End: 2023-07-03
Payer: MEDICARE

## 2023-07-03 VITALS
HEIGHT: 68 IN | WEIGHT: 279.13 LBS | HEART RATE: 73 BPM | BODY MASS INDEX: 42.3 KG/M2 | DIASTOLIC BLOOD PRESSURE: 80 MMHG | SYSTOLIC BLOOD PRESSURE: 114 MMHG

## 2023-07-03 DIAGNOSIS — K76.0 FATTY LIVER: Primary | ICD-10-CM

## 2023-07-03 PROCEDURE — 1159F MED LIST DOCD IN RCRD: CPT | Mod: CPTII,S$GLB,, | Performed by: INTERNAL MEDICINE

## 2023-07-03 PROCEDURE — 3072F PR LOW RISK FOR RETINOPATHY: ICD-10-PCS | Mod: CPTII,S$GLB,, | Performed by: INTERNAL MEDICINE

## 2023-07-03 PROCEDURE — 3008F PR BODY MASS INDEX (BMI) DOCUMENTED: ICD-10-PCS | Mod: CPTII,S$GLB,, | Performed by: INTERNAL MEDICINE

## 2023-07-03 PROCEDURE — 3044F PR MOST RECENT HEMOGLOBIN A1C LEVEL <7.0%: ICD-10-PCS | Mod: CPTII,S$GLB,, | Performed by: INTERNAL MEDICINE

## 2023-07-03 PROCEDURE — 3061F PR NEG MICROALBUMINURIA RESULT DOCUMENTED/REVIEW: ICD-10-PCS | Mod: CPTII,S$GLB,, | Performed by: INTERNAL MEDICINE

## 2023-07-03 PROCEDURE — 99999 PR PBB SHADOW E&M-EST. PATIENT-LVL IV: ICD-10-PCS | Mod: PBBFAC,,, | Performed by: INTERNAL MEDICINE

## 2023-07-03 PROCEDURE — 3079F DIAST BP 80-89 MM HG: CPT | Mod: CPTII,S$GLB,, | Performed by: INTERNAL MEDICINE

## 2023-07-03 PROCEDURE — 99214 PR OFFICE/OUTPT VISIT, EST, LEVL IV, 30-39 MIN: ICD-10-PCS | Mod: S$GLB,,, | Performed by: INTERNAL MEDICINE

## 2023-07-03 PROCEDURE — 3008F BODY MASS INDEX DOCD: CPT | Mod: CPTII,S$GLB,, | Performed by: INTERNAL MEDICINE

## 2023-07-03 PROCEDURE — 3074F SYST BP LT 130 MM HG: CPT | Mod: CPTII,S$GLB,, | Performed by: INTERNAL MEDICINE

## 2023-07-03 PROCEDURE — 99214 OFFICE O/P EST MOD 30 MIN: CPT | Mod: S$GLB,,, | Performed by: INTERNAL MEDICINE

## 2023-07-03 PROCEDURE — 3074F PR MOST RECENT SYSTOLIC BLOOD PRESSURE < 130 MM HG: ICD-10-PCS | Mod: CPTII,S$GLB,, | Performed by: INTERNAL MEDICINE

## 2023-07-03 PROCEDURE — 1159F PR MEDICATION LIST DOCUMENTED IN MEDICAL RECORD: ICD-10-PCS | Mod: CPTII,S$GLB,, | Performed by: INTERNAL MEDICINE

## 2023-07-03 PROCEDURE — 1160F PR REVIEW ALL MEDS BY PRESCRIBER/CLIN PHARMACIST DOCUMENTED: ICD-10-PCS | Mod: CPTII,S$GLB,, | Performed by: INTERNAL MEDICINE

## 2023-07-03 PROCEDURE — 99999 PR PBB SHADOW E&M-EST. PATIENT-LVL IV: CPT | Mod: PBBFAC,,, | Performed by: INTERNAL MEDICINE

## 2023-07-03 PROCEDURE — 3066F NEPHROPATHY DOC TX: CPT | Mod: CPTII,S$GLB,, | Performed by: INTERNAL MEDICINE

## 2023-07-03 PROCEDURE — 3044F HG A1C LEVEL LT 7.0%: CPT | Mod: CPTII,S$GLB,, | Performed by: INTERNAL MEDICINE

## 2023-07-03 PROCEDURE — 3066F PR DOCUMENTATION OF TREATMENT FOR NEPHROPATHY: ICD-10-PCS | Mod: CPTII,S$GLB,, | Performed by: INTERNAL MEDICINE

## 2023-07-03 PROCEDURE — 3072F LOW RISK FOR RETINOPATHY: CPT | Mod: CPTII,S$GLB,, | Performed by: INTERNAL MEDICINE

## 2023-07-03 PROCEDURE — 3079F PR MOST RECENT DIASTOLIC BLOOD PRESSURE 80-89 MM HG: ICD-10-PCS | Mod: CPTII,S$GLB,, | Performed by: INTERNAL MEDICINE

## 2023-07-03 PROCEDURE — 3061F NEG MICROALBUMINURIA REV: CPT | Mod: CPTII,S$GLB,, | Performed by: INTERNAL MEDICINE

## 2023-07-03 PROCEDURE — 1160F RVW MEDS BY RX/DR IN RCRD: CPT | Mod: CPTII,S$GLB,, | Performed by: INTERNAL MEDICINE

## 2023-07-03 RX ORDER — OXYBUTYNIN CHLORIDE 5 MG/1
5 TABLET, EXTENDED RELEASE ORAL
COMMUNITY
Start: 2023-05-09 | End: 2024-02-27

## 2023-07-03 RX ORDER — ACETAMINOPHEN AND CODEINE PHOSPHATE 300; 30 MG/1; MG/1
TABLET ORAL
COMMUNITY
Start: 2023-04-27 | End: 2023-07-03

## 2023-07-03 RX ORDER — DULAGLUTIDE 0.75 MG/.5ML
INJECTION, SOLUTION SUBCUTANEOUS
COMMUNITY
Start: 2023-05-10 | End: 2023-07-03

## 2023-07-03 NOTE — PROGRESS NOTES
Subjective:     Sangeetha June is here for evaluation of fatty liver.    HPI  Sangeetha June is a 43 F with long hx of DM, weight over 200 lbs found to have hepatomegaly on MRI of the abdomen    Metabolic issues: hyperlipidemia, DM   ETOH:no  BMI:46  Diet:high carb diet  Exercise: poor balance    7/3/2023  Lost Over 10 lbs since last visit. Denies liver symptoms.  C.o headache. Neighbor is helping with visits     Review of Systems   Constitutional:  Negative for fatigue, fever and unexpected weight change.   Gastrointestinal:  Negative for abdominal distention, abdominal pain, blood in stool, nausea and vomiting.   Musculoskeletal:  Negative for arthralgias and gait problem.   Psychiatric/Behavioral:  Negative for confusion and sleep disturbance.      Objective:     Physical Exam  Vitals reviewed.   Constitutional:       Appearance: Normal appearance. She is obese.   Eyes:      General: No scleral icterus.  Pulmonary:      Effort: Pulmonary effort is normal.   Abdominal:      General: Bowel sounds are normal. There is no distension.      Palpations: There is no mass.      Tenderness: There is no abdominal tenderness.   Musculoskeletal:      Right lower leg: No edema.      Left lower leg: No edema.   Skin:     Coloration: Skin is not jaundiced.   Neurological:      Mental Status: She is alert and oriented to person, place, and time.   Psychiatric:         Thought Content: Thought content normal.       Computed MELD-Na unavailable. Necessary lab results were not found in the last year.  Computed MELD unavailable. Necessary lab results were not found in the last year.    WBC   Date Value Ref Range Status   09/16/2022 5.75 3.90 - 12.70 K/uL Final     Hemoglobin   Date Value Ref Range Status   09/16/2022 13.9 12.0 - 16.0 g/dL Final     Hematocrit   Date Value Ref Range Status   09/16/2022 43.2 37.0 - 48.5 % Final     Platelets   Date Value Ref Range Status   09/16/2022 361 150 - 450 K/uL Final     BUN    Date Value Ref Range Status   03/20/2023 6 6 - 20 mg/dL Final     Creatinine   Date Value Ref Range Status   03/20/2023 0.8 0.5 - 1.4 mg/dL Final     Glucose   Date Value Ref Range Status   03/20/2023 118 (H) 70 - 110 mg/dL Final     Calcium   Date Value Ref Range Status   03/20/2023 8.9 8.7 - 10.5 mg/dL Final     Sodium   Date Value Ref Range Status   03/20/2023 141 136 - 145 mmol/L Final     Potassium   Date Value Ref Range Status   03/20/2023 3.6 3.5 - 5.1 mmol/L Final     Chloride   Date Value Ref Range Status   03/20/2023 104 95 - 110 mmol/L Final     Magnesium   Date Value Ref Range Status   07/02/2021 1.5 (L) 1.6 - 2.6 mg/dL Final     AST   Date Value Ref Range Status   03/20/2023 16 10 - 40 U/L Final     ALT   Date Value Ref Range Status   03/20/2023 15 10 - 44 U/L Final     Alkaline Phosphatase   Date Value Ref Range Status   03/20/2023 102 55 - 135 U/L Final     Total Bilirubin   Date Value Ref Range Status   03/20/2023 0.2 0.1 - 1.0 mg/dL Final     Comment:     For infants and newborns, interpretation of results should be based  on gestational age, weight and in agreement with clinical  observations.    Premature Infant recommended reference ranges:  Up to 24 hours.............<8.0 mg/dL  Up to 48 hours............<12.0 mg/dL  3-5 days..................<15.0 mg/dL  6-29 days.................<15.0 mg/dL       Albumin   Date Value Ref Range Status   03/20/2023 3.7 3.5 - 5.2 g/dL Final     No results found for: INR      Assessment/Plan:       1.NAFLD(Non alcoholic fatty liver disease)  LFTs are in normal limits  Recent hemoglobin A1c is 5.0  Has steatosis on US abd  Educated patient on spectrum of fatty liver disease   Explored dietary habits and discussed dietary recommendations- Low calorie, low carbohydrate, high protein diet mediterranean with goal of loosing >3-5% to improve steatosis   Discussed consistent daily exercise  Encouraged to continue to loose weight atleast 30-40 more lbs     US abd -  3/2023    1.    There are 2 left renal lesions which are indeterminate.  Abdominal MRI with and without contrast renal mass protocol is recommended for further evaluation.  2.    There is an indeterminate 2.6 cm splenic lesion.  Hopefully this can be evaluated on the renal mass MRI.    She is scheduled for MRI and a urology consult prior to my visit, follow Splenic lesion   Also has meningioma of brain, has a scheduled visit to see neurology     RTC in 6 months with preclinic labs      Kayleen Singh MD  Dept of Hepatology, [place]

## 2023-07-07 ENCOUNTER — PATIENT MESSAGE (OUTPATIENT)
Dept: INFECTIOUS DISEASES | Facility: CLINIC | Age: 43
End: 2023-07-07
Payer: MEDICARE

## 2023-07-13 ENCOUNTER — TELEPHONE (OUTPATIENT)
Dept: INTERNAL MEDICINE | Facility: CLINIC | Age: 43
End: 2023-07-13
Payer: MEDICARE

## 2023-07-13 ENCOUNTER — TELEPHONE (OUTPATIENT)
Dept: NEUROLOGY | Facility: CLINIC | Age: 43
End: 2023-07-13
Payer: MEDICARE

## 2023-07-13 NOTE — TELEPHONE ENCOUNTER
Spoke with patient and was informed that she may have difficulty with transportation. Patient will try to see if she can find someone to bring her to [place] but hopes to see a neurologist in [place]

## 2023-07-13 NOTE — TELEPHONE ENCOUNTER
----- Message from Kenrick Otoole sent at 7/13/2023 10:09 AM CDT -----  Contact: [phone]  Pt is calling regarding neurology referral/appt . Please call back at [phone] . Georgianaj

## 2023-07-13 NOTE — TELEPHONE ENCOUNTER
----- Message from Kenrick Otoole sent at 7/13/2023  9:43 AM CDT -----  Contact: [phone]  Pt is calling regarding appt . Please call back at [phone] . yosvany

## 2023-07-25 NOTE — TELEPHONE ENCOUNTER
Called Ms. June to respond to her message. Encouraged her to visit the [facility] at her next appointment at the [facility] for further assistance. She agreed. Successful call.    Excisional Biopsy Additional Text (Leave Blank If You Do Not Want): The margin was drawn around the clinically apparent lesion. An elliptical shape was then drawn on the skin incorporating the lesion and margins.  Incisions were then made along these lines to the appropriate tissue plane and the lesion was extirpated.

## 2023-07-26 ENCOUNTER — NUTRITION (OUTPATIENT)
Dept: DIABETES | Facility: CLINIC | Age: 43
End: 2023-07-26
Payer: MEDICARE

## 2023-07-26 ENCOUNTER — OFFICE VISIT (OUTPATIENT)
Dept: OPHTHALMOLOGY | Facility: CLINIC | Age: 43
End: 2023-07-26
Payer: MEDICARE

## 2023-07-26 VITALS — WEIGHT: 273.38 LBS | BODY MASS INDEX: 41.57 KG/M2

## 2023-07-26 DIAGNOSIS — H52.13 MYOPIA WITH ASTIGMATISM AND PRESBYOPIA, BILATERAL: ICD-10-CM

## 2023-07-26 DIAGNOSIS — H52.203 MYOPIA WITH ASTIGMATISM AND PRESBYOPIA, BILATERAL: ICD-10-CM

## 2023-07-26 DIAGNOSIS — E11.69 TYPE 2 DIABETES MELLITUS WITH OTHER SPECIFIED COMPLICATION, WITH LONG-TERM CURRENT USE OF INSULIN: Primary | ICD-10-CM

## 2023-07-26 DIAGNOSIS — Z79.4 TYPE 2 DIABETES MELLITUS WITH OTHER SPECIFIED COMPLICATION, WITH LONG-TERM CURRENT USE OF INSULIN: Primary | ICD-10-CM

## 2023-07-26 DIAGNOSIS — H52.4 MYOPIA WITH ASTIGMATISM AND PRESBYOPIA, BILATERAL: ICD-10-CM

## 2023-07-26 DIAGNOSIS — H53.9 VISION DISTURBANCE: ICD-10-CM

## 2023-07-26 DIAGNOSIS — E11.9 DIABETES MELLITUS WITHOUT COMPLICATION: Primary | ICD-10-CM

## 2023-07-26 DIAGNOSIS — E11.36 DIABETIC CATARACT: ICD-10-CM

## 2023-07-26 DIAGNOSIS — E11.9 TYPE 2 DIABETES MELLITUS WITHOUT COMPLICATION: ICD-10-CM

## 2023-07-26 PROCEDURE — 99999 PR PBB SHADOW E&M-EST. PATIENT-LVL III: ICD-10-PCS | Mod: PBBFAC,HCNC,, | Performed by: DIETITIAN, REGISTERED

## 2023-07-26 PROCEDURE — 3061F NEG MICROALBUMINURIA REV: CPT | Mod: HCNC,CPTII,S$GLB, | Performed by: OPTOMETRIST

## 2023-07-26 PROCEDURE — 3066F PR DOCUMENTATION OF TREATMENT FOR NEPHROPATHY: ICD-10-PCS | Mod: HCNC,CPTII,S$GLB, | Performed by: OPTOMETRIST

## 2023-07-26 PROCEDURE — G0108 DIAB MANAGE TRN  PER INDIV: HCPCS | Mod: HCNC,S$GLB,, | Performed by: DIETITIAN, REGISTERED

## 2023-07-26 PROCEDURE — 92014 PR EYE EXAM, EST PATIENT,COMPREHESV: ICD-10-PCS | Mod: HCNC,S$GLB,, | Performed by: OPTOMETRIST

## 2023-07-26 PROCEDURE — 99999 PR PBB SHADOW E&M-EST. PATIENT-LVL III: CPT | Mod: PBBFAC,HCNC,, | Performed by: DIETITIAN, REGISTERED

## 2023-07-26 PROCEDURE — 92014 COMPRE OPH EXAM EST PT 1/>: CPT | Mod: HCNC,S$GLB,, | Performed by: OPTOMETRIST

## 2023-07-26 PROCEDURE — 3044F PR MOST RECENT HEMOGLOBIN A1C LEVEL <7.0%: ICD-10-PCS | Mod: HCNC,CPTII,S$GLB, | Performed by: OPTOMETRIST

## 2023-07-26 PROCEDURE — 3061F PR NEG MICROALBUMINURIA RESULT DOCUMENTED/REVIEW: ICD-10-PCS | Mod: HCNC,CPTII,S$GLB, | Performed by: OPTOMETRIST

## 2023-07-26 PROCEDURE — 92015 DETERMINE REFRACTIVE STATE: CPT | Mod: HCNC,S$GLB,, | Performed by: OPTOMETRIST

## 2023-07-26 PROCEDURE — G0108 PR DIAB MANAGE TRN  PER INDIV: ICD-10-PCS | Mod: HCNC,S$GLB,, | Performed by: DIETITIAN, REGISTERED

## 2023-07-26 PROCEDURE — 3066F NEPHROPATHY DOC TX: CPT | Mod: HCNC,CPTII,S$GLB, | Performed by: OPTOMETRIST

## 2023-07-26 PROCEDURE — 92015 PR REFRACTION: ICD-10-PCS | Mod: HCNC,S$GLB,, | Performed by: OPTOMETRIST

## 2023-07-26 PROCEDURE — 99999 PR PBB SHADOW E&M-EST. PATIENT-LVL III: CPT | Mod: PBBFAC,HCNC,, | Performed by: OPTOMETRIST

## 2023-07-26 PROCEDURE — 3044F HG A1C LEVEL LT 7.0%: CPT | Mod: HCNC,CPTII,S$GLB, | Performed by: OPTOMETRIST

## 2023-07-26 PROCEDURE — 99999 PR PBB SHADOW E&M-EST. PATIENT-LVL III: ICD-10-PCS | Mod: PBBFAC,HCNC,, | Performed by: OPTOMETRIST

## 2023-07-26 NOTE — PROGRESS NOTES
HPI    Vision changes since last eye exam?: yes, very blurry     Any eye pain today: no    Other ocular symptoms: no    Interested in contact lens fitting today? no    Lab Results       Component                Value               Date                       HGBA1C                   5.3                 06/23/2023                      Last edited by Keri Jaeger on 7/26/2023  1:44 PM.            Assessment /Plan     For exam results, see Encounter Report.    Diabetes mellitus without complication  Last A1c 5.3 There was no diabetic retinopathy present on either eye on examination today. Recommend good blood pressure control, strict blood glucose control, and good cholesterol control.  Continue close care with Dr. Garcia regarding diabetes.    Diabetic cataract  Cataracts are not visually significant and not affecting activities of daily living. Annual observation is recommended at this time. Patient to call or return to clinic with any significant change in vision prior to next visit.     Myopia with astigmatism and presbyopia, bilateral  Eyeglass Final Rx       Eyeglass Final Rx         Sphere Cylinder Axis Add    Right -0.50 +1.25 170 +1.50    Left -0.25 +0.75 154 +1.50      Type: PAL    Expiration Date: 7/26/2024                      RTC 1 yr for dilated eye exam or sooner if any changes to vision.   Discussed above and answered questions.

## 2023-07-27 NOTE — PROGRESS NOTES
"Diabetes Care Specialist Progress Note  Author: Alexandra Trent, RD, CDE  Date: 7/27/2023    Program Intake  Reason for Diabetes Program Visit:: Intervention (DM referral 4/12/23 Shavonne Nuñez, FNP E11.69,Z79.4)  Type of Intervention:: Individual  Education: Self-Management Skill Review  Current diabetes risk level:: high  In the last 12 months, have you::  (pt denies recent ER/hosp visits)    Lab Results   Component Value Date    HGBA1C 5.3 06/23/2023       Clinical    Problem Review  Active comorbidities affecting diabetes self-care.:  (HTN, HLD, CVA, s/p breast cancer, Hypothyroidism, Obesity by BMI, GERD)    Clinical Assessment  Current Diabetes Treatment: Diet, Oral Medication, Injectable (Mounjaro 7.5mg weekly. Metformin XR 500mg 1tab twice daily.)  Have you ever experienced hypoglycemia (low blood sugar)?: no (none recent)  Have you ever experienced hyperglycemia (high blood sugar)?: no (none recent)    Medication Information  How many days a week do you miss your medications?: Never  Do you sometimes have difficulty refilling your medications?: No  Medication adherence impacting ability to self-manage diabetes?: No    Labs  Do you have regular lab work to monitor your medications?: Yes  Type of Regular Lab Work: A1c, Cholesterol, Microalbumin, BMP  Where do you get your labs drawn?: [facility]  Lab Compliance Barriers: No    Nutritional Status  Diet:  (Pt reports 1-2meals daily. No excess carb, sat fat or sodium. Inadequate oral intake. No use MR. Occs excess reported from fried foods, sugary pat.)  Meal Plan 24 Hour Recall - Breakfast: today restaurant - 1/2c grits, 1 egg, 1.5" smoked sausage, diet tea OR none  Meal Plan 24 Hour Recall - Lunch: none OR chix salad on wheat bread, oranges, diet tea  Meal Plan 24 Hour Recall - Dinner: 3pc fried chix wings (pt usually limits fried foods), no sides, diet tea OR bkd chix, red beans OR bkd turkey wings, 1/2c rice/gravy, 1/2c sweet peas  Meal Plan 24 Hour " Recall - Snack: fruit OR occs sugar candy; pat: water, diet tea, occs reg. cola  Recent Changes in Weight: Weight Loss (~14lbs since 6/23)  Was weight loss intentional or unintentional?: Intentional (~14lbs since 6/23)  Current nutritional status an area of need that is impacting patient's ability to self-manage diabetes?: Yes    Diabetes Self-Management Skills Assessment    Nutrition/Healthy Eating  Challenges to healthy eating::  (irregular meal patterns)  Method of carbohydrate measurement::  (limiting frequency of fried foods, avoiding sugary beverages/snacks)  Patient can identify foods that impact blood sugar.: yes  Patient-identified foods:: starches (bread, pasta, rice, cereal), starchy vegetables (corn, peas, beans), sweets, soda, fruit/fruit juice  Nutrition/Healthy Eating Skills Assessment Completed:: Yes  Assessment indicates:: Instruction Needed, Knowledge deficit  Area of need?: Yes    Physical Activity/Exercise  Patient's daily activity level::  (none recent)  Patient formally exercises outside of work.: no  Reasons for not exercising::  (patient reports ability to walk short distances but inconsistent due to fear of falling and low energy level)  Patient can identify forms of physical activity.: yes  Stated forms of physical activity:: any movement performed by muscles that uses energy  Patient can identify reasons why exercise/physical activity is important in diabetes management.: no  Physical Activity/Exercise Skills Assessment Completed: : Yes  Assessment indicates:: Instruction Needed  Area of need?: Yes    Medications  Patient is able to describe current diabetes management routine.: yes  Diabetes management routine:: injectable medications, oral medications  Patient is able to identify current diabetes medications, dosages, and appropriate timing of medications.: yes  Patient understands the purpose of the medications taken for diabetes.: yes  Patient reports problems or concerns with current  medication regimen.: yes  Medication regimen problems/concerns:: concerned about side effects (Pt reports abdominal pain since taking Mounjaro.)  Medication Skills Assessment Completed:: Yes  Assessment indicates:: Instruction Needed  Area of need?: Yes    Psychosocial/Coping  Patient can identify ways of coping with chronic disease.: yes  Patient-stated ways of coping with chronic disease:: support from loved ones, counseling/actively seeing behavioral professional  Psychosocial/Coping Skills Assessment Completed: : Yes  Assessment indicates:: Adequate understanding  Area of need?: Yes    Assessment Summary and Plan  Based on today's diabetes care assessment, the following areas of need were identified:      Social 4/19/2023   Support No   Access to [employer] No   Cognitive/Behavioral Health No   Culture/Zoroastrian No   Communication No   Health Literacy No      Clinical 7/26/2023   Medication Adherence No   Lab Compliance No   Nutritional Status Yes -see care plan      Diabetes Self-Management Skills 7/26/2023   Nutrition/Healthy Eating Yes -see care plan   Physical Activity/Exercise Yes -see care plan   Medication Yes - discussed medication action and tips to control GI side effects   Psychosocial/Coping Yes - pt agrees to contact [place]'s bx and psych provider since she's needing medication refill support and additional care related to depression symptoms        Today's interventions were provided through individual discussion, instruction, and written materials were provided.    Patient verbalized understanding of instruction and written materials.  Pt was able to return back demonstration of instructions today. Patient understood key points, needs reinforcement and further instruction.     Diabetes Self-Management Care Plan:Today's Diabetes Self-Management Care Plan was developed with Sangeetha's input. Sangeetha has agreed to work toward the following goal(s) to improve his/her overall diabetes control.      Care  Plan: Diabetes Management   Updates made since 6/27/2023 12:00 AM        Problem: Healthy Eating         Goal: Eat 3 meals daily - manage carb 30-45grams/meal, 5-15grams/snack.    Start Date: 4/19/2023   Expected End Date: 4/12/2024   This Visit's Progress: No change   Recent Progress: On track   Priority: High   Barriers: Knowledge deficit; Cognitive Deficits   Note:    Encouraged progress of limiting freq fried foods, sugary pat/snacks.    Discussed role meal spacing and carb/pro balance w/ strategies to support. Reviewed need for adequate oral intake and nutrition to support diabetes and overall health. Instructed on specific meal ideas using pt preferences. Reviewed role MR shake/entree w/ brands to try.       Problem: Physical Activity and Exercise         Goal: Patient agrees to increase physical activity to a goal of 150min/wk.    Start Date: 6/19/2023   Expected End Date: 4/12/2024   This Visit's Progress: No change   Priority: Medium   Barriers: Other (comments)   Note:    Discussed role exercise on diabetes control and overall health. Discussed strategies to improve indoor, chair type exercise on days when she isn't able to do short distance walking. Reviewed ADA goals and split sessions to build upon progress.          Follow Up Plan   Follow up in about 4 weeks (around 8/23/2023).  -donn pierce    Today's care plan and follow up schedule was discussed with patient.  Sangeetha verbalized understanding of the care plan, goals, and agrees to follow up plan.        The patient was encouraged to communicate with his/her health care provider/physician and care team regarding his/her condition(s) and treatment.  I provided the patient with my contact information today and encouraged to contact me via phone or [facility] Patient Portal as needed.     Length of Visit   Total Time: 60 Minutes

## 2023-07-28 ENCOUNTER — PATIENT MESSAGE (OUTPATIENT)
Dept: PULMONOLOGY | Facility: CLINIC | Age: 43
End: 2023-07-28
Payer: MEDICARE

## 2023-07-30 ENCOUNTER — PATIENT MESSAGE (OUTPATIENT)
Dept: DIABETES | Facility: CLINIC | Age: 43
End: 2023-07-30
Payer: MEDICARE

## 2023-08-07 ENCOUNTER — TELEPHONE (OUTPATIENT)
Dept: INTERNAL MEDICINE | Facility: CLINIC | Age: 43
End: 2023-08-07
Payer: MEDICARE

## 2023-08-07 RX ORDER — QUETIAPINE FUMARATE 25 MG/1
25 TABLET, FILM COATED ORAL NIGHTLY
COMMUNITY
Start: 2023-07-31 | End: 2023-10-31

## 2023-08-07 RX ORDER — IBUPROFEN 800 MG/1
800 TABLET ORAL EVERY 6 HOURS PRN
COMMUNITY
Start: 2023-04-27 | End: 2023-08-07 | Stop reason: SDUPTHER

## 2023-08-07 NOTE — TELEPHONE ENCOUNTER
Per Sarah (  nurse) pt is now seeing a Psychiatrist at the [facility] and was given a rx for Seroquel and per the  nurse there is a warning that the Seroquel and Lexapro cant be given together. I suggested that she talk with the Psychiatrist about this since he is now managing her meds for this problem.

## 2023-08-07 NOTE — TELEPHONE ENCOUNTER
----- Message from Luca Richter sent at 8/4/2023  6:04 PM CDT -----  Type:  Patient Returning Call    Who Called:Charles Degroot [facility]  Who Left Message for Patient:  Does the patient know what this is regarding?:new rx  Would the patient rather a call back or a response via [facility]? Call  Best Call Back Number:[phone]  Additional Information: home health nurse would like to inform provider that the pt has started a new rx.

## 2023-08-08 NOTE — PROGRESS NOTES
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     Current Treatment:   - surveillance     Subjective:       Chief Complaint: Breast Cancer      HPI    Sangeetha June  43 y.o.  female with past medical history significant for HTN, DM, CVA, Hypothyroidism, OA, meningioma, invasive ductal carcinoma status post left breast mastectomy and adjuvant chemotherapy presents today for f/u      April of 2020, age of 41 y/o, she presented to PCP with complaints of breast lump near sternum, painful and swollen.  Mammography/ultrasound breast showed a new 2.5 cm mass left breast at 9 Oclock middle depth.  Biopsy revealed invasive ductal carcinoma, ER positive by 1%, MD negative and HER2 Elieser negative, stage at pT2 N0 M0.  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     She underwent mastectomy on 06/24/2020 and the tumor was noted to be 3.1 cm in size.  0/16 lymph nodes were positive.  Tumor was essentially treated at triple negative and chemotherapy was recommended.  Echocardiogram performed May of 2020 showed ejection fraction of 55%. Patient was treated with dose dense AC followed by weekly Taxol.  She received 3 cycles out of 12 of single agent Taxol with the last one being in October of 2020 before she started complaining of worsening cognitive abilities and speech problems.  Chemotherapy was discontinued.       Adjuvant radiation was not recommended as patient's tumor margins were noted to be about 1.3 cm, she had negative lymph nodes and tumor was T2.       Endocrine therapy was not recommended.     She has since been observed. Has mediport in place and  last flush 1/30/2023        With regards to speech difficulty and cognitive impairment, MRI of the brain was obtained on 10/13/2020 and showed a left frontal meningioma with no acute intracranial pathology, repeat MRI was ecommended in 6 months.    5/24/2021- MRI of brain revealed- 9 mm round extra-axial enhancing mass overlying the left frontal lobe consistent with a small meningioma.  No other enhancing intracranial lesion identified.  10/2021 repeat brain MRI revealed stable findings     12/29/2022- MRI brain  EXAMINATION:  MRI BRAIN W WO CONTRAST       Stable left frontal meningioma.      PET 5/26/2021- no evidence of metastatic disease     11/15/2022- Plastic surgery procedure left expander placement and right breast reduction  1/2023 pt had left expander removed due to infection     Genetic testing revealed negative Myrisk- had a mutation of the TP53 gene- classified as inconclusive  TP53 can be related to osteosarcomas, breast osteosarcomas and hematologic malignancies     Family history is pertinent colon cancer, prostate cancer esophageal cancer.  No family history breast or ovarian cancer.       Interval History:     Patient is here for follow-up of her breast cancer management and denies any new issues.  She had noticed breast lumps previously and had an ultrasound done with recommendation to have a repeat done in 1 year.  Denies any abdominal pain, nausea, vomiting, diarrhea, weight loss.  Patient has having diagnosis of meningioma and had been following with Neurosurgery but would like to have a new referral placed again.  Patient is following with Endocrinology for her thyroid issues.  She is also scheduled with urology for renal evaluation.    Past Medical History:   Diagnosis Date    Anxiety     Asthma 11/8/2022    Breast cancer     Chest pain     Chest pain 07/02/2021    Cholecystitis without calculus     Decreased ROM of left shoulder 02/15/2022    Diabetes mellitus     Dizziness     Elevated  C-reactive protein (CRP)     Essential hypertension     Fall 10/11/2021    Memory change     EVERARDO (obstructive sleep apnea)     Osteoarthritis     Other specified disorders of thyroid     Pre-diabetes 04/15/2021    Chronic, Stable, cont metformin    Screening mammogram, encounter for 04/15/2021    Shoulder injury     SOB (shortness of breath)     Stroke     Thyroiditis     Tingling of left upper extremity 10/29/2021    Vision disturbance 04/30/2021    Weakness of shoulder 02/15/2022      Past Surgical History:   Procedure Laterality Date    ARTHROSCOPIC REPAIR OF ROTATOR CUFF OF SHOULDER Left 1/27/2022    Procedure: REPAIR, ROTATOR CUFF, ARTHROSCOPIC;  Surgeon: Francisco Mathews MD;  Location: [facility] OR;  Service: Orthopedics;  Laterality: Left;    ARTHROSCOPY OF SHOULDER WITH DECOMPRESSION OF SUBACROMIAL SPACE Left 1/27/2022    Procedure: ARTHROSCOPY, SHOULDER, WITH SUBACROMIAL SPACE DECOMPRESSION;  Surgeon: Francisco Mathews MD;  Location: [facility] OR;  Service: Orthopedics;  Laterality: Left;    BREAST BIOPSY      COLONOSCOPY N/A 5/31/2022    Procedure: COLONOSCOPY;  Surgeon: Km Odom MD;  Location: [facility];  Service: Endoscopy;  Laterality: N/A;    ESOPHAGOGASTRODUODENOSCOPY N/A 5/31/2022    Procedure: EGD (ESOPHAGOGASTRODUODENOSCOPY) ;  Surgeon: Km Odom MD;  Location: [facility];  Service: Endoscopy;  Laterality: N/A;    FIXATION OF TENDON Left 1/27/2022    Procedure: FIXATION, TENDON;  Surgeon: Francisco Mathews MD;  Location: [facility] OR;  Service: Orthopedics;  Laterality: Left;    HYSTERECTOMY      INJECTION OF ANESTHETIC AGENT INTO SACROILIAC JOINT Bilateral 7/29/2022    Procedure: Bilateral SIJ +  Bilateral GT Bursa Injection RN IV Sedation;  Surgeon: Bisi Cochran MD;  Location: [facility] PAIN MGT;  Service: Pain Management;  Laterality: Bilateral;    INJECTION OF JOINT Left 11/12/2021    Procedure: Left suprascapular and axillary injection with local;  Surgeon: Bisi REDDING  MD Dimas;  Location: [facility] PAIN MGT;  Service: Pain Management;  Laterality: Left;    INJECTION OF JOINT Bilateral 7/29/2022    Procedure: Bilateral SIJ + Bilateral GT Bursa Injection RN IV Sedation;  Surgeon: Bisi Cochran MD;  Location: [facility] PAIN MGT;  Service: Pain Management;  Laterality: Bilateral;    INSERTION OF BREAST TISSUE EXPANDER Left 11/15/2022    Procedure: INSERTION, TISSUE EXPANDER, BREAST;  Surgeon: Collin Pinto MD;  Location: [facility] OR;  Service: Plastics;  Laterality: Left;    MASTECTOMY      ROBOT-ASSISTED CHOLECYSTECTOMY USING DA ROBERTA XI N/A 8/29/2022    Procedure: XI ROBOTIC CHOLECYSTECTOMY;  Surgeon: Raeann Bhandari DO;  Location: [facility] OR;  Service: General;  Laterality: N/A;    TISSUE EXPANDER REMOVAL Left 1/4/2023    Procedure: REMOVAL, TISSUE EXPANDER;  Surgeon: Collin Pinto MD;  Location: [facility] OR;  Service: Plastics;  Laterality: Left;    TOTAL REDUCTION MAMMOPLASTY Right 11/15/2022    Procedure: MAMMOPLASTY, REDUCTION;  Surgeon: Collin Pinto MD;  Location: [facility] OR;  Service: Plastics;  Laterality: Right;     Social History     Socioeconomic History    Marital status:    Tobacco Use    Smoking status: Never     Passive exposure: Current    Smokeless tobacco: Never   Substance and Sexual Activity    Alcohol use: Not Currently    Drug use: Not Currently     Types: Marijuana     Comment: none currently    Sexual activity: Yes      Family History   Problem Relation Age of Onset    Colon cancer Mother 53        partial colectomy    Stroke Mother     Heart disease Mother     Breast cancer Sister     Diabetes Sister     Throat cancer Maternal Aunt         smoking hx    Lung cancer Maternal Uncle         hx smoking    No Known Problems Maternal Grandmother     Prostate cancer Maternal Grandfather 70    Alcohol abuse Maternal Grandfather     No Known Problems Paternal Grandmother     No Known Problems Paternal Grandfather     Diabetes Brother     Diabetes Brother     Diabetes  Brother     Breast cancer Other 37        chemotherapy, planning for BL mastectomy    No Known Problems Other       Review of patient's allergies indicates:   Allergen Reactions    Lisinopril     Lurasidone Other (See Comments)     Uncontrolled movement      Review of Systems   Constitutional:  Positive for fatigue. Negative for activity change, chills and fever.   HENT: Negative.     Eyes:  Positive for visual disturbance.   Respiratory:  Negative for cough and shortness of breath.    Cardiovascular:  Negative for chest pain and leg swelling.   Gastrointestinal:  Negative for constipation, diarrhea, nausea and vomiting.   Endocrine: Negative.    Genitourinary: Negative.    Musculoskeletal:  Negative for arthralgias and myalgias.   Integumentary:  Negative.   Allergic/Immunologic: Negative.    Neurological:  Positive for coordination difficulties and coordination difficulties. Negative for light-headedness, numbness and headaches.   Hematological: Negative.    Psychiatric/Behavioral: Negative.           Objective:        Vitals:    08/09/23 1252   BP: 121/86   Pulse: 69   Temp: 97.4 °F (36.3 °C)        Physical Exam  Constitutional:       General: She is not in acute distress.     Appearance: She is well-developed. She is not ill-appearing.   HENT:      Head: Normocephalic and atraumatic.      Mouth/Throat:      Mouth: Mucous membranes are moist.   Eyes:      Extraocular Movements: Extraocular movements intact.      Conjunctiva/sclera: Conjunctivae normal.   Cardiovascular:      Rate and Rhythm: Normal rate and regular rhythm.      Heart sounds: Normal heart sounds.   Pulmonary:      Effort: Pulmonary effort is normal. No respiratory distress.      Breath sounds: Normal breath sounds. No wheezing.   Abdominal:      General: There is no distension.      Palpations: Abdomen is soft.      Tenderness: There is no abdominal tenderness.   Musculoskeletal:         General: Normal range of motion.      Cervical back: Normal  range of motion and neck supple.   Skin:     General: Skin is warm.   Neurological:      General: No focal deficit present.      Mental Status: She is alert and oriented to person, place, and time. Mental status is at baseline.      Cranial Nerves: No cranial nerve deficit.   Psychiatric:         Mood and Affect: Mood normal.           LABS / IMAGING        2/15/2023- right diagnostic mammo   Impression:  Mammo Digital Diagnostic Right with D[place]ion  There is no mammographic evidence of malignancy in the right breast.     US Breast Right Limited  There is no sonographic evidence of malignancy in the right breast.     BI-RADS Category:   Overall: 2 - Benign     Recommendation:  Diagnostic mammogram in 1 year is recommended     - 3/29/2023- MRI of abdomen- Benign bilateral renal cysts. Mildly enlarged, fatty liver.   Enlarged, stable left common iliac lymph node that was present on the previous PET.       - 6/5/2023 U/S: .  Stable left thyroid nodule. Consider ultrasound follow-up at 1, 2, 3, and 5 years from initial discovery.       Assessment:     ECOG SCORE    1 - Restricted in strenuous activity-ambulatory and able to carry out work of a light nature       Invasive ductal carcinoma IIA (pT2 pN0 cM0), ER 1%, TX negative HER2 Elieser negative   - status post left breast mastectomy and adjuvant chemotherapy - dose dense AC plus 3/12 weekly Taxol.   - Restaging PET-CT scan from 05/26/2021 showed no FDG PET/CT findings to suggest recurrent or metastatic disease.   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 Chance following  Recommend ultrasound of abdomen for  evaluation for adrenal tumor screening              2/15/2023 ultrasound of abdomen revealed 2 left renal lesions and a splenic lesion- adrenals not seen- referral to urology placed for eval oF 2 renal lesions      ANGIE (generalized anxiety disorder)  Followed by psychiatrist.     Thyroid nodule  Was seen by Dr. Erickson, biopsy in December of 2021 revealed benign tissue.     Meningioma  History of -  MRI of brain that revealed a 9 mm round extra-axial enhancing mass overlying the left frontal lobe consistent with a small meningioma.  No other enhancing intracranial lesion identified. Stable [id number]     Microcytic anemia  Microcytic anemia of unknown etiology.  Resolved following IV iron therapy.    Reviewed recent upper and lower endoscopies that were unremarkable. 5/2022- 10 year f/u colon recommended          Plan:       - Recent breast U/S and MMG done with BIRAD2 with recommendation to be repeated in one year  - Referred to Neurology for meningioma follow up again   - Follow up with Endocrinology for thyrroid nodules - 9/26/2023  - Follow up with urology for renal cysts - 8/16/2023  - Potassium repleted, follow with PCP in a week  - return to the clinic in 3 months        Med Onc Chart Routing      Follow up with physician 6 months.   Follow up with MIKAL 3 months.   Infusion scheduling note    Injection scheduling note    Labs    Imaging   Mammogram in Feb 2024   Pharmacy appointment    Other referrals       Referred to Neurosurgery             The patient was seen, interviewed and examined. Pertinent lab and radiology studies were reviewed. Pt instructed to call should develop concerning signs/symptoms or have further questions.       Portions of the record may have been created with voice recognition software. Occasional wrong-word or sound-a-like substitutions may have occurred due to the inherent limitations of voice recognition software. Read the chart carefully and recognize, using context, where substitutions  have occurred.    Nazanin Anglin MD  Hematology / Oncology

## 2023-08-09 ENCOUNTER — LAB VISIT (OUTPATIENT)
Dept: LAB | Facility: HOSPITAL | Age: 43
End: 2023-08-09
Attending: INTERNAL MEDICINE
Payer: MEDICARE

## 2023-08-09 ENCOUNTER — OFFICE VISIT (OUTPATIENT)
Dept: HEMATOLOGY/ONCOLOGY | Facility: CLINIC | Age: 43
End: 2023-08-09
Payer: MEDICARE

## 2023-08-09 VITALS
WEIGHT: 268.31 LBS | TEMPERATURE: 97 F | SYSTOLIC BLOOD PRESSURE: 121 MMHG | HEART RATE: 69 BPM | BODY MASS INDEX: 42.11 KG/M2 | DIASTOLIC BLOOD PRESSURE: 86 MMHG | HEIGHT: 67 IN

## 2023-08-09 DIAGNOSIS — Z12.31 ENCOUNTER FOR SCREENING MAMMOGRAM FOR MALIGNANT NEOPLASM OF BREAST: ICD-10-CM

## 2023-08-09 DIAGNOSIS — Z15.01 MUTATION IN TP53 GENE: ICD-10-CM

## 2023-08-09 DIAGNOSIS — E87.6 HYPOKALEMIA: ICD-10-CM

## 2023-08-09 DIAGNOSIS — Z15.09 MUTATION IN TP53 GENE: ICD-10-CM

## 2023-08-09 DIAGNOSIS — D32.9 MENINGIOMA: Primary | ICD-10-CM

## 2023-08-09 DIAGNOSIS — Z15.89 MUTATION IN TP53 GENE: ICD-10-CM

## 2023-08-09 DIAGNOSIS — C50.912 MALIGNANT NEOPLASM OF LEFT BREAST IN FEMALE, ESTROGEN RECEPTOR NEGATIVE, UNSPECIFIED SITE OF BREAST: ICD-10-CM

## 2023-08-09 DIAGNOSIS — Z17.1 MALIGNANT NEOPLASM OF LEFT BREAST IN FEMALE, ESTROGEN RECEPTOR NEGATIVE, UNSPECIFIED SITE OF BREAST: ICD-10-CM

## 2023-08-09 LAB
ALBUMIN SERPL BCP-MCNC: 3.7 G/DL (ref 3.5–5.2)
ALP SERPL-CCNC: 88 U/L (ref 55–135)
ALT SERPL W/O P-5'-P-CCNC: 21 U/L (ref 10–44)
ANION GAP SERPL CALC-SCNC: 12 MMOL/L (ref 8–16)
AST SERPL-CCNC: 22 U/L (ref 10–40)
BASOPHILS # BLD AUTO: 0.02 K/UL (ref 0–0.2)
BASOPHILS NFR BLD: 0.4 % (ref 0–1.9)
BILIRUB SERPL-MCNC: 0.3 MG/DL (ref 0.1–1)
BUN SERPL-MCNC: 10 MG/DL (ref 6–20)
CALCIUM SERPL-MCNC: 9.7 MG/DL (ref 8.7–10.5)
CHLORIDE SERPL-SCNC: 104 MMOL/L (ref 95–110)
CO2 SERPL-SCNC: 27 MMOL/L (ref 23–29)
CREAT SERPL-MCNC: 1.1 MG/DL (ref 0.5–1.4)
DIFFERENTIAL METHOD: ABNORMAL
EOSINOPHIL # BLD AUTO: 0.1 K/UL (ref 0–0.5)
EOSINOPHIL NFR BLD: 1.9 % (ref 0–8)
ERYTHROCYTE [DISTWIDTH] IN BLOOD BY AUTOMATED COUNT: 12.7 % (ref 11.5–14.5)
EST. GFR  (NO RACE VARIABLE): >60 ML/MIN/1.73 M^2
GLUCOSE SERPL-MCNC: 101 MG/DL (ref 70–110)
HCT VFR BLD AUTO: 40.4 % (ref 37–48.5)
HGB BLD-MCNC: 13.5 G/DL (ref 12–16)
IMM GRANULOCYTES # BLD AUTO: 0 K/UL (ref 0–0.04)
IMM GRANULOCYTES NFR BLD AUTO: 0 % (ref 0–0.5)
LYMPHOCYTES # BLD AUTO: 2.5 K/UL (ref 1–4.8)
LYMPHOCYTES NFR BLD: 51.8 % (ref 18–48)
MCH RBC QN AUTO: 28.4 PG (ref 27–31)
MCHC RBC AUTO-ENTMCNC: 33.4 G/DL (ref 32–36)
MCV RBC AUTO: 85 FL (ref 82–98)
MONOCYTES # BLD AUTO: 0.3 K/UL (ref 0.3–1)
MONOCYTES NFR BLD: 5.4 % (ref 4–15)
NEUTROPHILS # BLD AUTO: 2 K/UL (ref 1.8–7.7)
NEUTROPHILS NFR BLD: 40.5 % (ref 38–73)
NRBC BLD-RTO: 0 /100 WBC
PLATELET # BLD AUTO: 284 K/UL (ref 150–450)
PMV BLD AUTO: 9.6 FL (ref 9.2–12.9)
POTASSIUM SERPL-SCNC: 3.1 MMOL/L (ref 3.5–5.1)
PROT SERPL-MCNC: 7.4 G/DL (ref 6–8.4)
RBC # BLD AUTO: 4.76 M/UL (ref 4–5.4)
SODIUM SERPL-SCNC: 143 MMOL/L (ref 136–145)
WBC # BLD AUTO: 4.85 K/UL (ref 3.9–12.7)

## 2023-08-09 PROCEDURE — 36415 COLL VENOUS BLD VENIPUNCTURE: CPT | Mod: HCNC | Performed by: INTERNAL MEDICINE

## 2023-08-09 PROCEDURE — 1159F MED LIST DOCD IN RCRD: CPT | Mod: HCNC,CPTII,S$GLB, | Performed by: INTERNAL MEDICINE

## 2023-08-09 PROCEDURE — 99214 OFFICE O/P EST MOD 30 MIN: CPT | Mod: HCNC,S$GLB,, | Performed by: INTERNAL MEDICINE

## 2023-08-09 PROCEDURE — 3008F BODY MASS INDEX DOCD: CPT | Mod: HCNC,CPTII,S$GLB, | Performed by: INTERNAL MEDICINE

## 2023-08-09 PROCEDURE — 3079F PR MOST RECENT DIASTOLIC BLOOD PRESSURE 80-89 MM HG: ICD-10-PCS | Mod: HCNC,CPTII,S$GLB, | Performed by: INTERNAL MEDICINE

## 2023-08-09 PROCEDURE — 3079F DIAST BP 80-89 MM HG: CPT | Mod: HCNC,CPTII,S$GLB, | Performed by: INTERNAL MEDICINE

## 2023-08-09 PROCEDURE — 3072F PR LOW RISK FOR RETINOPATHY: ICD-10-PCS | Mod: HCNC,CPTII,S$GLB, | Performed by: INTERNAL MEDICINE

## 2023-08-09 PROCEDURE — 3066F PR DOCUMENTATION OF TREATMENT FOR NEPHROPATHY: ICD-10-PCS | Mod: HCNC,CPTII,S$GLB, | Performed by: INTERNAL MEDICINE

## 2023-08-09 PROCEDURE — 1160F PR REVIEW ALL MEDS BY PRESCRIBER/CLIN PHARMACIST DOCUMENTED: ICD-10-PCS | Mod: HCNC,CPTII,S$GLB, | Performed by: INTERNAL MEDICINE

## 2023-08-09 PROCEDURE — 3008F PR BODY MASS INDEX (BMI) DOCUMENTED: ICD-10-PCS | Mod: HCNC,CPTII,S$GLB, | Performed by: INTERNAL MEDICINE

## 2023-08-09 PROCEDURE — 1159F PR MEDICATION LIST DOCUMENTED IN MEDICAL RECORD: ICD-10-PCS | Mod: HCNC,CPTII,S$GLB, | Performed by: INTERNAL MEDICINE

## 2023-08-09 PROCEDURE — 99999 PR PBB SHADOW E&M-EST. PATIENT-LVL III: CPT | Mod: PBBFAC,HCNC,, | Performed by: INTERNAL MEDICINE

## 2023-08-09 PROCEDURE — 3074F SYST BP LT 130 MM HG: CPT | Mod: HCNC,CPTII,S$GLB, | Performed by: INTERNAL MEDICINE

## 2023-08-09 PROCEDURE — 85025 COMPLETE CBC W/AUTO DIFF WBC: CPT | Mod: HCNC | Performed by: INTERNAL MEDICINE

## 2023-08-09 PROCEDURE — 3066F NEPHROPATHY DOC TX: CPT | Mod: HCNC,CPTII,S$GLB, | Performed by: INTERNAL MEDICINE

## 2023-08-09 PROCEDURE — 3044F HG A1C LEVEL LT 7.0%: CPT | Mod: HCNC,CPTII,S$GLB, | Performed by: INTERNAL MEDICINE

## 2023-08-09 PROCEDURE — 3072F LOW RISK FOR RETINOPATHY: CPT | Mod: HCNC,CPTII,S$GLB, | Performed by: INTERNAL MEDICINE

## 2023-08-09 PROCEDURE — 3044F PR MOST RECENT HEMOGLOBIN A1C LEVEL <7.0%: ICD-10-PCS | Mod: HCNC,CPTII,S$GLB, | Performed by: INTERNAL MEDICINE

## 2023-08-09 PROCEDURE — 3074F PR MOST RECENT SYSTOLIC BLOOD PRESSURE < 130 MM HG: ICD-10-PCS | Mod: HCNC,CPTII,S$GLB, | Performed by: INTERNAL MEDICINE

## 2023-08-09 PROCEDURE — 3061F NEG MICROALBUMINURIA REV: CPT | Mod: HCNC,CPTII,S$GLB, | Performed by: INTERNAL MEDICINE

## 2023-08-09 PROCEDURE — 99999 PR PBB SHADOW E&M-EST. PATIENT-LVL III: ICD-10-PCS | Mod: PBBFAC,HCNC,, | Performed by: INTERNAL MEDICINE

## 2023-08-09 PROCEDURE — 3061F PR NEG MICROALBUMINURIA RESULT DOCUMENTED/REVIEW: ICD-10-PCS | Mod: HCNC,CPTII,S$GLB, | Performed by: INTERNAL MEDICINE

## 2023-08-09 PROCEDURE — 80053 COMPREHEN METABOLIC PANEL: CPT | Mod: HCNC | Performed by: INTERNAL MEDICINE

## 2023-08-09 PROCEDURE — 1160F RVW MEDS BY RX/DR IN RCRD: CPT | Mod: HCNC,CPTII,S$GLB, | Performed by: INTERNAL MEDICINE

## 2023-08-09 PROCEDURE — 99214 PR OFFICE/OUTPT VISIT, EST, LEVL IV, 30-39 MIN: ICD-10-PCS | Mod: HCNC,S$GLB,, | Performed by: INTERNAL MEDICINE

## 2023-08-09 RX ORDER — POTASSIUM CHLORIDE 20 MEQ/1
20 TABLET, EXTENDED RELEASE ORAL DAILY
Qty: 8 TABLET | Refills: 0 | Status: SHIPPED | OUTPATIENT
Start: 2023-08-09 | End: 2023-10-31

## 2023-08-15 ENCOUNTER — OFFICE VISIT (OUTPATIENT)
Dept: DIABETES | Facility: CLINIC | Age: 43
End: 2023-08-15
Payer: MEDICARE

## 2023-08-15 DIAGNOSIS — E11.9 TYPE 2 DIABETES MELLITUS WITHOUT COMPLICATION, WITHOUT LONG-TERM CURRENT USE OF INSULIN: Primary | ICD-10-CM

## 2023-08-15 PROCEDURE — 99214 PR OFFICE/OUTPT VISIT, EST, LEVL IV, 30-39 MIN: ICD-10-PCS | Mod: HCNC,95,, | Performed by: NURSE PRACTITIONER

## 2023-08-15 PROCEDURE — 3044F HG A1C LEVEL LT 7.0%: CPT | Mod: HCNC,CPTII,95, | Performed by: NURSE PRACTITIONER

## 2023-08-15 PROCEDURE — 99214 OFFICE O/P EST MOD 30 MIN: CPT | Mod: HCNC,95,, | Performed by: NURSE PRACTITIONER

## 2023-08-15 PROCEDURE — 3061F NEG MICROALBUMINURIA REV: CPT | Mod: HCNC,CPTII,95, | Performed by: NURSE PRACTITIONER

## 2023-08-15 PROCEDURE — 3044F PR MOST RECENT HEMOGLOBIN A1C LEVEL <7.0%: ICD-10-PCS | Mod: HCNC,CPTII,95, | Performed by: NURSE PRACTITIONER

## 2023-08-15 PROCEDURE — 3066F NEPHROPATHY DOC TX: CPT | Mod: HCNC,CPTII,95, | Performed by: NURSE PRACTITIONER

## 2023-08-15 PROCEDURE — 3061F PR NEG MICROALBUMINURIA RESULT DOCUMENTED/REVIEW: ICD-10-PCS | Mod: HCNC,CPTII,95, | Performed by: NURSE PRACTITIONER

## 2023-08-15 PROCEDURE — 3066F PR DOCUMENTATION OF TREATMENT FOR NEPHROPATHY: ICD-10-PCS | Mod: HCNC,CPTII,95, | Performed by: NURSE PRACTITIONER

## 2023-08-15 NOTE — PROGRESS NOTES
"The patient location is: Home  The chief complaint leading to consultation is: Diabetes    Visit type: audiovisual    Face to Face time with patient: 15  30 minutes of total time spent on the encounter, which includes face to face time and non-face to face time preparing to see the patient (eg, review of tests), Obtaining and/or reviewing separately obtained history, Documenting clinical information in the electronic or other health record, Independently interpreting results (not separately reported) and communicating results to the patient/family/caregiver, or Care coordination (not separately reported).  Each patient to whom he or she provides medical services by telemedicine is:  (1) informed of the relationship between the physician and patient and the respective role of any other health care provider with respect to management of the patient; and (2) notified that he or she may decline to receive medical services by telemedicine and may withdraw from such care at any time.    Notes:    Sangeetha June is a 43 y.o. female who presents for a follow up evaluation of Type 2 diabetes mellitus.     CHIEF COMPLAINT: Diabetes Consultation    PCP: Sam Garcia MD      Initial visit with me - 4/3/2023    The patient was initially diagnosed with diabetes  for 2 years.      Previous failed treatments include:  none     Social Documentation:  Patient lives in [place] with Boni.   Occupation: Disabled.  Exercise: No formal exercise.   Diet: "very poor", does not cook. Boni cooks her meals for her.       Diabetes related complications:   cerebrovascular disease.   denies Pancreatitis  denies Gastroparesis  denies DKA  denies Hx/family Hx of MEN2/MTC  denies Frequent UTIs/yeast infections     Cardiovascular Risk Factors: dyslipidemia, hypertension, obesity (BMI >30 kg/m2), and sedentary lifestyle.    Diabetes Medications               metFORMIN (GLUCOPHAGE-XR) 500 MG ER 24hr tablet Take 1 tablet (500 mg total) " "by mouth 2 (two) times daily with meals.    tirzepatide (MOUNJARO) 7.5 mg/0.5 mL PnIj Inject 7.5 mg into the skin every 7 days.     Current monitoring regimen: capillary blood glucose monitoring with finger sticks.  Not checking every day.     Recent hypoglycemic episodes: No.   Patient compliant with glucose checks and medication administration? Yes    DIABETES MANAGEMENT STATUS  Statin: Taking  ACE/ARB: Not taking  Screening or Prevention Patient's value Goal Complete/Controlled?   HgA1C Testing and Control   Lab Results   Component Value Date    HGBA1C 5.3 06/23/2023      Annually/Less than 8% Yes   Lipid profile : 07/19/2022 Annually Yes   LDL control Lab Results   Component Value Date    LDLCALC 84.6 07/19/2022    Annually/Less than 100 mg/dl  Yes   Nephropathy screening Lab Results   Component Value Date    LABMICR 5.0 06/23/2023     No results found for: "PROTEINUA"  No results found for: "UTPCR"   Annually Yes   Blood pressure BP Readings from Last 1 Encounters:   08/09/23 121/86    Less than 140/90 Yes   Dilated retinal exam : 07/26/2023 Annually Yes   Foot exam   : 07/25/2022 Annually Yes   Patient's medications, allergies, surgical, social and family histories were reviewed and updated as appropriate.     Review of Systems   Constitutional:  Negative for weight loss.   Eyes:  Negative for blurred vision and double vision.   Cardiovascular:  Negative for chest pain.   Gastrointestinal:  Negative for nausea and vomiting.   Genitourinary:  Negative for frequency.   Musculoskeletal:  Negative for falls.   Neurological:  Negative for dizziness and weakness.   Endo/Heme/Allergies:  Negative for polydipsia.   Psychiatric/Behavioral:  Negative for depression.    All other systems reviewed and are negative.       Physical Exam  Constitutional:       Appearance: Normal appearance.   HENT:      Head: Normocephalic and atraumatic.   Pulmonary:      Effort: No respiratory distress.   Musculoskeletal:      Cervical " "back: Normal range of motion.   Neurological:      Mental Status: She is alert and oriented to person, place, and time.   Psychiatric:         Mood and Affect: Mood normal.         Behavior: Behavior normal.        There were no vitals taken for this visit.  Wt Readings from Last 3 Encounters:   08/09/23 121.7 kg (268 lb 4.8 oz)   07/26/23 124 kg (273 lb 5.9 oz)   07/03/23 126.6 kg (279 lb 1.6 oz)       LAB REVIEW  Lab Results   Component Value Date     08/09/2023    K 3.1 (L) 08/09/2023     08/09/2023    CO2 27 08/09/2023    BUN 10 08/09/2023    CREATININE 1.1 08/09/2023    CALCIUM 9.7 08/09/2023    ANIONGAP 12 08/09/2023    EGFRNORACEVR >60 08/09/2023     No results found for: "CPEPTIDE", "GLUTAMICACID", "INSLNABS"  Hemoglobin A1C   Date Value Ref Range Status   06/23/2023 5.3 4.0 - 5.6 % Final     Comment:     ADA Screening Guidelines:  5.7-6.4%  Consistent with prediabetes  >or=6.5%  Consistent with diabetes    High levels of fetal hemoglobin interfere with the HbA1C  assay. Heterozygous hemoglobin variants (HbS, HgC, etc)do  not significantly interfere with this assay.   However, presence of multiple variants may affect accuracy.     02/20/2023 5.0 4.0 - 5.6 % Final     Comment:     ADA Screening Guidelines:  5.7-6.4%  Consistent with prediabetes  >or=6.5%  Consistent with diabetes    High levels of fetal hemoglobin interfere with the HbA1C  assay. Heterozygous hemoglobin variants (HbS, HgC, etc)do  not significantly interfere with this assay.   However, presence of multiple variants may affect accuracy.     10/21/2022 5.1 4.0 - 5.6 % Final     Comment:     ADA Screening Guidelines:  5.7-6.4%  Consistent with prediabetes  >or=6.5%  Consistent with diabetes    High levels of fetal hemoglobin interfere with the HbA1C  assay. Heterozygous hemoglobin variants (HbS, HgC, etc)do  not significantly interfere with this assay.   However, presence of multiple variants may affect accuracy.      "     ASSESSMENT    ICD-10-CM ICD-9-CM   1. Type 2 diabetes mellitus without complication, without long-term current use of insulin  E11.9 250.00         PLAN  Diagnoses and all orders for this visit:    Type 2 diabetes mellitus without complication, without long-term current use of insulin  -     tirzepatide 10 mg/0.5 mL PnIj; Inject 10 mg into the skin every 7 days.          Reviewed pathophysiology of diabetes, complications related to the disease, importance of annual dilated eye exam and daily foot examination. Explained MOA, SE, dosage of medications. Written instructions given and reviewed with patient and patient verbalizes understanding.     4/3/2023 Initial visit: was doing well on trulicity 3 mg, but then was not able to get it due to backorder and has not taken in 2 months. Will restart at 0.75, f/u 6 weeks.     5/15/2023 - patient doing well, has [employer] [facility] supplies, will need to get with [facility] team for set up. Doing well with trulicity. F/u 3 months.     8/15/2023 - patient started on Mounjaro by PCP, doing well, has lost weight. Will increase to 10 mg with next dose. F/u 3 months.     PATIENT INSTRUCTIONS     Discontinue Trulicity. - stopped by PCP in .   Continue Mounjaro 7.5 mg subcutaneouslyv every 7 days.   Will increase to 10 mg with next refill.   Continue Glucophage  mg by mouth daily.     Blood Sugar Goals:       Fastin-130.       1-2 hours after a meal: Less than 180.     Follow up in about 3 months (around 11/15/2023).    Portions of this note were prepared with [email or web address] Naturally Speaking voice recognition transcription software. Grammatical errors, including garbled syntax, mangle pronouns, and other bizarre constructions may be attributed to that software system.

## 2023-08-15 NOTE — PATIENT INSTRUCTIONS
PATIENT INSTRUCTIONS     Discontinue Trulicity. - stopped by PCP in .   Continue Mounjaro 7.5 mg subcutaneouslyv every 7 days.   Can increase to 10 mg with next refill.  Continue Glucophage  mg by mouth daily.     Blood Sugar Goals:       Fastin-130.       1-2 hours after a meal: Less than 180.

## 2023-08-16 ENCOUNTER — OFFICE VISIT (OUTPATIENT)
Dept: UROLOGY | Facility: CLINIC | Age: 43
End: 2023-08-16
Payer: MEDICARE

## 2023-08-16 VITALS
HEART RATE: 73 BPM | HEIGHT: 67 IN | BODY MASS INDEX: 42.63 KG/M2 | WEIGHT: 271.63 LBS | DIASTOLIC BLOOD PRESSURE: 66 MMHG | SYSTOLIC BLOOD PRESSURE: 94 MMHG | RESPIRATION RATE: 16 BRPM

## 2023-08-16 DIAGNOSIS — N32.81 OAB (OVERACTIVE BLADDER): Primary | ICD-10-CM

## 2023-08-16 PROCEDURE — 3044F PR MOST RECENT HEMOGLOBIN A1C LEVEL <7.0%: ICD-10-PCS | Mod: HCNC,CPTII,S$GLB, | Performed by: UROLOGY

## 2023-08-16 PROCEDURE — 3061F NEG MICROALBUMINURIA REV: CPT | Mod: HCNC,CPTII,S$GLB, | Performed by: UROLOGY

## 2023-08-16 PROCEDURE — 1159F MED LIST DOCD IN RCRD: CPT | Mod: HCNC,CPTII,S$GLB, | Performed by: UROLOGY

## 2023-08-16 PROCEDURE — 99213 OFFICE O/P EST LOW 20 MIN: CPT | Mod: HCNC,S$GLB,, | Performed by: UROLOGY

## 2023-08-16 PROCEDURE — 3008F PR BODY MASS INDEX (BMI) DOCUMENTED: ICD-10-PCS | Mod: HCNC,CPTII,S$GLB, | Performed by: UROLOGY

## 2023-08-16 PROCEDURE — 3074F PR MOST RECENT SYSTOLIC BLOOD PRESSURE < 130 MM HG: ICD-10-PCS | Mod: HCNC,CPTII,S$GLB, | Performed by: UROLOGY

## 2023-08-16 PROCEDURE — 1160F RVW MEDS BY RX/DR IN RCRD: CPT | Mod: HCNC,CPTII,S$GLB, | Performed by: UROLOGY

## 2023-08-16 PROCEDURE — 3078F PR MOST RECENT DIASTOLIC BLOOD PRESSURE < 80 MM HG: ICD-10-PCS | Mod: HCNC,CPTII,S$GLB, | Performed by: UROLOGY

## 2023-08-16 PROCEDURE — 3008F BODY MASS INDEX DOCD: CPT | Mod: HCNC,CPTII,S$GLB, | Performed by: UROLOGY

## 2023-08-16 PROCEDURE — 3078F DIAST BP <80 MM HG: CPT | Mod: HCNC,CPTII,S$GLB, | Performed by: UROLOGY

## 2023-08-16 PROCEDURE — 99213 PR OFFICE/OUTPT VISIT, EST, LEVL III, 20-29 MIN: ICD-10-PCS | Mod: HCNC,S$GLB,, | Performed by: UROLOGY

## 2023-08-16 PROCEDURE — 99999 PR PBB SHADOW E&M-EST. PATIENT-LVL III: ICD-10-PCS | Mod: PBBFAC,HCNC,, | Performed by: UROLOGY

## 2023-08-16 PROCEDURE — 99999 PR PBB SHADOW E&M-EST. PATIENT-LVL III: CPT | Mod: PBBFAC,HCNC,, | Performed by: UROLOGY

## 2023-08-16 PROCEDURE — 3074F SYST BP LT 130 MM HG: CPT | Mod: HCNC,CPTII,S$GLB, | Performed by: UROLOGY

## 2023-08-16 PROCEDURE — 1160F PR REVIEW ALL MEDS BY PRESCRIBER/CLIN PHARMACIST DOCUMENTED: ICD-10-PCS | Mod: HCNC,CPTII,S$GLB, | Performed by: UROLOGY

## 2023-08-16 PROCEDURE — 1159F PR MEDICATION LIST DOCUMENTED IN MEDICAL RECORD: ICD-10-PCS | Mod: HCNC,CPTII,S$GLB, | Performed by: UROLOGY

## 2023-08-16 PROCEDURE — 3066F PR DOCUMENTATION OF TREATMENT FOR NEPHROPATHY: ICD-10-PCS | Mod: HCNC,CPTII,S$GLB, | Performed by: UROLOGY

## 2023-08-16 PROCEDURE — 3044F HG A1C LEVEL LT 7.0%: CPT | Mod: HCNC,CPTII,S$GLB, | Performed by: UROLOGY

## 2023-08-16 PROCEDURE — 3066F NEPHROPATHY DOC TX: CPT | Mod: HCNC,CPTII,S$GLB, | Performed by: UROLOGY

## 2023-08-16 PROCEDURE — 3061F PR NEG MICROALBUMINURIA RESULT DOCUMENTED/REVIEW: ICD-10-PCS | Mod: HCNC,CPTII,S$GLB, | Performed by: UROLOGY

## 2023-08-16 NOTE — PROGRESS NOTES
Chief Complaint:  OAB, renal cysts    HPI:   08/162023 - returns today for follow-up, has been taking the VESIcare as prescribed and notes a significant improvement in her incontinence, she now notes that she gets a strong an uncomfortable need to urinate only rarely and has a rush to go the bathroom with a sudden strong urge to urinate rarely, does have some dry mouth, denies any gross hematuria or dysuria    06/14/2023 - patient returns today for follow-up, has been taking the oxybutynin as prescribed but has not really noticed an improvement, she still notes urgency and urge incontinence, denies any side effects, denies gross hematuria    04/19/2023 - 42 yo female that presents for renal cysts.  Patient had an abdominal ultrasound obtained which revealed a large left renal cyst as well as cysts with possible solid components.  She had a follow-up MRI with and without contrast which revealed that these did not have any enhancement and were completely benign.  Patient also notes issues with urinary incontinence and overactive bladder.  She also notes urge incontinence as well as stress incontinence.  She notes that her urgency and urge incontinence are more bothersome by far.  She wears 2-5 pads per day.  She also has nocturia x2.  She has been on oxybutynin 5 mg in the past but does not think that she is currently taking this.  She does think that it helped though.  She denies any dysuria or gross hematuria.  She does have a family history of prostate cancer in her maternal grandfather.    PMH:  Past Medical History:   Diagnosis Date    Anxiety     Asthma 11/8/2022    Breast cancer     Chest pain     Chest pain 07/02/2021    Cholecystitis without calculus     Decreased ROM of left shoulder 02/15/2022    Diabetes mellitus     Dizziness     Elevated C-reactive protein (CRP)     Essential hypertension     Fall 10/11/2021    Memory change     EVERARDO (obstructive sleep apnea)     Osteoarthritis     Other specified  disorders of thyroid     Pre-diabetes 04/15/2021    Chronic, Stable, cont metformin    Screening mammogram, encounter for 04/15/2021    Shoulder injury     SOB (shortness of breath)     Stroke     Thyroiditis     Tingling of left upper extremity 10/29/2021    Vision disturbance 04/30/2021    Weakness of shoulder 02/15/2022       PSH:  Past Surgical History:   Procedure Laterality Date    ARTHROSCOPIC REPAIR OF ROTATOR CUFF OF SHOULDER Left 1/27/2022    Procedure: REPAIR, ROTATOR CUFF, ARTHROSCOPIC;  Surgeon: Francisco Mathews MD;  Location: [facility] OR;  Service: Orthopedics;  Laterality: Left;    ARTHROSCOPY OF SHOULDER WITH DECOMPRESSION OF SUBACROMIAL SPACE Left 1/27/2022    Procedure: ARTHROSCOPY, SHOULDER, WITH SUBACROMIAL SPACE DECOMPRESSION;  Surgeon: Francisco Mathews MD;  Location: [facility] OR;  Service: Orthopedics;  Laterality: Left;    BREAST BIOPSY      COLONOSCOPY N/A 5/31/2022    Procedure: COLONOSCOPY;  Surgeon: Km Odom MD;  Location: [facility];  Service: Endoscopy;  Laterality: N/A;    ESOPHAGOGASTRODUODENOSCOPY N/A 5/31/2022    Procedure: EGD (ESOPHAGOGASTRODUODENOSCOPY) ;  Surgeon: Km Odom MD;  Location: [facility];  Service: Endoscopy;  Laterality: N/A;    FIXATION OF TENDON Left 1/27/2022    Procedure: FIXATION, TENDON;  Surgeon: Francisco Mathews MD;  Location: [facility];  Service: Orthopedics;  Laterality: Left;    HYSTERECTOMY      INJECTION OF ANESTHETIC AGENT INTO SACROILIAC JOINT Bilateral 7/29/2022    Procedure: Bilateral SIJ +  Bilateral GT Bursa Injection RN IV Sedation;  Surgeon: Bisi Cochran MD;  Location: [facility] PAIN MGT;  Service: Pain Management;  Laterality: Bilateral;    INJECTION OF JOINT Left 11/12/2021    Procedure: Left suprascapular and axillary injection with local;  Surgeon: Bisi Cochran MD;  Location: [facility] PAIN MGT;  Service: Pain Management;  Laterality: Left;    INJECTION OF JOINT Bilateral 7/29/2022    Procedure: Bilateral SIJ +  Bilateral GT Bursa Injection RN IV Sedation;  Surgeon: Bisi Cochran MD;  Location: [facility] PAIN MGT;  Service: Pain Management;  Laterality: Bilateral;    INSERTION OF BREAST TISSUE EXPANDER Left 11/15/2022    Procedure: INSERTION, TISSUE EXPANDER, BREAST;  Surgeon: Collin Pinto MD;  Location: [facility] OR;  Service: Plastics;  Laterality: Left;    MASTECTOMY      ROBOT-ASSISTED CHOLECYSTECTOMY USING DA ROBERTA XI N/A 8/29/2022    Procedure: XI ROBOTIC CHOLECYSTECTOMY;  Surgeon: Raeann Bhandari DO;  Location: [facility] OR;  Service: General;  Laterality: N/A;    TISSUE EXPANDER REMOVAL Left 1/4/2023    Procedure: REMOVAL, TISSUE EXPANDER;  Surgeon: Collin Pinto MD;  Location: [facility] OR;  Service: Plastics;  Laterality: Left;    TOTAL REDUCTION MAMMOPLASTY Right 11/15/2022    Procedure: MAMMOPLASTY, REDUCTION;  Surgeon: Collin Pinto MD;  Location: [facility] OR;  Service: Plastics;  Laterality: Right;       Family History:  Family History   Problem Relation Age of Onset    Colon cancer Mother 53        partial colectomy    Stroke Mother     Heart disease Mother     Breast cancer Sister     Diabetes Sister     Throat cancer Maternal Aunt         smoking hx    Lung cancer Maternal Uncle         hx smoking    No Known Problems Maternal Grandmother     Prostate cancer Maternal Grandfather 70    Alcohol abuse Maternal Grandfather     No Known Problems Paternal Grandmother     No Known Problems Paternal Grandfather     Diabetes Brother     Diabetes Brother     Diabetes Brother     Breast cancer Other 37        chemotherapy, planning for BL mastectomy    No Known Problems Other        Social History:  Social History     Tobacco Use    Smoking status: Never     Passive exposure: Current    Smokeless tobacco: Never   Substance Use Topics    Alcohol use: Not Currently    Drug use: Not Currently     Types: Marijuana     Comment: none currently        Review of Systems:  General: No fever, chills  Skin: No rashes  Chest:  Denies  cough and sputum production  Heart: Denies chest pain  Resp: Denies dyspnea  Abdomen: Denies diarrhea, abdominal pain, hematemesis, or blood in stool.  Musculoskeletal: No joint stiffness or swelling. Denies back pain.  : see HPI  Neuro: no dizziness or weakness    Allergies:  Lisinopril and Lurasidone    Medications:    Current Outpatient Medications:     albuterol (PROVENTIL/VENTOLIN HFA) 90 mcg/actuation inhaler, Inhale 2 puffs into the lungs every 4 (four) hours as needed for Wheezing. Rescue, Disp: 8.5 g, Rfl: 1    ammonium lactate 12 % Crea, Apply 1 Act topically 2 (two) times daily. To feet., Disp: 140 g, Rfl: 2    ascorbic acid, vitamin C, (VITAMIN C) 1000 MG tablet, Take 1 tablet (1,000 mg total) by mouth once daily., Disp: 90 tablet, Rfl: 3    aspirin (ECOTRIN) 81 MG EC tablet, Take 1 tablet (81 mg total) by mouth once daily., Disp: 360 tablet, Rfl: 1    budesonide-formoterol 160-4.5 mcg (SYMBICORT) 160-4.5 mcg/actuation HFAA, Inhale 2 puffs into the lungs every 12 (twelve) hours. Controller, Disp: 10.2 g, Rfl: 11    busPIRone (BUSPAR) 15 MG tablet, Take 1 tablet (15 mg total) by mouth 3 (three) times daily., Disp: 270 tablet, Rfl: 1    celecoxib (CELEBREX) 200 MG capsule, Take 1 capsule (200 mg total) by mouth 2 (two) times daily., Disp: 28 capsule, Rfl: 0    cephALEXin (KEFLEX) 500 MG capsule, TAKE 1 CAPSULE BY MOUTH EVERY 6 HOURS FOR 14 DAYS, Disp: 56 capsule, Rfl: 0    diclofenac (VOLTAREN) 75 MG EC tablet, Take 1 tablet (75 mg total) by mouth 2 (two) times daily as needed., Disp: 60 tablet, Rfl: 2    EScitalopram oxalate (LEXAPRO) 20 MG tablet, Take 1 tablet (20 mg total) by mouth once daily., Disp: 90 tablet, Rfl: 1    EUTHYROX 50 mcg tablet, Take 1 tablet (50 mcg total) by mouth every morning., Disp: 90 tablet, Rfl: 1    gabapentin (NEURONTIN) 300 MG capsule, Take 1 capsule (300 mg total) by mouth 2 (two) times daily., Disp: 180 capsule, Rfl: 1    hydroCHLOROthiazide (HYDRODIURIL) 25 MG tablet,  Take 1 tablet (25 mg total) by mouth daily as needed (edema, swelling)., Disp: 90 tablet, Rfl: 1    hydrocortisone 0.5 % cream, Apply topically 2 (two) times daily., Disp: , Rfl:     LATUDA 20 mg Tab tablet, Take 20 mg by mouth once daily., Disp: , Rfl:     linaCLOtide (LINZESS) 145 mcg Cap capsule, Take 1 capsule (145 mcg total) by mouth once daily., Disp: 90 capsule, Rfl: 1    magnesium oxide (MAG-OX) 400 mg (241.3 mg magnesium) tablet, Take 1 tablet (400 mg total) by mouth once daily., Disp: 90 tablet, Rfl: 1    metFORMIN (GLUCOPHAGE-XR) 500 MG ER 24hr tablet, Take 1 tablet (500 mg total) by mouth 2 (two) times daily with meals., Disp: 180 tablet, Rfl: 1    multivit with min-folic acid (ADULT MULTIVITAMIN GUMMIES) 200 mcg Chew, Take 1 tablet by mouth once daily., Disp: 90 tablet, Rfl: 3    olopatadine (PATANOL) 0.1 % ophthalmic solution, , Disp: , Rfl:     oxybutynin (DITROPAN-XL) 5 MG TR24, Take 5 mg by mouth., Disp: , Rfl:     pantoprazole (PROTONIX) 40 MG tablet, Take 1 tablet (40 mg total) by mouth once daily., Disp: 90 tablet, Rfl: 1    potassium chloride SA (K-DUR,KLOR-CON) 20 MEQ tablet, Take 1 tablet (20 mEq total) by mouth once daily., Disp: 8 tablet, Rfl: 0    pravastatin (PRAVACHOL) 40 MG tablet, Take 1 tablet (40 mg total) by mouth every evening., Disp: 90 tablet, Rfl: 1    QUEtiapine (SEROQUEL) 25 MG Tab, Take 25 mg by mouth every evening., Disp: , Rfl:     solifenacin (VESICARE) 10 MG tablet, Take 1 tablet (10 mg total) by mouth once daily., Disp: 30 tablet, Rfl: 11    tirzepatide 10 mg/0.5 mL PnIj, Inject 10 mg into the skin every 7 days., Disp: 4 pen , Rfl: 5    traZODone (DESYREL) 150 MG tablet, Take 1 tablet (150 mg total) by mouth every evening., Disp: 90 tablet, Rfl: 1  No current facility-administered medications for this visit.    Facility-Administered Medications Ordered in Other Visits:     lactated ringers infusion, , Intravenous, Continuous, Maty Thompson MD, Last Rate: 10 mL/hr at  01/27/22 0636, New Bag at 01/04/23 1603    Physical Exam:  Vitals:    08/16/23 0940   BP: 94/66   Pulse: 73   Resp: 16     Body mass index is 42.54 kg/m².  General: awake, alert, cooperative  Head: NC/AT  Ears: external ears normal  Eyes: sclera normal  Lungs: normal inspiration, NAD  Heart: well-perfused  Skin: The skin is warm and dry  Ext: No c/c/e.  Neuro: grossly intact, AOx3    RADIOLOGY:  MRI ABDOMEN W WO CONTRAST  03/29/2023     CLINICAL HISTORY:  Renal mass.  Other specified disorders of kidney and ureter     TECHNIQUE:  Multiplanar, multisequence MR imaging was performed through the abdomen before and after the intravenous administration of 10 cc Gadavist contrast.     COMPARISON:  Abdominal ultrasound on 02/15/2023     FINDINGS:  Benign bilateral renal cysts measuring up to 7.7 cm at the lower pole the left kidney and 2.2 cm at the upper pole.  The previously described hypoechoic complicated cysts in the left kidney do not demonstrate internal enhancement and are consistent with benign Bosniak 2 category cysts.  No hydronephrosis or perinephric stranding.     Enlarged, fatty liver.  No liver lesions.  The hepatic and portal veins are patent.     Aortic caliber is within normal limits.  The pancreas, adrenal glands, and visualized bowel is within normal limits.  Small fat containing umbilical hernia.  There is a 3 cm hypointense lesion in the dome of the spleen that demonstrates homogeneous enhancement similar to the adjacent splenic parenchyma characteristic of a benign hamartoma.     There is an enlarged left common iliac lymph node measuring up to 3.7 x 2.1 cm.     Impression:     Benign bilateral renal cysts.     Mildly enlarged, fatty liver.     Enlarged, stable left common iliac lymph node that was present on the previous PET.    LABS:  I personally reviewed the following lab values:  Lab Results   Component Value Date    WBC 4.85 08/09/2023    HGB 13.5 08/09/2023    HCT 40.4 08/09/2023      08/09/2023     08/09/2023    K 3.1 (L) 08/09/2023     08/09/2023    CREATININE 1.1 08/09/2023    BUN 10 08/09/2023    CO2 27 08/09/2023    TSH 1.209 06/23/2023    HGBA1C 5.3 06/23/2023    CHOL 148 07/19/2022    TRIG 97 07/19/2022    HDL 44 07/19/2022    ALT 21 08/09/2023    AST 22 08/09/2023       URINALYSIS:  Urinalysis obtained in clinic today specific gravity 1.025 pH six, negative for all parameters      Assessment/Plan:   Sangeetha June is a 43 y.o. female with:    OAB - refractory to ditropan but responding to vesicare 10mg, f/u 4 months    Renal cysts - large left renal cyst but all of her cysts are completely benign, no need for further imaging in relation to her renal cysts      Issac Pablo MD  Urology

## 2023-08-22 ENCOUNTER — CLINICAL SUPPORT (OUTPATIENT)
Dept: DIABETES | Facility: CLINIC | Age: 43
End: 2023-08-22
Payer: MEDICARE

## 2023-08-22 DIAGNOSIS — E11.69 TYPE 2 DIABETES MELLITUS WITH OTHER SPECIFIED COMPLICATION, WITH LONG-TERM CURRENT USE OF INSULIN: Primary | ICD-10-CM

## 2023-08-22 DIAGNOSIS — Z79.4 TYPE 2 DIABETES MELLITUS WITH OTHER SPECIFIED COMPLICATION, WITH LONG-TERM CURRENT USE OF INSULIN: Primary | ICD-10-CM

## 2023-08-22 PROCEDURE — G0108 DIAB MANAGE TRN  PER INDIV: HCPCS | Mod: HCNC,95,, | Performed by: DIETITIAN, REGISTERED

## 2023-08-22 PROCEDURE — G0108 PR DIAB MANAGE TRN  PER INDIV: ICD-10-PCS | Mod: HCNC,95,, | Performed by: DIETITIAN, REGISTERED

## 2023-08-22 NOTE — PROGRESS NOTES
Diabetes Care Specialist Virtual Visit Note     The patient location is: home in [place]  The chief complaint leading to consultation is: Diabetes  Visit type: audiovisual  Total time spent with patient: 30 min   Each patient to whom he or she provides medical services by telemedicine is:  (1) informed of the relationship between the physician and patient and the respective role of any other health care provider with respect to management of the patient; and (2) notified that he or she may decline to receive medical services by telemedicine and may withdraw from such care at any time.      Diabetes Care Specialist Progress Note  Author: Alexandra Trent RD, CDE  Date: 8/22/2023    Program Intake  Reason for Diabetes Program Visit:: Intervention (DM referral 4/12/23 Shavonne Nuñez, FNP E11.69,Z79.4)  Type of Intervention:: Individual  Education: Self-Management Skill Review, Nutrition and Meal Planning  Current diabetes risk level:: high  In the last 12 months, have you::  (pt denies recent ER/hosp visits)    Lab Results   Component Value Date    HGBA1C 5.3 06/23/2023       Clinical    Problem Review  Active comorbidities affecting diabetes self-care.:  (HTN, HLD, CVA, s/p breast cancer, Hypothyroidism, Obesity by BMI, GERD)    Clinical Assessment  Current Diabetes Treatment: Diet, Exercise (Mounjaro 10mg weekly. Metformin XR 500mg 1tab twice daily.)  Have you ever experienced hypoglycemia (low blood sugar)?: no  Are you able to tell when your blood sugar is low?: No    Medication Information  How many days a week do you miss your medications?: Never  Do you sometimes have difficulty refilling your medications?: No  Medication adherence impacting ability to self-manage diabetes?: No    Labs  Type of Regular Lab Work: A1c, Cholesterol, Microalbumin, BMP  Where do you get your labs drawn?: [facility]  Lab Compliance Barriers: No    Nutritional Status  Diet:  (Pt reports irregular meal patterns. Excess carb from  grazing on fruits. No excess sat fat or sodium reported. No use MR. Inadequate protein.)  Meal Plan 24 Hour Recall - Breakfast: blueberries  Meal Plan 24 Hour Recall - Lunch: 1/2 liliadwich - ham, Syriac dressing on white  Meal Plan 24 Hour Recall - Dinner: none  Meal Plan 24 Hour Recall - Snack: berries, cherries, oranges; pat:  Was weight loss intentional or unintentional?: Intentional (21lbs since 3/23)  Current nutritional status an area of need that is impacting patient's ability to self-manage diabetes?: Yes    Diabetes Self-Management Skills Assessment    Nutrition/Healthy Eating  Challenges to healthy eating::  (irregular meal patterns, inadequate protein, grazing on fruits)  Method of carbohydrate measurement:: no method  Patient can identify foods that impact blood sugar.: yes  Patient-identified foods:: starches (bread, pasta, rice, cereal), starchy vegetables (corn, peas, beans), sweets, soda, fruit/fruit juice  Nutrition/Healthy Eating Skills Assessment Completed:: Yes  Assessment indicates:: Instruction Needed  Area of need?: Yes    Physical Activity/Exercise  Patient's daily activity level::  (None structured; some light indoor walking)  Reasons for not exercising::  (Patient reports ability to walk short distances. Pt has concerns related to feeling dizzy during activity and takes freq rest breaks; she plans to discuss w/ pcp.)  Patient can identify forms of physical activity.: yes  Stated forms of physical activity:: any movement performed by muscles that uses energy  Patient can identify reasons why exercise/physical activity is important in diabetes management.: no  Physical Activity/Exercise Skills Assessment Completed: : Yes  Assessment indicates:: Instruction Needed  Area of need?: Yes    Medications  Patient is able to describe current diabetes management routine.: yes  Diabetes management routine:: injectable medications, oral medications  Patient is able to identify current diabetes  medications, dosages, and appropriate timing of medications.: yes  Patient understands the purpose of the medications taken for diabetes.: yes  Patient reports problems or concerns with current medication regimen.: no  Medication Skills Assessment Completed:: Yes  Assessment indicates:: Adequate understanding  Area of need?: No    Home Blood Glucose Monitoring  Patient states that blood sugar is checked at home daily.: yes  Monitoring Method:: home glucometer  Home glucometer meter type:: unknown  When you check what is your typical blood sugar range? : per recall, random BG avg 117-198.  Blood glucose logs:: yes, encouraged to bring logs to provider visits  Home Blood Glucose Monitoring Skills Assessment Completed: : Yes  Assessment indicates:: Instruction Needed  Area of need?: Yes    Assessment Summary and Plan  Based on today's diabetes care assessment, the following areas of need were identified:      Social 4/19/2023   Support No   Access to Mass Media/Tech No   Cognitive/Behavioral Health No   Culture/[employer]ist No   Communication No   Health Literacy No      Clinical 8/22/2023   Medication Adherence No   Lab Compliance No   Nutritional Status Yes -see care plan      Diabetes Self-Management Skills 8/22/2023   Nutrition/Healthy Eating Yes -see care plan   Physical Activity/Exercise Yes -see care plan   Medication No   Home Blood Glucose Monitoring Yes -Discussed appropriate timing and frequency to SMBG, education on parameters on when to notify provider and advised patient to bring logs to all appts with PCP/Endocrinologist/Diabetes Care Specialist.      Today's interventions were provided through individual discussion, instruction, and written materials were provided.    Patient verbalized understanding of instruction and written materials.  Pt was able to return back demonstration of instructions today. Patient understood key points, needs reinforcement and further instruction.     Diabetes Self-Management  Care Plan:  Today's Diabetes Self-Management Care Plan was developed with Sangeetha's input. Sangeetha has agreed to work toward the following goal(s) to improve his/her overall diabetes control.      Care Plan: Diabetes Management   Updates made since 7/23/2023 12:00 AM        Problem: Healthy Eating         Goal: Eat 3 meals daily - manage carb 30-45grams/meal, 5-15grams/snack.    Start Date: 4/19/2023   Expected End Date: 4/12/2024   This Visit's Progress: No change   Recent Progress: No change   Priority: High   Barriers: Knowledge deficit; Cognitive Deficits   Note:    Discussed role meal spacing and carb/pro balance w/ strategies to support. Reviewed need for adequate oral intake and nutrition to support diabetes and overall health. Instructed on specific meal ideas using pt preferences. Reviewed role  shake/entree w/ brands to try.     Pt agrees to set meal time phone reminders and pack meals since she frequently visits family.       Problem: Physical Activity and Exercise         Goal: Patient agrees to increase physical activity to a goal of 150min/wk.    Start Date: 6/19/2023   Expected End Date: 4/12/2024   This Visit's Progress: No change   Recent Progress: No change   Priority: Medium   Barriers: Other (comments)   Note:    Discussed role exercise on diabetes control and overall health. Discussed strategies to improve indoor, chair type exercise. Reviewed ADA goals and split sessions to build upon progress.     Encouraged pt to fu w/ pcp regarding dizziness during activity and to continue take frequent rest breaks. Also, reviewed signs/symptoms hypoglycemia, prevention and treatment protocol. Pt verbalized understanding.              Follow Up Plan   Follow up in about 4 weeks (around 9/19/2023).  -review BG logs  -eval goals    Today's care plan and follow up schedule was discussed with patient.  Sangeetha verbalized understanding of the care plan, goals, and agrees to follow up plan.        The patient was  encouraged to communicate with his/her health care provider/physician and care team regarding his/her condition(s) and treatment.  I provided the patient with my contact information today and encouraged to contact me via phone or [facility] Patient Portal as needed.     Length of Visit   Total Time: 30 Minutes

## 2023-08-31 ENCOUNTER — OFFICE VISIT (OUTPATIENT)
Dept: SPORTS MEDICINE | Facility: CLINIC | Age: 43
End: 2023-08-31
Payer: MEDICARE

## 2023-08-31 DIAGNOSIS — G89.29 CHRONIC BILATERAL LOW BACK PAIN WITHOUT SCIATICA: ICD-10-CM

## 2023-08-31 DIAGNOSIS — M54.50 CHRONIC BILATERAL LOW BACK PAIN WITHOUT SCIATICA: ICD-10-CM

## 2023-08-31 DIAGNOSIS — M47.816 LUMBAR FACET ARTHROPATHY: ICD-10-CM

## 2023-08-31 DIAGNOSIS — G89.29 BILATERAL CHRONIC KNEE PAIN: Primary | ICD-10-CM

## 2023-08-31 DIAGNOSIS — M47.816 LUMBAR SPONDYLOSIS: ICD-10-CM

## 2023-08-31 DIAGNOSIS — M25.562 BILATERAL CHRONIC KNEE PAIN: Primary | ICD-10-CM

## 2023-08-31 DIAGNOSIS — M17.0 PRIMARY OSTEOARTHRITIS OF BOTH KNEES: ICD-10-CM

## 2023-08-31 DIAGNOSIS — M51.36 LUMBAR DEGENERATIVE DISC DISEASE: ICD-10-CM

## 2023-08-31 DIAGNOSIS — M25.561 BILATERAL CHRONIC KNEE PAIN: Primary | ICD-10-CM

## 2023-08-31 DIAGNOSIS — M22.2X1 PATELLOFEMORAL PAIN SYNDROME OF BOTH KNEES: ICD-10-CM

## 2023-08-31 DIAGNOSIS — M22.2X2 PATELLOFEMORAL PAIN SYNDROME OF BOTH KNEES: ICD-10-CM

## 2023-08-31 PROCEDURE — 99999 PR PBB SHADOW E&M-EST. PATIENT-LVL III: CPT | Mod: PBBFAC,HCNC,, | Performed by: STUDENT IN AN ORGANIZED HEALTH CARE EDUCATION/TRAINING PROGRAM

## 2023-08-31 PROCEDURE — 1160F RVW MEDS BY RX/DR IN RCRD: CPT | Mod: HCNC,CPTII,S$GLB, | Performed by: STUDENT IN AN ORGANIZED HEALTH CARE EDUCATION/TRAINING PROGRAM

## 2023-08-31 PROCEDURE — 3066F NEPHROPATHY DOC TX: CPT | Mod: HCNC,CPTII,S$GLB, | Performed by: STUDENT IN AN ORGANIZED HEALTH CARE EDUCATION/TRAINING PROGRAM

## 2023-08-31 PROCEDURE — 20610 LARGE JOINT ASPIRATION/INJECTION: BILATERAL KNEE: ICD-10-PCS | Mod: 50,HCNC,S$GLB, | Performed by: STUDENT IN AN ORGANIZED HEALTH CARE EDUCATION/TRAINING PROGRAM

## 2023-08-31 PROCEDURE — 99999 PR PBB SHADOW E&M-EST. PATIENT-LVL III: ICD-10-PCS | Mod: PBBFAC,HCNC,, | Performed by: STUDENT IN AN ORGANIZED HEALTH CARE EDUCATION/TRAINING PROGRAM

## 2023-08-31 PROCEDURE — 20610 DRAIN/INJ JOINT/BURSA W/O US: CPT | Mod: 50,HCNC,S$GLB, | Performed by: STUDENT IN AN ORGANIZED HEALTH CARE EDUCATION/TRAINING PROGRAM

## 2023-08-31 PROCEDURE — 3061F PR NEG MICROALBUMINURIA RESULT DOCUMENTED/REVIEW: ICD-10-PCS | Mod: HCNC,CPTII,S$GLB, | Performed by: STUDENT IN AN ORGANIZED HEALTH CARE EDUCATION/TRAINING PROGRAM

## 2023-08-31 PROCEDURE — 99213 PR OFFICE/OUTPT VISIT, EST, LEVL III, 20-29 MIN: ICD-10-PCS | Mod: 25,HCNC,S$GLB, | Performed by: STUDENT IN AN ORGANIZED HEALTH CARE EDUCATION/TRAINING PROGRAM

## 2023-08-31 PROCEDURE — 1160F PR REVIEW ALL MEDS BY PRESCRIBER/CLIN PHARMACIST DOCUMENTED: ICD-10-PCS | Mod: HCNC,CPTII,S$GLB, | Performed by: STUDENT IN AN ORGANIZED HEALTH CARE EDUCATION/TRAINING PROGRAM

## 2023-08-31 PROCEDURE — 99213 OFFICE O/P EST LOW 20 MIN: CPT | Mod: 25,HCNC,S$GLB, | Performed by: STUDENT IN AN ORGANIZED HEALTH CARE EDUCATION/TRAINING PROGRAM

## 2023-08-31 PROCEDURE — 3044F HG A1C LEVEL LT 7.0%: CPT | Mod: HCNC,CPTII,S$GLB, | Performed by: STUDENT IN AN ORGANIZED HEALTH CARE EDUCATION/TRAINING PROGRAM

## 2023-08-31 PROCEDURE — 1159F MED LIST DOCD IN RCRD: CPT | Mod: HCNC,CPTII,S$GLB, | Performed by: STUDENT IN AN ORGANIZED HEALTH CARE EDUCATION/TRAINING PROGRAM

## 2023-08-31 PROCEDURE — 3044F PR MOST RECENT HEMOGLOBIN A1C LEVEL <7.0%: ICD-10-PCS | Mod: HCNC,CPTII,S$GLB, | Performed by: STUDENT IN AN ORGANIZED HEALTH CARE EDUCATION/TRAINING PROGRAM

## 2023-08-31 PROCEDURE — 3061F NEG MICROALBUMINURIA REV: CPT | Mod: HCNC,CPTII,S$GLB, | Performed by: STUDENT IN AN ORGANIZED HEALTH CARE EDUCATION/TRAINING PROGRAM

## 2023-08-31 PROCEDURE — 1159F PR MEDICATION LIST DOCUMENTED IN MEDICAL RECORD: ICD-10-PCS | Mod: HCNC,CPTII,S$GLB, | Performed by: STUDENT IN AN ORGANIZED HEALTH CARE EDUCATION/TRAINING PROGRAM

## 2023-08-31 PROCEDURE — 3066F PR DOCUMENTATION OF TREATMENT FOR NEPHROPATHY: ICD-10-PCS | Mod: HCNC,CPTII,S$GLB, | Performed by: STUDENT IN AN ORGANIZED HEALTH CARE EDUCATION/TRAINING PROGRAM

## 2023-08-31 RX ORDER — TRIAMCINOLONE ACETONIDE 40 MG/ML
20 INJECTION, SUSPENSION INTRA-ARTICULAR; INTRAMUSCULAR
Status: DISCONTINUED | OUTPATIENT
Start: 2023-08-31 | End: 2023-08-31 | Stop reason: HOSPADM

## 2023-08-31 RX ADMIN — TRIAMCINOLONE ACETONIDE 20 MG: 40 INJECTION, SUSPENSION INTRA-ARTICULAR; INTRAMUSCULAR at 10:08

## 2023-08-31 NOTE — PROGRESS NOTES
Patient ID: Sangeetha June  YOB: 1980  MRN: [id number]    Chief Complaint: Pain of the Right Knee and Pain of the Left Knee    Referred By: Milvia Shafer NP    Occupation: Data Unavailable      History of Present Illness: Sangeetha June is a right-hand dominant 43 y.o. female who presents today with Pain of the Right Knee and Pain of the Left Knee    She was initially evaluated in our office on 5/23/23.   She was diagnosed with bilateral knee OA and PFPS and treated with bilateral CSI and referral to PT at [facility].  She reports excellent relief for 3 months before her pain returned.  She was never contacted by PT.    HPI 5/23/23:  Pain is been present for several years, but went away when she was undergoing chemotherapy for breast cancer.  She finished chemotherapy in 2020.  She noticed over the past month or so, steady recurrence of bilateral knee pain, now with left more significant than the right.  Reports the pain radiates down to bilateral calves.  Associated with numbness, swelling, and overall feeling very tight throughout the knee.  Describes the pain is aching, throbbing, shooting, and stinging.  Pain is constant.  Worse with walking, going up and down stairs, and after riding in a car.  He has tried ibuprofen and Tylenol without much improvement.  Currently 5/10 severity.    Past Medical History:   Past Medical History:   Diagnosis Date    Anxiety     Asthma 11/8/2022    Breast cancer     Chest pain     Chest pain 07/02/2021    Cholecystitis without calculus     Decreased ROM of left shoulder 02/15/2022    Diabetes mellitus     Dizziness     Elevated C-reactive protein (CRP)     Essential hypertension     Fall 10/11/2021    Memory change     EVERARDO (obstructive sleep apnea)     Osteoarthritis     Other specified disorders of thyroid     Pre-diabetes 04/15/2021    Chronic, Stable, cont metformin    Screening mammogram, encounter for 04/15/2021    Shoulder injury     SOB  (shortness of breath)     Stroke     Thyroiditis     Tingling of left upper extremity 10/29/2021    Vision disturbance 04/30/2021    Weakness of shoulder 02/15/2022     Past Surgical History:   Procedure Laterality Date    ARTHROSCOPIC REPAIR OF ROTATOR CUFF OF SHOULDER Left 1/27/2022    Procedure: REPAIR, ROTATOR CUFF, ARTHROSCOPIC;  Surgeon: Francisco Mathews MD;  Location: [facility];  Service: Orthopedics;  Laterality: Left;    ARTHROSCOPY OF SHOULDER WITH DECOMPRESSION OF SUBACROMIAL SPACE Left 1/27/2022    Procedure: ARTHROSCOPY, SHOULDER, WITH SUBACROMIAL SPACE DECOMPRESSION;  Surgeon: Francisco Mathews MD;  Location: [facility] OR;  Service: Orthopedics;  Laterality: Left;    BREAST BIOPSY      COLONOSCOPY N/A 5/31/2022    Procedure: COLONOSCOPY;  Surgeon: Km Odom MD;  Location: [facility];  Service: Endoscopy;  Laterality: N/A;    ESOPHAGOGASTRODUODENOSCOPY N/A 5/31/2022    Procedure: EGD (ESOPHAGOGASTRODUODENOSCOPY) ;  Surgeon: Km Odom MD;  Location: [facility];  Service: Endoscopy;  Laterality: N/A;    FIXATION OF TENDON Left 1/27/2022    Procedure: FIXATION, TENDON;  Surgeon: Francisco Mathews MD;  Location: [facility];  Service: Orthopedics;  Laterality: Left;    HYSTERECTOMY      INJECTION OF ANESTHETIC AGENT INTO SACROILIAC JOINT Bilateral 7/29/2022    Procedure: Bilateral SIJ +  Bilateral GT Bursa Injection RN IV Sedation;  Surgeon: Bisi Cochran MD;  Location: [facility];  Service: Pain Management;  Laterality: Bilateral;    INJECTION OF JOINT Left 11/12/2021    Procedure: Left suprascapular and axillary injection with local;  Surgeon: Bisi Cochran MD;  Location: [facility];  Service: Pain Management;  Laterality: Left;    INJECTION OF JOINT Bilateral 7/29/2022    Procedure: Bilateral SIJ + Bilateral GT Bursa Injection RN IV Sedation;  Surgeon: Bisi Cochran MD;  Location: [facility];  Service: Pain Management;  Laterality: Bilateral;    INSERTION OF  BREAST TISSUE EXPANDER Left 11/15/2022    Procedure: INSERTION, TISSUE EXPANDER, BREAST;  Surgeon: Collin Pinto MD;  Location: [facility] OR;  Service: Plastics;  Laterality: Left;    MASTECTOMY      ROBOT-ASSISTED CHOLECYSTECTOMY USING DA ROBERTA XI N/A 8/29/2022    Procedure: XI ROBOTIC CHOLECYSTECTOMY;  Surgeon: Raeann Bhandari DO;  Location: [facility] OR;  Service: General;  Laterality: N/A;    TISSUE EXPANDER REMOVAL Left 1/4/2023    Procedure: REMOVAL, TISSUE EXPANDER;  Surgeon: Collin Pinto MD;  Location: [facility] OR;  Service: Plastics;  Laterality: Left;    TOTAL REDUCTION MAMMOPLASTY Right 11/15/2022    Procedure: MAMMOPLASTY, REDUCTION;  Surgeon: Collin Pinto MD;  Location: [facility] OR;  Service: Plastics;  Laterality: Right;     Family History   Problem Relation Age of Onset    Colon cancer Mother 53        partial colectomy    Stroke Mother     Heart disease Mother     Breast cancer Sister     Diabetes Sister     Throat cancer Maternal Aunt         smoking hx    Lung cancer Maternal Uncle         hx smoking    No Known Problems Maternal Grandmother     Prostate cancer Maternal Grandfather 70    Alcohol abuse Maternal Grandfather     No Known Problems Paternal Grandmother     No Known Problems Paternal Grandfather     Diabetes Brother     Diabetes Brother     Diabetes Brother     Breast cancer Other 37        chemotherapy, planning for BL mastectomy    No Known Problems Other      Social History     Socioeconomic History    Marital status:    Tobacco Use    Smoking status: Never     Passive exposure: Current    Smokeless tobacco: Never   Substance and Sexual Activity    Alcohol use: Not Currently    Drug use: Not Currently     Types: Marijuana     Comment: none currently    Sexual activity: Yes     Medication List with Changes/Refills   Current Medications    ALBUTEROL (PROVENTIL/VENTOLIN HFA) 90 MCG/ACTUATION INHALER    Inhale 2 puffs into the lungs every 4 (four) hours as needed for Wheezing. Rescue     AMMONIUM LACTATE 12 % CREA    Apply 1 Act topically 2 (two) times daily. To feet.    ASCORBIC ACID, VITAMIN C, (VITAMIN C) 1000 MG TABLET    Take 1 tablet (1,000 mg total) by mouth once daily.    ASPIRIN (ECOTRIN) 81 MG EC TABLET    Take 1 tablet (81 mg total) by mouth once daily.    BUDESONIDE-FORMOTEROL 160-4.5 MCG (SYMBICORT) 160-4.5 MCG/ACTUATION HFAA    Inhale 2 puffs into the lungs every 12 (twelve) hours. Controller    BUSPIRONE (BUSPAR) 15 MG TABLET    Take 1 tablet (15 mg total) by mouth 3 (three) times daily.    CELECOXIB (CELEBREX) 200 MG CAPSULE    Take 1 capsule (200 mg total) by mouth 2 (two) times daily.    CEPHALEXIN (KEFLEX) 500 MG CAPSULE    TAKE 1 CAPSULE BY MOUTH EVERY 6 HOURS FOR 14 DAYS    DICLOFENAC (VOLTAREN) 75 MG EC TABLET    Take 1 tablet (75 mg total) by mouth 2 (two) times daily as needed.    ESCITALOPRAM OXALATE (LEXAPRO) 20 MG TABLET    Take 1 tablet (20 mg total) by mouth once daily.    EUTHYROX 50 MCG TABLET    Take 1 tablet (50 mcg total) by mouth every morning.    GABAPENTIN (NEURONTIN) 300 MG CAPSULE    Take 1 capsule (300 mg total) by mouth 2 (two) times daily.    HYDROCHLOROTHIAZIDE (HYDRODIURIL) 25 MG TABLET    Take 1 tablet (25 mg total) by mouth daily as needed (edema, swelling).    HYDROCORTISONE 0.5 % CREAM    Apply topically 2 (two) times daily.    LATUDA 20 MG TAB TABLET    Take 20 mg by mouth once daily.    LINACLOTIDE (LINZESS) 145 MCG CAP CAPSULE    Take 1 capsule (145 mcg total) by mouth once daily.    MAGNESIUM OXIDE (MAG-OX) 400 MG (241.3 MG MAGNESIUM) TABLET    Take 1 tablet (400 mg total) by mouth once daily.    METFORMIN (GLUCOPHAGE-XR) 500 MG ER 24HR TABLET    Take 1 tablet (500 mg total) by mouth 2 (two) times daily with meals.    MULTIVIT WITH MIN-FOLIC ACID (ADULT MULTIVITAMIN GUMMIES) 200 MCG CHEW    Take 1 tablet by mouth once daily.    OLOPATADINE (PATANOL) 0.1 % OPHTHALMIC SOLUTION        OXYBUTYNIN (DITROPAN-XL) 5 MG TR24    Take 5 mg by mouth.     PANTOPRAZOLE (PROTONIX) 40 MG TABLET    Take 1 tablet (40 mg total) by mouth once daily.    POTASSIUM CHLORIDE SA (K-DUR,KLOR-CON) 20 MEQ TABLET    Take 1 tablet (20 mEq total) by mouth once daily.    PRAVASTATIN (PRAVACHOL) 40 MG TABLET    Take 1 tablet (40 mg total) by mouth every evening.    QUETIAPINE (SEROQUEL) 25 MG TAB    Take 25 mg by mouth every evening.    SOLIFENACIN (VESICARE) 10 MG TABLET    Take 1 tablet (10 mg total) by mouth once daily.    TIRZEPATIDE 10 MG/0.5 ML PNIJ    Inject 10 mg into the skin every 7 days.    TRAZODONE (DESYREL) 150 MG TABLET    Take 1 tablet (150 mg total) by mouth every evening.     Review of patient's allergies indicates:   Allergen Reactions    Lisinopril     Lurasidone Other (See Comments)     Uncontrolled movement       Physical Exam:   There is no height or weight on file to calculate BMI.    Detailed MSK exam:      Right Knee:  Inspection: No effusion, erythema, or ecchymosis   Palpation tenderness: Inferior patella, Medial and lateral joint lines  Range of motion: 0 deg extension - 120 deg flexion  Strength:  5/5 Extension    5/5 Flexion    5-/5 Hip Abduction  Stability: Negative ACL/Lachman      Negative Posterior Drawer      Negative MCL/Valgus Stress      Negative LCL/Varus Stress   Patella Exam: Positive J-sign    + lateral patellar tracking   Negative Patellar apprehension   Negative Patellar grind   N/V Exam:  Tibial:    Normal sensory (plantar foot)  Normal motor (FHL)    Sup Peroneal:   Normal sensory (dorsal foot)  Normal motor (Peroneals)            Deep Peroneal:   Normal sensory (1st web space)  Normal motor (EHL)    Sural:   Normal sensory (lateral foot)   Saphenous:   Normal sensory (medial lower leg)   Normal pedal pulses, warm and well perfused with capillary refill < 2 sec     Left Knee:  Inspection: No effusion, erythema, or ecchymosis   Palpation tenderness: Inferior patella, Medial and lateral joint lines  Range of motion: 0 deg extension - 120 deg  flexion  Strength:  5/5 Extension    5/5 Flexion    5-/5 Hip Abduction  Stability: Negative ACL/Lachman      Negative Posterior Drawer      Negative MCL/Valgus Stress      Negative LCL/Varus Stress   Patella Exam: Positive J-sign    + lateral patellar tracking   Negative Patellar apprehension   Negative Patellar grind   N/V Exam:  Tibial:    Normal sensory (plantar foot)  Normal motor (FHL)    Sup Peroneal:   Normal sensory (dorsal foot)  Normal motor (Peroneals)            Deep Peroneal:   Normal sensory (1st web space)  Normal motor (EHL)    Sural:   Normal sensory (lateral foot)   Saphenous:   Normal sensory (medial lower leg)   Normal pedal pulses, warm and well perfused with capillary refill < 2 sec     Imaging:     No new imaging today    Patient Instructions   Assessment:  Sangeetha June is a 43 y.o. female with a chief complaint of Pain of the Right Knee and Pain of the Left Knee    Encounter Diagnoses   Name Primary?    Bilateral chronic knee pain Yes    Primary osteoarthritis of both knees     Patellofemoral pain syndrome of both knees     Chronic bilateral low back pain without sciatica     Lumbar spondylosis     Lumbar degenerative disc disease     Lumbar facet arthropathy       Plan:  Recurrence of bilateral knee pain today, we got approx. 2-1/2 months of relief following steroid injection at last visit.  Repeat bilateral knee corticosteroid injections today.    We discussed the proper protocols after the injection such as no submerging pools, baths tubs, or hot tubs for 24 hr.  Showering is okay today.  We also discussed that blood sugars can be elevated after an injection and asked patient to properly check their sugars over the next few days and contact their PCP if there are any concerns.  We discussed red flags such as fevers, chills, red, warm, tender joint at the area of injection to please seek medical care immediately.    Physical therapy ordered at [facility] - 2-3 visits per week for  6-8 weeks.   Patient given phone number to call to get her PT scheduled  Persistent chronic low back pain, midline and bilateral lumbar area.  Previous x-rays showing degenerative disc disease, facet arthropathy, lumbar spondylosis.  Referred to interventional pain management department for evaluation and treatment of chronic low back pain.    Follow-up: As needed or sooner if there are any problems between now and then.    Thank you for choosing [facility] and Dr. Donaldo Sumner for your orthopedic & sports medicine care. It is our goal to provide you with exceptional care that will help keep you healthy, active, and get you back in the game.    Please do not hesitate to reach out to us via email, phone, or MyChart with any questions, concerns, or feedback.    If you are experiencing pain/discomfort ,or have questions after 5pm and would like to be connected to the [facility] on-call team, please call this number and specify which Sports Medicine provider is treating you: [phone]      A copy of today's visit note has been sent to the referring provider.           Donaldo Sumner MD  Primary Care Sports Medicine    Disclaimer: This note was prepared using a voice recognition system and is likely to have sound alike errors within the text.

## 2023-08-31 NOTE — PATIENT INSTRUCTIONS
Assessment:  Sangeetha June is a 43 y.o. female with a chief complaint of Pain of the Right Knee and Pain of the Left Knee    Encounter Diagnoses   Name Primary?    Bilateral chronic knee pain Yes    Primary osteoarthritis of both knees     Patellofemoral pain syndrome of both knees     Chronic bilateral low back pain without sciatica     Lumbar spondylosis     Lumbar degenerative disc disease     Lumbar facet arthropathy       Plan:  Recurrence of bilateral knee pain today, we got approx. 2-1/2 months of relief following steroid injection at last visit.  Repeat bilateral knee corticosteroid injections today.    We discussed the proper protocols after the injection such as no submerging pools, baths tubs, or hot tubs for 24 hr.  Showering is okay today.  We also discussed that blood sugars can be elevated after an injection and asked patient to properly check their sugars over the next few days and contact their PCP if there are any concerns.  We discussed red flags such as fevers, chills, red, warm, tender joint at the area of injection to please seek medical care immediately.    Physical therapy ordered at [facility] - 2-3 visits per week for 6-8 weeks.   Patient given phone number to call to get her PT scheduled  Persistent chronic low back pain, midline and bilateral lumbar area.  Previous x-rays showing degenerative disc disease, facet arthropathy, lumbar spondylosis.  Referred to interventional pain management department for evaluation and treatment of chronic low back pain.    Follow-up: As needed or sooner if there are any problems between now and then.    Thank you for choosing [facility] and Dr. Donaldo Sumner for your orthopedic & sports medicine care. It is our goal to provide you with exceptional care that will help keep you healthy, active, and get you back in the game.    Please do not hesitate to reach out to us via email, phone, or MyChart with any questions,  concerns, or feedback.    If you are experiencing pain/discomfort ,or have questions after 5pm and would like to be connected to the [facility] on-call team, please call this number and specify which Sports Medicine provider is treating you: [phone]

## 2023-08-31 NOTE — PROCEDURES
Large Joint Aspiration/Injection: bilateral knee    Date/Time: 8/31/2023 10:40 AM    Performed by: Donaldo Sumner MD  Authorized by: Donaldo Sumner MD    Consent Done?:  Yes (Verbal)  Indications:  Arthritis and pain  Site marked: the procedure site was marked    Timeout: prior to procedure the correct patient, procedure, and site was verified      Local anesthesia used?: Yes    Anesthesia:  Local infiltration  Local anesthetic:  Bupivacaine 0.5% without epinephrine, lidocaine 1% without epinephrine and topical anesthetic  Anesthetic total (ml):  4      Details:  Needle Size:  22 G  Ultrasonic Guidance for needle placement?: No    Approach:  Anterolateral  Location:  Knee  Laterality:  Bilateral  Site:  Bilateral knee  Medications (Right):  20 mg triamcinolone acetonide 40 mg/mL  Medications (Left):  20 mg triamcinolone acetonide 40 mg/mL  Patient tolerance:  Patient tolerated the procedure well with no immediate complications     We discussed the proper protocols after the injection such as no submerging pools, baths tubs, or hot tubs for 24 hr.  Showering is okay today.  We also discussed that blood sugars can be elevated after an injection and asked patient to properly checked her sugars over the next few days and contact their PCP if there are any concerns.  We discussed red flags such as fevers, chills, red, warm, tender joint at the area of injection to please seek medical care immediately.

## 2023-09-02 NOTE — TELEPHONE ENCOUNTER
Reason for Disposition   [1] Follow-up call to recent contact AND [2] information only call, no triage required    Protocols used: INFORMATION ONLY CALL - NO TRIAGE-A-    
Return call to pt,  medication reviewed and instructed to take meds as directed by MD and if still has pain not controlled take 2 roxycodone. Protocol reviewed and care advice given. Pt agrees with advice and verbalizes understanding. Instructed to call for questions, concerns or changes.  
Patient/Caregiver requests family/friend to interpret.

## 2023-09-04 ENCOUNTER — NURSE TRIAGE (OUTPATIENT)
Dept: ADMINISTRATIVE | Facility: CLINIC | Age: 43
End: 2023-09-04
Payer: MEDICARE

## 2023-09-04 NOTE — TELEPHONE ENCOUNTER
Pt rolled out of the bed over the weekend and landed on her stomach and right elbow. Home health LPN, Oziel Ramírez, reports the pt does not have any visible injuries including bruising or scrapes. She does not complain of any pain at this time. She reports mild nausea since the fall; denies hitting her head. BP is 122/78 with HR of 53. Temp is 97.9. Denies dizziness or lightheadedness. Denies weakness. Care advice is home care. Will route encounter to PCP.     Reason for Disposition   [1] Recent fall AND [2] no injury    Additional Information   Negative: Major injury from dangerous force (e.g., fall > 10 feet or 3 meters)   Negative: [1] Major bleeding (e.g., actively dripping or spurting) AND [2] can't be stopped   Negative: Shock suspected (e.g., cold/pale/clammy skin, too weak to stand)   Negative: Difficult to awaken or acting confused (e.g., disoriented, slurred speech)   Negative: [1] SEVERE weakness (i.e., unable to walk or barely able to walk, requires support) AND [2] new-onset or worsening   Negative: [1] Can't stand (bear weight) or walk AND [2] new-onset after fall   Negative: Sounds like a life-threatening emergency to the triager   Negative: Injury (or injuries) that need emergency care   Negative: Sounds like a serious injury to the triager   Negative: [1] Muscle pain AND [2] dark (cola colored) or red-colored urine   Negative: [1] Unable to get up until help (e.g., caregiver, family, friend) arrived AND [2] on the ground 1 hour or more   Negative: Patient sounds very sick or weak to the triager   Negative: [1] MODERATE weakness (i.e., interferes with work, school, normal activities) AND [2] new-onset or worsening   Negative: [1] Fever > 101 F (38.3 C) AND [2] age > 60 years   Negative: [1] Fever > 100.0 F (37.8 C) AND [2] bedridden (e.g., CVA, chronic illness, recovering from surgery)   Negative: [1] Fever > 100.0 F (37.8 C) AND [2] diabetes mellitus or weak immune system (e.g., HIV positive,  cancer chemo, splenectomy, organ transplant, chronic steroids)   Negative: Suspicious history for the fall   Negative: [1] Caller has URGENT question AND [2] triager unable to answer question   Negative: [1] No prior tetanus shots (or is not fully vaccinated) AND [2] any wound (e.g., cut or scrape)   Negative: [1] HIV positive or severe immunodeficiency (severely weak immune system) AND [2] DIRTY cut or scrape   Negative: [1] Caller has NON-URGENT question AND [2] triager unable to answer question   Negative: MILD weakness (i.e., does not interfere with ability to work, go to school, normal activities)  (Exception: Mild weakness is a chronic symptom.)   Negative: [1] Last tetanus shot > 5 years ago AND [2] DIRTY cut or scrape   Negative: [1] Last tetanus shot > 10 years ago AND [2] CLEAN cut or scrape (e.g., object AND skin were clean)   Negative: [1] Fall AND [2] went to emergency department for evaluation or treatment   Negative: [1] Fall AND [2] patient seems very anxious and fearful of falling again   Negative: [1] Falling (two or more falls) AND [2] in past year   Negative: Weakness is a chronic symptom (recurrent or ongoing AND present > 4 weeks)   Negative: Dizziness is a chronic symptom (recurrent or ongoing AND present > 4 weeks)    Protocols used: Falls and [id number]

## 2023-09-07 ENCOUNTER — PATIENT MESSAGE (OUTPATIENT)
Dept: PSYCHIATRY | Facility: CLINIC | Age: 43
End: 2023-09-07

## 2023-09-12 ENCOUNTER — CLINICAL SUPPORT (OUTPATIENT)
Dept: REHABILITATION | Facility: HOSPITAL | Age: 43
End: 2023-09-12
Attending: STUDENT IN AN ORGANIZED HEALTH CARE EDUCATION/TRAINING PROGRAM
Payer: MEDICARE

## 2023-09-12 DIAGNOSIS — R68.89 DECREASED FUNCTIONAL ACTIVITY TOLERANCE: ICD-10-CM

## 2023-09-12 DIAGNOSIS — R29.898 DECREASED STRENGTH OF LOWER EXTREMITY: ICD-10-CM

## 2023-09-12 DIAGNOSIS — M22.2X1 PATELLOFEMORAL PAIN SYNDROME OF BOTH KNEES: ICD-10-CM

## 2023-09-12 DIAGNOSIS — M22.2X2 PATELLOFEMORAL PAIN SYNDROME OF BOTH KNEES: ICD-10-CM

## 2023-09-12 DIAGNOSIS — G89.29 CHRONIC PAIN OF BOTH KNEES: ICD-10-CM

## 2023-09-12 DIAGNOSIS — M25.561 CHRONIC PAIN OF BOTH KNEES: ICD-10-CM

## 2023-09-12 DIAGNOSIS — R26.9 GAIT ABNORMALITY: ICD-10-CM

## 2023-09-12 DIAGNOSIS — M25.562 CHRONIC PAIN OF BOTH KNEES: ICD-10-CM

## 2023-09-12 DIAGNOSIS — M17.0 PRIMARY OSTEOARTHRITIS OF BOTH KNEES: ICD-10-CM

## 2023-09-12 PROCEDURE — 97162 PT EVAL MOD COMPLEX 30 MIN: CPT | Mod: HCNC

## 2023-09-12 PROCEDURE — 97110 THERAPEUTIC EXERCISES: CPT | Mod: HCNC

## 2023-09-12 NOTE — PLAN OF CARE
Problem: VTE, Risk for  Goal: # No s/s of VTE  Outcome: Outcome Met, Continue evaluating goal progress toward completion  Goal: # Verbalizes understanding of VTE risk factors and prevention  Description: Document education using the patient education activity.   Outcome: Outcome Met, Continue evaluating goal progress toward completion     Problem: Pain  Goal: #Acceptable pain level achieved/maintained at rest using NRS/Faces  Description: This goal is used for patients who can self-report.  Acceptable means the level is at or below the identified comfort/function goal.  Outcome: Outcome Met, Continue evaluating goal progress toward completion  Goal: # Acceptable pain level achieved/maintained at rest using NRS/Faces without oversedation (opioid naive or PCA/Epidural infusion)  Description: This goal is used if Opioid-naïve or on PCA/Epidural Infusion.  Outcome: Outcome Met, Continue evaluating goal progress toward completion  Goal: # Acceptable pain level achieved/maintained with activity using NRS/Faces  Description: This goal is used for patients who can self-report and are not achieving acceptable pain control during activity.  Outcome: Outcome Met, Continue evaluating goal progress toward completion  Goal: # Verbalizes understanding of pain management  Description: Documented in Patient Education Activity  Outcome: Outcome Met, Continue evaluating goal progress toward completion      [facility]   Physical Therapy Initial Evaluation      Date: 9/12/2023   Name: Sangeetha Hannon [facility] Number: [id number]    Therapy Diagnosis:  Encounter Diagnoses   Name Primary?    Primary osteoarthritis of both knees     Patellofemoral pain syndrome of both knees     Decreased strength of lower extremity     Gait abnormality     Chronic pain of both knees     Decreased functional activity tolerance       Physician: Donaldo Sumner MD     Physician Orders: PT Eval and Treat  Medical Diagnosis from Referral: Primary osteoarthritis of both knees [M17.0], Patellofemoral pain syndrome of both knees [M22.2X1, M22.2X2]  Evaluation Date: 9/12/2023  Authorization Period Expiration: 8/30/2024  Plan of Care Expiration: 12/5/2023  Progress Note Due: 10/10/2023  Visit # / Visits authorized: 1/1   FOTO: 1/3 (last performed on 9/12/2023)    Precautions: Standard, Diabetes, blood thinners, Fall, and cancer (in remission 3 years)    Time In: 10:45  Time Out: 11:40  Total Billable Time (timed & untimed codes): 50 minutes    Subjective     Date of onset: Several years ( last 3 months she has had exacerbation of pain)     History of current condition - Sangeetha reports That she has RA which has lead to her having bilateral knee pain. She has had injections in the past and she got good pain relief the first time. She recently got another injections which lead to more pain. She has difficulty with walking due to the pain. Pt reports she is fairly limited on her day to day basis due to the amount of pain she has in her knees. She also reports numbness in her feet due to diabetes.     Falls: A week ago: She fell when trying to get out of the bed. She reports some slight elbow pain from the fall but nothing of major concern.     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   Current Level of Function: Independent with all ADL, IADL, community mobility and functional activities with reports of increased pain and need for increased time and frequent breaks.      Dominant Extremity:    [x] Right    [] Left    Pts goals: Pt reported goals are to decrease overall pain levels in order to return to prior functional level.     Medical History:   Past Medical History:   Diagnosis Date    Anxiety     Asthma 11/8/2022    Breast cancer     Chest pain     Chest pain 07/02/2021    Cholecystitis without calculus     Decreased ROM of left shoulder 02/15/2022    Diabetes mellitus     Dizziness     Elevated C-reactive protein (CRP)     Essential hypertension     Fall 10/11/2021    Memory change     EVERARDO (obstructive sleep apnea)     Osteoarthritis     Other specified disorders of thyroid     Pre-diabetes 04/15/2021    Chronic, Stable, cont metformin    Screening mammogram, encounter for 04/15/2021    Shoulder injury     SOB (shortness of breath)     Stroke     Thyroiditis     Tingling of left upper extremity 10/29/2021    Vision disturbance 04/30/2021    Weakness of shoulder 02/15/2022       Surgical History:   Sangeetha June  has a past surgical history that includes Hysterectomy; Mastectomy; Breast biopsy; Injection of joint (Left, 11/12/2021); Arthroscopic repair of rotator cuff of shoulder (Left, 1/27/2022); Arthroscopy of shoulder with decompression of subacromial space (Left, 1/27/2022); Fixation of tendon (Left, 1/27/2022); Esophagogastroduodenoscopy (N/A,  5/31/2022); Colonoscopy (N/A, 5/31/2022); Injection of anesthetic agent into sacroiliac joint (Bilateral, 7/29/2022); Injection of joint (Bilateral, 7/29/2022); Robot-assisted cholecystectomy using da Leonidas Xi (N/A, 8/29/2022); Total Reduction Mammoplasty (Right, 11/15/2022); Insertion of breast tissue expander (Left, 11/15/2022); and Tissue expander removal (Left, 1/4/2023).    Medications:   Sangeetha has a current medication list which includes the following prescription(s): albuterol, ammonium lactate, ascorbic acid (vitamin c), aspirin, budesonide-formoterol 160-4.5 mcg, buspirone, celecoxib, cephalexin, diclofenac, escitalopram oxalate, euthyrox, gabapentin, hydrochlorothiazide, hydrocortisone, latuda, linaclotide, magnesium oxide, metformin, adult multivitamin gummies, olopatadine, oxybutynin, pantoprazole, potassium chloride sa, pravastatin, quetiapine, solifenacin, tirzepatide, and trazodone, and the following Facility-Administered Medications: lactated ringers.    Allergies:   Review of patient's allergies indicates:   Allergen Reactions    Lisinopril     Lurasidone Other (See Comments)     Uncontrolled movement        Objective        RANGE OF MOTION:   Hip AROM/PROM Right Left Pain/Dysfunction with Movement Goal   Hip Flexion (120º) 100 100 Pain with overpressure 120   Hip Extension (30º) 10 10  20   Hip Abduction (45º) 45 45  -   Hip IR (45º) 30 30  35   Hip ER (45º) 28 25  35       Knee AROM/PROM Right Left Pain/Dysfunction with Movement Goal   Knee Flexion (135º) 130 130 Pain with overpressure -   Knee Extension (0º) 2 3 Pain with overpressure        0       Ankle/Foot AROM/PROM Right Left Pain/Dysfunction with Movement Goal   Dorsiflexion (20º) 10 10  15   Plantarflexion (50º) 45 50  50   Inversion (35º) 35 35  -   Eversion (15º) 12 15  -          STRENGTH:   L/E MMT Right  (spine) Left Pain/Dysfunction with Movement Goal   Hip Flexion  3+/5 3+/5 Pain in knee  5/5 B   Hip Extension  3/5 3/5  5/5 B   Hip  Abduction  3+/5 3+/5 Pain in knee and hip 5/5 B   Knee Extension 4/5 4/5 Pain in knee  5/5 B   Knee Flexion 4-/5 4-/5 Pain in knee  5/5 B   Hip IR 4/5 4/5  5/5 B   Hip ER 4/5 4/5  5/5 B   Ankle DF 5/5 5/5  5/5 B   Ankle PF 3/5 3/5 Pt unable to perform single leg calf raise 5/5 B   Ankle Inversion 5/5 5/5  5/5 B   Ankle Eversion 5/5 5/5  5/5 B      JOINT MOBILITY  Joint Motion Right Mobility  (spine) Left Mobility Goal   Knee Distraction  [] Hypo     [x] Normal     [] Hyper [] Hypo     [x] Normal     [] Hyper Normal    Distal Femur AP [x] Hypo     [] Normal     [] Hyper [x] Hypo     [] Normal     [] Hyper Normal    Proximal Tibia AP [x] Hypo     [] Normal     [] Hyper [x] Hypo     [] Normal     [] Hyper Normal    Patellar Medial Glide  [x] Hypo     [] Normal     [] Hyper [x] Hypo     [] Normal     [] Hyper Normal    Patellar Lateral Glide  [x] Hypo     [] Normal     [] Hyper [x] Hypo     [] Normal     [] Hyper Normal    Patellar Superior Glide  [x] Hypo     [] Normal     [] Hyper [x] Hypo     [] Normal     [] Hyper Normal    Patellar Inferior Glide  [x] Hypo     [] Normal     [] Hyper [x] Hypo     [] Normal     [] Hyper Normal    Ankle:  [] Hypo     [x] Normal     [] Hyper [] Hypo     [x] Normal     [] Hyper Normal    PAIN WITH PATELLA MOBILIZATION   Joint Motion Right Mobility   Left Mobility Goal   Talar Distraction  [] Hypo     [x] Normal     [] Hyper [] Hypo     [x] Normal     [] Hyper Normal    AP Talar Glide  [] Hypo     [x] Normal     [] Hyper [] Hypo     [x] Normal     [] Hyper Normal    PA Talar Glide  [] Hypo     [x] Normal     [] Hyper [] Hypo     [x] Normal     [] Hyper Normal    Calcaneal Inversion  [] Hypo     [x] Normal     [] Hyper [] Hypo     [x] Normal     [] Hyper Normal    Calcaneal Eversion  [] Hypo     [x] Normal     [] Hyper [] Hypo     [x] Normal     [] Hyper Normal          SPECIAL TESTS:    Right  (spine) Left  Goal   TASHA  [] Positive    [x] Negative [] Positive    [x] Negative Negative B     FADIR  [] Positive    [x] Negative [] Positive    [x] Negative Negative B    Scour  [] Positive    [x] Negative [] Positive    [x] Negative Negative B         Right  (spine) Left  Goal   Anterior Drawer  [] Positive    [x] Negative [] Positive    [x] Negative Negative B    Posterior Drawer [] Positive    [x] Negative [] Positive    [x] Negative Negative B    Varus Stress Test [] Positive    [x] Negative [] Positive    [x] Negative Negative B    Valgus Stress Test [] Positive    [x] Negative [] Positive    [x] Negative Negative B    Junito Test [] Positive    [x] Negative [] Positive    [x] Negative Negative B          SENSATION  [x] Intact to Light Touch   [] Impaired:      PALPATION: Structures: Increased tenderness to palpation of: bilateral , LOWER STRUCTURES : knee joint, fat pad of knee, patellar tendon      POSTURE:  Pt presents with postural abnormalities which include:    [x] Forward Head   [] Increased Lumbar Lordosis   [x] Rounded Shoulder   [] Genu Recurvatum   [] Increased Thoracic Kyphosis [] Genu Valgus   [] Trunk Deviated    [] Pes Planus   [] Scapular Winging    [] Other:         GAIT ANALYSIS The patient ambulated with the following assistive device: none; the pt presents with the following gait abnormalities: trendelenburg, decreased step length bilateral, decreased stance time bilateral, decreased heel strike bilateral, and decreased knee extension bilateral    FUNCTIONAL MOVEMENT PATTERNS  Movement Analysis Notes   Bed Mobility  []Functional  [x]Dysfunctional:  []Painful  [x]Non-Painful       Sit to Stand []Functional  [x]Dysfunctional:  [x]Painful  []Non-Painful     Pain bilateral in both knees    Squat []Functional  [x]Dysfunctional:  [x]Painful  []Non-Painful       Single Leg Squat  []Functional  []Dysfunctional:  []Painful  []Non-Painful    Not appropriate   Step Down  []Functional  []Dysfunctional:  []Painful  []Non-Painful    Not performed due to safety       BALANCE:PERFORM NEXT  VISIT   Right   (seconds) Left  (seconds) Pain/dysfunction Noted Goal   Narrow Base of Support  ---  60+ seconds   Tandem Stance    60+ seconds   Single Leg Stance    60+ seconds       FUNCTIONAL TESTING    Outcome Norms Goal   Timed Up and Go  <13.5 sec PERFORM NEXT VISIT   Five Time Sit to Stand 36.72 sec 60s: <11.4 sec  70s: <12.6 sec  80s: 14.8 sec <11.4 sec         Function:     Intake Outcome Measure for FOTO Knee Survey    Therapist reviewed FOTO scores for Sangeetha on 9/12/2023.   FOTO report - see Media section or FOTO account for episode details    Intake Score: 29%         Treatment     Total Treatment time (time-based codes) separate from Evaluation: (10) minutes     Sangeetha received the treatments listed below:        THERAPEUTIC EXERCISES to develop strength, endurance, ROM, flexibility, posture, and core stabilization for (10) minutes including:    Intervention Performed Today    Hep Education x  See patient instructions                                        Plan for Next Visit:        Patient Education and Home Exercises     Education provided: (5) minutes  PURPOSE: Patient educated on the impairments noted above and the effects of physical therapy intervention to improve overall condition and QOL.   EXERCISE: Patient was educated on all the above exercise prior/during/after for proper posture, positioning, and execution for safe performance with home exercise program.   STRENGTH: Patient educated on the importance of improved core and extremity strength in order to improve alignment of the spine and extremities with static positions and dynamic movement.     Written Home Exercises Provided: yes.  Exercises were reviewed and Sangeetha was able to demonstrate them prior to the end of the session.  Sangeetha demonstrated good  understanding of the education provided. See EMR under Patient Instructions for exercises provided during therapy sessions.    Assessment     Sangeetha is a 43 y.o. female referred to outpatient  Physical Therapy with a medical diagnosis of Primary osteoarthritis of both knees [M17.0], Patellofemoral pain syndrome of both knees [M22.2X1, M22.2X2]. Pt presents with impairments in the following categories: IMPAIRMENTS: ROM, strength, endurance, joint mobility, gait mechanics, and functional movement patterns Pt is currently very limited in functional activity tolerance with her normal ADL's and peer related activities due to her pain. She demonstrated increased fall risk based on history and 5 time sit to stand test.     Pt prognosis is Good  Pt will benefit from skilled outpatient Physical Therapy to address the deficits stated above and in the chart below, provide pt/family education, and to maximize pt's level of independence.     Plan of care discussed with patient: Yes  Pt's spiritual, cultural and educational needs considered and patient is agreeable to the plan of care and goals as stated below:     Anticipated Barriers for therapy: co-morbidities, sedentary lifestyle, and chronicity of condition    Medical Necessity is demonstrated by the following  History  Co-morbidities and personal factors that may impact the plan of care [] LOW: no personal factors / co-morbidities  [] MODERATE: 1-2 personal factors / co-morbidities  [x] HIGH: 3+ personal factors / co-morbidities    Moderate / High Support Documentation:   Past Medical History:   Diagnosis Date    Anxiety     Asthma 11/8/2022    Breast cancer     Chest pain     Chest pain 07/02/2021    Cholecystitis without calculus     Decreased ROM of left shoulder 02/15/2022    Diabetes mellitus     Dizziness     Elevated C-reactive protein (CRP)     Essential hypertension     Fall 10/11/2021    Memory change     EVERARDO (obstructive sleep apnea)     Osteoarthritis     Other specified disorders of thyroid     Pre-diabetes 04/15/2021    Chronic, Stable, cont metformin    Screening mammogram, encounter for 04/15/2021    Shoulder injury     SOB (shortness of breath)   "   Stroke     Thyroiditis     Tingling of left upper extremity 10/29/2021    Vision disturbance 04/30/2021    Weakness of shoulder 02/15/2022        Examination  Body Structures and Functions, activity limitations and participation restrictions that may impact the plan of care [] LOW: addressing 1-2 elements  [x] MODERATE: 3+ elements  [] HIGH: 4+ elements (please support below)    Moderate / High Support Documentation: See above in "Current Level of Function"      Clinical Presentation [] LOW: stable  [x] MODERATE: Evolving  [] HIGH: Unstable     Decision Making/ Complexity Score: moderate         Short Term Goals:  6 weeks Status  Date Met   PAIN: Pt will report worst pain of 6/10 in order to progress toward max functional ability and improve quality of life. [] Progressing  [] Met  [] Not Met    FUNCTION: Patient will demonstrate improved function as indicated by a score of greater than or equal to 34 out of 100 on FOTO. [] Progressing  [] Met  [] Not Met    MOBILITY: Patient will improve AROM to 50% of stated goals, listed in objective measures above, in order to progress towards independence with functional activities.  [] Progressing  [] Met  [] Not Met    STRENGTH: Patient will improve strength to 50% of stated goals, listed in objective measures above, in order to progress towards independence with functional activities. [] Progressing  [] Met  [] Not Met    POSTURE: Patient will correct postural deviations in sitting and standing, to decrease pain and promote long term stability.  [] Progressing  [] Met  [] Not Met    GAIT: Patient will demonstrate improved gait mechanics including Increased stance time, step length and heel strike bilaterally in order to improve functional mobility, improve quality of life, and decrease risk of further injury or fall.  [] Progressing  [] Met  [] Not Met    HEP: Patient will demonstrate independence with HEP in order to progress toward functional independence. [] " Progressing  [] Met  [] Not Met      Long Term Goals:  12 weeks Status Date Met   PAIN: Pt will report worst pain less than or equal to 2/10 in order to progress toward max functional ability and improve quality of life [] Progressing  [] Met  [] Not Met    FUNCTION: Patient will demonstrate improved function as indicated by a score of greater than or equal to 42 out of 100 on FOTO. [] Progressing  [] Met  [] Not Met    MOBILITY: Patient will improve AROM to stated goals, listed in objective measures above, in order to return to maximal functional potential and improve quality of life.  [] Progressing  [] Met  [] Not Met    STRENGTH: Patient will improve strength to stated goals, listed in objective measures above, in order to improve functional independence and quality of life. [] Progressing  [] Met  [] Not Met    GAIT: Patient will demonstrate normalized gait mechanics with minimal compensation in order to return to PLOF. [] Progressing  [] Met  [] Not Met    Patient will return to normal ADL's, IADL's, community involvement, recreational activities, and work-related activities with less than or equal to 2/10 pain and maximal function.  [] Progressing  [] Met  [] Not Met      Plan     Plan of care Certification: 9/12/2023 to 12/5/2023.    Outpatient Physical Therapy 3 times weekly for 12 weeks to include any combination of the following interventions: virtual visits, dry needling, modalities, electrical stimulation (IFC, Pre-Mod, Attended with Functional Dry Needling), Cervical/Lumbar Traction, Gait Training, Manual Therapy, Neuromuscular Re-ed, Patient Education, Self Care, Therapeutic Exercise, and Therapeutic Activites     Marciano Edwards, PT, DPT

## 2023-09-14 ENCOUNTER — OFFICE VISIT (OUTPATIENT)
Dept: PODIATRY | Facility: CLINIC | Age: 43
End: 2023-09-14
Payer: MEDICARE

## 2023-09-14 ENCOUNTER — CLINICAL SUPPORT (OUTPATIENT)
Dept: REHABILITATION | Facility: HOSPITAL | Age: 43
End: 2023-09-14
Payer: MEDICARE

## 2023-09-14 VITALS — HEIGHT: 67 IN | BODY MASS INDEX: 42.53 KG/M2 | WEIGHT: 271 LBS

## 2023-09-14 DIAGNOSIS — M25.562 CHRONIC PAIN OF BOTH KNEES: ICD-10-CM

## 2023-09-14 DIAGNOSIS — E11.9 COMPREHENSIVE DIABETIC FOOT EXAMINATION, TYPE 2 DM, ENCOUNTER FOR: Primary | ICD-10-CM

## 2023-09-14 DIAGNOSIS — L84 CORN OR CALLUS: ICD-10-CM

## 2023-09-14 DIAGNOSIS — G89.29 CHRONIC PAIN OF BOTH KNEES: ICD-10-CM

## 2023-09-14 DIAGNOSIS — R29.898 DECREASED STRENGTH OF LOWER EXTREMITY: Primary | ICD-10-CM

## 2023-09-14 DIAGNOSIS — R68.89 DECREASED FUNCTIONAL ACTIVITY TOLERANCE: ICD-10-CM

## 2023-09-14 DIAGNOSIS — M25.561 CHRONIC PAIN OF BOTH KNEES: ICD-10-CM

## 2023-09-14 DIAGNOSIS — E11.69 TYPE 2 DIABETES MELLITUS WITH OTHER SPECIFIED COMPLICATION, WITHOUT LONG-TERM CURRENT USE OF INSULIN: ICD-10-CM

## 2023-09-14 DIAGNOSIS — R26.9 GAIT ABNORMALITY: ICD-10-CM

## 2023-09-14 PROCEDURE — 99213 OFFICE O/P EST LOW 20 MIN: CPT | Mod: HCNC,S$GLB,, | Performed by: PODIATRIST

## 2023-09-14 PROCEDURE — 97112 NEUROMUSCULAR REEDUCATION: CPT | Mod: HCNC

## 2023-09-14 PROCEDURE — 99999 PR PBB SHADOW E&M-EST. PATIENT-LVL IV: ICD-10-PCS | Mod: PBBFAC,HCNC,, | Performed by: PODIATRIST

## 2023-09-14 PROCEDURE — 3044F HG A1C LEVEL LT 7.0%: CPT | Mod: HCNC,CPTII,S$GLB, | Performed by: PODIATRIST

## 2023-09-14 PROCEDURE — 3066F NEPHROPATHY DOC TX: CPT | Mod: HCNC,CPTII,S$GLB, | Performed by: PODIATRIST

## 2023-09-14 PROCEDURE — 3061F PR NEG MICROALBUMINURIA RESULT DOCUMENTED/REVIEW: ICD-10-PCS | Mod: HCNC,CPTII,S$GLB, | Performed by: PODIATRIST

## 2023-09-14 PROCEDURE — 99213 PR OFFICE/OUTPT VISIT, EST, LEVL III, 20-29 MIN: ICD-10-PCS | Mod: HCNC,S$GLB,, | Performed by: PODIATRIST

## 2023-09-14 PROCEDURE — 1159F MED LIST DOCD IN RCRD: CPT | Mod: HCNC,CPTII,S$GLB, | Performed by: PODIATRIST

## 2023-09-14 PROCEDURE — 97110 THERAPEUTIC EXERCISES: CPT | Mod: HCNC

## 2023-09-14 PROCEDURE — 99999 PR PBB SHADOW E&M-EST. PATIENT-LVL IV: CPT | Mod: PBBFAC,HCNC,, | Performed by: PODIATRIST

## 2023-09-14 PROCEDURE — 3044F PR MOST RECENT HEMOGLOBIN A1C LEVEL <7.0%: ICD-10-PCS | Mod: HCNC,CPTII,S$GLB, | Performed by: PODIATRIST

## 2023-09-14 PROCEDURE — 3061F NEG MICROALBUMINURIA REV: CPT | Mod: HCNC,CPTII,S$GLB, | Performed by: PODIATRIST

## 2023-09-14 PROCEDURE — 3008F PR BODY MASS INDEX (BMI) DOCUMENTED: ICD-10-PCS | Mod: HCNC,CPTII,S$GLB, | Performed by: PODIATRIST

## 2023-09-14 PROCEDURE — 1159F PR MEDICATION LIST DOCUMENTED IN MEDICAL RECORD: ICD-10-PCS | Mod: HCNC,CPTII,S$GLB, | Performed by: PODIATRIST

## 2023-09-14 PROCEDURE — 3008F BODY MASS INDEX DOCD: CPT | Mod: HCNC,CPTII,S$GLB, | Performed by: PODIATRIST

## 2023-09-14 PROCEDURE — 3066F PR DOCUMENTATION OF TREATMENT FOR NEPHROPATHY: ICD-10-PCS | Mod: HCNC,CPTII,S$GLB, | Performed by: PODIATRIST

## 2023-09-14 RX ORDER — AMMONIUM LACTATE 12 G/100G
1 CREAM TOPICAL 2 TIMES DAILY
Qty: 140 G | Refills: 2 | Status: SHIPPED | OUTPATIENT
Start: 2023-09-14 | End: 2023-11-26 | Stop reason: SDUPTHER

## 2023-09-14 NOTE — PROGRESS NOTES
PODIATRIC MEDICINE AND SURGERY     CHIEF COMPLAINT  Chief Complaint   Patient presents with    Follow-up     Follow up appt, pt is diabetic,  c/o numbness at the bottom of both feet, comes and goes x2 months,pt rates pain 0/10, last seen pcp Sam Garcia MD 06/23/23         HPI    SUBJECTIVE: Sangeetha June is a 43 y.o. female who  has a past medical history of Anxiety, Asthma (11/8/2022), Breast cancer, Chest pain, Chest pain (07/02/2021), Cholecystitis without calculus, Decreased ROM of left shoulder (02/15/2022), Diabetes mellitus, Dizziness, Elevated C-reactive protein (CRP), Essential hypertension, Fall (10/11/2021), Memory change, EVERARDO (obstructive sleep apnea), Osteoarthritis, Other specified disorders of thyroid, Pre-diabetes (04/15/2021), Screening mammogram, encounter for (04/15/2021), Shoulder injury, SOB (shortness of breath), Stroke, Thyroiditis, Tingling of left upper extremity (10/29/2021), Vision disturbance (04/30/2021), and Weakness of shoulder (02/15/2022). Sangeethapresents to clinic for high risk diabetic foot exam and care.  Sangeetha admits numbness, burning, and/or tingling sensations in their feet. Patient has no other pedal complaints at this time.    This patient has documented high risk feet requiring routine maintenance secondary to diabetes mellitis and those secondary complications of diabetes, as mentioned.      HgA1c:   Hemoglobin A1C   Date Value Ref Range Status   06/23/2023 5.3 4.0 - 5.6 % Final     Comment:     ADA Screening Guidelines:  5.7-6.4%  Consistent with prediabetes  >or=6.5%  Consistent with diabetes    High levels of fetal hemoglobin interfere with the HbA1C  assay. Heterozygous hemoglobin variants (HbS, HgC, etc)do  not significantly interfere with this assay.   However, presence of multiple variants may affect accuracy.     02/20/2023 5.0 4.0 - 5.6 % Final     Comment:     ADA Screening Guidelines:  5.7-6.4%  Consistent with prediabetes  >or=6.5%   Consistent with diabetes    High levels of fetal hemoglobin interfere with the HbA1C  assay. Heterozygous hemoglobin variants (HbS, HgC, etc)do  not significantly interfere with this assay.   However, presence of multiple variants may affect accuracy.     10/21/2022 5.1 4.0 - 5.6 % Final     Comment:     ADA Screening Guidelines:  5.7-6.4%  Consistent with prediabetes  >or=6.5%  Consistent with diabetes    High levels of fetal hemoglobin interfere with the HbA1C  assay. Heterozygous hemoglobin variants (HbS, HgC, etc)do  not significantly interfere with this assay.   However, presence of multiple variants may affect accuracy.           [facility]  Past Medical History:   Diagnosis Date    Anxiety     Asthma 11/8/2022    Breast cancer     Chest pain     Chest pain 07/02/2021    Cholecystitis without calculus     Decreased ROM of left shoulder 02/15/2022    Diabetes mellitus     Dizziness     Elevated C-reactive protein (CRP)     Essential hypertension     Fall 10/11/2021    Memory change     EVERARDO (obstructive sleep apnea)     Osteoarthritis     Other specified disorders of thyroid     Pre-diabetes 04/15/2021    Chronic, Stable, cont metformin    Screening mammogram, encounter for 04/15/2021    Shoulder injury     SOB (shortness of breath)     Stroke     Thyroiditis     Tingling of left upper extremity 10/29/2021    Vision disturbance 04/30/2021    Weakness of shoulder 02/15/2022     Patient Active Problem List   Diagnosis    Delayed immunizations    S/P mastectomy    Chemotherapy adverse reaction    History of breast cancer    Left rotator cuff tear arthropathy    ANGIE (generalized anxiety disorder)    Urinary incontinence    Hyperlipidemia    Other insomnia    AGUILAR (dyspnea on exertion)    S/P hysterectomy    Postmenopausal    Vision disturbance    Obesity, morbid, BMI 40.0-49.9    Malignant neoplasm of left breast in female, estrogen receptor negative    Meningioma    Murmur    Hypertension    Hypothyroidism    Microcytic  anemia    Strain of lumbar region    Gait difficulty    Imbalance    Difficulty walking    Lumbago    Abdominal pain    Gastroesophageal reflux disease    SOB (shortness of breath)    Nontraumatic incomplete tear of left rotator cuff    Thyroid nodule    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    Hx of CVA (cerebral vascular accident)    Type 2 diabetes mellitus, without long-term current use of insulin    Memory change    Dysphagia    Dizziness    Chronic idiopathic constipation    Greater trochanteric bursitis of both hips    Asthma    Bipolar disorder    Chronic pain    Mechanical complication of breast prosthesis    EVERARDO (obstructive sleep apnea)    Decreased strength of lower extremity    Gait abnormality    Chronic pain of both knees    Decreased functional activity tolerance       MEDS  Current Outpatient Medications on File Prior to Visit   Medication Sig Dispense Refill    albuterol (PROVENTIL/VENTOLIN HFA) 90 mcg/actuation inhaler Inhale 2 puffs into the lungs every 4 (four) hours as needed for Wheezing. Rescue 8.5 g 1    ascorbic acid, vitamin C, (VITAMIN C) 1000 MG tablet Take 1 tablet (1,000 mg total) by mouth once daily. 90 tablet 3    aspirin (ECOTRIN) 81 MG EC tablet Take 1 tablet (81 mg total) by mouth once daily. 360 tablet 1    budesonide-formoterol 160-4.5 mcg (SYMBICORT) 160-4.5 mcg/actuation HFAA Inhale 2 puffs into the lungs every 12 (twelve) hours. Controller 10.2 g 11    busPIRone (BUSPAR) 15 MG tablet Take 1 tablet (15 mg total) by mouth 3 (three) times daily. 270 tablet 1    celecoxib (CELEBREX) 200 MG capsule Take 1 capsule (200 mg total) by mouth 2 (two) times daily. 28 capsule 0    cephALEXin (KEFLEX) 500 MG capsule TAKE 1 CAPSULE BY MOUTH EVERY 6 HOURS FOR 14 DAYS 56 capsule 0    diclofenac (VOLTAREN) 75 MG EC tablet Take 1 tablet (75 mg total) by mouth 2 (two) times daily as needed. 60 tablet 2    EScitalopram oxalate (LEXAPRO) 20 MG tablet Take 1 tablet (20 mg total) by mouth once daily. 90 tablet 1     EUTHYROX 50 mcg tablet Take 1 tablet (50 mcg total) by mouth every morning. 90 tablet 1    gabapentin (NEURONTIN) 300 MG capsule Take 1 capsule (300 mg total) by mouth 2 (two) times daily. 180 capsule 1    hydroCHLOROthiazide (HYDRODIURIL) 25 MG tablet Take 1 tablet (25 mg total) by mouth daily as needed (edema, swelling). 90 tablet 1    hydrocortisone 0.5 % cream Apply topically 2 (two) times daily.      LATUDA 20 mg Tab tablet Take 20 mg by mouth once daily.      linaCLOtide (LINZESS) 145 mcg Cap capsule Take 1 capsule (145 mcg total) by mouth once daily. 90 capsule 1    magnesium oxide (MAG-OX) 400 mg (241.3 mg magnesium) tablet Take 1 tablet (400 mg total) by mouth once daily. 90 tablet 1    metFORMIN (GLUCOPHAGE-XR) 500 MG ER 24hr tablet Take 1 tablet (500 mg total) by mouth 2 (two) times daily with meals. 180 tablet 1    multivit with min-folic acid (ADULT MULTIVITAMIN GUMMIES) 200 mcg Chew Take 1 tablet by mouth once daily. 90 tablet 3    olopatadine (PATANOL) 0.1 % ophthalmic solution       oxybutynin (DITROPAN-XL) 5 MG TR24 Take 5 mg by mouth.      pantoprazole (PROTONIX) 40 MG tablet Take 1 tablet (40 mg total) by mouth once daily. 90 tablet 1    potassium chloride SA (K-DUR,KLOR-CON) 20 MEQ tablet Take 1 tablet (20 mEq total) by mouth once daily. 8 tablet 0    pravastatin (PRAVACHOL) 40 MG tablet Take 1 tablet (40 mg total) by mouth every evening. 90 tablet 1    QUEtiapine (SEROQUEL) 25 MG Tab Take 25 mg by mouth every evening.      solifenacin (VESICARE) 10 MG tablet Take 1 tablet (10 mg total) by mouth once daily. 30 tablet 11    tirzepatide 10 mg/0.5 mL PnIj Inject 10 mg into the skin every 7 days. 4 pen 5    traZODone (DESYREL) 150 MG tablet Take 1 tablet (150 mg total) by mouth every evening. 90 tablet 1    [DISCONTINUED] ammonium lactate 12 % Crea Apply 1 Act topically 2 (two) times daily. To feet. 140 g 2     Current Facility-Administered Medications on File Prior to Visit   Medication Dose Route  Frequency Provider Last Rate Last Admin    lactated ringers infusion   Intravenous Continuous Maty Thompson MD 10 mL/hr at 01/27/22 0636 New Bag at 01/04/23 1603       PSH     Past Surgical History:   Procedure Laterality Date    ARTHROSCOPIC REPAIR OF ROTATOR CUFF OF SHOULDER Left 1/27/2022    Procedure: REPAIR, ROTATOR CUFF, ARTHROSCOPIC;  Surgeon: Francisco Mathews MD;  Location: [facility] OR;  Service: Orthopedics;  Laterality: Left;    ARTHROSCOPY OF SHOULDER WITH DECOMPRESSION OF SUBACROMIAL SPACE Left 1/27/2022    Procedure: ARTHROSCOPY, SHOULDER, WITH SUBACROMIAL SPACE DECOMPRESSION;  Surgeon: Francisco Mathews MD;  Location: [facility] OR;  Service: Orthopedics;  Laterality: Left;    BREAST BIOPSY      COLONOSCOPY N/A 5/31/2022    Procedure: COLONOSCOPY;  Surgeon: Km Odom MD;  Location: [facility];  Service: Endoscopy;  Laterality: N/A;    ESOPHAGOGASTRODUODENOSCOPY N/A 5/31/2022    Procedure: EGD (ESOPHAGOGASTRODUODENOSCOPY) ;  Surgeon: Km Odom MD;  Location: [facility];  Service: Endoscopy;  Laterality: N/A;    FIXATION OF TENDON Left 1/27/2022    Procedure: FIXATION, TENDON;  Surgeon: Francisco Mathews MD;  Location: [facility] OR;  Service: Orthopedics;  Laterality: Left;    HYSTERECTOMY      INJECTION OF ANESTHETIC AGENT INTO SACROILIAC JOINT Bilateral 7/29/2022    Procedure: Bilateral SIJ +  Bilateral GT Bursa Injection RN IV Sedation;  Surgeon: Bisi Cochran MD;  Location: [facility] PAIN MGT;  Service: Pain Management;  Laterality: Bilateral;    INJECTION OF JOINT Left 11/12/2021    Procedure: Left suprascapular and axillary injection with local;  Surgeon: Bisi Cochran MD;  Location: [facility]IN [facility]GT;  Service: Pain Management;  Laterality: Left;    INJECTION OF JOINT Bilateral 7/29/2022    Procedure: Bilateral SIJ + Bilateral GT Bursa Injection RN IV Sedation;  Surgeon: Bisi Cochran MD;  Location: [facility]IN [facility]GT;  Service: Pain Management;  Laterality: Bilateral;     INSERTION OF BREAST TISSUE EXPANDER Left 11/15/2022    Procedure: INSERTION, TISSUE EXPANDER, BREAST;  Surgeon: Collin Pinto MD;  Location: [facility] OR;  Service: Plastics;  Laterality: Left;    MASTECTOMY      ROBOT-ASSISTED CHOLECYSTECTOMY USING DA ROBERTA XI N/A 8/29/2022    Procedure: XI ROBOTIC CHOLECYSTECTOMY;  Surgeon: Raeann Bhandari DO;  Location: [facility] OR;  Service: General;  Laterality: N/A;    TISSUE EXPANDER REMOVAL Left 1/4/2023    Procedure: REMOVAL, TISSUE EXPANDER;  Surgeon: Collin Pinto MD;  Location: [facility] OR;  Service: Plastics;  Laterality: Left;    TOTAL REDUCTION MAMMOPLASTY Right 11/15/2022    Procedure: MAMMOPLASTY, REDUCTION;  Surgeon: Collin Pinto MD;  Location: [facility] OR;  Service: Plastics;  Laterality: Right;        ALL  Review of patient's allergies indicates:   Allergen Reactions    Lisinopril     Lurasidone Other (See Comments)     Uncontrolled movement       SOC     Social History     Tobacco Use    Smoking status: Never     Passive exposure: Current    Smokeless tobacco: Never   Substance Use Topics    Alcohol use: Not Currently    Drug use: Not Currently     Types: Marijuana     Comment: none currently         Family HX      Family History   Problem Relation Age of Onset    Colon cancer Mother 53        partial colectomy    Stroke Mother     Heart disease Mother     Breast cancer Sister     Diabetes Sister     Throat cancer Maternal Aunt         smoking hx    Lung cancer Maternal Uncle         hx smoking    No Known Problems Maternal Grandmother     Prostate cancer Maternal Grandfather 70    Alcohol abuse Maternal Grandfather     No Known Problems Paternal Grandmother     No Known Problems Paternal Grandfather     Diabetes Brother     Diabetes Brother     Diabetes Brother     Breast cancer Other 37        chemotherapy, planning for BL mastectomy    No Known Problems Other             REVIEW OF SYSTEMS  General: Denies any fever or chills  Chest: Denies shortness of breath,  "wheezing, coughing, or sputum production  Heart: Denies chest pain.  As noted above and per history of current illness above, otherwise negative in the remainder of the 14 systems.     PHYSICAL EXAM  Vitals:    09/14/23 0957   Weight: 122.9 kg (271 lb)   Height: 5' 7" (1.702 m)   PainSc: 0-No pain       GEN:  This patient is well-developed, well-nourished and appears stated age, well-oriented to person, place and time, and cooperative and pleasant on today's visit.      LOWER EXTREMITY    VASCULAR  DP pedal pulse 2/4 RIGHT, LEFT2/4   PT pedal pulse 2/4 RIGHT, LEFT2/4  Capillary refill time immediate to the toes.   Feet are warm to the touch. Skin temperature warm to warm from proximally to distally   There are no varicosities, telangiectasias noted to bilateral foot and ankle regions.   There are no ecchymoses noted to bilateral foot and ankle regions.   There is mild left gross lower extremity edema.    DERMATOLOGIC  Skin moist with healthy texture and turgor.  Thickened, dystrophic, TRIMMED discolored toenails with subungal debris 1-5 b/l   There are no open ulcerations, lacerations, or fissures to bilateral foot and ankle regions. There are no signs of infection as there is no erythema, no proximal-extending lymphangiitis, no fluctuance, or crepitus noted on palpation to bilateral foot and ankle regions.   There is no interdigital maceration.   There are diffuse plantar hyperkeratotic lesions noted to feet.    NEUROLOGIC  Protective sensation intact at 10/10 sites upon examination with [place] Weinsten 5.07 g monofilament.  Propioception intact at 1st MTPJ b/l.  Achilles and patellar deep tendon reflexes intact  Babinski reflex absent    ORTHOPEDIC/BIOMECHANICAL  No symptomatic structural abnormalities noted. Muscle strength is 5/5 for foot inverters, everters, plantarflexors, and dorsiflexors. Muscle tone is normal.  I  nspection/palpation of bone, joints and muscles unremarkable.    ASSESSMENT  Corn or " callus    Type 2 diabetes mellitus with other specified complication, without long-term current use of insulin  -     Ambulatory referral/consult to Podiatry    Comprehensive diabetic foot examination, type 2 DM, encounter for    Other orders  -     ammonium lactate 12 % Crea; Apply 1 Act topically 2 (two) times daily. To feet.  Dispense: 140 g; Refill: 2        PLAN  -The patient was examined and evaulated  -Discuss presenting problems, etiology, pathologic processes and management options with patient today.     I counseled the patient on his/her Diabetic Mellitus regarding today's clinical examination and objection findings. We did also discuss recent medication changes, pertinent labs and imaging evaluations and other medical consultation notes and progress notes. Greater than 50% of this visit was spent on counseling and coordination of care. Greater than 20 minutes of this appt. was spent on education about the diabetic foot, in relation to PVD and/or neuropathy, and the prevention of limb loss. Patient received these instructions in written format and discussed verbally.     Comprehensive in depth discussion provided in written format in regards to diabetic/at risk foot health and amputation prevention. Summary as noted below:  1. Anti-diabetic medications should be taken regularly to prevent complications.  2. Feet should be washed daily.  3. Lukewarm water should be used to wash feet.   4. The temperature of the water should be checked before washing feet.   5. Feet should be dried completely after washing and in between toes.  6. Talcum powder (Zeasorb ( should be used to keep the areas betweent he toes dry.   7. Lotion or moisturizing cream should be applied to the fet daily to prevent dryness of the skin. Examples provided to patient OTC.  8. Lotion should not be applied between the toes.  9. Socks should be changed daily. I recommend white socks in order to be able to note any drainage or bleeding if  injury occurs.  10. Toenails should be trimmed straight across (if applicable).  11. Feet should be inspected at least once a day.  12. Diabetic patients should wear comfortable shoes. Examples provided.   13. The inside of the shoes should be inspected before wearing them.  14. Diabetic patients should not walk barefoot.   15. Diabetic patients should consult their podiatrist if their feet have redness, blisters, cuts, or wounds.       Shoe gear is inspected and wear and proper fit/type. If applicable/covered, pt was provided prescription for diabetic shoes and/or custom molded inserts.  Shoe Fitting recommendations:  Have foot measured while standing.  Try on both shoes and walk around to be sure shoes are comfortable.  Allow at least a thumbs width of space at end of longest toe in shoes. Make sure you can wiggle toes inside shoe.  Try on shoes at end of the day due to foot swelling.   Look for shoes with a round and wide flexible  toe box, extra cushion, and thick/stiff heel. Check inside shoes and avoid thick seams.   Breathable anti-microbial or moisture wicking fabric is preferred. Leather uppers are optimal.     Patient is reminded of the importance of good nutrition and blood sugar control to help prevent podiatric complications of diabetes. I discussed proper blood glucose control and co-management with diabetes education team and patient's PCP and/or Endocrinology Advanced Practice Provider.  Patient  will continue to monitor the areas daily, inspect feet, wear protective shoe gear when ambulatory, moisturizer to maintain skin integrity.    Follow up as scheduled below or sooner if any problem arises with foot and/or ankle.       Disclaimer: This note was partially prepared using a voice recognition system and is likely to have sound alike errors within the text.        Future Appointments   Date Time Provider Department Center   9/14/2023 11:30 AM Marciano Edwards, RADHA [facility]    9/20/2023  1:45 PM Allbritton, Marciano, PT [facility] R[facility] [place]   9/22/2023  8:15 AM Allbritton, Marciano, PT [facility] [place]   9/25/2023  8:15 AM Allbritton, Marciano, PT [facility] [place]   9/26/2023  2:00 PM Talia Espinosa, NP NOM ENDODIA [facility]   9/27/2023  8:30 AM Alexandra Trent, RD, CDE [facility] DIBEDU [place]   9/27/2023  2:00 PM Patito Jaquez, PhD, MPAP [facility] PSYCH [place]   9/27/2023  2:30 PM Allbritton, Marciano, PT [facility] [place]   9/28/2023 11:15 AM Riki Lamb MD [facility] NEUROS8 [facility]   10/2/2023  9:45 AM Allbritton, Marciano, PT [facility] [place]   10/3/2023 10:00 AM Elli Huang, NP [facility] BREAST [place]   10/3/2023 11:30 AM Carmen Singh, NP [facility] NEURO [place]   10/4/2023 10:00 AM Allbritton, Marciano, PT [facility] [place]   10/5/2023  9:20 AM Sam Garcia MD [facility] [place]   10/6/2023 11:15 AM Allbritton, Marciano, PT [facility] [place]   10/9/2023  9:45 AM Allbritton, Marciano, PT [facility] [place]   10/11/2023  7:15 AM Bisi Cochran MD [facility] INT ZURI [place]   10/11/2023  8:30 AM Allbritton, Marciano, PT [facility] [place]   10/13/2023  9:45 AM Allbritton, Marciano, PT [facility] [place]   10/16/2023  9:45 AM Allbritton, Marciano, PT [facility] [place]   10/18/2023 10:00 AM Marciano Edwards, PT [facility]   10/20/2023  9:45 AM Marciano Edwards, PT [facility]   11/3/2023  8:15 AM Issac Pablo MD [facility] UROLOGY [facility]   11/8/2023  1:30 PM Pj Mcpherson, NP [facility] HEM ONC [facility]   11/15/2023 10:30 AM Shavonne Nuñze, FNP [facility] DIABETE [facility]   12/19/2023  3:45 PM Issac Pablo MD [facility] UROLOGY [facility]   2/9/2024 10:30 AM [facility] [id number]-SCR [facility] MAMMO [facility]   2/12/2024 10:40 AM Asim Johnston MD [facility] CARDIO [place]   7/3/2024  9:30 AM LABORATORY, HGV [facility] LAB [facility]       Report Electronically Signed By:     Tiny THAO  DESIRAE Lyn   Podiatry  [facility]  9/14/2023

## 2023-09-14 NOTE — PROGRESS NOTES
[facility]   Physical Therapy Treatment Note        Name: Sangeetha Hannon [facility] Number: [id number]    Therapy Diagnosis:   Encounter Diagnoses   Name Primary?    Decreased strength of lower extremity Yes    Gait abnormality     Chronic pain of both knees     Decreased functional activity tolerance      Physician: Donaldo Sumner MD    Visit Date: 9/14/2023       Physician Orders: PT Eval and Treat  Medical Diagnosis from Referral: Primary osteoarthritis of both knees [M17.0], Patellofemoral pain syndrome of both knees [M22.2X1, M22.2X2]  Evaluation Date: 9/12/2023  Authorization Period Expiration: 8/30/2024  Plan of Care Expiration: 12/5/2023  Progress Note Due: 10/10/2023  Visit # / Visits authorized: 1/20 (+1 for authorization)  FOTO: 1/3 (last performed on 9/12/2023)     Precautions: Standard, Diabetes, blood thinners, Fall, and cancer (in remission 3 years)  PTA Visit #: 0/5     Time In: 11:35  Time Out: 12:25  Total Billable Time: 40 minutes (Billing reflects 1 on 1 treatment time spent with patient)    Subjective     Patient reports: That she had some difficulty with the home exercises and would like to go through them again to ensure she is doing them correctly .    He/She was compliant with home exercise program.  Response to previous treatment: Initial eval  Functional change: N/A    Pain: 4/10     Location: Bilateral Knees     Objective      Objective Measures updated at progress report or POC update only unless otherwise noted.       Treatment     Sangeetha received the treatments listed below:         MANUAL THERAPY TECHNIQUES were applied for (0) minutes, including:    Manual Intervention Performed Today    Soft Tissue Mobilization []      Joint Mobilizations []     []     []    Functional Dry Needling  []      Plan for Next Visit: Continue as needed           THERAPEUTIC EXERCISES to develop strength, endurance, ROM, flexibility, posture, and core stabilization for  "(15) minutes including:    Intervention Performed Today    Nu step x  10 mins   Hamstring stretch x  10" x 5 bilateral   Piriformis stretch x  10" x 5 bilateral                               Plan for Next Visit:            NEUROMUSCULAR RE-EDUCATION ACTIVITIES to improve Balance, Coordination, Kinesthetic, Sense, Proprioception, and Posture for (25) minutes.  The following were included:    Intervention Performed Today    Bridges x  2x10    Clam shells x  2x 10 bilateral   SLR x  2x 10 bilateral   LAQ x  2x10 bilateral 5#   SL hip abduction x 2x10                    Plan for Next Visit:        Patient Education and Home Exercises       Home Exercises Provided and Patient Education Provided     Education provided: (5) minutes  PURPOSE: Patient educated on the impairments noted above and the effects of physical therapy intervention to improve overall condition and QOL.   EXERCISE: Patient was educated on all the above exercise prior/during/after for proper posture, positioning, and execution for safe performance with home exercise program.   STRENGTH: Patient educated on the importance of improved core and extremity strength in order to improve alignment of the spine and extremities with static positions and dynamic movement.     Written Home Exercises Provided: yes.  Exercises were reviewed and Sangeetha was able to demonstrate them prior to the end of the session.  Sangeetha demonstrated good  understanding of the education provided. See EMR under Patient Instructions for exercises provided during therapy sessions.    Assessment     Pt tolerated treatment well with only subjective reports of muscle fatigue. Pt needed verbal and tactile cueing for lumbo pelvic coordination with lateral hip exercises.     Sangeetha is progressing well towards her goals.   Patient prognosis is Good.     Patient will continue to benefit from skilled outpatient physical therapy to address the deficits listed in the problem list box on initial " evaluation, provide pt/family education and to maximize patient's level of independence in the home and community environment.     Patient's spiritual, cultural and educational needs considered and pt agreeable to plan of care and goals.     Anticipated Barriers for therapy: co-morbidities, sedentary lifestyle, and chronicity of condition       Short Term Goals:  6 weeks Status  Date Met   PAIN: Pt will report worst pain of 6/10 in order to progress toward max functional ability and improve quality of life. [] Progressing  [] Met  [] Not Met     FUNCTION: Patient will demonstrate improved function as indicated by a score of greater than or equal to 34 out of 100 on FOTO. [] Progressing  [] Met  [] Not Met     MOBILITY: Patient will improve AROM to 50% of stated goals, listed in objective measures above, in order to progress towards independence with functional activities.  [] Progressing  [] Met  [] Not Met     STRENGTH: Patient will improve strength to 50% of stated goals, listed in objective measures above, in order to progress towards independence with functional activities. [] Progressing  [] Met  [] Not Met     POSTURE: Patient will correct postural deviations in sitting and standing, to decrease pain and promote long term stability.  [] Progressing  [] Met  [] Not Met     GAIT: Patient will demonstrate improved gait mechanics including Increased stance time, step length and heel strike bilaterally in order to improve functional mobility, improve quality of life, and decrease risk of further injury or fall.  [] Progressing  [] Met  [] Not Met     HEP: Patient will demonstrate independence with HEP in order to progress toward functional independence. [] Progressing  [] Met  [] Not Met        Long Term Goals:  12 weeks Status Date Met   PAIN: Pt will report worst pain less than or equal to 2/10 in order to progress toward max functional ability and improve quality of life [] Progressing  [] Met  [] Not Met      FUNCTION: Patient will demonstrate improved function as indicated by a score of greater than or equal to 42 out of 100 on FOTO. [] Progressing  [] Met  [] Not Met     MOBILITY: Patient will improve AROM to stated goals, listed in objective measures above, in order to return to maximal functional potential and improve quality of life.  [] Progressing  [] Met  [] Not Met     STRENGTH: Patient will improve strength to stated goals, listed in objective measures above, in order to improve functional independence and quality of life. [] Progressing  [] Met  [] Not Met     GAIT: Patient will demonstrate normalized gait mechanics with minimal compensation in order to return to PLOF. [] Progressing  [] Met  [] Not Met     Patient will return to normal ADL's, IADL's, community involvement, recreational activities, and work-related activities with less than or equal to 2/10 pain and maximal function.  [] Progressing  [] Met  [] Not Met          Plan     Continue Plan of Care (POC) and progress per patient tolerance. See treatment section for details on planned progressions next session.      Marciano Edwards, PT, DPT

## 2023-09-22 ENCOUNTER — TELEPHONE (OUTPATIENT)
Dept: REHABILITATION | Facility: HOSPITAL | Age: 43
End: 2023-09-22
Payer: MEDICARE

## 2023-09-22 NOTE — TELEPHONE ENCOUNTER
Name: Sangeetha June  Clinic Number: [id number]      Call Date: 9/22/2023    Reason for call: Pt was beyond 15 minutes late for her appointment.     Message Left:  Sangeetha June was called but her mailbox was full, so no message was left    Plan: Follow up with patient on her next visit.     Marcaino Edwards, PT, DPT

## 2023-09-25 ENCOUNTER — CLINICAL SUPPORT (OUTPATIENT)
Dept: REHABILITATION | Facility: HOSPITAL | Age: 43
End: 2023-09-25
Payer: MEDICARE

## 2023-09-25 DIAGNOSIS — M25.562 CHRONIC PAIN OF BOTH KNEES: ICD-10-CM

## 2023-09-25 DIAGNOSIS — R26.9 GAIT ABNORMALITY: ICD-10-CM

## 2023-09-25 DIAGNOSIS — G89.29 CHRONIC PAIN OF BOTH KNEES: ICD-10-CM

## 2023-09-25 DIAGNOSIS — M25.561 CHRONIC PAIN OF BOTH KNEES: ICD-10-CM

## 2023-09-25 DIAGNOSIS — R29.898 DECREASED STRENGTH OF LOWER EXTREMITY: Primary | ICD-10-CM

## 2023-09-25 DIAGNOSIS — R68.89 DECREASED FUNCTIONAL ACTIVITY TOLERANCE: ICD-10-CM

## 2023-09-25 PROCEDURE — 97110 THERAPEUTIC EXERCISES: CPT | Mod: HCNC

## 2023-09-25 PROCEDURE — 97112 NEUROMUSCULAR REEDUCATION: CPT | Mod: HCNC

## 2023-09-25 NOTE — PROGRESS NOTES
[facility]   Physical Therapy Treatment Note        Name: Sangeetha Hannon [facility] Number: [id number]    Therapy Diagnosis:   Encounter Diagnoses   Name Primary?    Decreased strength of lower extremity Yes    Gait abnormality     Chronic pain of both knees     Decreased functional activity tolerance        Physician: Donaldo Sumner MD    Visit Date: 9/25/2023       Physician Orders: PT Eval and Treat  Medical Diagnosis from Referral: Primary osteoarthritis of both knees [M17.0], Patellofemoral pain syndrome of both knees [M22.2X1, M22.2X2]  Evaluation Date: 9/12/2023  Authorization Period Expiration: 8/30/2024  Plan of Care Expiration: 12/5/2023  Progress Note Due: 10/10/2023  Visit # / Visits authorized: 2/20 (+1 for authorization)  FOTO: 1/3 (last performed on 9/12/2023)     Precautions: Standard, Diabetes, blood thinners, Fall, and cancer (in remission 3 years)  PTA Visit #: 0/5     Time In: 8:18 AM  Time Out: 9:03 AM  Total Billable Time: 40 minutes (Billing reflects 1 on 1 treatment time spent with patient)    Subjective     Patient reports: That her big toe has been going numb since I last saw her, and she pulled a muscle in her back.     He/She was compliant with home exercise program.  Response to previous treatment: Educated on HEP exercises with proper form  Functional change: relative understanding/ independence with HEP exercises.    Pain: 4/10     Location: Bilateral Knees     Objective      Objective Measures updated at progress report or POC update only unless otherwise noted.       Treatment     Sangeetha received the treatments listed below:       MANUAL THERAPY TECHNIQUES were applied for (0) minutes, including:    Manual Intervention Performed Today    Soft Tissue Mobilization []      Joint Mobilizations []     []     []    Functional Dry Needling  []      Plan for Next Visit: Continue as needed           THERAPEUTIC EXERCISES to develop strength, endurance, ROM,  "flexibility, posture, and core stabilization for (15) minutes including:    Intervention Performed Today    Nu step x  10 mins   Hamstring stretch x  10" x 5 bilateral   Piriformis stretch x  10" x 5 bilateral                               Plan for Next Visit:            NEUROMUSCULAR RE-EDUCATION ACTIVITIES to improve Balance, Coordination, Kinesthetic, Sense, Proprioception, and Posture for (25) minutes.  The following were included:    Intervention Performed Today    Bridges x  2x10    Clam shells x  2x 10 bilateral  YTB   SLR x  2x 10 bilateral 3#   LAQ x  2x10 bilateral 8#   SL hip abduction  2x10   Box Squats x  2x10 20" box   Sciatic nerve flossing x  20x B   Peroneal nerve flossing.  x  20x B          Plan for Next Visit:        Patient Education and Home Exercises       Home Exercises Provided and Patient Education Provided     Education provided: (Included in treatment time) minutes  PURPOSE: Patient educated on the impairments noted above and the effects of physical therapy intervention to improve overall condition and QOL.   EXERCISE: Patient was educated on all the above exercise prior/during/after for proper posture, positioning, and execution for safe performance with home exercise program.   STRENGTH: Patient educated on the importance of improved core and extremity strength in order to improve alignment of the spine and extremities with static positions and dynamic movement.     Written Home Exercises Provided: yes.  Exercises were reviewed and Sangeetha was able to demonstrate them prior to the end of the session.  Sangeetha demonstrated good  understanding of the education provided. See EMR under Patient Instructions for exercises provided during therapy sessions.    Assessment     Pt tolerated session well with only muscle fatigue, was unable to relieve sensation of great toe numbness with nerve flossing. Instructed patient to continue to monitor symptoms and if they numbness gets worse or begins to spread " to follow up with her MD.    Sangeetha is progressing well towards her goals.   Patient prognosis is Good.     Patient will continue to benefit from skilled outpatient physical therapy to address the deficits listed in the problem list box on initial evaluation, provide pt/family education and to maximize patient's level of independence in the home and community environment.     Patient's spiritual, cultural and educational needs considered and pt agreeable to plan of care and goals.     Anticipated Barriers for therapy: co-morbidities, sedentary lifestyle, and chronicity of condition       Short Term Goals:  6 weeks Status  Date Met   PAIN: Pt will report worst pain of 6/10 in order to progress toward max functional ability and improve quality of life. [] Progressing  [] Met  [] Not Met     FUNCTION: Patient will demonstrate improved function as indicated by a score of greater than or equal to 34 out of 100 on FOTO. [] Progressing  [] Met  [] Not Met     MOBILITY: Patient will improve AROM to 50% of stated goals, listed in objective measures above, in order to progress towards independence with functional activities.  [] Progressing  [] Met  [] Not Met     STRENGTH: Patient will improve strength to 50% of stated goals, listed in objective measures above, in order to progress towards independence with functional activities. [] Progressing  [] Met  [] Not Met     POSTURE: Patient will correct postural deviations in sitting and standing, to decrease pain and promote long term stability.  [] Progressing  [] Met  [] Not Met     GAIT: Patient will demonstrate improved gait mechanics including Increased stance time, step length and heel strike bilaterally in order to improve functional mobility, improve quality of life, and decrease risk of further injury or fall.  [] Progressing  [] Met  [] Not Met     HEP: Patient will demonstrate independence with HEP in order to progress toward functional independence. [] Progressing  []  Met  [] Not Met        Long Term Goals:  12 weeks Status Date Met   PAIN: Pt will report worst pain less than or equal to 2/10 in order to progress toward max functional ability and improve quality of life [] Progressing  [] Met  [] Not Met     FUNCTION: Patient will demonstrate improved function as indicated by a score of greater than or equal to 42 out of 100 on FOTO. [] Progressing  [] Met  [] Not Met     MOBILITY: Patient will improve AROM to stated goals, listed in objective measures above, in order to return to maximal functional potential and improve quality of life.  [] Progressing  [] Met  [] Not Met     STRENGTH: Patient will improve strength to stated goals, listed in objective measures above, in order to improve functional independence and quality of life. [] Progressing  [] Met  [] Not Met     GAIT: Patient will demonstrate normalized gait mechanics with minimal compensation in order to return to PLOF. [] Progressing  [] Met  [] Not Met     Patient will return to normal ADL's, IADL's, community involvement, recreational activities, and work-related activities with less than or equal to 2/10 pain and maximal function.  [] Progressing  [] Met  [] Not Met          Plan     Continue Plan of Care (POC) and progress per patient tolerance. See treatment section for details on planned progressions next session.      Marciano Edwards, PT, DPT

## 2023-09-27 ENCOUNTER — TELEPHONE (OUTPATIENT)
Dept: DIABETES | Facility: CLINIC | Age: 43
End: 2023-09-27
Payer: MEDICARE

## 2023-09-28 ENCOUNTER — TELEPHONE (OUTPATIENT)
Dept: PSYCHIATRY | Facility: CLINIC | Age: 43
End: 2023-09-28
Payer: MEDICARE

## 2023-09-28 ENCOUNTER — OFFICE VISIT (OUTPATIENT)
Dept: NEUROSURGERY | Facility: CLINIC | Age: 43
End: 2023-09-28
Payer: MEDICARE

## 2023-09-28 VITALS
DIASTOLIC BLOOD PRESSURE: 68 MMHG | HEIGHT: 67 IN | BODY MASS INDEX: 42.53 KG/M2 | SYSTOLIC BLOOD PRESSURE: 95 MMHG | WEIGHT: 271 LBS | HEART RATE: 73 BPM

## 2023-09-28 DIAGNOSIS — D32.9 MENINGIOMA: ICD-10-CM

## 2023-09-28 PROCEDURE — 3066F NEPHROPATHY DOC TX: CPT | Mod: HCNC,CPTII,S$GLB, | Performed by: NEUROLOGICAL SURGERY

## 2023-09-28 PROCEDURE — 3061F NEG MICROALBUMINURIA REV: CPT | Mod: HCNC,CPTII,S$GLB, | Performed by: NEUROLOGICAL SURGERY

## 2023-09-28 PROCEDURE — 3074F PR MOST RECENT SYSTOLIC BLOOD PRESSURE < 130 MM HG: ICD-10-PCS | Mod: HCNC,CPTII,S$GLB, | Performed by: NEUROLOGICAL SURGERY

## 2023-09-28 PROCEDURE — 99204 PR OFFICE/OUTPT VISIT, NEW, LEVL IV, 45-59 MIN: ICD-10-PCS | Mod: HCNC,S$GLB,, | Performed by: NEUROLOGICAL SURGERY

## 2023-09-28 PROCEDURE — 1160F RVW MEDS BY RX/DR IN RCRD: CPT | Mod: HCNC,CPTII,S$GLB, | Performed by: NEUROLOGICAL SURGERY

## 2023-09-28 PROCEDURE — 99999 PR PBB SHADOW E&M-EST. PATIENT-LVL III: CPT | Mod: PBBFAC,HCNC,, | Performed by: NEUROLOGICAL SURGERY

## 2023-09-28 PROCEDURE — 1159F MED LIST DOCD IN RCRD: CPT | Mod: HCNC,CPTII,S$GLB, | Performed by: NEUROLOGICAL SURGERY

## 2023-09-28 PROCEDURE — 1160F PR REVIEW ALL MEDS BY PRESCRIBER/CLIN PHARMACIST DOCUMENTED: ICD-10-PCS | Mod: HCNC,CPTII,S$GLB, | Performed by: NEUROLOGICAL SURGERY

## 2023-09-28 PROCEDURE — 3078F DIAST BP <80 MM HG: CPT | Mod: HCNC,CPTII,S$GLB, | Performed by: NEUROLOGICAL SURGERY

## 2023-09-28 PROCEDURE — 3061F PR NEG MICROALBUMINURIA RESULT DOCUMENTED/REVIEW: ICD-10-PCS | Mod: HCNC,CPTII,S$GLB, | Performed by: NEUROLOGICAL SURGERY

## 2023-09-28 PROCEDURE — 99999 PR PBB SHADOW E&M-EST. PATIENT-LVL III: ICD-10-PCS | Mod: PBBFAC,HCNC,, | Performed by: NEUROLOGICAL SURGERY

## 2023-09-28 PROCEDURE — 99204 OFFICE O/P NEW MOD 45 MIN: CPT | Mod: HCNC,S$GLB,, | Performed by: NEUROLOGICAL SURGERY

## 2023-09-28 PROCEDURE — 3044F PR MOST RECENT HEMOGLOBIN A1C LEVEL <7.0%: ICD-10-PCS | Mod: HCNC,CPTII,S$GLB, | Performed by: NEUROLOGICAL SURGERY

## 2023-09-28 PROCEDURE — 3008F BODY MASS INDEX DOCD: CPT | Mod: HCNC,CPTII,S$GLB, | Performed by: NEUROLOGICAL SURGERY

## 2023-09-28 PROCEDURE — 3044F HG A1C LEVEL LT 7.0%: CPT | Mod: HCNC,CPTII,S$GLB, | Performed by: NEUROLOGICAL SURGERY

## 2023-09-28 PROCEDURE — 3066F PR DOCUMENTATION OF TREATMENT FOR NEPHROPATHY: ICD-10-PCS | Mod: HCNC,CPTII,S$GLB, | Performed by: NEUROLOGICAL SURGERY

## 2023-09-28 PROCEDURE — 3078F PR MOST RECENT DIASTOLIC BLOOD PRESSURE < 80 MM HG: ICD-10-PCS | Mod: HCNC,CPTII,S$GLB, | Performed by: NEUROLOGICAL SURGERY

## 2023-09-28 PROCEDURE — 3008F PR BODY MASS INDEX (BMI) DOCUMENTED: ICD-10-PCS | Mod: HCNC,CPTII,S$GLB, | Performed by: NEUROLOGICAL SURGERY

## 2023-09-28 PROCEDURE — 1159F PR MEDICATION LIST DOCUMENTED IN MEDICAL RECORD: ICD-10-PCS | Mod: HCNC,CPTII,S$GLB, | Performed by: NEUROLOGICAL SURGERY

## 2023-09-28 PROCEDURE — 3074F SYST BP LT 130 MM HG: CPT | Mod: HCNC,CPTII,S$GLB, | Performed by: NEUROLOGICAL SURGERY

## 2023-09-28 NOTE — TELEPHONE ENCOUNTER
----- Message from Mary Kate Barber sent at 9/27/2023  9:26 AM CDT -----  Contact: patient  Sangeetha June would like a call back at [phone], in regards to rescheduling the appt that she had to cancel on today.

## 2023-09-28 NOTE — PROGRESS NOTES
Sangeetha June was seen in neurosurgical consultation at the office today.  She is a 43-year-old lady who was found to have breast cancer and underwent left mastectomy on 06/24/2020 and treated with Taxol.  She began to complain of speech difficulty and memory loss and MRI showed no evidence for metastasis but did show a small 1 cm left convexity meningioma.  She was referred back for neurosurgical evaluation.  She says she experiences headaches, these tend to be bitemporal.  She feels her vision is blurry but this is improved with glasses.  She has noted difficulty expressing herself.  She feels her balance is not good.  Past medical history includes diabetes treated with metformin and hypertension treated with HydroDIURIL.  She has been unable to returned to work.    On physical examination she is a well-developed, moderately obese black lady who is alert and cooperative.  Examination of the head shows some tenderness over the temporalis muscles bilaterally more on the left.  Eyes show full extraocular movements, pupils are equal reactive to light and fundi show clear disc margins.  Hearing is somewhat decreased in the left ear compared to the right and midline tuning fork is referred more to the right.  She is moving neck freely.  On neurological examination she has hesitant speech but is able to make herself understood.  Finger-to-nose was done well, gait is a little slow but not ataxic.  Cranial nerves otherwise intact, she has normal facial sensation and movement and the tongue protrudes in the midline.  She shows good strength extremities, normal sensation, hypoactive but symmetrical deep tendon reflexes.    MRI of the brain done at [facility] on 12/29/2022 was reviewed.  Ventricular size is normal.  There is a small 1 cm enhancing tumor on the left frontal convexity consistent with a small meningioma.  This is unchanged from previous scans of 10/08/2021 and 05/24/2021 done at [facility].    Impression:  Left convexity meningioma.    The small tumor appears quite benign.  It only slightly indents the brain and there is no associated edema.  This can be followed up in about a year.

## 2023-10-03 ENCOUNTER — TELEPHONE (OUTPATIENT)
Dept: NEUROLOGY | Facility: CLINIC | Age: 43
End: 2023-10-03
Payer: MEDICARE

## 2023-10-03 NOTE — TELEPHONE ENCOUNTER
----- Message from Julienne Kapoor sent at 10/3/2023 11:57 AM CDT -----  Contact: Sangeetha  Patient is calling to speak with a nurse regarding appt . Pt would like to be seen later today if possible please give a call back at [phone] .

## 2023-10-04 ENCOUNTER — CLINICAL SUPPORT (OUTPATIENT)
Dept: REHABILITATION | Facility: HOSPITAL | Age: 43
End: 2023-10-04
Payer: MEDICARE

## 2023-10-04 DIAGNOSIS — R68.89 DECREASED FUNCTIONAL ACTIVITY TOLERANCE: ICD-10-CM

## 2023-10-04 DIAGNOSIS — M25.562 CHRONIC PAIN OF BOTH KNEES: ICD-10-CM

## 2023-10-04 DIAGNOSIS — G89.29 CHRONIC PAIN OF BOTH KNEES: ICD-10-CM

## 2023-10-04 DIAGNOSIS — R26.9 GAIT ABNORMALITY: ICD-10-CM

## 2023-10-04 DIAGNOSIS — M25.561 CHRONIC PAIN OF BOTH KNEES: ICD-10-CM

## 2023-10-04 DIAGNOSIS — R29.898 DECREASED STRENGTH OF LOWER EXTREMITY: Primary | ICD-10-CM

## 2023-10-04 PROCEDURE — 97110 THERAPEUTIC EXERCISES: CPT | Mod: HCNC

## 2023-10-04 PROCEDURE — 97112 NEUROMUSCULAR REEDUCATION: CPT | Mod: HCNC

## 2023-10-04 NOTE — PROGRESS NOTES
[facility]   Physical Therapy Treatment Note        Name: Sangeetha [facility]annon [facility] Number: [id number]    Therapy Diagnosis:   Encounter Diagnoses   Name Primary?    Decreased strength of lower extremity Yes    Gait abnormality     Chronic pain of both knees     Decreased functional activity tolerance          Physician: Donaldo Sumner MD    Visit Date: 10/4/2023       Physician Orders: PT Eval and Treat  Medical Diagnosis from Referral: Primary osteoarthritis of both knees [M17.0], Patellofemoral pain syndrome of both knees [M22.2X1, M22.2X2]  Evaluation Date: 9/12/2023  Authorization Period Expiration: 8/30/2024  Plan of Care Expiration: 12/5/2023  Progress Note Due: 10/10/2023  Visit # / Visits authorized: 3/20 (+1 for authorization)  FOTO: 1/3 (last performed on 9/12/2023)     Precautions: Standard, Diabetes, blood thinners, Fall, and cancer (in remission 3 years)  PTA Visit #: 0/5     Time In: 10:10 AM  Time Out: 11:00 AM  Total Billable Time: 40 minutes (Billing reflects 1 on 1 treatment time spent with patient)    Subjective     Patient reports: That her knees have been bothering her, but due to personal life issues she has had to move out of her boyfriends house and move in with her other family, so she has not been compliant with her HEP.    He/She Was not compliant with home exercise program.  Response to previous treatment: Educated on importance of home stretching and exercises.   Functional change: relative understanding/ independence with HEP exercises.    Pain: 4/10     Location: Bilateral Knees     Objective      Objective Measures updated at progress report or POC update only unless otherwise noted.       Treatment     Sangeetha received the treatments listed below:       THERAPEUTIC EXERCISES to develop strength, endurance, ROM, flexibility, posture, and core stabilization for (15) minutes including:    Intervention Performed Today    Nu step x  10 mins   Hamstring  "stretch x  10" x 5 bilateral   Piriformis stretch x  10" x 5 bilateral    Seated Knee extension machine x  3x 10 bilateral   Leg press machine                      Plan for Next Visit:            NEUROMUSCULAR RE-EDUCATION ACTIVITIES to improve Balance, Coordination, Kinesthetic, Sense, Proprioception, and Posture for (25) minutes.  The following were included:    Intervention Performed Today    Bridges x  2x10    Clam shells x  2x 10 bilateral  YTB   SLR x  2x 10 bilateral 3#   SL hip abduction x 2x10   Box Squats x  2x10 20" box   Standing hip abd, ext  x  20x bilateral each way           Plan for Next Visit:        Patient Education and Home Exercises       Home Exercises Provided and Patient Education Provided     Education provided: (Included in treatment time) minutes  PURPOSE: Patient educated on the impairments noted above and the effects of physical therapy intervention to improve overall condition and QOL.   EXERCISE: Patient was educated on all the above exercise prior/during/after for proper posture, positioning, and execution for safe performance with home exercise program.   STRENGTH: Patient educated on the importance of improved core and extremity strength in order to improve alignment of the spine and extremities with static positions and dynamic movement.     Written Home Exercises Provided: yes.  Exercises were reviewed and Sangeetha was able to demonstrate them prior to the end of the session.  Sangeetha demonstrated good  understanding of the education provided. See EMR under Patient Instructions for exercises provided during therapy sessions.    Assessment     Pt tolerated session well with progression of some of her strengthening and motor control exercises. Patient needs consistent verbal and tactile cueing for lumbo pelvic coordination    Sangeetha is progressing well towards her goals.   Patient prognosis is Good.     Patient will continue to benefit from skilled outpatient physical therapy to address the " deficits listed in the problem list box on initial evaluation, provide pt/family education and to maximize patient's level of independence in the home and community environment.     Patient's spiritual, cultural and educational needs considered and pt agreeable to plan of care and goals.     Anticipated Barriers for therapy: co-morbidities, sedentary lifestyle, and chronicity of condition       Short Term Goals:  6 weeks Status  Date Met   PAIN: Pt will report worst pain of 6/10 in order to progress toward max functional ability and improve quality of life. [] Progressing  [] Met  [] Not Met     FUNCTION: Patient will demonstrate improved function as indicated by a score of greater than or equal to 34 out of 100 on FOTO. [] Progressing  [] Met  [] Not Met     MOBILITY: Patient will improve AROM to 50% of stated goals, listed in objective measures above, in order to progress towards independence with functional activities.  [] Progressing  [] Met  [] Not Met     STRENGTH: Patient will improve strength to 50% of stated goals, listed in objective measures above, in order to progress towards independence with functional activities. [] Progressing  [] Met  [] Not Met     POSTURE: Patient will correct postural deviations in sitting and standing, to decrease pain and promote long term stability.  [] Progressing  [] Met  [] Not Met     GAIT: Patient will demonstrate improved gait mechanics including Increased stance time, step length and heel strike bilaterally in order to improve functional mobility, improve quality of life, and decrease risk of further injury or fall.  [] Progressing  [] Met  [] Not Met     HEP: Patient will demonstrate independence with HEP in order to progress toward functional independence. [] Progressing  [] Met  [] Not Met        Long Term Goals:  12 weeks Status Date Met   PAIN: Pt will report worst pain less than or equal to 2/10 in order to progress toward max functional ability and improve  quality of life [] Progressing  [] Met  [] Not Met     FUNCTION: Patient will demonstrate improved function as indicated by a score of greater than or equal to 42 out of 100 on FOTO. [] Progressing  [] Met  [] Not Met     MOBILITY: Patient will improve AROM to stated goals, listed in objective measures above, in order to return to maximal functional potential and improve quality of life.  [] Progressing  [] Met  [] Not Met     STRENGTH: Patient will improve strength to stated goals, listed in objective measures above, in order to improve functional independence and quality of life. [] Progressing  [] Met  [] Not Met     GAIT: Patient will demonstrate normalized gait mechanics with minimal compensation in order to return to PLOF. [] Progressing  [] Met  [] Not Met     Patient will return to normal ADL's, IADL's, community involvement, recreational activities, and work-related activities with less than or equal to 2/10 pain and maximal function.  [] Progressing  [] Met  [] Not Met          Plan     Continue Plan of Care (POC) and progress per patient tolerance. See treatment section for details on planned progressions next session.      Marciano Edwards, PT, DPT

## 2023-10-09 ENCOUNTER — DOCUMENTATION ONLY (OUTPATIENT)
Dept: REHABILITATION | Facility: HOSPITAL | Age: 43
End: 2023-10-09
Payer: MEDICARE

## 2023-10-09 ENCOUNTER — TELEPHONE (OUTPATIENT)
Dept: REHABILITATION | Facility: HOSPITAL | Age: 43
End: 2023-10-09
Payer: MEDICARE

## 2023-10-09 PROBLEM — R26.9 GAIT ABNORMALITY: Status: RESOLVED | Noted: 2023-09-12 | Resolved: 2023-10-09

## 2023-10-09 PROBLEM — R68.89 DECREASED FUNCTIONAL ACTIVITY TOLERANCE: Status: RESOLVED | Noted: 2023-09-12 | Resolved: 2023-10-09

## 2023-10-09 PROBLEM — R29.898 DECREASED STRENGTH OF LOWER EXTREMITY: Status: RESOLVED | Noted: 2023-09-12 | Resolved: 2023-10-09

## 2023-10-09 PROBLEM — M25.561 CHRONIC PAIN OF BOTH KNEES: Status: RESOLVED | Noted: 2023-09-12 | Resolved: 2023-10-09

## 2023-10-09 PROBLEM — G89.29 CHRONIC PAIN OF BOTH KNEES: Status: RESOLVED | Noted: 2023-09-12 | Resolved: 2023-10-09

## 2023-10-09 PROBLEM — M25.562 CHRONIC PAIN OF BOTH KNEES: Status: RESOLVED | Noted: 2023-09-12 | Resolved: 2023-10-09

## 2023-10-09 NOTE — TELEPHONE ENCOUNTER
Name: Sangeetha June  Clinic Number: [id number]      Call Date: 10/9/2023    Reason for call: No Show to appointment    Discussion: Sangeetha June was reached via phone number, and stated that her insurance informed her that they would not be able to pay for her to have home health and physical therapy. She stated that she needs home health and therefore is electing to not continue therapy due to her insurance.     Follow up Visit's: None    Plan: To communicate with supervisor about discharging the patient from therapy or find alternative to therapy so the patient can continue to address her limitation. Informed the patient to continue doing her HEP to help prevent her condition from worsening.     Marciano Edwards, PT, DPT

## 2023-10-09 NOTE — PROGRESS NOTES
[facility]  Physical Therapy Discharge Note    Name: Snageetha June  Clinic Number: [id number]    Therapy Diagnosis:       Encounter Diagnoses   Name Primary?    Primary osteoarthritis of both knees      Patellofemoral pain syndrome of both knees      Decreased strength of lower extremity      Gait abnormality      Chronic pain of both knees      Decreased functional activity tolerance      Physician: Donaldo Sumner MD    Physician Orders: PT Eval and Treat  Medical Diagnosis from Referral: Primary osteoarthritis of both knees [M17.0], Patellofemoral pain syndrome of both knees [M22.2X1, M22.2X2]  Evaluation Date: 9/12/2023      Date of Last visit: 10/4/2023  Total Visits Received: 4 (including initial Evaluation)    ASSESSMENT      Mrs. June called on 10/9/2023 to inform me that she no longer would be able to attend outpatient PT due to her insurance not covering her home health and us. She cancelled all her future appointments. Prior to this the patient had only attended 4 visits including the evaluation. Despite a short time frame she demonstrated improved exercises tolerance and understanding of home exercises program. Her referring provider was informed of this phone call. Patient would continue to benefit from physical therapy to address her limitations in ROM, strength, pain, and functional mobility tolerance.     Discharge reason: Other:  Patient is currently receiving home health, and her insurance will not cover home health and outpatient care so she is forgoing outpatient PT to continue having home health.     Discharge FOTO Score: 29% (same as intake as no discharge was done due to patient informing cancellation of future appointments via phone call)    Goals:  Goals Not met due to patient only attending 4 visits prior to discharge from therapy   Short Term Goals:  6 weeks Status  Date Met   PAIN: Pt will report worst pain of 6/10 in order to progress toward  max functional ability and improve quality of life. [] Progressing  [] Met  [x] Not Met     FUNCTION: Patient will demonstrate improved function as indicated by a score of greater than or equal to 34 out of 100 on FOTO. [] Progressing  [] Met  [x] Not Met     MOBILITY: Patient will improve AROM to 50% of stated goals, listed in objective measures above, in order to progress towards independence with functional activities.  [] Progressing  [] Met  [x] Not Met     STRENGTH: Patient will improve strength to 50% of stated goals, listed in objective measures above, in order to progress towards independence with functional activities. [] Progressing  [] Met  [x] Not Met     POSTURE: Patient will correct postural deviations in sitting and standing, to decrease pain and promote long term stability.  [] Progressing  [] Met  [x] Not Met     GAIT: Patient will demonstrate improved gait mechanics including Increased stance time, step length and heel strike bilaterally in order to improve functional mobility, improve quality of life, and decrease risk of further injury or fall.  [] Progressing  [] Met  [x] Not Met     HEP: Patient will demonstrate independence with HEP in order to progress toward functional independence. [] Progressing  [] Met  [x] Not Met        Long Term Goals:  12 weeks Status Date Met   PAIN: Pt will report worst pain less than or equal to 2/10 in order to progress toward max functional ability and improve quality of life [] Progressing  [] Met  [x] Not Met     FUNCTION: Patient will demonstrate improved function as indicated by a score of greater than or equal to 42 out of 100 on FOTO. [] Progressing  [] Met  [x] Not Met     MOBILITY: Patient will improve AROM to stated goals, listed in objective measures above, in order to return to maximal functional potential and improve quality of life.  [] Progressing  [] Met  [x] Not Met     STRENGTH: Patient will improve strength to stated goals, listed in objective  measures above, in order to improve functional independence and quality of life. [] Progressing  [] Met  [x] Not Met     GAIT: Patient will demonstrate normalized gait mechanics with minimal compensation in order to return to PLOF. [] Progressing  [] Met  [x] Not Met     Patient will return to normal ADL's, IADL's, community involvement, recreational activities, and work-related activities with less than or equal to 2/10 pain and maximal function.  [] Progressing  [] Met  [x] Not Met          PLAN   This patient is discharged from Outpatient Physical Therapy      Marciano Edwards, PT,DPT

## 2023-10-16 ENCOUNTER — TELEPHONE (OUTPATIENT)
Dept: PAIN MEDICINE | Facility: CLINIC | Age: 43
End: 2023-10-16
Payer: MEDICARE

## 2023-10-16 ENCOUNTER — TELEPHONE (OUTPATIENT)
Dept: NEUROLOGY | Facility: CLINIC | Age: 43
End: 2023-10-16

## 2023-10-16 NOTE — TELEPHONE ENCOUNTER
Called and spoke with patient. Patient expressed difficulty with her memory trying to recall the reason she was referred to the neurologist. She said her PCP ordered the referral and asked if it had to do with the meningioma she has. Discussed NSGY Dr. Riki Lamb saw her for the meningioma and Carmen Singh NP is a vascular neurologist. Patient reports having a stroke during chemotherapy. Chart reviewed and patient has h/o CVA 01/2022. Also discussed pending referral for headaches. Patient requested to be scheduled with a vascular neurologist and with headache. Advised patient a message will be sent to the appropriate providers to schedule her in [facility] and NSGY. Patient requested to be scheduled with Carmen Singh NP in [place]. Confirmed appointment (11/07) and appointment letter sent.               ----- Message from Julienne Kapoor sent at 10/3/2023 11:57 AM CDT -----  Contact: Sangeetha Duvall is calling to speak with a nurse regarding appt . Pt would like to be seen later today if possible please give a call back at [phone] .

## 2023-10-17 ENCOUNTER — TELEPHONE (OUTPATIENT)
Dept: PAIN MEDICINE | Facility: CLINIC | Age: 43
End: 2023-10-17
Payer: MEDICARE

## 2023-10-24 ENCOUNTER — OFFICE VISIT (OUTPATIENT)
Dept: SURGERY | Facility: CLINIC | Age: 43
End: 2023-10-24
Payer: MEDICARE

## 2023-10-24 ENCOUNTER — INFUSION (OUTPATIENT)
Dept: INFUSION THERAPY | Facility: HOSPITAL | Age: 43
End: 2023-10-24
Attending: FAMILY MEDICINE
Payer: MEDICARE

## 2023-10-24 DIAGNOSIS — Z15.01 MUTATION IN TP53 GENE: Primary | ICD-10-CM

## 2023-10-24 DIAGNOSIS — Z15.09 MUTATION IN TP53 GENE: Primary | ICD-10-CM

## 2023-10-24 DIAGNOSIS — Z15.89 MUTATION IN TP53 GENE: Primary | ICD-10-CM

## 2023-10-24 DIAGNOSIS — C50.912 MALIGNANT NEOPLASM OF LEFT BREAST IN FEMALE, ESTROGEN RECEPTOR NEGATIVE, UNSPECIFIED SITE OF BREAST: ICD-10-CM

## 2023-10-24 DIAGNOSIS — C50.912 MALIGNANT NEOPLASM OF LEFT BREAST IN FEMALE, ESTROGEN RECEPTOR NEGATIVE, UNSPECIFIED SITE OF BREAST: Primary | ICD-10-CM

## 2023-10-24 DIAGNOSIS — I97.2 POSTMASTECTOMY LYMPHEDEMA: ICD-10-CM

## 2023-10-24 DIAGNOSIS — E04.1 THYROID NODULE: ICD-10-CM

## 2023-10-24 DIAGNOSIS — Z17.1 MALIGNANT NEOPLASM OF LEFT BREAST IN FEMALE, ESTROGEN RECEPTOR NEGATIVE, UNSPECIFIED SITE OF BREAST: Primary | ICD-10-CM

## 2023-10-24 DIAGNOSIS — Z90.12 STATUS POST LEFT MASTECTOMY: ICD-10-CM

## 2023-10-24 DIAGNOSIS — Z17.1 MALIGNANT NEOPLASM OF LEFT BREAST IN FEMALE, ESTROGEN RECEPTOR NEGATIVE, UNSPECIFIED SITE OF BREAST: ICD-10-CM

## 2023-10-24 PROCEDURE — 99999 PR PBB SHADOW E&M-EST. PATIENT-LVL II: CPT | Mod: PBBFAC,HCNC,, | Performed by: NURSE PRACTITIONER

## 2023-10-24 PROCEDURE — 3044F HG A1C LEVEL LT 7.0%: CPT | Mod: HCNC,CPTII,S$GLB, | Performed by: NURSE PRACTITIONER

## 2023-10-24 PROCEDURE — 3066F NEPHROPATHY DOC TX: CPT | Mod: HCNC,CPTII,S$GLB, | Performed by: NURSE PRACTITIONER

## 2023-10-24 PROCEDURE — 3066F PR DOCUMENTATION OF TREATMENT FOR NEPHROPATHY: ICD-10-PCS | Mod: HCNC,CPTII,S$GLB, | Performed by: NURSE PRACTITIONER

## 2023-10-24 PROCEDURE — 99214 OFFICE O/P EST MOD 30 MIN: CPT | Mod: HCNC,S$GLB,, | Performed by: NURSE PRACTITIONER

## 2023-10-24 PROCEDURE — 63600175 PHARM REV CODE 636 W HCPCS: Mod: HCNC | Performed by: NURSE PRACTITIONER

## 2023-10-24 PROCEDURE — 3044F PR MOST RECENT HEMOGLOBIN A1C LEVEL <7.0%: ICD-10-PCS | Mod: HCNC,CPTII,S$GLB, | Performed by: NURSE PRACTITIONER

## 2023-10-24 PROCEDURE — A4216 STERILE WATER/SALINE, 10 ML: HCPCS | Mod: HCNC | Performed by: NURSE PRACTITIONER

## 2023-10-24 PROCEDURE — 99214 PR OFFICE/OUTPT VISIT, EST, LEVL IV, 30-39 MIN: ICD-10-PCS | Mod: HCNC,S$GLB,, | Performed by: NURSE PRACTITIONER

## 2023-10-24 PROCEDURE — 96523 IRRIG DRUG DELIVERY DEVICE: CPT | Mod: HCNC

## 2023-10-24 PROCEDURE — 25000003 PHARM REV CODE 250: Mod: HCNC | Performed by: NURSE PRACTITIONER

## 2023-10-24 PROCEDURE — 3061F PR NEG MICROALBUMINURIA RESULT DOCUMENTED/REVIEW: ICD-10-PCS | Mod: HCNC,CPTII,S$GLB, | Performed by: NURSE PRACTITIONER

## 2023-10-24 PROCEDURE — 99999 PR PBB SHADOW E&M-EST. PATIENT-LVL II: ICD-10-PCS | Mod: PBBFAC,HCNC,, | Performed by: NURSE PRACTITIONER

## 2023-10-24 PROCEDURE — 3061F NEG MICROALBUMINURIA REV: CPT | Mod: HCNC,CPTII,S$GLB, | Performed by: NURSE PRACTITIONER

## 2023-10-24 RX ORDER — HEPARIN 100 UNIT/ML
500 SYRINGE INTRAVENOUS
Status: DISCONTINUED | OUTPATIENT
Start: 2023-10-24 | End: 2023-10-24 | Stop reason: HOSPADM

## 2023-10-24 RX ORDER — SODIUM CHLORIDE 0.9 % (FLUSH) 0.9 %
10 SYRINGE (ML) INJECTION
OUTPATIENT
Start: 2023-10-24

## 2023-10-24 RX ORDER — HEPARIN 100 UNIT/ML
500 SYRINGE INTRAVENOUS
OUTPATIENT
Start: 2023-10-24

## 2023-10-24 RX ORDER — SODIUM CHLORIDE 0.9 % (FLUSH) 0.9 %
10 SYRINGE (ML) INJECTION
Status: DISCONTINUED | OUTPATIENT
Start: 2023-10-24 | End: 2023-10-24 | Stop reason: HOSPADM

## 2023-10-24 RX ADMIN — Medication 10 ML: at 02:10

## 2023-10-24 RX ADMIN — HEPARIN 500 UNITS: 100 SYRINGE at 02:10

## 2023-10-24 NOTE — PROGRESS NOTES
Subjective:       Patient ID: Sangeetha June is a 43 y.o. female.    Chief Complaint: Breast cancer survivorship and f/u    ONCOLOGIC DIAGNOSIS:  Invasive ductal carcinoma, pT2 N0 M0  ?   CURRENT TREATMENT:  None     PAST TREATMENT:   -left breast mastectomy  -adjuvant chemotherapy with dose dense AC followed by 3 cycles of weekly Taxol  ?   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 Pt has not f/u with oncology for lab revew    Pt has thyroid nodules- Dr. Andujar endocrinologist recommended one year f/u 5/2023- pt has not had imaging or f/u    Last visit with me 3/28/2023     HPI:  43-year-old female with past medical history that is significant for hypertension osteoarthritis thyroiditis, invasive ductal carcinoma status post left breast mastectomy and adjuvant chemotherapy presents today for f/u    Pt has history of right breast pain stable    Onset- 6 mons to one year  Location- right breast at 12 oclock and medial right breast  Duration- intermittent- may last a few hours and go away a few days or weeks then returns for a few days  Quality- soreness  Rated as-8  Pt relates just recently noted a nodule along the right breast nipple incision line- no change since noting - tender to palpation.   Changed- with movement change- feels better when holds breast close to chest  Change in meds, physical activity, caffeine or supplements- none     April of 2020, age of 39 y/o, she presented to PCP with complaints of breast lump near sternum, painful and swollen.  Mammography/ultrasound breast showed a new 2.5 cm mass left breast at 9 Oclock middle depth.  Biopsy revealed invasive ductal carcinoma, ER positive by 1%, CO negative and HER2 Elieser negative, stage at pT2 N0 M0.  Germline testing revealed BRCA negative but TP53 deletion  Exon 9-10.     She underwent mastectomy on 06/24/2020 and the tumor was noted to be 3.1 cm in size.  0/16 lymph nodes were positive.  Tumor was essentially treated at triple negative and chemotherapy was recommended.  Echocardiogram performed May of 2020 showed ejection fraction of 55%.  Patient was treated with dose dense AC followed by weekly Taxol.  She received 3 cycles out of 12 of single agent Taxol with the last one being in October of 2020 before she started complaining of worsening cognitive abilities and speech problems.  Chemotherapy was discontinued.      Adjuvant radiation was not recommended as patient's tumor margins were noted to be about 1.3 cm, she had negative lymph nodes and tumor was T2.      Endocrine therapy was not recommended.     She has since been observed.      has mediport in place and last flush 5/9/2023     Denies fever, chills, nausea chest pain shortness of breath, abdominal pain, or dysuria.  Denies nipple inversion, breast pain palpable mass.     With regards to speech difficulty and cognitive impairment, MRI of the brain was obtained on 10/13/2020 and showed a left frontal meningioma with no acute intracranial pathology, repeat MRI was recommended in 6 months.    5/24/2021- MRI of brain revealed-   9 mm round extra-axial enhancing mass overlying the left frontal lobe consistent with a small meningioma.  No other enhancing intracranial lesion identified.  10/2021 repeat brain MRI revealed stable findings    12/29/2022- MRI brain  EXAMINATION:  MRI BRAIN W WO CONTRAST     CLINICAL HISTORY:  Headache, chronic, new features or increased frequency;history of meningioma- having new onset of headaches;.  Benign neoplasm of meninges, unspecified     TECHNIQUE:  Multiplanar multisequence MR imaging of the brain was performed before and after the administration of 10 mL Gadavist  intravenous contrast.     COMPARISON:  10/8/21     FINDINGS:  Intracranial compartment:     Ventricles and sulci  are normal in size for age without evidence of hydrocephalus. No extra-axial blood or fluid collections.     Stable mild T2 prolongation in the left insular region.  No evidence of acute hemorrhage.  Grossly stable 9 x 10 mm enhancing left frontal meningioma with mild mass effect on the adjacent left frontal lobe without evidence of signal within the brain parenchyma.  No abnormal enhancement.     Normal vascular flow voids are preserved.     Skull/extracranial contents (limited evaluation): Bone marrow signal intensity is normal.     Impression:     No acute/subacute infarct, midline shift or extra-axial fluid collection.     Stable left frontal meningioma.     No additional acute findings are present.         She complains of intermittent headache mostly frontal. Takes tylenol and ibuprofen with no relief- had eye exam recently with no improvement with glasses change- no nausea. No weakness    Last PET 5/26/2021- no evidence of metastatic disease  Last visit with Dr. Martin 9/14/2022    11/15/2022- Plastic surgery procedure left expander placement and right breast reduction  1/2023 pt had left expander removed due to infection    Genetic testing revealed negative Myrisk- had a mutation of the TP53 gene- classified as inconclusive  TP53 can be related to osteosarcomas, breast osteosarcomas and hematologic malignancies     Family history is pertinent colon cancer, prostate cancer esophageal cancer.  No family history breast or ovarian cancer.    ECOG SCORE    2 - Capable of all selfcare but unable to carry out any work activities, active > 50% of hours      Interval History:    2/15/2023- right diagnostic mammo   Result:   Mammo Digital Diagnostic Right with Darrion  US Breast Right Limited     History:  Patient is 43 y.o. and is seen for diagnostic imaging.     Films Compared:  Compared to: 02/23/2022 Mammo Digital Diagnostic Right with Darrion and 06/01/2021 Mammo Digital Diagnostic Right with Darrion     Findings:  This  procedure was performed using tomosynthesis. Computer-aided detection was utilized in the interpretation of this examination.  The right breast has scattered areas of fibroglandular density.  Limited ultrasound was performed in the retroareolar region and axilla.  Mammo Digital Diagnostic Right with Darrion  Right  Post-Surgical Finding: There are post-surgical findings from a previous breast reduction seen in the right breast.   Lymph Node: There are multiple similar lymph nodes seen in the right axilla on the MLO view. These were not demonstrated previously.  Calcifications: There are benign calcifications seen in the right breast.    An implanted port is partially demonstrated on the MLO view.      Limited US right sub areolar and axillary regions.  There is a mixed echogenic finding in the palpable area of concern that corresponds to the recent surgical scar that is consistent with fat necrosis.   Right axillary lymph nodes have a normal appearance.        Impression:  Mammo Digital Diagnostic Right with Darrion  There is no mammographic evidence of malignancy in the right breast.     US Breast Right Limited  There is no sonographic evidence of malignancy in the right breast.     BI-RADS Category:   Overall: 2 - Benign     Recommendation:  Diagnostic mammogram in 1 year is recommended    2/15/2023- ultrasound abdomen  EXAM: US ABDOMEN COMPLETE     CLINICAL HISTORY: TP53 mutation- at risk for adrenal gland tumors; [C50.919]-Malignant neoplasm of unspecified site of unspecified female breast./[Z15.02]-Genetic susceptibility to malignant neoplasm of ovary./[Z15.89]-Genetic susceptibility to other disease./[Z15.09]     COMPARISON: None available.     TECHNIQUE: Ultrasound evaluation of the abdomen including the use of grayscale, color, and Doppler techniques.     FINDINGS:  Liver: Measures 16.6 cm. Increased hepatic echotexture with decreased through transmission.  Main portal vein flows towards the liver.      Gallbladder: Surgically absent.     Bile ducts: No biliary ductal dilation.  CBD measures 4 mm in diameter.     Kidneys: The right kidney measures 11.2 cm.  No hydronephrosis or nephrolithiasis.  There is a 9 mm cyst at the inferior pole.     The left kidney measures 12.4 cm.  There is a dominant cyst at the mid kidney measuring up to 7.7 cm.  There are 2 complex left hypoechoic/mixed cystic and solid lesions measuring 2.1 and 1.3 cm respectively.     Pancreas: Visualized segments are unremarkable.     Spleen: The spleen is normal in size, 9.8 cm.  There is a hypoechoic region in the spleen measuring up to 2.6 cm.     Vessels: The visualized segments of the abdominal aorta are normal in caliber. The visualized IVC is unremarkable.     Miscellaneous: No ascites identified.    Impression:     1.    There are 2 left renal lesions which are indeterminate.  Abdominal MRI with and without contrast renal mass protocol is recommended for further evaluation.  2.    There is an indeterminate 2.6 cm splenic lesion.  Hopefully this can be evaluated on the renal mass MRI.  3.    The adrenal glands are not typically evaluated sonographically and were not demonstrated on this exam.  They could be evaluated on the abdominal MRI.    3/29/2023  EXAMINATION:  MRI ABDOMEN W WO CONTRAST     CLINICAL HISTORY:  Renal mass.  Other specified disorders of kidney and ureter     TECHNIQUE:  Multiplanar, multisequence MR imaging was performed through the abdomen before and after the intravenous administration of 10 cc Gadavist contrast.     COMPARISON:  Abdominal ultrasound on 02/15/2023     FINDINGS:  Benign bilateral renal cysts measuring up to 7.7 cm at the lower pole the left kidney and 2.2 cm at the upper pole.  The previously described hypoechoic complicated cysts in the left kidney do not demonstrate internal enhancement and are consistent with benign Bosniak 2 category cysts.  No hydronephrosis or perinephric stranding.     Enlarged,  fatty liver.  No liver lesions.  The hepatic and portal veins are patent.     Aortic caliber is within normal limits.  The pancreas, adrenal glands, and visualized bowel is within normal limits.  Small fat containing umbilical hernia.  There is a 3 cm hypointense lesion in the dome of the spleen that demonstrates homogeneous enhancement similar to the adjacent splenic parenchyma characteristic of a benign hamartoma.     There is an enlarged left common iliac lymph node measuring up to 3.7 x 2.1 cm.     Impression:     Benign bilateral renal cysts.     Mildly enlarged, fatty liver.     Enlarged, stable left common iliac lymph node that was present on the previous PET.             Pt followed by hepatology for fatty liver and urology for renal cysts    Interval History:     Persistent symptoms/side effects of treatment:    Functional changes: ROM, neuropathy, weakness or fatigue- toes- stable- followed by podiatry    Psychosocial challenges= depression stable    Lymphedema- left arm - comes and goes- does not have sleeve-     Diet/Nutrition- loosing wt with new diabetes meds    Sexual Dysfunction or challenges- none     Past Medical History:   Diagnosis Date    Anxiety     Asthma 11/8/2022    Breast cancer     Chest pain     Chest pain 07/02/2021    Cholecystitis without calculus     Decreased ROM of left shoulder 02/15/2022    Diabetes mellitus     Dizziness     Elevated C-reactive protein (CRP)     Essential hypertension     Fall 10/11/2021    Memory change     EVERARDO (obstructive sleep apnea)     Osteoarthritis     Other specified disorders of thyroid     Pre-diabetes 04/15/2021    Chronic, Stable, cont metformin    Screening mammogram, encounter for 04/15/2021    Shoulder injury     SOB (shortness of breath)     Stroke     Thyroiditis     Tingling of left upper extremity 10/29/2021    Vision disturbance 04/30/2021    Weakness of shoulder 02/15/2022     Past Surgical History:   Procedure Laterality Date     ARTHROSCOPIC REPAIR OF ROTATOR CUFF OF SHOULDER Left 1/27/2022    Procedure: REPAIR, ROTATOR CUFF, ARTHROSCOPIC;  Surgeon: Francisco Mathews MD;  Location: [facility] OR;  Service: Orthopedics;  Laterality: Left;    ARTHROSCOPY OF SHOULDER WITH DECOMPRESSION OF SUBACROMIAL SPACE Left 1/27/2022    Procedure: ARTHROSCOPY, SHOULDER, WITH SUBACROMIAL SPACE DECOMPRESSION;  Surgeon: Francisco Mathews MD;  Location: [facility] OR;  Service: Orthopedics;  Laterality: Left;    BREAST BIOPSY      COLONOSCOPY N/A 5/31/2022    Procedure: COLONOSCOPY;  Surgeon: Km Odom MD;  Location: [facility];  Service: Endoscopy;  Laterality: N/A;    ESOPHAGOGASTRODUODENOSCOPY N/A 5/31/2022    Procedure: EGD (ESOPHAGOGASTRODUODENOSCOPY) ;  Surgeon: Km Odom MD;  Location: [facility];  Service: Endoscopy;  Laterality: N/A;    FIXATION OF TENDON Left 1/27/2022    Procedure: FIXATION, TENDON;  Surgeon: Francisco Mathews MD;  Location: [facility] OR;  Service: Orthopedics;  Laterality: Left;    HYSTERECTOMY      INJECTION OF ANESTHETIC AGENT INTO SACROILIAC JOINT Bilateral 7/29/2022    Procedure: Bilateral SIJ +  Bilateral GT Bursa Injection RN IV Sedation;  Surgeon: Bisi Cochran MD;  Location: [facility];  Service: Pain Management;  Laterality: Bilateral;    INJECTION OF JOINT Left 11/12/2021    Procedure: Left suprascapular and axillary injection with local;  Surgeon: Bisi Cochran MD;  Location: [facility];  Service: Pain Management;  Laterality: Left;    INJECTION OF JOINT Bilateral 7/29/2022    Procedure: Bilateral SIJ + Bilateral GT Bursa Injection RN IV Sedation;  Surgeon: Bisi Cochran MD;  Location: [facility];  Service: Pain Management;  Laterality: Bilateral;    INSERTION OF BREAST TISSUE EXPANDER Left 11/15/2022    Procedure: INSERTION, TISSUE EXPANDER, BREAST;  Surgeon: Collin Pinto MD;  Location: [facility] OR;  Service: Plastics;  Laterality: Left;    MASTECTOMY      ROBOT-ASSISTED CHOLECYSTECTOMY  USING DA ROBERTA XI N/A 8/29/2022    Procedure: XI ROBOTIC CHOLECYSTECTOMY;  Surgeon: Raeann Bhandari DO;  Location: [facility] OR;  Service: General;  Laterality: N/A;    TISSUE EXPANDER REMOVAL Left 1/4/2023    Procedure: REMOVAL, TISSUE EXPANDER;  Surgeon: Collin Pinto MD;  Location: [facility] OR;  Service: Plastics;  Laterality: Left;    TOTAL REDUCTION MAMMOPLASTY Right 11/15/2022    Procedure: MAMMOPLASTY, REDUCTION;  Surgeon: Collin Pinto MD;  Location: [facility] OR;  Service: Plastics;  Laterality: Right;     Family History   Problem Relation Age of Onset    Colon cancer Mother 53        partial colectomy    Stroke Mother     Heart disease Mother     Breast cancer Sister     Diabetes Sister     Throat cancer Maternal Aunt         smoking hx    Lung cancer Maternal Uncle         hx smoking    No Known Problems Maternal Grandmother     Prostate cancer Maternal Grandfather 70    Alcohol abuse Maternal Grandfather     No Known Problems Paternal Grandmother     No Known Problems Paternal Grandfather     Diabetes Brother     Diabetes Brother     Diabetes Brother     Breast cancer Other 37        chemotherapy, planning for BL mastectomy    No Known Problems Other      Social History     Socioeconomic History    Marital status:    Tobacco Use    Smoking status: Never     Passive exposure: Current    Smokeless tobacco: Never   Substance and Sexual Activity    Alcohol use: Not Currently    Drug use: Not Currently     Types: Marijuana     Comment: none currently    Sexual activity: Yes       Current Outpatient Medications   Medication Sig Dispense Refill    albuterol (PROVENTIL/VENTOLIN HFA) 90 mcg/actuation inhaler Inhale 2 puffs into the lungs every 4 (four) hours as needed for Wheezing. Rescue 8.5 g 1    ammonium lactate 12 % Crea Apply 1 Act topically 2 (two) times daily. To feet. 140 g 2    ascorbic acid, vitamin C, (VITAMIN C) 1000 MG tablet Take 1 tablet (1,000 mg total) by mouth once daily. 90 tablet 3     aspirin (ECOTRIN) 81 MG EC tablet Take 1 tablet (81 mg total) by mouth once daily. 360 tablet 1    budesonide-formoterol 160-4.5 mcg (SYMBICORT) 160-4.5 mcg/actuation HFAA Inhale 2 puffs into the lungs every 12 (twelve) hours. Controller 10.2 g 11    busPIRone (BUSPAR) 15 MG tablet Take 1 tablet (15 mg total) by mouth 3 (three) times daily. 270 tablet 1    celecoxib (CELEBREX) 200 MG capsule Take 1 capsule (200 mg total) by mouth 2 (two) times daily. 28 capsule 0    cephALEXin (KEFLEX) 500 MG capsule TAKE 1 CAPSULE BY MOUTH EVERY 6 HOURS FOR 14 DAYS (Patient not taking: Reported on 9/28/2023) 56 capsule 0    diclofenac (VOLTAREN) 75 MG EC tablet Take 1 tablet (75 mg total) by mouth 2 (two) times daily as needed. 60 tablet 2    EScitalopram oxalate (LEXAPRO) 20 MG tablet Take 1 tablet (20 mg total) by mouth once daily. 90 tablet 1    EUTHYROX 50 mcg tablet Take 1 tablet (50 mcg total) by mouth every morning. 90 tablet 1    gabapentin (NEURONTIN) 300 MG capsule Take 1 capsule (300 mg total) by mouth 2 (two) times daily. 180 capsule 1    hydroCHLOROthiazide (HYDRODIURIL) 25 MG tablet Take 1 tablet (25 mg total) by mouth daily as needed (edema, swelling). 90 tablet 1    hydrocortisone 0.5 % cream Apply topically 2 (two) times daily.      LATUDA 20 mg Tab tablet Take 20 mg by mouth once daily.      linaCLOtide (LINZESS) 145 mcg Cap capsule Take 1 capsule (145 mcg total) by mouth once daily. 90 capsule 1    magnesium oxide (MAG-OX) 400 mg (241.3 mg magnesium) tablet Take 1 tablet (400 mg total) by mouth once daily. 90 tablet 1    metFORMIN (GLUCOPHAGE-XR) 500 MG ER 24hr tablet Take 1 tablet (500 mg total) by mouth 2 (two) times daily with meals. 180 tablet 1    multivit with min-folic acid (ADULT MULTIVITAMIN GUMMIES) 200 mcg Chew Take 1 tablet by mouth once daily. 90 tablet 3    olopatadine (PATANOL) 0.1 % ophthalmic solution       oxybutynin (DITROPAN-XL) 5 MG TR24 Take 5 mg by mouth.      pantoprazole (PROTONIX) 40 MG  tablet Take 1 tablet (40 mg total) by mouth once daily. 90 tablet 1    potassium chloride SA (K-DUR,KLOR-CON) 20 MEQ tablet Take 1 tablet (20 mEq total) by mouth once daily. (Patient not taking: Reported on 9/28/2023) 8 tablet 0    pravastatin (PRAVACHOL) 40 MG tablet Take 1 tablet (40 mg total) by mouth every evening. 90 tablet 1    QUEtiapine (SEROQUEL) 25 MG Tab Take 25 mg by mouth every evening.      solifenacin (VESICARE) 10 MG tablet Take 1 tablet (10 mg total) by mouth once daily. 30 tablet 11    tirzepatide 10 mg/0.5 mL PnIj Inject 10 mg into the skin every 7 days. 4 pen 5    traZODone (DESYREL) 150 MG tablet Take 1 tablet (150 mg total) by mouth every evening. 90 tablet 1     No current facility-administered medications for this visit.     Facility-Administered Medications Ordered in Other Visits   Medication Dose Route Frequency Provider Last Rate Last Admin    lactated ringers infusion   Intravenous Continuous Maty Thompson MD 10 mL/hr at 01/27/22 0636 New Bag at 01/04/23 1603     Review of patient's allergies indicates:   Allergen Reactions    Lisinopril     Lurasidone Other (See Comments)     Uncontrolled movement       Review of Systems   Constitutional: Negative.    HENT: Negative.     Eyes: Negative.    Respiratory: Negative.     Cardiovascular: Negative.    Gastrointestinal: Negative.    Endocrine: Negative.    Genitourinary: Negative.    Musculoskeletal:  Positive for arthralgias and gait problem.   Skin: Negative.    Allergic/Immunologic: Negative.    Hematological: Negative.  Negative for adenopathy.   Psychiatric/Behavioral: Negative.         Breast: no nipple discharge - see HPI. Pt unsure if feels abnormality  Objective:      There were no vitals filed for this visit.        Physical Exam  Constitutional:       Appearance: She is well-developed.   HENT:      Head: Normocephalic and atraumatic.      Right Ear: External ear normal.      Left Ear: External ear normal.      Mouth/Throat:       Pharynx: No oropharyngeal exudate.   Eyes:      General: No scleral icterus.        Right eye: No discharge.         Left eye: No discharge.      Conjunctiva/sclera: Conjunctivae normal.      Pupils: Pupils are equal, round, and reactive to light.   Neck:      Thyroid: No thyromegaly.   Chest:   Breasts:     Right: No inverted nipple, mass, nipple discharge, skin change or tenderness.      Left: No mass, skin change or tenderness.          Comments: right breast mastopexy   Musculoskeletal:         General: Normal range of motion.      Right shoulder: No crepitus. Normal strength.      Cervical back: Normal range of motion and neck supple.   Lymphadenopathy:      Head:      Right side of head: No submental, submandibular, tonsillar, preauricular, posterior auricular or occipital adenopathy.      Left side of head: No submental, submandibular, tonsillar, preauricular, posterior auricular or occipital adenopathy.      Cervical: No cervical adenopathy.      Right cervical: No superficial or posterior cervical adenopathy.     Left cervical: No superficial or posterior cervical adenopathy.      Upper Body:      Right upper body: No supraclavicular or axillary adenopathy.      Left upper body: No supraclavicular or axillary adenopathy.   Skin:     General: Skin is warm and dry.      Coloration: Skin is not pale.      Findings: No erythema or rash.   Neurological:      General: No focal deficit present.      Mental Status: She is alert and oriented to person, place, and time.      Cranial Nerves: No cranial nerve deficit or facial asymmetry.      Sensory: Sensation is intact. No sensory deficit.      Motor: No weakness or tremor.      Coordination: Coordination normal.      Deep Tendon Reflexes: Reflexes are normal and symmetric.   Psychiatric:         Behavior: Behavior normal.         Thought Content: Thought content normal.         Judgment: Judgment normal.         Lab Review: CBC:   Lab Results   Component Value Date     WBC 4.85 08/09/2023    RBC 4.76 08/09/2023    HGB 13.5 08/09/2023    HCT 40.4 08/09/2023     08/09/2023     BMP:   Lab Results   Component Value Date     08/09/2023     08/09/2023    K 3.1 (L) 08/09/2023     08/09/2023    CO2 27 08/09/2023    BUN 10 08/09/2023    CREATININE 1.1 08/09/2023    CALCIUM 9.7 08/09/2023          Assessment:       1. 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 Status post left mastectomy    3. Postmastectomy lymphedema    4. Malignant neoplasm of left breast in female, estrogen receptor negative, unspecified site of breast    5. Thyroid nodule                Plan:       Malignant neoplasm of left breast in female, estrogen receptor negative  Stage IIA (pT2 pN0 cM0) invasive ductal carcinoma, ER 1%, SC negative HER2 Elieser negative status post left breast mastectomy and adjuvant chemotherapy. Completed dose dense AC plus 3/12 weekly Taxol.      Restaging PET-CT scan from 05/26/2021 showed no FDG PET/CT findings to suggest recurrent or metastatic disease. Diagnostic mammogram from 02/23/2022 showed no evidence of malignancy in the right breast.    S/p right breast mastopexy 11/2022- and left breast expander removal 1/2023 for infection    Palpable right breast nodule at 1 oclock areolar region over scar- right diagnostic mammo and ultrasound - 2/15/2023 - favored fat necrosis    Feel area of nodularity along base of right breast under mastopexy scar- most likely fat necrosis- needs right diagnostic and ultrasound to make sure           Right breast pain- most likely related to hormonal fluctuations - scar tissue  Recommend good support bra, decrease caffeine fat and salt intake. May try heat or ice, topical analgesics, heat patches, vit E, fish oil or evening primrose oil. If not contraindicated may try tylenol or nsaid for 3 days with food to decrease inflammation.    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- inconclusive  Followed by Genetic Counsellor   Recommend continue f/u with neurology referral for meningioma f/u and risk for CNS tumor f/u-   Recommend hematology referral for close monitoring of labs due to risk for leukemia and MDS and for adrenal tumor with abdominal imaging- pt has not f/u since August- referral placed  ultrasound of abdomen for evaluation for adrenal tumor screening   2/15/2023 ultrasound of abdomen revealed 2 left renal lesions and a splenic lesion- adrenals not seen- followed by urology for eval or 2 renal lesions   3/29/2023- MRI of abdomen       Benign bilateral renal cysts.     Mildly enlarged, fatty liver.     Enlarged, stable left common iliac lymph node that was present on the previous PET.    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     Thyroid nodule  Was seen by Dr. Erickson, biopsy in December of 2021 revealed benign tissue.- needs f/u ultrasound and f/u with ENT- referral placed     Meningioma  History of -  MRI of brain that revealed a 9 mm round extra-axial enhancing mass overlying the left frontal lobe consistent with a small meningioma.  No other enhancing intracranial lesion identified. Stable [id number]- followed by neurology    Microcytic anemia  Microcytic anemia of unknown etiology.  Resolved following IV iron therapy.  upper and lower endoscopies that were unremarkable. 5/2022- 10 year f/u colon recommended- f/u with hematology     Obesity, morbid, BMI 40.0-49.9  Continue to encourage lifestyle modification and exercise as tolerated. Loosing wt and feeling better    Needs mediport flushed today        ?         ?   Follow-Up: Follow up in 6 mons for exam if breast imaging wnl

## 2023-10-24 NOTE — NURSING
"Pt here for Mediport Flush. ____R__ chestwall mediport accessed with a 20g 1" sanchez via sterile technique.  Excellent blood return noted.  Flushed with 10ml NS and 5 ml heparin solution.  Needle D/C, site without redness, swelling, or drainage noted.  Dressing applied.  Patient tolerated well.  Patient to return to clinic in__12/19/23___     "

## 2023-10-31 ENCOUNTER — OFFICE VISIT (OUTPATIENT)
Dept: PSYCHIATRY | Facility: CLINIC | Age: 43
End: 2023-10-31
Payer: MEDICARE

## 2023-10-31 ENCOUNTER — DOCUMENTATION ONLY (OUTPATIENT)
Dept: PSYCHIATRY | Facility: CLINIC | Age: 43
End: 2023-10-31
Payer: MEDICARE

## 2023-10-31 DIAGNOSIS — F25.1 SCHIZOAFFECTIVE DISORDER, DEPRESSIVE TYPE: Primary | ICD-10-CM

## 2023-10-31 DIAGNOSIS — F41.1 GENERALIZED ANXIETY DISORDER: ICD-10-CM

## 2023-10-31 DIAGNOSIS — G31.84 MILD NEUROCOGNITIVE DISORDER: ICD-10-CM

## 2023-10-31 DIAGNOSIS — G47.09 OTHER INSOMNIA: ICD-10-CM

## 2023-10-31 PROCEDURE — 3061F NEG MICROALBUMINURIA REV: CPT | Mod: HCNC,CPTII,95, | Performed by: PSYCHOLOGIST

## 2023-10-31 PROCEDURE — 90863 PHARMACOLOGIC MGMT W/PSYTX: CPT | Mod: HCNC,95,, | Performed by: PSYCHOLOGIST

## 2023-10-31 PROCEDURE — 90863 PR PHARMACOLOGIC MANAGEMENT: ICD-10-PCS | Mod: HCNC,95,, | Performed by: PSYCHOLOGIST

## 2023-10-31 PROCEDURE — 3044F HG A1C LEVEL LT 7.0%: CPT | Mod: HCNC,CPTII,95, | Performed by: PSYCHOLOGIST

## 2023-10-31 PROCEDURE — 1159F MED LIST DOCD IN RCRD: CPT | Mod: HCNC,CPTII,95, | Performed by: PSYCHOLOGIST

## 2023-10-31 PROCEDURE — 90832 PR PSYCHOTHERAPY W/PATIENT, 30 MIN: ICD-10-PCS | Mod: HCNC,95,, | Performed by: PSYCHOLOGIST

## 2023-10-31 PROCEDURE — 1159F PR MEDICATION LIST DOCUMENTED IN MEDICAL RECORD: ICD-10-PCS | Mod: HCNC,CPTII,95, | Performed by: PSYCHOLOGIST

## 2023-10-31 PROCEDURE — 90832 PSYTX W PT 30 MINUTES: CPT | Mod: HCNC,95,, | Performed by: PSYCHOLOGIST

## 2023-10-31 PROCEDURE — 3066F NEPHROPATHY DOC TX: CPT | Mod: HCNC,CPTII,95, | Performed by: PSYCHOLOGIST

## 2023-10-31 PROCEDURE — 3066F PR DOCUMENTATION OF TREATMENT FOR NEPHROPATHY: ICD-10-PCS | Mod: HCNC,CPTII,95, | Performed by: PSYCHOLOGIST

## 2023-10-31 PROCEDURE — 3044F PR MOST RECENT HEMOGLOBIN A1C LEVEL <7.0%: ICD-10-PCS | Mod: HCNC,CPTII,95, | Performed by: PSYCHOLOGIST

## 2023-10-31 PROCEDURE — 3061F PR NEG MICROALBUMINURIA RESULT DOCUMENTED/REVIEW: ICD-10-PCS | Mod: HCNC,CPTII,95, | Performed by: PSYCHOLOGIST

## 2023-10-31 RX ORDER — BUSPIRONE HYDROCHLORIDE 15 MG/1
15 TABLET ORAL 3 TIMES DAILY
Qty: 270 TABLET | Refills: 1 | Status: SHIPPED | OUTPATIENT
Start: 2023-10-31 | End: 2024-02-27 | Stop reason: SDUPTHER

## 2023-10-31 RX ORDER — TRAZODONE HYDROCHLORIDE 150 MG/1
150 TABLET ORAL NIGHTLY
Qty: 90 TABLET | Refills: 1 | Status: SHIPPED | OUTPATIENT
Start: 2023-10-31 | End: 2024-02-27 | Stop reason: SDUPTHER

## 2023-10-31 NOTE — PROGRESS NOTES
CASE MANAGEMENT REFERRAL    MRN: [id number]  : 1980  Reason for referral: [facility] again--needs individual and group with medicine management; we also need records from them if she comes back to us    Referral placed

## 2023-10-31 NOTE — PROGRESS NOTES
"Outpatient Psychiatry Follow-Up Visit     10/31/2023      Timeframe: Corona Virus Outbreak     The patient location is: Patient's home/ Patient reported that his/her location at the time of this visit was in the [facility]     Visit type: Virtual visit with synchronous audio and video     Each patient to whom he or she provides medical services by telehealth is: (1) informed of the relationship between the medical psychologist and patient and the respective role of any other health care provider with respect to management of the patient; and (2) notified that he or she may decline to receive medical services by telehealth and may withdraw from such care at any time.    I also informed patient of the following:   Patito Jaquez, PhD, MPAP:  [place] medical license number: [id number]    My contact info:  [facility] / 2nd Floor  [address] [place] [address]   Ph: [phone]    If technology issues, call office phone: Ph: [phone]  If crisis: Dial 911 or go to nearest Emergency Room (ER)  If questions related to privacy practices: contact [facility] Information Department: [phone]    Clinical Status of Patient:  Outpatient (Ambulatory)    Chief Complaint:  Sangeetha June is a 43 y.o. female who presents today for follow-up of depression, anxiety and memory .      Impressions/Plan from last visit: Sangeetha attended her visit. She is taking her medicines--also taking Latuda. She said that she has anxiety sometimes. She is going to [facility]--she started about a month or two ago. They reportedly started her on Latuda (?)--she goes to groups--"I give hugs now. At first I would not let anyone touch me. I was scared of them." She reported that she sometimes sees people-"some things going on on this side [pointed to left side], but they turn out to be nothing."     She goes to [facility]--daily when she does not have doctors appointments. She goes from 9:30 " "to 1:30 pm.     She reported that her eye focus is bothering her today--pulling her forehead down.    She thinks that her memory has gotten "a little bit better." "As long as that anxiety is maintaining," she feels better. 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    Coordinate with case mgmt and therapist --provisional dx of schizoaffective as there seems to be a mood component, but it's unclear. Records from [facility] will be helpful. She will not continue with services here while attending the [facility]. No refills are needed today. Medicines--Lexapro 20 mg, buspirone 15 mg tid, trazodone 150 mg hs; Latuda 20 mg.    Interval History and Content of Current Session: Sangeetha attended her virtual visit. She was last seen by me in August 2022, though she could not recall seeing me in the past. She reported that she is "okay now, but a couple of weeks ago, I was not okay." She said that she had a "meltdown" or something--felt "confused, scared"--"I was really all over the place." She said that her neurosurgeon told her that her memory complaints, etc were not related to her brain mass but rather "chemo brain" and is "permanent." She has previously been evaluated by Dr. Garay in neurology for a neuropsychological evaluation and was diagnosed with a minor neurocognitive disorder. See evaluation in chart in May of 2022.    She said that Latuda gave her "involuntary movements." She is currently only taking buspirone. She had been to [facility] in August; reportedly went to [facility] and "had a major panic attack." She said that her boyfriend told her that she was not going back because they thought her panic attack was because of going there. She has continued to forget things. She is interested in [facility]--likes seeing Mr. Rader at [facility] but knows that while with [facility] would not see him. We will request that she get individual at [facility] in addition to group and medicine management. She was unable to " recall the names of her current medications,and we have not received the discharge summary from last year or any new information from this year regarding her treatment at [facility]. We requested that staff reach out to [facility] to get her medication record, though we have also referred her to case management to get back into [facility] IOP. Having daily observations regarding her response to medicines will be helpful. She recalled that she has been taking buspirone and trazodone but could not recall other medicines. Those medicines were continued today. See plan below.    Plan--continue buspirone 15 mg tid, trazodone 150 mg hs; neurology referral already in system; case management referral [facility] again--needs individual and group with medicine management; we also need records from them if she comes back to us      [Lamb visit of 9/28/23: Sangeetha June was seen in neurosurgical consultation at the office today.  She is a 43-year-old lady who was found to have breast cancer and underwent left mastectomy on 06/24/2020 and treated with Taxol.  She began to complain of speech difficulty and memory loss and MRI showed no evidence for metastasis but did show a small 1 cm left convexity meningioma.  She was referred back for neurosurgical evaluation.  She says she experiences headaches, these tend to be bitemporal.  She feels her vision is blurry but this is improved with glasses.  She has noted difficulty expressing herself.  She feels her balance is not good.  Past medical history includes diabetes treated with metformin and hypertension treated with HydroDIURIL.  She has been unable to returned to work.     On physical examination she is a well-developed, moderately obese black lady who is alert and cooperative.  Examination of the head shows some tenderness over the temporalis muscles bilaterally more on the left.  Eyes show full extraocular movements, pupils are equal reactive to light and fundi show clear disc margins.   Hearing is somewhat decreased in the left ear compared to the right and midline tuning fork is referred more to the right.  She is moving neck freely.  On neurological examination she has hesitant speech but is able to make herself understood.  Finger-to-nose was done well, gait is a little slow but not ataxic.  Cranial nerves otherwise intact, she has normal facial sensation and movement and the tongue protrudes in the midline.  She shows good strength extremities, normal sensation, hypoactive but symmetrical deep tendon reflexes.     MRI of the brain done at [facility] on 12/29/2022 was reviewed.  Ventricular size is normal.  There is a small 1 cm enhancing tumor on the left frontal convexity consistent with a small meningioma.  This is unchanged from previous scans of 10/08/2021 and 05/24/2021 done at [facility].     Impression:  Left convexity meningioma.     The small tumor appears quite benign.  It only slightly indents the brain and there is no associated edema.  This can be followed up in about a year.]    PSYCHOTHERAPY      Site: [facility]  Time: 16 minutes  Participants: Met with patient    Therapeutic Intervention Type: supportive psychotherapy  Why chosen therapy is appropriate versus another modality: relevant to diagnosis, and current level of functioning    Target symptoms: anxiety , confusion, memory, sleep  Primary focus: see above    Outcome monitoring methods: self-report, observation, collatoral/outpatient data    Patient's response to intervention:  The patient's response to intervention is accepting.    Progress toward goals:  The patient's progress toward goals is limited.  --------------------------------------------------------------------------------------------------------  Prior medicines: Latuda, Lexapor, trazodone        12/1/2022     1:41 PM 2/8/2022     1:09 PM 10/11/2021    10:50 AM   Depression Patient Health Questionnaire   Over the last two weeks how  often have you been bothered by little interest or pleasure in doing things Not at all Not at all Not at all   Over the last two weeks how often have you been bothered by feeling down, depressed or hopeless Not at all Not at all Not at all   PHQ-2 Total Score 0 0 0     0-4 = No intervention  5 to 9 = Mild  10 to 14 = Moderate  15 to 19 = Moderately severe  =20 = Severe    Review of Systems   PSYCHIATRIC: Pertinant items are noted in the narrative.    Past Medical, Family and Social History: The patient's past medical, family and social history have been reviewed and updated as appropriate within the electronic medical record - see encounter notes.      Current Outpatient Medications:     albuterol (PROVENTIL/VENTOLIN HFA) 90 mcg/actuation inhaler, Inhale 2 puffs into the lungs every 4 (four) hours as needed for Wheezing. Rescue, Disp: 8.5 g, Rfl: 1    ammonium lactate 12 % Crea, Apply 1 Act topically 2 (two) times daily. To feet., Disp: 140 g, Rfl: 2    ascorbic acid, vitamin C, (VITAMIN C) 1000 MG tablet, Take 1 tablet (1,000 mg total) by mouth once daily., Disp: 90 tablet, Rfl: 3    aspirin (ECOTRIN) 81 MG EC tablet, Take 1 tablet (81 mg total) by mouth once daily., Disp: 360 tablet, Rfl: 1    budesonide-formoterol 160-4.5 mcg (SYMBICORT) 160-4.5 mcg/actuation HFAA, Inhale 2 puffs into the lungs every 12 (twelve) hours. Controller, Disp: 10.2 g, Rfl: 11    busPIRone (BUSPAR) 15 MG tablet, Take 1 tablet (15 mg total) by mouth 3 (three) times daily., Disp: 270 tablet, Rfl: 1    celecoxib (CELEBREX) 200 MG capsule, Take 1 capsule (200 mg total) by mouth 2 (two) times daily., Disp: 28 capsule, Rfl: 0    diclofenac (VOLTAREN) 75 MG EC tablet, Take 1 tablet (75 mg total) by mouth 2 (two) times daily as needed., Disp: 60 tablet, Rfl: 2    EUTHYROX 50 mcg tablet, Take 1 tablet (50 mcg total) by mouth every morning., Disp: 90 tablet, Rfl: 1    gabapentin (NEURONTIN) 300 MG capsule, Take 1 capsule (300 mg total) by mouth 2  (two) times daily., Disp: 180 capsule, Rfl: 1    hydroCHLOROthiazide (HYDRODIURIL) 25 MG tablet, Take 1 tablet (25 mg total) by mouth daily as needed (edema, swelling)., Disp: 90 tablet, Rfl: 1    hydrocortisone 0.5 % cream, Apply topically 2 (two) times daily., Disp: , Rfl:     linaCLOtide (LINZESS) 145 mcg Cap capsule, Take 1 capsule (145 mcg total) by mouth once daily., Disp: 90 capsule, Rfl: 1    magnesium oxide (MAG-OX) 400 mg (241.3 mg magnesium) tablet, Take 1 tablet (400 mg total) by mouth once daily., Disp: 90 tablet, Rfl: 1    metFORMIN (GLUCOPHAGE-XR) 500 MG ER 24hr tablet, Take 1 tablet (500 mg total) by mouth 2 (two) times daily with meals., Disp: 180 tablet, Rfl: 1    multivit with min-folic acid (ADULT MULTIVITAMIN GUMMIES) 200 mcg Chew, Take 1 tablet by mouth once daily., Disp: 90 tablet, Rfl: 3    olopatadine (PATANOL) 0.1 % ophthalmic solution, , Disp: , Rfl:     oxybutynin (DITROPAN-XL) 5 MG TR24, Take 5 mg by mouth., Disp: , Rfl:     pantoprazole (PROTONIX) 40 MG tablet, Take 1 tablet (40 mg total) by mouth once daily., Disp: 90 tablet, Rfl: 1    pravastatin (PRAVACHOL) 40 MG tablet, Take 1 tablet (40 mg total) by mouth every evening., Disp: 90 tablet, Rfl: 1    solifenacin (VESICARE) 10 MG tablet, Take 1 tablet (10 mg total) by mouth once daily., Disp: 30 tablet, Rfl: 11    tirzepatide 10 mg/0.5 mL PnIj, Inject 10 mg into the skin every 7 days., Disp: 4 pen , Rfl: 5    traZODone (DESYREL) 150 MG tablet, Take 1 tablet (150 mg total) by mouth every evening., Disp: 90 tablet, Rfl: 1  No current facility-administered medications for this visit.    Facility-Administered Medications Ordered in Other Visits:     lactated ringers infusion, , Intravenous, Continuous, Maty Thompson MD, Last Rate: 10 mL/hr at 01/27/22 0636, New Bag at 01/04/23 1603    Compliance: yes?    Side effects: see above    Risk Parameters:  Patient reports no suicidal ideation  Patient reports no homicidal ideation  Patient  "reports no self-injurious behavior  Patient reports no violent behavior    Exam (detailed: at least 9 elements; comprehensive: all 15 elements)   Constitutional  Vitals:  Most recent vital signs were reviewed.   Last 3 sets of Vitals        8/31/2023    10:45 AM 9/14/2023     9:57 AM 9/28/2023    11:19 AM   Vitals - 1 value per visit   SYSTOLIC   95   DIASTOLIC   68   Pulse   73   Weight (lb)  271 271   Weight (kg)  122.925 122.925   Height  5' 7" (1.702 m) 5' 7" (1.702 m)   BMI (Calculated)  42.4 42.4   Pain Score Six Zero Eight          General:  age appropriate, casually dressed, neatly groomed, hair pulled up on top of head; some graying     Musculoskeletal  Muscle Strength/Tone:  no tremor, no tic   Gait & Station:  Virtual visit     Psychiatric  Speech:  no latency; no press, slight artic--understandable about 80%   Behavior: wnl   Mood & Affect:  "Better today"--not good two weeks ago  congruent and appropriate   Thought Process:  normal and logical   Associations:  intact   Thought Content:  Not fully addressed--difficult to get history   Insight:  has awareness of illness   Judgement: behavior is adequate to circumstances   Orientation:  grossly intact   Memory: not tested   Language: grossly intact   Attention Span & Concentration:  Grossly intact   Fund of Knowledge:  not tested     Assessment and Diagnosis   Status/Progress: Based on the examination today, the patient's problem(s) is/are inadequately controlled and treatment resistant.  New problems have been presented today.   Co-morbidities and inadequate records from other treatment  are complicating management of the primary condition.  The working differential for this patient includes schizoaffective vs schizophrenia.     General Impression:     Encounter Diagnoses   Name Primary?    Schizoaffective disorder, depressive type Yes    Generalized anxiety disorder     Mild neurocognitive disorder     Other insomnia  "       Intervention/Counseling/Treatment Plan   Medication Management: Discussed risks, benefits, and alternatives to treatment plan documented above with patient. I answered all patient questions related to this plan, and patient expressed understanding and agreement.   Continue buspirone 15 mg tid, trazodone 150 mg [facility]    CM referral--[facility] again--needs individual and group with medicine management; we also need records from them if she comes back to us    Return to Clinic:  after discharge from [facility]    Medication List with Changes/Refills   Current Medications    ALBUTEROL (PROVENTIL/VENTOLIN HFA) 90 MCG/ACTUATION INHALER    Inhale 2 puffs into the lungs every 4 (four) hours as needed for Wheezing. Rescue    AMMONIUM LACTATE 12 % CREA    Apply 1 Act topically 2 (two) times daily. To feet.    ASCORBIC ACID, VITAMIN C, (VITAMIN C) 1000 MG TABLET    Take 1 tablet (1,000 mg total) by mouth once daily.    ASPIRIN (ECOTRIN) 81 MG EC TABLET    Take 1 tablet (81 mg total) by mouth once daily.    BUDESONIDE-FORMOTEROL 160-4.5 MCG (SYMBICORT) 160-4.5 MCG/ACTUATION HFAA    Inhale 2 puffs into the lungs every 12 (twelve) hours. Controller    CELECOXIB (CELEBREX) 200 MG CAPSULE    Take 1 capsule (200 mg total) by mouth 2 (two) times daily.    DICLOFENAC (VOLTAREN) 75 MG EC TABLET    Take 1 tablet (75 mg total) by mouth 2 (two) times daily as needed.    EUTHYROX 50 MCG TABLET    Take 1 tablet (50 mcg total) by mouth every morning.    GABAPENTIN (NEURONTIN) 300 MG CAPSULE    Take 1 capsule (300 mg total) by mouth 2 (two) times daily.    HYDROCHLOROTHIAZIDE (HYDRODIURIL) 25 MG TABLET    Take 1 tablet (25 mg total) by mouth daily as needed (edema, swelling).    HYDROCORTISONE 0.5 % CREAM    Apply topically 2 (two) times daily.    LINACLOTIDE (LINZESS) 145 MCG CAP CAPSULE    Take 1 capsule (145 mcg total) by mouth once daily.    MAGNESIUM OXIDE (MAG-OX) 400 MG (241.3 MG MAGNESIUM) TABLET    Take 1 tablet (400 mg total)  by mouth once daily.    METFORMIN (GLUCOPHAGE-XR) 500 MG ER 24HR TABLET    Take 1 tablet (500 mg total) by mouth 2 (two) times daily with meals.    MULTIVIT WITH MIN-FOLIC ACID (ADULT MULTIVITAMIN GUMMIES) 200 MCG CHEW    Take 1 tablet by mouth once daily.    OLOPATADINE (PATANOL) 0.1 % OPHTHALMIC SOLUTION        OXYBUTYNIN (DITROPAN-XL) 5 MG TR24    Take 5 mg by mouth.    PANTOPRAZOLE (PROTONIX) 40 MG TABLET    Take 1 tablet (40 mg total) by mouth once daily.    PRAVASTATIN (PRAVACHOL) 40 MG TABLET    Take 1 tablet (40 mg total) by mouth every evening.    SOLIFENACIN (VESICARE) 10 MG TABLET    Take 1 tablet (10 mg total) by mouth once daily.    TIRZEPATIDE 10 MG/0.5 ML PNIJ    Inject 10 mg into the skin every 7 days.   Changed and/or Refilled Medications    Modified Medication Previous Medication    BUSPIRONE (BUSPAR) 15 MG TABLET busPIRone (BUSPAR) 15 MG tablet       Take 1 tablet (15 mg total) by mouth 3 (three) times daily.    Take 1 tablet (15 mg total) by mouth 3 (three) times daily.    TRAZODONE (DESYREL) 150 MG TABLET traZODone (DESYREL) 150 MG tablet       Take 1 tablet (150 mg total) by mouth every evening.    Take 1 tablet (150 mg total) by mouth every evening.   Discontinued Medications    CEPHALEXIN (KEFLEX) 500 MG CAPSULE    TAKE 1 CAPSULE BY MOUTH EVERY 6 HOURS FOR 14 DAYS    ESCITALOPRAM OXALATE (LEXAPRO) 20 MG TABLET    Take 1 tablet (20 mg total) by mouth once daily.    LATUDA 20 MG TAB TABLET    Take 20 mg by mouth once daily.    POTASSIUM CHLORIDE SA (K-DUR,KLOR-CON) 20 MEQ TABLET    Take 1 tablet (20 mEq total) by mouth once daily.    QUETIAPINE (SEROQUEL) 25 MG TAB    Take 25 mg by mouth every evening.        I spent an additional 35 minutes performing E/M services with >50% spent on counseling, guidance, coordinating care (not Psychotherapy related) in addition to the 16 minutes performing Psychotherapy.    Time spent with pt including note preparation and chart review: 51 minutes     Patito  Leonid, PhD, MP  Advanced Practice Medical Psychologist  [facility]  [address]  [place], [place] [address] [phone]   [phone] fax

## 2023-10-31 NOTE — PATIENT INSTRUCTIONS

## 2023-11-06 ENCOUNTER — TELEPHONE (OUTPATIENT)
Dept: PSYCHIATRY | Facility: CLINIC | Age: 43
End: 2023-11-06
Payer: MEDICARE

## 2023-11-06 ENCOUNTER — OFFICE VISIT (OUTPATIENT)
Dept: INTERNAL MEDICINE | Facility: CLINIC | Age: 43
End: 2023-11-06
Payer: MEDICARE

## 2023-11-06 VITALS
HEIGHT: 67 IN | SYSTOLIC BLOOD PRESSURE: 118 MMHG | OXYGEN SATURATION: 98 % | DIASTOLIC BLOOD PRESSURE: 68 MMHG | RESPIRATION RATE: 10 BRPM | WEIGHT: 249.31 LBS | HEART RATE: 86 BPM | BODY MASS INDEX: 39.13 KG/M2 | TEMPERATURE: 99 F

## 2023-11-06 DIAGNOSIS — E78.2 MIXED HYPERLIPIDEMIA: ICD-10-CM

## 2023-11-06 DIAGNOSIS — F41.1 GAD (GENERALIZED ANXIETY DISORDER): ICD-10-CM

## 2023-11-06 DIAGNOSIS — I10 PRIMARY HYPERTENSION: Primary | ICD-10-CM

## 2023-11-06 DIAGNOSIS — K59.04 CHRONIC IDIOPATHIC CONSTIPATION: ICD-10-CM

## 2023-11-06 DIAGNOSIS — E11.69 TYPE 2 DIABETES MELLITUS WITH OTHER SPECIFIED COMPLICATION, WITHOUT LONG-TERM CURRENT USE OF INSULIN: ICD-10-CM

## 2023-11-06 DIAGNOSIS — I10 HYPERTENSION, UNSPECIFIED TYPE: ICD-10-CM

## 2023-11-06 PROCEDURE — 3008F BODY MASS INDEX DOCD: CPT | Mod: HCNC,CPTII,S$GLB, | Performed by: FAMILY MEDICINE

## 2023-11-06 PROCEDURE — 99999 PR PBB SHADOW E&M-EST. PATIENT-LVL IV: CPT | Mod: PBBFAC,HCNC,, | Performed by: FAMILY MEDICINE

## 2023-11-06 PROCEDURE — 99214 PR OFFICE/OUTPT VISIT, EST, LEVL IV, 30-39 MIN: ICD-10-PCS | Mod: HCNC,S$GLB,, | Performed by: FAMILY MEDICINE

## 2023-11-06 PROCEDURE — G0008 FLU VACCINE (QUAD) GREATER THAN OR EQUAL TO 3YO PRESERVATIVE FREE IM: ICD-10-PCS | Mod: HCNC,S$GLB,, | Performed by: FAMILY MEDICINE

## 2023-11-06 PROCEDURE — 90686 IIV4 VACC NO PRSV 0.5 ML IM: CPT | Mod: HCNC,S$GLB,, | Performed by: FAMILY MEDICINE

## 2023-11-06 PROCEDURE — 3044F HG A1C LEVEL LT 7.0%: CPT | Mod: HCNC,CPTII,S$GLB, | Performed by: FAMILY MEDICINE

## 2023-11-06 PROCEDURE — 3061F NEG MICROALBUMINURIA REV: CPT | Mod: HCNC,CPTII,S$GLB, | Performed by: FAMILY MEDICINE

## 2023-11-06 PROCEDURE — 90686 FLU VACCINE (QUAD) GREATER THAN OR EQUAL TO 3YO PRESERVATIVE FREE IM: ICD-10-PCS | Mod: HCNC,S$GLB,, | Performed by: FAMILY MEDICINE

## 2023-11-06 PROCEDURE — 1160F RVW MEDS BY RX/DR IN RCRD: CPT | Mod: HCNC,CPTII,S$GLB, | Performed by: FAMILY MEDICINE

## 2023-11-06 PROCEDURE — 3008F PR BODY MASS INDEX (BMI) DOCUMENTED: ICD-10-PCS | Mod: HCNC,CPTII,S$GLB, | Performed by: FAMILY MEDICINE

## 2023-11-06 PROCEDURE — 3074F SYST BP LT 130 MM HG: CPT | Mod: HCNC,CPTII,S$GLB, | Performed by: FAMILY MEDICINE

## 2023-11-06 PROCEDURE — 1160F PR REVIEW ALL MEDS BY PRESCRIBER/CLIN PHARMACIST DOCUMENTED: ICD-10-PCS | Mod: HCNC,CPTII,S$GLB, | Performed by: FAMILY MEDICINE

## 2023-11-06 PROCEDURE — 3078F DIAST BP <80 MM HG: CPT | Mod: HCNC,CPTII,S$GLB, | Performed by: FAMILY MEDICINE

## 2023-11-06 PROCEDURE — 3061F PR NEG MICROALBUMINURIA RESULT DOCUMENTED/REVIEW: ICD-10-PCS | Mod: HCNC,CPTII,S$GLB, | Performed by: FAMILY MEDICINE

## 2023-11-06 PROCEDURE — 1159F MED LIST DOCD IN RCRD: CPT | Mod: HCNC,CPTII,S$GLB, | Performed by: FAMILY MEDICINE

## 2023-11-06 PROCEDURE — 3074F PR MOST RECENT SYSTOLIC BLOOD PRESSURE < 130 MM HG: ICD-10-PCS | Mod: HCNC,CPTII,S$GLB, | Performed by: FAMILY MEDICINE

## 2023-11-06 PROCEDURE — 1159F PR MEDICATION LIST DOCUMENTED IN MEDICAL RECORD: ICD-10-PCS | Mod: HCNC,CPTII,S$GLB, | Performed by: FAMILY MEDICINE

## 2023-11-06 PROCEDURE — G0008 ADMIN INFLUENZA VIRUS VAC: HCPCS | Mod: HCNC,S$GLB,, | Performed by: FAMILY MEDICINE

## 2023-11-06 PROCEDURE — 99999 PR PBB SHADOW E&M-EST. PATIENT-LVL IV: ICD-10-PCS | Mod: PBBFAC,HCNC,, | Performed by: FAMILY MEDICINE

## 2023-11-06 PROCEDURE — 3044F PR MOST RECENT HEMOGLOBIN A1C LEVEL <7.0%: ICD-10-PCS | Mod: HCNC,CPTII,S$GLB, | Performed by: FAMILY MEDICINE

## 2023-11-06 PROCEDURE — 3066F PR DOCUMENTATION OF TREATMENT FOR NEPHROPATHY: ICD-10-PCS | Mod: HCNC,CPTII,S$GLB, | Performed by: FAMILY MEDICINE

## 2023-11-06 PROCEDURE — 3066F NEPHROPATHY DOC TX: CPT | Mod: HCNC,CPTII,S$GLB, | Performed by: FAMILY MEDICINE

## 2023-11-06 PROCEDURE — 3078F PR MOST RECENT DIASTOLIC BLOOD PRESSURE < 80 MM HG: ICD-10-PCS | Mod: HCNC,CPTII,S$GLB, | Performed by: FAMILY MEDICINE

## 2023-11-06 PROCEDURE — 99214 OFFICE O/P EST MOD 30 MIN: CPT | Mod: HCNC,S$GLB,, | Performed by: FAMILY MEDICINE

## 2023-11-06 RX ORDER — IBUPROFEN 100 MG/5ML
1000 SUSPENSION, ORAL (FINAL DOSE FORM) ORAL DAILY
Qty: 90 TABLET | Refills: 3 | Status: CANCELLED | OUTPATIENT
Start: 2023-11-06 | End: 2024-11-05

## 2023-11-06 RX ORDER — PANTOPRAZOLE SODIUM 40 MG/1
40 TABLET, DELAYED RELEASE ORAL DAILY
Qty: 90 TABLET | Refills: 1 | Status: SHIPPED | OUTPATIENT
Start: 2023-11-06 | End: 2024-02-13 | Stop reason: SDUPTHER

## 2023-11-06 RX ORDER — TRIAMCINOLONE ACETONIDE 40 MG/ML
INJECTION, SUSPENSION INTRA-ARTICULAR; INTRAMUSCULAR
COMMUNITY
Start: 2023-08-31 | End: 2024-02-27

## 2023-11-06 RX ORDER — ASPIRIN 325 MG
1 TABLET ORAL DAILY
Qty: 90 TABLET | Refills: 3 | Status: CANCELLED | OUTPATIENT
Start: 2023-11-06 | End: 2024-11-05

## 2023-11-06 RX ORDER — HYDROCHLOROTHIAZIDE 25 MG/1
25 TABLET ORAL DAILY PRN
Qty: 90 TABLET | Refills: 1 | Status: SHIPPED | OUTPATIENT
Start: 2023-11-06 | End: 2024-02-20

## 2023-11-06 RX ORDER — GABAPENTIN 300 MG/1
300 CAPSULE ORAL 2 TIMES DAILY
Qty: 180 CAPSULE | Refills: 1 | Status: SHIPPED | OUTPATIENT
Start: 2023-11-06 | End: 2024-01-16 | Stop reason: SDUPTHER

## 2023-11-06 NOTE — PROGRESS NOTES
Subjective:       Patient ID: Sangeetha June is a 43 y.o. female.    Chief Complaint: Diabetes    Hypertension  This is a chronic problem. The current episode started more than 1 year ago. The problem has been resolved since onset. The problem is controlled. Pertinent negatives include no anxiety, blurred vision, chest pain or shortness of breath. There are no associated agents to hypertension.     Review of Systems   Eyes:  Negative for blurred vision.   Respiratory:  Negative for shortness of breath.    Cardiovascular:  Negative for chest pain.   Gastrointestinal:  Negative for abdominal pain.       Objective:      Physical Exam  Vitals and nursing note reviewed.   Constitutional:       General: She is not in acute distress.     Appearance: Normal appearance. She is well-developed. She is not diaphoretic.   HENT:      Head: Normocephalic and atraumatic.   Pulmonary:      Effort: Pulmonary effort is normal. No respiratory distress.      Breath sounds: Normal breath sounds. No wheezing.   Skin:     General: Skin is warm and dry.      Findings: No erythema or rash.   Neurological:      Mental Status: She is alert.         Assessment:       1. Primary hypertension    2. ANGIE (generalized anxiety disorder)    3. Mixed hyperlipidemia    4. Type 2 diabetes mellitus with other specified complication, without long-term current use of insulin    5. Hypertension, unspecified type    6. Chronic idiopathic constipation        Plan:     Problem List Items Addressed This Visit          Psychiatric    ANGIE (generalized anxiety disorder)    Overview     Chronic, Stable, cont lexapro and buspar            Cardiac/Vascular    Hyperlipidemia    Overview     Chronic, Stable, cont a statin         Hypertension - Primary    Overview     Chronic, Stable, cont hctz         Relevant Medications    hydroCHLOROthiazide (HYDRODIURIL) 25 MG tablet       Endocrine    Type 2 diabetes mellitus, without long-term current use of insulin     Overview     cont metformin, trulicity         Relevant Orders    Hemoglobin A1C    Lipid Panel       GI    Chronic idiopathic constipation    Relevant Medications    linaCLOtide (LINZESS) 145 mcg Cap capsule

## 2023-11-07 ENCOUNTER — OFFICE VISIT (OUTPATIENT)
Dept: NEUROLOGY | Facility: CLINIC | Age: 43
End: 2023-11-07
Payer: MEDICARE

## 2023-11-07 VITALS
DIASTOLIC BLOOD PRESSURE: 78 MMHG | SYSTOLIC BLOOD PRESSURE: 112 MMHG | BODY MASS INDEX: 39.44 KG/M2 | WEIGHT: 251.31 LBS | HEIGHT: 67 IN | HEART RATE: 67 BPM

## 2023-11-07 DIAGNOSIS — E11.9 TYPE 2 DIABETES MELLITUS WITHOUT COMPLICATION, WITHOUT LONG-TERM CURRENT USE OF INSULIN: ICD-10-CM

## 2023-11-07 DIAGNOSIS — G47.33 OSA (OBSTRUCTIVE SLEEP APNEA): ICD-10-CM

## 2023-11-07 DIAGNOSIS — I10 PRIMARY HYPERTENSION: ICD-10-CM

## 2023-11-07 DIAGNOSIS — Z85.3 HISTORY OF BREAST CANCER: ICD-10-CM

## 2023-11-07 DIAGNOSIS — E66.01 OBESITY, MORBID, BMI 40.0-49.9: ICD-10-CM

## 2023-11-07 DIAGNOSIS — R93.89 ABNORMAL FINDING OF DIAGNOSTIC IMAGING: Primary | ICD-10-CM

## 2023-11-07 DIAGNOSIS — D32.9 MENINGIOMA: ICD-10-CM

## 2023-11-07 DIAGNOSIS — G62.9 NEUROPATHY: ICD-10-CM

## 2023-11-07 PROCEDURE — 3044F HG A1C LEVEL LT 7.0%: CPT | Mod: HCNC,CPTII,S$GLB, | Performed by: NURSE PRACTITIONER

## 2023-11-07 PROCEDURE — 1159F MED LIST DOCD IN RCRD: CPT | Mod: HCNC,CPTII,S$GLB, | Performed by: NURSE PRACTITIONER

## 2023-11-07 PROCEDURE — 99214 OFFICE O/P EST MOD 30 MIN: CPT | Mod: HCNC,S$GLB,, | Performed by: NURSE PRACTITIONER

## 2023-11-07 PROCEDURE — 3074F PR MOST RECENT SYSTOLIC BLOOD PRESSURE < 130 MM HG: ICD-10-PCS | Mod: HCNC,CPTII,S$GLB, | Performed by: NURSE PRACTITIONER

## 2023-11-07 PROCEDURE — 3061F PR NEG MICROALBUMINURIA RESULT DOCUMENTED/REVIEW: ICD-10-PCS | Mod: HCNC,CPTII,S$GLB, | Performed by: NURSE PRACTITIONER

## 2023-11-07 PROCEDURE — 1160F PR REVIEW ALL MEDS BY PRESCRIBER/CLIN PHARMACIST DOCUMENTED: ICD-10-PCS | Mod: HCNC,CPTII,S$GLB, | Performed by: NURSE PRACTITIONER

## 2023-11-07 PROCEDURE — 3061F NEG MICROALBUMINURIA REV: CPT | Mod: HCNC,CPTII,S$GLB, | Performed by: NURSE PRACTITIONER

## 2023-11-07 PROCEDURE — 3066F PR DOCUMENTATION OF TREATMENT FOR NEPHROPATHY: ICD-10-PCS | Mod: HCNC,CPTII,S$GLB, | Performed by: NURSE PRACTITIONER

## 2023-11-07 PROCEDURE — 99999 PR PBB SHADOW E&M-EST. PATIENT-LVL IV: CPT | Mod: PBBFAC,HCNC,, | Performed by: NURSE PRACTITIONER

## 2023-11-07 PROCEDURE — 3074F SYST BP LT 130 MM HG: CPT | Mod: HCNC,CPTII,S$GLB, | Performed by: NURSE PRACTITIONER

## 2023-11-07 PROCEDURE — 3008F PR BODY MASS INDEX (BMI) DOCUMENTED: ICD-10-PCS | Mod: HCNC,CPTII,S$GLB, | Performed by: NURSE PRACTITIONER

## 2023-11-07 PROCEDURE — 3008F BODY MASS INDEX DOCD: CPT | Mod: HCNC,CPTII,S$GLB, | Performed by: NURSE PRACTITIONER

## 2023-11-07 PROCEDURE — 1159F PR MEDICATION LIST DOCUMENTED IN MEDICAL RECORD: ICD-10-PCS | Mod: HCNC,CPTII,S$GLB, | Performed by: NURSE PRACTITIONER

## 2023-11-07 PROCEDURE — 99214 PR OFFICE/OUTPT VISIT, EST, LEVL IV, 30-39 MIN: ICD-10-PCS | Mod: HCNC,S$GLB,, | Performed by: NURSE PRACTITIONER

## 2023-11-07 PROCEDURE — 3078F DIAST BP <80 MM HG: CPT | Mod: HCNC,CPTII,S$GLB, | Performed by: NURSE PRACTITIONER

## 2023-11-07 PROCEDURE — 3044F PR MOST RECENT HEMOGLOBIN A1C LEVEL <7.0%: ICD-10-PCS | Mod: HCNC,CPTII,S$GLB, | Performed by: NURSE PRACTITIONER

## 2023-11-07 PROCEDURE — 3066F NEPHROPATHY DOC TX: CPT | Mod: HCNC,CPTII,S$GLB, | Performed by: NURSE PRACTITIONER

## 2023-11-07 PROCEDURE — 1160F RVW MEDS BY RX/DR IN RCRD: CPT | Mod: HCNC,CPTII,S$GLB, | Performed by: NURSE PRACTITIONER

## 2023-11-07 PROCEDURE — 99999 PR PBB SHADOW E&M-EST. PATIENT-LVL IV: ICD-10-PCS | Mod: PBBFAC,HCNC,, | Performed by: NURSE PRACTITIONER

## 2023-11-07 PROCEDURE — 3078F PR MOST RECENT DIASTOLIC BLOOD PRESSURE < 80 MM HG: ICD-10-PCS | Mod: HCNC,CPTII,S$GLB, | Performed by: NURSE PRACTITIONER

## 2023-11-08 PROBLEM — R93.89 ABNORMAL FINDING OF DIAGNOSTIC IMAGING: Status: ACTIVE | Noted: 2023-11-08

## 2023-11-08 PROBLEM — G62.9 NEUROPATHY: Status: ACTIVE | Noted: 2023-11-08

## 2023-11-08 NOTE — PROGRESS NOTES
[facility] VISIT      SUBJECTIVE:    History for Present Illness: Sangeetha June is a 43 y.o.  female  with PMHx of HTN, breast CA (s/p left mastectomy 06/24/2020) EVERARDO, dm, and meningioma (followed by Dr. Lamb in Neurosurgery) who presents to me today for history of abnormal imaging finding.  She is accompanied to today's visit by her father    Relevant HPI:  Patient was found to have breast cancer and underwent left mastectomy on 06/24/2020 and treated with Taxol.  She will be and plan of speech difficulty memory loss. Per patient during chemotherapy regimen she was told that she possibly had a stroke. Outpatient MRI indicated no evidence of metastasis but did show a small 1 cm left convexity meningioma with no evidence of acute/subacute infarct.      Interval history:    At the time of today's visit, the patient denies new or worsening focal neurologic symptoms concerning for new stroke or TIA. She is doing well overall .  She continues to report memory loss and occasional headaches with chronic neuropathy to hands and bilateral feet.  She remains independent with ADLs.  She denies alcohol/tobacco/illicit drug use.  Patient is adherent with home medications.      Past Medical History:   Diagnosis Date    Anxiety     Asthma 11/8/2022    Breast cancer     Chest pain     Chest pain 07/02/2021    Cholecystitis without calculus     Decreased ROM of left shoulder 02/15/2022    Diabetes mellitus     Dizziness     Elevated C-reactive protein (CRP)     Essential hypertension     Fall 10/11/2021    Memory change     EVERARDO (obstructive sleep apnea)     Osteoarthritis     Other specified disorders of thyroid     Pre-diabetes 04/15/2021    Chronic, Stable, cont metformin    Screening mammogram, encounter for 04/15/2021    Shoulder injury     SOB (shortness of breath)     Stroke     Thyroiditis     Tingling of left upper extremity 10/29/2021    Vision disturbance 04/30/2021    Weakness of  shoulder 02/15/2022     Past Surgical History:   Procedure Laterality Date    ARTHROSCOPIC REPAIR OF ROTATOR CUFF OF SHOULDER Left 1/27/2022    Procedure: REPAIR, ROTATOR CUFF, ARTHROSCOPIC;  Surgeon: Francisco Mathews MD;  Location: [facility] OR;  Service: Orthopedics;  Laterality: Left;    ARTHROSCOPY OF SHOULDER WITH DECOMPRESSION OF SUBACROMIAL SPACE Left 1/27/2022    Procedure: ARTHROSCOPY, SHOULDER, WITH SUBACROMIAL SPACE DECOMPRESSION;  Surgeon: Francisco Mathews MD;  Location: [facility] OR;  Service: Orthopedics;  Laterality: Left;    BREAST BIOPSY      COLONOSCOPY N/A 5/31/2022    Procedure: COLONOSCOPY;  Surgeon: Km Odom MD;  Location: [facility] ENDO;  Service: Endoscopy;  Laterality: N/A;    ESOPHAGOGASTRODUODENOSCOPY N/A 5/31/2022    Procedure: EGD (ESOPHAGOGASTRODUODENOSCOPY) ;  Surgeon: Km Odom MD;  Location: [facility] ENDO;  Service: Endoscopy;  Laterality: N/A;    FIXATION OF TENDON Left 1/27/2022    Procedure: FIXATION, TENDON;  Surgeon: Francisco Mathews MD;  Location: [facility] OR;  Service: Orthopedics;  Laterality: Left;    HYSTERECTOMY      INJECTION OF ANESTHETIC AGENT INTO SACROILIAC JOINT Bilateral 7/29/2022    Procedure: Bilateral SIJ +  Bilateral GT Bursa Injection RN IV Sedation;  Surgeon: Bisi Cochran MD;  Location: [facility] PAIN MGT;  Service: Pain Management;  Laterality: Bilateral;    INJECTION OF JOINT Left 11/12/2021    Procedure: Left suprascapular and axillary injection with local;  Surgeon: Bisi Cochran MD;  Location: [facility] PAIN MGT;  Service: Pain Management;  Laterality: Left;    INJECTION OF JOINT Bilateral 7/29/2022    Procedure: Bilateral SIJ + Bilateral GT Bursa Injection RN IV Sedation;  Surgeon: Bisi Cochran MD;  Location: [facility] PAIN MGT;  Service: Pain Management;  Laterality: Bilateral;    INSERTION OF BREAST TISSUE EXPANDER Left 11/15/2022    Procedure: INSERTION, TISSUE EXPANDER, BREAST;  Surgeon: Collin Pinto MD;  Location: [facility] OR;  Service:  Plastics;  Laterality: Left;    MASTECTOMY      ROBOT-ASSISTED CHOLECYSTECTOMY USING DA ROBERTA XI N/A 8/29/2022    Procedure: XI ROBOTIC CHOLECYSTECTOMY;  Surgeon: Raeann Bhandari DO;  Location: [facility] OR;  Service: General;  Laterality: N/A;    TISSUE EXPANDER REMOVAL Left 1/4/2023    Procedure: REMOVAL, TISSUE EXPANDER;  Surgeon: Collin Pinto MD;  Location: [facility] OR;  Service: Plastics;  Laterality: Left;    TOTAL REDUCTION MAMMOPLASTY Right 11/15/2022    Procedure: MAMMOPLASTY, REDUCTION;  Surgeon: Collin Pinto MD;  Location: [facility] OR;  Service: Plastics;  Laterality: Right;     Family History   Problem Relation Age of Onset    Colon cancer Mother 53        partial colectomy    Stroke Mother     Heart disease Mother     Breast cancer Sister     Diabetes Sister     Throat cancer Maternal Aunt         smoking hx    Lung cancer Maternal Uncle         hx smoking    No Known Problems Maternal Grandmother     Prostate cancer Maternal Grandfather 70    Alcohol abuse Maternal Grandfather     No Known Problems Paternal Grandmother     No Known Problems Paternal Grandfather     Diabetes Brother     Diabetes Brother     Diabetes Brother     Breast cancer Other 37        chemotherapy, planning for BL mastectomy    No Known Problems Other         Current Outpatient Medications:     albuterol (PROVENTIL/VENTOLIN HFA) 90 mcg/actuation inhaler, Inhale 2 puffs into the lungs every 4 (four) hours as needed for Wheezing. Rescue, Disp: 8.5 g, Rfl: 1    ammonium lactate 12 % Crea, Apply 1 Act topically 2 (two) times daily. To feet., Disp: 140 g, Rfl: 2    ascorbic acid, vitamin C, (VITAMIN C) 1000 MG tablet, Take 1 tablet (1,000 mg total) by mouth once daily., Disp: 90 tablet, Rfl: 3    aspirin (ECOTRIN) 81 MG EC tablet, Take 1 tablet (81 mg total) by mouth once daily., Disp: 360 tablet, Rfl: 1    budesonide-formoterol 160-4.5 mcg (SYMBICORT) 160-4.5 mcg/actuation HFAA, Inhale 2 puffs into the lungs every 12 (twelve) hours.  Controller, Disp: 10.2 g, Rfl: 11    busPIRone (BUSPAR) 15 MG tablet, Take 1 tablet (15 mg total) by mouth 3 (three) times daily., Disp: 270 tablet, Rfl: 1    celecoxib (CELEBREX) 200 MG capsule, Take 1 capsule (200 mg total) by mouth 2 (two) times daily., Disp: 28 capsule, Rfl: 0    diclofenac (VOLTAREN) 75 MG EC tablet, Take 1 tablet (75 mg total) by mouth 2 (two) times daily as needed., Disp: 60 tablet, Rfl: 2    EUTHYROX 50 mcg tablet, Take 1 tablet (50 mcg total) by mouth every morning., Disp: 90 tablet, Rfl: 1    gabapentin (NEURONTIN) 300 MG capsule, Take 1 capsule (300 mg total) by mouth 2 (two) times daily., Disp: 180 capsule, Rfl: 1    hydroCHLOROthiazide (HYDRODIURIL) 25 MG tablet, Take 1 tablet (25 mg total) by mouth daily as needed (edema, swelling)., Disp: 90 tablet, Rfl: 1    hydrocortisone 0.5 % cream, Apply topically 2 (two) times daily., Disp: , Rfl:     linaCLOtide (LINZESS) 145 mcg Cap capsule, Take 1 capsule (145 mcg total) by mouth once daily., Disp: 90 capsule, Rfl: 1    magnesium oxide (MAG-OX) 400 mg (241.3 mg magnesium) tablet, Take 1 tablet (400 mg total) by mouth once daily., Disp: 90 tablet, Rfl: 1    metFORMIN (GLUCOPHAGE-XR) 500 MG ER 24hr tablet, Take 1 tablet (500 mg total) by mouth 2 (two) times daily with meals., Disp: 180 tablet, Rfl: 1    multivit with min-folic acid (ADULT MULTIVITAMIN GUMMIES) 200 mcg Chew, Take 1 tablet by mouth once daily., Disp: 90 tablet, Rfl: 3    olopatadine (PATANOL) 0.1 % ophthalmic solution, , Disp: , Rfl:     oxybutynin (DITROPAN-XL) 5 MG TR24, Take 5 mg by mouth., Disp: , Rfl:     pantoprazole (PROTONIX) 40 MG tablet, Take 1 tablet (40 mg total) by mouth once daily., Disp: 90 tablet, Rfl: 1    pravastatin (PRAVACHOL) 40 MG tablet, Take 1 tablet (40 mg total) by mouth every evening., Disp: 90 tablet, Rfl: 1    solifenacin (VESICARE) 10 MG tablet, Take 1 tablet (10 mg total) by mouth once daily., Disp: 30 tablet, Rfl: 11    tirzepatide 10 mg/0.5 mL  "PnIj, Inject 10 mg into the skin every 7 days., Disp: 4 pen , Rfl: 5    traZODone (DESYREL) 150 MG tablet, Take 1 tablet (150 mg total) by mouth every evening., Disp: 90 tablet, Rfl: 1    triamcinolone acetonide (KENALOG-40) 40 mg/mL injection, , Disp: , Rfl:   No current facility-administered medications for this visit.    Facility-Administered Medications Ordered in Other Visits:     lactated ringers infusion, , Intravenous, Continuous, Maty Thompson MD, Last Rate: 10 mL/hr at 01/27/22 0636, New Bag at 01/04/23 1603     Review of Systems:  Review of systems is negative except for the symptoms mentioned in HPI    Physical exam:    /78 (BP Location: Right forearm, Patient Position: Sitting, BP Method: Small (Automatic))   Pulse 67   Ht 5' 7" (1.702 m)   Wt 114 kg (251 lb 5.2 oz)   LMP  (LMP Unknown)   BMI 39.36 kg/m²     General: Well-developed, well-groomed. No apparent distress  HENT: Normocephalic, atraumatic.    Cardiovascular: Regular rate and rhythm  Chest: No audible wheezes, stridor, ronchi appreciated.  Musculoskeletal: No peripheral edema     Neurologic Exam: The patient is awake, alert and oriented to person, place, time and situation. Attentive, vigilant during exam. Language is fluent. Naming & repetition intact, +2-step commands.  Fund of knowledge is appropriate. Well organized thoughts.     Cranial nerves:   CN II: Visual fields are full to confrontation. Pupils are 4 mm and briskly reactive to light.  CN III, IV, VI: EOMI, no nystagmus, no ptosis  CN V: Facial sensation is intact in all 3 divisions bilaterally.  CN VII: Face is symmetric with normal eye closure and smile.  CN VIII: Hearing is normal bilaterally  CN IX, X: Palate elevates symmetrically. Phonation is normal.  CN XI: Head turning and shoulder shrug are intact  CN XII: Tongue is midline with normal movements and no atrophy.     Motor examination of all extremities demonstrates normal bulk and tone in all four limbs. There " are no atrophy or fasciculations.        Left Right     Left Right   Deltoid 5/5 5/5   Hip Flexion 5/5 5/5   Biceps 5/5 5/5   Hip Extension 5/5 5/5   Triceps 5/5 5/5   Knee Flexion 5/5 5/5   Wrist Ext 5/5 5/5   Knee Extension 5/5 5/5   Finger Abd 5/5 5/5   Ankle dorsiflex 5/5 5/5           Ankle plantar flex 5/5 5/5     Sensory examination is normal light touch in BUE and BLE.  Romberg is negative.  Deep tendon reflexes No clonus. Negative Gipson's     Gait: Normal tandem, and casual gait.     Coordination: No dysmetria with finger-to-nose. Rapid alternating movements and fine finger movements are intact.         Relevant Labwork:  Recent Labs   Lab 07/19/22  1356 10/21/22  1416 06/23/23  1215   Hemoglobin A1C 5.4   < > 5.3   LDL Cholesterol 84.6  --   --    HDL 44  --   --    Triglycerides 97  --   --    Cholesterol 148  --   --     < > = values in this interval not displayed.       Diagnostic Results:  Imaging was independently reviewed by myself, along with the associated radiology report    Brain Imaging   MRI of the brain with and without 12/29/2022:  No acute/subacute infarct, midline shift or extra-axial fluid collection.    Stable left frontal meningioma.  Vessel Imaging     Cardiac Imaging     Assessment:  1. Abnormal finding of diagnostic imaging        2. Type 2 diabetes mellitus without complication, without long-term current use of insulin        3. Obesity, morbid, BMI 40.0-49.9        4. Primary hypertension        5. History of breast cancer        6. EVERARDO (obstructive sleep apnea)        7. Meningioma          Sangeetha June  is a 43 y.o.  female  seen today in clinic for follow-up assessment and recommendations. The following recommendations and plan were discussed in depth with the patient who voiced understanding and was in agreement.  Plan:  Abnormal imaging finding  -mild T2 prolongation in the left insular region unlikely related to a vascular neurologic etiology. However, cannot  definitively rule out punctate infarct.  Patient has stroke risk factors, therefore will continue risk factor modification and  medical management  --In-depth discussion with patient regarding imaging findings & stroke risk factors  - Antithrombotics/ Anticoagulation: Aspirin 81 mg daily,  indefinitely   - Stroke Risk Factors:  HTN, HLD, EVERARDO, dm, Ca  - Lipid Management: Target is LDL < 70mg/dL. Continue Pravachol 40 mg daily. Monitoring for liver dysfunction and myopathy is suggested for statins. To be addressed by PCP  -Hypertension: Long term goal is normotension w/ target BP of less than 130/80 mmHg.  Continue home medications and follow up with PCP for surveillance and long-term management  - Diagnostics ordered/pending:  None  Diabetes: Target hemoglobin A1c <7%, measured 2X/year or quarterly if not meeting goals. Continue home medications and follow up with PCP for surveillance and long-term management  -EVERARDO :Stroke Risk factor . Continue with CPAP at night. Follow up with PCP for surveillance and chronic management  -Diet: Discussed Mediterranean Diet recommendations (Adopted from Lopez et al, [facility], 2018.)  - Eat primarily plant-based foods, such as fruits and vegetables, whole grains, legumes (beans) and nuts  - Limit refined carbohydrates (white pasta, bread, rice).  - Replace butter with healthy fats such as olive oil.  - Use herbs and spices instead of salt to flavor foods.  - Limit red meat and processed meats to no more than a few times a month.  - Avoid sugary sodas, bakery goods, and sweets.  - Eat fish and poultry at least twice a week.              - Get plenty of exercise (150 minutes per week).     Meningioma  -continue to follow-up outpatient with Neurosurgery-Dr. Lamb for surveillance and long-term management     Breast CA  -continue to follow-up outpatient with Hematology/Oncology for surveillance and long-term management     Neuropathy  -patient encouraged to keep appointment with   Frankie in Neurology for further assessment and recommendations     Patient/Family teaching provided during this visit  -Identifying the signs and symptoms of stroke; emergency action and ER attention  -Risk factor control  -Optimization for secondary stroke prevention including compliance with current medications       I will plan on having Sangeetha June return to clinic as needed. The patient can contact my office with any questions or concerns they may have as they arise in the interim.       Collaborating Physician, Dr Pulido, was available during today's encounter. Any change to plan along with cosign to appear in the EMR    TERRY Hernandez  Department of Vascular Neurology  [facility]  [phone]    This note is dictated on M*Modal Fluency Direct word recognition program. There are word recognition mistakes that are occasionally missed on review

## 2023-11-09 ENCOUNTER — PATIENT MESSAGE (OUTPATIENT)
Dept: INTERNAL MEDICINE | Facility: CLINIC | Age: 43
End: 2023-11-09
Payer: MEDICARE

## 2023-11-09 NOTE — TELEPHONE ENCOUNTER
Pt is requesting referral to  for assistance with managing diabetes and medications as pt has some mental confusion.  Referral pended.  Please place orders if appropriate

## 2023-11-13 ENCOUNTER — HOSPITAL ENCOUNTER (OUTPATIENT)
Dept: RADIOLOGY | Facility: HOSPITAL | Age: 43
Discharge: HOME | End: 2023-11-13
Attending: NURSE PRACTITIONER
Payer: MEDICARE

## 2023-11-13 VITALS — HEIGHT: 67 IN | WEIGHT: 251.31 LBS | BODY MASS INDEX: 39.44 KG/M2

## 2023-11-13 DIAGNOSIS — Z17.1 MALIGNANT NEOPLASM OF LEFT BREAST IN FEMALE, ESTROGEN RECEPTOR NEGATIVE, UNSPECIFIED SITE OF BREAST: ICD-10-CM

## 2023-11-13 DIAGNOSIS — C50.912 MALIGNANT NEOPLASM OF LEFT BREAST IN FEMALE, ESTROGEN RECEPTOR NEGATIVE, UNSPECIFIED SITE OF BREAST: ICD-10-CM

## 2023-11-13 PROCEDURE — 77061 BREAST TOMOSYNTHESIS UNI: CPT | Mod: TC,HCNC,RT

## 2023-11-13 PROCEDURE — 77065 MAMMO DIGITAL DIAGNOSTIC RIGHT WITH TOMO: ICD-10-PCS | Mod: 26,HCNC,RT, | Performed by: RADIOLOGY

## 2023-11-13 PROCEDURE — 76642 ULTRASOUND BREAST LIMITED: CPT | Mod: 26,HCNC,RT, | Performed by: RADIOLOGY

## 2023-11-13 PROCEDURE — 77061 BREAST TOMOSYNTHESIS UNI: CPT | Mod: 26,HCNC,RT, | Performed by: RADIOLOGY

## 2023-11-13 PROCEDURE — 76642 ULTRASOUND BREAST LIMITED: CPT | Mod: TC,HCNC,RT

## 2023-11-13 PROCEDURE — 77065 DX MAMMO INCL CAD UNI: CPT | Mod: 26,HCNC,RT, | Performed by: RADIOLOGY

## 2023-11-13 PROCEDURE — 77061 MAMMO DIGITAL DIAGNOSTIC RIGHT WITH TOMO: ICD-10-PCS | Mod: 26,HCNC,RT, | Performed by: RADIOLOGY

## 2023-11-13 PROCEDURE — 76642 US BREAST RIGHT LIMITED: ICD-10-PCS | Mod: 26,HCNC,RT, | Performed by: RADIOLOGY

## 2023-11-14 ENCOUNTER — PATIENT MESSAGE (OUTPATIENT)
Dept: INTERNAL MEDICINE | Facility: CLINIC | Age: 43
End: 2023-11-14
Payer: MEDICARE

## 2023-11-15 ENCOUNTER — OFFICE VISIT (OUTPATIENT)
Dept: DIABETES | Facility: CLINIC | Age: 43
End: 2023-11-15
Payer: MEDICARE

## 2023-11-15 DIAGNOSIS — I63.9 CEREBROVASCULAR ACCIDENT (CVA), UNSPECIFIED MECHANISM: ICD-10-CM

## 2023-11-15 DIAGNOSIS — E78.2 MIXED HYPERLIPIDEMIA: ICD-10-CM

## 2023-11-15 DIAGNOSIS — E11.9 TYPE 2 DIABETES MELLITUS WITHOUT COMPLICATION, WITHOUT LONG-TERM CURRENT USE OF INSULIN: Primary | ICD-10-CM

## 2023-11-15 DIAGNOSIS — I10 PRIMARY HYPERTENSION: ICD-10-CM

## 2023-11-15 PROCEDURE — 3066F NEPHROPATHY DOC TX: CPT | Mod: HCNC,CPTII,95, | Performed by: NURSE PRACTITIONER

## 2023-11-15 PROCEDURE — 99214 PR OFFICE/OUTPT VISIT, EST, LEVL IV, 30-39 MIN: ICD-10-PCS | Mod: HCNC,95,, | Performed by: NURSE PRACTITIONER

## 2023-11-15 PROCEDURE — 99214 OFFICE O/P EST MOD 30 MIN: CPT | Mod: HCNC,95,, | Performed by: NURSE PRACTITIONER

## 2023-11-15 PROCEDURE — 3044F PR MOST RECENT HEMOGLOBIN A1C LEVEL <7.0%: ICD-10-PCS | Mod: HCNC,CPTII,95, | Performed by: NURSE PRACTITIONER

## 2023-11-15 PROCEDURE — 3066F PR DOCUMENTATION OF TREATMENT FOR NEPHROPATHY: ICD-10-PCS | Mod: HCNC,CPTII,95, | Performed by: NURSE PRACTITIONER

## 2023-11-15 PROCEDURE — 3061F PR NEG MICROALBUMINURIA RESULT DOCUMENTED/REVIEW: ICD-10-PCS | Mod: HCNC,CPTII,95, | Performed by: NURSE PRACTITIONER

## 2023-11-15 PROCEDURE — 3061F NEG MICROALBUMINURIA REV: CPT | Mod: HCNC,CPTII,95, | Performed by: NURSE PRACTITIONER

## 2023-11-15 PROCEDURE — 3044F HG A1C LEVEL LT 7.0%: CPT | Mod: HCNC,CPTII,95, | Performed by: NURSE PRACTITIONER

## 2023-11-15 NOTE — PATIENT INSTRUCTIONS
PATIENT INSTRUCTIONS     Lifestyle modification with well balanced diet and at least 30 minutes of physical activity daily recommended.   Labs ordered and will be scheduled by [facility] staff.      Continue Mounjaro 10 mg subcutaneouslyv every 7 days.   Continue Glucophage  mg by mouth daily.     Blood Sugar Goals:       Fastin-130.       1-2 hours after a meal: Less than 180.

## 2023-11-15 NOTE — PROGRESS NOTES
"The patient location is: Home  The chief complaint leading to consultation is: Diabetes    Visit type: audiovisual    Face to Face time with patient: 15  30 minutes of total time spent on the encounter, which includes face to face time and non-face to face time preparing to see the patient (eg, review of tests), Obtaining and/or reviewing separately obtained history, Documenting clinical information in the electronic or other health record, Independently interpreting results (not separately reported) and communicating results to the patient/family/caregiver, or Care coordination (not separately reported).  Each patient to whom he or she provides medical services by telemedicine is:  (1) informed of the relationship between the physician and patient and the respective role of any other health care provider with respect to management of the patient; and (2) notified that he or she may decline to receive medical services by telemedicine and may withdraw from such care at any time.    Notes:    Sangeetha June is a 43 y.o. female who presents for a follow up evaluation of Type 2 diabetes mellitus.     CHIEF COMPLAINT: Diabetes Consultation    PCP: Sam Garcia MD      Initial visit with me - 4/3/2023    The patient was initially diagnosed with diabetes  for 2 years.      Previous failed treatments include:  none     Social Documentation:  Patient lives in [place] with Boni.   Occupation: Disabled.  Exercise: No formal exercise.   Diet: "very poor", does not cook. Boni cooks her meals for her.       Diabetes related complications:   cerebrovascular disease.   denies Pancreatitis  denies Gastroparesis  denies DKA  denies Hx/family Hx of MEN2/MTC  denies Frequent UTIs/yeast infections     Cardiovascular Risk Factors: dyslipidemia, hypertension, obesity (BMI >30 kg/m2), and sedentary lifestyle.    Diabetes Medications               metFORMIN (GLUCOPHAGE-XR) 500 MG ER 24hr tablet Take 1 tablet (500 mg total) " "by mouth 2 (two) times daily with meals.    tirzepatide 10 mg/0.5 mL PnIj Inject 10 mg into the skin every 7 days.     Current monitoring regimen: capillary blood glucose monitoring with finger sticks.  Not checking every day.     Recent hypoglycemic episodes: No.   Patient compliant with glucose checks and medication administration? Yes    DIABETES MANAGEMENT STATUS  Statin: Taking  ACE/ARB: Not taking  Screening or Prevention Patient's value Goal Complete/Controlled?   HgA1C Testing and Control   Lab Results   Component Value Date    HGBA1C 5.3 06/23/2023      Annually/Less than 8% Yes   Lipid profile : 07/19/2022 Annually Yes   LDL control Lab Results   Component Value Date    LDLCALC 84.6 07/19/2022    Annually/Less than 100 mg/dl  Yes   Nephropathy screening Lab Results   Component Value Date    LABMICR 5.0 06/23/2023     No results found for: "PROTEINUA"  No results found for: "UTPCR"   Annually Yes   Blood pressure BP Readings from Last 1 Encounters:   11/07/23 112/78    Less than 140/90 Yes   Dilated retinal exam : 07/26/2023 Annually Yes   Foot exam   : 09/14/2023 Annually Yes   Patient's medications, allergies, surgical, social and family histories were reviewed and updated as appropriate.     Review of Systems   Constitutional:  Negative for weight loss.   Eyes:  Negative for blurred vision and double vision.   Cardiovascular:  Negative for chest pain.   Gastrointestinal:  Negative for nausea and vomiting.   Genitourinary:  Negative for frequency.   Musculoskeletal:  Negative for falls.   Neurological:  Negative for dizziness and weakness.   Endo/Heme/Allergies:  Negative for polydipsia.   Psychiatric/Behavioral:  Negative for depression.    All other systems reviewed and are negative.       Physical Exam  Constitutional:       Appearance: Normal appearance.   HENT:      Head: Normocephalic and atraumatic.   Pulmonary:      Effort: No respiratory distress.   Musculoskeletal:      Cervical back: Normal " "range of motion.   Neurological:      Mental Status: She is alert and oriented to person, place, and time.   Psychiatric:         Mood and Affect: Mood normal.         Behavior: Behavior normal.        There were no vitals taken for this visit.  Wt Readings from Last 3 Encounters:   11/13/23 114 kg (251 lb 5.2 oz)   11/07/23 114 kg (251 lb 5.2 oz)   11/06/23 113.1 kg (249 lb 5.4 oz)       LAB REVIEW  Lab Results   Component Value Date     08/09/2023    K 3.1 (L) 08/09/2023     08/09/2023    CO2 27 08/09/2023    BUN 10 08/09/2023    CREATININE 1.1 08/09/2023    CALCIUM 9.7 08/09/2023    ANIONGAP 12 08/09/2023    EGFRNORACEVR >60 08/09/2023     No results found for: "CPEPTIDE", "GLUTAMICACID", "INSLNABS"  Hemoglobin A1C   Date Value Ref Range Status   06/23/2023 5.3 4.0 - 5.6 % Final     Comment:     ADA Screening Guidelines:  5.7-6.4%  Consistent with prediabetes  >or=6.5%  Consistent with diabetes    High levels of fetal hemoglobin interfere with the HbA1C  assay. Heterozygous hemoglobin variants (HbS, HgC, etc)do  not significantly interfere with this assay.   However, presence of multiple variants may affect accuracy.     02/20/2023 5.0 4.0 - 5.6 % Final     Comment:     ADA Screening Guidelines:  5.7-6.4%  Consistent with prediabetes  >or=6.5%  Consistent with diabetes    High levels of fetal hemoglobin interfere with the HbA1C  assay. Heterozygous hemoglobin variants (HbS, HgC, etc)do  not significantly interfere with this assay.   However, presence of multiple variants may affect accuracy.     10/21/2022 5.1 4.0 - 5.6 % Final     Comment:     ADA Screening Guidelines:  5.7-6.4%  Consistent with prediabetes  >or=6.5%  Consistent with diabetes    High levels of fetal hemoglobin interfere with the HbA1C  assay. Heterozygous hemoglobin variants (HbS, HgC, etc)do  not significantly interfere with this assay.   However, presence of multiple variants may affect accuracy.          ASSESSMENT    ICD-10-CM " ICD-9-CM   1. Type 2 diabetes mellitus without complication, without long-term current use of insulin  E11.9 250.00   2. Cerebrovascular accident (CVA), unspecified mechanism  I63.9 434.91   3. Mixed hyperlipidemia  E78.2 272.2   4. Primary hypertension  I10 401.9           PLAN  Diagnoses and all orders for this visit:    Type 2 diabetes mellitus without complication, without long-term current use of insulin  -     Comprehensive Metabolic Panel; Future  -     Hemoglobin A1C; Future  -     Lipid Panel; Future    Cerebrovascular accident (CVA), unspecified mechanism    Mixed hyperlipidemia    Primary hypertension            Reviewed pathophysiology of diabetes, complications related to the disease, importance of annual dilated eye exam and daily foot examination. Explained MOA, SE, dosage of medications. Written instructions given and reviewed with patient and patient verbalizes understanding.     4/3/2023 Initial visit: was doing well on trulicity 3 mg, but then was not able to get it due to backorder and has not taken in 2 months. Will restart at 0.75, f/u 6 weeks.     5/15/2023 - patient doing well, has [employer] supplies, will need to get with [facility] team for set up. Doing well with trulicity. F/u 3 months.     8/15/2023 - patient started on Mounjaro by PCP, doing well, has lost weight. Will increase to 10 mg with next dose. F/u 3 months.    11/15/2023 - reports doing well on Mounjaro. Will follow up in 3 months with A1c.      PATIENT INSTRUCTIONS     Lifestyle modification with well balanced diet and at least 30 minutes of physical activity daily recommended.   Labs ordered and will be scheduled by [facility] staff.      Continue Mounjaro 10 mg subcutaneouslyv every 7 days.   Continue Glucophage  mg by mouth daily.     Blood Sugar Goals:       Fastin-130.       1-2 hours after a meal: Less than 180.     Follow up in about 3 months (around 2/15/2024) for In-Person, Schedule fasting  labs.    Portions of this note were prepared with [employer] Naturally Speaking voice recognition transcription software. Grammatical errors, including garbled syntax, mangle pronouns, and other bizarre constructions may be attributed to that software system.

## 2023-11-21 ENCOUNTER — PATIENT MESSAGE (OUTPATIENT)
Dept: HEMATOLOGY/ONCOLOGY | Facility: CLINIC | Age: 43
End: 2023-11-21

## 2023-11-21 DIAGNOSIS — C50.912 MALIGNANT NEOPLASM OF LEFT BREAST IN FEMALE, ESTROGEN RECEPTOR NEGATIVE, UNSPECIFIED SITE OF BREAST: Primary | ICD-10-CM

## 2023-11-21 DIAGNOSIS — D50.9 MICROCYTIC ANEMIA: ICD-10-CM

## 2023-11-21 DIAGNOSIS — Z17.1 MALIGNANT NEOPLASM OF LEFT BREAST IN FEMALE, ESTROGEN RECEPTOR NEGATIVE, UNSPECIFIED SITE OF BREAST: Primary | ICD-10-CM

## 2023-11-25 NOTE — PROGRESS NOTES
"Subjective:      Patient ID: Sangeetha June is a 43 y.o. female.    Chief Complaint:  Headaches / Dizziness.       History of Present Illness  HPI 43 years old Right handed AA Female with multiple PMHx including Meningioma /   Neuropathy / CVA and others came for the evaluation of Headaches / Dizziness.     Started: about over 20 years.  Describes: pressure / Dizziness ( spinning ).   Associated symptoms: no sensory symptoms.   Timing:   Frequency: 4 to 5 HA's per week.   Pain: 5 to 6/ 10 - HA's.   Location: Bilateral Fronto / Temporal / Occipital at times.   Family: Mother HA's.   Medications: NSAID's ( Tylenol ) PRN / ASA.   Worsen: lying down in bed / head movements.   Alleviated: rest / no moving head.     No Head trauma / Surgery.   2 falls - on her Sofa - after lightheaded episodes.   Dizziness feels like " small spinning " sensation.     Review of Systems  Review of Systems   Allergic/Immunologic:        Lisinopril  Lurasidone     All other systems reviewed and are negative.    Objective:     Neurologic Exam     Mental Status   Oriented to person, place, and time.   Registration: recalls 3 of 3 objects. Recall at 5 minutes: recalls 3 of 3 objects. Follows 3 step commands.   Attention: normal. Concentration: normal.   Speech: speech is normal   Level of consciousness: alert  Knowledge: good. Able to perform simple calculations.   Able to name object. Able to read. Able to repeat. Able to write. Normal comprehension.     Cranial Nerves     CN II   Visual fields full to confrontation.     CN III, IV, VI   Pupils are equal, round, and reactive to light.  Extraocular motions are normal.   Upgaze: normal  Downgaze: normal  Conjugate gaze: present  Vestibulo-ocular reflex: present    CN V   Facial sensation intact.     CN VII   Facial expression full, symmetric.     CN VIII   CN VIII normal.     CN IX, X   CN IX normal.   CN X normal.     CN XI   CN XI normal.     CN XII   CN XII normal.     Motor Exam "   Muscle bulk: normal  Overall muscle tone: normal    Strength   Strength 5/5 throughout.   Right neck flexion: 5/5  Left neck flexion: 5/5  Right neck extension: 5/5  Left neck extension: 5/5  Right deltoid: 5/5  Left deltoid: 5/5  Right biceps: 5/5  Left biceps: 5/5  Right triceps: 5/5  Left triceps: 5/5  Right wrist flexion: 5/5  Left wrist flexion: 5/5  Right wrist extension: 5/5  Left wrist extension: 5/5  Right interossei: 5/5  Left interossei: 5/5  Right abdominals: 5/5  Left abdominals: 5/5  Right iliopsoas: 5/5  Left iliopsoas: 5/5  Right quadriceps: 5/5  Left quadriceps: 5/5  Right hamstrin/5  Left hamstrin/5  Right glutei: 5/5  Left glutei: 5/5  Right anterior tibial: 5/5  Left anterior tibial: 5/5  Right posterior tibial: 5/5  Left posterior tibial: 5/5  Right peroneal: 5/5  Left peroneal: 5/5  Right gastroc: 5/5  Left gastroc: 5/5    Sensory Exam   Light touch normal.   Vibration normal.   Proprioception normal.   Pinprick normal.   Graphesthesia: normal  Stereognosis: normal    Gait, Coordination, and Reflexes     Gait  Gait: normal    Coordination   Romberg: negative  Finger to nose coordination: normal  Heel to shin coordination: normal  Tandem walking coordination: normal    Tremor   Resting tremor: absent  Intention tremor: absent  Action tremor: absent    Reflexes   Right brachioradialis: 2+  Left brachioradialis: 2+  Right biceps: 2+  Left biceps: 2+  Right triceps: 2+  Left triceps: 2+  Right patellar: 2+  Left patellar: 2+  Right achilles: 2+  Left achilles: 2+  Right : 2+  Left : 2+  Right plantar: normal  Left plantar: normal  Right Gipson: absent  Left Gipson: absent  Right ankle clonus: absent  Left ankle clonus: absent  Right pendular knee jerk: absent  Left pendular knee jerk: absent      Physical Exam  Vitals and nursing note reviewed.   Constitutional:       Appearance: Normal appearance. She is normal weight.   HENT:      Head: Normocephalic and atraumatic.      Right  Ear: Tympanic membrane, ear canal and external ear normal.      Left Ear: Tympanic membrane, ear canal and external ear normal.      Nose: Nose normal.      Mouth/Throat:      Mouth: Mucous membranes are moist.      Pharynx: Oropharynx is clear.   Eyes:      Extraocular Movements: Extraocular movements intact and EOM normal.      Conjunctiva/sclera: Conjunctivae normal.      Pupils: Pupils are equal, round, and reactive to light.   Cardiovascular:      Rate and Rhythm: Normal rate and regular rhythm.      Pulses: Normal pulses.      Heart sounds: Normal heart sounds.   Pulmonary:      Effort: Pulmonary effort is normal.      Breath sounds: Normal breath sounds.   Abdominal:      General: Abdomen is flat. Bowel sounds are normal.      Palpations: Abdomen is soft.   Genitourinary:     Comments: Deferred.   Musculoskeletal:         General: Normal range of motion.      Cervical back: Normal range of motion and neck supple.   Skin:     General: Skin is warm and dry.      Capillary Refill: Capillary refill takes less than 2 seconds.   Neurological:      Mental Status: She is alert and oriented to person, place, and time. Mental status is at baseline.      Motor: Motor strength is normal.     Coordination: Finger-Nose-Finger Test, Heel to Shin Test and Romberg Test normal.      Gait: Gait is intact. Tandem walk normal.      Deep Tendon Reflexes:      Reflex Scores:       Tricep reflexes are 2+ on the right side and 2+ on the left side.       Bicep reflexes are 2+ on the right side and 2+ on the left side.       Brachioradialis reflexes are 2+ on the right side and 2+ on the left side.       Patellar reflexes are 2+ on the right side and 2+ on the left side.       Achilles reflexes are 2+ on the right side and 2+ on the left side.  Psychiatric:         Mood and Affect: Mood normal.         Speech: Speech normal.         Behavior: Behavior normal.         Thought Content: Thought content normal.         Judgment: Judgment  normal.          Assessment:   Patient Neurological Assessment is remarkable for a mild spinning sensation with head movements / no focal Neuro deficits.   - Hx of CVA.  - Brain Meningioma.   - Chronic Tension Headaches.   - BPPV.     Plan:   Patient is having BPPV - PT eval and treatment.   Chronic headaches - Topamax 25 mg PO BID.   Meningioma - old / benign / no mass effects / will follow with imagines.     MRI Brain W and W/ o contrast - pending.   Labs: CBC / CMP / Hgb A 1 C / TSH / Free T 4 - done 08/ 2023 - non significant abnormalities.   Personally reviewed MRI Brain - 2021 / 2022 - small extra axial left Frontal Benign looking Meningioma.       Please do not hesitate to contact me with any updates, questions or concerns.      Toby David MD.

## 2023-11-27 ENCOUNTER — OFFICE VISIT (OUTPATIENT)
Dept: NEUROLOGY | Facility: CLINIC | Age: 43
End: 2023-11-27
Payer: MEDICARE

## 2023-11-27 VITALS
HEART RATE: 64 BPM | WEIGHT: 245.56 LBS | BODY MASS INDEX: 38.54 KG/M2 | DIASTOLIC BLOOD PRESSURE: 75 MMHG | HEIGHT: 67 IN | SYSTOLIC BLOOD PRESSURE: 109 MMHG

## 2023-11-27 DIAGNOSIS — Z15.01 MUTATION IN TP53 GENE: ICD-10-CM

## 2023-11-27 DIAGNOSIS — Z15.09 MUTATION IN TP53 GENE: ICD-10-CM

## 2023-11-27 DIAGNOSIS — H81.10 BENIGN PAROXYSMAL POSITIONAL VERTIGO, UNSPECIFIED LATERALITY: ICD-10-CM

## 2023-11-27 DIAGNOSIS — G44.229 CHRONIC TENSION-TYPE HEADACHE, NOT INTRACTABLE: Primary | ICD-10-CM

## 2023-11-27 DIAGNOSIS — Z15.89 MUTATION IN TP53 GENE: ICD-10-CM

## 2023-11-27 DIAGNOSIS — D32.9 MENINGIOMA: ICD-10-CM

## 2023-11-27 PROCEDURE — 3044F HG A1C LEVEL LT 7.0%: CPT | Mod: HCNC,CPTII,S$GLB, | Performed by: PSYCHIATRY & NEUROLOGY

## 2023-11-27 PROCEDURE — 3008F BODY MASS INDEX DOCD: CPT | Mod: HCNC,CPTII,S$GLB, | Performed by: PSYCHIATRY & NEUROLOGY

## 2023-11-27 PROCEDURE — 99999 PR PBB SHADOW E&M-EST. PATIENT-LVL V: ICD-10-PCS | Mod: PBBFAC,HCNC,, | Performed by: PSYCHIATRY & NEUROLOGY

## 2023-11-27 PROCEDURE — 3078F PR MOST RECENT DIASTOLIC BLOOD PRESSURE < 80 MM HG: ICD-10-PCS | Mod: HCNC,CPTII,S$GLB, | Performed by: PSYCHIATRY & NEUROLOGY

## 2023-11-27 PROCEDURE — 3061F PR NEG MICROALBUMINURIA RESULT DOCUMENTED/REVIEW: ICD-10-PCS | Mod: HCNC,CPTII,S$GLB, | Performed by: PSYCHIATRY & NEUROLOGY

## 2023-11-27 PROCEDURE — 99203 PR OFFICE/OUTPT VISIT, NEW, LEVL III, 30-44 MIN: ICD-10-PCS | Mod: HCNC,S$GLB,, | Performed by: PSYCHIATRY & NEUROLOGY

## 2023-11-27 PROCEDURE — 3066F NEPHROPATHY DOC TX: CPT | Mod: HCNC,CPTII,S$GLB, | Performed by: PSYCHIATRY & NEUROLOGY

## 2023-11-27 PROCEDURE — 3044F PR MOST RECENT HEMOGLOBIN A1C LEVEL <7.0%: ICD-10-PCS | Mod: HCNC,CPTII,S$GLB, | Performed by: PSYCHIATRY & NEUROLOGY

## 2023-11-27 PROCEDURE — 99203 OFFICE O/P NEW LOW 30 MIN: CPT | Mod: HCNC,S$GLB,, | Performed by: PSYCHIATRY & NEUROLOGY

## 2023-11-27 PROCEDURE — 3074F SYST BP LT 130 MM HG: CPT | Mod: HCNC,CPTII,S$GLB, | Performed by: PSYCHIATRY & NEUROLOGY

## 2023-11-27 PROCEDURE — 1159F MED LIST DOCD IN RCRD: CPT | Mod: HCNC,CPTII,S$GLB, | Performed by: PSYCHIATRY & NEUROLOGY

## 2023-11-27 PROCEDURE — 3061F NEG MICROALBUMINURIA REV: CPT | Mod: HCNC,CPTII,S$GLB, | Performed by: PSYCHIATRY & NEUROLOGY

## 2023-11-27 PROCEDURE — 99999 PR PBB SHADOW E&M-EST. PATIENT-LVL V: CPT | Mod: PBBFAC,HCNC,, | Performed by: PSYCHIATRY & NEUROLOGY

## 2023-11-27 PROCEDURE — 3074F PR MOST RECENT SYSTOLIC BLOOD PRESSURE < 130 MM HG: ICD-10-PCS | Mod: HCNC,CPTII,S$GLB, | Performed by: PSYCHIATRY & NEUROLOGY

## 2023-11-27 PROCEDURE — 1159F PR MEDICATION LIST DOCUMENTED IN MEDICAL RECORD: ICD-10-PCS | Mod: HCNC,CPTII,S$GLB, | Performed by: PSYCHIATRY & NEUROLOGY

## 2023-11-27 PROCEDURE — 3066F PR DOCUMENTATION OF TREATMENT FOR NEPHROPATHY: ICD-10-PCS | Mod: HCNC,CPTII,S$GLB, | Performed by: PSYCHIATRY & NEUROLOGY

## 2023-11-27 PROCEDURE — 3078F DIAST BP <80 MM HG: CPT | Mod: HCNC,CPTII,S$GLB, | Performed by: PSYCHIATRY & NEUROLOGY

## 2023-11-27 PROCEDURE — 1160F RVW MEDS BY RX/DR IN RCRD: CPT | Mod: HCNC,CPTII,S$GLB, | Performed by: PSYCHIATRY & NEUROLOGY

## 2023-11-27 PROCEDURE — 1160F PR REVIEW ALL MEDS BY PRESCRIBER/CLIN PHARMACIST DOCUMENTED: ICD-10-PCS | Mod: HCNC,CPTII,S$GLB, | Performed by: PSYCHIATRY & NEUROLOGY

## 2023-11-27 PROCEDURE — 3008F PR BODY MASS INDEX (BMI) DOCUMENTED: ICD-10-PCS | Mod: HCNC,CPTII,S$GLB, | Performed by: PSYCHIATRY & NEUROLOGY

## 2023-11-27 RX ORDER — TOPIRAMATE 25 MG/1
25 TABLET ORAL 2 TIMES DAILY
Qty: 60 TABLET | Refills: 4 | Status: SHIPPED | OUTPATIENT
Start: 2023-11-27 | End: 2024-02-13 | Stop reason: SDUPTHER

## 2023-11-27 RX ORDER — ESCITALOPRAM OXALATE 20 MG/1
20 TABLET ORAL DAILY
COMMUNITY
Start: 2023-11-17 | End: 2024-02-27

## 2023-12-08 DIAGNOSIS — E03.9 HYPOTHYROIDISM, UNSPECIFIED TYPE: ICD-10-CM

## 2023-12-08 RX ORDER — LEVOTHYROXINE SODIUM 50 UG/1
50 TABLET ORAL
Qty: 90 TABLET | Refills: 0 | Status: SHIPPED | OUTPATIENT
Start: 2023-12-08

## 2023-12-13 ENCOUNTER — PATIENT MESSAGE (OUTPATIENT)
Dept: ADMINISTRATIVE | Facility: CLINIC | Age: 43
End: 2023-12-13
Payer: MEDICARE

## 2023-12-15 ENCOUNTER — TELEPHONE (OUTPATIENT)
Dept: UROLOGY | Facility: CLINIC | Age: 43
End: 2023-12-15
Payer: MEDICARE

## 2023-12-21 ENCOUNTER — PATIENT MESSAGE (OUTPATIENT)
Dept: INTERNAL MEDICINE | Facility: CLINIC | Age: 43
End: 2023-12-21
Payer: MEDICARE

## 2023-12-22 ENCOUNTER — TELEPHONE (OUTPATIENT)
Dept: INFUSION THERAPY | Facility: HOSPITAL | Age: 43
End: 2023-12-22
Payer: MEDICARE

## 2023-12-22 NOTE — TELEPHONE ENCOUNTER
----- Message from Thelma Mcdowell MA sent at 12/22/2023  1:59 PM CST -----    ----- Message -----  From: Selene Grove  Sent: 12/22/2023   1:20 PM CST  To: [facility] Staff    Pt would like to reschedule the port flush,Please call back at  [phone]

## 2023-12-28 ENCOUNTER — PATIENT MESSAGE (OUTPATIENT)
Dept: INTERNAL MEDICINE | Facility: CLINIC | Age: 43
End: 2023-12-28
Payer: MEDICARE

## 2023-12-29 ENCOUNTER — TELEPHONE (OUTPATIENT)
Dept: INFUSION THERAPY | Facility: HOSPITAL | Age: 43
End: 2023-12-29
Payer: MEDICARE

## 2023-12-29 NOTE — TELEPHONE ENCOUNTER
----- Message from Regine Saunders sent at 12/29/2023  2:52 PM CST -----  Contact: Sangeetha Vivas needs a call back at [phone], regards to getting her infusion reschedule to her transportation never came.    Thanks  Td

## 2023-12-30 ENCOUNTER — PATIENT MESSAGE (OUTPATIENT)
Dept: INTERNAL MEDICINE | Facility: CLINIC | Age: 43
End: 2023-12-30
Payer: MEDICARE

## 2024-01-02 ENCOUNTER — OFFICE VISIT (OUTPATIENT)
Dept: INTERNAL MEDICINE | Facility: CLINIC | Age: 44
End: 2024-01-02
Payer: MEDICARE

## 2024-01-02 VITALS
SYSTOLIC BLOOD PRESSURE: 88 MMHG | DIASTOLIC BLOOD PRESSURE: 60 MMHG | OXYGEN SATURATION: 100 % | BODY MASS INDEX: 38.47 KG/M2 | HEART RATE: 70 BPM | HEIGHT: 67 IN

## 2024-01-02 DIAGNOSIS — I95.9 HYPOTENSION, UNSPECIFIED HYPOTENSION TYPE: ICD-10-CM

## 2024-01-02 DIAGNOSIS — R42 DIZZINESS: ICD-10-CM

## 2024-01-02 DIAGNOSIS — G44.229 CHRONIC TENSION-TYPE HEADACHE, NOT INTRACTABLE: Primary | ICD-10-CM

## 2024-01-02 PROCEDURE — 3078F DIAST BP <80 MM HG: CPT | Mod: HCNC,CPTII,S$GLB, | Performed by: NURSE PRACTITIONER

## 2024-01-02 PROCEDURE — 3074F SYST BP LT 130 MM HG: CPT | Mod: HCNC,CPTII,S$GLB, | Performed by: NURSE PRACTITIONER

## 2024-01-02 PROCEDURE — 1159F MED LIST DOCD IN RCRD: CPT | Mod: HCNC,CPTII,S$GLB, | Performed by: NURSE PRACTITIONER

## 2024-01-02 PROCEDURE — 99999 PR PBB SHADOW E&M-EST. PATIENT-LVL IV: CPT | Mod: PBBFAC,HCNC,, | Performed by: NURSE PRACTITIONER

## 2024-01-02 PROCEDURE — 3008F BODY MASS INDEX DOCD: CPT | Mod: HCNC,CPTII,S$GLB, | Performed by: NURSE PRACTITIONER

## 2024-01-02 PROCEDURE — 99214 OFFICE O/P EST MOD 30 MIN: CPT | Mod: HCNC,S$GLB,, | Performed by: NURSE PRACTITIONER

## 2024-01-02 RX ORDER — POTASSIUM CHLORIDE 20 MEQ/1
TABLET, EXTENDED RELEASE ORAL
COMMUNITY
Start: 2023-12-31

## 2024-01-02 NOTE — PROGRESS NOTES
Subjective:       Patient ID: Sangeetha June is a 44 y.o. female.    Chief Complaint: Hypotension    HPI    Pt reports weakness/HA/low BP      Past Medical History:   Diagnosis Date    Anxiety     Asthma 11/8/2022    Breast cancer     Chest pain     Chest pain 07/02/2021    Cholecystitis without calculus     Decreased ROM of left shoulder 02/15/2022    Diabetes mellitus     Dizziness     Elevated C-reactive protein (CRP)     Essential hypertension     Fall 10/11/2021    Memory change     EVERARDO (obstructive sleep apnea)     Osteoarthritis     Other specified disorders of thyroid     Pre-diabetes 04/15/2021    Chronic, Stable, cont metformin    Screening mammogram, encounter for 04/15/2021    Shoulder injury     SOB (shortness of breath)     Stroke     Thyroiditis     Tingling of left upper extremity 10/29/2021    Vision disturbance 04/30/2021    Weakness of shoulder 02/15/2022     Past Surgical History:   Procedure Laterality Date    ARTHROSCOPIC REPAIR OF ROTATOR CUFF OF SHOULDER Left 1/27/2022    Procedure: REPAIR, ROTATOR CUFF, ARTHROSCOPIC;  Surgeon: Francisco Mathews MD;  Location: [facility];  Service: Orthopedics;  Laterality: Left;    ARTHROSCOPY OF SHOULDER WITH DECOMPRESSION OF SUBACROMIAL SPACE Left 1/27/2022    Procedure: ARTHROSCOPY, SHOULDER, WITH SUBACROMIAL SPACE DECOMPRESSION;  Surgeon: Francisco Mathews MD;  Location: [facility] OR;  Service: Orthopedics;  Laterality: Left;    BREAST BIOPSY      COLONOSCOPY N/A 5/31/2022    Procedure: COLONOSCOPY;  Surgeon: Km Odom MD;  Location: [facility];  Service: Endoscopy;  Laterality: N/A;    ESOPHAGOGASTRODUODENOSCOPY N/A 5/31/2022    Procedure: EGD (ESOPHAGOGASTRODUODENOSCOPY) ;  Surgeon: Km Odom MD;  Location: [facility];  Service: Endoscopy;  Laterality: N/A;    FIXATION OF TENDON Left 1/27/2022    Procedure: FIXATION, TENDON;  Surgeon: Francisco Mathews MD;  Location: [facility];  Service: Orthopedics;  Laterality:  Left;    HYSTERECTOMY      INJECTION OF ANESTHETIC AGENT INTO SACROILIAC JOINT Bilateral 7/29/2022    Procedure: Bilateral SIJ +  Bilateral GT Bursa Injection RN IV Sedation;  Surgeon: Bisi Cochran MD;  Location: [facility];  Service: Pain Management;  Laterality: Bilateral;    INJECTION OF JOINT Left 11/12/2021    Procedure: Left suprascapular and axillary injection with local;  Surgeon: Bisi Cochran MD;  [facility];  Service: Pain Management;  Laterality: Left;    INJECTION OF JOINT Bilateral 7/29/2022    Procedure: Bilateral SIJ + Bilateral GT Bursa Injection RN IV Sedation;  Surgeon: Bisi Cochran MD;  [facility];  Service: Pain Management;  Laterality: Bilateral;    INSERTION OF BREAST TISSUE EXPANDER Left 11/15/2022    Procedure: INSERTION, TISSUE EXPANDER, BREAST;  Surgeon: Collin Pinto MD;  Location: [facility] OR;  Service: Plastics;  Laterality: Left;    MASTECTOMY      ROBOT-ASSISTED CHOLECYSTECTOMY USING DA ROBERTA XI N/A 8/29/2022    Procedure: XI ROBOTIC CHOLECYSTECTOMY;  Surgeon: Raeann Bhandari DO;  Location: [facility] OR;  Service: General;  Laterality: N/A;    TISSUE EXPANDER REMOVAL Left 1/4/2023    Procedure: REMOVAL, TISSUE EXPANDER;  Surgeon: Collin Pinto MD;  Location: [facility] OR;  Service: Plastics;  Laterality: Left;    TOTAL REDUCTION MAMMOPLASTY Right 11/15/2022    Procedure: MAMMOPLASTY, REDUCTION;  Surgeon: Collin Pinto MD;  Location: [facility] OR;  Service: Plastics;  Laterality: Right;     Social History     Socioeconomic History    Marital status:    Tobacco Use    Smoking status: Never     Passive exposure: Current    Smokeless tobacco: Never   Substance and Sexual Activity    Alcohol use: Not Currently    Drug use: Not Currently     Types: Marijuana     Comment: none currently    Sexual activity: Yes     Partners: Male     Social Determinants of Health     Financial Resource Strain: Medium Risk (12/1/2023)    Overall Financial Resource Strain  (CARDIA)     Difficulty of Paying Living Expenses: Somewhat hard   Food Insecurity: Food Insecurity Present (12/1/2023)    Hunger Vital Sign     Worried About Running Out of Food in the Last Year: Often true     Ran Out of Food in the Last Year: Never true   Transportation Needs: No Transportation Needs (12/1/2023)    PRAPARE - Transportation     Lack of Transportation (Medical): No     Lack of Transportation (Non-Medical): No   Physical Activity: Inactive (12/1/2023)    Exercise Vital Sign     Days of Exercise per Week: 0 days     Minutes of Exercise per Session: 0 min   Stress: Stress Concern Present (12/1/2023)    [place] [employer] - Occupational Stress Questionnaire     Feeling of Stress : Very much   Social Connections: Unknown (12/1/2023)    Social Connection and Isolation Panel [NHANES]     Frequency of Communication with Friends and Family: Twice a week     Frequency of Social Gatherings with Friends and Family: Twice a week     Active Member of Clubs or Organizations: No     Attends Club or Organization Meetings: Never     Marital Status: Living with partner   Housing Stability: Unknown (12/1/2023)    Housing Stability Vital Sign     Unable to Pay for Housing in the Last Year: No     Unstable Housing in the Last Year: No     Review of patient's allergies indicates:   Allergen Reactions    Lisinopril     Lurasidone Other (See Comments)     Uncontrolled movement     Current Outpatient Medications   Medication Sig    albuterol (PROVENTIL/VENTOLIN HFA) 90 mcg/actuation inhaler Inhale 2 puffs into the lungs every 4 (four) hours as needed for Wheezing. Rescue    ammonium lactate 12 % Crea Apply 1 Act topically 2 (two) times daily. To feet.    ascorbic acid, vitamin C, (VITAMIN C) 1000 MG tablet Take 1 tablet (1,000 mg total) by mouth once daily.    aspirin (ECOTRIN) 81 MG EC tablet Take 1 tablet (81 mg total) by mouth once daily.    budesonide-formoterol 160-4.5 mcg (SYMBICORT) 160-4.5  mcg/actuation HFAA Inhale 2 puffs into the lungs every 12 (twelve) hours. Controller    busPIRone (BUSPAR) 15 MG tablet Take 1 tablet (15 mg total) by mouth 3 (three) times daily.    celecoxib (CELEBREX) 200 MG capsule Take 1 capsule (200 mg total) by mouth 2 (two) times daily.    diclofenac (VOLTAREN) 75 MG EC tablet Take 1 tablet (75 mg total) by mouth 2 (two) times daily as needed.    EScitalopram oxalate (LEXAPRO) 20 MG tablet Take 20 mg by mouth once daily.    EUTHYROX 50 mcg tablet TAKE ONE TABLET BY MOUTH EVERY DAY    gabapentin (NEURONTIN) 300 MG capsule Take 1 capsule (300 mg total) by mouth 2 (two) times daily.    hydroCHLOROthiazide (HYDRODIURIL) 25 MG tablet Take 1 tablet (25 mg total) by mouth daily as needed (edema, swelling).    hydrocortisone 0.5 % cream Apply topically 2 (two) times daily.    linaCLOtide (LINZESS) 145 mcg Cap capsule Take 1 capsule (145 mcg total) by mouth once daily.    magnesium oxide (MAG-OX) 400 mg (241.3 mg magnesium) tablet Take 1 tablet (400 mg total) by mouth once daily.    metFORMIN (GLUCOPHAGE-XR) 500 MG ER 24hr tablet Take 1 tablet (500 mg total) by mouth 2 (two) times daily with meals.    multivit with min-folic acid (ADULT MULTIVITAMIN GUMMIES) 200 mcg Chew Take 1 tablet by mouth once daily.    olopatadine (PATANOL) 0.1 % ophthalmic solution     oxybutynin (DITROPAN-XL) 5 MG TR24 Take 5 mg by mouth.    pantoprazole (PROTONIX) 40 MG tablet Take 1 tablet (40 mg total) by mouth once daily.    potassium chloride SA (K-DUR,KLOR-CON) 20 MEQ tablet     pravastatin (PRAVACHOL) 40 MG tablet Take 1 tablet (40 mg total) by mouth every evening.    solifenacin (VESICARE) 10 MG tablet Take 1 tablet (10 mg total) by mouth once daily.    tirzepatide 10 mg/0.5 mL PnIj Inject 10 mg into the skin every 7 days.    topiramate (TOPAMAX) 25 MG tablet Take 1 tablet (25 mg total) by mouth 2 (two) times daily.    traZODone (DESYREL) 150 MG tablet Take 1 tablet (150 mg total) by mouth every  evening.    triamcinolone acetonide (KENALOG-40) 40 mg/mL injection      No current facility-administered medications for this visit.     Facility-Administered Medications Ordered in Other Visits   Medication    lactated ringers infusion           Review of Systems   Constitutional:  Negative for activity change, appetite change, chills, diaphoresis, fatigue, fever and unexpected weight change.   HENT:  Negative for congestion, ear pain, postnasal drip, rhinorrhea, sinus pressure, sinus pain, sneezing, sore throat, tinnitus, trouble swallowing and voice change.    Eyes:  Negative for photophobia, pain and visual disturbance.   Respiratory:  Negative for cough, chest tightness, shortness of breath and wheezing.    Cardiovascular:  Negative for chest pain, palpitations and leg swelling.   Gastrointestinal:  Negative for abdominal distention, abdominal pain, constipation, diarrhea, nausea and vomiting.   Genitourinary:  Negative for decreased urine volume, difficulty urinating, dysuria, flank pain, frequency, hematuria and urgency.   Musculoskeletal:  Negative for arthralgias, back pain, joint swelling, neck pain and neck stiffness.   Allergic/Immunologic: Negative for immunocompromised state.   Neurological:  Positive for dizziness and headaches. Negative for tremors, seizures, syncope, facial asymmetry, speech difficulty, weakness, light-headedness and numbness.   Hematological:  Negative for adenopathy. Does not bruise/bleed easily.   Psychiatric/Behavioral:  Negative for confusion and sleep disturbance.        Objective:      Physical Exam  Vitals reviewed.   Cardiovascular:      Rate and Rhythm: Normal rate and regular rhythm.      Heart sounds: Normal heart sounds.   Pulmonary:      Effort: Pulmonary effort is normal.      Breath sounds: Normal breath sounds.   Musculoskeletal:      Comments: In w/c   Neurological:      Mental Status: She is alert and oriented to person, place, and time.         Assessment:      Vitals:    01/02/24 1124   BP: (!) 88/60   Pulse:          1. Chronic tension-type headache, not intractable    2. Hypotension, unspecified hypotension type    3. Dizziness        Plan:   Chronic tension-type headache, not intractable    Hypotension, unspecified hypotension type    Dizziness      Pt advised to seek care at ER due to low BP/HA/dizziness  States she was recently seen in ER and they wanted to admit her due to the above. She declined  I spoke to family on the phone in regard to bringing her to ER. They agreed, however they left the pt at clinic and stopped answering. EMS called to retrieve pt

## 2024-01-04 ENCOUNTER — TELEPHONE (OUTPATIENT)
Dept: PSYCHIATRY | Facility: CLINIC | Age: 44
End: 2024-01-04
Payer: MEDICAID

## 2024-01-05 ENCOUNTER — PATIENT MESSAGE (OUTPATIENT)
Dept: CARDIOLOGY | Facility: CLINIC | Age: 44
End: 2024-01-05
Payer: MEDICAID

## 2024-01-08 ENCOUNTER — TELEPHONE (OUTPATIENT)
Dept: INTERNAL MEDICINE | Facility: CLINIC | Age: 44
End: 2024-01-08
Payer: MEDICAID

## 2024-01-08 NOTE — TELEPHONE ENCOUNTER
Ms. Vivas is home from the hospital and would like [facility] to be ordered so she can receive care at home

## 2024-01-08 NOTE — TELEPHONE ENCOUNTER
----- Message from Yoanna Carter sent at 1/8/2024 11:57 AM CST -----  Contact: Gustavo  Pt was discharged on 1/6 for syncope and the pt is requesting home health services. The previous company used was [facility] and the pt would like to use them again. Please call the pt back at [phone].    Thanks  TS

## 2024-01-11 ENCOUNTER — NURSE TRIAGE (OUTPATIENT)
Dept: ADMINISTRATIVE | Facility: CLINIC | Age: 44
End: 2024-01-11
Payer: MEDICAID

## 2024-01-11 ENCOUNTER — PATIENT OUTREACH (OUTPATIENT)
Dept: ADMINISTRATIVE | Facility: CLINIC | Age: 44
End: 2024-01-11
Payer: MEDICAID

## 2024-01-11 DIAGNOSIS — Z00.00 ENCOUNTER FOR MEDICARE ANNUAL WELLNESS EXAM: ICD-10-CM

## 2024-01-11 NOTE — TELEPHONE ENCOUNTER
"PD Pt transferred to this NT by Lizzie Plascencia LPN. Pt was recently hospitalized for hypotension. She states that she feels her BP has dropped but does not have a BP monitor at home to check. She notes severe weakness, "head feels a little foggy," confusion, and fatigue.  is called on pt's behalf. Her address is [address] [place] [address]. NT spoke with Cheryl of [facility] dispatch who advises that a unit is being sent out to pt. NT stays on phone with pt while waiting for EMS to arrive. Pt is instructed to lie down on couch with her feet elevated while waiting for help to arrive.     EMS arrives at approx. 12:58 pm. Pt is instructed to call back with any new/worsening sxs, questions, or concerns.     Reason for Disposition   Difficult to awaken or acting confused (e.g., disoriented, slurred speech)    Additional Information   Negative: SEVERE difficulty breathing (e.g., struggling for each breath, speaks in single words)   Negative: Shock suspected (e.g., cold/pale/clammy skin, too weak to stand, low BP, rapid pulse)    Protocols used: Weakness (Generalized) and Fatigue-A-OH    "

## 2024-01-11 NOTE — PROGRESS NOTES
"Called patient for [facility] outreach call, and patient is very confused, weak, fatigued, and noted that she feels as though her BP dropped this morning around 8am, and has been in bed since then. She is home alone. Stated "Did they gave me medicine when I left the hospital?" She has not received them, waiting on pharmacy to drop off at her home. She accepted Triage nurse transfer.     Verified home address as [address] [place] [address]. (Patient cannot recall zipcode)    Transferred to triage nurse.   "

## 2024-01-17 DIAGNOSIS — Z76.89 ENCOUNTER TO ESTABLISH CARE: ICD-10-CM

## 2024-01-17 DIAGNOSIS — Z00.00 ROUTINE ADULT HEALTH MAINTENANCE: Primary | ICD-10-CM

## 2024-01-17 RX ORDER — LANOLIN ALCOHOL/MO/W.PET/CERES
400 CREAM (GRAM) TOPICAL DAILY
Qty: 90 TABLET | Refills: 1 | Status: SHIPPED | OUTPATIENT
Start: 2024-01-17

## 2024-01-17 RX ORDER — GABAPENTIN 300 MG/1
300 CAPSULE ORAL 2 TIMES DAILY
Qty: 180 CAPSULE | Refills: 0 | Status: SHIPPED | OUTPATIENT
Start: 2024-01-17 | End: 2024-07-15

## 2024-01-17 RX ORDER — DICLOFENAC SODIUM 75 MG/1
75 TABLET, DELAYED RELEASE ORAL 2 TIMES DAILY PRN
Qty: 60 TABLET | Refills: 2 | Status: SHIPPED | OUTPATIENT
Start: 2024-01-17

## 2024-01-20 ENCOUNTER — PATIENT MESSAGE (OUTPATIENT)
Dept: PSYCHIATRY | Facility: CLINIC | Age: 44
End: 2024-01-20
Payer: MEDICAID

## 2024-01-22 ENCOUNTER — TELEPHONE (OUTPATIENT)
Dept: PSYCHIATRY | Facility: CLINIC | Age: 44
End: 2024-01-22
Payer: MEDICAID

## 2024-01-22 NOTE — TELEPHONE ENCOUNTER
Call to schedule three f/u appts with Kaleb Rader. And a f/u appt with Dr. Patito Jaquez. On Tuesday 2/27/24 at 2:00 pm.

## 2024-02-13 DIAGNOSIS — G44.229 CHRONIC TENSION-TYPE HEADACHE, NOT INTRACTABLE: ICD-10-CM

## 2024-02-13 DIAGNOSIS — E78.5 HYPERLIPIDEMIA, UNSPECIFIED HYPERLIPIDEMIA TYPE: ICD-10-CM

## 2024-02-13 RX ORDER — AMMONIUM LACTATE 12 G/100G
1 CREAM TOPICAL 2 TIMES DAILY
Qty: 140 G | Refills: 2 | OUTPATIENT
Start: 2024-02-13

## 2024-02-14 RX ORDER — TOPIRAMATE 25 MG/1
25 TABLET ORAL 2 TIMES DAILY
Qty: 60 TABLET | Refills: 4 | Status: SHIPPED | OUTPATIENT
Start: 2024-02-14 | End: 2024-02-27

## 2024-02-14 RX ORDER — PANTOPRAZOLE SODIUM 40 MG/1
40 TABLET, DELAYED RELEASE ORAL DAILY
Qty: 90 TABLET | Refills: 0 | Status: SHIPPED | OUTPATIENT
Start: 2024-02-14 | End: 2024-05-14

## 2024-02-14 RX ORDER — PRAVASTATIN SODIUM 40 MG/1
40 TABLET ORAL NIGHTLY
Qty: 90 TABLET | Refills: 1 | Status: SHIPPED | OUTPATIENT
Start: 2024-02-14

## 2024-02-15 ENCOUNTER — PATIENT MESSAGE (OUTPATIENT)
Dept: ORTHOPEDICS | Facility: CLINIC | Age: 44
End: 2024-02-15
Payer: MEDICAID

## 2024-02-19 NOTE — PROGRESS NOTES
Subjective:   Patient ID:  Sangeetha June is a 44 y.o. female who presents for cardiac consult of Anxiety      Referring Physician: Sam Garcia MD   [id number] [place] [id number]    Reason for consult: AGUILAR      The patient came in today for cardiac consult of Anxiety        Sangeetha June is a 44 y.o. female pt with HLD, HTN, morbid obesity, pre DM, h/o breast ca s/p chemo/mastectomy, ANGIE presents for follow up CV eval.     5/4/21   In March 2021 she went to [facility] ER - 41-year-old -American female with history of left-sided breast cancer with mastectomy and hypertension to the emergency room with complaints of left-sided upper chest pain. Patient reports symptoms began 3 to 4 days ago with injury, nausea, vomiting, fever, chills. She denies radiating pain. No recent injury. She does report mild shortness of breath cough, cold, congestion.   Had neg CV work up was given pain meds and DC'd home.     BP elevated 130s/90s, HR 60s. She had Breast Ca March 2018, had chemo. She has been having intermittent CP along with edema. She also has palpitations worse when laying down in the bed. She became more bed bound with chemo, she then had to learn to walk again, unclear if she had CVA. She has moved from [place], [place].     4/17/23  BP and HR stable today. BMI 46 - 287 lbs - gained signific weight. She had infection after breast surgery and had to be on abx, does not want any further surgeries.   ECG - NSR, nonspec ST T wave changes     2/20/24  She was admitted to [facility] in Jan 2024 for hypotension sec to diarrhea. She had mult loose BMs with nausea, vomiting. BP was 80s/40s, received 2L IVF; She was at [facility] prior to this.   She was on midodrine as well in hosp. She is on Florinef now.     BP now is stable 114/80s. HR 70. BMI 38 - 243 lbs.     Patient has dec exercise tolerance.    Patient is compliant with medications.    FH - mother - MI in 40s - pt was 60s had dementia had  CVA,       Home Sleep Studies     Date/Time: 12/10/2021 8:00 AM  Performed by: Joshua Edwards MD  Authorized by: Evy Cid MD        1 night study  MILD OBSTRUCTIVE SLEEP APNEA with overall AHI 9.6/hr ( 66 events): night #1  Oxygen desaturation: < 70%. SpO2 between 90% to 94% for 28 min.  Patient snored 97% time above 50 .  Heart rate range: 45 bpm - 89 bpm  REC's:  Therapy with APAP at 4-20 cm WP using mask of choice with heated humidification is an option.  Weight loss/management. with regular exercise per direction of physician.  Avoid drowsy driving.  Follow up in sleep clinic to maximize adherence and ensure resolution of symptoms    Results for orders placed during the hospital encounter of 06/23/21    Nuclear Stress - Cardiology Interpreted    Interpretation Summary    Equivocal myocardial perfusion scan.    There is a moderate intensity, fixed defect consistent with scar in the apical wall(s). VS apical artifact    The gated perfusion images showed an ejection fraction of 60% at rest. The gated perfusion images showed an ejection fraction of 53% post stress.    The EKG portion of this study is negative for ischemia.    Results for orders placed during the hospital encounter of 06/23/21    Echo Color Flow Doppler? Yes    Interpretation Summary  · The left ventricle is normal in size with moderate concentric hypertrophy and normal systolic function.  · The estimated ejection fraction is 55%.  · Normal left ventricular diastolic function.  · Normal right ventricular size with normal right ventricular systolic function.  · Normal central venous pressure (3 mmHg).  · The estimated PA systolic pressure is 31 mmHg.    HOLTER  Sinus rhythm with heart rates varying between 47 and 119 bpm with an average of 66 bpm.  There were very rare PVCs totalling 4 and averaging 0.04 per hour.  There were very rare PACs totalling 58 and averaging 0.6 per hour.      Past Medical History:   Diagnosis Date     Anxiety     Asthma 11/8/2022    Breast cancer     Chest pain     Chest pain 07/02/2021    Cholecystitis without calculus     Decreased ROM of left shoulder 02/15/2022    Diabetes mellitus     Dizziness     Elevated C-reactive protein (CRP)     Essential hypertension     Fall 10/11/2021    Memory change     EVERARDO (obstructive sleep apnea)     Osteoarthritis     Other specified disorders of thyroid     Pre-diabetes 04/15/2021    Chronic, Stable, cont metformin    Screening mammogram, encounter for 04/15/2021    Shoulder injury     SOB (shortness of breath)     Stroke     Thyroiditis     Tingling of left upper extremity 10/29/2021    Vision disturbance 04/30/2021    Weakness of shoulder 02/15/2022       Past Surgical History:   Procedure Laterality Date    ARTHROSCOPIC REPAIR OF ROTATOR CUFF OF SHOULDER Left 1/27/2022    Procedure: REPAIR, ROTATOR CUFF, ARTHROSCOPIC;  Surgeon: Francisco Mathews MD;  Location: [facility];  Service: Orthopedics;  Laterality: Left;    ARTHROSCOPY OF SHOULDER WITH DECOMPRESSION OF SUBACROMIAL SPACE Left 1/27/2022    Procedure: ARTHROSCOPY, SHOULDER, WITH SUBACROMIAL SPACE DECOMPRESSION;  Surgeon: Francisco Mathews MD;  Location: [facility] OR;  Service: Orthopedics;  Laterality: Left;    BREAST BIOPSY      COLONOSCOPY N/A 5/31/2022    Procedure: COLONOSCOPY;  Surgeon: Km Odom MD;  Location: [facility];  Service: Endoscopy;  Laterality: N/A;    ESOPHAGOGASTRODUODENOSCOPY N/A 5/31/2022    Procedure: EGD (ESOPHAGOGASTRODUODENOSCOPY) ;  Surgeon: Km Odom MD;  Location: [facility];  Service: Endoscopy;  Laterality: N/A;    FIXATION OF TENDON Left 1/27/2022    Procedure: FIXATION, TENDON;  Surgeon: Francisco Mathews MD;  Location: [facility];  Service: Orthopedics;  Laterality: Left;    HYSTERECTOMY      INJECTION OF ANESTHETIC AGENT INTO SACROILIAC JOINT Bilateral 7/29/2022    Procedure: Bilateral SIJ +  Bilateral GT Bursa Injection RN IV Sedation;  Surgeon: Bisi  MILADIS Cochran MD;  Location: [facility];  Service: Pain Management;  Laterality: Bilateral;    INJECTION OF JOINT Left 11/12/2021    Procedure: Left suprascapular and axillary injection with local;  Surgeon: Bisi Cochran MD;  Location: [facility];  Service: Pain Management;  Laterality: Left;    INJECTION OF JOINT Bilateral 7/29/2022    Procedure: Bilateral SIJ + Bilateral GT Bursa Injection RN IV Sedation;  Surgeon: Bisi Cochran MD;  Location: [facility];  Service: Pain Management;  Laterality: Bilateral;    INSERTION OF BREAST TISSUE EXPANDER Left 11/15/2022    Procedure: INSERTION, TISSUE EXPANDER, BREAST;  Surgeon: Collin Pinto MD;  Location: [facility] OR;  Service: Plastics;  Laterality: Left;    MASTECTOMY      ROBOT-ASSISTED CHOLECYSTECTOMY USING DA ROBERTA XI N/A 8/29/2022    Procedure: XI ROBOTIC CHOLECYSTECTOMY;  Surgeon: Raeann Bhandari DO;  Location: [facility] OR;  Service: General;  Laterality: N/A;    TISSUE EXPANDER REMOVAL Left 1/4/2023    Procedure: REMOVAL, TISSUE EXPANDER;  Surgeon: Collin Pinto MD;  Location: [facility] OR;  Service: Plastics;  Laterality: Left;    TOTAL REDUCTION MAMMOPLASTY Right 11/15/2022    Procedure: MAMMOPLASTY, REDUCTION;  Surgeon: Collin Pinto MD;  Location: [facility] OR;  Service: Plastics;  Laterality: Right;       Social History     Tobacco Use    Smoking status: Never     Passive exposure: Current    Smokeless tobacco: Never   Substance Use Topics    Alcohol use: Not Currently    Drug use: Not Currently     Types: Marijuana     Comment: none currently       Family History   Problem Relation Age of Onset    Colon cancer Mother 53        partial colectomy    Stroke Mother     Heart disease Mother     Breast cancer Sister     Diabetes Sister     Throat cancer Maternal Aunt         smoking hx    Lung cancer Maternal Uncle         hx smoking    No Known Problems Maternal Grandmother     Prostate cancer Maternal Grandfather 70    Alcohol abuse Maternal Grandfather     No  Known Problems Paternal Grandmother     No Known Problems Paternal Grandfather     Diabetes Brother     Diabetes Brother     Diabetes Brother     Breast cancer Other 37        chemotherapy, planning for BL mastectomy    No Known Problems Other        Patient's Medications   New Prescriptions    MIDODRINE (PROAMATINE) 5 MG TAB    Take 1 tablet (5 mg total) by mouth every meal as needed (if BP is below 110/70).   Previous Medications    ALBUTEROL (PROVENTIL/VENTOLIN HFA) 90 MCG/ACTUATION INHALER    Inhale 2 puffs into the lungs every 4 (four) hours as needed for Wheezing. Rescue    AMMONIUM LACTATE 12 % CREA    Apply 1 Act topically 2 (two) times daily. To feet.    ASCORBIC ACID, VITAMIN C, (VITAMIN C) 1000 MG TABLET    Take 1 tablet (1,000 mg total) by mouth once daily.    ASPIRIN (ECOTRIN) 81 MG EC TABLET    Take 1 tablet (81 mg total) by mouth once daily.    BUDESONIDE-FORMOTEROL 160-4.5 MCG (SYMBICORT) 160-4.5 MCG/ACTUATION HFAA    Inhale 2 puffs into the lungs every 12 (twelve) hours. Controller    BUSPIRONE (BUSPAR) 15 MG TABLET    Take 1 tablet (15 mg total) by mouth 3 (three) times daily.    CELECOXIB (CELEBREX) 200 MG CAPSULE    Take 1 capsule (200 mg total) by mouth 2 (two) times daily.    DICLOFENAC (VOLTAREN) 75 MG EC TABLET    Take 1 tablet (75 mg total) by mouth 2 (two) times daily as needed.    ESCITALOPRAM OXALATE (LEXAPRO) 20 MG TABLET    Take 20 mg by mouth once daily.    EUTHYROX 50 MCG TABLET    TAKE ONE TABLET BY MOUTH EVERY DAY    FOLIC ACID (FOLVITE) 1 MG TABLET    Take 1 tablet by mouth once daily.    GABAPENTIN (NEURONTIN) 300 MG CAPSULE    Take 1 capsule (300 mg total) by mouth 2 (two) times daily.    HYDROCORTISONE 0.5 % CREAM    Apply topically 2 (two) times daily.    LINACLOTIDE (LINZESS) 145 MCG CAP CAPSULE    Take 1 capsule (145 mcg total) by mouth once daily.    MAGNESIUM OXIDE (MAG-OX) 400 MG (241.3 MG MAGNESIUM) TABLET    Take 1 tablet (400 mg total) by mouth once daily.    MELOXICAM  (MOBIC) 15 MG TABLET    Take 1 tablet by mouth every morning.    METFORMIN (GLUCOPHAGE-XR) 500 MG ER 24HR TABLET    Take 1 tablet (500 mg total) by mouth 2 (two) times daily with meals.    MULTIVIT WITH MIN-FOLIC ACID (ADULT MULTIVITAMIN GUMMIES) 200 MCG CHEW    Take 1 tablet by mouth once daily.    OLOPATADINE (PATANOL) 0.1 % OPHTHALMIC SOLUTION        OXYBUTYNIN (DITROPAN-XL) 5 MG TR24    Take 5 mg by mouth.    PANTOPRAZOLE (PROTONIX) 40 MG TABLET    Take 1 tablet (40 mg total) by mouth once daily.    POTASSIUM CHLORIDE SA (K-DUR,KLOR-CON) 20 MEQ TABLET        PRAVASTATIN (PRAVACHOL) 40 MG TABLET    Take 1 tablet (40 mg total) by mouth every evening.    SOLIFENACIN (VESICARE) 10 MG TABLET    Take 1 tablet (10 mg total) by mouth once daily.    TIRZEPATIDE 10 MG/0.5 ML PNIJ    Inject 10 mg into the skin every 7 days.    TOPIRAMATE (TOPAMAX) 25 MG TABLET    Take 1 tablet (25 mg total) by mouth 2 (two) times daily.    TRAZODONE (DESYREL) 150 MG TABLET    Take 1 tablet (150 mg total) by mouth every evening.    TRIAMCINOLONE ACETONIDE (KENALOG-40) 40 MG/ML INJECTION        ZIPRASIDONE (GEODON) 40 MG CAP    Take 40 mg by mouth 2 (two) times daily.   Modified Medications    Modified Medication Previous Medication    FLUDROCORTISONE (FLORINEF) 0.1 MG TAB fludrocortisone (FLORINEF) 0.1 mg Tab       Take 1 tablet (100 mcg total) by mouth once daily.    Take 100 mcg by mouth.   Discontinued Medications    HYDROCHLOROTHIAZIDE (HYDRODIURIL) 25 MG TABLET    Take 1 tablet (25 mg total) by mouth daily as needed (edema, swelling).       Review of Systems   Constitutional: Negative.    HENT: Negative.     Eyes: Negative.    Respiratory:  Positive for shortness of breath.    Cardiovascular:  Positive for chest pain and palpitations.   Gastrointestinal:  Positive for heartburn.   Genitourinary: Negative.    Musculoskeletal: Negative.    Skin: Negative.    Neurological: Negative.    Endo/Heme/Allergies: Negative.   "  Psychiatric/Behavioral: Negative.     All 12 systems otherwise negative.      Wt Readings from Last 3 Encounters:   02/20/24 110.3 kg (243 lb 2.7 oz)   11/27/23 111.4 kg (245 lb 9.5 oz)   11/13/23 114 kg (251 lb 5.2 oz)     Temp Readings from Last 3 Encounters:   11/06/23 99.3 °F (37.4 °C) (Tympanic)   08/09/23 97.4 °F (36.3 °C) (Temporal)   06/23/23 98 °F (36.7 °C) (Tympanic)     BP Readings from Last 3 Encounters:   02/20/24 114/82   01/02/24 (!) 88/60   11/27/23 109/75     Pulse Readings from Last 3 Encounters:   02/20/24 70   01/02/24 70   11/27/23 64       /82 (BP Location: Right arm, Patient Position: Sitting, BP Method: Large (Manual))   Pulse 70   Ht 5' 7" (1.702 m)   Wt 110.3 kg (243 lb 2.7 oz)   LMP  (LMP Unknown)   SpO2 98%   BMI 38.09 kg/m²     Objective:   Physical Exam  Vitals and nursing note reviewed.   Constitutional:       General: She is not in acute distress.     Appearance: She is well-developed. She is obese. She is not diaphoretic.   HENT:      Head: Normocephalic and atraumatic.      Nose: Nose normal.   Eyes:      General: No scleral icterus.     Conjunctiva/sclera: Conjunctivae normal.   Neck:      Thyroid: No thyromegaly.      Vascular: No JVD.   Cardiovascular:      Rate and Rhythm: Normal rate and regular rhythm.      Heart sounds: S1 normal and S2 normal. No murmur heard.     No friction rub. No gallop. No S3 or S4 sounds.   Pulmonary:      Effort: Pulmonary effort is normal. No respiratory distress.      Breath sounds: Normal breath sounds. No stridor. No wheezing or rales.   Chest:      Chest wall: No tenderness.   Abdominal:      General: Bowel sounds are normal. There is no distension.      Palpations: Abdomen is soft. There is no mass.      Tenderness: There is no abdominal tenderness. There is no rebound.   Genitourinary:     Comments: Deferred  Musculoskeletal:         General: No tenderness or deformity. Normal range of motion.      Cervical back: Normal range of " motion and neck supple.   Lymphadenopathy:      Cervical: No cervical adenopathy.   Skin:     General: Skin is warm and dry.      Coloration: Skin is not pale.      Findings: No erythema or rash.   Neurological:      Mental Status: She is alert and oriented to person, place, and time.      Motor: No abnormal muscle tone.      Coordination: Coordination normal.   Psychiatric:         Behavior: Behavior normal.         Thought Content: Thought content normal.         Judgment: Judgment normal.         Lab Results   Component Value Date     08/09/2023    K 3.1 (L) 08/09/2023     08/09/2023    CO2 27 08/09/2023    BUN 10 08/09/2023    CREATININE 1.1 08/09/2023     08/09/2023    HGBA1C 4.3 02/06/2024    HGBA1C 5.3 06/23/2023    MG 1.5 (L) 07/02/2021    AST 22 08/09/2023    ALT 21 08/09/2023    ALBUMIN 3.7 08/09/2023    PROT 7.4 08/09/2023    BILITOT 0.3 08/09/2023    WBC 4.85 08/09/2023    HGB 13.5 08/09/2023    HCT 40.4 08/09/2023    MCV 85 08/09/2023     08/09/2023    TSH 0.81 02/06/2024    TSH 1.209 06/23/2023    CHOL 148 07/19/2022    HDL 44 07/19/2022    LDLCALC 84.6 07/19/2022    TRIG 97 07/19/2022    BNP 15 10/29/2021     Assessment:      1. Primary hypertension    2. Obesity, morbid, BMI 40.0-49.9    3. AGUILAR (dyspnea on exertion)    4. SOB (shortness of breath)    5. History of breast cancer    6. Murmur    7. Palpitations    8. Other hyperlipidemia    9. Chest pain, unspecified type    10. ANGIE (generalized anxiety disorder)    11. Sleep apnea, unspecified type    12. Localized edema    13. History of CVA (cerebrovascular accident)    14. Status post left mastectomy              Plan:     1. CP with AGUILAR with abnormal ECG with palpitations - resolved  - pharm nuclear stress test - neg 6/21  - f/u pulm has EVERARDO  - Holter - neg  - h/o D dimer elevated, neg CTA for PE in past  - f/u pain -  will have injection by Dr. Cochran, proceed as tolerated   - order ECHO    2. HLD with h/o CVA  - cont  statin and asa  - f/u neuro     3. Obesity BMI 45 - 274 --> 272 lbs lbs --> BMI 46 - 287 lbs  -->  BMI 38 - 243 lbs.   - cont weight loss    - cont tx  - discussed may need gastric sleeve    4. DM21c 6.4 --> 5.5 --> 6.3 --> 6.5 --> 5.4  --> 5.0   Cont tx - was on Trulicity and metformin  - was on Mounjaro    5. ANGIE  - cont tx per PCP    7. HTN with edema - recently low BP sec to diarrhea   - DC HCTZ, cont Florinef, start PRN midodrine   - needs low salt diet, avoid crawish  - LE u/s - neg  - may need Florinef and/or Midodrine  - rec compression stocking therapy     7. H/o Breast CA, s/p R breast Surg  - cont tx/ fu with onc  - may need mastectomy in future     8. EVERARDO  - cont CPAP, doing well     9. GERD  - cont PPI  - s/p EGD - neg    10. Hypothyroidism TSH - 1.5 --> 1.4   - cont Euthyrox    11. Preop - dental surgery - will get dentures  - low CV risk, proceed as needed     Visit today included increased complexity associated with the care of the episodic problem HTN addressed and managing the longitudinal care of the patient due to the serious and/or complex managed problem(s) .      Thank you for allowing me to participate in this patient's care. Please do not hesitate to contact me with any questions or concerns. Consult note has been forwarded to the referral physician.

## 2024-02-20 ENCOUNTER — OFFICE VISIT (OUTPATIENT)
Dept: CARDIOLOGY | Facility: CLINIC | Age: 44
End: 2024-02-20
Payer: MEDICARE

## 2024-02-20 ENCOUNTER — HOSPITAL ENCOUNTER (OUTPATIENT)
Dept: CARDIOLOGY | Facility: HOSPITAL | Age: 44
Discharge: HOME | End: 2024-02-20
Attending: INTERNAL MEDICINE
Payer: MEDICARE

## 2024-02-20 ENCOUNTER — OFFICE VISIT (OUTPATIENT)
Dept: DIABETES | Facility: CLINIC | Age: 44
End: 2024-02-20
Payer: MEDICARE

## 2024-02-20 VITALS
WEIGHT: 243.19 LBS | BODY MASS INDEX: 38.17 KG/M2 | OXYGEN SATURATION: 98 % | HEIGHT: 67 IN | DIASTOLIC BLOOD PRESSURE: 82 MMHG | SYSTOLIC BLOOD PRESSURE: 114 MMHG | HEART RATE: 70 BPM

## 2024-02-20 DIAGNOSIS — R07.9 CHEST PAIN, UNSPECIFIED TYPE: ICD-10-CM

## 2024-02-20 DIAGNOSIS — I10 PRIMARY HYPERTENSION: Primary | ICD-10-CM

## 2024-02-20 DIAGNOSIS — R00.2 PALPITATIONS: ICD-10-CM

## 2024-02-20 DIAGNOSIS — E78.49 OTHER HYPERLIPIDEMIA: ICD-10-CM

## 2024-02-20 DIAGNOSIS — I63.9 CEREBROVASCULAR ACCIDENT (CVA), UNSPECIFIED MECHANISM: ICD-10-CM

## 2024-02-20 DIAGNOSIS — Z00.00 ROUTINE ADULT HEALTH MAINTENANCE: ICD-10-CM

## 2024-02-20 DIAGNOSIS — R60.0 LOCALIZED EDEMA: ICD-10-CM

## 2024-02-20 DIAGNOSIS — Z85.3 HISTORY OF BREAST CANCER: ICD-10-CM

## 2024-02-20 DIAGNOSIS — Z86.73 HISTORY OF CVA (CEREBROVASCULAR ACCIDENT): ICD-10-CM

## 2024-02-20 DIAGNOSIS — E66.01 OBESITY, MORBID, BMI 40.0-49.9: ICD-10-CM

## 2024-02-20 DIAGNOSIS — F41.1 GAD (GENERALIZED ANXIETY DISORDER): ICD-10-CM

## 2024-02-20 DIAGNOSIS — R06.02 SOB (SHORTNESS OF BREATH): ICD-10-CM

## 2024-02-20 DIAGNOSIS — Z76.89 ENCOUNTER TO ESTABLISH CARE: ICD-10-CM

## 2024-02-20 DIAGNOSIS — R01.1 MURMUR: ICD-10-CM

## 2024-02-20 DIAGNOSIS — G47.30 SLEEP APNEA, UNSPECIFIED TYPE: ICD-10-CM

## 2024-02-20 DIAGNOSIS — E11.9 TYPE 2 DIABETES MELLITUS WITHOUT COMPLICATION, WITHOUT LONG-TERM CURRENT USE OF INSULIN: Primary | ICD-10-CM

## 2024-02-20 DIAGNOSIS — Z90.12 STATUS POST LEFT MASTECTOMY: ICD-10-CM

## 2024-02-20 DIAGNOSIS — R06.09 DOE (DYSPNEA ON EXERTION): ICD-10-CM

## 2024-02-20 LAB
OHS QRS DURATION: 82 MS
OHS QTC CALCULATION: 423 MS

## 2024-02-20 PROCEDURE — 3061F NEG MICROALBUMINURIA REV: CPT | Mod: HCNC,CPTII,S$GLB, | Performed by: INTERNAL MEDICINE

## 2024-02-20 PROCEDURE — G2211 COMPLEX E/M VISIT ADD ON: HCPCS | Mod: HCNC,S$GLB,, | Performed by: INTERNAL MEDICINE

## 2024-02-20 PROCEDURE — 3008F BODY MASS INDEX DOCD: CPT | Mod: HCNC,CPTII,S$GLB, | Performed by: INTERNAL MEDICINE

## 2024-02-20 PROCEDURE — 93005 ELECTROCARDIOGRAM TRACING: CPT | Mod: HCNC

## 2024-02-20 PROCEDURE — 3079F DIAST BP 80-89 MM HG: CPT | Mod: HCNC,CPTII,S$GLB, | Performed by: INTERNAL MEDICINE

## 2024-02-20 PROCEDURE — 1160F RVW MEDS BY RX/DR IN RCRD: CPT | Mod: HCNC,CPTII,S$GLB, | Performed by: INTERNAL MEDICINE

## 2024-02-20 PROCEDURE — 3044F HG A1C LEVEL LT 7.0%: CPT | Mod: HCNC,CPTII,S$GLB, | Performed by: INTERNAL MEDICINE

## 2024-02-20 PROCEDURE — 99214 OFFICE O/P EST MOD 30 MIN: CPT | Mod: HCNC,95,, | Performed by: NURSE PRACTITIONER

## 2024-02-20 PROCEDURE — 3066F NEPHROPATHY DOC TX: CPT | Mod: HCNC,CPTII,S$GLB, | Performed by: INTERNAL MEDICINE

## 2024-02-20 PROCEDURE — 99214 OFFICE O/P EST MOD 30 MIN: CPT | Mod: HCNC,S$GLB,, | Performed by: INTERNAL MEDICINE

## 2024-02-20 PROCEDURE — 93010 ELECTROCARDIOGRAM REPORT: CPT | Mod: HCNC,,, | Performed by: INTERNAL MEDICINE

## 2024-02-20 PROCEDURE — 1159F MED LIST DOCD IN RCRD: CPT | Mod: HCNC,CPTII,S$GLB, | Performed by: INTERNAL MEDICINE

## 2024-02-20 PROCEDURE — 3044F HG A1C LEVEL LT 7.0%: CPT | Mod: HCNC,CPTII,95, | Performed by: NURSE PRACTITIONER

## 2024-02-20 PROCEDURE — 99999 PR PBB SHADOW E&M-EST. PATIENT-LVL III: CPT | Mod: PBBFAC,HCNC,, | Performed by: INTERNAL MEDICINE

## 2024-02-20 PROCEDURE — 3074F SYST BP LT 130 MM HG: CPT | Mod: HCNC,CPTII,S$GLB, | Performed by: INTERNAL MEDICINE

## 2024-02-20 RX ORDER — FLUDROCORTISONE ACETATE 0.1 MG/1
100 TABLET ORAL
COMMUNITY
Start: 2024-02-13 | End: 2024-02-20 | Stop reason: SDUPTHER

## 2024-02-20 RX ORDER — MELOXICAM 15 MG/1
1 TABLET ORAL EVERY MORNING
COMMUNITY

## 2024-02-20 RX ORDER — ZIPRASIDONE HYDROCHLORIDE 40 MG/1
40 CAPSULE ORAL 2 TIMES DAILY
COMMUNITY
End: 2024-02-27 | Stop reason: SDUPTHER

## 2024-02-20 RX ORDER — FLUDROCORTISONE ACETATE 0.1 MG/1
100 TABLET ORAL DAILY
Qty: 30 TABLET | Refills: 3 | Status: SHIPPED | OUTPATIENT
Start: 2024-02-20 | End: 2024-05-13 | Stop reason: SDUPTHER

## 2024-02-20 RX ORDER — MIDODRINE HYDROCHLORIDE 5 MG/1
5 TABLET ORAL
Qty: 90 TABLET | Refills: 11 | Status: SHIPPED | OUTPATIENT
Start: 2024-02-20 | End: 2024-05-13 | Stop reason: SDUPTHER

## 2024-02-20 RX ORDER — FOLIC ACID 1 MG/1
1 TABLET ORAL DAILY
COMMUNITY
Start: 2024-01-12

## 2024-02-20 NOTE — PATIENT INSTRUCTIONS
PATIENT INSTRUCTIONS     Continue Mounjaro 10 mg subcutaneously every 7 days.   Continue Glucophage  mg by mouth daily.     Blood Sugar Goals:       Fastin-130.       1-2 hours after a meal: Less than 180.

## 2024-02-20 NOTE — PROGRESS NOTES
"Sangeetha June is a 44 y.o. female with  has a past medical history of Anxiety, Asthma (11/8/2022), Breast cancer, Chest pain, Chest pain (07/02/2021), Cholecystitis without calculus, Decreased ROM of left shoulder (02/15/2022), Diabetes mellitus, Dizziness, Elevated C-reactive protein (CRP), Essential hypertension, Fall (10/11/2021), Memory change, EVERARDO (obstructive sleep apnea), Osteoarthritis, Other specified disorders of thyroid, Pre-diabetes (04/15/2021), Screening mammogram, encounter for (04/15/2021), Shoulder injury, SOB (shortness of breath), Stroke, Thyroiditis, Tingling of left upper extremity (10/29/2021), Vision disturbance (04/30/2021), and Weakness of shoulder (02/15/2022). who presents for a follow up evaluation of Type 2 diabetes mellitus.     CHIEF COMPLAINT: Diabetes Consultation    PCP: Sam Garcia MD (Inactive)      Initial visit with me - 4/3/2023    The patient was initially diagnosed with diabetes  for 2 years.      Previous failed treatments include:  none     Social Documentation:  Patient lives in [place] with Boni.   Occupation: Disabled.  Exercise: No formal exercise.   Diet: "very poor", does not cook. Boni cooks her meals for her.       Diabetes related complications:   cerebrovascular disease.   denies Pancreatitis  denies Gastroparesis  denies DKA  denies Hx/family Hx of MEN2/MTC  denies Frequent UTIs/yeast infections     Cardiovascular Risk Factors: dyslipidemia, hypertension, obesity (BMI >30 kg/m2), and sedentary lifestyle.    Diabetes Medications               metFORMIN (GLUCOPHAGE-XR) 500 MG ER 24hr tablet Take 1 tablet (500 mg total) by mouth 2 (two) times daily with meals.    tirzepatide 10 mg/0.5 mL PnIj Inject 10 mg into the skin every 7 days.     Current monitoring regimen: capillary blood glucose monitoring with finger sticks.      Recent hypoglycemic episodes: No.   Patient compliant with glucose checks and medication administration? Yes    DIABETES " "MANAGEMENT STATUS  Statin: Taking  ACE/ARB: Not taking  Screening or Prevention Patient's value Goal Complete/Controlled?   HgA1C Testing and Control   Lab Results   Component Value Date    HGBA1C 4.3 02/06/2024      Annually/Less than 8% Yes   Lipid profile : 02/06/2024 Annually Yes   LDL control Lab Results   Component Value Date    LDLCALC 84.6 07/19/2022    Annually/Less than 100 mg/dl  Yes   Nephropathy screening Lab Results   Component Value Date    LABMICR 5.0 06/23/2023     No results found for: "PROTEINUA"  No results found for: "UTPCR"   Annually Yes   Blood pressure BP Readings from Last 1 Encounters:   01/02/24 (!) 88/60    Less than 140/90 Yes   Dilated retinal exam : 07/26/2023 Annually Yes   Foot exam   : 09/14/2023 Annually Yes   Patient's medications, allergies, surgical, social and family histories were reviewed and updated as appropriate.     Review of Systems   Constitutional:  Negative for weight loss.   Eyes:  Negative for blurred vision and double vision.   Cardiovascular:  Negative for chest pain.   Gastrointestinal:  Negative for nausea and vomiting.   Genitourinary:  Negative for frequency.   Musculoskeletal:  Negative for falls.   Neurological:  Negative for dizziness and weakness.   Endo/Heme/Allergies:  Negative for polydipsia.   Psychiatric/Behavioral:  Negative for depression.    All other systems reviewed and are negative.       Physical Exam  Constitutional:       Appearance: Normal appearance.   HENT:      Head: Normocephalic and atraumatic.   Pulmonary:      Effort: No respiratory distress.   Musculoskeletal:      Cervical back: Normal range of motion.   Neurological:      Mental Status: She is alert and oriented to person, place, and time.   Psychiatric:         Mood and Affect: Mood normal.         Behavior: Behavior normal.        There were no vitals taken for this visit.  Wt Readings from Last 3 Encounters:   11/27/23 111.4 kg (245 lb 9.5 oz)   11/13/23 114 kg (251 lb 5.2 oz) " "  11/07/23 114 kg (251 lb 5.2 oz)       LAB REVIEW  Lab Results   Component Value Date     08/09/2023    K 3.1 (L) 08/09/2023     08/09/2023    CO2 27 08/09/2023    BUN 10 08/09/2023    CREATININE 1.1 08/09/2023    CALCIUM 9.7 08/09/2023    ANIONGAP 12 08/09/2023    EGFRNORACEVR >60 08/09/2023     No results found for: "CPEPTIDE", "GLUTAMICACID", "INSLNABS"  Hemoglobin A1C   Date Value Ref Range Status   02/06/2024 4.3 4.2 - 5.8 % Final   06/23/2023 5.3 4.0 - 5.6 % Final     Comment:     ADA Screening Guidelines:  5.7-6.4%  Consistent with prediabetes  >or=6.5%  Consistent with diabetes    High levels of fetal hemoglobin interfere with the HbA1C  assay. Heterozygous hemoglobin variants (HbS, HgC, etc)do  not significantly interfere with this assay.   However, presence of multiple variants may affect accuracy.     02/20/2023 5.0 4.0 - 5.6 % Final     Comment:     ADA Screening Guidelines:  5.7-6.4%  Consistent with prediabetes  >or=6.5%  Consistent with diabetes    High levels of fetal hemoglobin interfere with the HbA1C  assay. Heterozygous hemoglobin variants (HbS, HgC, etc)do  not significantly interfere with this assay.   However, presence of multiple variants may affect accuracy.     10/21/2022 5.1 4.0 - 5.6 % Final     Comment:     ADA Screening Guidelines:  5.7-6.4%  Consistent with prediabetes  >or=6.5%  Consistent with diabetes    High levels of fetal hemoglobin interfere with the HbA1C  assay. Heterozygous hemoglobin variants (HbS, HgC, etc)do  not significantly interfere with this assay.   However, presence of multiple variants may affect accuracy.          ASSESSMENT    ICD-10-CM ICD-9-CM   1. Type 2 diabetes mellitus without complication, without long-term current use of insulin  E11.9 250.00   2. Cerebrovascular accident (CVA), unspecified mechanism  I63.9 434.91     PLAN  Diagnoses and all orders for this visit:    Type 2 diabetes mellitus without complication, without long-term current use " of insulin    Cerebrovascular accident (CVA), unspecified mechanism      Reviewed pathophysiology of diabetes, complications related to the disease, importance of annual dilated eye exam and daily foot examination. Explained MOA, SE, dosage of medications. Written instructions given and reviewed with patient and patient verbalizes understanding.     4/3/2023 Initial visit: was doing well on trulicity 3 mg, but then was not able to get it due to backorder and has not taken in 2 months. Will restart at 0.75, f/u 6 weeks.     5/15/2023 - patient doing well, has [facility] supplies, will need to get with [facility] team for set up. Doing well with trulicity. F/u 3 months.     8/15/2023 - patient started on Mounjaro by PCP, doing well, has lost weight. Will increase to 10 mg with next dose. F/u 3 months.    11/15/2023 - reports doing well on Mounjaro. Will follow up in 3 months with A1c.     2024 - A1c remains at goal, doing well.      PATIENT INSTRUCTIONS     Continue Mounjaro 10 mg subcutaneously every 7 days.   Continue Glucophage  mg by mouth daily.     Blood Sugar Goals:       Fastin-130.       1-2 hours after a meal: Less than 180.     Follow up in about 6 months (around 2024) for In-Person, No Device.    Portions of this note were prepared with [employer] Naturally Speaking voice recognition transcription software. Grammatical errors, including garbled syntax, mangle pronouns, and other bizarre constructions may be attributed to that software system.

## 2024-02-23 ENCOUNTER — TELEPHONE (OUTPATIENT)
Dept: PSYCHIATRY | Facility: CLINIC | Age: 44
End: 2024-02-23
Payer: MEDICAID

## 2024-02-27 ENCOUNTER — OFFICE VISIT (OUTPATIENT)
Dept: PSYCHIATRY | Facility: CLINIC | Age: 44
End: 2024-02-27
Payer: MEDICARE

## 2024-02-27 ENCOUNTER — HOSPITAL ENCOUNTER (OUTPATIENT)
Dept: CARDIOLOGY | Facility: HOSPITAL | Age: 44
Discharge: HOME | End: 2024-02-27
Attending: INTERNAL MEDICINE
Payer: MEDICARE

## 2024-02-27 VITALS
WEIGHT: 240.06 LBS | HEIGHT: 67 IN | WEIGHT: 243 LBS | SYSTOLIC BLOOD PRESSURE: 114 MMHG | DIASTOLIC BLOOD PRESSURE: 82 MMHG | SYSTOLIC BLOOD PRESSURE: 107 MMHG | DIASTOLIC BLOOD PRESSURE: 71 MMHG | BODY MASS INDEX: 38.14 KG/M2 | BODY MASS INDEX: 37.6 KG/M2 | HEART RATE: 64 BPM

## 2024-02-27 DIAGNOSIS — F41.1 GAD (GENERALIZED ANXIETY DISORDER): ICD-10-CM

## 2024-02-27 DIAGNOSIS — I10 PRIMARY HYPERTENSION: ICD-10-CM

## 2024-02-27 DIAGNOSIS — R06.09 DOE (DYSPNEA ON EXERTION): ICD-10-CM

## 2024-02-27 DIAGNOSIS — R07.9 CHEST PAIN, UNSPECIFIED TYPE: ICD-10-CM

## 2024-02-27 DIAGNOSIS — R00.2 PALPITATIONS: ICD-10-CM

## 2024-02-27 DIAGNOSIS — R60.0 LOCALIZED EDEMA: ICD-10-CM

## 2024-02-27 DIAGNOSIS — R06.02 SOB (SHORTNESS OF BREATH): ICD-10-CM

## 2024-02-27 DIAGNOSIS — G47.09 OTHER INSOMNIA: ICD-10-CM

## 2024-02-27 DIAGNOSIS — F25.1 SCHIZOAFFECTIVE DISORDER, DEPRESSIVE TYPE: Primary | ICD-10-CM

## 2024-02-27 DIAGNOSIS — Z85.3 HISTORY OF BREAST CANCER: ICD-10-CM

## 2024-02-27 DIAGNOSIS — G31.84 MILD NEUROCOGNITIVE DISORDER: ICD-10-CM

## 2024-02-27 LAB
AORTIC ROOT ANNULUS: 3.01 CM
ASCENDING AORTA: 3.13 CM
AV INDEX (PROSTH): 1.08
AV MEAN GRADIENT: 3 MMHG
AV PEAK GRADIENT: 6 MMHG
AV VALVE AREA BY VELOCITY RATIO: 3.31 CM²
AV VALVE AREA: 3.47 CM²
AV VELOCITY RATIO: 1.03
BSA FOR ECHO PROCEDURE: 2.28 M2
CV ECHO LV RWT: 0.41 CM
DOP CALC AO PEAK VEL: 1.18 M/S
DOP CALC AO VTI: 25.4 CM
DOP CALC LVOT AREA: 3.2 CM2
DOP CALC LVOT DIAMETER: 2.02 CM
DOP CALC LVOT PEAK VEL: 1.22 M/S
DOP CALC LVOT STROKE VOLUME: 88.09 CM3
DOP CALC RVOT PEAK VEL: 0.64 M/S
DOP CALC RVOT VTI: 15.4 CM
DOP CALCLVOT PEAK VEL VTI: 27.5 CM
E WAVE DECELERATION TIME: 183.13 MSEC
E/A RATIO: 1.11
E/E' RATIO: 8.59 M/S
ECHO LV POSTERIOR WALL: 1.03 CM (ref 0.6–1.1)
EJECTION FRACTION: 60 %
FRACTIONAL SHORTENING: 33 % (ref 28–44)
INTERVENTRICULAR SEPTUM: 1 CM (ref 0.6–1.1)
IVC DIAMETER: 1.53 CM
IVRT: 95.15 MSEC
LA MAJOR: 4.95 CM
LA MINOR: 5.18 CM
LA WIDTH: 3.9 CM
LEFT ATRIUM SIZE: 4.32 CM
LEFT ATRIUM VOLUME INDEX MOD: 21.5 ML/M2
LEFT ATRIUM VOLUME INDEX: 33 ML/M2
LEFT ATRIUM VOLUME MOD: 47.23 CM3
LEFT ATRIUM VOLUME: 72.5 CM3
LEFT INTERNAL DIMENSION IN SYSTOLE: 3.39 CM (ref 2.1–4)
LEFT VENTRICLE DIASTOLIC VOLUME INDEX: 54.53 ML/M2
LEFT VENTRICLE DIASTOLIC VOLUME: 119.97 ML
LEFT VENTRICLE MASS INDEX: 85 G/M2
LEFT VENTRICLE SYSTOLIC VOLUME INDEX: 21.4 ML/M2
LEFT VENTRICLE SYSTOLIC VOLUME: 46.99 ML
LEFT VENTRICULAR INTERNAL DIMENSION IN DIASTOLE: 5.03 CM (ref 3.5–6)
LEFT VENTRICULAR MASS: 187.49 G
LV LATERAL E/E' RATIO: 7.3 M/S
LV SEPTAL E/E' RATIO: 10.43 M/S
LVOT MG: 3.09 MMHG
LVOT MV: 0.81 CM/S
MV PEAK A VEL: 0.66 M/S
MV PEAK E VEL: 0.73 M/S
PISA TR MAX VEL: 2.38 M/S
PV MEAN GRADIENT: 1 MMHG
PV PEAK GRADIENT: 2 MMHG
PV PEAK VELOCITY: 0.9 M/S
RA MAJOR: 4.66 CM
RA PRESSURE ESTIMATED: 3 MMHG
RA WIDTH: 3.18 CM
RIGHT VENTRICULAR END-DIASTOLIC DIMENSION: 3.21 CM
RV TB RVSP: 5 MMHG
SINUS: 2.97 CM
STJ: 2.56 CM
TDI LATERAL: 0.1 M/S
TDI SEPTAL: 0.07 M/S
TDI: 0.09 M/S
TR MAX PG: 23 MMHG
TRICUSPID ANNULAR PLANE SYSTOLIC EXCURSION: 1.96 CM
TV REST PULMONARY ARTERY PRESSURE: 26 MMHG
Z-SCORE OF LEFT VENTRICULAR DIMENSION IN END DIASTOLE: -4.01
Z-SCORE OF LEFT VENTRICULAR DIMENSION IN END SYSTOLE: -2.35

## 2024-02-27 PROCEDURE — 93306 TTE W/DOPPLER COMPLETE: CPT | Mod: HCNC

## 2024-02-27 PROCEDURE — 93306 TTE W/DOPPLER COMPLETE: CPT | Mod: 26,HCNC,, | Performed by: INTERNAL MEDICINE

## 2024-02-27 PROCEDURE — 99212 OFFICE O/P EST SF 10 MIN: CPT | Mod: PBBFAC,25 | Performed by: PSYCHOLOGIST

## 2024-02-27 PROCEDURE — 90833 PSYTX W PT W E/M 30 MIN: CPT | Mod: HCNC,S$GLB,, | Performed by: PSYCHOLOGIST

## 2024-02-27 PROCEDURE — 3074F SYST BP LT 130 MM HG: CPT | Mod: HCNC,CPTII,S$GLB, | Performed by: PSYCHOLOGIST

## 2024-02-27 PROCEDURE — 3008F BODY MASS INDEX DOCD: CPT | Mod: HCNC,CPTII,S$GLB, | Performed by: PSYCHOLOGIST

## 2024-02-27 PROCEDURE — 3044F HG A1C LEVEL LT 7.0%: CPT | Mod: HCNC,CPTII,S$GLB, | Performed by: PSYCHOLOGIST

## 2024-02-27 PROCEDURE — 99214 OFFICE O/P EST MOD 30 MIN: CPT | Mod: HCNC,S$GLB,, | Performed by: PSYCHOLOGIST

## 2024-02-27 PROCEDURE — 3066F NEPHROPATHY DOC TX: CPT | Mod: HCNC,CPTII,S$GLB, | Performed by: PSYCHOLOGIST

## 2024-02-27 PROCEDURE — 1159F MED LIST DOCD IN RCRD: CPT | Mod: HCNC,CPTII,S$GLB, | Performed by: PSYCHOLOGIST

## 2024-02-27 PROCEDURE — 3078F DIAST BP <80 MM HG: CPT | Mod: HCNC,CPTII,S$GLB, | Performed by: PSYCHOLOGIST

## 2024-02-27 PROCEDURE — 99999 PR PBB SHADOW E&M-EST. PATIENT-LVL II: CPT | Mod: PBBFAC,HCNC,, | Performed by: PSYCHOLOGIST

## 2024-02-27 PROCEDURE — 3061F NEG MICROALBUMINURIA REV: CPT | Mod: HCNC,CPTII,S$GLB, | Performed by: PSYCHOLOGIST

## 2024-02-27 RX ORDER — TIRZEPATIDE 12.5 MG/.5ML
INJECTION, SOLUTION SUBCUTANEOUS
COMMUNITY
Start: 2024-02-14

## 2024-02-27 RX ORDER — DULOXETIN HYDROCHLORIDE 30 MG/1
30 CAPSULE, DELAYED RELEASE ORAL DAILY
Qty: 30 CAPSULE | Refills: 5 | Status: SHIPPED | OUTPATIENT
Start: 2024-02-27 | End: 2024-08-25

## 2024-02-27 RX ORDER — ZIPRASIDONE HYDROCHLORIDE 40 MG/1
40 CAPSULE ORAL 2 TIMES DAILY
Qty: 60 CAPSULE | Refills: 5 | Status: SHIPPED | OUTPATIENT
Start: 2024-02-27 | End: 2024-08-25

## 2024-02-27 RX ORDER — DULOXETIN HYDROCHLORIDE 30 MG/1
30 CAPSULE, DELAYED RELEASE ORAL DAILY
COMMUNITY
End: 2024-02-27 | Stop reason: SDUPTHER

## 2024-02-27 RX ORDER — TRAZODONE HYDROCHLORIDE 150 MG/1
150 TABLET ORAL NIGHTLY
Qty: 30 TABLET | Refills: 5 | Status: SHIPPED | OUTPATIENT
Start: 2024-02-27 | End: 2024-08-25

## 2024-02-27 RX ORDER — BUSPIRONE HYDROCHLORIDE 15 MG/1
15 TABLET ORAL 3 TIMES DAILY
Qty: 90 TABLET | Refills: 5 | Status: SHIPPED | OUTPATIENT
Start: 2024-02-27 | End: 2024-08-25

## 2024-02-27 NOTE — PROGRESS NOTES
"Outpatient Psychiatry Follow-Up Visit     2/27/2024      Clinical Status of Patient:  Outpatient (Ambulatory)    Chief Complaint:  Sangeetha June is a 44 y.o. female who presents today for follow-up of depression, anxiety, and memory .      Preferred Name: Sangeetha  Gender Identity: cis female  Preferred Pronouns: she/her    Impressions/Plan from last visit: Sangeetha attended her virtual visit. She was last seen by me in August 2022, though she could not recall seeing me in the past. She reported that she is "okay now, but a couple of weeks ago, I was not okay." She said that she had a "meltdown" or something--felt "confused, scared"--"I was really all over the place." She said that her neurosurgeon told her that her memory complaints, etc were not related to her brain mass but rather "chemo brain" and is "permanent." She has previously been evaluated by Dr. Garay in neurology for a neuropsychological evaluation and was diagnosed with a minor neurocognitive disorder. See evaluation in chart in May of 2022.    She said that [facility] gave her "involuntary movements." She is currently only taking buspirone. She had been to [facility] in August; reportedly went to [facility] and "had a major panic attack." She said that her boyfriend told her that she was not going back because they thought her panic attack was because of going there. She has continued to forget things. She is interested in [facility]--likes seeing Mr. Rader at [facility] but knows that while with [facility] would not see him. We will request that she get individual at [facility] in addition to group and medicine management. She was unable to recall the names of her current medications,and we have not received the discharge summary from last year or any new information from this year regarding her treatment at [facility]. We requested that staff reach out to [facility] to get her medication record, though we have also referred her to case management to get back into [facility]. " "Having daily observations regarding her response to medicines will be helpful. She recalled that she has been taking buspirone and trazodone but could not recall other medicines. Those medicines were continued today. See plan below.    Plan--continue buspirone 15 mg tid, trazodone 150 mg hs; neurology referral already in system; case management referral [facility] again--needs individual and group with medicine management; we also need records from them if she comes back to us    Interval History and Content of Current Session: Sangeetha attended her visit. Her dad brought her to the appt today. She said that she went to [facility]; then was hospitalized for gall bladder surgery and was not able to go back. She said that she recently moved from [place] and left some of her medicine there. She is now living in [place] with her niece. She has been nervous and scared--had another medicine prescribed but has not started it. Called the pharmacy to confirm her medicines--trazodone, buspirone tid, geodon bid, cymbalta. She has been hearing voices--"I'm coming to get you"--it's "the people." When asked who they are, she said she does not know. She hears the voices every other day when she gets worried. She said that she feels more scared when alone--is alone for a few hours each day. Her niece has a 2-y/o daughter--helps to distract Sangeetha. She is interested in a day program--will ask about that with . She has been thinking about doom--gets depressed sometimes. She reported that she is waiting to start PT to help her walk--she is in a walked (sitting and roller backward). She wanted her medicines sent to [facility] today so she could pick them up; she then plans to have [facility] get them for her. She and her ex have broken up; he reportedly was taking her check and she was not seeing her money. She is interested in day hab programs to give her things to do during the day. Will inquire with staff on options for her. See plan " below--we are continuing her medicines as currently prescribed.     Plan--continue buspirone 15 mg tid, trazodone 150 mg hs; geodon 40 mg bid; Cymbalta 30 mg; discharge from [facility]; day hab options    --------------------------------------------------------------------------------------------------------  Prior medicines: Latuda, Lexapor, trazodone    PSYCHOTHERAPY      Site: [facility]  Time: 16 minutes  Participants: Met with patient    Therapeutic Intervention Type: behavior modifying psychotherapy, supportive psychotherapy  Why chosen therapy is appropriate versus another modality: patient responds to this modality, evidence based practice    Target symptoms: depression, anxiety   Primary focus: see above    Outcome monitoring methods: self-report, observation    Patient's response to intervention:  The patient's response to intervention is accepting.    Progress toward goals:  The patient's progress toward goals is fair .        10/31/2023    11:29 AM   GAD7   1. Feeling nervous, anxious, or on edge? 1   2. Not being able to stop or control worrying? 3   3. Worrying too much about different things? 3   4. Trouble relaxing? 3   5. Being so restless that it is hard to sit still? 2   6. Becoming easily annoyed or irritable? 1   7. Feeling afraid as if something awful might happen? 1   ANGIE-7 Score 14      0-4 = Minimal anxiety  5-9 = Mild anxiety  10-14 = Moderate anxiety  15-21 = Severe anxiety             11/6/2023     3:02 PM 12/1/2022     1:41 PM 2/8/2022     1:09 PM 10/11/2021    10:50 AM   Depression Patient Health Questionnaire   Over the last two weeks how often have you been bothered by little interest or pleasure in doing things Not at all Not at all Not at all Not at all   Over the last two weeks how often have you been bothered by feeling down, depressed or hopeless Not at all Not at all Not at all Not at all   PHQ-2 Total Score 0 0 0 0     0-4 = No intervention  5 to 9 = Mild  10 to 14 =  Moderate  15 to 19 = Moderately severe  =20 = Severe    Review of Systems   PSYCHIATRIC: Pertinant items are noted in the narrative.    Past Medical, Family and Social History: The patient's past medical, family and social history have been reviewed and updated as appropriate within the electronic medical record - see encounter notes.      Current Outpatient Medications:     albuterol (PROVENTIL/VENTOLIN HFA) 90 mcg/actuation inhaler, Inhale 2 puffs into the lungs every 4 (four) hours as needed for Wheezing. Rescue, Disp: 8.5 g, Rfl: 1    ammonium lactate 12 % Crea, Apply 1 Act topically 2 (two) times daily. To feet., Disp: 140 g, Rfl: 2    ascorbic acid, vitamin C, (VITAMIN C) 1000 MG tablet, Take 1 tablet (1,000 mg total) by mouth once daily., Disp: 90 tablet, Rfl: 3    aspirin (ECOTRIN) 81 MG EC tablet, Take 1 tablet (81 mg total) by mouth once daily., Disp: 360 tablet, Rfl: 1    budesonide-formoterol 160-4.5 mcg (SYMBICORT) 160-4.5 mcg/actuation HFAA, Inhale 2 puffs into the lungs every 12 (twelve) hours. Controller, Disp: 10.2 g, Rfl: 11    busPIRone (BUSPAR) 15 MG tablet, Take 1 tablet (15 mg total) by mouth 3 (three) times daily., Disp: 270 tablet, Rfl: 1    celecoxib (CELEBREX) 200 MG capsule, Take 1 capsule (200 mg total) by mouth 2 (two) times daily., Disp: 28 capsule, Rfl: 0    diclofenac (VOLTAREN) 75 MG EC tablet, Take 1 tablet (75 mg total) by mouth 2 (two) times daily as needed., Disp: 60 tablet, Rfl: 2    EScitalopram oxalate (LEXAPRO) 20 MG tablet, Take 20 mg by mouth once daily., Disp: , Rfl:     EUTHYROX 50 mcg tablet, TAKE ONE TABLET BY MOUTH EVERY DAY, Disp: 90 tablet, Rfl: 0    fludrocortisone (FLORINEF) 0.1 mg Tab, Take 1 tablet (100 mcg total) by mouth once daily., Disp: 30 tablet, Rfl: 3    folic acid (FOLVITE) 1 MG tablet, Take 1 tablet by mouth once daily., Disp: , Rfl:     gabapentin (NEURONTIN) 300 MG capsule, Take 1 capsule (300 mg total) by mouth 2 (two) times daily., Disp: 180  capsule, Rfl: 0    hydrocortisone 0.5 % cream, Apply topically 2 (two) times daily., Disp: , Rfl:     linaCLOtide (LINZESS) 145 mcg Cap capsule, Take 1 capsule (145 mcg total) by mouth once daily., Disp: 90 capsule, Rfl: 1    magnesium oxide (MAG-OX) 400 mg (241.3 mg magnesium) tablet, Take 1 tablet (400 mg total) by mouth once daily., Disp: 90 tablet, Rfl: 1    meloxicam (MOBIC) 15 MG tablet, Take 1 tablet by mouth every morning., Disp: , Rfl:     metFORMIN (GLUCOPHAGE-XR) 500 MG ER 24hr tablet, Take 1 tablet (500 mg total) by mouth 2 (two) times daily with meals., Disp: 180 tablet, Rfl: 1    midodrine (PROAMATINE) 5 MG Tab, Take 1 tablet (5 mg total) by mouth every meal as needed (if BP is below 110/70)., Disp: 90 tablet, Rfl: 11    multivit with min-folic acid (ADULT MULTIVITAMIN GUMMIES) 200 mcg Chew, Take 1 tablet by mouth once daily., Disp: 90 tablet, Rfl: 3    olopatadine (PATANOL) 0.1 % ophthalmic solution, , Disp: , Rfl:     oxybutynin (DITROPAN-XL) 5 MG TR24, Take 5 mg by mouth., Disp: , Rfl:     pantoprazole (PROTONIX) 40 MG tablet, Take 1 tablet (40 mg total) by mouth once daily., Disp: 90 tablet, Rfl: 0    potassium chloride SA (K-DUR,KLOR-CON) 20 MEQ tablet, , Disp: , Rfl:     pravastatin (PRAVACHOL) 40 MG tablet, Take 1 tablet (40 mg total) by mouth every evening., Disp: 90 tablet, Rfl: 1    solifenacin (VESICARE) 10 MG tablet, Take 1 tablet (10 mg total) by mouth once daily., Disp: 30 tablet, Rfl: 11    tirzepatide 10 mg/0.5 mL PnIj, Inject 10 mg into the skin every 7 days., Disp: 4 pen , Rfl: 5    topiramate (TOPAMAX) 25 MG tablet, Take 1 tablet (25 mg total) by mouth 2 (two) times daily., Disp: 60 tablet, Rfl: 4    traZODone (DESYREL) 150 MG tablet, Take 1 tablet (150 mg total) by mouth every evening., Disp: 90 tablet, Rfl: 1    triamcinolone acetonide (KENALOG-40) 40 mg/mL injection, , Disp: , Rfl:     ziprasidone (GEODON) 40 MG Cap, Take 40 mg by mouth 2 (two) times daily., Disp: , Rfl:   No  "current facility-administered medications for this visit.    Facility-Administered Medications Ordered in Other Visits:     lactated ringers infusion, , Intravenous, Continuous, Maty Thompson MD, Last Rate: 10 mL/hr at 01/27/22 0636, New Bag at 01/04/23 1603    Compliance: partial    Side effects: see above    Risk Parameters:  Patient reports no suicidal ideation  Patient reports no homicidal ideation  Patient reports no self-injurious behavior  Patient reports no violent behavior    Exam (detailed: at least 9 elements; comprehensive: all 15 elements)   Constitutional  Vitals:  Most recent vital signs were reviewed.   Last 3 sets of Vitals        2/20/2024    11:57 AM 2/27/2024     1:36 PM 2/27/2024     1:41 PM   Vitals - 1 value per visit   SYSTOLIC 114 114 107   DIASTOLIC 82 82 71   Pulse 70  64   SPO2 98 %     Weight (lb) 243.17 243 240.08   Weight (kg) 110.3 110.224 108.9   Height 5' 7" (1.702 m) 5' 7" (1.702 m)    BMI (Calculated) 38.1 38.1 37.6   Pain Score Seven            General:  age appropriate, casually dressed, hair pulled back     Musculoskeletal  Muscle Strength/Tone:  no tremor, no tic   Gait & Station:  Sitting on walker; rolling backward to get around     Psychiatric  Speech:  no latency; no press   Behavior: wnl   Mood & Affect:  anxious, depressed  congruent and appropriate   Thought Process:  normal and logical   Associations:  intact   Thought Content:  See above   Insight:  has awareness of illness   Judgement: behavior is adequate to circumstances   Orientation:  grossly intact   Memory: intact for content of interview   Language: grossly intact   Attention Span & Concentration:  Grossly intact   Fund of Knowledge:  intact and appropriate to age and level of education     Assessment and Diagnosis   Status/Progress: Based on the examination today, the patient's problem(s) is/are inadequately controlled.  New problems have been presented today.   Co-morbidities and psychosocial stressors  " are complicating management of the primary condition.  The working differential for this patient includes unclear whether schizoaffective vs schizophrenia.     General Impression:     Encounter Diagnoses   Name Primary?    Schizoaffective disorder, depressive type Yes    ANGIE (generalized anxiety disorder)     Mild neurocognitive disorder     Other insomnia        Intervention/Counseling/Treatment Plan   Medication Management: Discussed risks, benefits, and alternatives to treatment plan documented above with patient. I answered all patient questions related to this plan, and patient expressed understanding and agreement.   continue buspirone 15 mg tid, trazodone 150 mg hs; geodon 40 mg bid; Cymbalta 30 mg  Continue counseling  Consider dayhab for activities  discharge from [facility]    Return to Clinic: 3 months    Medication List with Changes/Refills   Current Medications    ALBUTEROL (PROVENTIL/VENTOLIN HFA) 90 MCG/ACTUATION INHALER    Inhale 2 puffs into the lungs every 4 (four) hours as needed for Wheezing. Rescue    AMMONIUM LACTATE 12 % CREA    Apply 1 Act topically 2 (two) times daily. To feet.    ASCORBIC ACID, VITAMIN C, (VITAMIN C) 1000 MG TABLET    Take 1 tablet (1,000 mg total) by mouth once daily.    ASPIRIN (ECOTRIN) 81 MG EC TABLET    Take 1 tablet (81 mg total) by mouth once daily.    BUDESONIDE-FORMOTEROL 160-4.5 MCG (SYMBICORT) 160-4.5 MCG/ACTUATION HFAA    Inhale 2 puffs into the lungs every 12 (twelve) hours. Controller    BUSPIRONE (BUSPAR) 15 MG TABLET    Take 1 tablet (15 mg total) by mouth 3 (three) times daily.    CELECOXIB (CELEBREX) 200 MG CAPSULE    Take 1 capsule (200 mg total) by mouth 2 (two) times daily.    DICLOFENAC (VOLTAREN) 75 MG EC TABLET    Take 1 tablet (75 mg total) by mouth 2 (two) times daily as needed.    ESCITALOPRAM OXALATE (LEXAPRO) 20 MG TABLET    Take 20 mg by mouth once daily.    EUTHYROX 50 MCG TABLET    TAKE ONE TABLET BY MOUTH EVERY DAY    FLUDROCORTISONE (FLORINEF)  0.1 MG TAB    Take 1 tablet (100 mcg total) by mouth once daily.    FOLIC ACID (FOLVITE) 1 MG TABLET    Take 1 tablet by mouth once daily.    GABAPENTIN (NEURONTIN) 300 MG CAPSULE    Take 1 capsule (300 mg total) by mouth 2 (two) times daily.    HYDROCORTISONE 0.5 % CREAM    Apply topically 2 (two) times daily.    LINACLOTIDE (LINZESS) 145 MCG CAP CAPSULE    Take 1 capsule (145 mcg total) by mouth once daily.    MAGNESIUM OXIDE (MAG-OX) 400 MG (241.3 MG MAGNESIUM) TABLET    Take 1 tablet (400 mg total) by mouth once daily.    MELOXICAM (MOBIC) 15 MG TABLET    Take 1 tablet by mouth every morning.    METFORMIN (GLUCOPHAGE-XR) 500 MG ER 24HR TABLET    Take 1 tablet (500 mg total) by mouth 2 (two) times daily with meals.    MIDODRINE (PROAMATINE) 5 MG TAB    Take 1 tablet (5 mg total) by mouth every meal as needed (if BP is below 110/70).    MULTIVIT WITH MIN-FOLIC ACID (ADULT MULTIVITAMIN GUMMIES) 200 MCG CHEW    Take 1 tablet by mouth once daily.    OLOPATADINE (PATANOL) 0.1 % OPHTHALMIC SOLUTION        OXYBUTYNIN (DITROPAN-XL) 5 MG TR24    Take 5 mg by mouth.    PANTOPRAZOLE (PROTONIX) 40 MG TABLET    Take 1 tablet (40 mg total) by mouth once daily.    POTASSIUM CHLORIDE SA (K-DUR,KLOR-CON) 20 MEQ TABLET        PRAVASTATIN (PRAVACHOL) 40 MG TABLET    Take 1 tablet (40 mg total) by mouth every evening.    SOLIFENACIN (VESICARE) 10 MG TABLET    Take 1 tablet (10 mg total) by mouth once daily.    TIRZEPATIDE 10 MG/0.5 ML PNIJ    Inject 10 mg into the skin every 7 days.    TOPIRAMATE (TOPAMAX) 25 MG TABLET    Take 1 tablet (25 mg total) by mouth 2 (two) times daily.    TRAZODONE (DESYREL) 150 MG TABLET    Take 1 tablet (150 mg total) by mouth every evening.    TRIAMCINOLONE ACETONIDE (KENALOG-40) 40 MG/ML INJECTION        ZIPRASIDONE (GEODON) 40 MG CAP    Take 40 mg by mouth 2 (two) times daily.        I spent an additional 27 minutes performing E/M services with >50% spent on counseling, guidance, coordinating care  (not Psychotherapy related) in addition to the 16 minutes performing Psychotherapy.    Time spent with pt including note preparation: 42 minutes     Patito Jaquez, PhD, MP  Advanced Practice Medical Psychologist  [facility] [address] [facility].  LATHA Jerez [id number] [phone]   [phone] fax

## 2024-02-27 NOTE — PATIENT INSTRUCTIONS

## 2024-02-28 ENCOUNTER — DOCUMENTATION ONLY (OUTPATIENT)
Dept: PSYCHIATRY | Facility: CLINIC | Age: 44
End: 2024-02-28
Payer: MEDICAID

## 2024-02-28 NOTE — PROGRESS NOTES
CASE MANAGEMENT REFERRAL    MRN: [id number]  : 1980  Reason for referral: needs day hab for activities that provides transportation

## 2024-03-04 ENCOUNTER — TELEPHONE (OUTPATIENT)
Dept: PSYCHIATRY | Facility: CLINIC | Age: 44
End: 2024-03-04
Payer: MEDICAID

## 2024-03-07 ENCOUNTER — TELEPHONE (OUTPATIENT)
Dept: PSYCHIATRY | Facility: CLINIC | Age: 44
End: 2024-03-07
Payer: MEDICAID

## 2024-03-11 ENCOUNTER — OFFICE VISIT (OUTPATIENT)
Dept: PSYCHIATRY | Facility: CLINIC | Age: 44
End: 2024-03-11
Payer: MEDICARE

## 2024-03-11 DIAGNOSIS — F48.9 DEFERRED DIAGNOSIS ON AXIS I: Primary | ICD-10-CM

## 2024-03-11 PROCEDURE — 99499 UNLISTED E&M SERVICE: CPT | Mod: HCNC,95,, | Performed by: SOCIAL WORKER
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
## 2024-03-18 ENCOUNTER — DOCUMENTATION ONLY (OUTPATIENT)
Dept: PSYCHIATRY | Facility: CLINIC | Age: 44
End: 2024-03-18
Payer: MEDICAID

## 2024-03-25 ENCOUNTER — PATIENT OUTREACH (OUTPATIENT)
Dept: ADMINISTRATIVE | Facility: CLINIC | Age: 44
End: 2024-03-25
Payer: MEDICAID

## 2024-03-25 RX ORDER — HYDROXYZINE PAMOATE 25 MG/1
25 CAPSULE ORAL EVERY 8 HOURS PRN
COMMUNITY
Start: 2024-03-21

## 2024-03-25 RX ORDER — BUTALBITAL, ACETAMINOPHEN AND CAFFEINE 50; 325; 40 MG/1; MG/1; MG/1
1 TABLET ORAL EVERY 4 HOURS PRN
COMMUNITY
Start: 2024-03-22 | End: 2024-04-01

## 2024-03-25 RX ORDER — TOPIRAMATE 25 MG/1
25 TABLET ORAL 2 TIMES DAILY
COMMUNITY
Start: 2024-03-07

## 2024-03-25 RX ORDER — RISPERIDONE 0.5 MG/1
0.5 TABLET ORAL 2 TIMES DAILY
COMMUNITY
Start: 2024-03-15 | End: 2024-05-13

## 2024-03-25 NOTE — PROGRESS NOTES
C3 nurse spoke with Sangeetha June for a TCC post hospital discharge follow up call. The patient completed a virtual HOSFU appointment with Sam Garcia MD on 3/22/24, and she will see him again this week.     She is also scheduled to see Patito Jaquez MD (psych) on 4/25/24 @ 9:00am.

## 2024-04-02 ENCOUNTER — TELEPHONE (OUTPATIENT)
Dept: SURGERY | Facility: CLINIC | Age: 44
End: 2024-04-02
Payer: MEDICAID

## 2024-04-02 NOTE — TELEPHONE ENCOUNTER
----- Message from Cecelia Carcamo sent at 4/2/2024  8:41 AM CDT -----  Contact: Angelika/ Dr. Roseline Carney is calling to speak to the nurse to follow up on a message sent last Tuesday in reference to test results, please give her a call at      Thanks  LJ

## 2024-04-08 ENCOUNTER — TELEPHONE (OUTPATIENT)
Dept: SURGERY | Facility: CLINIC | Age: 44
End: 2024-04-08
Payer: MEDICAID

## 2024-04-08 NOTE — TELEPHONE ENCOUNTER
----- Message from Kristina Alvarez sent at 4/8/2024 12:42 PM CDT -----  Contact: jacqui Carney Is calling in regards to seeing if office has the original copy of patients myriad results or have any idea who ordered labs. Please call her back at [phone]. Thanks KB

## 2024-04-08 NOTE — TELEPHONE ENCOUNTER
Returned call bk to Angelika. Informed her that copy was all we had in our media in ref to pt's genetic testing. [facility] [place].

## 2024-05-13 ENCOUNTER — OFFICE VISIT (OUTPATIENT)
Dept: CARDIOLOGY | Facility: CLINIC | Age: 44
End: 2024-05-13
Payer: MEDICARE

## 2024-05-13 VITALS
BODY MASS INDEX: 38.17 KG/M2 | WEIGHT: 243.19 LBS | HEIGHT: 67 IN | DIASTOLIC BLOOD PRESSURE: 68 MMHG | OXYGEN SATURATION: 99 % | SYSTOLIC BLOOD PRESSURE: 92 MMHG | HEART RATE: 78 BPM

## 2024-05-13 DIAGNOSIS — E66.01 OBESITY, MORBID, BMI 40.0-49.9: ICD-10-CM

## 2024-05-13 DIAGNOSIS — R06.02 SOB (SHORTNESS OF BREATH): ICD-10-CM

## 2024-05-13 DIAGNOSIS — I95.9 HYPOTENSION, UNSPECIFIED HYPOTENSION TYPE: ICD-10-CM

## 2024-05-13 DIAGNOSIS — Z90.12 STATUS POST LEFT MASTECTOMY: ICD-10-CM

## 2024-05-13 DIAGNOSIS — R00.2 PALPITATIONS: ICD-10-CM

## 2024-05-13 DIAGNOSIS — G47.30 SLEEP APNEA, UNSPECIFIED TYPE: ICD-10-CM

## 2024-05-13 DIAGNOSIS — F41.1 GAD (GENERALIZED ANXIETY DISORDER): ICD-10-CM

## 2024-05-13 DIAGNOSIS — R07.9 CHEST PAIN, UNSPECIFIED TYPE: ICD-10-CM

## 2024-05-13 DIAGNOSIS — R06.09 DOE (DYSPNEA ON EXERTION): Primary | ICD-10-CM

## 2024-05-13 DIAGNOSIS — E78.49 OTHER HYPERLIPIDEMIA: ICD-10-CM

## 2024-05-13 DIAGNOSIS — I10 PRIMARY HYPERTENSION: ICD-10-CM

## 2024-05-13 DIAGNOSIS — R60.0 LOCALIZED EDEMA: ICD-10-CM

## 2024-05-13 DIAGNOSIS — Z86.73 HISTORY OF CVA (CEREBROVASCULAR ACCIDENT): ICD-10-CM

## 2024-05-13 DIAGNOSIS — Z85.3 HISTORY OF BREAST CANCER: ICD-10-CM

## 2024-05-13 PROCEDURE — 3078F DIAST BP <80 MM HG: CPT | Mod: CPTII,S$GLB,, | Performed by: INTERNAL MEDICINE

## 2024-05-13 PROCEDURE — 3061F NEG MICROALBUMINURIA REV: CPT | Mod: CPTII,S$GLB,, | Performed by: INTERNAL MEDICINE

## 2024-05-13 PROCEDURE — 3044F HG A1C LEVEL LT 7.0%: CPT | Mod: CPTII,S$GLB,, | Performed by: INTERNAL MEDICINE

## 2024-05-13 PROCEDURE — G2211 COMPLEX E/M VISIT ADD ON: HCPCS | Mod: S$GLB,,, | Performed by: INTERNAL MEDICINE

## 2024-05-13 PROCEDURE — 99999 PR PBB SHADOW E&M-EST. PATIENT-LVL IV: CPT | Mod: PBBFAC,,, | Performed by: INTERNAL MEDICINE

## 2024-05-13 PROCEDURE — 3074F SYST BP LT 130 MM HG: CPT | Mod: CPTII,S$GLB,, | Performed by: INTERNAL MEDICINE

## 2024-05-13 PROCEDURE — 99214 OFFICE O/P EST MOD 30 MIN: CPT | Mod: S$GLB,,, | Performed by: INTERNAL MEDICINE

## 2024-05-13 PROCEDURE — 1159F MED LIST DOCD IN RCRD: CPT | Mod: CPTII,S$GLB,, | Performed by: INTERNAL MEDICINE

## 2024-05-13 PROCEDURE — 3008F BODY MASS INDEX DOCD: CPT | Mod: CPTII,S$GLB,, | Performed by: INTERNAL MEDICINE

## 2024-05-13 PROCEDURE — 1160F RVW MEDS BY RX/DR IN RCRD: CPT | Mod: CPTII,S$GLB,, | Performed by: INTERNAL MEDICINE

## 2024-05-13 PROCEDURE — 3066F NEPHROPATHY DOC TX: CPT | Mod: CPTII,S$GLB,, | Performed by: INTERNAL MEDICINE

## 2024-05-13 RX ORDER — FLUDROCORTISONE ACETATE 0.1 MG/1
100 TABLET ORAL DAILY
Qty: 90 TABLET | Refills: 1 | Status: SHIPPED | OUTPATIENT
Start: 2024-05-13

## 2024-05-13 RX ORDER — MIDODRINE HYDROCHLORIDE 5 MG/1
5 TABLET ORAL
Qty: 180 TABLET | Refills: 1 | Status: SHIPPED | OUTPATIENT
Start: 2024-05-13 | End: 2025-05-13

## 2024-05-13 NOTE — PROGRESS NOTES
Subjective:   Patient ID:  Sangeetha June is a 44 y.o. female who presents for cardiac consult of Shortness of Breath and Fatigue      Referring Physician: Sam Garcia MD   [id number] [place] [address]    Reason for consult: AGUILAR      The patient came in today for cardiac consult of Shortness of Breath and Fatigue        Sangeetha June is a 44 y.o. female pt with HLD, HTN, morbid obesity, pre DM, h/o breast ca s/p chemo/mastectomy, ANGIE presents for follow up CV eval.     2/20/24  She was admitted to [facility] in Jan 2024 for hypotension sec to diarrhea. She had mult loose BMs with nausea, vomiting. BP was 80s/40s, received 2L IVF; She was at [facility] prior to this.   She was on midodrine as well in hosp. She is on Florinef now.   BP now is stable 114/80s. HR 70. BMI 38 - 243 lbs.     5/13/24  Pt admitted in March 2024 for SI at [facility].   ECHO 2/2024 with normal bi V function, atrial septal defect, overall normal valves.     She had recent eval with PCP Dr. Garcia - Patient has orthostatic hypotension, discussed with patient the increased frequency of falls that she is having, there is concern for her orthostatic hypotension being a component of this along with her neuropathy, patient is complaining of increased number of falls and inability to feel her feet, EMG recently was performed which showed significant neuropathy to bilateral lower extremities. I am very concerned that the increased risk of falls as well as her significant neuropathy and poor gait, all leading to a situation that puts the patient in potential future harm given patient's significant comorbidities.     BP is low 92/68. HR 70s. BMI 38  - 243 lbs  She has not taken Midodrine this am but will take it today.   She is on Mounjaro 15 mg.     FH - mother - MI in 40s - pt was 60s had dementia had CVA,     Results for orders placed during the hospital encounter of 02/27/24    Echo    Interpretation Summary    Left  Ventricle: There is normal systolic function with a visually estimated ejection fraction of 55 - 70%. Ejection fraction by visual approximation is 60%. There is normal diastolic function.    Right Ventricle: Normal right ventricular cavity size. Wall thickness is normal. Right ventricle wall motion  is normal. Systolic function is normal.    Left Atrium: Atrial septal defect present.  Atrial septum is bulging to the right.    IVC/SVC: Normal venous pressure at 3 mmHg.      Home Sleep Studies     Date/Time: 12/10/2021 8:00 AM  Performed by: Joshua Edwards MD  Authorized by: Evy Cid MD        1 night study  MILD OBSTRUCTIVE SLEEP APNEA with overall AHI 9.6/hr ( 66 events): night #1  Oxygen desaturation: < 70%. SpO2 between 90% to 94% for 28 min.  Patient snored 97% time above 50 .  Heart rate range: 45 bpm - 89 bpm  REC's:  Therapy with APAP at 4-20 cm WP using mask of choice with heated humidification is an option.  Weight loss/management. with regular exercise per direction of physician.  Avoid drowsy driving.  Follow up in sleep clinic to maximize adherence and ensure resolution of symptoms    Results for orders placed during the hospital encounter of 06/23/21    Nuclear Stress - Cardiology Interpreted    Interpretation Summary    Equivocal myocardial perfusion scan.    There is a moderate intensity, fixed defect consistent with scar in the apical wall(s). VS apical artifact    The gated perfusion images showed an ejection fraction of 60% at rest. The gated perfusion images showed an ejection fraction of 53% post stress.    The EKG portion of this study is negative for ischemia.    Results for orders placed during the hospital encounter of 06/23/21    Echo Color Flow Doppler? Yes    Interpretation Summary  · The left ventricle is normal in size with moderate concentric hypertrophy and normal systolic function.  · The estimated ejection fraction is 55%.  · Normal left ventricular diastolic  function.  · Normal right ventricular size with normal right ventricular systolic function.  · Normal central venous pressure (3 mmHg).  · The estimated PA systolic pressure is 31 mmHg.    HOLTER  Sinus rhythm with heart rates varying between 47 and 119 bpm with an average of 66 bpm.  There were very rare PVCs totalling 4 and averaging 0.04 per hour.  There were very rare PACs totalling 58 and averaging 0.6 per hour.      Past Medical History:   Diagnosis Date    Anxiety     Asthma 11/8/2022    Breast cancer     Chest pain     Chest pain 07/02/2021    Cholecystitis without calculus     Decreased ROM of left shoulder 02/15/2022    Diabetes mellitus     Dizziness     Elevated C-reactive protein (CRP)     Essential hypertension     Fall 10/11/2021    Memory change     EVERARDO (obstructive sleep apnea)     Osteoarthritis     Other specified disorders of thyroid     Pre-diabetes 04/15/2021    Chronic, Stable, cont metformin    Screening mammogram, encounter for 04/15/2021    Shoulder injury     SOB (shortness of breath)     Stroke     Thyroiditis     Tingling of left upper extremity 10/29/2021    Vision disturbance 04/30/2021    Weakness of shoulder 02/15/2022       Past Surgical History:   Procedure Laterality Date    ARTHROSCOPIC REPAIR OF ROTATOR CUFF OF SHOULDER Left 1/27/2022    Procedure: REPAIR, ROTATOR CUFF, ARTHROSCOPIC;  Surgeon: Francisco Mathews MD;  Location: [facility] OR;  Service: Orthopedics;  Laterality: Left;    ARTHROSCOPY OF SHOULDER WITH DECOMPRESSION OF SUBACROMIAL SPACE Left 1/27/2022    Procedure: ARTHROSCOPY, SHOULDER, WITH SUBACROMIAL SPACE DECOMPRESSION;  Surgeon: Francisco Mathews MD;  Location: [facility] OR;  Service: Orthopedics;  Laterality: Left;    BREAST BIOPSY      COLONOSCOPY N/A 5/31/2022    Procedure: COLONOSCOPY;  Surgeon: Km Odom MD;  Location: [facility] ENDO;  Service: Endoscopy;  Laterality: N/A;    ESOPHAGOGASTRODUODENOSCOPY N/A 5/31/2022    Procedure: EGD  (ESOPHAGOGASTRODUODENOSCOPY) ;  Surgeon: Km Odom MD;  Location: [facility] ENDO;  Service: Endoscopy;  Laterality: N/A;    FIXATION OF TENDON Left 1/27/2022    Procedure: FIXATION, TENDON;  Surgeon: Francisco Mathews MD;  Location: [facility] OR;  Service: Orthopedics;  Laterality: Left;    HYSTERECTOMY      INJECTION OF ANESTHETIC AGENT INTO SACROILIAC JOINT Bilateral 7/29/2022    Procedure: Bilateral SIJ +  Bilateral GT Bursa Injection RN IV Sedation;  Surgeon: Bisi Cochran MD;  Location: [facility] PAIN MGT;  Service: Pain Management;  Laterality: Bilateral;    INJECTION OF JOINT Left 11/12/2021    Procedure: Left suprascapular and axillary injection with local;  Surgeon: Bisi Cochran MD;  Location: [facility] PAIN MGT;  Service: Pain Management;  Laterality: Left;    INJECTION OF JOINT Bilateral 7/29/2022    Procedure: Bilateral SIJ + Bilateral GT Bursa Injection RN IV Sedation;  Surgeon: Bisi Cochran MD;  Location: [facility] PAIN MGT;  Service: Pain Management;  Laterality: Bilateral;    INSERTION OF BREAST TISSUE EXPANDER Left 11/15/2022    Procedure: INSERTION, TISSUE EXPANDER, BREAST;  Surgeon: Collin Pinto MD;  Location: [facility] OR;  Service: Plastics;  Laterality: Left;    MASTECTOMY      ROBOT-ASSISTED CHOLECYSTECTOMY USING DA ROBERTA XI N/A 8/29/2022    Procedure: XI ROBOTIC CHOLECYSTECTOMY;  Surgeon: Raeann Bhandari DO;  Location: [facility] OR;  Service: General;  Laterality: N/A;    TISSUE EXPANDER REMOVAL Left 1/4/2023    Procedure: REMOVAL, TISSUE EXPANDER;  Surgeon: Collin Pinto MD;  Location: [facility] OR;  Service: Plastics;  Laterality: Left;    TOTAL REDUCTION MAMMOPLASTY Right 11/15/2022    Procedure: MAMMOPLASTY, REDUCTION;  Surgeon: Collin Pinto MD;  Location: [facility] OR;  Service: Plastics;  Laterality: Right;       Social History     Tobacco Use    Smoking status: Never     Passive exposure: Current    Smokeless tobacco: Never   Substance Use Topics    Alcohol use: Not Currently    Drug use: Not  Currently     Types: Marijuana     Comment: none currently       Family History   Problem Relation Name Age of Onset    Colon cancer Mother Sara 53        partial colectomy    Stroke Mother Rosemary     Heart disease Mother Sara     Breast cancer Sister Shaneka     Diabetes Sister Shaneka     Throat cancer Maternal Aunt Radha         smoking hx    Lung cancer Maternal Uncle Nirmal         hx smoking    No Known Problems Maternal Grandmother Lima     Prostate cancer Maternal Grandfather Kevin 70    Alcohol abuse Maternal Grandfather Kevin     No Known Problems Paternal Grandmother      No Known Problems Paternal Grandfather      Diabetes Brother Felicity     Diabetes Brother Kevin     Diabetes Brother Wil     Breast cancer Other Justine 37        chemotherapy, planning for BL mastectomy    No Known Problems Other Nitin        Patient's Medications   New Prescriptions    No medications on file   Previous Medications    ASPIRIN (ECOTRIN) 81 MG EC TABLET    Take 1 tablet (81 mg total) by mouth once daily.    BUDESONIDE-FORMOTEROL 160-4.5 MCG (SYMBICORT) 160-4.5 MCG/ACTUATION HFAA    Inhale 2 puffs into the lungs every 12 (twelve) hours. Controller    BUSPIRONE (BUSPAR) 15 MG TABLET    Take 1 tablet (15 mg total) by mouth 3 (three) times daily.    CELECOXIB (CELEBREX) 200 MG CAPSULE    Take 1 capsule (200 mg total) by mouth 2 (two) times daily.    DICLOFENAC (VOLTAREN) 75 MG EC TABLET    Take 1 tablet (75 mg total) by mouth 2 (two) times daily as needed.    DULOXETINE (CYMBALTA) 30 MG CAPSULE    Take 1 capsule (30 mg total) by mouth once daily.    EUTHYROX 50 MCG TABLET    TAKE ONE TABLET BY MOUTH EVERY DAY    FOLIC ACID (FOLVITE) 1 MG TABLET    Take 1 tablet by mouth once daily.    GABAPENTIN (NEURONTIN) 300 MG CAPSULE    Take 1 capsule (300 mg total) by mouth 2 (two) times daily.    HYDROXYZINE PAMOATE (VISTARIL) 25 MG CAP    Take 25 mg by mouth every 8 (eight) hours as needed (anxiety).     LINACLOTIDE (LINZESS) 145 MCG CAP CAPSULE    Take 1 capsule (145 mcg total) by mouth once daily.    MAGNESIUM OXIDE (MAG-OX) 400 MG (241.3 MG MAGNESIUM) TABLET    Take 1 tablet (400 mg total) by mouth once daily.    MELOXICAM (MOBIC) 15 MG TABLET    Take 1 tablet by mouth every morning.    MOUNJARO 12.5 MG/0.5 ML PNIJ    Inject into the skin.    PANTOPRAZOLE (PROTONIX) 40 MG TABLET    Take 1 tablet (40 mg total) by mouth once daily.    POTASSIUM CHLORIDE SA (K-DUR,KLOR-CON) 20 MEQ TABLET        PRAVASTATIN (PRAVACHOL) 40 MG TABLET    Take 1 tablet (40 mg total) by mouth every evening.    RISPERIDONE (RISPERDAL) 0.5 MG TAB    Take 0.5 mg by mouth 2 (two) times daily.    SOLIFENACIN (VESICARE) 10 MG TABLET    Take 1 tablet (10 mg total) by mouth once daily.    TIRZEPATIDE (MOUNJARO) 15 MG/0.5 ML PNIJ    Inject 15 mg into the skin every 7 days    TOPIRAMATE (TOPAMAX) 25 MG TABLET    Take 25 mg by mouth 2 (two) times daily.    TRAZODONE (DESYREL) 150 MG TABLET    Take 1 tablet (150 mg total) by mouth every evening.    ZIPRASIDONE (GEODON) 40 MG CAP    Take 1 capsule (40 mg total) by mouth 2 (two) times daily.   Modified Medications    Modified Medication Previous Medication    FLUDROCORTISONE (FLORINEF) 0.1 MG TAB fludrocortisone (FLORINEF) 0.1 mg Tab       Take 1 tablet (100 mcg total) by mouth once daily.    Take 1 tablet (100 mcg total) by mouth once daily.    MIDODRINE (PROAMATINE) 5 MG TAB midodrine (PROAMATINE) 5 MG Tab       Take 1 tablet (5 mg total) by mouth every meal as needed (if BP is below 110/70).    Take 1 tablet (5 mg total) by mouth every meal as needed (if BP is below 110/70).   Discontinued Medications    No medications on file       Review of Systems   Constitutional: Negative.    HENT: Negative.     Eyes: Negative.    Respiratory:  Positive for shortness of breath.    Cardiovascular:  Positive for chest pain and palpitations.   Gastrointestinal:  Positive for heartburn.   Genitourinary: Negative.   "  Musculoskeletal: Negative.    Skin: Negative.    Neurological: Negative.    Endo/Heme/Allergies: Negative.    Psychiatric/Behavioral: Negative.     All 12 systems otherwise negative.      Wt Readings from Last 3 Encounters:   05/13/24 110.3 kg (243 lb 2.7 oz)   02/27/24 110.2 kg (243 lb)   02/20/24 110.3 kg (243 lb 2.7 oz)     Temp Readings from Last 3 Encounters:   11/06/23 99.3 °F (37.4 °C) (Tympanic)   08/09/23 97.4 °F (36.3 °C) (Temporal)   06/23/23 98 °F (36.7 °C) (Tympanic)     BP Readings from Last 3 Encounters:   05/13/24 92/68   02/27/24 114/82   02/20/24 114/82     Pulse Readings from Last 3 Encounters:   05/13/24 78   02/20/24 70   01/02/24 70       BP 92/68 (BP Location: Right arm, Patient Position: Sitting, BP Method: Medium (Manual))   Pulse 78   Ht 5' 7" (1.702 m)   Wt 110.3 kg (243 lb 2.7 oz)   LMP  (LMP Unknown)   SpO2 99%   BMI 38.09 kg/m²     Objective:   Physical Exam  Vitals and nursing note reviewed.   Constitutional:       General: She is not in acute distress.     Appearance: She is well-developed. She is obese. She is not diaphoretic.   HENT:      Head: Normocephalic and atraumatic.      Nose: Nose normal.   Eyes:      General: No scleral icterus.     Conjunctiva/sclera: Conjunctivae normal.   Neck:      Thyroid: No thyromegaly.      Vascular: No JVD.   Cardiovascular:      Rate and Rhythm: Normal rate and regular rhythm.      Heart sounds: S1 normal and S2 normal. No murmur heard.     No friction rub. No gallop. No S3 or S4 sounds.   Pulmonary:      Effort: Pulmonary effort is normal. No respiratory distress.      Breath sounds: Normal breath sounds. No stridor. No wheezing or rales.   Chest:      Chest wall: No tenderness.   Abdominal:      General: Bowel sounds are normal. There is no distension.      Palpations: Abdomen is soft. There is no mass.      Tenderness: There is no abdominal tenderness. There is no rebound.   Genitourinary:     Comments: Deferred  Musculoskeletal:       "   General: No tenderness or deformity. Normal range of motion.      Cervical back: Normal range of motion and neck supple.   Lymphadenopathy:      Cervical: No cervical adenopathy.   Skin:     General: Skin is warm and dry.      Coloration: Skin is not pale.      Findings: No erythema or rash.   Neurological:      Mental Status: She is alert and oriented to person, place, and time.      Motor: No abnormal muscle tone.      Coordination: Coordination normal.   Psychiatric:         Behavior: Behavior normal.         Thought Content: Thought content normal.         Judgment: Judgment normal.         Lab Results   Component Value Date     08/09/2023    K 3.1 (L) 08/09/2023     08/09/2023    CO2 27 08/09/2023    BUN 10 08/09/2023    CREATININE 1.1 08/09/2023     08/09/2023    HGBA1C 4.3 02/06/2024    HGBA1C 5.3 06/23/2023    MG 1.5 (L) 07/02/2021    AST 22 08/09/2023    ALT 21 08/09/2023    ALBUMIN 3.7 08/09/2023    PROT 7.4 08/09/2023    BILITOT 0.3 08/09/2023    WBC 4.85 08/09/2023    HGB 13.5 08/09/2023    HCT 40.4 08/09/2023    MCV 85 08/09/2023     08/09/2023    TSH 0.81 02/06/2024    TSH 1.209 06/23/2023    CHOL 148 07/19/2022    HDL 44 07/19/2022    LDLCALC 84.6 07/19/2022    TRIG 97 07/19/2022    BNP 15 10/29/2021     Assessment:      1. AGUILAR (dyspnea on exertion)    2. SOB (shortness of breath)    3. History of breast cancer    4. Palpitations    5. Primary hypertension    6. Chest pain, unspecified type    7. Localized edema    8. ANGIE (generalized anxiety disorder)    9. Obesity, morbid, BMI 40.0-49.9    10. Other hyperlipidemia    11. History of CVA (cerebrovascular accident)    12. Sleep apnea, unspecified type    13. Status post left mastectomy    14. Hypotension, unspecified hypotension type          Plan:     1. CP with AGUILAR with abnormal ECG with palpitations   - pharm nuclear stress test - neg 6/21  - f/u pulm has EVERARDO  - Holter - neg  - h/o D dimer elevated, neg CTA for PE in  past  - f/u pain -  will have injection by Dr. Cochran, proceed as tolerated   - ECHO 2/2024 with normal bi V function, atrial septal defect, overall normal valves.     2. HLD with h/o CVA  - cont statin and asa  - f/u neuro     3. Obesity BMI 45 - 274 --> 272 lbs lbs --> BMI 46 - 287 lbs  -->  BMI 38 - 243 lbs.   - cont weight loss    - cont tx  - discussed may need gastric sleeve    4. DM21c 6.4 --> 5.5 --> 6.3 --> 6.5 --> 5.4  --> 5.0   Cont tx - was on Trulicity and metformin  - is on Mounjaro 15 mg     5. ANGIE, depression  - cont tx per PCP and psych    7. HTN with edema - recently low BP sec to diarrhea with orthostasis   - DC HCTZ, cont Florinef, cont PRN midodrine   - LE u/s - neg  - is taking Florinef and/or Midodrine  - rec compression stocking therapy     7. H/o Breast CA, s/p R breast Surg  - cont tx/ fu with onc  - may need mastectomy in future     8. EVERARDO  - cont CPAP, doing well     9. GERD  - cont PPI  - s/p EGD - neg    10. Hypothyroidism TSH - 1.5 --> 1.4   - cont Euthyrox    Visit today included increased complexity associated with the care of the episodic problem hypotension addressed and managing the longitudinal care of the patient due to the serious and/or complex managed problem(s) .      Thank you for allowing me to participate in this patient's care. Please do not hesitate to contact me with any questions or concerns. Consult note has been forwarded to the referral physician.

## 2024-08-09 DIAGNOSIS — R06.09 DOE (DYSPNEA ON EXERTION): ICD-10-CM

## 2024-08-09 DIAGNOSIS — I10 PRIMARY HYPERTENSION: ICD-10-CM

## 2024-08-20 ENCOUNTER — TELEPHONE (OUTPATIENT)
Dept: CARDIOLOGY | Facility: CLINIC | Age: 44
End: 2024-08-20
Payer: MEDICARE

## 2024-08-20 NOTE — TELEPHONE ENCOUNTER
Called and spoke to pt. Pt states she's having severe chest pains and wanted to be seen today. Pt added to schedule.     ----- Message from Tiesha Singh sent at 8/20/2024 11:41 AM CDT -----  Regarding: same day  Contact: pt  Type:  Same Day Appointment Request    Caller is requesting a same day appointment.  Caller declined first available appointment listed below.      Name of Caller:  patient   When is the first available appointment?    Symptoms:  check pain  Best Call Back Number:  [phone]    Additional Information:     Seeking to be seen today call to be seen thanks

## 2024-10-16 ENCOUNTER — OFFICE VISIT (OUTPATIENT)
Dept: DIABETES | Facility: CLINIC | Age: 44
End: 2024-10-16
Payer: MEDICARE

## 2024-10-16 DIAGNOSIS — E11.9 TYPE 2 DIABETES MELLITUS WITHOUT COMPLICATION, WITHOUT LONG-TERM CURRENT USE OF INSULIN: Primary | ICD-10-CM

## 2024-10-16 PROCEDURE — 3066F NEPHROPATHY DOC TX: CPT | Mod: CPTII,95,, | Performed by: NURSE PRACTITIONER

## 2024-10-16 PROCEDURE — G2211 COMPLEX E/M VISIT ADD ON: HCPCS | Mod: 95,,, | Performed by: NURSE PRACTITIONER

## 2024-10-16 PROCEDURE — 3061F NEG MICROALBUMINURIA REV: CPT | Mod: CPTII,95,, | Performed by: NURSE PRACTITIONER

## 2024-10-16 PROCEDURE — 99214 OFFICE O/P EST MOD 30 MIN: CPT | Mod: 95,,, | Performed by: NURSE PRACTITIONER

## 2024-10-16 PROCEDURE — 3044F HG A1C LEVEL LT 7.0%: CPT | Mod: CPTII,95,, | Performed by: NURSE PRACTITIONER

## 2024-10-16 RX ORDER — METFORMIN HYDROCHLORIDE 500 MG/1
500 TABLET, EXTENDED RELEASE ORAL
Qty: 90 TABLET | Refills: 3 | Status: SHIPPED | OUTPATIENT
Start: 2024-10-16 | End: 2025-10-16

## 2024-10-16 NOTE — PATIENT INSTRUCTIONS
PATIENT INSTRUCTIONS     Labs ordered and will be scheduled by [facility] staff.      Continue Mounjaro 15 mg subcutaneously every 7 days.   Continue Glucophage  mg by mouth daily.     Blood Sugar Goals:       Fastin-130.       1-2 hours after a meal: Less than 180.

## 2024-10-16 NOTE — PROGRESS NOTES
The patient location is: Home  The chief complaint leading to consultation is: Diabetes    Visit type: audiovisual    Face to Face time with patient: 15  30 minutes of total time spent on the encounter, which includes face to face time and non-face to face time preparing to see the patient (eg, review of tests), Obtaining and/or reviewing separately obtained history, Documenting clinical information in the electronic or other health record, Independently interpreting results (not separately reported) and communicating results to the patient/family/caregiver, or Care coordination (not separately reported).  Each patient to whom he or she provides medical services by telemedicine is:  (1) informed of the relationship between the physician and patient and the respective role of any other health care provider with respect to management of the patient; and (2) notified that he or she may decline to receive medical services by telemedicine and may withdraw from such care at any time.   Sangeetha June is a 44 y.o. female with  has a past medical history of Anxiety, Asthma (11/8/2022), Breast cancer, Chest pain, Chest pain (07/02/2021), Cholecystitis without calculus, Decreased ROM of left shoulder (02/15/2022), Diabetes mellitus, Dizziness, Elevated C-reactive protein (CRP), Essential hypertension, Fall (10/11/2021), Memory change, EVERARDO (obstructive sleep apnea), Osteoarthritis, Other specified disorders of thyroid, Pre-diabetes (04/15/2021), Screening mammogram, encounter for (04/15/2021), Shoulder injury, SOB (shortness of breath), Stroke, Thyroiditis, Tingling of left upper extremity (10/29/2021), Vision disturbance (04/30/2021), and Weakness of shoulder (02/15/2022). who presents for a follow up evaluation of Type 2 diabetes mellitus.     CHIEF COMPLAINT: Diabetes Consultation    PCP: Sam Garcia MD      Initial visit with me - 4/3/2023    The patient was initially diagnosed with diabetes  for 2 years.  "     Previous failed treatments include:  none     Social Documentation:  Patient lives in [place] with Boni.   Occupation: Disabled.  Exercise: No formal exercise.   Diet: "very poor", does not cook. Boni cooks her meals for her.       Diabetes related complications:   cerebrovascular disease.   denies Pancreatitis  denies Gastroparesis  denies DKA  denies Hx/family Hx of MEN2/MTC  denies Frequent UTIs/yeast infections     Cardiovascular Risk Factors: dyslipidemia, hypertension, obesity (BMI >30 kg/m2), and sedentary lifestyle.      Diabetes Medications                    tirzepatide (MOUNJARO) 15 mg/0.5 mL PnIj Inject 15 mg into the skin every 7 days     Current monitoring regimen: capillary blood glucose monitoring with finger sticks.    Recent hypoglycemic episodes: No.   Patient compliant with glucose checks and medication administration? Yes    DIABETES MANAGEMENT STATUS  Statin: Taking  ACE/ARB: Not taking  Screening or Prevention Patient's value Goal Complete/Controlled?   HgA1C Testing and Control   Lab Results   Component Value Date    HGBA1C 5.0 05/17/2024      Annually/Less than 8% Yes   Lipid profile : 05/17/2024 Annually Yes   LDL control Lab Results   Component Value Date    LDLCALC 84.6 07/19/2022    Annually/Less than 100 mg/dl  Yes   Nephropathy screening Lab Results   Component Value Date    LABMICR 5.0 06/23/2023     No results found for: "PROTEINUA"  No results found for: "UTPCR"   Annually Yes   Blood pressure BP Readings from Last 1 Encounters:   05/13/24 92/68    Less than 140/90 Yes   Dilated retinal exam : 03/07/2024 Annually Yes   Foot exam   : 09/14/2023 Annually Yes   Patient's medications, allergies, surgical, social and family histories were reviewed and updated as appropriate.     Review of Systems   Constitutional:  Negative for weight loss.   Eyes:  Negative for blurred vision and double vision.   Cardiovascular:  Negative for chest pain.   Gastrointestinal:  Negative for nausea " "and vomiting.   Genitourinary:  Negative for frequency.   Musculoskeletal:  Negative for falls.   Neurological:  Negative for dizziness and weakness.   Endo/Heme/Allergies:  Negative for polydipsia.   Psychiatric/Behavioral:  Negative for depression.    All other systems reviewed and are negative.       Physical Exam  Constitutional:       Appearance: Normal appearance.   HENT:      Head: Normocephalic and atraumatic.   Pulmonary:      Effort: No respiratory distress.   Musculoskeletal:      Cervical back: Normal range of motion.   Neurological:      Mental Status: She is alert and oriented to person, place, and time.   Psychiatric:         Mood and Affect: Mood normal.         Behavior: Behavior normal.        There were no vitals taken for this visit.  Wt Readings from Last 3 Encounters:   05/13/24 110.3 kg (243 lb 2.7 oz)   02/27/24 110.2 kg (243 lb)   02/20/24 110.3 kg (243 lb 2.7 oz)       LAB REVIEW  Lab Results   Component Value Date     08/09/2023    K 3.1 (L) 08/09/2023     08/09/2023    CO2 27 08/09/2023    BUN 10 08/09/2023    CREATININE 1.1 08/09/2023    CALCIUM 9.7 08/09/2023    ANIONGAP 12 08/09/2023    EGFRNORACEVR >60 08/09/2023     No results found for: "CPEPTIDE", "GLUTAMICACID", "INSLNABS"  Hemoglobin A1C   Date Value Ref Range Status   05/17/2024 5.0 4.2 - 5.8 % Final   02/06/2024 4.3 4.2 - 5.8 % Final   06/23/2023 5.3 4.0 - 5.6 % Final     Comment:     ADA Screening Guidelines:  5.7-6.4%  Consistent with prediabetes  >or=6.5%  Consistent with diabetes    High levels of fetal hemoglobin interfere with the HbA1C  assay. Heterozygous hemoglobin variants (HbS, HgC, etc)do  not significantly interfere with this assay.   However, presence of multiple variants may affect accuracy.     02/20/2023 5.0 4.0 - 5.6 % Final     Comment:     ADA Screening Guidelines:  5.7-6.4%  Consistent with prediabetes  >or=6.5%  Consistent with diabetes    High levels of fetal hemoglobin interfere with the " HbA1C  assay. Heterozygous hemoglobin variants (HbS, HgC, etc)do  not significantly interfere with this assay.   However, presence of multiple variants may affect accuracy.     10/21/2022 5.1 4.0 - 5.6 % Final     Comment:     ADA Screening Guidelines:  5.7-6.4%  Consistent with prediabetes  >or=6.5%  Consistent with diabetes    High levels of fetal hemoglobin interfere with the HbA1C  assay. Heterozygous hemoglobin variants (HbS, HgC, etc)do  not significantly interfere with this assay.   However, presence of multiple variants may affect accuracy.          ASSESSMENT    ICD-10-CM ICD-9-CM   1. Type 2 diabetes mellitus without complication, without long-term current use of insulin  E11.9 250.00       PLAN  Diagnoses and all orders for this visit:    Type 2 diabetes mellitus without complication, without long-term current use of insulin  -     metFORMIN (GLUCOPHAGE-XR) 500 MG ER 24hr tablet; Take 1 tablet (500 mg total) by mouth daily with breakfast.  -     Hemoglobin A1C; Future  -     Basic Metabolic Panel; Future  -     Microalbumin/Creatinine Ratio, Urine; Future        Reviewed pathophysiology of diabetes, complications related to the disease, importance of annual dilated eye exam and daily foot examination. Explained MOA, SE, dosage of medications. Written instructions given and reviewed with patient and patient verbalizes understanding.     4/3/2023 Initial visit: was doing well on trulicity 3 mg, but then was not able to get it due to backorder and has not taken in 2 months. Will restart at 0.75, f/u 6 weeks.     5/15/2023 - patient doing well, has [facility] supplies, will need to get with [facility] team for set up. Doing well with trulicity. F/u 3 months.     8/15/2023 - patient started on Mounjaro by PCP, doing well, has lost weight. Will increase to 10 mg with next dose. F/u 3 months.    11/15/2023 - reports doing well on Mounjaro. Will follow up in 3 months with A1c.     2/20/2024 - A1c remains at goal,  doing well.     10/16/2024 - patient doing well, no c/o. On max mounjaro, reports glucoses remain well controlled.      PATIENT INSTRUCTIONS     Labs ordered and will be scheduled by [facility] staff.      Continue Mounjaro 15 mg subcutaneously every 7 days.   Continue Glucophage  mg by mouth daily.     Blood Sugar Goals:       Fastin-130.       1-2 hours after a meal: Less than 180.     Follow up in about 6 months (around 2025) for No Device, Schedule fasting labs.    Portions of this note were prepared with [employer] voice recognition transcription software. Grammatical errors, including garbled syntax, mangle pronouns, and other bizarre constructions may be attributed to that software system.

## 2024-11-13 ENCOUNTER — OFFICE VISIT (OUTPATIENT)
Dept: HOME HEALTH SERVICES | Facility: CLINIC | Age: 44
End: 2024-11-13
Payer: MEDICARE

## 2024-11-13 VITALS
WEIGHT: 281 LBS | DIASTOLIC BLOOD PRESSURE: 76 MMHG | TEMPERATURE: 97 F | HEIGHT: 67 IN | BODY MASS INDEX: 44.1 KG/M2 | SYSTOLIC BLOOD PRESSURE: 121 MMHG | HEART RATE: 81 BPM | OXYGEN SATURATION: 98 %

## 2024-11-13 DIAGNOSIS — D50.9 MICROCYTIC ANEMIA: ICD-10-CM

## 2024-11-13 DIAGNOSIS — E66.01 OBESITY, MORBID, BMI 40.0-49.9: ICD-10-CM

## 2024-11-13 DIAGNOSIS — E78.2 DM TYPE 2 WITH DIABETIC MIXED HYPERLIPIDEMIA: ICD-10-CM

## 2024-11-13 DIAGNOSIS — Z74.09 OTHER REDUCED MOBILITY: ICD-10-CM

## 2024-11-13 DIAGNOSIS — Z12.31 ENCOUNTER FOR SCREENING MAMMOGRAM FOR MALIGNANT NEOPLASM OF BREAST: ICD-10-CM

## 2024-11-13 DIAGNOSIS — Z13.31 POSITIVE DEPRESSION SCREENING: ICD-10-CM

## 2024-11-13 DIAGNOSIS — E03.9 HYPOTHYROIDISM, UNSPECIFIED TYPE: ICD-10-CM

## 2024-11-13 DIAGNOSIS — M17.0 OSTEOARTHRITIS OF BOTH KNEES, UNSPECIFIED OSTEOARTHRITIS TYPE: ICD-10-CM

## 2024-11-13 DIAGNOSIS — F25.9 SCHIZOAFFECTIVE DISORDER, UNSPECIFIED TYPE: ICD-10-CM

## 2024-11-13 DIAGNOSIS — G89.29 OTHER CHRONIC PAIN: ICD-10-CM

## 2024-11-13 DIAGNOSIS — R32 URINARY INCONTINENCE, UNSPECIFIED TYPE: ICD-10-CM

## 2024-11-13 DIAGNOSIS — F31.9 BIPOLAR AFFECTIVE DISORDER, REMISSION STATUS UNSPECIFIED: ICD-10-CM

## 2024-11-13 DIAGNOSIS — E11.9 DIABETES MELLITUS WITH COINCIDENT HYPERTENSION: ICD-10-CM

## 2024-11-13 DIAGNOSIS — E11.42 DIABETIC POLYNEUROPATHY ASSOCIATED WITH TYPE 2 DIABETES MELLITUS: ICD-10-CM

## 2024-11-13 DIAGNOSIS — I63.9 CEREBROVASCULAR ACCIDENT (CVA), UNSPECIFIED MECHANISM: ICD-10-CM

## 2024-11-13 DIAGNOSIS — G40.909 NONINTRACTABLE EPILEPSY WITHOUT STATUS EPILEPTICUS, UNSPECIFIED EPILEPSY TYPE: ICD-10-CM

## 2024-11-13 DIAGNOSIS — I10 DIABETES MELLITUS WITH COINCIDENT HYPERTENSION: ICD-10-CM

## 2024-11-13 DIAGNOSIS — Z00.00 ENCOUNTER FOR PREVENTIVE HEALTH EXAMINATION: Primary | ICD-10-CM

## 2024-11-13 DIAGNOSIS — I15.2 OBESITY, DIABETES, AND HYPERTENSION SYNDROME: ICD-10-CM

## 2024-11-13 DIAGNOSIS — K21.9 GASTROESOPHAGEAL REFLUX DISEASE, UNSPECIFIED WHETHER ESOPHAGITIS PRESENT: ICD-10-CM

## 2024-11-13 DIAGNOSIS — K59.04 CHRONIC IDIOPATHIC CONSTIPATION: ICD-10-CM

## 2024-11-13 DIAGNOSIS — G47.09 OTHER INSOMNIA: ICD-10-CM

## 2024-11-13 DIAGNOSIS — J45.909 ASTHMA, UNSPECIFIED ASTHMA SEVERITY, UNSPECIFIED WHETHER COMPLICATED, UNSPECIFIED WHETHER PERSISTENT: ICD-10-CM

## 2024-11-13 DIAGNOSIS — Z00.00 ENCOUNTER FOR MEDICARE ANNUAL WELLNESS EXAM: ICD-10-CM

## 2024-11-13 DIAGNOSIS — I73.9 CLAUDICATION: ICD-10-CM

## 2024-11-13 DIAGNOSIS — E11.59 OBESITY, DIABETES, AND HYPERTENSION SYNDROME: ICD-10-CM

## 2024-11-13 DIAGNOSIS — E11.69 DM TYPE 2 WITH DIABETIC MIXED HYPERLIPIDEMIA: ICD-10-CM

## 2024-11-13 DIAGNOSIS — G47.33 OSA (OBSTRUCTIVE SLEEP APNEA): ICD-10-CM

## 2024-11-13 DIAGNOSIS — E11.69 OBESITY, DIABETES, AND HYPERTENSION SYNDROME: ICD-10-CM

## 2024-11-13 DIAGNOSIS — E66.9 OBESITY, DIABETES, AND HYPERTENSION SYNDROME: ICD-10-CM

## 2024-11-13 DIAGNOSIS — C50.112 MALIGNANT NEOPLASM OF CENTRAL PORTION OF LEFT FEMALE BREAST, UNSPECIFIED ESTROGEN RECEPTOR STATUS: ICD-10-CM

## 2024-11-13 DIAGNOSIS — R41.3 MEMORY DEFICIT: ICD-10-CM

## 2024-11-13 PROBLEM — C50.919 MALIGNANT NEOPLASM OF BREAST ASSOCIATED WITH MUTATION IN TP53 GENE: Status: ACTIVE | Noted: 2021-05-09

## 2024-11-13 PROBLEM — M17.10 ARTHRITIS OF KNEE: Status: ACTIVE | Noted: 2024-05-17

## 2024-11-13 PROBLEM — Z15.09 MALIGNANT NEOPLASM OF BREAST ASSOCIATED WITH MUTATION IN TP53 GENE: Status: ACTIVE | Noted: 2021-05-09

## 2024-11-13 PROBLEM — E78.5 HYPERLIPIDEMIA: Status: RESOLVED | Noted: 2021-04-15 | Resolved: 2024-11-13

## 2024-11-13 PROBLEM — Z15.02 MALIGNANT NEOPLASM OF BREAST ASSOCIATED WITH MUTATION IN TP53 GENE: Status: ACTIVE | Noted: 2021-05-09

## 2024-11-13 PROBLEM — T45.1X5A CHEMOTHERAPY ADVERSE REACTION: Status: RESOLVED | Noted: 2021-04-15 | Resolved: 2024-11-13

## 2024-11-13 PROBLEM — G56.00 CARPAL TUNNEL SYNDROME: Status: ACTIVE | Noted: 2024-10-05

## 2024-11-13 PROBLEM — Z15.89 MALIGNANT NEOPLASM OF BREAST ASSOCIATED WITH MUTATION IN TP53 GENE: Status: ACTIVE | Noted: 2021-05-09

## 2024-11-13 PROBLEM — R10.9 ABDOMINAL PAIN: Status: RESOLVED | Noted: 2021-10-29 | Resolved: 2024-11-13

## 2024-11-13 RX ORDER — NYSTATIN 100000 [USP'U]/ML
5 SUSPENSION ORAL 4 TIMES DAILY
COMMUNITY
Start: 2024-10-14

## 2024-11-13 RX ORDER — MONTELUKAST SODIUM 10 MG/1
10 TABLET ORAL NIGHTLY
COMMUNITY
Start: 2024-10-23

## 2024-11-13 RX ORDER — DULOXETIN HYDROCHLORIDE 60 MG/1
60 CAPSULE, DELAYED RELEASE ORAL EVERY MORNING
COMMUNITY
Start: 2024-10-28

## 2024-11-13 RX ORDER — CALCIUM CITRATE/VITAMIN D3 200MG-6.25
TABLET ORAL 2 TIMES DAILY PRN
COMMUNITY

## 2024-11-13 RX ORDER — ALBUTEROL SULFATE 90 UG/1
2 INHALANT RESPIRATORY (INHALATION) EVERY 4 HOURS PRN
COMMUNITY
Start: 2024-05-03 | End: 2025-05-03

## 2024-11-13 NOTE — PROGRESS NOTES
"Sangeetha June presented for an initial Medicare AWV today. The following components were reviewed and updated:    Medical history  Family History  Social history  Allergies and Current Medications  Health Risk Assessment  Health Maintenance  Care Team    **See Completed Assessments for Annual Wellness visit with in the encounter summary    The following assessments were completed:  Depression Screening  Cognitive function Screening    Timed Get Up Test  Whisper Test      Opioid documentation:      Patient does not have a current opioid prescription.          Vitals:    11/13/24 1356   BP: 121/76   Pulse: 81   Temp: 97 °F (36.1 °C)   TempSrc: Temporal   SpO2: 98%   Weight: 127.5 kg (281 lb)   Height: 5' 7" (1.702 m)     Body mass index is 44.01 kg/m².       Physical Exam  Constitutional:       Appearance: She is obese.   HENT:      Mouth/Throat:      Mouth: Mucous membranes are moist.   Cardiovascular:      Rate and Rhythm: Normal rate and regular rhythm.      Pulses: Normal pulses.           Dorsalis pedis pulses are 2+ on the right side and 2+ on the left side.        Posterior tibial pulses are 2+ on the right side and 2+ on the left side.      Heart sounds: Normal heart sounds.   Pulmonary:      Effort: Pulmonary effort is normal.      Breath sounds: Normal breath sounds.   Feet:      Right foot:      Protective Sensation: 10 sites tested.  10 sites sensed.      Skin integrity: No ulcer, blister or skin breakdown.      Toenail Condition: Right toenails are normal.      Left foot:      Protective Sensation: 10 sites tested.  9 sites sensed.      Skin integrity: No ulcer, blister or skin breakdown.      Toenail Condition: Left toenails are normal.   Skin:     General: Skin is warm and dry.   Neurological:      General: No focal deficit present.      Mental Status: She is alert and oriented to person, place, and time.   Psychiatric:         Mood and Affect: Mood normal.         Behavior: Behavior normal. "           Diagnoses and health risks identified today and associated recommendations/orders:  1. Encounter for preventive health examination  2. Encounter for Medicare annual wellness exam  Medicare awv complete. Health maintenance:  flu and updated covid 19 vaccines due-encouraged pt to obtain at a pharmacy.  Foot exam done today. Mammogram ordered and message sent to MA to call pt to schedule.   - Ambulatory Referral/Consult to Enhanced Annual Wellness Visit (eAWV)    3. Malignant neoplasm of central portion of left female breast, unspecified estrogen receptor status  MASTECTOMY Left 01/24/2020   US GUIDED BIOPSY BREAST LEFT Left 06/02/2020   US GUIDED BIOPSY BREAST LEFT    Encounter for screening mammogram for malignant neoplasm of breast  - Mammo Digital Screening Bilat; Future    4. Schizoaffective disorder, unspecified type   Bipolar affective disorder, remission status unspecified   Positive depression screening  Chronic and stable. Continue current management. Geodon, cymbalta.  Pt denies any thoughts of suicide. F/u with [facility] three times a week.     5. Nonintractable epilepsy without status epilepticus, unspecified epilepsy type  Chronic and stable. Continue current management. Topamax. See med list. Follow up with PCP.     6. Claudication  Stable. Continue aspirin and pravastatin. F/u with pcp.     7. Diabetes mellitus with coincident hypertension  Diabetic polyneuropathy associated with type 2 diabetes mellitus  Controlled. Continue current management. See med list. Patient states she checks her bs once a week. Continue mounjaro and metformin. Continue gabapentin for neuropathy. Reviewed diabetic diet, diabetic foot care, preferred BS, and HgbA1C levels. Follow up with your PCP as instructed, podiatry and ophthalmology yearly.      9. Obesity, morbid, BMI 40.0-49.9  Chronic. Recommend diet and exercise to lose weight. Follow up with your PCP as planned to discuss adjustments to your  treatment plan.      10. DM type 2 with diabetic mixed hyperlipidemia  Lab Results   Component Value Date    CHOL 148 07/19/2022    CHOL 134 04/30/2021    CHOL 194 03/09/2021     Lab Results   Component Value Date    HDL 44 07/19/2022    HDL 43 04/30/2021    HDL 42 03/09/2021     Lab Results   Component Value Date    LDLCALC 84.6 07/19/2022    LDLCALC 67.2 04/30/2021    LDLCALC 137 (H) 03/09/2021     Lab Results   Component Value Date    TRIG 97 07/19/2022    TRIG 119 04/30/2021    TRIG 84 03/09/2021       Lab Results   Component Value Date    CHOLHDL 29.7 07/19/2022    CHOLHDL 32.1 04/30/2021    Chronic, Stable, Continue pravastatin 40 mg. F/u with pcp.     16. Memory deficit  Prior cva. No issues. Continue current aspirin and statin.  F/u with pcp.     18. Asthma, unspecified asthma severity, unspecified whether complicated, unspecified whether persistent  Chronic and stable.  No acute issues. Continue current management. Albuterol and symbicort. See med list above. Follow up with pulmonology.      19. Urinary incontinence, unspecified type  Chronic and stable. Continue current management. Vesicare. See med list. Follow up with PCP.     21. Other reduced mobility  Chronic. Fall precautions recommended and discussed. Follow up with PCP.      22. Hypothyroidism, unspecified type  Lab Results   Component Value Date    TSH 0.12 (L) 06/20/2024    Chronic, Stable, Continue synthroid 75mcg. F/u with pcp.     23. Gastroesophageal reflux disease, unspecified whether esophagitis present  Chronic and stable. Continue current management. Protonix. See med list. Follow up with PCP.     24. Chronic idiopathic constipation  Chronic and stable. Continue current management. Linzess. See med list. Follow up with PCP.     26. EVERARDO (obstructive sleep apnea)  Chronic and stable. No acute issues. Continue current management. See med list above. Follow up with pulmonology.      27. Other insomnia  Chronic and stable. Continue current  management. Trazodone. See med list. Follow up with PCP.       Provided Sangeetha with a 5-10 year written screening schedule and personal prevention plan. Recommendations were developed using the USPSTF age appropriate recommendations. Education, counseling, and referrals were provided as needed.  After Visit Summary printed and given to patient which includes a list of additional screenings\tests needed.    Follow up in about 1 year (around 11/13/2025) for annual wellness visit.      Anjelica Colon, MOLLY      I have used clinical judgement based on duration and functional status to consider definite necessity for treatment.   I offered to discuss advanced care planning, including how to pick a person who would make decisions for you if you were unable to make them for yourself, called a health care power of , and what kind of decisions you might make such as use of life sustaining treatments such as ventilators and tube feeding when faced with a life limiting illness recorded on a living will that they will need to know. (How you want to be cared for as you near the end of your natural life)     X Patient is interested in learning more about how to make advanced directives.  I provided them paperwork and offered to discuss this with them.

## 2024-11-13 NOTE — PATIENT INSTRUCTIONS
Counseling and Referral of Other Preventative  (Italic type indicates deductible and co-insurance are waived)    Patient Name: Sangeetha June  Today's Date: 11/13/2024    Health Maintenance       Date Due Completion Date    COVID-19 Vaccine (3 - Moderna risk series) 10/26/2021 9/28/2021    Diabetes Urine Screening 06/23/2024 6/23/2023    Influenza Vaccine (1) 09/01/2024 11/6/2023    Foot Exam 09/14/2024 9/14/2023    Mammogram 11/13/2024 11/13/2023    Hemoglobin A1c 11/17/2024 5/17/2024    Eye Exam 03/07/2025 3/7/2024    Lipid Panel 05/17/2025 5/17/2024    High Dose Statin 11/13/2025 11/13/2024    Pneumococcal Vaccines (Age 0-64) (3 of 3 - PPSV23 or PCV20) 07/02/2026 7/2/2021    TETANUS VACCINE 04/15/2031 4/15/2021    Colorectal Cancer Screening 03/14/2034 3/14/2024    RSV Vaccine (Age 60+ and Pregnant patients) (1 - 1-dose 75+ series) 01/08/2053 ---        No orders of the defined types were placed in this encounter.    The following information is provided to all patients.  This information is to help you find resources for any of the problems found today that may be affecting your health:                  Living healthy guide: www.[facility].[place].[facility]      Understanding Diabetes: www.diabetes.org      Eating healthy: www.cdc.gov/healthyweight      CDC home safety checklist: www.cdc.gov/steadi/patient.html      Agency on Aging: www.goea.[place].[facility]      Alcoholics anonymous (AA): www.aa.org      Physical Activity: www.ruth.nih.gov/mf7pnwc      Tobacco use: www.quitwithusla.org

## 2024-11-14 ENCOUNTER — PATIENT MESSAGE (OUTPATIENT)
Dept: INTERNAL MEDICINE | Facility: CLINIC | Age: 44
End: 2024-11-14
Payer: MEDICARE

## 2024-11-15 ENCOUNTER — TELEPHONE (OUTPATIENT)
Dept: ADMINISTRATIVE | Facility: CLINIC | Age: 44
End: 2024-11-15
Payer: MEDICARE

## 2024-11-15 NOTE — TELEPHONE ENCOUNTER
----- Message from MOLLY Cuevas sent at 11/13/2024  2:53 PM CST -----  Please call pt to schedule mammogram.

## 2024-11-21 ENCOUNTER — TELEPHONE (OUTPATIENT)
Dept: ADMINISTRATIVE | Facility: CLINIC | Age: 44
End: 2024-11-21
Payer: MEDICARE

## 2024-11-21 NOTE — TELEPHONE ENCOUNTER
Called to schedule mammogram but pt states she already had at [facility] [place]

## 2025-02-19 DIAGNOSIS — R01.1 MURMUR: Primary | ICD-10-CM

## 2025-02-19 DIAGNOSIS — I10 DIABETES MELLITUS WITH COINCIDENT HYPERTENSION: ICD-10-CM

## 2025-02-19 DIAGNOSIS — E11.9 DIABETES MELLITUS WITH COINCIDENT HYPERTENSION: ICD-10-CM

## 2025-02-20 ENCOUNTER — PATIENT MESSAGE (OUTPATIENT)
Dept: CARDIOLOGY | Facility: HOSPITAL | Age: 45
End: 2025-02-20
Payer: MEDICARE

## 2025-02-20 ENCOUNTER — OFFICE VISIT (OUTPATIENT)
Dept: CARDIOLOGY | Facility: CLINIC | Age: 45
End: 2025-02-20
Payer: MEDICARE

## 2025-02-20 ENCOUNTER — HOSPITAL ENCOUNTER (OUTPATIENT)
Dept: CARDIOLOGY | Facility: HOSPITAL | Age: 45
Discharge: HOME | End: 2025-02-20
Attending: INTERNAL MEDICINE
Payer: MEDICARE

## 2025-02-20 VITALS
HEIGHT: 67 IN | SYSTOLIC BLOOD PRESSURE: 130 MMHG | WEIGHT: 293 LBS | HEART RATE: 75 BPM | DIASTOLIC BLOOD PRESSURE: 72 MMHG | BODY MASS INDEX: 45.99 KG/M2

## 2025-02-20 DIAGNOSIS — R00.2 PALPITATIONS: ICD-10-CM

## 2025-02-20 DIAGNOSIS — I10 PRIMARY HYPERTENSION: Primary | ICD-10-CM

## 2025-02-20 DIAGNOSIS — E11.9 DIABETES MELLITUS WITH COINCIDENT HYPERTENSION: ICD-10-CM

## 2025-02-20 DIAGNOSIS — E78.49 OTHER HYPERLIPIDEMIA: ICD-10-CM

## 2025-02-20 DIAGNOSIS — Z90.12 STATUS POST LEFT MASTECTOMY: ICD-10-CM

## 2025-02-20 DIAGNOSIS — Z85.3 HISTORY OF BREAST CANCER: ICD-10-CM

## 2025-02-20 DIAGNOSIS — Z86.73 HISTORY OF CVA (CEREBROVASCULAR ACCIDENT): ICD-10-CM

## 2025-02-20 DIAGNOSIS — R60.0 LOCALIZED EDEMA: ICD-10-CM

## 2025-02-20 DIAGNOSIS — R07.9 CHEST PAIN, UNSPECIFIED TYPE: ICD-10-CM

## 2025-02-20 DIAGNOSIS — F41.1 GAD (GENERALIZED ANXIETY DISORDER): ICD-10-CM

## 2025-02-20 DIAGNOSIS — R01.1 MURMUR: ICD-10-CM

## 2025-02-20 DIAGNOSIS — G47.30 SLEEP APNEA, UNSPECIFIED TYPE: ICD-10-CM

## 2025-02-20 DIAGNOSIS — I10 DIABETES MELLITUS WITH COINCIDENT HYPERTENSION: ICD-10-CM

## 2025-02-20 DIAGNOSIS — E66.01 OBESITY, MORBID, BMI 40.0-49.9: ICD-10-CM

## 2025-02-20 DIAGNOSIS — R06.02 SOB (SHORTNESS OF BREATH): ICD-10-CM

## 2025-02-20 DIAGNOSIS — E78.5 HYPERLIPIDEMIA, UNSPECIFIED HYPERLIPIDEMIA TYPE: ICD-10-CM

## 2025-02-20 DIAGNOSIS — R06.09 DOE (DYSPNEA ON EXERTION): ICD-10-CM

## 2025-02-20 DIAGNOSIS — I95.9 HYPOTENSION, UNSPECIFIED HYPOTENSION TYPE: ICD-10-CM

## 2025-02-20 LAB
OHS QRS DURATION: 86 MS
OHS QTC CALCULATION: 406 MS

## 2025-02-20 PROCEDURE — 93005 ELECTROCARDIOGRAM TRACING: CPT

## 2025-02-20 PROCEDURE — 99999 PR PBB SHADOW E&M-EST. PATIENT-LVL IV: CPT | Mod: PBBFAC,,, | Performed by: INTERNAL MEDICINE

## 2025-02-20 RX ORDER — AMLODIPINE BESYLATE 5 MG/1
5 TABLET ORAL DAILY
COMMUNITY
Start: 2025-02-03 | End: 2025-08-02

## 2025-02-20 NOTE — PROGRESS NOTES
Subjective:   Patient ID:  Sangeetha June is a 45 y.o. female who presents for cardiac consult of No chief complaint on file.      Referring Physician: Sam Garcia MD   [id number] [place] [address]    Reason for consult: AGUILAR      The patient came in today for cardiac consult of No chief complaint on file.        Sangeetha June is a 45 y.o. female pt with HLD, HTN, morbid obesity, pre DM, h/o breast ca s/p chemo/mastectomy, ANGIE presents for follow up CV eval.         5/13/24  Pt admitted in March 2024 for SI at [facility].   ECHO 2/2024 with normal bi V function, atrial septal defect, overall normal valves.     She had recent eval with PCP Dr. Garcia - Patient has orthostatic hypotension, discussed with patient the increased frequency of falls that she is having, there is concern for her orthostatic hypotension being a component of this along with her neuropathy, patient is complaining of increased number of falls and inability to feel her feet, EMG recently was performed which showed significant neuropathy to bilateral lower extremities. I am very concerned that the increased risk of falls as well as her significant neuropathy and poor gait, all leading to a situation that puts the patient in potential future harm given patient's significant comorbidities.     BP is low 92/68. HR 70s. BMI 38  - 243 lbs  She has not taken Midodrine this am but will take it today.   She is on Mounjaro 15 mg.     2/20/25  Pt had eval with Dr. Garcia earlier this month, labs are pending.   She went to GI and s/p EGD/Cscope - for surveillance - Li Fraumeni syndrome.   She feels more anxiety at times.   BP and HR stable. BMI 45 - 293 lbs   She has restarted Mounjaro 1 month ago.   ECG - NSR     FH - mother - MI in 40s - pt was 60s had dementia had CVA,     Results for orders placed during the hospital encounter of 02/27/24    Echo    Interpretation Summary    Left Ventricle: There is normal systolic  function with a visually estimated ejection fraction of 55 - 70%. Ejection fraction by visual approximation is 60%. There is normal diastolic function.    Right Ventricle: Normal right ventricular cavity size. Wall thickness is normal. Right ventricle wall motion  is normal. Systolic function is normal.    Left Atrium: Atrial septal defect present.  Atrial septum is bulging to the right.    IVC/SVC: Normal venous pressure at 3 mmHg.      Home Sleep Studies     Date/Time: 12/10/2021 8:00 AM  Performed by: Joshua Edwards MD  Authorized by: Evy Cid MD        1 night study  MILD OBSTRUCTIVE SLEEP APNEA with overall AHI 9.6/hr ( 66 events): night #1  Oxygen desaturation: < 70%. SpO2 between 90% to 94% for 28 min.  Patient snored 97% time above 50 .  Heart rate range: 45 bpm - 89 bpm  REC's:  Therapy with APAP at 4-20 cm WP using mask of choice with heated humidification is an option.  Weight loss/management. with regular exercise per direction of physician.  Avoid drowsy driving.  Follow up in sleep clinic to maximize adherence and ensure resolution of symptoms    Results for orders placed during the hospital encounter of 06/23/21    Nuclear Stress - Cardiology Interpreted    Interpretation Summary    Equivocal myocardial perfusion scan.    There is a moderate intensity, fixed defect consistent with scar in the apical wall(s). VS apical artifact    The gated perfusion images showed an ejection fraction of 60% at rest. The gated perfusion images showed an ejection fraction of 53% post stress.    The EKG portion of this study is negative for ischemia.    Results for orders placed during the hospital encounter of 06/23/21    Echo Color Flow Doppler? Yes    Interpretation Summary  · The left ventricle is normal in size with moderate concentric hypertrophy and normal systolic function.  · The estimated ejection fraction is 55%.  · Normal left ventricular diastolic function.  · Normal right ventricular size  with normal right ventricular systolic function.  · Normal central venous pressure (3 mmHg).  · The estimated PA systolic pressure is 31 mmHg.    HOLTER  Sinus rhythm with heart rates varying between 47 and 119 bpm with an average of 66 bpm.  There were very rare PVCs totalling 4 and averaging 0.04 per hour.  There were very rare PACs totalling 58 and averaging 0.6 per hour.      Past Medical History:   Diagnosis Date    Anxiety     Asthma 11/8/2022    Breast cancer     Chest pain     Chest pain 07/02/2021    Cholecystitis without calculus     Decreased ROM of left shoulder 02/15/2022    Diabetes mellitus     Dizziness     Elevated C-reactive protein (CRP)     Essential hypertension     Fall 10/11/2021    Memory change     EVERARDO (obstructive sleep apnea)     Osteoarthritis     Other specified disorders of thyroid     Pre-diabetes 04/15/2021    Chronic, Stable, cont metformin    Screening mammogram, encounter for 04/15/2021    Shoulder injury     SOB (shortness of breath)     Stroke     Thyroiditis     Tingling of left upper extremity 10/29/2021    Vision disturbance 04/30/2021    Weakness of shoulder 02/15/2022       Past Surgical History:   Procedure Laterality Date    ARTHROSCOPIC REPAIR OF ROTATOR CUFF OF SHOULDER Left 1/27/2022    Procedure: REPAIR, ROTATOR CUFF, ARTHROSCOPIC;  Surgeon: Francisco Mathews MD;  Location: [facility] OR;  Service: Orthopedics;  Laterality: Left;    ARTHROSCOPY OF SHOULDER WITH DECOMPRESSION OF SUBACROMIAL SPACE Left 1/27/2022    Procedure: ARTHROSCOPY, SHOULDER, WITH SUBACROMIAL SPACE DECOMPRESSION;  Surgeon: Francisco Mathews MD;  Location: [facility];  Service: Orthopedics;  Laterality: Left;    BREAST BIOPSY      COLONOSCOPY N/A 5/31/2022    Procedure: COLONOSCOPY;  Surgeon: Km Odom MD;  Location: [facility];  Service: Endoscopy;  Laterality: N/A;    ESOPHAGOGASTRODUODENOSCOPY N/A 5/31/2022    Procedure: EGD (ESOPHAGOGASTRODUODENOSCOPY) ;  Surgeon: Km KIRK  MD Lei;  Location: [facility] ENDO;  Service: Endoscopy;  Laterality: N/A;    FIXATION OF TENDON Left 1/27/2022    Procedure: FIXATION, TENDON;  Surgeon: Francisco Mathews MD;  Location: [facility] OR;  Service: Orthopedics;  Laterality: Left;    HYSTERECTOMY      INJECTION OF ANESTHETIC AGENT INTO SACROILIAC JOINT Bilateral 7/29/2022    Procedure: Bilateral SIJ +  Bilateral GT Bursa Injection RN IV Sedation;  Surgeon: Bisi Cochran MD;  Location: [facility] PAIN MGT;  Service: Pain Management;  Laterality: Bilateral;    INJECTION OF JOINT Left 11/12/2021    Procedure: Left suprascapular and axillary injection with local;  Surgeon: Bisi Cochran MD;  Location: [facility] PAIN MGT;  Service: Pain Management;  Laterality: Left;    INJECTION OF JOINT Bilateral 7/29/2022    Procedure: Bilateral SIJ + Bilateral GT Bursa Injection RN IV Sedation;  Surgeon: Bisi Cochran MD;  Location: [facility] PAIN MGT;  Service: Pain Management;  Laterality: Bilateral;    INSERTION OF BREAST TISSUE EXPANDER Left 11/15/2022    Procedure: INSERTION, TISSUE EXPANDER, BREAST;  Surgeon: Collin Pinto MD;  Location: [facility] OR;  Service: Plastics;  Laterality: Left;    MASTECTOMY      ROBOT-ASSISTED CHOLECYSTECTOMY USING DA ROBERTA XI N/A 8/29/2022    Procedure: XI ROBOTIC CHOLECYSTECTOMY;  Surgeon: Raeann Bhandari DO;  Location: [facility] OR;  Service: General;  Laterality: N/A;    TISSUE EXPANDER REMOVAL Left 1/4/2023    Procedure: REMOVAL, TISSUE EXPANDER;  Surgeon: Collin Pinto MD;  Location: [facility] OR;  Service: Plastics;  Laterality: Left;    TOTAL REDUCTION MAMMOPLASTY Right 11/15/2022    Procedure: MAMMOPLASTY, REDUCTION;  Surgeon: Collin Pinto MD;  Location: [facility] OR;  Service: Plastics;  Laterality: Right;       Social History     Tobacco Use    Smoking status: Never     Passive exposure: Current    Smokeless tobacco: Never   Substance Use Topics    Alcohol use: Not Currently    Drug use: Not Currently     Types: Marijuana     Comment: none  currently       Family History   Problem Relation Name Age of Onset    Colon cancer Mother Sara 53        partial colectomy    Stroke Mother Rosemary     Heart disease Mother Sara     Breast cancer Sister Shaneka     Diabetes Sister Shaneka     Throat cancer Maternal Aunt Radha         smoking hx    Lung cancer Maternal Uncle Nirmal         hx smoking    No Known Problems Maternal Grandmother Lima     Prostate cancer Maternal Grandfather Kevin 70    Alcohol abuse Maternal Grandfather Kevin     No Known Problems Paternal Grandmother      No Known Problems Paternal Grandfather      Diabetes Brother Felicity     Diabetes Brother Kevin     Diabetes Brother Wil     Breast cancer Other Justine 37        chemotherapy, planning for BL mastectomy    No Known Problems Other Nitin        Patient's Medications   New Prescriptions    No medications on file   Previous Medications    ALBUTEROL (PROVENTIL/VENTOLIN HFA) 90 MCG/ACTUATION INHALER    Inhale 2 puffs into the lungs every 4 (four) hours as needed.    ASPIRIN (ECOTRIN) 81 MG EC TABLET    Take 1 tablet (81 mg total) by mouth once daily.    BUDESONIDE-FORMOTEROL 160-4.5 MCG (SYMBICORT) 160-4.5 MCG/ACTUATION HFAA    Inhale 2 puffs into the lungs every 12 (twelve) hours. Controller    DULOXETINE (CYMBALTA) 60 MG CAPSULE    Take 60 mg by mouth every morning.    EUTHYROX 50 MCG TABLET    TAKE ONE TABLET BY MOUTH EVERY DAY    GABAPENTIN (NEURONTIN) 300 MG CAPSULE    Take 1 capsule (300 mg total) by mouth 2 (two) times daily.    HYDROXYZINE PAMOATE (VISTARIL) 25 MG CAP    Take 25 mg by mouth every 8 (eight) hours as needed (anxiety).    LINACLOTIDE (LINZESS) 145 MCG CAP CAPSULE    Take 1 capsule (145 mcg total) by mouth once daily.    MAGNESIUM OXIDE (MAG-OX) 400 MG (241.3 MG MAGNESIUM) TABLET    Take 1 tablet (400 mg total) by mouth once daily.    METFORMIN (GLUCOPHAGE-XR) 500 MG ER 24HR TABLET    Take 1 tablet (500 mg total) by mouth daily with breakfast.     MIDODRINE (PROAMATINE) 5 MG TAB    Take 1 tablet (5 mg total) by mouth every meal as needed (if BP is below 110/70).    MONTELUKAST (SINGULAIR) 10 MG TABLET    Take 10 mg by mouth every evening.    NYSTATIN (MYCOSTATIN) 100,000 UNIT/ML SUSPENSION    Take 5 mLs by mouth 4 (four) times daily.    PANTOPRAZOLE (PROTONIX) 40 MG TABLET    Take 1 tablet (40 mg total) by mouth once daily.    PRAVASTATIN (PRAVACHOL) 40 MG TABLET    Take 1 tablet (40 mg total) by mouth every evening.    SOLIFENACIN (VESICARE) 10 MG TABLET    Take 1 tablet (10 mg total) by mouth once daily.    TIRZEPATIDE (MOUNJARO) 15 MG/0.5 ML PNIJ    Inject 15 mg into the skin every 7 days    TOPIRAMATE (TOPAMAX) 25 MG TABLET    Take 25 mg by mouth 2 (two) times daily.    TRAZODONE (DESYREL) 150 MG TABLET    Take 1 tablet (150 mg total) by mouth every evening.    TRUE METRIX GLUCOSE TEST STRIP STRP    2 (two) times daily as needed.    ZIPRASIDONE (GEODON) 40 MG CAP    Take 1 capsule (40 mg total) by mouth 2 (two) times daily.   Modified Medications    No medications on file   Discontinued Medications    No medications on file       Review of Systems   Constitutional: Negative.    HENT: Negative.     Eyes: Negative.    Respiratory:  Positive for shortness of breath.    Cardiovascular:  Positive for chest pain and palpitations.   Gastrointestinal:  Positive for heartburn.   Genitourinary: Negative.    Musculoskeletal: Negative.    Skin: Negative.    Neurological: Negative.    Endo/Heme/Allergies: Negative.    Psychiatric/Behavioral: Negative.     All 12 systems otherwise negative.      Wt Readings from Last 3 Encounters:   11/13/24 127.5 kg (281 lb)   05/13/24 110.3 kg (243 lb 2.7 oz)   02/27/24 110.2 kg (243 lb)     Temp Readings from Last 3 Encounters:   11/13/24 97 °F (36.1 °C) (Temporal)   11/06/23 99.3 °F (37.4 °C) (Tympanic)   08/09/23 97.4 °F (36.3 °C) (Temporal)     BP Readings from Last 3 Encounters:   11/13/24 121/76   05/13/24 92/68   02/27/24  114/82     Pulse Readings from Last 3 Encounters:   11/13/24 81   05/13/24 78   02/20/24 70       LMP  (LMP Unknown)     Objective:   Physical Exam  Vitals and nursing note reviewed.   Constitutional:       General: She is not in acute distress.     Appearance: She is well-developed. She is obese. She is not diaphoretic.   HENT:      Head: Normocephalic and atraumatic.      Nose: Nose normal.   Eyes:      General: No scleral icterus.     Conjunctiva/sclera: Conjunctivae normal.   Neck:      Thyroid: No thyromegaly.      Vascular: No JVD.   Cardiovascular:      Rate and Rhythm: Normal rate and regular rhythm.      Heart sounds: S1 normal and S2 normal. No murmur heard.     No friction rub. No gallop. No S3 or S4 sounds.   Pulmonary:      Effort: Pulmonary effort is normal. No respiratory distress.      Breath sounds: Normal breath sounds. No stridor. No wheezing or rales.   Chest:      Chest wall: No tenderness.   Abdominal:      General: Bowel sounds are normal. There is no distension.      Palpations: Abdomen is soft. There is no mass.      Tenderness: There is no abdominal tenderness. There is no rebound.   Genitourinary:     Comments: Deferred  Musculoskeletal:         General: No tenderness or deformity. Normal range of motion.      Cervical back: Normal range of motion and neck supple.   Lymphadenopathy:      Cervical: No cervical adenopathy.   Skin:     General: Skin is warm and dry.      Coloration: Skin is not pale.      Findings: No erythema or rash.   Neurological:      Mental Status: She is alert and oriented to person, place, and time.      Motor: No abnormal muscle tone.      Coordination: Coordination normal.   Psychiatric:         Behavior: Behavior normal.         Thought Content: Thought content normal.         Judgment: Judgment normal.         Lab Results   Component Value Date     08/09/2023    K 3.1 (L) 08/09/2023     08/09/2023    CO2 27 08/09/2023    BUN 10 08/09/2023    CREATININE  1.1 08/09/2023     08/09/2023    HGBA1C 5.0 05/17/2024    HGBA1C 5.3 06/23/2023    MG 1.5 (L) 07/02/2021    AST 22 08/09/2023    ALT 21 08/09/2023    ALBUMIN 3.7 08/09/2023    PROT 7.4 08/09/2023    BILITOT 0.3 08/09/2023    WBC 4.85 08/09/2023    HGB 13.5 08/09/2023    HCT 40.4 08/09/2023    MCV 85 08/09/2023     08/09/2023    TSH 0.12 (L) 06/20/2024    TSH 1.209 06/23/2023    CHOL 148 07/19/2022    HDL 44 07/19/2022    LDLCALC 84.6 07/19/2022    TRIG 97 07/19/2022    BNP 15 10/29/2021     Assessment:      1. Primary hypertension    2. Obesity, morbid, BMI 40.0-49.9    3. Diabetes mellitus with coincident hypertension    4. AGUILAR (dyspnea on exertion)    5. SOB (shortness of breath)    6. History of breast cancer    7. Palpitations    8. ANGIE (generalized anxiety disorder)    9. History of CVA (cerebrovascular accident)    10. Sleep apnea, unspecified type    11. Hypotension, unspecified hypotension type    12. Other hyperlipidemia    13. Localized edema    14. Status post left mastectomy    15. Chest pain, unspecified type    16. Hyperlipidemia, unspecified hyperlipidemia type          Plan:     1. CP with AGUILAR with abnormal ECG with palpitations   - pharm nuclear stress test - neg 6/21  - f/u pulm has EVERARDO  - Holter - neg  - h/o D dimer elevated, neg CTA for PE in past  - f/u pain -  will have injection by Dr. Cochran, proceed as tolerated   - ECHO 2/2024 with normal bi V function, atrial septal defect, overall normal valves.   - repeat ECHO ordered - has more AGUILAR, upcoming breast surgery     2. HLD with h/o CVA  - cont statin and asa  - f/u neuro     3. Obesity BMI 45 - 274 --> 272 lbs lbs --> BMI 46 - 287 lbs  -->  BMI 38 - 243 lbs.   BMI 45 - 293 lbs   - cont weight loss    - cont tx  - discussed may need gastric sleeve - has gotten back on Mounjaro now     4. DM21c 6.4 --> 5.5 --> 6.3 --> 6.5 --> 5.4  --> 5.0   Cont tx - was on Trulicity and metformin  - is on Mounjaro 15 mg     5. ANGIE, depression  -  cont tx per PCP and psych    7. HTN with edema - recently low BP sec to diarrhea with orthostasis   - DC HCTZ, cont Florinef, cont PRN midodrine   - LE u/s - neg  - is taking Florinef and/or Midodrine  - rec compression stocking therapy   - is on Norvasc now     7. H/o Breast CA, s/p R breast Surg  - cont tx/ fu with onc  - will have Mastectomy - in April 2025    8. EVERARDO  - cont CPAP, doing well     9. GERD  - cont PPI  - will have EGD/Cscope - for surveillance - Li Fraumeni syndrome.     10. Hypothyroidism TSH - 1.5 --> 1.4   - cont Euthyrox    11. Pre-OP CV evaluation prior to mastectomy April 2025 - h/o breast CA   Low periop risk of CV events for moderate risk procedure.  Ok to proceed to the scheduled surgery without further cardiac study.  OK to hold Aspirin 7 days before the procedure and resume ASAP postop.  Continue Statin periop.      Visit today included increased complexity associated with the care of the episodic problem hypotension addressed and managing the longitudinal care of the patient due to the serious and/or complex managed problem(s) .      Thank you for allowing me to participate in this patient's care. Please do not hesitate to contact me with any questions or concerns. Consult note has been forwarded to the referral physician.

## 2025-03-04 ENCOUNTER — HOSPITAL ENCOUNTER (EMERGENCY)
Facility: HOSPITAL | Age: 45
Discharge: HOME | End: 2025-03-04
Attending: EMERGENCY MEDICINE
Payer: MEDICARE

## 2025-03-04 VITALS
SYSTOLIC BLOOD PRESSURE: 130 MMHG | OXYGEN SATURATION: 98 % | WEIGHT: 292 LBS | HEART RATE: 86 BPM | BODY MASS INDEX: 45.83 KG/M2 | HEIGHT: 67 IN | RESPIRATION RATE: 22 BRPM | TEMPERATURE: 99 F | DIASTOLIC BLOOD PRESSURE: 70 MMHG

## 2025-03-04 DIAGNOSIS — R05.9 COUGH: ICD-10-CM

## 2025-03-04 DIAGNOSIS — J10.1 INFLUENZA A: Primary | ICD-10-CM

## 2025-03-04 LAB
ALBUMIN SERPL BCP-MCNC: 3.8 G/DL (ref 3.5–5.2)
ALP SERPL-CCNC: 97 U/L (ref 40–150)
ALT SERPL W/O P-5'-P-CCNC: 15 U/L (ref 10–44)
ANION GAP SERPL CALC-SCNC: 9 MMOL/L (ref 8–16)
AST SERPL-CCNC: 14 U/L (ref 10–40)
BASOPHILS # BLD AUTO: 0.03 K/UL (ref 0–0.2)
BASOPHILS NFR BLD: 0.5 % (ref 0–1.9)
BILIRUB SERPL-MCNC: 0.3 MG/DL (ref 0.1–1)
BUN SERPL-MCNC: 9 MG/DL (ref 6–20)
CALCIUM SERPL-MCNC: 9.4 MG/DL (ref 8.7–10.5)
CHLORIDE SERPL-SCNC: 106 MMOL/L (ref 95–110)
CO2 SERPL-SCNC: 23 MMOL/L (ref 23–29)
CREAT SERPL-MCNC: 1.1 MG/DL (ref 0.5–1.4)
CTP QC/QA: YES
CTP QC/QA: YES
DIFFERENTIAL METHOD BLD: ABNORMAL
EOSINOPHIL # BLD AUTO: 0 K/UL (ref 0–0.5)
EOSINOPHIL NFR BLD: 0.6 % (ref 0–8)
ERYTHROCYTE [DISTWIDTH] IN BLOOD BY AUTOMATED COUNT: 15.9 % (ref 11.5–14.5)
EST. GFR  (NO RACE VARIABLE): >60 ML/MIN/1.73 M^2
GLUCOSE SERPL-MCNC: 91 MG/DL (ref 70–110)
HCT VFR BLD AUTO: 41.1 % (ref 37–48.5)
HEP C VIRUS HOLD SPECIMEN: NORMAL
HGB BLD-MCNC: 13.5 G/DL (ref 12–16)
IMM GRANULOCYTES # BLD AUTO: 0.03 K/UL (ref 0–0.04)
IMM GRANULOCYTES NFR BLD AUTO: 0.5 % (ref 0–0.5)
LACTATE SERPL-SCNC: 1.1 MMOL/L (ref 0.5–2.2)
LYMPHOCYTES # BLD AUTO: 1 K/UL (ref 1–4.8)
LYMPHOCYTES NFR BLD: 14.9 % (ref 18–48)
MAGNESIUM SERPL-MCNC: 1.6 MG/DL (ref 1.6–2.6)
MCH RBC QN AUTO: 27.1 PG (ref 27–31)
MCHC RBC AUTO-ENTMCNC: 32.8 G/DL (ref 32–36)
MCV RBC AUTO: 82 FL (ref 82–98)
MONOCYTES # BLD AUTO: 0.3 K/UL (ref 0.3–1)
MONOCYTES NFR BLD: 4.7 % (ref 4–15)
NEUTROPHILS # BLD AUTO: 5.2 K/UL (ref 1.8–7.7)
NEUTROPHILS NFR BLD: 78.8 % (ref 38–73)
NRBC BLD-RTO: 0 /100 WBC
PLATELET # BLD AUTO: 314 K/UL (ref 150–450)
PMV BLD AUTO: 9.7 FL (ref 9.2–12.9)
POC MOLECULAR INFLUENZA A AGN: POSITIVE
POC MOLECULAR INFLUENZA B AGN: NEGATIVE
POTASSIUM SERPL-SCNC: 3.9 MMOL/L (ref 3.5–5.1)
PROT SERPL-MCNC: 7.9 G/DL (ref 6–8.4)
RBC # BLD AUTO: 4.99 M/UL (ref 4–5.4)
SARS-COV-2 RDRP RESP QL NAA+PROBE: NEGATIVE
SODIUM SERPL-SCNC: 138 MMOL/L (ref 136–145)
TROPONIN I SERPL DL<=0.01 NG/ML-MCNC: 0.01 NG/ML (ref 0–0.03)
WBC # BLD AUTO: 6.57 K/UL (ref 3.9–12.7)

## 2025-03-04 PROCEDURE — 93005 ELECTROCARDIOGRAM TRACING: CPT | Mod: ER

## 2025-03-04 PROCEDURE — 99285 EMERGENCY DEPT VISIT HI MDM: CPT | Mod: 25,ER

## 2025-03-04 PROCEDURE — 83735 ASSAY OF MAGNESIUM: CPT | Mod: ER | Performed by: EMERGENCY MEDICINE

## 2025-03-04 PROCEDURE — 85025 COMPLETE CBC W/AUTO DIFF WBC: CPT | Mod: ER | Performed by: EMERGENCY MEDICINE

## 2025-03-04 PROCEDURE — 87502 INFLUENZA DNA AMP PROBE: CPT | Mod: ER

## 2025-03-04 PROCEDURE — 86803 HEPATITIS C AB TEST: CPT | Performed by: EMERGENCY MEDICINE

## 2025-03-04 PROCEDURE — 83605 ASSAY OF LACTIC ACID: CPT | Mod: ER | Performed by: EMERGENCY MEDICINE

## 2025-03-04 PROCEDURE — 84484 ASSAY OF TROPONIN QUANT: CPT | Mod: ER | Performed by: EMERGENCY MEDICINE

## 2025-03-04 PROCEDURE — 94640 AIRWAY INHALATION TREATMENT: CPT | Mod: ER

## 2025-03-04 PROCEDURE — 87635 SARS-COV-2 COVID-19 AMP PRB: CPT | Mod: ER | Performed by: EMERGENCY MEDICINE

## 2025-03-04 PROCEDURE — 80053 COMPREHEN METABOLIC PANEL: CPT | Mod: ER | Performed by: EMERGENCY MEDICINE

## 2025-03-04 PROCEDURE — 94761 N-INVAS EAR/PLS OXIMETRY MLT: CPT | Mod: ER

## 2025-03-04 PROCEDURE — 25000242 PHARM REV CODE 250 ALT 637 W/ HCPCS: Mod: ER | Performed by: EMERGENCY MEDICINE

## 2025-03-04 PROCEDURE — 87389 HIV-1 AG W/HIV-1&-2 AB AG IA: CPT | Performed by: EMERGENCY MEDICINE

## 2025-03-04 PROCEDURE — 25000003 PHARM REV CODE 250: Mod: ER | Performed by: EMERGENCY MEDICINE

## 2025-03-04 PROCEDURE — 93010 ELECTROCARDIOGRAM REPORT: CPT | Mod: ,,, | Performed by: INTERNAL MEDICINE

## 2025-03-04 RX ORDER — OSELTAMIVIR PHOSPHATE 75 MG/1
75 CAPSULE ORAL
Status: COMPLETED | OUTPATIENT
Start: 2025-03-04 | End: 2025-03-04

## 2025-03-04 RX ORDER — IPRATROPIUM BROMIDE AND ALBUTEROL SULFATE 2.5; .5 MG/3ML; MG/3ML
3 SOLUTION RESPIRATORY (INHALATION)
Status: COMPLETED | OUTPATIENT
Start: 2025-03-04 | End: 2025-03-04

## 2025-03-04 RX ORDER — ACETAMINOPHEN 325 MG/1
650 TABLET ORAL
Status: COMPLETED | OUTPATIENT
Start: 2025-03-04 | End: 2025-03-04

## 2025-03-04 RX ORDER — OSELTAMIVIR PHOSPHATE 75 MG/1
75 CAPSULE ORAL 2 TIMES DAILY
Qty: 10 CAPSULE | Refills: 0 | Status: SHIPPED | OUTPATIENT
Start: 2025-03-04 | End: 2025-03-09

## 2025-03-04 RX ORDER — FLUTICASONE PROPIONATE 50 MCG
1 SPRAY, SUSPENSION (ML) NASAL 2 TIMES DAILY PRN
Qty: 15 G | Refills: 0 | Status: SHIPPED | OUTPATIENT
Start: 2025-03-04

## 2025-03-04 RX ORDER — PROMETHAZINE HYDROCHLORIDE AND DEXTROMETHORPHAN HYDROBROMIDE 6.25; 15 MG/5ML; MG/5ML
5 SYRUP ORAL EVERY 4 HOURS PRN
Qty: 118 ML | Refills: 0 | Status: SHIPPED | OUTPATIENT
Start: 2025-03-04 | End: 2025-03-14

## 2025-03-04 RX ADMIN — IPRATROPIUM BROMIDE AND ALBUTEROL SULFATE 3 ML: 2.5; .5 SOLUTION RESPIRATORY (INHALATION) at 09:03

## 2025-03-04 RX ADMIN — OSELTAMIVIR PHOSPHATE 75 MG: 75 CAPSULE ORAL at 10:03

## 2025-03-04 RX ADMIN — ACETAMINOPHEN 650 MG: 325 TABLET ORAL at 10:03

## 2025-03-05 LAB
HCV AB SERPL QL IA: NEGATIVE
HIV 1+2 AB+HIV1 P24 AG SERPL QL IA: NEGATIVE
OHS QRS DURATION: 84 MS
OHS QTC CALCULATION: 404 MS

## 2025-03-05 NOTE — ED PROVIDER NOTES
Encounter Date: 3/4/2025       History     Chief Complaint   Patient presents with    Cough     Cough, SOB ,and chest pain started two days ago. With body aches and chills      The history is provided by the patient.   Cough  This is a new problem. The current episode started two days ago. The problem occurs constantly. The problem has been unchanged. The cough is Non-productive. The maximum temperature recorded prior to her arrival was 100 - 100.9 F. Associated symptoms include chest pain, chills, rhinorrhea, myalgias, shortness of breath and wheezing.     Review of patient's allergies indicates:   Allergen Reactions    Lisinopril     Lurasidone Other (See Comments)     Uncontrolled movement     Past Medical History:   Diagnosis Date    Anxiety     Asthma 11/8/2022    Breast cancer     Chest pain     Chest pain 07/02/2021    Cholecystitis without calculus     Decreased ROM of left shoulder 02/15/2022    Diabetes mellitus     Dizziness     Elevated C-reactive protein (CRP)     Essential hypertension     Fall 10/11/2021    Memory change     EVERARDO (obstructive sleep apnea)     Osteoarthritis     Other specified disorders of thyroid     Pre-diabetes 04/15/2021    Chronic, Stable, cont metformin    Screening mammogram, encounter for 04/15/2021    Shoulder injury     SOB (shortness of breath)     Stroke     Thyroiditis     Tingling of left upper extremity 10/29/2021    Vision disturbance 04/30/2021    Weakness of shoulder 02/15/2022     Past Surgical History:   Procedure Laterality Date    ARTHROSCOPIC REPAIR OF ROTATOR CUFF OF SHOULDER Left 1/27/2022    Procedure: REPAIR, ROTATOR CUFF, ARTHROSCOPIC;  Surgeon: Francisco Mathews MD;  Location: [facility] OR;  Service: Orthopedics;  Laterality: Left;    ARTHROSCOPY OF SHOULDER WITH DECOMPRESSION OF SUBACROMIAL SPACE Left 1/27/2022    Procedure: ARTHROSCOPY, SHOULDER, WITH SUBACROMIAL SPACE DECOMPRESSION;  Surgeon: Francisco Mathews MD;  Location: [facility] OR;  Service:  Orthopedics;  Laterality: Left;    BREAST BIOPSY      COLONOSCOPY N/A 5/31/2022    Procedure: COLONOSCOPY;  Surgeon: Km Odom MD;  Location: [facility] ENDO;  Service: Endoscopy;  Laterality: N/A;    ESOPHAGOGASTRODUODENOSCOPY N/A 5/31/2022    Procedure: EGD (ESOPHAGOGASTRODUODENOSCOPY) ;  Surgeon: Km Odom MD;  Location: [facility] ENDO;  Service: Endoscopy;  Laterality: N/A;    FIXATION OF TENDON Left 1/27/2022    Procedure: FIXATION, TENDON;  Surgeon: Francisco Mathews MD;  Location: [facility] OR;  Service: Orthopedics;  Laterality: Left;    HYSTERECTOMY      INJECTION OF ANESTHETIC AGENT INTO SACROILIAC JOINT Bilateral 7/29/2022    Procedure: Bilateral SIJ +  Bilateral GT Bursa Injection RN IV Sedation;  Surgeon: Bisi Cochran MD;  Location: [facility] PAIN MGT;  Service: Pain Management;  Laterality: Bilateral;    INJECTION OF JOINT Left 11/12/2021    Procedure: Left suprascapular and axillary injection with local;  Surgeon: Bisi Cochran MD;  Location: [facility] PAIN MGT;  Service: Pain Management;  Laterality: Left;    INJECTION OF JOINT Bilateral 7/29/2022    Procedure: Bilateral SIJ + Bilateral GT Bursa Injection RN IV Sedation;  Surgeon: Bisi Cochran MD;  Location: [facility] PAIN MGT;  Service: Pain Management;  Laterality: Bilateral;    INSERTION OF BREAST TISSUE EXPANDER Left 11/15/2022    Procedure: INSERTION, TISSUE EXPANDER, BREAST;  Surgeon: Collin Pinto MD;  Location: [facility] OR;  Service: Plastics;  Laterality: Left;    MASTECTOMY      ROBOT-ASSISTED CHOLECYSTECTOMY USING DA ROBERTA XI N/A 8/29/2022    Procedure: XI ROBOTIC CHOLECYSTECTOMY;  Surgeon: Raeann Bhandari DO;  Location: [facility] OR;  Service: General;  Laterality: N/A;    TISSUE EXPANDER REMOVAL Left 1/4/2023    Procedure: REMOVAL, TISSUE EXPANDER;  Surgeon: Collin Pinto MD;  Location: [facility] OR;  Service: Plastics;  Laterality: Left;    TOTAL REDUCTION MAMMOPLASTY Right 11/15/2022    Procedure: MAMMOPLASTY, REDUCTION;  Surgeon: Collin  SHWETA Pinto MD;  Location: [facility] OR;  Service: Plastics;  Laterality: Right;     Family History   Problem Relation Name Age of Onset    Colon cancer Mother Sara 53        partial colectomy    Stroke Mother Rosemary     Heart disease Mother Sara     Breast cancer Sister Shaneka     Diabetes Sister Shaneka     Throat cancer Maternal Aunt Radha         smoking hx    Lung cancer Maternal Uncle Nirmal         hx smoking    No Known Problems Maternal Grandmother Lima     Prostate cancer Maternal Grandfather Kevin 70    Alcohol abuse Maternal Grandfather Kevin     No Known Problems Paternal Grandmother      No Known Problems Paternal Grandfather      Diabetes Brother Felicity     Diabetes Brother Kevin     Diabetes Brother Wil     Breast cancer Other Justine 37        chemotherapy, planning for BL mastectomy    No Known Problems Other Nitin      Social History[1]  Review of Systems   Constitutional:  Positive for chills.   HENT:  Positive for congestion and rhinorrhea.    Respiratory:  Positive for cough, shortness of breath and wheezing.    Cardiovascular:  Positive for chest pain.   Musculoskeletal:  Positive for myalgias.       Physical Exam     Initial Vitals [03/04/25 2130]   BP Pulse Resp Temp SpO2   (!) 138/92 90 (!) 24 99.3 °F (37.4 °C) 99 %      MAP       --         Physical Exam    Nursing note and vitals reviewed.  Constitutional: She appears well-developed and well-nourished. No distress.   HENT:   Head: Normocephalic and atraumatic.   Nose: Mucosal edema present. Mouth/Throat: Oropharynx is clear and moist.   Eyes: Conjunctivae and EOM are normal. Pupils are equal, round, and reactive to light.   Neck: Neck supple.   Normal range of motion.  Cardiovascular:  Normal rate, regular rhythm and normal heart sounds.           Pulmonary/Chest: Tachypnea noted. No respiratory distress. She has wheezes.   Abdominal: Abdomen is soft. Bowel sounds are normal. She exhibits no distension. There is no  abdominal tenderness.   Musculoskeletal:         General: Normal range of motion.      Cervical back: Normal range of motion and neck supple.     Neurological: She is alert and oriented to person, place, and time. She has normal strength.   Skin: Skin is warm and dry.   Psychiatric: She has a normal mood and affect. Thought content normal.         ED Course   Procedures  Labs Reviewed   CBC W/ AUTO DIFFERENTIAL - Abnormal       Result Value    WBC 6.57      RBC 4.99      Hemoglobin 13.5      Hematocrit 41.1      MCV 82      MCH 27.1      MCHC 32.8      RDW 15.9 (*)     Platelets 314      MPV 9.7      Immature Granulocytes 0.5      Gran # (ANC) 5.2      Immature Grans (Abs) 0.03      Lymph # 1.0      Mono # 0.3      Eos # 0.0      Baso # 0.03      nRBC 0      Gran % 78.8 (*)     Lymph % 14.9 (*)     Mono % 4.7      Eosinophil % 0.6      Basophil % 0.5      Differential Method Automated      Narrative:     Release to patient->Immediate   POCT INFLUENZA A/B MOLECULAR - Abnormal    POC Molecular Influenza A Ag Positive (*)     POC Molecular Influenza B Ag Negative       Acceptable Yes     COMPREHENSIVE METABOLIC PANEL    Sodium 138      Potassium 3.9      Chloride 106      CO2 23      Glucose 91      BUN 9      Creatinine 1.1      Calcium 9.4      Total Protein 7.9      Albumin 3.8      Total Bilirubin 0.3      Alkaline Phosphatase 97      AST 14      ALT 15      eGFR >60.0      Anion Gap 9      Narrative:     Release to patient->Immediate   LACTIC ACID, PLASMA    Lactate (Lactic Acid) 1.1     TROPONIN I    Troponin I 0.008      Narrative:     Release to patient->Immediate   MAGNESIUM    Magnesium 1.6      Narrative:     Release to patient->Immediate   HEPATITIS C ANTIBODY   HEP C VIRUS HOLD SPECIMEN   HIV 1 / 2 ANTIBODY   URINALYSIS, REFLEX TO URINE CULTURE   SARS-COV-2 RDRP GENE    POC Rapid COVID Negative       Acceptable Yes       EKG Readings: (Independently Interpreted)   Rhythm:  Normal Sinus Rhythm. Heart Rate: 95. ST Segments: Normal ST Segments. T Waves: Normal. Axis: Left Axis Deviation. Clinical Impression: Normal Sinus Rhythm       Imaging Results              X-Ray Chest 1 View (Final result)  Result time 03/04/25 22:29:05      Final result by Fritz Gonzalez MD (03/04/25 22:29:05)                   Impression:      No acute abnormality.      Electronically signed by: Fritz Gonzalez  Date:    03/04/2025  Time:    22:29               Narrative:    EXAMINATION:  XR CHEST 1 VIEW    CLINICAL HISTORY:  Cough, unspecified    TECHNIQUE:  Single frontal view of the chest was performed.    COMPARISON:  None    FINDINGS:  Right-sided port.  No pleural effusion or pneumothorax.  Mild central pulmonary vascular crowding    Prominent cardiac silhouette..  The hilar and mediastinal contours are unremarkable.    Bones are intact.                                       Medications   albuterol-ipratropium 2.5 mg-0.5 mg/3 mL nebulizer solution 3 mL (3 mLs Nebulization Given 3/4/25 2143)   acetaminophen tablet 650 mg (650 mg Oral Given 3/4/25 2207)   oseltamivir capsule 75 mg (75 mg Oral Given 3/4/25 2210)     Medical Decision Making  DDx Covid, Flu, Pneumonia, Sepsis    Problems Addressed:  Cough: acute illness or injury  Influenza A: acute illness or injury    Amount and/or Complexity of Data Reviewed  Labs: ordered.  Radiology: ordered.  ECG/medicine tests: ordered and independent interpretation performed. Decision-making details documented in ED Course.    Risk  OTC drugs.  Prescription drug management.  Decision regarding hospitalization.    10:45 PM - Counseling: Spoke with the patient and discussed todays findings, in addition to providing specific details for the plan of care and counseling regarding the diagnosis and prognosis. Questions are answered at this time.                                     Clinical Impression:  Final diagnoses:  [R05.9] Cough  [J10.1] Influenza A (Primary)          ED  Disposition Condition    Discharge Stable          ED Prescriptions       Medication Sig Dispense Start Date End Date Auth. Provider    oseltamivir (TAMIFLU) 75 MG capsule Take 1 capsule (75 mg total) by mouth 2 (two) times daily. for 5 days 10 capsule 3/4/2025 3/9/2025 Francisco De Leon MD    promethazine-dextromethorphan (PROMETHAZINE-DM) 6.25-15 mg/5 mL Syrp Take 5 mLs by mouth every 4 (four) hours as needed. 118 mL 3/4/2025 3/14/2025 Francisco De Leon MD    fluticasone propionate (FLONASE) 50 mcg/actuation nasal spray 1 spray (50 mcg total) by Each Nostril route 2 (two) times daily as needed. 15 g 3/4/2025 -- Francisco De Leon MD          Follow-up Information       Follow up With Specialties Details Why Contact Info    Sam Garcia MD Family Medicine Schedule an appointment as soon as possible for a visit   [address] [place] [id number]  [phone]                   [1]   Social History  Tobacco Use    Smoking status: Never     Passive exposure: Current    Smokeless tobacco: Never   Substance Use Topics    Alcohol use: Not Currently    Drug use: Not Currently     Types: Marijuana     Comment: none currently        Francisco De Leon MD  03/04/25 3552

## (undated) DEVICE — CANNULA TWIST IN 7MM X 7CM

## (undated) DEVICE — GOWN SMARTGOWN LVL4 X-LONG XL

## (undated) DEVICE — ELECTRODE REM PLYHSV RETURN 9

## (undated) DEVICE — SUT 3-0 VICRYL / SH (J416)

## (undated) DEVICE — TOWEL OR DISP STRL BLUE 4/PK

## (undated) DEVICE — APPLICATOR CHLORAPREP ORN 26ML

## (undated) DEVICE — DRAPE SURG W/TWL 17 5/8X23

## (undated) DEVICE — SET SECONDARY UNIVERSAL

## (undated) DEVICE — PACK BASIC SETUP SC BR

## (undated) DEVICE — DEVICE CLOSURE DISP 14G

## (undated) DEVICE — CLOSURE SKIN STERI STRIP 1/2X4

## (undated) DEVICE — BNDG COFLEX FOAM LF2 ST 4X5YD

## (undated) DEVICE — BRA MAMMARY SUPPORT XLARGE

## (undated) DEVICE — SUT VICRYL PLUS 3-0 SH 18IN

## (undated) DEVICE — SEE MEDLINE ITEM 157131

## (undated) DEVICE — PAD ABD 8X10 STERILE

## (undated) DEVICE — BAG TISSUE RETRIEVAL 5MM

## (undated) DEVICE — DRAPE INCISE IOBAN 2 23X17IN

## (undated) DEVICE — MANIFOLD 4 PORT

## (undated) DEVICE — SEE MEDLINE ITEM 146292

## (undated) DEVICE — SEE MEDLINE ITEM 157166

## (undated) DEVICE — SOL WATER STRL IRR 1000ML

## (undated) DEVICE — SUT VICRYL 2-0 27 CT-1

## (undated) DEVICE — SUT VICRYL PLUS 2-0 CT1 18

## (undated) DEVICE — BURR OVAL 8 FLUTE 4MMX13CM

## (undated) DEVICE — DRAPE PLASTIC U 60X72

## (undated) DEVICE — SUT ETHICON 3-0 BLK MONO PS

## (undated) DEVICE — PACK FLUID CONTROL SHOULDER

## (undated) DEVICE — GAUZE SPONGE 4X4 12PLY

## (undated) DEVICE — SUPPORT ULNA NERVE PROTECTOR

## (undated) DEVICE — SYR 3CC LUER LOC

## (undated) DEVICE — SET CASSETTE TUBE DW OUTFLOW

## (undated) DEVICE — SLING ULTRASLING II LG +13IN

## (undated) DEVICE — COVER LIGHT HANDLE 80/CA

## (undated) DEVICE — SEE MEDLINE ITEM 157117

## (undated) DEVICE — EVACUATOR WOUND BULB 100CC

## (undated) DEVICE — GLOVE BIOGEL ORTHOPEDIC 7.5

## (undated) DEVICE — Device

## (undated) DEVICE — NDL HYPODERMIC BLUNT 18G 1.5IN

## (undated) DEVICE — DRESSING CURITY WOUND 10X30IN

## (undated) DEVICE — SUT 2/0 30IN SILK BLK BRAI

## (undated) DEVICE — TUBING ARTHRO IRR 4-LEAD

## (undated) DEVICE — SOL STRL WATER INJ 1000ML BG

## (undated) DEVICE — TUBING PUMP ARTHROSCOPY STRL

## (undated) DEVICE — GOWN POLY REINF BRTH SLV XL

## (undated) DEVICE — NDL SAFETY 22G X 1.5 ECLIPSE

## (undated) DEVICE — COVER SURG LIGHT HANDLE

## (undated) DEVICE — SUT PROLENE 2-0 SH 36IN BLU

## (undated) DEVICE — BLADE COOLCUT EXCALIBER 4X13

## (undated) DEVICE — DRAPE ARM DAVINCI XI

## (undated) DEVICE — SYR 10CC LUER LOCK

## (undated) DEVICE — DRESSING XEROFORM FOIL PK 1X8

## (undated) DEVICE — TOWEL OR NONABSORB ADH 17X26

## (undated) DEVICE — BOWL STERILE LARGE 32OZ

## (undated) DEVICE — COVER TIP CURVED SCISSORS XI

## (undated) DEVICE — DRAPE THREE-QTR REINF 53X77IN

## (undated) DEVICE — KIT TRIMANO

## (undated) DEVICE — CANNULA PASSPORT 8 MM X 4CM.

## (undated) DEVICE — UNDERGLOVE BIOGEL PI SZ 6.5 LF

## (undated) DEVICE — GLOVE SURGICAL LATEX SZ 6.5

## (undated) DEVICE — ADHESIVE DERMABOND ADVANCED

## (undated) DEVICE — SEE MEDLINE ITEM 157027

## (undated) DEVICE — DRAPE ABDOMINAL TIBURON 14X11

## (undated) DEVICE — KIT FIBERTAK DISP ANCHOR 2.6

## (undated) DEVICE — GLOVE SURG BIOGEL LATEX SZ 7.5

## (undated) DEVICE — SET PNEUMOCLEAR HEAT HUM SE HF

## (undated) DEVICE — TUBING SUCTION STRAIGHT .25X20

## (undated) DEVICE — DRESSING GAUZE XEROFORM 5X9

## (undated) DEVICE — KIT ANTIFOG W/SPONG & FLUID

## (undated) DEVICE — UNDERGLOVES BIOGEL PI SIZE 8

## (undated) DEVICE — NDL SPINAL 18GX3.5 SPINOCAN

## (undated) DEVICE — TAPE SURGICAL MICROFOAM 4IN

## (undated) DEVICE — SUT CTD VICRYL 0 UND BR SUT

## (undated) DEVICE — CLIP HEMO-LOK ML

## (undated) DEVICE — POSITIONER HEAD DONUT 9IN FOAM

## (undated) DEVICE — SUT PDS II 3-0 PS-1 CLEAR M

## (undated) DEVICE — ELECTRODE BLADE E-Z CLEAN 4IN

## (undated) DEVICE — COVER CAMERA OPERATING ROOM

## (undated) DEVICE — NDL ARTHSCP MF SCORPION

## (undated) DEVICE — PROBE MULTI PORT RF 90 DEGREE

## (undated) DEVICE — INSTRAMOD SHOULDER TRACTION

## (undated) DEVICE — SEE MEDLINE ITEM 157216

## (undated) DEVICE — SUT MONOCRYL 4.0 PS2 CP496G

## (undated) DEVICE — DRAPE CHEST FEN 15X10IN

## (undated) DEVICE — SYR 50CC LL

## (undated) DEVICE — OBTURATOR BLADELESS 8MM XI CLR

## (undated) DEVICE — DRAPE STERI U-SHAPED 47X51IN

## (undated) DEVICE — SUT MONOCRYL PLUS UD 3-0 27

## (undated) DEVICE — SUT MCRYL PLUS 3-0 PS2 27IN

## (undated) DEVICE — DRAPE HIP TIBURON 87X115X134

## (undated) DEVICE — DRAPE COLUMN DAVINCI XI

## (undated) DEVICE — STOPCOCK DISCOFIX 3 WAY

## (undated) DEVICE — SET IV PRIMARY